# Patient Record
Sex: MALE | Race: WHITE | Employment: UNEMPLOYED | URBAN - NONMETROPOLITAN AREA
[De-identification: names, ages, dates, MRNs, and addresses within clinical notes are randomized per-mention and may not be internally consistent; named-entity substitution may affect disease eponyms.]

---

## 2019-02-07 ENCOUNTER — HOSPITAL ENCOUNTER (EMERGENCY)
Age: 34
Discharge: HOME | End: 2019-02-08
Payer: MEDICARE

## 2019-02-07 VITALS
TEMPERATURE: 98 F | WEIGHT: 185 LBS | DIASTOLIC BLOOD PRESSURE: 71 MMHG | OXYGEN SATURATION: 96 % | HEIGHT: 70 IN | RESPIRATION RATE: 20 BRPM | SYSTOLIC BLOOD PRESSURE: 108 MMHG | BODY MASS INDEX: 26.48 KG/M2 | HEART RATE: 126 BPM

## 2019-02-07 DIAGNOSIS — F41.1 ANXIETY STATE: ICD-10-CM

## 2019-02-07 DIAGNOSIS — R33.9 URINARY RETENTION: Primary | ICD-10-CM

## 2019-02-07 LAB
ALBUMIN SERPL-MCNC: 4.6 G/DL (ref 3.5–5.1)
ALP BLD-CCNC: 74 U/L (ref 38–126)
ALT SERPL-CCNC: 72 U/L (ref 11–66)
AMPHETAMINE+METHAMPHETAMINE URINE SCREEN: NEGATIVE
ANION GAP SERPL CALCULATED.3IONS-SCNC: 17 MEQ/L (ref 8–16)
AST SERPL-CCNC: 68 U/L (ref 5–40)
BACTERIA: NORMAL
BARBITURATE QUANTITATIVE URINE: NEGATIVE
BASOPHILS # BLD: 0.4 %
BASOPHILS ABSOLUTE: 0 THOU/MM3 (ref 0–0.1)
BENZODIAZEPINE QUANTITATIVE URINE: NEGATIVE
BILIRUB SERPL-MCNC: 0.5 MG/DL (ref 0.3–1.2)
BILIRUBIN URINE: NEGATIVE
BLOOD, URINE: NEGATIVE
BUN BLDV-MCNC: 12 MG/DL (ref 7–22)
CALCIUM SERPL-MCNC: 9.8 MG/DL (ref 8.5–10.5)
CANNABINOID QUANTITATIVE URINE: NEGATIVE
CASTS: NORMAL /LPF
CASTS: NORMAL /LPF
CHARACTER, URINE: CLEAR
CHLORIDE BLD-SCNC: 98 MEQ/L (ref 98–111)
CO2: 23 MEQ/L (ref 23–33)
COCAINE METABOLITE QUANTITATIVE URINE: NEGATIVE
COLOR: YELLOW
CREAT SERPL-MCNC: 1.1 MG/DL (ref 0.4–1.2)
CRYSTALS: NORMAL
EKG ATRIAL RATE: 108 BPM
EKG P AXIS: 49 DEGREES
EKG P-R INTERVAL: 160 MS
EKG Q-T INTERVAL: 334 MS
EKG QRS DURATION: 88 MS
EKG QTC CALCULATION (BAZETT): 447 MS
EKG R AXIS: 35 DEGREES
EKG T AXIS: 47 DEGREES
EKG VENTRICULAR RATE: 108 BPM
EOSINOPHIL # BLD: 1.5 %
EOSINOPHILS ABSOLUTE: 0.2 THOU/MM3 (ref 0–0.4)
EPITHELIAL CELLS, UA: NORMAL /HPF
ERYTHROCYTE [DISTWIDTH] IN BLOOD BY AUTOMATED COUNT: 12.7 % (ref 11.5–14.5)
ERYTHROCYTE [DISTWIDTH] IN BLOOD BY AUTOMATED COUNT: 39.4 FL (ref 35–45)
GFR SERPL CREATININE-BSD FRML MDRD: 76 ML/MIN/1.73M2
GLUCOSE BLD-MCNC: 112 MG/DL (ref 70–108)
GLUCOSE, URINE: NEGATIVE MG/DL
HCT VFR BLD CALC: 42.3 % (ref 42–52)
HEMOGLOBIN: 14.2 GM/DL (ref 14–18)
IMMATURE GRANS (ABS): 0.03 THOU/MM3 (ref 0–0.07)
IMMATURE GRANULOCYTES: 0.3 %
KETONES, URINE: NEGATIVE
LEUKOCYTE ESTERASE, URINE: NEGATIVE
LYMPHOCYTES # BLD: 26.8 %
LYMPHOCYTES ABSOLUTE: 2.9 THOU/MM3 (ref 1–4.8)
MCH RBC QN AUTO: 28.9 PG (ref 26–33)
MCHC RBC AUTO-ENTMCNC: 33.6 GM/DL (ref 32.2–35.5)
MCV RBC AUTO: 86 FL (ref 80–94)
MISCELLANEOUS LAB TEST RESULT: NORMAL
MONOCYTES # BLD: 13.2 %
MONOCYTES ABSOLUTE: 1.4 THOU/MM3 (ref 0.4–1.3)
NITRITE, URINE: NEGATIVE
NUCLEATED RED BLOOD CELLS: 0 /100 WBC
OPIATES, URINE: NEGATIVE
OSMOLALITY CALCULATION: 276.2 MOSMOL/KG (ref 275–300)
OXYCODONE: NEGATIVE
PH UA: 5
PHENCYCLIDINE QUANTITATIVE URINE: NEGATIVE
PLATELET # BLD: 306 THOU/MM3 (ref 130–400)
PMV BLD AUTO: 9.7 FL (ref 9.4–12.4)
POTASSIUM SERPL-SCNC: 4.2 MEQ/L (ref 3.5–5.2)
PROTEIN UA: NEGATIVE MG/DL
RBC # BLD: 4.92 MILL/MM3 (ref 4.7–6.1)
RBC URINE: NORMAL /HPF
RENAL EPITHELIAL, UA: NORMAL
SEG NEUTROPHILS: 57.8 %
SEGMENTED NEUTROPHILS ABSOLUTE COUNT: 6.2 THOU/MM3 (ref 1.8–7.7)
SODIUM BLD-SCNC: 138 MEQ/L (ref 135–145)
SPECIFIC GRAVITY UA: 1.02 (ref 1–1.03)
TOTAL PROTEIN: 7.4 G/DL (ref 6.1–8)
TRICHOMONAS, URINE, MALE: NORMAL
UROBILINOGEN, URINE: 0.2 EU/DL
WBC # BLD: 10.8 THOU/MM3 (ref 4.8–10.8)
WBC UA: NORMAL /HPF
YEAST: NORMAL

## 2019-02-07 PROCEDURE — 87086 URINE CULTURE/COLONY COUNT: CPT

## 2019-02-07 PROCEDURE — 80307 DRUG TEST PRSMV CHEM ANLYZR: CPT

## 2019-02-07 PROCEDURE — 99283 EMERGENCY DEPT VISIT LOW MDM: CPT

## 2019-02-07 PROCEDURE — 2709999900 HC NON-CHARGEABLE SUPPLY

## 2019-02-07 PROCEDURE — 85025 COMPLETE CBC W/AUTO DIFF WBC: CPT

## 2019-02-07 PROCEDURE — 6370000000 HC RX 637 (ALT 250 FOR IP): Performed by: PHYSICIAN ASSISTANT

## 2019-02-07 PROCEDURE — 80053 COMPREHEN METABOLIC PANEL: CPT

## 2019-02-07 PROCEDURE — 81001 URINALYSIS AUTO W/SCOPE: CPT

## 2019-02-07 PROCEDURE — 36415 COLL VENOUS BLD VENIPUNCTURE: CPT

## 2019-02-07 PROCEDURE — 87591 N.GONORRHOEAE DNA AMP PROB: CPT

## 2019-02-07 PROCEDURE — 87491 CHLMYD TRACH DNA AMP PROBE: CPT

## 2019-02-07 PROCEDURE — 6360000002 HC RX W HCPCS: Performed by: PHYSICIAN ASSISTANT

## 2019-02-07 PROCEDURE — 93005 ELECTROCARDIOGRAM TRACING: CPT | Performed by: PHYSICIAN ASSISTANT

## 2019-02-07 PROCEDURE — 2580000003 HC RX 258: Performed by: PHYSICIAN ASSISTANT

## 2019-02-07 PROCEDURE — 87210 SMEAR WET MOUNT SALINE/INK: CPT

## 2019-02-07 PROCEDURE — 96374 THER/PROPH/DIAG INJ IV PUSH: CPT

## 2019-02-07 RX ORDER — LORAZEPAM 2 MG/ML
1 INJECTION INTRAMUSCULAR ONCE
Status: COMPLETED | OUTPATIENT
Start: 2019-02-07 | End: 2019-02-07

## 2019-02-07 RX ORDER — DIPHENHYDRAMINE HCL 25 MG
25 TABLET ORAL ONCE
Status: COMPLETED | OUTPATIENT
Start: 2019-02-07 | End: 2019-02-07

## 2019-02-07 RX ORDER — 0.9 % SODIUM CHLORIDE 0.9 %
1000 INTRAVENOUS SOLUTION INTRAVENOUS ONCE
Status: COMPLETED | OUTPATIENT
Start: 2019-02-07 | End: 2019-02-07

## 2019-02-07 RX ADMIN — LORAZEPAM 1 MG: 2 INJECTION INTRAMUSCULAR; INTRAVENOUS at 20:26

## 2019-02-07 RX ADMIN — DIPHENHYDRAMINE HCL 25 MG: 25 TABLET ORAL at 23:55

## 2019-02-07 RX ADMIN — SODIUM CHLORIDE 1000 ML: 9 INJECTION, SOLUTION INTRAVENOUS at 20:17

## 2019-02-07 ASSESSMENT — ENCOUNTER SYMPTOMS
VOMITING: 0
DIARRHEA: 0
COUGH: 0
RHINORRHEA: 0
BACK PAIN: 1
EYE REDNESS: 0
WHEEZING: 0
EYE DISCHARGE: 0
SORE THROAT: 0
SHORTNESS OF BREATH: 0
NAUSEA: 1
ABDOMINAL PAIN: 0

## 2019-02-08 LAB
CHLAMYDIA TRACHOMATIS BY RT-PCR: NOT DETECTED
CT/NG SOURCE: NORMAL
NEISSERIA GONORRHOEAE BY RT-PCR: NOT DETECTED

## 2019-02-08 PROCEDURE — 93010 ELECTROCARDIOGRAM REPORT: CPT | Performed by: INTERNAL MEDICINE

## 2019-02-09 LAB — URINE CULTURE, ROUTINE: NORMAL

## 2019-02-13 ENCOUNTER — HOSPITAL ENCOUNTER (EMERGENCY)
Age: 34
Discharge: HOME | End: 2019-02-13
Attending: EMERGENCY MEDICINE
Payer: MEDICARE

## 2019-02-13 VITALS
DIASTOLIC BLOOD PRESSURE: 88 MMHG | RESPIRATION RATE: 20 BRPM | OXYGEN SATURATION: 98 % | SYSTOLIC BLOOD PRESSURE: 120 MMHG | TEMPERATURE: 100.3 F | HEIGHT: 70 IN | HEART RATE: 126 BPM | BODY MASS INDEX: 26.48 KG/M2 | WEIGHT: 185 LBS

## 2019-02-13 DIAGNOSIS — F11.20 HEROIN ADDICTION (HCC): ICD-10-CM

## 2019-02-13 DIAGNOSIS — L08.9 SKIN PUSTULE: ICD-10-CM

## 2019-02-13 DIAGNOSIS — F11.93 OPIOID WITHDRAWAL (HCC): Primary | ICD-10-CM

## 2019-02-13 LAB
AMPHETAMINE+METHAMPHETAMINE URINE SCREEN: POSITIVE
ANION GAP SERPL CALCULATED.3IONS-SCNC: 18 MEQ/L (ref 8–16)
BACTERIA: ABNORMAL /HPF
BARBITURATE QUANTITATIVE URINE: NEGATIVE
BASOPHILS # BLD: 0.2 %
BASOPHILS ABSOLUTE: 0 THOU/MM3 (ref 0–0.1)
BENZODIAZEPINE QUANTITATIVE URINE: POSITIVE
BILIRUBIN URINE: ABNORMAL
BLOOD, URINE: ABNORMAL
BUN BLDV-MCNC: 21 MG/DL (ref 7–22)
CALCIUM SERPL-MCNC: 9.1 MG/DL (ref 8.5–10.5)
CANNABINOID QUANTITATIVE URINE: NEGATIVE
CASTS 2: ABNORMAL /LPF
CASTS UA: ABNORMAL /LPF
CHARACTER, URINE: CLEAR
CHLORIDE BLD-SCNC: 98 MEQ/L (ref 98–111)
CO2: 21 MEQ/L (ref 23–33)
COCAINE METABOLITE QUANTITATIVE URINE: NEGATIVE
COLOR: YELLOW
CREAT SERPL-MCNC: 0.9 MG/DL (ref 0.4–1.2)
CRYSTALS, UA: ABNORMAL
EOSINOPHIL # BLD: 0 %
EOSINOPHILS ABSOLUTE: 0 THOU/MM3 (ref 0–0.4)
EPITHELIAL CELLS, UA: ABNORMAL /HPF
ERYTHROCYTE [DISTWIDTH] IN BLOOD BY AUTOMATED COUNT: 12.6 % (ref 11.5–14.5)
ERYTHROCYTE [DISTWIDTH] IN BLOOD BY AUTOMATED COUNT: 40.2 FL (ref 35–45)
GFR SERPL CREATININE-BSD FRML MDRD: > 90 ML/MIN/1.73M2
GLUCOSE BLD-MCNC: 86 MG/DL (ref 70–108)
GLUCOSE URINE: NEGATIVE MG/DL
HCT VFR BLD CALC: 37.2 % (ref 42–52)
HEMOGLOBIN: 12.5 GM/DL (ref 14–18)
ICTOTEST: NEGATIVE
IMMATURE GRANS (ABS): 0.24 THOU/MM3 (ref 0–0.07)
IMMATURE GRANULOCYTES: 1.2 %
KETONES, URINE: 80
LEUKOCYTE ESTERASE, URINE: NEGATIVE
LYMPHOCYTES # BLD: 5.4 %
LYMPHOCYTES ABSOLUTE: 1 THOU/MM3 (ref 1–4.8)
MCH RBC QN AUTO: 29.2 PG (ref 26–33)
MCHC RBC AUTO-ENTMCNC: 33.6 GM/DL (ref 32.2–35.5)
MCV RBC AUTO: 86.9 FL (ref 80–94)
MISCELLANEOUS 2: ABNORMAL
MONOCYTES # BLD: 7 %
MONOCYTES ABSOLUTE: 1.4 THOU/MM3 (ref 0.4–1.3)
NITRITE, URINE: NEGATIVE
NUCLEATED RED BLOOD CELLS: 0 /100 WBC
OPIATES, URINE: NEGATIVE
OSMOLALITY CALCULATION: 276.1 MOSMOL/KG (ref 275–300)
OXYCODONE: NEGATIVE
PH UA: 5.5
PHENCYCLIDINE QUANTITATIVE URINE: NEGATIVE
PLATELET # BLD: 273 THOU/MM3 (ref 130–400)
PMV BLD AUTO: 9.9 FL (ref 9.4–12.4)
POTASSIUM SERPL-SCNC: 4.4 MEQ/L (ref 3.5–5.2)
PROTEIN UA: ABNORMAL
RBC # BLD: 4.28 MILL/MM3 (ref 4.7–6.1)
RBC URINE: ABNORMAL /HPF
RENAL EPITHELIAL, UA: ABNORMAL
SEG NEUTROPHILS: 86.2 %
SEGMENTED NEUTROPHILS ABSOLUTE COUNT: 16.7 THOU/MM3 (ref 1.8–7.7)
SODIUM BLD-SCNC: 137 MEQ/L (ref 135–145)
SPECIFIC GRAVITY, URINE: 1.02 (ref 1–1.03)
UROBILINOGEN, URINE: 1 EU/DL
WBC # BLD: 19.4 THOU/MM3 (ref 4.8–10.8)
WBC UA: ABNORMAL /HPF
YEAST: ABNORMAL

## 2019-02-13 PROCEDURE — 81001 URINALYSIS AUTO W/SCOPE: CPT

## 2019-02-13 PROCEDURE — 6360000002 HC RX W HCPCS: Performed by: EMERGENCY MEDICINE

## 2019-02-13 PROCEDURE — 85025 COMPLETE CBC W/AUTO DIFF WBC: CPT

## 2019-02-13 PROCEDURE — 99283 EMERGENCY DEPT VISIT LOW MDM: CPT

## 2019-02-13 PROCEDURE — 80048 BASIC METABOLIC PNL TOTAL CA: CPT

## 2019-02-13 PROCEDURE — 80307 DRUG TEST PRSMV CHEM ANLYZR: CPT

## 2019-02-13 PROCEDURE — 6370000000 HC RX 637 (ALT 250 FOR IP)

## 2019-02-13 PROCEDURE — 2580000003 HC RX 258: Performed by: EMERGENCY MEDICINE

## 2019-02-13 PROCEDURE — 6370000000 HC RX 637 (ALT 250 FOR IP): Performed by: EMERGENCY MEDICINE

## 2019-02-13 PROCEDURE — 36415 COLL VENOUS BLD VENIPUNCTURE: CPT

## 2019-02-13 PROCEDURE — 96374 THER/PROPH/DIAG INJ IV PUSH: CPT

## 2019-02-13 RX ORDER — SULFAMETHOXAZOLE AND TRIMETHOPRIM 800; 160 MG/1; MG/1
1 TABLET ORAL ONCE
Status: COMPLETED | OUTPATIENT
Start: 2019-02-13 | End: 2019-02-13

## 2019-02-13 RX ORDER — IBUPROFEN 800 MG/1
TABLET ORAL
Status: COMPLETED
Start: 2019-02-13 | End: 2019-02-13

## 2019-02-13 RX ORDER — HYDROXYZINE PAMOATE 50 MG/1
50 CAPSULE ORAL NIGHTLY
COMMUNITY
End: 2019-09-18

## 2019-02-13 RX ORDER — SODIUM CHLORIDE 9 MG/ML
INJECTION, SOLUTION INTRAVENOUS CONTINUOUS
Status: DISCONTINUED | OUTPATIENT
Start: 2019-02-13 | End: 2019-02-13 | Stop reason: HOSPADM

## 2019-02-13 RX ORDER — BUPRENORPHINE AND NALOXONE 8; 2 MG/1; MG/1
1 FILM, SOLUBLE BUCCAL; SUBLINGUAL 2 TIMES DAILY
COMMUNITY
End: 2019-08-30 | Stop reason: SDUPTHER

## 2019-02-13 RX ORDER — MAGNESIUM HYDROXIDE/ALUMINUM HYDROXICE/SIMETHICONE 120; 1200; 1200 MG/30ML; MG/30ML; MG/30ML
30 SUSPENSION ORAL ONCE
Status: COMPLETED | OUTPATIENT
Start: 2019-02-13 | End: 2019-02-13

## 2019-02-13 RX ORDER — VENLAFAXINE HYDROCHLORIDE 150 MG/1
150 CAPSULE, EXTENDED RELEASE ORAL DAILY
COMMUNITY
End: 2019-09-25

## 2019-02-13 RX ORDER — ONDANSETRON 4 MG/1
TABLET, ORALLY DISINTEGRATING ORAL
Status: COMPLETED
Start: 2019-02-13 | End: 2019-02-13

## 2019-02-13 RX ORDER — HYDROXYZINE PAMOATE 25 MG/1
50 CAPSULE ORAL ONCE
Status: COMPLETED | OUTPATIENT
Start: 2019-02-13 | End: 2019-02-13

## 2019-02-13 RX ORDER — CEPHALEXIN 500 MG/1
500 CAPSULE ORAL 3 TIMES DAILY
Qty: 30 CAPSULE | Refills: 0 | Status: SHIPPED | OUTPATIENT
Start: 2019-02-13 | End: 2019-09-03

## 2019-02-13 RX ORDER — MIRTAZAPINE 45 MG/1
45 TABLET, FILM COATED ORAL NIGHTLY
COMMUNITY
End: 2019-11-20 | Stop reason: SDUPTHER

## 2019-02-13 RX ORDER — ONDANSETRON 4 MG/1
4 TABLET, ORALLY DISINTEGRATING ORAL ONCE
Status: COMPLETED | OUTPATIENT
Start: 2019-02-13 | End: 2019-02-13

## 2019-02-13 RX ORDER — SULFAMETHOXAZOLE AND TRIMETHOPRIM 800; 160 MG/1; MG/1
1 TABLET ORAL 2 TIMES DAILY
Qty: 20 TABLET | Refills: 0 | Status: SHIPPED | OUTPATIENT
Start: 2019-02-13 | End: 2019-02-23

## 2019-02-13 RX ORDER — BUSPIRONE HYDROCHLORIDE 30 MG/1
30 TABLET ORAL DAILY
COMMUNITY
End: 2019-11-20 | Stop reason: SDUPTHER

## 2019-02-13 RX ORDER — IBUPROFEN 800 MG/1
800 TABLET ORAL ONCE
Status: COMPLETED | OUTPATIENT
Start: 2019-02-13 | End: 2019-02-13

## 2019-02-13 RX ORDER — 0.9 % SODIUM CHLORIDE 0.9 %
500 INTRAVENOUS SOLUTION INTRAVENOUS ONCE
Status: COMPLETED | OUTPATIENT
Start: 2019-02-13 | End: 2019-02-13

## 2019-02-13 RX ORDER — LORAZEPAM 2 MG/ML
1 INJECTION INTRAMUSCULAR ONCE
Status: COMPLETED | OUTPATIENT
Start: 2019-02-13 | End: 2019-02-13

## 2019-02-13 RX ADMIN — SULFAMETHOXAZOLE AND TRIMETHOPRIM 1 TABLET: 800; 160 TABLET ORAL at 13:47

## 2019-02-13 RX ADMIN — HYDROXYZINE PAMOATE 50 MG: 25 CAPSULE ORAL at 13:47

## 2019-02-13 RX ADMIN — LORAZEPAM 1 MG: 2 INJECTION INTRAMUSCULAR; INTRAVENOUS at 15:45

## 2019-02-13 RX ADMIN — IBUPROFEN 800 MG: 800 TABLET ORAL at 13:52

## 2019-02-13 RX ADMIN — ALUMINUM HYDROXIDE, MAGNESIUM HYDROXIDE, AND SIMETHICONE 30 ML: 200; 200; 20 SUSPENSION ORAL at 16:03

## 2019-02-13 RX ADMIN — ONDANSETRON 4 MG: 4 TABLET, ORALLY DISINTEGRATING ORAL at 13:52

## 2019-02-13 RX ADMIN — SODIUM CHLORIDE 500 ML: 9 INJECTION, SOLUTION INTRAVENOUS at 15:05

## 2019-02-13 ASSESSMENT — PAIN SCALES - GENERAL
PAINLEVEL_OUTOF10: 7
PAINLEVEL_OUTOF10: 7
PAINLEVEL_OUTOF10: 5

## 2019-02-13 ASSESSMENT — ENCOUNTER SYMPTOMS
CHEST TIGHTNESS: 0
SHORTNESS OF BREATH: 1
COUGH: 1
PHOTOPHOBIA: 0
VOMITING: 0
BLOOD IN STOOL: 0
RHINORRHEA: 0
EYE PAIN: 0
ABDOMINAL PAIN: 0
NAUSEA: 1
WHEEZING: 0
SORE THROAT: 0
BACK PAIN: 1
CONSTIPATION: 0
DIARRHEA: 0

## 2019-02-13 ASSESSMENT — PAIN DESCRIPTION - LOCATION
LOCATION: BACK
LOCATION: BACK

## 2019-02-13 ASSESSMENT — PAIN DESCRIPTION - DIRECTION: RADIATING_TOWARDS: FLANKS

## 2019-02-13 ASSESSMENT — PAIN DESCRIPTION - ORIENTATION
ORIENTATION: LOWER
ORIENTATION: LOWER

## 2019-02-13 ASSESSMENT — PAIN DESCRIPTION - ONSET
ONSET: ON-GOING
ONSET: ON-GOING

## 2019-02-13 ASSESSMENT — PAIN DESCRIPTION - PAIN TYPE
TYPE: CHRONIC PAIN
TYPE: CHRONIC PAIN

## 2019-02-13 ASSESSMENT — PAIN DESCRIPTION - DESCRIPTORS
DESCRIPTORS: ACHING;DULL;STABBING
DESCRIPTORS: ACHING;SHARP;STABBING

## 2019-02-13 ASSESSMENT — PAIN DESCRIPTION - PROGRESSION: CLINICAL_PROGRESSION: GRADUALLY IMPROVING

## 2019-04-25 ENCOUNTER — HOSPITAL ENCOUNTER (OUTPATIENT)
Age: 34
Discharge: HOME | End: 2019-04-25
Payer: MEDICARE

## 2019-04-25 LAB
ALBUMIN SERPL-MCNC: 4 G/DL (ref 3.5–5.1)
ALP BLD-CCNC: 82 U/L (ref 38–126)
ALT SERPL-CCNC: 58 U/L (ref 11–66)
ANION GAP SERPL CALCULATED.3IONS-SCNC: 14 MEQ/L (ref 8–16)
AST SERPL-CCNC: 45 U/L (ref 5–40)
BASOPHILS # BLD: 0.4 %
BASOPHILS ABSOLUTE: 0 THOU/MM3 (ref 0–0.1)
BILIRUB SERPL-MCNC: < 0.2 MG/DL (ref 0.3–1.2)
BUN BLDV-MCNC: 13 MG/DL (ref 7–22)
CALCIUM SERPL-MCNC: 9.5 MG/DL (ref 8.5–10.5)
CHLORIDE BLD-SCNC: 102 MEQ/L (ref 98–111)
CO2: 25 MEQ/L (ref 23–33)
CREAT SERPL-MCNC: 1 MG/DL (ref 0.4–1.2)
EOSINOPHIL # BLD: 3 %
EOSINOPHILS ABSOLUTE: 0.2 THOU/MM3 (ref 0–0.4)
ERYTHROCYTE [DISTWIDTH] IN BLOOD BY AUTOMATED COUNT: 14.2 % (ref 11.5–14.5)
ERYTHROCYTE [DISTWIDTH] IN BLOOD BY AUTOMATED COUNT: 45.1 FL (ref 35–45)
GFR SERPL CREATININE-BSD FRML MDRD: 85 ML/MIN/1.73M2
GLUCOSE BLD-MCNC: 109 MG/DL (ref 70–108)
HCT VFR BLD CALC: 41.2 % (ref 42–52)
HEMOGLOBIN: 13.5 GM/DL (ref 14–18)
HEPATITIS C ANTIBODY: ABNORMAL
IMMATURE GRANS (ABS): 0.03 THOU/MM3 (ref 0–0.07)
IMMATURE GRANULOCYTES: 0.4 %
LYMPHOCYTES # BLD: 33 %
LYMPHOCYTES ABSOLUTE: 2.2 THOU/MM3 (ref 1–4.8)
MCH RBC QN AUTO: 28.8 PG (ref 26–33)
MCHC RBC AUTO-ENTMCNC: 32.8 GM/DL (ref 32.2–35.5)
MCV RBC AUTO: 87.8 FL (ref 80–94)
MONOCYTES # BLD: 8.2 %
MONOCYTES ABSOLUTE: 0.5 THOU/MM3 (ref 0.4–1.3)
NUCLEATED RED BLOOD CELLS: 0 /100 WBC
PLATELET # BLD: 316 THOU/MM3 (ref 130–400)
PMV BLD AUTO: 10 FL (ref 9.4–12.4)
POTASSIUM SERPL-SCNC: 4.3 MEQ/L (ref 3.5–5.2)
PROSTATE SPECIFIC ANTIGEN: 0.98 NG/ML (ref 0–1)
RBC # BLD: 4.69 MILL/MM3 (ref 4.7–6.1)
SEG NEUTROPHILS: 55 %
SEGMENTED NEUTROPHILS ABSOLUTE COUNT: 3.7 THOU/MM3 (ref 1.8–7.7)
SODIUM BLD-SCNC: 141 MEQ/L (ref 135–145)
TOTAL PROTEIN: 7.5 G/DL (ref 6.1–8)
TSH SERPL DL<=0.05 MIU/L-ACNC: 0.66 UIU/ML (ref 0.4–4.2)
WBC # BLD: 6.7 THOU/MM3 (ref 4.8–10.8)

## 2019-04-25 PROCEDURE — 84443 ASSAY THYROID STIM HORMONE: CPT

## 2019-04-25 PROCEDURE — 84460 ALANINE AMINO (ALT) (SGPT): CPT

## 2019-04-25 PROCEDURE — 80053 COMPREHEN METABOLIC PANEL: CPT

## 2019-04-25 PROCEDURE — 85025 COMPLETE CBC W/AUTO DIFF WBC: CPT

## 2019-04-25 PROCEDURE — 83883 ASSAY NEPHELOMETRY NOT SPEC: CPT

## 2019-04-25 PROCEDURE — 84450 TRANSFERASE (AST) (SGOT): CPT

## 2019-04-25 PROCEDURE — 84520 ASSAY OF UREA NITROGEN: CPT

## 2019-04-25 PROCEDURE — 87522 HEPATITIS C REVRS TRNSCRPJ: CPT

## 2019-04-25 PROCEDURE — 86803 HEPATITIS C AB TEST: CPT

## 2019-04-25 PROCEDURE — 87902 NFCT AGT GNTYP ALYS HEP C: CPT

## 2019-04-25 PROCEDURE — 82977 ASSAY OF GGT: CPT

## 2019-04-25 PROCEDURE — 87389 HIV-1 AG W/HIV-1&-2 AB AG IA: CPT

## 2019-04-25 PROCEDURE — 36415 COLL VENOUS BLD VENIPUNCTURE: CPT

## 2019-04-25 PROCEDURE — G0103 PSA SCREENING: HCPCS

## 2019-04-27 LAB
HCV QNT BY NAAT INTERPRETATION: DETECTED
HCV QNT BY NAAT IU/ML: ABNORMAL IU/ML
HCV QNT BY NAAT LOG IU/ML: 5.88 LOG IU/ML

## 2019-04-29 LAB
ALANINE AMINOTRANSFERASE, FIBROMETER: 73 U/L (ref 5–50)
ALPHA-2-MACROGLOBULIN, FIBROMETER: 246 MG/DL (ref 131–293)
ASPARTATE AMINOTRANSFERASE, FIBROMETER: 51 U/L (ref 9–50)
CIRRHOMETER PATIENT SCORE: 0
EER FIBROMETER REPORT: ABNORMAL
FIBROMETER INTERPRETATION: ABNORMAL
FIBROMETER PATIENT SCORE: 0.23
FIBROMETER PLATELET COUNT: 316 K/UL
FIBROMETER PROTHROMBIN INDEX: 102 % (ref 90–120)
FIBROSIS METAVIR CLASSIFICATION: ABNORMAL
GAMMA GLUTAMYL TRANSFERASE, FIBROMETER: 59 U/L (ref 7–51)
INFLAMETER METAVIR CLASSIFICATION: ABNORMAL
INFLAMETER PATIENT SCORE: 0.3
UREA NITROGEN, FIBROMETER: 14 MG/DL (ref 7–20)

## 2019-05-02 LAB
HCV GENOTYPE: NORMAL
HIV-2 AB: NEGATIVE

## 2019-07-22 ENCOUNTER — HOSPITAL ENCOUNTER (EMERGENCY)
Age: 34
Discharge: HOME | End: 2019-07-22
Payer: MEDICARE

## 2019-07-22 VITALS
SYSTOLIC BLOOD PRESSURE: 135 MMHG | HEART RATE: 98 BPM | RESPIRATION RATE: 18 BRPM | OXYGEN SATURATION: 100 % | DIASTOLIC BLOOD PRESSURE: 93 MMHG | TEMPERATURE: 98.2 F

## 2019-07-22 DIAGNOSIS — L03.90 CELLULITIS, UNSPECIFIED CELLULITIS SITE: Primary | ICD-10-CM

## 2019-07-22 PROCEDURE — 99282 EMERGENCY DEPT VISIT SF MDM: CPT

## 2019-07-22 PROCEDURE — 2709999900 HC NON-CHARGEABLE SUPPLY

## 2019-07-22 PROCEDURE — 6370000000 HC RX 637 (ALT 250 FOR IP): Performed by: NURSE PRACTITIONER

## 2019-07-22 RX ORDER — NAPROXEN 500 MG/1
500 TABLET ORAL 2 TIMES DAILY
Qty: 60 TABLET | Refills: 0 | Status: ON HOLD | OUTPATIENT
Start: 2019-07-22 | End: 2020-05-21 | Stop reason: HOSPADM

## 2019-07-22 RX ORDER — DOXYCYCLINE 100 MG/1
100 CAPSULE ORAL 2 TIMES DAILY
Qty: 20 CAPSULE | Refills: 0 | Status: SHIPPED | OUTPATIENT
Start: 2019-07-22 | End: 2019-08-01

## 2019-07-22 RX ORDER — NAPROXEN 250 MG/1
250 TABLET ORAL ONCE
Status: COMPLETED | OUTPATIENT
Start: 2019-07-22 | End: 2019-07-22

## 2019-07-22 RX ORDER — DOXYCYCLINE HYCLATE 100 MG
100 TABLET ORAL ONCE
Status: COMPLETED | OUTPATIENT
Start: 2019-07-22 | End: 2019-07-22

## 2019-07-22 RX ADMIN — NAPROXEN 250 MG: 250 TABLET ORAL at 23:39

## 2019-07-22 RX ADMIN — DOXYCYCLINE HYCLATE 100 MG: 100 TABLET, COATED ORAL at 23:13

## 2019-07-22 ASSESSMENT — PAIN SCALES - GENERAL
PAINLEVEL_OUTOF10: 7
PAINLEVEL_OUTOF10: 7

## 2019-07-22 ASSESSMENT — ENCOUNTER SYMPTOMS
DIARRHEA: 0
ABDOMINAL PAIN: 0
NAUSEA: 0
BACK PAIN: 0
VOMITING: 0

## 2019-07-22 ASSESSMENT — PAIN DESCRIPTION - PAIN TYPE: TYPE: ACUTE PAIN

## 2019-07-23 NOTE — ED PROVIDER NOTES
(MONODOX) 100 MG capsule Take 1 capsule by mouth 2 times daily for 10 days, Disp-20 capsule, R-0Print      naproxen (NAPROSYN) 500 MG tablet Take 1 tablet by mouth 2 times daily, Disp-60 tablet, R-0Print             (Please note that portions of this note were completed with a voice recognition program.Efforts were made to edit the dictations but occasionally words are mis-transcribed.)    The patient was given an opportunity to see the EmergencyAttending. The patient voiced understanding that I was a Mid-Level Provider and was in agreement with being seenindependently by myself. Scribe: Angelo Navarroer 7/22/19 10:43 PM Scribing for and in the [address] [place] [address] NAI [place]. .    Signed by:Casandra Ibanez, 07/23/19 6:58 PM    Provider:  I personally performed the services described in the documentation, reviewed and edited thedocumentation which was dictated to the scribe in my presence, and it accurately records my words andactions.     Ge Alonzo CNP. 7/22/19 6:58 PM        PAPA Carroll - NAI  07/23/19 [id number]

## 2019-08-24 ENCOUNTER — HOSPITAL ENCOUNTER (EMERGENCY)
Age: 34
Discharge: HOME | DRG: 812 | End: 2019-08-24
Payer: MEDICARE

## 2019-08-24 VITALS
TEMPERATURE: 97.6 F | OXYGEN SATURATION: 98 % | RESPIRATION RATE: 26 BRPM | DIASTOLIC BLOOD PRESSURE: 80 MMHG | SYSTOLIC BLOOD PRESSURE: 126 MMHG | BODY MASS INDEX: 28.7 KG/M2 | HEART RATE: 108 BPM | WEIGHT: 200 LBS

## 2019-08-24 DIAGNOSIS — F19.20 POLYSUBSTANCE (INCLUDING OPIOIDS) DEPENDENCE, DAILY USE (HCC): Primary | ICD-10-CM

## 2019-08-24 DIAGNOSIS — F11.20 POLYSUBSTANCE (INCLUDING OPIOIDS) DEPENDENCE, DAILY USE (HCC): Primary | ICD-10-CM

## 2019-08-24 LAB
ACETAMINOPHEN LEVEL: < 5 UG/ML (ref 0–20)
ALBUMIN SERPL-MCNC: 4.8 G/DL (ref 3.5–5.1)
ALP BLD-CCNC: 66 U/L (ref 38–126)
ALT SERPL-CCNC: 37 U/L (ref 11–66)
ANION GAP SERPL CALCULATED.3IONS-SCNC: 16 MEQ/L (ref 8–16)
AST SERPL-CCNC: 35 U/L (ref 5–40)
BASOPHILS # BLD: 0.4 %
BASOPHILS ABSOLUTE: 0 THOU/MM3 (ref 0–0.1)
BILIRUB SERPL-MCNC: 0.3 MG/DL (ref 0.3–1.2)
BILIRUBIN DIRECT: < 0.2 MG/DL (ref 0–0.3)
BUN BLDV-MCNC: 18 MG/DL (ref 7–22)
CALCIUM SERPL-MCNC: 10.3 MG/DL (ref 8.5–10.5)
CHLORIDE BLD-SCNC: 106 MEQ/L (ref 98–111)
CO2: 22 MEQ/L (ref 23–33)
CREAT SERPL-MCNC: 2.4 MG/DL (ref 0.4–1.2)
EOSINOPHIL # BLD: 0.9 %
EOSINOPHILS ABSOLUTE: 0.1 THOU/MM3 (ref 0–0.4)
ERYTHROCYTE [DISTWIDTH] IN BLOOD BY AUTOMATED COUNT: 13.7 % (ref 11.5–14.5)
ERYTHROCYTE [DISTWIDTH] IN BLOOD BY AUTOMATED COUNT: 44.5 FL (ref 35–45)
ETHYL ALCOHOL, SERUM: < 0.01 %
GFR SERPL CREATININE-BSD FRML MDRD: 31 ML/MIN/1.73M2
GLUCOSE BLD-MCNC: 106 MG/DL (ref 70–108)
HCT VFR BLD CALC: 43.1 % (ref 42–52)
HEMOGLOBIN: 14.1 GM/DL (ref 14–18)
IMMATURE GRANS (ABS): 0.02 THOU/MM3 (ref 0–0.07)
IMMATURE GRANULOCYTES: 0 %
LYMPHOCYTES # BLD: 24.1 %
LYMPHOCYTES ABSOLUTE: 2.4 THOU/MM3 (ref 1–4.8)
MCH RBC QN AUTO: 29.2 PG (ref 26–33)
MCHC RBC AUTO-ENTMCNC: 32.7 GM/DL (ref 32.2–35.5)
MCV RBC AUTO: 89.2 FL (ref 80–94)
MONOCYTES # BLD: 12.1 %
MONOCYTES ABSOLUTE: 1.2 THOU/MM3 (ref 0.4–1.3)
NUCLEATED RED BLOOD CELLS: 0 /100 WBC
OSMOLALITY CALCULATION: 289.2 MOSMOL/KG (ref 275–300)
PLATELET # BLD: 302 THOU/MM3 (ref 130–400)
PMV BLD AUTO: 9.6 FL (ref 9.4–12.4)
POTASSIUM SERPL-SCNC: 4 MEQ/L (ref 3.5–5.2)
RBC # BLD: 4.83 MILL/MM3 (ref 4.7–6.1)
SALICYLATE, SERUM: 3.3 MG/DL (ref 2–10)
SEG NEUTROPHILS: 62.3 %
SEGMENTED NEUTROPHILS ABSOLUTE COUNT: 6.3 THOU/MM3 (ref 1.8–7.7)
SODIUM BLD-SCNC: 144 MEQ/L (ref 135–145)
TOTAL PROTEIN: 8.3 G/DL (ref 6.1–8)
WBC # BLD: 10.1 THOU/MM3 (ref 4.8–10.8)

## 2019-08-24 PROCEDURE — 82248 BILIRUBIN DIRECT: CPT

## 2019-08-24 PROCEDURE — G0480 DRUG TEST DEF 1-7 CLASSES: HCPCS

## 2019-08-24 PROCEDURE — 85025 COMPLETE CBC W/AUTO DIFF WBC: CPT

## 2019-08-24 PROCEDURE — 80053 COMPREHEN METABOLIC PANEL: CPT

## 2019-08-24 PROCEDURE — 99284 EMERGENCY DEPT VISIT MOD MDM: CPT

## 2019-08-24 PROCEDURE — 36415 COLL VENOUS BLD VENIPUNCTURE: CPT

## 2019-08-24 RX ORDER — HYDROXYZINE HYDROCHLORIDE 50 MG/ML
50 INJECTION, SOLUTION INTRAMUSCULAR ONCE
Status: DISCONTINUED | OUTPATIENT
Start: 2019-08-24 | End: 2019-08-24 | Stop reason: HOSPADM

## 2019-08-24 ASSESSMENT — ENCOUNTER SYMPTOMS
ABDOMINAL PAIN: 0
EYE REDNESS: 0
BLOOD IN STOOL: 0
VOICE CHANGE: 0
CONSTIPATION: 0
WHEEZING: 0
SINUS PRESSURE: 0
DIARRHEA: 0
SORE THROAT: 0
SHORTNESS OF BREATH: 0
BACK PAIN: 0
ABDOMINAL DISTENTION: 0
COUGH: 0
PHOTOPHOBIA: 0
NAUSEA: 1
CHEST TIGHTNESS: 0
COLOR CHANGE: 0
RHINORRHEA: 0
VOMITING: 0

## 2019-08-24 NOTE — ED PROVIDER NOTES
List as of 8/24/2019  5:29 PM          (Please note that portions of this note were completed with a voice recognition program.  Efforts were made to edit the dictations but occasionally words are mis-transcribed.)    Scribe:  Samir Sanches 8/24/19 4:34 PM Scribing for and in the presence of Jeremiah Perez CNP. Signed by: Bassem Silva, 08/24/19 6:11 PM    Provider:  I personally performed the services described in the documentation,reviewed and edited the documentation which was dictated to the scribe in my presence, and it accurately records my words and actions.     Jeremiah Perez CNP 08/24/19 6:11 PM    Reji Perez, APRN - NAI         [employer], PAPA - CNP  08/24/19 1281

## 2019-08-24 NOTE — ED TRIAGE NOTES
Patient presents to the ED via [employer] with concerns of smoking joanne laced with meth. Patient denies any pain. Patient is fidgety and can not sit still. He states he knew it was laced and smoked it anyways. Patient states he was clean for about 6 months. He would like to more information about the detox program. VSS.

## 2019-08-25 ENCOUNTER — HOSPITAL ENCOUNTER (INPATIENT)
Age: 34
LOS: 2 days | Discharge: LEFT BEFORE DISCHARGE | DRG: 812 | End: 2019-08-27
Attending: FAMILY MEDICINE | Admitting: HOSPITALIST
Payer: MEDICARE

## 2019-08-25 ENCOUNTER — HOSPITAL ENCOUNTER (EMERGENCY)
Age: 34
Discharge: HOME | DRG: 812 | End: 2019-08-25
Payer: MEDICARE

## 2019-08-25 ENCOUNTER — APPOINTMENT (OUTPATIENT)
Dept: GENERAL RADIOLOGY | Age: 34
DRG: 812 | End: 2019-08-25
Payer: MEDICARE

## 2019-08-25 VITALS
SYSTOLIC BLOOD PRESSURE: 119 MMHG | TEMPERATURE: 98.6 F | HEART RATE: 144 BPM | DIASTOLIC BLOOD PRESSURE: 79 MMHG | RESPIRATION RATE: 16 BRPM

## 2019-08-25 DIAGNOSIS — F15.10 METHAMPHETAMINE ABUSE (HCC): ICD-10-CM

## 2019-08-25 DIAGNOSIS — N17.9 AKI (ACUTE KIDNEY INJURY) (HCC): ICD-10-CM

## 2019-08-25 DIAGNOSIS — M62.82 NON-TRAUMATIC RHABDOMYOLYSIS: ICD-10-CM

## 2019-08-25 DIAGNOSIS — F41.1 ANXIETY STATE: ICD-10-CM

## 2019-08-25 DIAGNOSIS — T50.901A ACCIDENTAL DRUG OVERDOSE, INITIAL ENCOUNTER: Primary | ICD-10-CM

## 2019-08-25 DIAGNOSIS — F19.10 POLYSUBSTANCE ABUSE (HCC): Primary | ICD-10-CM

## 2019-08-25 DIAGNOSIS — N17.9 ACUTE KIDNEY INJURY (HCC): ICD-10-CM

## 2019-08-25 PROBLEM — T50.902A DRUG OVERDOSE, INTENTIONAL (HCC): Status: ACTIVE | Noted: 2019-08-25

## 2019-08-25 PROBLEM — T50.902A DRUG OVERDOSE, INTENTIONAL, INITIAL ENCOUNTER (HCC): Status: ACTIVE | Noted: 2019-08-25

## 2019-08-25 PROBLEM — E87.20 METABOLIC ACIDOSIS: Status: ACTIVE | Noted: 2019-08-25

## 2019-08-25 LAB
ACETAMINOPHEN LEVEL: < 5 UG/ML (ref 0–20)
ACETAMINOPHEN LEVEL: < 5 UG/ML (ref 0–20)
ALBUMIN SERPL-MCNC: 5.2 G/DL (ref 3.5–5.1)
ALLEN TEST: POSITIVE
ALP BLD-CCNC: 74 U/L (ref 38–126)
ALT SERPL-CCNC: 35 U/L (ref 11–66)
AMPHETAMINE+METHAMPHETAMINE URINE SCREEN: POSITIVE
ANION GAP SERPL CALCULATED.3IONS-SCNC: 23 MEQ/L (ref 8–16)
ANION GAP SERPL CALCULATED.3IONS-SCNC: 24 MEQ/L (ref 8–16)
AST SERPL-CCNC: 40 U/L (ref 5–40)
BACTERIA: ABNORMAL /HPF
BARBITURATE QUANTITATIVE URINE: NEGATIVE
BASE EXCESS (CALCULATED): -6.5 MMOL/L (ref -2.5–2.5)
BASOPHILS # BLD: 0.3 %
BASOPHILS ABSOLUTE: 0.1 THOU/MM3 (ref 0–0.1)
BENZODIAZEPINE QUANTITATIVE URINE: NEGATIVE
BILIRUB SERPL-MCNC: 0.4 MG/DL (ref 0.3–1.2)
BILIRUBIN DIRECT: < 0.2 MG/DL (ref 0–0.3)
BILIRUBIN URINE: ABNORMAL
BLOOD, URINE: ABNORMAL
BUN BLDV-MCNC: 30 MG/DL (ref 7–22)
BUN BLDV-MCNC: 37 MG/DL (ref 7–22)
CALCIUM SERPL-MCNC: 10 MG/DL (ref 8.5–10.5)
CALCIUM SERPL-MCNC: 10.6 MG/DL (ref 8.5–10.5)
CANNABINOID QUANTITATIVE URINE: NEGATIVE
CASTS UA: ABNORMAL /LPF
CHARACTER, URINE: ABNORMAL
CHLORIDE BLD-SCNC: 96 MEQ/L (ref 98–111)
CHLORIDE BLD-SCNC: 99 MEQ/L (ref 98–111)
CO2: 18 MEQ/L (ref 23–33)
CO2: 23 MEQ/L (ref 23–33)
COCAINE METABOLITE QUANTITATIVE URINE: POSITIVE
COLLECTED BY:: ABNORMAL
COLOR: ABNORMAL
CREAT SERPL-MCNC: 6.2 MG/DL (ref 0.4–1.2)
CREAT SERPL-MCNC: 6.3 MG/DL (ref 0.4–1.2)
CRYSTALS, UA: ABNORMAL
DEVICE: ABNORMAL
EKG ATRIAL RATE: 103 BPM
EKG P AXIS: 56 DEGREES
EKG P-R INTERVAL: 156 MS
EKG Q-T INTERVAL: 340 MS
EKG QRS DURATION: 88 MS
EKG QTC CALCULATION (BAZETT): 445 MS
EKG R AXIS: 43 DEGREES
EKG T AXIS: 47 DEGREES
EKG VENTRICULAR RATE: 103 BPM
EOSINOPHIL # BLD: 0.1 %
EOSINOPHILS ABSOLUTE: 0 THOU/MM3 (ref 0–0.4)
EPITHELIAL CELLS, UA: ABNORMAL /HPF
ERYTHROCYTE [DISTWIDTH] IN BLOOD BY AUTOMATED COUNT: 13.6 % (ref 11.5–14.5)
ERYTHROCYTE [DISTWIDTH] IN BLOOD BY AUTOMATED COUNT: 44.3 FL (ref 35–45)
ETHYL ALCOHOL, SERUM: < 0.01 %
ETHYL ALCOHOL, SERUM: < 0.01 %
GFR SERPL CREATININE-BSD FRML MDRD: 10 ML/MIN/1.73M2
GFR SERPL CREATININE-BSD FRML MDRD: 10 ML/MIN/1.73M2
GLUCOSE BLD-MCNC: 113 MG/DL (ref 70–108)
GLUCOSE BLD-MCNC: 125 MG/DL (ref 70–108)
GLUCOSE URINE: NEGATIVE MG/DL
HCO3: 20 MMOL/L (ref 23–28)
HCT VFR BLD CALC: 42.5 % (ref 42–52)
HEMOGLOBIN: 13.8 GM/DL (ref 14–18)
ICTOTEST: NEGATIVE
IMMATURE GRANS (ABS): 0.12 THOU/MM3 (ref 0–0.07)
IMMATURE GRANULOCYTES: 1 %
KETONES, URINE: ABNORMAL
LACTIC ACID: 1.5 MMOL/L (ref 0.5–2.2)
LEUKOCYTE ESTERASE, URINE: ABNORMAL
LIPASE: 8.4 U/L (ref 5.6–51.3)
LYMPHOCYTES # BLD: 12.9 %
LYMPHOCYTES ABSOLUTE: 2.7 THOU/MM3 (ref 1–4.8)
MAGNESIUM: 2.2 MG/DL (ref 1.6–2.4)
MCH RBC QN AUTO: 29.1 PG (ref 26–33)
MCHC RBC AUTO-ENTMCNC: 32.5 GM/DL (ref 32.2–35.5)
MCV RBC AUTO: 89.5 FL (ref 80–94)
MONOCYTES # BLD: 9.7 %
MONOCYTES ABSOLUTE: 2.1 THOU/MM3 (ref 0.4–1.3)
MUCUS: ABNORMAL
NITRITE, URINE: NEGATIVE
NUCLEATED RED BLOOD CELLS: 0 /100 WBC
O2 SATURATION: 87 %
OPIATES, URINE: NEGATIVE
OSMOLALITY CALCULATION: 285.2 MOSMOL/KG (ref 275–300)
OSMOLALITY CALCULATION: 296.4 MOSMOL/KG (ref 275–300)
OXYCODONE: NEGATIVE
PCO2: 40 MMHG (ref 35–45)
PH BLOOD GAS: 7.3 (ref 7.35–7.45)
PH UA: 5 (ref 5–9)
PHENCYCLIDINE QUANTITATIVE URINE: NEGATIVE
PLATELET # BLD: 323 THOU/MM3 (ref 130–400)
PMV BLD AUTO: 9.7 FL (ref 9.4–12.4)
PO2: 58 MMHG (ref 71–104)
POTASSIUM SERPL-SCNC: 4.4 MEQ/L (ref 3.5–5.2)
POTASSIUM SERPL-SCNC: 4.8 MEQ/L (ref 3.5–5.2)
PROCALCITONIN: 1.67 NG/ML (ref 0.01–0.09)
PROTEIN UA: 100
RBC # BLD: 4.75 MILL/MM3 (ref 4.7–6.1)
RBC URINE: ABNORMAL /HPF
SALICYLATE, SERUM: 1 MG/DL (ref 2–10)
SALICYLATE, SERUM: < 0.3 MG/DL (ref 2–10)
SCAN OF BLOOD SMEAR: NORMAL
SCAN OF BLOOD SMEAR: NORMAL
SEG NEUTROPHILS: 76.4 %
SEGMENTED NEUTROPHILS ABSOLUTE COUNT: 16.3 THOU/MM3 (ref 1.8–7.7)
SODIUM BLD-SCNC: 138 MEQ/L (ref 135–145)
SODIUM BLD-SCNC: 145 MEQ/L (ref 135–145)
SOURCE, BLOOD GAS: ABNORMAL
SPECIFIC GRAVITY, URINE: 1.02 (ref 1–1.03)
TOTAL CK: 1022 U/L (ref 55–170)
TOTAL PROTEIN: 8.9 G/DL (ref 6.1–8)
TROPONIN T: < 0.01 NG/ML
TSH SERPL DL<=0.05 MIU/L-ACNC: 0.92 UIU/ML (ref 0.4–4.2)
UROBILINOGEN, URINE: 0.2 EU/DL (ref 0–1)
WBC # BLD: 21.3 THOU/MM3 (ref 4.8–10.8)
WBC UA: ABNORMAL /HPF

## 2019-08-25 PROCEDURE — 2140000000 HC CCU INTERMEDIATE R&B

## 2019-08-25 PROCEDURE — 99223 1ST HOSP IP/OBS HIGH 75: CPT | Performed by: HOSPITALIST

## 2019-08-25 PROCEDURE — 36600 WITHDRAWAL OF ARTERIAL BLOOD: CPT

## 2019-08-25 PROCEDURE — 6360000002 HC RX W HCPCS: Performed by: FAMILY MEDICINE

## 2019-08-25 PROCEDURE — 80048 BASIC METABOLIC PNL TOTAL CA: CPT

## 2019-08-25 PROCEDURE — 81001 URINALYSIS AUTO W/SCOPE: CPT

## 2019-08-25 PROCEDURE — 2580000003 HC RX 258: Performed by: HOSPITALIST

## 2019-08-25 PROCEDURE — 82248 BILIRUBIN DIRECT: CPT

## 2019-08-25 PROCEDURE — 85025 COMPLETE CBC W/AUTO DIFF WBC: CPT

## 2019-08-25 PROCEDURE — 2709999900 HC NON-CHARGEABLE SUPPLY

## 2019-08-25 PROCEDURE — 96365 THER/PROPH/DIAG IV INF INIT: CPT

## 2019-08-25 PROCEDURE — 83605 ASSAY OF LACTIC ACID: CPT

## 2019-08-25 PROCEDURE — G0480 DRUG TEST DEF 1-7 CLASSES: HCPCS

## 2019-08-25 PROCEDURE — 36415 COLL VENOUS BLD VENIPUNCTURE: CPT

## 2019-08-25 PROCEDURE — 96376 TX/PRO/DX INJ SAME DRUG ADON: CPT

## 2019-08-25 PROCEDURE — 93005 ELECTROCARDIOGRAM TRACING: CPT | Performed by: FAMILY MEDICINE

## 2019-08-25 PROCEDURE — 71045 X-RAY EXAM CHEST 1 VIEW: CPT

## 2019-08-25 PROCEDURE — 2580000003 HC RX 258: Performed by: FAMILY MEDICINE

## 2019-08-25 PROCEDURE — 96368 THER/DIAG CONCURRENT INF: CPT

## 2019-08-25 PROCEDURE — 83735 ASSAY OF MAGNESIUM: CPT

## 2019-08-25 PROCEDURE — 6360000002 HC RX W HCPCS

## 2019-08-25 PROCEDURE — 80307 DRUG TEST PRSMV CHEM ANLYZR: CPT

## 2019-08-25 PROCEDURE — 82803 BLOOD GASES ANY COMBINATION: CPT

## 2019-08-25 PROCEDURE — 2500000003 HC RX 250 WO HCPCS: Performed by: FAMILY MEDICINE

## 2019-08-25 PROCEDURE — 84145 PROCALCITONIN (PCT): CPT

## 2019-08-25 PROCEDURE — 96375 TX/PRO/DX INJ NEW DRUG ADDON: CPT

## 2019-08-25 PROCEDURE — 84484 ASSAY OF TROPONIN QUANT: CPT

## 2019-08-25 PROCEDURE — 87040 BLOOD CULTURE FOR BACTERIA: CPT

## 2019-08-25 PROCEDURE — 84443 ASSAY THYROID STIM HORMONE: CPT

## 2019-08-25 PROCEDURE — 99285 EMERGENCY DEPT VISIT HI MDM: CPT

## 2019-08-25 PROCEDURE — 80053 COMPREHEN METABOLIC PANEL: CPT

## 2019-08-25 PROCEDURE — 83690 ASSAY OF LIPASE: CPT

## 2019-08-25 PROCEDURE — 99284 EMERGENCY DEPT VISIT MOD MDM: CPT

## 2019-08-25 PROCEDURE — 93010 ELECTROCARDIOGRAM REPORT: CPT | Performed by: INTERNAL MEDICINE

## 2019-08-25 PROCEDURE — 82550 ASSAY OF CK (CPK): CPT

## 2019-08-25 RX ORDER — HYDROXYZINE HYDROCHLORIDE 50 MG/ML
50 INJECTION, SOLUTION INTRAMUSCULAR EVERY 6 HOURS PRN
Status: DISCONTINUED | OUTPATIENT
Start: 2019-08-25 | End: 2019-08-25 | Stop reason: HOSPADM

## 2019-08-25 RX ORDER — LORAZEPAM 2 MG/ML
1 INJECTION INTRAMUSCULAR ONCE
Status: COMPLETED | OUTPATIENT
Start: 2019-08-25 | End: 2019-08-25

## 2019-08-25 RX ORDER — NALOXONE HYDROCHLORIDE 0.4 MG/ML
INJECTION, SOLUTION INTRAMUSCULAR; INTRAVENOUS; SUBCUTANEOUS
Status: COMPLETED
Start: 2019-08-25 | End: 2019-08-25

## 2019-08-25 RX ORDER — 0.9 % SODIUM CHLORIDE 0.9 %
1000 INTRAVENOUS SOLUTION INTRAVENOUS ONCE
Status: COMPLETED | OUTPATIENT
Start: 2019-08-25 | End: 2019-08-25

## 2019-08-25 RX ORDER — SODIUM CHLORIDE 9 MG/ML
INJECTION, SOLUTION INTRAVENOUS CONTINUOUS
Status: DISCONTINUED | OUTPATIENT
Start: 2019-08-25 | End: 2019-08-27 | Stop reason: HOSPADM

## 2019-08-25 RX ORDER — LORAZEPAM 2 MG/ML
1 INJECTION INTRAMUSCULAR ONCE
Status: DISCONTINUED | OUTPATIENT
Start: 2019-08-25 | End: 2019-08-25

## 2019-08-25 RX ORDER — SODIUM CHLORIDE 9 MG/ML
INJECTION, SOLUTION INTRAVENOUS ONCE
Status: COMPLETED | OUTPATIENT
Start: 2019-08-25 | End: 2019-08-25

## 2019-08-25 RX ORDER — DIPHENHYDRAMINE HYDROCHLORIDE 50 MG/ML
25 INJECTION INTRAMUSCULAR; INTRAVENOUS ONCE
Status: DISCONTINUED | OUTPATIENT
Start: 2019-08-25 | End: 2019-08-25

## 2019-08-25 RX ORDER — NALOXONE HYDROCHLORIDE 0.4 MG/ML
0.4 INJECTION, SOLUTION INTRAMUSCULAR; INTRAVENOUS; SUBCUTANEOUS ONCE
Status: COMPLETED | OUTPATIENT
Start: 2019-08-25 | End: 2019-08-25

## 2019-08-25 RX ORDER — LINEZOLID 2 MG/ML
600 INJECTION, SOLUTION INTRAVENOUS EVERY 12 HOURS
Status: DISCONTINUED | OUTPATIENT
Start: 2019-08-25 | End: 2019-08-27

## 2019-08-25 RX ORDER — DIPHENHYDRAMINE HYDROCHLORIDE 50 MG/ML
50 INJECTION INTRAMUSCULAR; INTRAVENOUS ONCE
Status: COMPLETED | OUTPATIENT
Start: 2019-08-25 | End: 2019-08-25

## 2019-08-25 RX ADMIN — LORAZEPAM 1 MG: 2 INJECTION INTRAMUSCULAR; INTRAVENOUS at 17:54

## 2019-08-25 RX ADMIN — SODIUM CHLORIDE 1000 ML: 9 INJECTION, SOLUTION INTRAVENOUS at 20:40

## 2019-08-25 RX ADMIN — DIPHENHYDRAMINE HYDROCHLORIDE 50 MG: 50 INJECTION, SOLUTION INTRAMUSCULAR; INTRAVENOUS at 18:52

## 2019-08-25 RX ADMIN — SODIUM CHLORIDE 1000 ML: 9 INJECTION, SOLUTION INTRAVENOUS at 18:55

## 2019-08-25 RX ADMIN — SODIUM CHLORIDE: 9 INJECTION, SOLUTION INTRAVENOUS at 22:35

## 2019-08-25 RX ADMIN — LINEZOLID 600 MG: 600 INJECTION, SOLUTION INTRAVENOUS at 21:28

## 2019-08-25 RX ADMIN — CEFEPIME HYDROCHLORIDE 2 G: 2 INJECTION, POWDER, FOR SOLUTION INTRAVENOUS at 21:28

## 2019-08-25 RX ADMIN — SODIUM CHLORIDE 1000 ML: 9 INJECTION, SOLUTION INTRAVENOUS at 21:12

## 2019-08-25 RX ADMIN — LORAZEPAM 1 MG: 2 INJECTION INTRAMUSCULAR; INTRAVENOUS at 18:50

## 2019-08-25 RX ADMIN — Medication 50 MEQ: at 20:36

## 2019-08-25 RX ADMIN — SODIUM CHLORIDE 1000 ML/HR: 9 INJECTION, SOLUTION INTRAVENOUS at 19:57

## 2019-08-25 RX ADMIN — NALOXONE HYDROCHLORIDE 0.4 MG: 0.4 INJECTION, SOLUTION INTRAMUSCULAR; INTRAVENOUS; SUBCUTANEOUS at 20:17

## 2019-08-25 ASSESSMENT — ENCOUNTER SYMPTOMS
BACK PAIN: 0
SHORTNESS OF BREATH: 0
DIARRHEA: 0
CONSTIPATION: 0
ABDOMINAL PAIN: 1
NAUSEA: 1
VOMITING: 1

## 2019-08-25 NOTE — ED NOTES
Pt resting with eyes closes. Breathing easy and unlabored. EKG complete. Bed alarm on for safety.       Emre Griffith, DEIRDREN  50/94/61 [id number]

## 2019-08-25 NOTE — ED PROVIDER NOTES
signs or Co-signs this chart in the absence of a cardiologist.    None    RADIOLOGY: non-plainfilm images(s) such as CT, Ultrasound and MRI are read by the radiologist.    No orders to display       LABS:     Labs Reviewed   CBC WITH AUTO DIFFERENTIAL - Abnormal; Notable for the following components:       Result Value    WBC 20.3 (*)     All other components within normal limits   BASIC METABOLIC PANEL - Abnormal; Notable for the following components:    Glucose 125 (*)     BUN 30 (*)     CREATININE 6.2 (*)     Calcium 10.6 (*)     All other components within normal limits   HEPATIC FUNCTION PANEL - Abnormal; Notable for the following components: Alb 5.2 (*)     Total Protein 8.9 (*)     All other components within normal limits   SALICYLATE LEVEL - Abnormal; Notable for the following components:    Salicylate, Serum 1.0 (*)     All other components within normal limits   ANION GAP - Abnormal; Notable for the following components:    Anion Gap 23.0 (*)     All other components within normal limits   GLOMERULAR FILTRATION RATE, ESTIMATED - Abnormal; Notable for the following components:    Est, Glom Filt Rate 10 (*)     All other components within normal limits   LIPASE   TROPONIN   MAGNESIUM   TSH WITHOUT REFLEX   ACETAMINOPHEN LEVEL   ETHANOL   OSMOLALITY   URINE DRUG SCREEN   URINE RT REFLEX TO CULTURE       EMERGENCY DEPARTMENT COURSE:   Vitals:    Vitals:    08/25/19 1324   BP: 119/79   Pulse: 144   Resp: 16   Temp: 98.6 °F (37 °C)   TempSrc: Oral       1:29 PM: The patient was seen and evaluated. MDM:  The patient was seen and evaluated within the ED today with anxiety and depression with a history of drug use. Patient denies suicidal and homicidal ideation. Within the department, I observed the patient's HR to be 144 bpm on arrival to this department. On exam, the patient is very anxious. He is fidgeting in the bed and intermittently becomes tearful.   There are several lesions on his face, abdomen, and arms that appear to be scabd he has picked open. No chest wall or abdominal tenderness. Lung sounds are clear. The patient has cool and clammy skin and is mildly diaphoretic. Laboratory work was ordered, but the patient eloped before labs resulted. Within the department, the patient was treated with Vistaril. I observed the patient's condition to remain stable during the duration of the stay. Shortly after, the patient was unable to be found in the room. Multiple attempts were made to locate the patient. The patient eloped without completion of my evaluation and workup. Therefore, the patient eloped before I could obtain appropriate lab work and imaging, make any necessary consults, admit or discharge, or initiate appropriate treatment. After the patient eloped, he was found to have a Cr reading of 6.2 which is increased from 2.4 yesterday. BUN 30. WBC count noted to be 20.3. I did call both of the patient's listed phone numbers so that I could advise him to return to this department for management of his SATYA with concern for rhabdo, but neither of those phone numbers work. I have been unable to contact him regarding his lab results. CRITICAL CARE:   None    CONSULTS:  [facility] - provided outpatient follow up resources    PROCEDURES:  None    FINAL IMPRESSION      1. Polysubstance abuse ([address]    2. Anxiety state    3. SATYA (acute kidney injury) ([facility] [address]          DISPOSITION/PLAN   The patient was unable to be found in the room. Multiple attempts were made to locate the patient. The patient eloped without completion of my evaluation and workup. Therefore, the patient eloped before I could obtain appropriate lab work and imaging, make any necessary consults, admit or discharge, or initiate appropriate treatment. PATIENT REFERRED TO:  No follow-up provider specified.     DISCHARGE MEDICATIONS:  Discharge Medication List as of 8/25/2019  2:46 PM          (Please note that portions of this note were completed with a voice recognition program.  Efforts were made to edit the dictations but occasionally words are mis-transcribed.)    The patient was given an opportunity to see the Emergency Attending. The patient voiced understanding that I was a Mid-LevelProvider and was in agreement with being seen independently by myself. Scribe:  Marlin Natarajan 8/25/19 1:29 PM Scribing for and in the presence of Yanick Michelle. Signed by: Bassem Boogie, 08/25/19 3:04 PM    Provider:  I personally performed the services described in the documentation, reviewed and edited the documentation which was dictated to the scribe in my presence, and it accurately records my words and actions.     Yanick Michelle 8/25/19 3:04 PM      Yanick Michelle PA-C  08/25/19 1501

## 2019-08-25 NOTE — ED TRIAGE NOTES
PT presents to the ED via EMS for seizures and drug addiction. Pt was at the retirement prior. Pt was seen in the ED earlier today but eloped due to wanting a cigarette. Pt states he last snorted meth at 0430 today. Pt states he used fentanyl IV last week. Pt denies alcohol. Pt states he had 3 seizures today. Pt states he was eating and started to convulse and his sister stopped him from hitting his head on the ground. Pt states \"I seen it through my own eyes\" referring to what happened when he was having a seizure.

## 2019-08-26 ENCOUNTER — APPOINTMENT (OUTPATIENT)
Dept: ULTRASOUND IMAGING | Age: 34
DRG: 812 | End: 2019-08-26
Payer: MEDICARE

## 2019-08-26 ENCOUNTER — APPOINTMENT (OUTPATIENT)
Dept: GENERAL RADIOLOGY | Age: 34
DRG: 812 | End: 2019-08-26
Payer: MEDICARE

## 2019-08-26 PROBLEM — Z87.442 HISTORY OF KIDNEY STONES: Status: ACTIVE | Noted: 2019-08-26

## 2019-08-26 PROBLEM — J96.21 ACUTE AND CHRONIC RESPIRATORY FAILURE WITH HYPOXIA (HCC): Status: ACTIVE | Noted: 2019-08-26

## 2019-08-26 PROBLEM — D72.829 LEUKOCYTOSIS: Status: ACTIVE | Noted: 2019-08-26

## 2019-08-26 PROBLEM — D64.9 NORMOCYTIC ANEMIA: Status: ACTIVE | Noted: 2019-08-26

## 2019-08-26 PROBLEM — F41.9 ANXIETY: Status: ACTIVE | Noted: 2019-08-26

## 2019-08-26 LAB
ALBUMIN SERPL-MCNC: 3.7 G/DL (ref 3.5–5.1)
ALP BLD-CCNC: 50 U/L (ref 38–126)
ALT SERPL-CCNC: 23 U/L (ref 11–66)
ANION GAP SERPL CALCULATED.3IONS-SCNC: 16 MEQ/L (ref 8–16)
AST SERPL-CCNC: 31 U/L (ref 5–40)
BASOPHILS # BLD: 0.2 %
BASOPHILS ABSOLUTE: 0 THOU/MM3 (ref 0–0.1)
BILIRUB SERPL-MCNC: 0.6 MG/DL (ref 0.3–1.2)
BUN BLDV-MCNC: 35 MG/DL (ref 7–22)
CALCIUM SERPL-MCNC: 8.6 MG/DL (ref 8.5–10.5)
CHLORIDE BLD-SCNC: 106 MEQ/L (ref 98–111)
CO2: 17 MEQ/L (ref 23–33)
CREAT SERPL-MCNC: 3.5 MG/DL (ref 0.4–1.2)
CREATININE URINE: 175.9 MG/DL
EOSINOPHIL # BLD: 0.7 %
EOSINOPHILS ABSOLUTE: 0.1 THOU/MM3 (ref 0–0.4)
ERYTHROCYTE [DISTWIDTH] IN BLOOD BY AUTOMATED COUNT: 13.4 % (ref 11.5–14.5)
ERYTHROCYTE [DISTWIDTH] IN BLOOD BY AUTOMATED COUNT: 13.9 % (ref 11.5–14.5)
ERYTHROCYTE [DISTWIDTH] IN BLOOD BY AUTOMATED COUNT: 44.2 FL (ref 35–45)
ERYTHROCYTE [DISTWIDTH] IN BLOOD BY AUTOMATED COUNT: 45.1 FL (ref 35–45)
GFR SERPL CREATININE-BSD FRML MDRD: 20 ML/MIN/1.73M2
GLUCOSE BLD-MCNC: 96 MG/DL (ref 70–108)
HCT VFR BLD CALC: 34 % (ref 42–52)
HCT VFR BLD CALC: 43.9 % (ref 42–52)
HEMOGLOBIN: 11.2 GM/DL (ref 14–18)
HEMOGLOBIN: 14.2 GM/DL (ref 14–18)
IMMATURE GRANS (ABS): 0.09 THOU/MM3 (ref 0–0.07)
IMMATURE GRANULOCYTES: 0 %
LACTIC ACID: 0.7 MMOL/L (ref 0.5–2.2)
LYMPHOCYTES # BLD: 19.3 %
LYMPHOCYTES ABSOLUTE: 3.9 THOU/MM3 (ref 1–4.8)
MCH RBC QN AUTO: 29 PG (ref 26–33)
MCH RBC QN AUTO: 29 PG (ref 26–33)
MCHC RBC AUTO-ENTMCNC: 32.3 GM/DL (ref 32.2–35.5)
MCHC RBC AUTO-ENTMCNC: 32.9 GM/DL (ref 32.2–35.5)
MCV RBC AUTO: 88.1 FL (ref 80–94)
MCV RBC AUTO: 89.8 FL (ref 80–94)
MONOCYTES # BLD: 10.8 %
MONOCYTES ABSOLUTE: 2.2 THOU/MM3 (ref 0.4–1.3)
MRSA SCREEN RT-PCR: NEGATIVE
NUCLEATED RED BLOOD CELLS: 0 /100 WBC
PATHOLOGIST REVIEW: ABNORMAL
PLATELET # BLD: 231 THOU/MM3 (ref 130–400)
PLATELET # BLD: 368 THOU/MM3 (ref 130–400)
PLATELET ESTIMATE: ADEQUATE
PMV BLD AUTO: 9.8 FL (ref 9.4–12.4)
PMV BLD AUTO: 9.8 FL (ref 9.4–12.4)
POTASSIUM REFLEX MAGNESIUM: 3.9 MEQ/L (ref 3.5–5.2)
RBC # BLD: 3.86 MILL/MM3 (ref 4.7–6.1)
RBC # BLD: 4.89 MILL/MM3 (ref 4.7–6.1)
SEG NEUTROPHILS: 68.6 %
SEGMENTED NEUTROPHILS ABSOLUTE COUNT: 13.9 THOU/MM3 (ref 1.8–7.7)
SODIUM BLD-SCNC: 139 MEQ/L (ref 135–145)
SODIUM URINE: 69 MEQ/L
TOTAL PROTEIN: 6.4 G/DL (ref 6.1–8)
VANCOMYCIN RESISTANT ENTEROCOCCUS: NEGATIVE
WBC # BLD: 14.3 THOU/MM3 (ref 4.8–10.8)
WBC # BLD: 20.3 THOU/MM3 (ref 4.8–10.8)

## 2019-08-26 PROCEDURE — 83605 ASSAY OF LACTIC ACID: CPT

## 2019-08-26 PROCEDURE — 76770 US EXAM ABDO BACK WALL COMP: CPT

## 2019-08-26 PROCEDURE — 2709999900 HC NON-CHARGEABLE SUPPLY

## 2019-08-26 PROCEDURE — 6360000002 HC RX W HCPCS: Performed by: HOSPITALIST

## 2019-08-26 PROCEDURE — 90792 PSYCH DIAG EVAL W/MED SRVCS: CPT | Performed by: PSYCHIATRY & NEUROLOGY

## 2019-08-26 PROCEDURE — APPSS45 APP SPLIT SHARED TIME 31-45 MINUTES: Performed by: NURSE PRACTITIONER

## 2019-08-26 PROCEDURE — 2580000003 HC RX 258: Performed by: HOSPITALIST

## 2019-08-26 PROCEDURE — 6370000000 HC RX 637 (ALT 250 FOR IP): Performed by: NURSE PRACTITIONER

## 2019-08-26 PROCEDURE — 87081 CULTURE SCREEN ONLY: CPT

## 2019-08-26 PROCEDURE — 71045 X-RAY EXAM CHEST 1 VIEW: CPT

## 2019-08-26 PROCEDURE — 85027 COMPLETE CBC AUTOMATED: CPT

## 2019-08-26 PROCEDURE — 87500 VANOMYCIN DNA AMP PROBE: CPT

## 2019-08-26 PROCEDURE — 0BH17EZ INSERTION OF ENDOTRACHEAL AIRWAY INTO TRACHEA, VIA NATURAL OR ARTIFICIAL OPENING: ICD-10-PCS | Performed by: HOSPITALIST

## 2019-08-26 PROCEDURE — 99233 SBSQ HOSP IP/OBS HIGH 50: CPT | Performed by: INTERNAL MEDICINE

## 2019-08-26 PROCEDURE — 80053 COMPREHEN METABOLIC PANEL: CPT

## 2019-08-26 PROCEDURE — 2500000003 HC RX 250 WO HCPCS: Performed by: HOSPITALIST

## 2019-08-26 PROCEDURE — 6360000002 HC RX W HCPCS

## 2019-08-26 PROCEDURE — 6370000000 HC RX 637 (ALT 250 FOR IP): Performed by: HOSPITALIST

## 2019-08-26 PROCEDURE — 94002 VENT MGMT INPAT INIT DAY: CPT

## 2019-08-26 PROCEDURE — 84300 ASSAY OF URINE SODIUM: CPT

## 2019-08-26 PROCEDURE — 36415 COLL VENOUS BLD VENIPUNCTURE: CPT

## 2019-08-26 PROCEDURE — 82570 ASSAY OF URINE CREATININE: CPT

## 2019-08-26 PROCEDURE — 5A1935Z RESPIRATORY VENTILATION, LESS THAN 24 CONSECUTIVE HOURS: ICD-10-PCS | Performed by: PHYSICIAN ASSISTANT

## 2019-08-26 PROCEDURE — 1200000003 HC TELEMETRY R&B

## 2019-08-26 PROCEDURE — 87641 MR-STAPH DNA AMP PROBE: CPT

## 2019-08-26 PROCEDURE — APPNB45 APP NON BILLABLE 31-45 MINUTES: Performed by: PHYSICIAN ASSISTANT

## 2019-08-26 PROCEDURE — 87086 URINE CULTURE/COLONY COUNT: CPT

## 2019-08-26 RX ORDER — PROPOFOL 10 MG/ML
INJECTION, EMULSION INTRAVENOUS
Status: COMPLETED
Start: 2019-08-26 | End: 2019-08-26

## 2019-08-26 RX ORDER — MIDAZOLAM HYDROCHLORIDE 1 MG/ML
INJECTION INTRAMUSCULAR; INTRAVENOUS
Status: COMPLETED | OUTPATIENT
Start: 2019-08-26 | End: 2019-08-26

## 2019-08-26 RX ORDER — BUSPIRONE HYDROCHLORIDE 10 MG/1
30 TABLET ORAL DAILY
Status: DISCONTINUED | OUTPATIENT
Start: 2019-08-26 | End: 2019-08-27 | Stop reason: HOSPADM

## 2019-08-26 RX ORDER — ROCURONIUM BROMIDE 10 MG/ML
INJECTION, SOLUTION INTRAVENOUS
Status: COMPLETED | OUTPATIENT
Start: 2019-08-26 | End: 2019-08-26

## 2019-08-26 RX ORDER — LORAZEPAM 2 MG/ML
INJECTION INTRAMUSCULAR
Status: COMPLETED
Start: 2019-08-26 | End: 2019-08-26

## 2019-08-26 RX ORDER — QUETIAPINE FUMARATE 25 MG/1
50 TABLET, FILM COATED ORAL 2 TIMES DAILY PRN
Status: DISCONTINUED | OUTPATIENT
Start: 2019-08-26 | End: 2019-08-27 | Stop reason: HOSPADM

## 2019-08-26 RX ORDER — VENLAFAXINE HYDROCHLORIDE 150 MG/1
150 CAPSULE, EXTENDED RELEASE ORAL DAILY
Status: DISCONTINUED | OUTPATIENT
Start: 2019-08-26 | End: 2019-08-26

## 2019-08-26 RX ORDER — ACETAMINOPHEN 325 MG/1
650 TABLET ORAL EVERY 6 HOURS PRN
Status: DISCONTINUED | OUTPATIENT
Start: 2019-08-26 | End: 2019-08-27 | Stop reason: HOSPADM

## 2019-08-26 RX ORDER — HALOPERIDOL 5 MG/ML
2 INJECTION INTRAMUSCULAR 2 TIMES DAILY PRN
Status: DISCONTINUED | OUTPATIENT
Start: 2019-08-26 | End: 2019-08-27 | Stop reason: HOSPADM

## 2019-08-26 RX ORDER — HYDROXYZINE PAMOATE 50 MG/1
50 CAPSULE ORAL NIGHTLY
Status: DISCONTINUED | OUTPATIENT
Start: 2019-08-26 | End: 2019-08-27 | Stop reason: HOSPADM

## 2019-08-26 RX ORDER — SODIUM CHLORIDE 0.9 % (FLUSH) 0.9 %
10 SYRINGE (ML) INJECTION PRN
Status: DISCONTINUED | OUTPATIENT
Start: 2019-08-26 | End: 2019-08-27 | Stop reason: HOSPADM

## 2019-08-26 RX ORDER — ONDANSETRON 2 MG/ML
4 INJECTION INTRAMUSCULAR; INTRAVENOUS EVERY 6 HOURS PRN
Status: DISCONTINUED | OUTPATIENT
Start: 2019-08-26 | End: 2019-08-27 | Stop reason: HOSPADM

## 2019-08-26 RX ORDER — PROPOFOL 10 MG/ML
30 INJECTION, EMULSION INTRAVENOUS
Status: DISCONTINUED | OUTPATIENT
Start: 2019-08-26 | End: 2019-08-27

## 2019-08-26 RX ORDER — LORAZEPAM 2 MG/ML
2 INJECTION INTRAMUSCULAR EVERY 6 HOURS PRN
Status: DISCONTINUED | OUTPATIENT
Start: 2019-08-26 | End: 2019-08-27 | Stop reason: HOSPADM

## 2019-08-26 RX ORDER — MIRTAZAPINE 45 MG/1
45 TABLET, FILM COATED ORAL NIGHTLY
Status: DISCONTINUED | OUTPATIENT
Start: 2019-08-26 | End: 2019-08-27 | Stop reason: HOSPADM

## 2019-08-26 RX ORDER — BUPRENORPHINE AND NALOXONE 8; 2 MG/1; MG/1
1 FILM, SOLUBLE BUCCAL; SUBLINGUAL 2 TIMES DAILY
Status: DISCONTINUED | OUTPATIENT
Start: 2019-08-26 | End: 2019-08-26

## 2019-08-26 RX ORDER — NICOTINE 21 MG/24HR
1 PATCH, TRANSDERMAL 24 HOURS TRANSDERMAL NIGHTLY
Status: DISCONTINUED | OUTPATIENT
Start: 2019-08-26 | End: 2019-08-27 | Stop reason: HOSPADM

## 2019-08-26 RX ORDER — PROPOFOL 10 MG/ML
INJECTION, EMULSION INTRAVENOUS CONTINUOUS PRN
Status: COMPLETED | OUTPATIENT
Start: 2019-08-26 | End: 2019-08-26

## 2019-08-26 RX ORDER — SODIUM CHLORIDE 0.9 % (FLUSH) 0.9 %
10 SYRINGE (ML) INJECTION EVERY 12 HOURS SCHEDULED
Status: DISCONTINUED | OUTPATIENT
Start: 2019-08-26 | End: 2019-08-27 | Stop reason: HOSPADM

## 2019-08-26 RX ADMIN — DEXMEDETOMIDINE 0.5 MCG/KG/HR: 100 INJECTION, SOLUTION, CONCENTRATE INTRAVENOUS at 01:03

## 2019-08-26 RX ADMIN — PROPOFOL 1000 MG: 10 INJECTION, EMULSION INTRAVENOUS at 01:21

## 2019-08-26 RX ADMIN — BUSPIRONE HYDROCHLORIDE 30 MG: 10 TABLET ORAL at 08:09

## 2019-08-26 RX ADMIN — ENOXAPARIN SODIUM 30 MG: 30 INJECTION SUBCUTANEOUS at 08:08

## 2019-08-26 RX ADMIN — MIDAZOLAM HYDROCHLORIDE 4 MG: 1 INJECTION, SOLUTION INTRAMUSCULAR; INTRAVENOUS at 01:10

## 2019-08-26 RX ADMIN — MIRTAZAPINE 45 MG: 45 TABLET, FILM COATED ORAL at 20:56

## 2019-08-26 RX ADMIN — LORAZEPAM 2 MG: 2 INJECTION INTRAMUSCULAR; INTRAVENOUS at 19:59

## 2019-08-26 RX ADMIN — BUPRENORPHINE HYDROCHLORIDE, NALOXONE HYDROCHLORIDE 1 FILM: 8; 2 FILM, SOLUBLE BUCCAL; SUBLINGUAL at 08:08

## 2019-08-26 RX ADMIN — DEXMEDETOMIDINE 0.9 MCG/KG/HR: 100 INJECTION, SOLUTION, CONCENTRATE INTRAVENOUS at 15:21

## 2019-08-26 RX ADMIN — LINEZOLID 600 MG: 600 INJECTION, SOLUTION INTRAVENOUS at 08:08

## 2019-08-26 RX ADMIN — ROCURONIUM BROMIDE 50 MG: 10 INJECTION, SOLUTION INTRAVENOUS at 01:12

## 2019-08-26 RX ADMIN — DEXMEDETOMIDINE 0.5 MCG/KG/HR: 100 INJECTION, SOLUTION, CONCENTRATE INTRAVENOUS at 09:46

## 2019-08-26 RX ADMIN — PROPOFOL 50 MG: 10 INJECTION, EMULSION INTRAVENOUS at 01:14

## 2019-08-26 RX ADMIN — Medication 2 MCG/MIN: at 02:42

## 2019-08-26 RX ADMIN — SODIUM CHLORIDE: 9 INJECTION, SOLUTION INTRAVENOUS at 07:22

## 2019-08-26 RX ADMIN — FAMOTIDINE 20 MG: 10 INJECTION INTRAVENOUS at 08:08

## 2019-08-26 RX ADMIN — SODIUM CHLORIDE: 9 INJECTION, SOLUTION INTRAVENOUS at 05:25

## 2019-08-26 RX ADMIN — PROPOFOL 30 MCG/KG/MIN: 10 INJECTION, EMULSION INTRAVENOUS at 01:22

## 2019-08-26 RX ADMIN — PROPOFOL 30 MCG/KG/MIN: 10 INJECTION, EMULSION INTRAVENOUS at 05:46

## 2019-08-26 RX ADMIN — SODIUM CHLORIDE: 9 INJECTION, SOLUTION INTRAVENOUS at 16:41

## 2019-08-26 RX ADMIN — LORAZEPAM 2 MG: 2 INJECTION INTRAMUSCULAR; INTRAVENOUS at 00:52

## 2019-08-26 RX ADMIN — LINEZOLID 600 MG: 600 INJECTION, SOLUTION INTRAVENOUS at 20:55

## 2019-08-26 ASSESSMENT — PULMONARY FUNCTION TESTS
PIF_VALUE: 16
PIF_VALUE: 21
PIF_VALUE: 21

## 2019-08-26 ASSESSMENT — PAIN DESCRIPTION - ORIENTATION
ORIENTATION: RIGHT
ORIENTATION: RIGHT

## 2019-08-26 ASSESSMENT — PAIN DESCRIPTION - LOCATION
LOCATION: ANKLE
LOCATION: FOOT

## 2019-08-26 ASSESSMENT — PAIN DESCRIPTION - PAIN TYPE: TYPE: ACUTE PAIN

## 2019-08-26 ASSESSMENT — PAIN SCALES - GENERAL: PAINLEVEL_OUTOF10: 10

## 2019-08-26 ASSESSMENT — PAIN SCALES - WONG BAKER: WONGBAKER_NUMERICALRESPONSE: 6

## 2019-08-26 NOTE — H&P
History & Physical        Patient:  Benita Knutson  YOB: 1985    MRN: [id number]     Acct: [de-identified]    PCP: PAPA Moncada CNP    Date of Admission: 8/25/2019    Date of Service: Pt seen/examined on 08/25/19  and Admitted to Inpatient with expected LOS greater than two midnights due to medical therapy. Chief Complaint:  Methamphetamine overdose, seizure? History Of Present Illness:      27 y.o. male was brought to [facility] after having a seizure. Apparently patient was getting agitated in ER and patient had to be sedated. History was collected from ER medical staff and medical records. Report states that patient stated that he had 3 seizures today and he saw himself banging his head on floors during convulsion. Patient was coming in and out of the hospital 3 times since yesterday due to drug overdose. Patient apparently used fentanyl yesterday and today he was using methamphetamine. ER tried to admit him this morning but patient signed out AMA and came back again in the evening. Patient was agitated, hypotensive and had severe acute renal failure. Patient was started on fluid bolus and sedation. Patient's BP improved with fluid resuscitation. Past Medical History:          Diagnosis Date    Anxiety     Depression     Kidney stone     Opiate abuse, continuous ([address]        Past Surgical History:      No past surgical history on file. Medications Prior to Admission:      Prior to Admission medications    Medication Sig Start Date End Date Taking?  Authorizing Provider   naproxen (NAPROSYN) 500 MG tablet Take 1 tablet by mouth 2 times daily 7/22/19   PPAA Hennessy CNP   venlafaxine (EFFEXOR XR) 150 MG extended release capsule Take 150 mg by mouth daily    Historical Provider, MD   mirtazapine (REMERON) 45 MG tablet Take 45 mg by mouth nightly    Historical Provider, MD   hydrOXYzine (VISTARIL) 50 MG capsule Take 50 mg by dose of bicarb in ER. Follow labs. Thank you PAPA Fischer CNP for the opportunity to be involved in this patient's care.     Electronically signed by Bette Carter DO on 8/25/2019 at 11:12 PM

## 2019-08-26 NOTE — CARE COORDINATION
8/26/19, 9:09 AM      Dominik Apodaca       Admitted from: ED [facility]25/2019[facility]   Location: [address] [place] [address] Reason for admit: Drug overdose, intentional, initial encounter ([facility] [address] Heather Gardner Status: IP  Admit order signed?: yes  PMH:  has a past medical history of Anxiety, Depression, Kidney stone, and Opiate abuse, continuous ([facility] [address] Procedure:   8/25 CXR: No acute process  8/26 Intubated - 8/26 Extubated  8/26 CXR: No acute process  Medications:  Scheduled Meds:   buprenorphine-naloxone  1 Film Sublingual BID    busPIRone  30 mg Oral Daily    hydrOXYzine  50 mg Oral Nightly    mirtazapine  45 mg Oral Nightly    sodium chloride flush  10 mL Intravenous 2 times per day    famotidine (PEPCID) injection  20 mg Intravenous Daily    enoxaparin  30 mg Subcutaneous Daily    HYDROmorphone  1 mg Intravenous Once    linezolid  600 mg Intravenous Q12H     Continuous Infusions:   dexmedetomidine 0.5 mcg/kg/hr (08/26/19 0545)    propofol 30 mcg/kg/min (08/26/19 0546)    norepinephrine 1 mcg/min (08/26/19 0250)    sodium chloride 125 mL/hr at 08/26/19 0872      Pertinent Info/Orders/Treatment Plan: Presented following 3 seizures and \"saw himself banging his head on floors during convulsion\". Had be to ED 3 times since 8/24 d/t drug overdose. Was using fentanyl on 8/24 and meth on 8/25. On 8/25 in am ED tried to admit, but patient signed out AMA. When presented in evening on 8/25 he was agitated, hypotensive, and had severe acute renal failure. Given Fluid bolus and BP improved. Admitted to 3B. Transferred to ICU early this am d/t agitation, restlessness, and aggressive behavior on 3B. Intubated. Extubated later this am, but remains on precedex drip. Renal US ordered. Addiction Services consulted. Afebrile. On room air. Telemetry, I&O, levin care, up with assist. IVF, precedex @ 1.2 mcg/kg/hr, levo @ 1 mcg/min, buspar, pepcid, vistaril, IV zyvox. Received 4L in fld bolus, IV cefepime x1. Narcan given.  CO2 22

## 2019-08-26 NOTE — PLAN OF CARE
Problem: Falls - Risk of:  Goal: Will remain free from falls  Description  Will remain free from falls  Outcome: Ongoing  Note:   Rounded on every hour, fall risk sign post, fall arm band on, all four bedside rails up, patient has a levin in place and all items in reach  Goal: Absence of physical injury  Description  Absence of physical injury  Outcome: Ongoing  Note:   Patient has remained absent of physical injury     Problem: Pain:  Goal: Pain level will decrease  Description  Pain level will decrease  Outcome: Ongoing  Note:   Patient is on a CPOT scale. Scaled at this time a zero  Goal: Control of acute pain  Description  Control of acute pain  Outcome: Ongoing  Note:   Patient has complained of foot pain in his right foot. No pain medication given at this time  Goal: Control of chronic pain  Description  Control of chronic pain  Outcome: Ongoing     Problem: Restraint Use - Nonviolent/Non-Self-Destructive Behavior:  Goal: Absence of restraint indications  Description  Absence of restraint indications  Outcome: Ongoing  Note:   Patient continues to want to putt at lines and tubes  Goal: Absence of restraint-related injury  Description  Absence of restraint-related injury  Outcome: Ongoing  Note:   Patient remained absent of restraint injury     Problem: Health Maintenance - Impaired:  Goal: Ability to manage health-related needs will improve  Description  Ability to manage health-related needs will improve  Outcome: Ongoing  Note:   Patient came in for detox, he is currently intubated & sedated. Will continue to assess once extubation     Problem: Mood - Altered:  Goal: Mood stable  Description  Mood stable  Outcome: Ongoing  Note:   Patient has been aggressive showing physical and verbal aggression     Problem: Violence - Risk of, Self/Other-Directed:  Goal: Knowledge of developmental care interventions  Description  Absence of violence  Outcome: Ongoing  Note:   Plan is to wean patient appropriately.  He was aggressive before intubation continue. Problem: Discharge Planning:  Goal: Participates in care planning  Description  Participates in care planning  Outcome: Ongoing  Note:   Patient is currently intubated and sedated. Goal: Discharged to appropriate level of care  Description  Discharged to appropriate level of care  Outcome: Ongoing     Problem: Airway Clearance - Ineffective:  Goal: Ability to maintain a clear airway will improve  Description  Ability to maintain a clear airway will improve  Outcome: Ongoing  Note:   Patient is breathing on a mechanical vent   Care plan reviewed with patient. Patient verbalize understanding of the plan of care and contribute to goal setting.    Family not at bedside at this time

## 2019-08-26 NOTE — PROGRESS NOTES
Assessment and Plan:          1. Acute hypoxic respiratory failure: intubated and sedated, extubated 8/26, wean Precedex off.   2. Polysubstance abuse overdose: + amphetamines and cocaine, consult addiction services and psych on extubated. Stopped Suboxone  3. Acute kidney injury: renal US ordered, pt does have history of kidney stone. Urine creatinine and sodium pending. Trending down, 3.5 today, closely monitor I/O, keep levin in place   4. Non anion gap metabolic acidosis:  Z85 17, continue IV hydration   5. Leukocytosis: blood cultures prelim negative, urine negative, chest negative, multiple skin lesions, Linezolid. 6. Normocytic anemia: H/H 11.2, 34.0, monitor   7. Hepatitis C:  Hx, hepatitis panel pending, liver enzymes normal   8. Hx kidney stone: renal US pending   9. Anxiety: psych to see, awaiting medication recommendations     Dispo: transfer to medical floor when Precedex is off     CC:  Acute respiratory failure   HPI: Nikole Lobo is a 27year old white male who presents [facility] via transportation with D. He has a past medical history of opiate abuse, kidney stone depression and anxity Per report patient was acting out in public, he was taken to the ER multiple times and was leaving before treatment. Patient was noted to be banging his head on the floor. He was admitted to , when he arrived he noted to he was thrashing and being abusive, he was taken to the ICU for intubation. 8/26 on exam he was extubated, addiction services and psych were consulted.      ROS: intubated and sedated   PMH:  Per HPI  SHX:  Current everyday smoker, occasional ETOH use, substance abuse   FHX: No known family history   Allergies: NKDA   Medications:     dexmedetomidine 1.2 mcg/kg/hr (08/26/19 1142)    propofol Stopped (08/26/19 1016)    norepinephrine 1 mcg/min (08/26/19 0250)    sodium chloride 125 mL/hr at 08/26/19 0722      busPIRone  30 mg Oral Daily    hydrOXYzine  50 mg Oral Nightly    mirtazapine  45 mg

## 2019-08-26 NOTE — PROGRESS NOTES
Attempted to complete AOD Consult. Patient fell asleep multiple times while speaking with patient. SHOBHA will re-attempt when patient alert.

## 2019-08-26 NOTE — PROGRESS NOTES
Patient has been successfully weaned from Mechanical Ventilation. EtCO2 of 34 and SpO2 of 100% on 30% FiO2. Patient extubated and placed on room air. Post extubation SpO2 is 96% with HR  76 bpm and RR 16 breaths/min. Patient had strong cough that was non-productive. Extubation Well tolerated by patient. Mike Simms

## 2019-08-26 NOTE — ED NOTES
Pt moved to  2 for closer monitoring and possible intubation. Pt currently maintaining his airway, resp easy and non labored- sats 96-98% RA. IV fluids infusing, pt positioned trendelenburg for BP support as well. Dr BERNSTEIN at bedside- respiratory called.       Jorge Ruth RN  08/25/19 4605

## 2019-08-26 NOTE — CONSULTS
Department of Psychiatry  Consult Service  Psychiatric Assessment     Reason for Consult:  Polysubstance abuse    HISTORY OF PRESENT ILLNESS:      Oralia Trevizo is a 27 y.o. male with a history of opioid and amphetamine abuse/dependence who is admitted to the ICU with an acute kidney injury, metabolic acidosis, after repeated presentations to the ED for substance intoxication. He was extremely agitated and combative in the ED requiring intubation and ICU admission. He was just extubated today, however remains sedated on Precedex. He has many psych meds on his home list including Suboxone. Psychiatry consulted to manage these. PSYCHIATRIC HISTORY:      Currently following with Dr. Lorene Krishnan at [facility] professional services. Per addictions consultant, he is planning on transferring to Dr. Randi Orlando and has an appointment with him tomorrow. Past psychiatric medications includes:     Effexor, BuSpar, Suboxone  Adverse reactions from psychotropic medications:    None known      [facility] Psychiatric Review of Systems  -unable to assess, patient sedated         Past Medical History:        Diagnosis Date    Anxiety     Depression     Kidney stone     Opiate abuse, continuous ([facility] [address]        Past Surgical History:    No past surgical history on file. Medications Prior to Admission:   Medications Prior to Admission: naproxen (NAPROSYN) 500 MG tablet, Take 1 tablet by mouth 2 times daily  venlafaxine (EFFEXOR XR) 150 MG extended release capsule, Take 150 mg by mouth daily  mirtazapine (REMERON) 45 MG tablet, Take 45 mg by mouth nightly  hydrOXYzine (VISTARIL) 50 MG capsule, Take 50 mg by mouth nightly  busPIRone (BUSPAR) 30 MG tablet, Take 30 mg by mouth daily  buprenorphine-naloxone (SUBOXONE) 8-2 MG FILM SL film, Place 1 Film under the tongue 2 times daily. .  ChlorproMAZINE HCl (THORAZINE PO), Take 50 mg by mouth nightly  cephALEXin (KEFLEX) 500 MG capsule, Take 1 capsule by mouth 3 times daily    Allergies:

## 2019-08-26 NOTE — ED NOTES
Nasal trumpet removed per Dr Naheed Scott. Pt continues to maintain his airway w/o difficulty, VSS, sats 100 on 2LNC.  Will monitor     Savage Bond RN  08/25/19 2023

## 2019-08-27 VITALS
DIASTOLIC BLOOD PRESSURE: 53 MMHG | BODY MASS INDEX: 26.52 KG/M2 | HEART RATE: 71 BPM | TEMPERATURE: 98.5 F | HEIGHT: 72 IN | OXYGEN SATURATION: 95 % | WEIGHT: 195.77 LBS | RESPIRATION RATE: 16 BRPM | SYSTOLIC BLOOD PRESSURE: 106 MMHG

## 2019-08-27 LAB
ANION GAP SERPL CALCULATED.3IONS-SCNC: 9 MEQ/L (ref 8–16)
BUN BLDV-MCNC: 16 MG/DL (ref 7–22)
CALCIUM SERPL-MCNC: 8.8 MG/DL (ref 8.5–10.5)
CHLORIDE BLD-SCNC: 104 MEQ/L (ref 98–111)
CO2: 23 MEQ/L (ref 23–33)
CREAT SERPL-MCNC: 0.8 MG/DL (ref 0.4–1.2)
GFR SERPL CREATININE-BSD FRML MDRD: > 90 ML/MIN/1.73M2
GLUCOSE BLD-MCNC: 111 MG/DL (ref 70–108)
HAV IGM SER IA-ACNC: NEGATIVE
HEPATITIS B CORE IGM ANTIBODY: NEGATIVE
HEPATITIS B SURFACE ANTIGEN: NEGATIVE
HEPATITIS C ANTIBODY: POSITIVE
POTASSIUM SERPL-SCNC: 3.5 MEQ/L (ref 3.5–5.2)
SODIUM BLD-SCNC: 136 MEQ/L (ref 135–145)

## 2019-08-27 PROCEDURE — 6370000000 HC RX 637 (ALT 250 FOR IP): Performed by: HOSPITALIST

## 2019-08-27 PROCEDURE — 6360000002 HC RX W HCPCS: Performed by: HOSPITALIST

## 2019-08-27 PROCEDURE — APPSS45 APP SPLIT SHARED TIME 31-45 MINUTES: Performed by: NURSE PRACTITIONER

## 2019-08-27 PROCEDURE — APPSS30 APP SPLIT SHARED TIME 16-30 MINUTES: Performed by: PHYSICIAN ASSISTANT

## 2019-08-27 PROCEDURE — 94761 N-INVAS EAR/PLS OXIMETRY MLT: CPT

## 2019-08-27 PROCEDURE — 36415 COLL VENOUS BLD VENIPUNCTURE: CPT

## 2019-08-27 PROCEDURE — 2709999900 HC NON-CHARGEABLE SUPPLY

## 2019-08-27 PROCEDURE — 80074 ACUTE HEPATITIS PANEL: CPT

## 2019-08-27 PROCEDURE — 6370000000 HC RX 637 (ALT 250 FOR IP): Performed by: PHYSICIAN ASSISTANT

## 2019-08-27 PROCEDURE — 99232 SBSQ HOSP IP/OBS MODERATE 35: CPT | Performed by: INTERNAL MEDICINE

## 2019-08-27 PROCEDURE — 80048 BASIC METABOLIC PNL TOTAL CA: CPT

## 2019-08-27 PROCEDURE — 2500000003 HC RX 250 WO HCPCS: Performed by: HOSPITALIST

## 2019-08-27 PROCEDURE — 2580000003 HC RX 258: Performed by: HOSPITALIST

## 2019-08-27 RX ORDER — PROMETHAZINE HYDROCHLORIDE 25 MG/1
25 TABLET ORAL EVERY 6 HOURS PRN
Status: DISCONTINUED | OUTPATIENT
Start: 2019-08-27 | End: 2019-08-27 | Stop reason: HOSPADM

## 2019-08-27 RX ORDER — HYDROXYZINE PAMOATE 50 MG/1
50 CAPSULE ORAL EVERY 8 HOURS PRN
Status: DISCONTINUED | OUTPATIENT
Start: 2019-08-27 | End: 2019-08-27 | Stop reason: HOSPADM

## 2019-08-27 RX ORDER — GABAPENTIN 300 MG/1
300 CAPSULE ORAL EVERY 8 HOURS PRN
Status: DISCONTINUED | OUTPATIENT
Start: 2019-08-27 | End: 2019-08-27 | Stop reason: HOSPADM

## 2019-08-27 RX ORDER — LINEZOLID 600 MG/1
600 TABLET, FILM COATED ORAL EVERY 12 HOURS SCHEDULED
Status: DISCONTINUED | OUTPATIENT
Start: 2019-08-27 | End: 2019-08-27 | Stop reason: HOSPADM

## 2019-08-27 RX ORDER — IBUPROFEN 800 MG/1
800 TABLET ORAL EVERY 8 HOURS PRN
Status: DISCONTINUED | OUTPATIENT
Start: 2019-08-27 | End: 2019-08-27 | Stop reason: HOSPADM

## 2019-08-27 RX ORDER — CLONIDINE HYDROCHLORIDE 0.1 MG/1
0.1 TABLET ORAL PRN
Status: DISCONTINUED | OUTPATIENT
Start: 2019-08-27 | End: 2019-08-27 | Stop reason: HOSPADM

## 2019-08-27 RX ORDER — TRAZODONE HYDROCHLORIDE 50 MG/1
50 TABLET ORAL NIGHTLY PRN
Status: DISCONTINUED | OUTPATIENT
Start: 2019-08-27 | End: 2019-08-27 | Stop reason: HOSPADM

## 2019-08-27 RX ORDER — DICYCLOMINE HCL 20 MG
20 TABLET ORAL EVERY 6 HOURS PRN
Status: DISCONTINUED | OUTPATIENT
Start: 2019-08-27 | End: 2019-08-27 | Stop reason: HOSPADM

## 2019-08-27 RX ADMIN — LINEZOLID 600 MG: 600 INJECTION, SOLUTION INTRAVENOUS at 08:55

## 2019-08-27 RX ADMIN — FAMOTIDINE 20 MG: 10 INJECTION INTRAVENOUS at 08:12

## 2019-08-27 RX ADMIN — SODIUM CHLORIDE: 9 INJECTION, SOLUTION INTRAVENOUS at 08:13

## 2019-08-27 RX ADMIN — QUETIAPINE FUMARATE 50 MG: 25 TABLET ORAL at 08:16

## 2019-08-27 RX ADMIN — BUSPIRONE HYDROCHLORIDE 30 MG: 10 TABLET ORAL at 08:12

## 2019-08-27 RX ADMIN — SODIUM CHLORIDE: 9 INJECTION, SOLUTION INTRAVENOUS at 01:43

## 2019-08-27 RX ADMIN — Medication 10 ML: at 08:13

## 2019-08-27 RX ADMIN — ENOXAPARIN SODIUM 30 MG: 30 INJECTION SUBCUTANEOUS at 08:12

## 2019-08-27 RX ADMIN — LORAZEPAM 2 MG: 2 INJECTION INTRAMUSCULAR; INTRAVENOUS at 02:04

## 2019-08-27 RX ADMIN — LORAZEPAM 2 MG: 2 INJECTION INTRAMUSCULAR; INTRAVENOUS at 08:15

## 2019-08-27 RX ADMIN — DEXMEDETOMIDINE 0.8 MCG/KG/HR: 100 INJECTION, SOLUTION, CONCENTRATE INTRAVENOUS at 01:40

## 2019-08-27 ASSESSMENT — ENCOUNTER SYMPTOMS
PHOTOPHOBIA: 0
EYE DISCHARGE: 0
DIARRHEA: 0
BLOOD IN STOOL: 0
CHEST TIGHTNESS: 0
FACIAL SWELLING: 0
TROUBLE SWALLOWING: 0
WHEEZING: 0
ANAL BLEEDING: 0
BACK PAIN: 0
NAUSEA: 0
SHORTNESS OF BREATH: 0
VOMITING: 0
COLOR CHANGE: 0
ABDOMINAL DISTENTION: 0

## 2019-08-27 ASSESSMENT — PAIN SCALES - WONG BAKER: WONGBAKER_NUMERICALRESPONSE: 0

## 2019-08-27 ASSESSMENT — PAIN SCALES - GENERAL
PAINLEVEL_OUTOF10: 0
PAINLEVEL_OUTOF10: 0

## 2019-08-27 NOTE — CARE COORDINATION
8/27/19, 1:38 PM    Discharge plan discussed by  and . Discharge plan reviewed with patient/ family. Patient/ family verbalize understanding of discharge plan and are in agreement with plan. Understanding was demonstrated using the teach back method.                Patient left AMA

## 2019-08-27 NOTE — PROGRESS NOTES
Arrived to complete addictions consult. Patient slow to speak and very lethargic. Advised that SHOBHA would return this afternoon to re-attempt consult.

## 2019-08-27 NOTE — PROGRESS NOTES
Department of Psychiatry  Consult Service  Progress Note     Reason for Consult: substance abuse, manage psych meds     SUBJECTIVE:      Jeet Prasad has been transferred to stepdown today from ICU. He is groggy yet, but able to talk. He states he has been taking suboxone 12mg/d from Dr. Merle Rand compliantly, disagrees with the [facility] report of the last fill date being 7/31/19. He c/o detox sx of head ache and restlessness, wants back on suboxone and plans to transfer care to Dr. Yudelka Blakely. Denies SI/HI/AVH.     OBJECTIVE      Medications  Current Facility-Administered Medications: famotidine (PEPCID) injection 20 mg, 20 mg, Intravenous, BID  [START ON 8/28/2019] enoxaparin (LOVENOX) injection 40 mg, 40 mg, Subcutaneous, Daily  busPIRone (BUSPAR) tablet 30 mg, 30 mg, Oral, Daily  hydrOXYzine (VISTARIL) capsule 50 mg, 50 mg, Oral, Nightly  mirtazapine (REMERON) tablet 45 mg, 45 mg, Oral, Nightly  sodium chloride flush 0.9 % injection 10 mL, 10 mL, Intravenous, 2 times per day  sodium chloride flush 0.9 % injection 10 mL, 10 mL, Intravenous, PRN  magnesium hydroxide (MILK OF MAGNESIA) 400 MG/5ML suspension 30 mL, 30 mL, Oral, Daily PRN  ondansetron (ZOFRAN) injection 4 mg, 4 mg, Intravenous, Q6H PRN  acetaminophen (TYLENOL) tablet 650 mg, 650 mg, Oral, Q6H PRN  LORazepam (ATIVAN) injection 2 mg, 2 mg, Intravenous, Q6H PRN  HYDROmorphone (DILAUDID) injection 1 mg, 1 mg, Intravenous, Once  QUEtiapine (SEROQUEL) tablet 50 mg, 50 mg, Oral, BID PRN  haloperidol lactate (HALDOL) injection 2 mg, 2 mg, Intramuscular, BID PRN  nicotine (NICODERM CQ) 21 MG/24HR 1 patch, 1 patch, Transdermal, Nightly  dexmedetomidine (PRECEDEX) 45 mcg in sodium chloride 0.9 % 50 mL IV bolus, 0.5 mcg/kg, Intravenous, Once  linezolid (ZYVOX) IVPB 600 mg, 600 mg, Intravenous, Q12H  0.9 % sodium chloride infusion, , Intravenous, Continuous     Physical  /65   Pulse 73   Temp 98.5 °F (36.9 °C) (Oral)   Resp 18   Ht 6' (1.829 m)   Wt 195 lb 12.3 oz

## 2019-08-27 NOTE — PROGRESS NOTES
Assessment and Plan:          1. Polysubstance abuse overdose: + amphetamines and cocaine, consult addiction services and psych on extubated. Stopped Suboxone per Dr. Luna Cazares  2. Acute kidney injury: resolving; renal US ordered, no obstruction or hydronephrosis, possible right renal artery stenosis, arterial dopplers ordered. 0.8 today, closely monitor I/O  3. Possible right renal artery stenions: recommendation for artrial dopplers, ordered    4. Leukocytosis: blood cultures prelim negative, urine negative, chest negative, multiple skin lesions, Linezolid. 5. Normocytic anemia: H/H 11.2, 34.0, monitor   6. Hepatitis C:  positive    7. Hx kidney stone: negative for obstruction   8. Anxiety: psych added medication recommendations   9. Acute hypoxic respiratory failure: resolved; extubated 8/26 on room air, wean Precedex off. 10. Non anion gap metabolic acidosis:  resolved; C02 23, continue IV hydration         [facility] bed[facility]     CC:  Acute respiratory failure   HPI:  Mati Hayes is a 27year old white male who presents [facility] via transportation with D. He has a past medical history of opiate abuse, kidney stone depression and anxity Per report patient was acting out in public, he was taken to the ER multiple times and was leaving before treatment. Patient was noted to be banging his head on the floor. He was admitted to , when he arrived he noted to he was thrashing and being abusive, he was taken to the ICU for intubation. 8/26 on exam he was extubated, addiction services and psych were consulted. 8/27 patient was noted to have increased agitation overnight, Precedex was started. Will stop today and continue for plans to transfer to 8A/B     Review of Systems   Constitutional: Negative for fatigue and fever. HENT: Negative for trouble swallowing. Eyes: Negative for photophobia and visual disturbance.    Respiratory: Negative for chest tightness, shortness of

## 2019-08-27 NOTE — ED PROVIDER NOTES
Absolute 16.3 (*)     Monocytes Absolute 2.1 (*)     Immature Grans (Abs) 0.12 (*)     All other components within normal limits   BASIC METABOLIC PANEL - Abnormal; Notable for the following components:    Chloride 96 (*)     CO2 18 (*)     Glucose 113 (*)     BUN 37 (*)     CREATININE 6.3 (*)     All other components within normal limits   PROCALCITONIN - Abnormal; Notable for the following components:    Procalcitonin 1.67 (*)     All other components within normal limits   SALICYLATE LEVEL - Abnormal; Notable for the following components:    Salicylate, Serum < 0.3 (*)     All other components within normal limits   CK - Abnormal; Notable for the following components:     Total CK 1,022 (*)     All other components within normal limits   ANION GAP - Abnormal; Notable for the following components:    Anion Gap 24.0 (*)     All other components within normal limits   GLOMERULAR FILTRATION RATE, ESTIMATED - Abnormal; Notable for the following components:    Est, Glom Filt Rate 10 (*)     All other components within normal limits   BLOOD GAS, ARTERIAL - Abnormal; Notable for the following components:    pH, Blood Gas 7.30 (*)     PO2 58 (*)     HCO3 20 (*)     Base Excess (Calculated) -6.5 (*)     All other components within normal limits   URINE WITH REFLEXED MICRO - Abnormal; Notable for the following components:    Bilirubin Urine SMALL (*)     Ketones, Urine TRACE (*)     Blood, Urine LARGE (*)     Protein,  (*)     Leukocyte Esterase, Urine TRACE (*)     Color, UA DK YELLOW (*)     Character, Urine CLOUDY (*)     All other components within normal limits   CBC - Abnormal; Notable for the following components:    WBC 14.3 (*)     RBC 3.86 (*)     Hemoglobin 11.2 (*)     Hematocrit 34.0 (*)     RDW-SD 45.1 (*)     All other components within normal limits   COMPREHENSIVE METABOLIC PANEL W/ REFLEX TO MG FOR LOW K - Abnormal; Notable for the following components:    CREATININE 3.5 (*)     BUN 35 (*)     CO2 17 (*)     All other components within normal limits   GLOMERULAR FILTRATION RATE, ESTIMATED - Abnormal; Notable for the following components:    Est, Glom Filt Rate 20 (*)     All other components within normal limits   CULTURE BLOOD #1    Narrative:     Source: blood-Adult-suboptimal <5.5oz./set volume       Site: Peripheral Vein            Current Antibiotics: not stated   CULTURE BLOOD #2    Narrative:     Source: blood-Adult-suboptimal <5.5oz./set volume       Site: Peripheral Vein            Current Antibiotics: not stated   VRE SCREEN BY PCR   MRSA SCREENING CULTURE ONLY   URINE CULTURE   URINE DRUG SCREEN   ETHANOL   ACETAMINOPHEN LEVEL   SCAN OF BLOOD SMEAR   OSMOLALITY   LACTIC ACID, PLASMA   BILE ACIDS, TOTAL   LACTIC ACID, PLASMA   MRSA BY PCR   ANION GAP   CREATININE, RANDOM URINE   SODIUM, URINE, RANDOM   HEPATITIS PANEL, ACUTE       All other labs were within normal range or not returned as of this dictation. EMERGENCY DEPARTMENT COURSE and Medical Decision Making:     MDM/   Patient seen and evaluated emergency for possible meth overdose. Agitated on presentation. Alert and oriented. Answers questions appropriately. Lab work is initiated. Patient has elevated creatinine known from previous visit where he left AMA. Elevated CPK. Patient started on fluids. Patient was given Ativan and Benadryl. He was sleeping in the emergency. Arousable to painful stimuli. Patient pulse ox 100%. All other lab work as mentioned above. I called discussed case with hospitalist on-call Dr. vargas accepted patient for admission. CONSULTS: (None if blank)  IP CONSULT TO ADDICTION SERVICES  IP CONSULT TO PSYCHIATRY    Procedures: (None if blank)       CLINICAL IMPRESSION      1. Accidental drug overdose, initial encounter    2. Methamphetamine abuse ([facility] [address]    3. Acute kidney injury ([facility] [address]    4.  Non-traumatic rhabdomyolysis          DISPOSITION/PLAN    DISPOSITION Admitted 08/25/2019 10:24:36 PM      PATIENT REFERRED TO:  Bibi Richard, APRN - CNP  [address] [facility]  [phone]            DISCHARGE MEDICATIONS:  Current Discharge Medication List                 (Please note that portions of this note were completed with a voicerecognition program.  Efforts were made to edit the dictations but occasionally words are mis-transcribed.)      Jane Cabrera MD (electronically signed)  Attending Emergency Department Provider         Jane Cabrera MD  08/27/19 [id number]

## 2019-08-28 ENCOUNTER — OFFICE VISIT (OUTPATIENT)
Dept: INTERNAL MEDICINE CLINIC | Age: 34
End: 2019-08-28
Payer: MEDICARE

## 2019-08-28 VITALS
WEIGHT: 190.5 LBS | DIASTOLIC BLOOD PRESSURE: 89 MMHG | BODY MASS INDEX: 25.84 KG/M2 | HEART RATE: 116 BPM | SYSTOLIC BLOOD PRESSURE: 154 MMHG

## 2019-08-28 DIAGNOSIS — F11.20 SEVERE OPIOID USE DISORDER (HCC): Primary | ICD-10-CM

## 2019-08-28 DIAGNOSIS — F19.10 POLYSUBSTANCE ABUSE (HCC): ICD-10-CM

## 2019-08-28 LAB
AMPHETAMINE SCREEN, URINE: ABNORMAL
BARBITURATE SCREEN, URINE: ABNORMAL
BENZODIAZEPINE SCREEN, URINE: ABNORMAL
BUPRENORPHINE URINE: ABNORMAL
COCAINE METABOLITE SCREEN URINE: ABNORMAL
GABAPENTIN SCREEN, URINE: ABNORMAL
MDMA URINE: ABNORMAL
METHADONE SCREEN, URINE: ABNORMAL
METHAMPHETAMINE, URINE: ABNORMAL
MRSA SCREEN: NORMAL
OPIATE SCREEN URINE: ABNORMAL
OXYCODONE SCREEN URINE: ABNORMAL
PHENCYCLIDINE SCREEN URINE: ABNORMAL
PROPOXYPHENE SCREEN, URINE: ABNORMAL
THC SCREEN, URINE: ABNORMAL
TRICYCLIC ANTIDEPRESSANTS, UR: ABNORMAL
URINE CULTURE, ROUTINE: NORMAL

## 2019-08-28 PROCEDURE — 99214 OFFICE O/P EST MOD 30 MIN: CPT | Performed by: INTERNAL MEDICINE

## 2019-08-28 PROCEDURE — 4004F PT TOBACCO SCREEN RCVD TLK: CPT | Performed by: INTERNAL MEDICINE

## 2019-08-28 PROCEDURE — G8428 CUR MEDS NOT DOCUMENT: HCPCS | Performed by: INTERNAL MEDICINE

## 2019-08-28 PROCEDURE — 1111F DSCHRG MED/CURRENT MED MERGE: CPT | Performed by: INTERNAL MEDICINE

## 2019-08-28 PROCEDURE — 80305 DRUG TEST PRSMV DIR OPT OBS: CPT | Performed by: INTERNAL MEDICINE

## 2019-08-28 PROCEDURE — G8419 CALC BMI OUT NRM PARAM NOF/U: HCPCS | Performed by: INTERNAL MEDICINE

## 2019-08-28 RX ORDER — BUPRENORPHINE AND NALOXONE 8; 2 MG/1; MG/1
1 FILM, SOLUBLE BUCCAL; SUBLINGUAL DAILY
Status: DISCONTINUED | OUTPATIENT
Start: 2019-08-28 | End: 2019-09-12

## 2019-08-28 RX ADMIN — BUPRENORPHINE AND NALOXONE 1 FILM: 8; 2 FILM, SOLUBLE BUCCAL; SUBLINGUAL at 14:20

## 2019-08-28 NOTE — PROGRESS NOTES
MEDICATION ASSISTED TREATMENT ENCOUNTER    HISTORY OF PRESENT ILLNESS  Patient presents for evaluation of opioid use and would like to be placed on medication assisted treatment  Patient was recently admitted to the hospital  Apparently he was found on the street acting strangely  The [place] PD brought him to the ER where he was found to have renal failure  He was admitted to the medical service  Dr. iKm Noe from psychiatry saw him  Patient states that Dr. Dulce Cheney told him not to give him any more Suboxone he could have the Vivitrol at all they would offer  Patient on hearing is signed out [place] he said is not taking Benadryl  The second day of admission his creatinine was up to 6.5 yesterday the day of discharge it was back down to 0.08  Patient has had problems using heroin  Patient started using heroin 4 years ago  Prior to that his pain meds he started those at age 14  Patient uses it IV  Other drugs used: Cocaine, meth, crack  Suboxone programs in the past Maribeth Ramirez, prior to that he went to the [facility] clinic in [place] in the past no  Last use of heroin a week ago  Patient would typically use 1-2 gms daily  Ever used Suboxone off the street yes, a half this am    Past Medical History:   Diagnosis Date    Anxiety     Depression     Kidney stone     Opiate abuse, continuous ([facility] [address]        No past surgical history on file.     PAST PSYCHIATRIC HISTORY: PTSD, anxiety, depression    No Known Allergies    Current Outpatient Medications   Medication Sig Dispense Refill    naproxen (NAPROSYN) 500 MG tablet Take 1 tablet by mouth 2 times daily 60 tablet 0    venlafaxine (EFFEXOR XR) 150 MG extended release capsule Take 150 mg by mouth daily      mirtazapine (REMERON) 45 MG tablet Take 45 mg by mouth nightly      busPIRone (BUSPAR) 30 MG tablet Take 30 mg by mouth daily      cephALEXin (KEFLEX) 500 MG capsule Take 1 capsule by mouth 3 times daily 30 Extremities: No cyanosis clubbing or edema     Neurologic: No focal neurologic deficit detected, no tremors     Musculoskeletal: No abnormal joint findings or muscle findings     Skin: No rashes, lesions or abnormalities noted        URINE DRUG SCREEN TODAY:  Amphetamine Screen, Urine 08/28/2019  1:01 PM Unknown   neg    Barbiturate Screen, Urine 08/28/2019  1:01 PM Unknown   neg    Benzodiazepine Screen, Urine 08/28/2019  1:01 PM Unknown   neg    Buprenorphine Urine 08/28/2019  1:01 PM Unknown   pos    Cocaine Metabolite Screen, Urine 08/28/2019  1:01 PM Unknown   neg    Gabapentin Screen, Urine 08/28/2019  1:01 PM Unknown   n/a    MDMA, Urine 08/28/2019  1:01 PM Unknown   neg    Methamphetamine, Urine 08/28/2019  1:01 PM Unknown   pos    Methadone Screen, Urine 08/28/2019  1:01 PM Unknown   neg    Opiate Scrn, Ur 08/28/2019  1:01 PM Unknown   neg    Oxycodone Screen, Ur 08/28/2019  1:01 PM Unknown   neg    PCP Screen, Urine 08/28/2019  1:01 PM Unknown   neg    Propoxyphene Screen, Urine 08/28/2019  1:01 PM Unknown   n/a    THC Screen, Urine 08/28/2019  1:01 PM Unknown   pos    Tricyclic Antidepressants, Urine 08/28/2019  1:01 PM Unknown   n/a    Narrative     Positive Fentanyl. Diagnosis Orders   1. Severe opioid use disorder (HCC)  POCT Rapid Drug Screen   2. Polysubstance abuse [facility])           PLAN:  Provider provided education on Medication Assisted Treatment options, including: Suboxone, Sublocade, Methadone, and Naltrexone/Vivitrol  Allowed opportunity to respond to questions regarding the treatment options. Patient voices that their treatment preference is suboxone. I feel that this patient is an appropriate candidate for this treatment option. Education given on the importance of combining Medication Assisted Treatment with comprehensive treatment, including: individual counseling, treatment groups, community support groups, and psychiatry as applicable.  Patient will meet with social

## 2019-08-29 ENCOUNTER — NURSE ONLY (OUTPATIENT)
Dept: LAB | Age: 34
End: 2019-08-29

## 2019-08-29 ENCOUNTER — OFFICE VISIT (OUTPATIENT)
Dept: INTERNAL MEDICINE CLINIC | Age: 34
End: 2019-08-29
Payer: MEDICARE

## 2019-08-29 VITALS
WEIGHT: 189.5 LBS | SYSTOLIC BLOOD PRESSURE: 169 MMHG | BODY MASS INDEX: 25.7 KG/M2 | DIASTOLIC BLOOD PRESSURE: 107 MMHG | HEART RATE: 149 BPM

## 2019-08-29 DIAGNOSIS — Z51.81 ENCOUNTER FOR MONITORING SUBOXONE MAINTENANCE THERAPY: ICD-10-CM

## 2019-08-29 DIAGNOSIS — Z79.899 ENCOUNTER FOR MONITORING SUBOXONE MAINTENANCE THERAPY: ICD-10-CM

## 2019-08-29 DIAGNOSIS — F11.20 SEVERE OPIOID USE DISORDER (HCC): Primary | ICD-10-CM

## 2019-08-29 LAB
AMPHETAMINE SCREEN, URINE: ABNORMAL
ANION GAP SERPL CALCULATED.3IONS-SCNC: 11 MEQ/L (ref 8–16)
BARBITURATE SCREEN, URINE: ABNORMAL
BENZODIAZEPINE SCREEN, URINE: ABNORMAL
BUN BLDV-MCNC: 10 MG/DL (ref 7–22)
BUPRENORPHINE URINE: ABNORMAL
CALCIUM SERPL-MCNC: 9.5 MG/DL (ref 8.5–10.5)
CHLORIDE BLD-SCNC: 104 MEQ/L (ref 98–111)
CO2: 26 MEQ/L (ref 23–33)
COCAINE METABOLITE SCREEN URINE: ABNORMAL
CREAT SERPL-MCNC: 0.7 MG/DL (ref 0.4–1.2)
GABAPENTIN SCREEN, URINE: ABNORMAL
GFR SERPL CREATININE-BSD FRML MDRD: > 90 ML/MIN/1.73M2
GLUCOSE BLD-MCNC: 99 MG/DL (ref 70–108)
MDMA URINE: ABNORMAL
METHADONE SCREEN, URINE: ABNORMAL
METHAMPHETAMINE, URINE: ABNORMAL
OPIATE SCREEN URINE: ABNORMAL
OXYCODONE SCREEN URINE: ABNORMAL
PHENCYCLIDINE SCREEN URINE: ABNORMAL
POTASSIUM SERPL-SCNC: 3.4 MEQ/L (ref 3.5–5.2)
PROPOXYPHENE SCREEN, URINE: ABNORMAL
SODIUM BLD-SCNC: 141 MEQ/L (ref 135–145)
THC SCREEN, URINE: ABNORMAL
TRICYCLIC ANTIDEPRESSANTS, UR: ABNORMAL

## 2019-08-29 PROCEDURE — 1111F DSCHRG MED/CURRENT MED MERGE: CPT | Performed by: INTERNAL MEDICINE

## 2019-08-29 PROCEDURE — 4004F PT TOBACCO SCREEN RCVD TLK: CPT | Performed by: INTERNAL MEDICINE

## 2019-08-29 PROCEDURE — 99213 OFFICE O/P EST LOW 20 MIN: CPT | Performed by: INTERNAL MEDICINE

## 2019-08-29 PROCEDURE — G8419 CALC BMI OUT NRM PARAM NOF/U: HCPCS | Performed by: INTERNAL MEDICINE

## 2019-08-29 PROCEDURE — 80305 DRUG TEST PRSMV DIR OPT OBS: CPT | Performed by: INTERNAL MEDICINE

## 2019-08-29 PROCEDURE — G8427 DOCREV CUR MEDS BY ELIG CLIN: HCPCS | Performed by: INTERNAL MEDICINE

## 2019-08-29 NOTE — PROGRESS NOTES
MEDICATION ASSISTED TREATMENT ENCOUNTER    HISTORY OF PRESENT ILLNESS  Patient presents for evaluation of opioid use and would like to be placed on medication assisted treatment  I saw him here 8/28  Patient was recently admitted to the hospital  Apparently he was found on the street acting strangely  The [place] PD brought him to the ER where he was found to have renal failure  He was admitted to the medical service  Dr. Pepe Bajwa from psychiatry saw him  Patient states that Dr. Tae Cronin told him not to give him any more Suboxone he could have the Vivitrol at all they would offer  Patient on hearing is signed out [place] he said is not taking Benadryl  The second day of admission his creatinine was up to 6.5 yesterday the day of discharge it was back down to 0.08  Patient has had problems using heroin  Patient started using heroin 4 years ago  Prior to that his pain meds he started those at age 14  Patient uses it IV  Other drugs used: Cocaine, meth, crack  Suboxone programs in the past Rory Hdez, prior to that he went to the [facility] clinic in [place] in the past no  Last use of heroin a week ago  Patient would typically use 1-2 gms daily  Ever used Suboxone off the street yes, a half this am    Past Medical History:   Diagnosis Date    Anxiety     Depression     Kidney stone     Opiate abuse, continuous ([facility] [address]      ROS     General: Patient is feeling well  Patient is not experiencing  withdrawal symptoms ,no urges or cravings  Patient is not having any side effects from the buprenorphine    PHYSICAL EXAM  Blood pressure (!) 169/107, pulse 149, weight 189 lb 8 oz (86 kg).          General: Patient resting comfortably in no acute distress     Mental status: Alert and oriented to person place and time, no hallucinations or delusions     Eyes: Pupils are normal            URINE DRUG SCREEN TODAY:  Amphetamine Screen, Urine 08/28/2019  1:01 PM Unknown   neg Barbiturate Screen, Urine 08/28/2019  1:01 PM Unknown   neg    Benzodiazepine Screen, Urine 08/28/2019  1:01 PM Unknown   neg    Buprenorphine Urine 08/28/2019  1:01 PM Unknown   pos    Cocaine Metabolite Screen, Urine 08/28/2019  1:01 PM Unknown   neg    Gabapentin Screen, Urine 08/28/2019  1:01 PM Unknown   n/a    MDMA, Urine 08/28/2019  1:01 PM Unknown   neg    Methamphetamine, Urine 08/28/2019  1:01 PM Unknown   pos    Methadone Screen, Urine 08/28/2019  1:01 PM Unknown   neg    Opiate Scrn, Ur 08/28/2019  1:01 PM Unknown   neg    Oxycodone Screen, Ur 08/28/2019  1:01 PM Unknown   neg    PCP Screen, Urine 08/28/2019  1:01 PM Unknown   neg    Propoxyphene Screen, Urine 08/28/2019  1:01 PM Unknown   n/a    THC Screen, Urine 08/28/2019  1:01 PM Unknown   pos    Tricyclic Antidepressants, Urine 08/28/2019  1:01 PM Unknown   n/a    Narrative     Positive Fentanyl. Diagnosis Orders   1. Severe opioid use disorder (HCC)  POCT Rapid Drug Screen   2.  Encounter for monitoring Suboxone maintenance therapy           PLAN:  Plan we will give him 8 mg films twice daily  I will have him follow-up this Friday   I will be out Jenni Romano  will see him  Pineda Weinberg to help set up meetings and counseling

## 2019-08-30 ENCOUNTER — OFFICE VISIT (OUTPATIENT)
Dept: INTERNAL MEDICINE CLINIC | Age: 34
End: 2019-08-30
Payer: MEDICARE

## 2019-08-30 VITALS
RESPIRATION RATE: 12 BRPM | DIASTOLIC BLOOD PRESSURE: 86 MMHG | HEIGHT: 72 IN | SYSTOLIC BLOOD PRESSURE: 146 MMHG | HEART RATE: 75 BPM | BODY MASS INDEX: 25.68 KG/M2 | WEIGHT: 189.6 LBS

## 2019-08-30 DIAGNOSIS — F11.20 SEVERE OPIOID USE DISORDER (HCC): Primary | ICD-10-CM

## 2019-08-30 LAB
AMPHETAMINE SCREEN, URINE: NORMAL
BARBITURATE SCREEN, URINE: NORMAL
BENZODIAZEPINE SCREEN, URINE: NORMAL
BUPRENORPHINE URINE: NORMAL
COCAINE METABOLITE SCREEN URINE: NORMAL
GABAPENTIN SCREEN, URINE: NORMAL
MDMA URINE: NORMAL
METHADONE SCREEN, URINE: NORMAL
METHAMPHETAMINE, URINE: NORMAL
OPIATE SCREEN URINE: NORMAL
OXYCODONE SCREEN URINE: NORMAL
PHENCYCLIDINE SCREEN URINE: NORMAL
PROPOXYPHENE SCREEN, URINE: NORMAL
THC SCREEN, URINE: NORMAL
TRICYCLIC ANTIDEPRESSANTS, UR: NORMAL

## 2019-08-30 PROCEDURE — 80305 DRUG TEST PRSMV DIR OPT OBS: CPT | Performed by: NURSE PRACTITIONER

## 2019-08-30 PROCEDURE — G8427 DOCREV CUR MEDS BY ELIG CLIN: HCPCS | Performed by: NURSE PRACTITIONER

## 2019-08-30 PROCEDURE — 4004F PT TOBACCO SCREEN RCVD TLK: CPT | Performed by: NURSE PRACTITIONER

## 2019-08-30 PROCEDURE — G8419 CALC BMI OUT NRM PARAM NOF/U: HCPCS | Performed by: NURSE PRACTITIONER

## 2019-08-30 PROCEDURE — 1111F DSCHRG MED/CURRENT MED MERGE: CPT | Performed by: NURSE PRACTITIONER

## 2019-08-30 PROCEDURE — 99214 OFFICE O/P EST MOD 30 MIN: CPT | Performed by: NURSE PRACTITIONER

## 2019-08-30 RX ORDER — BUPRENORPHINE AND NALOXONE 8; 2 MG/1; MG/1
1 FILM, SOLUBLE BUCCAL; SUBLINGUAL 2 TIMES DAILY
Qty: 8 FILM | Refills: 0 | Status: SHIPPED | OUTPATIENT
Start: 2019-08-30 | End: 2019-09-03 | Stop reason: SDUPTHER

## 2019-08-30 ASSESSMENT — PATIENT HEALTH QUESTIONNAIRE - PHQ9
1. LITTLE INTEREST OR PLEASURE IN DOING THINGS: 0
SUM OF ALL RESPONSES TO PHQ9 QUESTIONS 1 & 2: 0
2. FEELING DOWN, DEPRESSED OR HOPELESS: 0
SUM OF ALL RESPONSES TO PHQ QUESTIONS 1-9: 0
SUM OF ALL RESPONSES TO PHQ QUESTIONS 1-9: 0

## 2019-08-30 NOTE — PROGRESS NOTES
from the following:    Height as of this encounter: 6' 0.01\" (1.829 m). Weight as of this encounter: 189 lb 9.5 oz (86 kg). Physical Exam   Constitutional: He is oriented to person, place, and time. He appears well-developed and well-nourished. No distress. HENT:   Head: Normocephalic and atraumatic. Right Ear: External ear normal.   Left Ear: External ear normal.   Nose: Nose normal.   Mouth/Throat: Oropharynx is clear and moist.   Eyes: Pupils are equal, round, and reactive to light. Conjunctivae and EOM are normal.   Neck: Normal range of motion. Neck supple. No JVD present. Cardiovascular: Normal rate, regular rhythm and normal heart sounds. Exam reveals no gallop and no friction rub. No murmur heard. Pulmonary/Chest: Effort normal and breath sounds normal. No respiratory distress. He has no wheezes. He has no rales. Musculoskeletal: Normal range of motion. He exhibits no edema. Neurological: He is alert and oriented to person, place, and time. Skin: Skin is warm and dry. Psychiatric: He has a normal mood and affect. His behavior is normal. Judgment and thought content normal.       ASSESSMENT/PLAN:    1. Severe opioid use disorder (HCC)    - POCT Rapid Drug Screen  - OARRS reviewed  - Suboxone 8 mg BID  - Attend NA/AA meetings and arrange for individualized counseling  - Follow up with Dr. Aleshia Baldwin in 4 days    An electronic signature was used to authenticate this note.     --PAPA Fontanez - CNP on 8/30/2019 at 11:21 AM

## 2019-08-31 LAB
BLOOD CULTURE, ROUTINE: NORMAL
BLOOD CULTURE, ROUTINE: NORMAL

## 2019-09-03 ENCOUNTER — OFFICE VISIT (OUTPATIENT)
Dept: INTERNAL MEDICINE CLINIC | Age: 34
End: 2019-09-03
Payer: MEDICARE

## 2019-09-03 VITALS
HEIGHT: 71 IN | SYSTOLIC BLOOD PRESSURE: 135 MMHG | HEART RATE: 103 BPM | DIASTOLIC BLOOD PRESSURE: 85 MMHG | BODY MASS INDEX: 26.32 KG/M2 | WEIGHT: 188 LBS

## 2019-09-03 DIAGNOSIS — Z51.81 ENCOUNTER FOR MONITORING SUBOXONE MAINTENANCE THERAPY: ICD-10-CM

## 2019-09-03 DIAGNOSIS — Z79.899 ENCOUNTER FOR MONITORING SUBOXONE MAINTENANCE THERAPY: ICD-10-CM

## 2019-09-03 DIAGNOSIS — F11.20 SEVERE OPIOID USE DISORDER (HCC): Primary | ICD-10-CM

## 2019-09-03 DIAGNOSIS — F19.10 POLYSUBSTANCE ABUSE (HCC): ICD-10-CM

## 2019-09-03 LAB
AMPHETAMINE SCREEN, URINE: ABNORMAL
BARBITURATE SCREEN, URINE: ABNORMAL
BENZODIAZEPINE SCREEN, URINE: ABNORMAL
BUPRENORPHINE URINE: ABNORMAL
COCAINE METABOLITE SCREEN URINE: ABNORMAL
GABAPENTIN SCREEN, URINE: ABNORMAL
MDMA URINE: ABNORMAL
METHADONE SCREEN, URINE: ABNORMAL
METHAMPHETAMINE, URINE: ABNORMAL
OPIATE SCREEN URINE: ABNORMAL
OXYCODONE SCREEN URINE: ABNORMAL
PHENCYCLIDINE SCREEN URINE: ABNORMAL
PROPOXYPHENE SCREEN, URINE: ABNORMAL
THC SCREEN, URINE: ABNORMAL
TRICYCLIC ANTIDEPRESSANTS, UR: ABNORMAL

## 2019-09-03 PROCEDURE — G8419 CALC BMI OUT NRM PARAM NOF/U: HCPCS | Performed by: INTERNAL MEDICINE

## 2019-09-03 PROCEDURE — 1111F DSCHRG MED/CURRENT MED MERGE: CPT | Performed by: INTERNAL MEDICINE

## 2019-09-03 PROCEDURE — 99213 OFFICE O/P EST LOW 20 MIN: CPT | Performed by: INTERNAL MEDICINE

## 2019-09-03 PROCEDURE — 4004F PT TOBACCO SCREEN RCVD TLK: CPT | Performed by: INTERNAL MEDICINE

## 2019-09-03 PROCEDURE — 80305 DRUG TEST PRSMV DIR OPT OBS: CPT | Performed by: INTERNAL MEDICINE

## 2019-09-03 PROCEDURE — G8427 DOCREV CUR MEDS BY ELIG CLIN: HCPCS | Performed by: INTERNAL MEDICINE

## 2019-09-03 RX ORDER — BUPRENORPHINE AND NALOXONE 8; 2 MG/1; MG/1
1 FILM, SOLUBLE BUCCAL; SUBLINGUAL 2 TIMES DAILY
Qty: 14 FILM | Refills: 0 | Status: SHIPPED | OUTPATIENT
Start: 2019-09-03 | End: 2019-09-10

## 2019-09-12 ENCOUNTER — OFFICE VISIT (OUTPATIENT)
Dept: INTERNAL MEDICINE CLINIC | Age: 34
End: 2019-09-12
Payer: MEDICARE

## 2019-09-12 VITALS
WEIGHT: 176 LBS | HEIGHT: 71 IN | SYSTOLIC BLOOD PRESSURE: 129 MMHG | BODY MASS INDEX: 24.64 KG/M2 | HEART RATE: 115 BPM | DIASTOLIC BLOOD PRESSURE: 69 MMHG

## 2019-09-12 DIAGNOSIS — Z51.81 ENCOUNTER FOR MONITORING SUBOXONE MAINTENANCE THERAPY: ICD-10-CM

## 2019-09-12 DIAGNOSIS — Z79.899 ENCOUNTER FOR MONITORING SUBOXONE MAINTENANCE THERAPY: ICD-10-CM

## 2019-09-12 DIAGNOSIS — F11.20 SEVERE OPIOID USE DISORDER (HCC): Primary | ICD-10-CM

## 2019-09-12 DIAGNOSIS — F19.10 POLYSUBSTANCE ABUSE (HCC): ICD-10-CM

## 2019-09-12 PROCEDURE — G8427 DOCREV CUR MEDS BY ELIG CLIN: HCPCS | Performed by: INTERNAL MEDICINE

## 2019-09-12 PROCEDURE — 80305 DRUG TEST PRSMV DIR OPT OBS: CPT | Performed by: INTERNAL MEDICINE

## 2019-09-12 PROCEDURE — G8420 CALC BMI NORM PARAMETERS: HCPCS | Performed by: INTERNAL MEDICINE

## 2019-09-12 PROCEDURE — 99213 OFFICE O/P EST LOW 20 MIN: CPT | Performed by: INTERNAL MEDICINE

## 2019-09-12 PROCEDURE — 1111F DSCHRG MED/CURRENT MED MERGE: CPT | Performed by: INTERNAL MEDICINE

## 2019-09-12 PROCEDURE — 4004F PT TOBACCO SCREEN RCVD TLK: CPT | Performed by: INTERNAL MEDICINE

## 2019-09-12 RX ORDER — BUPRENORPHINE AND NALOXONE 8; 2 MG/1; MG/1
1 FILM, SOLUBLE BUCCAL; SUBLINGUAL 2 TIMES DAILY
Qty: 12 FILM | Refills: 0 | Status: SHIPPED | OUTPATIENT
Start: 2019-09-12 | End: 2019-09-18 | Stop reason: SDUPTHER

## 2019-09-12 RX ORDER — BUPRENORPHINE AND NALOXONE 8; 2 MG/1; MG/1
1 FILM, SOLUBLE BUCCAL; SUBLINGUAL 2 TIMES DAILY
COMMUNITY
End: 2019-09-12 | Stop reason: SDUPTHER

## 2019-09-13 NOTE — PROGRESS NOTES
Verbal order per Dr. Beto Centeno for urine drug screen. Positive for BUP. Verified results with Rakesh Zhou. Dr. Beto Centeno ordered Suboxone 8 mg film BID for patient. Verified dose with patient. Patient was sent home with 6 day script for Suboxone 8 mg film BID and will be seen back in the office on 9/18/2019.     Labwork to be done: CMP, CBC, TSH.

## 2019-09-18 ENCOUNTER — OFFICE VISIT (OUTPATIENT)
Dept: INTERNAL MEDICINE CLINIC | Age: 34
End: 2019-09-18
Payer: MEDICARE

## 2019-09-18 VITALS
HEIGHT: 71 IN | DIASTOLIC BLOOD PRESSURE: 98 MMHG | HEART RATE: 106 BPM | WEIGHT: 194.31 LBS | BODY MASS INDEX: 27.2 KG/M2 | SYSTOLIC BLOOD PRESSURE: 145 MMHG

## 2019-09-18 DIAGNOSIS — F11.20 SEVERE OPIOID USE DISORDER (HCC): Primary | ICD-10-CM

## 2019-09-18 DIAGNOSIS — F19.10 POLYSUBSTANCE ABUSE (HCC): ICD-10-CM

## 2019-09-18 DIAGNOSIS — Z51.81 ENCOUNTER FOR MONITORING SUBOXONE MAINTENANCE THERAPY: ICD-10-CM

## 2019-09-18 DIAGNOSIS — Z79.899 ENCOUNTER FOR MONITORING SUBOXONE MAINTENANCE THERAPY: ICD-10-CM

## 2019-09-18 PROCEDURE — G8427 DOCREV CUR MEDS BY ELIG CLIN: HCPCS | Performed by: INTERNAL MEDICINE

## 2019-09-18 PROCEDURE — 1111F DSCHRG MED/CURRENT MED MERGE: CPT | Performed by: INTERNAL MEDICINE

## 2019-09-18 PROCEDURE — G8419 CALC BMI OUT NRM PARAM NOF/U: HCPCS | Performed by: INTERNAL MEDICINE

## 2019-09-18 PROCEDURE — 99213 OFFICE O/P EST LOW 20 MIN: CPT | Performed by: INTERNAL MEDICINE

## 2019-09-18 PROCEDURE — 4004F PT TOBACCO SCREEN RCVD TLK: CPT | Performed by: INTERNAL MEDICINE

## 2019-09-18 PROCEDURE — 80305 DRUG TEST PRSMV DIR OPT OBS: CPT | Performed by: INTERNAL MEDICINE

## 2019-09-18 RX ORDER — BUPRENORPHINE AND NALOXONE 8; 2 MG/1; MG/1
1 FILM, SOLUBLE BUCCAL; SUBLINGUAL 2 TIMES DAILY
Qty: 14 FILM | Refills: 0 | Status: SHIPPED | OUTPATIENT
Start: 2019-09-18 | End: 2019-09-25 | Stop reason: SDUPTHER

## 2019-09-22 LAB
BUPRENORPHINE GLUCURONIDE URINE: 48 NG/ML
BUPRENORPHINE URINE: < 2 NG/ML
NALOXONE URINE: < 100 NG/ML
NORBUPRENORPHINE GLUCURONIDE URINE: 129 NG/ML
NORBUPRENORPHINE, URINE: 41 NG/ML

## 2019-09-25 ENCOUNTER — OFFICE VISIT (OUTPATIENT)
Dept: INTERNAL MEDICINE CLINIC | Age: 34
End: 2019-09-25
Payer: MEDICARE

## 2019-09-25 VITALS
HEIGHT: 71 IN | DIASTOLIC BLOOD PRESSURE: 92 MMHG | WEIGHT: 188 LBS | SYSTOLIC BLOOD PRESSURE: 131 MMHG | BODY MASS INDEX: 26.32 KG/M2 | HEART RATE: 72 BPM

## 2019-09-25 DIAGNOSIS — F19.10 POLYSUBSTANCE ABUSE (HCC): ICD-10-CM

## 2019-09-25 DIAGNOSIS — Z79.899 ENCOUNTER FOR MONITORING SUBOXONE MAINTENANCE THERAPY: ICD-10-CM

## 2019-09-25 DIAGNOSIS — F11.20 SEVERE OPIOID USE DISORDER (HCC): Primary | ICD-10-CM

## 2019-09-25 DIAGNOSIS — Z51.81 ENCOUNTER FOR MONITORING SUBOXONE MAINTENANCE THERAPY: ICD-10-CM

## 2019-09-25 PROCEDURE — G8419 CALC BMI OUT NRM PARAM NOF/U: HCPCS | Performed by: INTERNAL MEDICINE

## 2019-09-25 PROCEDURE — 80305 DRUG TEST PRSMV DIR OPT OBS: CPT | Performed by: INTERNAL MEDICINE

## 2019-09-25 PROCEDURE — 4004F PT TOBACCO SCREEN RCVD TLK: CPT | Performed by: INTERNAL MEDICINE

## 2019-09-25 PROCEDURE — G8427 DOCREV CUR MEDS BY ELIG CLIN: HCPCS | Performed by: INTERNAL MEDICINE

## 2019-09-25 PROCEDURE — 1111F DSCHRG MED/CURRENT MED MERGE: CPT | Performed by: INTERNAL MEDICINE

## 2019-09-25 PROCEDURE — 99213 OFFICE O/P EST LOW 20 MIN: CPT | Performed by: INTERNAL MEDICINE

## 2019-09-25 RX ORDER — MIRTAZAPINE 15 MG/1
TABLET, FILM COATED ORAL
COMMUNITY
Start: 2019-09-20 | End: 2019-09-25

## 2019-09-25 RX ORDER — VENLAFAXINE 75 MG/1
150 TABLET ORAL 2 TIMES DAILY
COMMUNITY
Start: 2019-09-20 | End: 2019-11-20 | Stop reason: SDUPTHER

## 2019-09-25 RX ORDER — BUPRENORPHINE AND NALOXONE 8; 2 MG/1; MG/1
1 FILM, SOLUBLE BUCCAL; SUBLINGUAL 2 TIMES DAILY
Qty: 14 FILM | Refills: 0 | Status: SHIPPED | OUTPATIENT
Start: 2019-09-25 | End: 2019-09-30 | Stop reason: SDUPTHER

## 2019-09-25 NOTE — PROGRESS NOTES
10:55 AM Unknown   POS    Cocaine Metabolite Screen, Urine 09/25/2019 10:55 AM Unknown   NEG    Gabapentin Screen, Urine 09/25/2019 10:55 AM Unknown   N/A    MDMA, Urine 09/25/2019 10:55 AM Unknown   NEG    Methamphetamine, Urine 09/25/2019 10:55 AM Unknown   NEG    Methadone Screen, Urine 09/25/2019 10:55 AM Unknown   NEG    Opiate Scrn, Ur 09/25/2019 10:55 AM Unknown   NEG    Oxycodone Screen, Ur 09/25/2019 10:55 AM Unknown   NEG    PCP Screen, Urine 09/25/2019 10:55 AM Unknown   NEG    Propoxyphene Screen, Urine 09/25/2019 10:55 AM Unknown   N/A    THC Screen, Urine 09/25/2019 10:55 AM Unknown   NEG    Tricyclic Antidepressants, Urine 09/25/2019 10:55 AM Unknown   N/A         Diagnosis Orders   1. Severe opioid use disorder (HCC)  POCT Rapid Drug Screen    buprenorphine-naloxone (SUBOXONE) 8-2 MG FILM SL film   2. Encounter for monitoring Suboxone maintenance therapy     3.  Polysubstance abuse ([facility] [address]           PLAN:  Plan we will give him 8 mg films twice daily  [facility]  He says he will consider subcutaneous buprenorphine but he does not want it presently as he is doing so well  With his girlfriend who apparently I see and she relapsed last week  I told him generally he cannot be around patients that use  Follow-up 1 week

## 2019-09-30 ENCOUNTER — OFFICE VISIT (OUTPATIENT)
Dept: INTERNAL MEDICINE CLINIC | Age: 34
End: 2019-09-30
Payer: MEDICARE

## 2019-09-30 VITALS
DIASTOLIC BLOOD PRESSURE: 79 MMHG | SYSTOLIC BLOOD PRESSURE: 139 MMHG | HEART RATE: 102 BPM | HEIGHT: 71 IN | WEIGHT: 184.31 LBS | BODY MASS INDEX: 25.8 KG/M2

## 2019-09-30 DIAGNOSIS — F19.10 POLYSUBSTANCE ABUSE (HCC): ICD-10-CM

## 2019-09-30 DIAGNOSIS — F11.20 SEVERE OPIOID USE DISORDER (HCC): Primary | ICD-10-CM

## 2019-09-30 DIAGNOSIS — Z51.81 ENCOUNTER FOR MONITORING SUBOXONE MAINTENANCE THERAPY: ICD-10-CM

## 2019-09-30 DIAGNOSIS — Z79.899 ENCOUNTER FOR MONITORING SUBOXONE MAINTENANCE THERAPY: ICD-10-CM

## 2019-09-30 PROCEDURE — G8427 DOCREV CUR MEDS BY ELIG CLIN: HCPCS | Performed by: INTERNAL MEDICINE

## 2019-09-30 PROCEDURE — 80305 DRUG TEST PRSMV DIR OPT OBS: CPT | Performed by: INTERNAL MEDICINE

## 2019-09-30 PROCEDURE — G8419 CALC BMI OUT NRM PARAM NOF/U: HCPCS | Performed by: INTERNAL MEDICINE

## 2019-09-30 PROCEDURE — 99213 OFFICE O/P EST LOW 20 MIN: CPT | Performed by: INTERNAL MEDICINE

## 2019-09-30 PROCEDURE — 4004F PT TOBACCO SCREEN RCVD TLK: CPT | Performed by: INTERNAL MEDICINE

## 2019-09-30 RX ORDER — BUPRENORPHINE AND NALOXONE 8; 2 MG/1; MG/1
1 FILM, SOLUBLE BUCCAL; SUBLINGUAL 2 TIMES DAILY
Qty: 14 FILM | Refills: 0 | Status: SHIPPED | OUTPATIENT
Start: 2019-09-30 | End: 2019-10-07

## 2019-11-14 ENCOUNTER — OFFICE VISIT (OUTPATIENT)
Dept: INTERNAL MEDICINE CLINIC | Age: 34
End: 2019-11-14
Payer: MEDICARE

## 2019-11-14 VITALS
BODY MASS INDEX: 26.74 KG/M2 | WEIGHT: 191 LBS | HEIGHT: 71 IN | SYSTOLIC BLOOD PRESSURE: 145 MMHG | DIASTOLIC BLOOD PRESSURE: 95 MMHG | HEART RATE: 148 BPM

## 2019-11-14 DIAGNOSIS — F19.10 POLYSUBSTANCE ABUSE (HCC): ICD-10-CM

## 2019-11-14 DIAGNOSIS — F11.20 SEVERE OPIOID USE DISORDER (HCC): Primary | ICD-10-CM

## 2019-11-14 DIAGNOSIS — Z51.81 ENCOUNTER FOR MONITORING SUBOXONE MAINTENANCE THERAPY: ICD-10-CM

## 2019-11-14 DIAGNOSIS — F15.10 METHAMPHETAMINE ABUSE (HCC): ICD-10-CM

## 2019-11-14 DIAGNOSIS — Z79.899 ENCOUNTER FOR MONITORING SUBOXONE MAINTENANCE THERAPY: ICD-10-CM

## 2019-11-14 PROCEDURE — 80305 DRUG TEST PRSMV DIR OPT OBS: CPT | Performed by: INTERNAL MEDICINE

## 2019-11-14 PROCEDURE — G8419 CALC BMI OUT NRM PARAM NOF/U: HCPCS | Performed by: INTERNAL MEDICINE

## 2019-11-14 PROCEDURE — 4004F PT TOBACCO SCREEN RCVD TLK: CPT | Performed by: INTERNAL MEDICINE

## 2019-11-14 PROCEDURE — G8427 DOCREV CUR MEDS BY ELIG CLIN: HCPCS | Performed by: INTERNAL MEDICINE

## 2019-11-14 PROCEDURE — G8484 FLU IMMUNIZE NO ADMIN: HCPCS | Performed by: INTERNAL MEDICINE

## 2019-11-14 PROCEDURE — 99213 OFFICE O/P EST LOW 20 MIN: CPT | Performed by: INTERNAL MEDICINE

## 2019-11-14 RX ORDER — BUPRENORPHINE AND NALOXONE 8; 2 MG/1; MG/1
1 FILM, SOLUBLE BUCCAL; SUBLINGUAL 2 TIMES DAILY
Qty: 8 FILM | Refills: 0 | Status: SHIPPED | OUTPATIENT
Start: 2019-11-14 | End: 2019-11-18

## 2019-11-20 ENCOUNTER — OFFICE VISIT (OUTPATIENT)
Dept: INTERNAL MEDICINE CLINIC | Age: 34
End: 2019-11-20
Payer: MEDICARE

## 2019-11-20 VITALS
DIASTOLIC BLOOD PRESSURE: 81 MMHG | HEIGHT: 71 IN | BODY MASS INDEX: 27.02 KG/M2 | WEIGHT: 193 LBS | HEART RATE: 85 BPM | SYSTOLIC BLOOD PRESSURE: 142 MMHG | RESPIRATION RATE: 12 BRPM

## 2019-11-20 VITALS
HEIGHT: 71 IN | WEIGHT: 192.9 LBS | SYSTOLIC BLOOD PRESSURE: 138 MMHG | BODY MASS INDEX: 27.01 KG/M2 | HEART RATE: 85 BPM | DIASTOLIC BLOOD PRESSURE: 81 MMHG

## 2019-11-20 DIAGNOSIS — I10 ESSENTIAL HYPERTENSION: ICD-10-CM

## 2019-11-20 DIAGNOSIS — F11.20 SEVERE OPIOID USE DISORDER (HCC): Primary | ICD-10-CM

## 2019-11-20 DIAGNOSIS — M79.7 FIBROMYALGIA: ICD-10-CM

## 2019-11-20 DIAGNOSIS — Z76.89 ENCOUNTER TO ESTABLISH CARE: ICD-10-CM

## 2019-11-20 DIAGNOSIS — F19.10 POLYSUBSTANCE ABUSE (HCC): ICD-10-CM

## 2019-11-20 DIAGNOSIS — B00.1 RECURRENT COLD SORES: ICD-10-CM

## 2019-11-20 DIAGNOSIS — Z51.81 ENCOUNTER FOR MONITORING SUBOXONE MAINTENANCE THERAPY: ICD-10-CM

## 2019-11-20 DIAGNOSIS — F41.9 ANXIETY AND DEPRESSION: ICD-10-CM

## 2019-11-20 DIAGNOSIS — S31.829A WOUND OF LEFT BUTTOCK, INITIAL ENCOUNTER: ICD-10-CM

## 2019-11-20 DIAGNOSIS — I10 ESSENTIAL HYPERTENSION: Primary | ICD-10-CM

## 2019-11-20 DIAGNOSIS — F32.A ANXIETY AND DEPRESSION: ICD-10-CM

## 2019-11-20 DIAGNOSIS — Z79.899 ENCOUNTER FOR MONITORING SUBOXONE MAINTENANCE THERAPY: ICD-10-CM

## 2019-11-20 DIAGNOSIS — F15.10 METHAMPHETAMINE ABUSE (HCC): ICD-10-CM

## 2019-11-20 DIAGNOSIS — S31.819A WOUND OF RIGHT BUTTOCK, INITIAL ENCOUNTER: ICD-10-CM

## 2019-11-20 LAB
AMPHETAMINE SCREEN, URINE: ABNORMAL
AMPHETAMINE SCREEN, URINE: ABNORMAL
BARBITURATE SCREEN, URINE: ABNORMAL
BARBITURATE SCREEN, URINE: ABNORMAL
BENZODIAZEPINE SCREEN, URINE: ABNORMAL
BENZODIAZEPINE SCREEN, URINE: ABNORMAL
BUPRENORPHINE URINE: ABNORMAL
BUPRENORPHINE URINE: ABNORMAL
COCAINE METABOLITE SCREEN URINE: ABNORMAL
COCAINE METABOLITE SCREEN URINE: ABNORMAL
GABAPENTIN SCREEN, URINE: ABNORMAL
GABAPENTIN SCREEN, URINE: ABNORMAL
MDMA URINE: ABNORMAL
MDMA URINE: ABNORMAL
METHADONE SCREEN, URINE: ABNORMAL
METHADONE SCREEN, URINE: ABNORMAL
METHAMPHETAMINE, URINE: ABNORMAL
METHAMPHETAMINE, URINE: ABNORMAL
OPIATE SCREEN URINE: ABNORMAL
OPIATE SCREEN URINE: ABNORMAL
OXYCODONE SCREEN URINE: ABNORMAL
OXYCODONE SCREEN URINE: ABNORMAL
PHENCYCLIDINE SCREEN URINE: ABNORMAL
PHENCYCLIDINE SCREEN URINE: ABNORMAL
PROPOXYPHENE SCREEN, URINE: ABNORMAL
PROPOXYPHENE SCREEN, URINE: ABNORMAL
THC SCREEN, URINE: ABNORMAL
THC SCREEN, URINE: ABNORMAL
TRICYCLIC ANTIDEPRESSANTS, UR: ABNORMAL
TRICYCLIC ANTIDEPRESSANTS, UR: ABNORMAL

## 2019-11-20 PROCEDURE — G8484 FLU IMMUNIZE NO ADMIN: HCPCS | Performed by: INTERNAL MEDICINE

## 2019-11-20 PROCEDURE — 99214 OFFICE O/P EST MOD 30 MIN: CPT | Performed by: NURSE PRACTITIONER

## 2019-11-20 PROCEDURE — 99213 OFFICE O/P EST LOW 20 MIN: CPT | Performed by: INTERNAL MEDICINE

## 2019-11-20 PROCEDURE — 80305 DRUG TEST PRSMV DIR OPT OBS: CPT | Performed by: INTERNAL MEDICINE

## 2019-11-20 PROCEDURE — G8427 DOCREV CUR MEDS BY ELIG CLIN: HCPCS | Performed by: NURSE PRACTITIONER

## 2019-11-20 PROCEDURE — G8428 CUR MEDS NOT DOCUMENT: HCPCS | Performed by: INTERNAL MEDICINE

## 2019-11-20 PROCEDURE — G8419 CALC BMI OUT NRM PARAM NOF/U: HCPCS | Performed by: INTERNAL MEDICINE

## 2019-11-20 PROCEDURE — 4004F PT TOBACCO SCREEN RCVD TLK: CPT | Performed by: NURSE PRACTITIONER

## 2019-11-20 PROCEDURE — 4004F PT TOBACCO SCREEN RCVD TLK: CPT | Performed by: INTERNAL MEDICINE

## 2019-11-20 PROCEDURE — G8484 FLU IMMUNIZE NO ADMIN: HCPCS | Performed by: NURSE PRACTITIONER

## 2019-11-20 PROCEDURE — G8419 CALC BMI OUT NRM PARAM NOF/U: HCPCS | Performed by: NURSE PRACTITIONER

## 2019-11-20 RX ORDER — LISINOPRIL 20 MG/1
20 TABLET ORAL DAILY
COMMUNITY
End: 2020-01-09 | Stop reason: SDUPTHER

## 2019-11-20 RX ORDER — BUSPIRONE HYDROCHLORIDE 30 MG/1
30 TABLET ORAL DAILY
Qty: 30 TABLET | Refills: 1 | Status: SHIPPED | OUTPATIENT
Start: 2019-11-20 | End: 2019-12-30 | Stop reason: ALTCHOICE

## 2019-11-20 RX ORDER — BUPRENORPHINE AND NALOXONE 8; 2 MG/1; MG/1
1 FILM, SOLUBLE BUCCAL; SUBLINGUAL 2 TIMES DAILY
COMMUNITY
End: 2019-11-21 | Stop reason: SDUPTHER

## 2019-11-20 RX ORDER — VENLAFAXINE 75 MG/1
150 TABLET ORAL 2 TIMES DAILY
Qty: 120 TABLET | Refills: 1 | Status: SHIPPED | OUTPATIENT
Start: 2019-11-20 | End: 2019-12-30

## 2019-11-20 RX ORDER — BUPRENORPHINE AND NALOXONE 8; 2 MG/1; MG/1
1 FILM, SOLUBLE BUCCAL; SUBLINGUAL 2 TIMES DAILY
Qty: 14 FILM | Refills: 0 | Status: CANCELLED | OUTPATIENT
Start: 2019-11-20 | End: 2019-11-27

## 2019-11-20 RX ORDER — LISINOPRIL 20 MG/1
20 TABLET ORAL 2 TIMES DAILY
Qty: 30 TABLET | Refills: 3 | Status: SHIPPED | OUTPATIENT
Start: 2019-11-20 | End: 2019-12-23 | Stop reason: SDUPTHER

## 2019-11-20 RX ORDER — MIRTAZAPINE 45 MG/1
45 TABLET, FILM COATED ORAL NIGHTLY
Qty: 30 TABLET | Refills: 1 | Status: SHIPPED | OUTPATIENT
Start: 2019-11-20 | End: 2020-01-09 | Stop reason: SDUPTHER

## 2019-11-20 RX ORDER — DOXYCYCLINE HYCLATE 100 MG
100 TABLET ORAL 2 TIMES DAILY
Qty: 14 TABLET | Refills: 0 | Status: SHIPPED | OUTPATIENT
Start: 2019-11-20 | End: 2019-11-27

## 2019-11-20 RX ORDER — VALACYCLOVIR HYDROCHLORIDE 500 MG/1
1000 TABLET, FILM COATED ORAL 2 TIMES DAILY
Qty: 20 TABLET | Refills: 0 | Status: SHIPPED | OUTPATIENT
Start: 2019-11-20 | End: 2019-11-25

## 2019-11-20 RX ORDER — PROMETHAZINE HYDROCHLORIDE 25 MG/1
25 TABLET ORAL 3 TIMES DAILY PRN
Qty: 20 TABLET | Refills: 0 | Status: SHIPPED | OUTPATIENT
Start: 2019-11-20 | End: 2019-11-27

## 2019-11-21 ENCOUNTER — OFFICE VISIT (OUTPATIENT)
Dept: INTERNAL MEDICINE CLINIC | Age: 34
End: 2019-11-21
Payer: MEDICARE

## 2019-11-21 DIAGNOSIS — R76.8 HEPATITIS C ANTIBODY POSITIVE IN BLOOD: ICD-10-CM

## 2019-11-21 DIAGNOSIS — F11.20 SEVERE OPIOID USE DISORDER (HCC): Primary | ICD-10-CM

## 2019-11-21 DIAGNOSIS — M79.7 FIBROMYALGIA: ICD-10-CM

## 2019-11-21 DIAGNOSIS — Z79.899 ENCOUNTER FOR MONITORING SUBOXONE MAINTENANCE THERAPY: ICD-10-CM

## 2019-11-21 DIAGNOSIS — I10 ESSENTIAL HYPERTENSION: ICD-10-CM

## 2019-11-21 DIAGNOSIS — F19.10 POLYSUBSTANCE ABUSE (HCC): ICD-10-CM

## 2019-11-21 DIAGNOSIS — F15.10 METHAMPHETAMINE ABUSE (HCC): ICD-10-CM

## 2019-11-21 DIAGNOSIS — Z51.81 ENCOUNTER FOR MONITORING SUBOXONE MAINTENANCE THERAPY: ICD-10-CM

## 2019-11-21 PROCEDURE — G8427 DOCREV CUR MEDS BY ELIG CLIN: HCPCS | Performed by: INTERNAL MEDICINE

## 2019-11-21 PROCEDURE — 4004F PT TOBACCO SCREEN RCVD TLK: CPT | Performed by: INTERNAL MEDICINE

## 2019-11-21 PROCEDURE — G8419 CALC BMI OUT NRM PARAM NOF/U: HCPCS | Performed by: INTERNAL MEDICINE

## 2019-11-21 PROCEDURE — 99213 OFFICE O/P EST LOW 20 MIN: CPT | Performed by: INTERNAL MEDICINE

## 2019-11-21 PROCEDURE — G8484 FLU IMMUNIZE NO ADMIN: HCPCS | Performed by: INTERNAL MEDICINE

## 2019-11-21 PROCEDURE — 80305 DRUG TEST PRSMV DIR OPT OBS: CPT | Performed by: INTERNAL MEDICINE

## 2019-11-21 RX ORDER — BUPRENORPHINE AND NALOXONE 8; 2 MG/1; MG/1
1 FILM, SOLUBLE BUCCAL; SUBLINGUAL 2 TIMES DAILY
Qty: 8 FILM | Refills: 0 | Status: SHIPPED | OUTPATIENT
Start: 2019-11-21 | End: 2019-11-25 | Stop reason: SDUPTHER

## 2019-11-21 ASSESSMENT — ENCOUNTER SYMPTOMS
TROUBLE SWALLOWING: 0
ABDOMINAL PAIN: 0
COUGH: 0
DIARRHEA: 0
CONSTIPATION: 0
RHINORRHEA: 0
WHEEZING: 0
SHORTNESS OF BREATH: 0
ABDOMINAL DISTENTION: 0
SORE THROAT: 0
SINUS PAIN: 0
BLOOD IN STOOL: 0
PHOTOPHOBIA: 0
SINUS PRESSURE: 0
VOMITING: 0
NAUSEA: 0

## 2019-11-25 ENCOUNTER — OFFICE VISIT (OUTPATIENT)
Dept: INTERNAL MEDICINE CLINIC | Age: 34
End: 2019-11-25
Payer: MEDICARE

## 2019-11-25 DIAGNOSIS — Z79.899 ENCOUNTER FOR MONITORING SUBOXONE MAINTENANCE THERAPY: ICD-10-CM

## 2019-11-25 DIAGNOSIS — F41.9 ANXIETY AND DEPRESSION: ICD-10-CM

## 2019-11-25 DIAGNOSIS — I10 ESSENTIAL HYPERTENSION: ICD-10-CM

## 2019-11-25 DIAGNOSIS — F11.20 SEVERE OPIOID USE DISORDER (HCC): Primary | ICD-10-CM

## 2019-11-25 DIAGNOSIS — Z51.81 ENCOUNTER FOR MONITORING SUBOXONE MAINTENANCE THERAPY: ICD-10-CM

## 2019-11-25 DIAGNOSIS — M79.7 FIBROMYALGIA: ICD-10-CM

## 2019-11-25 DIAGNOSIS — F32.A ANXIETY AND DEPRESSION: ICD-10-CM

## 2019-11-25 DIAGNOSIS — F19.10 POLYSUBSTANCE ABUSE (HCC): ICD-10-CM

## 2019-11-25 PROCEDURE — G8427 DOCREV CUR MEDS BY ELIG CLIN: HCPCS | Performed by: INTERNAL MEDICINE

## 2019-11-25 PROCEDURE — 80305 DRUG TEST PRSMV DIR OPT OBS: CPT | Performed by: INTERNAL MEDICINE

## 2019-11-25 PROCEDURE — G8419 CALC BMI OUT NRM PARAM NOF/U: HCPCS | Performed by: INTERNAL MEDICINE

## 2019-11-25 PROCEDURE — G8484 FLU IMMUNIZE NO ADMIN: HCPCS | Performed by: INTERNAL MEDICINE

## 2019-11-25 PROCEDURE — 99213 OFFICE O/P EST LOW 20 MIN: CPT | Performed by: INTERNAL MEDICINE

## 2019-11-25 PROCEDURE — 4004F PT TOBACCO SCREEN RCVD TLK: CPT | Performed by: INTERNAL MEDICINE

## 2019-11-25 RX ORDER — BUPRENORPHINE AND NALOXONE 8; 2 MG/1; MG/1
1 FILM, SOLUBLE BUCCAL; SUBLINGUAL 2 TIMES DAILY
Qty: 14 FILM | Refills: 0 | Status: SHIPPED | OUTPATIENT
Start: 2019-11-25 | End: 2019-12-02

## 2019-12-04 ENCOUNTER — OFFICE VISIT (OUTPATIENT)
Dept: INTERNAL MEDICINE CLINIC | Age: 34
End: 2019-12-04
Payer: MEDICARE

## 2019-12-04 VITALS
HEART RATE: 104 BPM | BODY MASS INDEX: 27.44 KG/M2 | HEIGHT: 71 IN | WEIGHT: 196 LBS | DIASTOLIC BLOOD PRESSURE: 82 MMHG | SYSTOLIC BLOOD PRESSURE: 139 MMHG

## 2019-12-04 VITALS
HEIGHT: 71 IN | HEART RATE: 104 BPM | WEIGHT: 196 LBS | BODY MASS INDEX: 27.44 KG/M2 | SYSTOLIC BLOOD PRESSURE: 139 MMHG | DIASTOLIC BLOOD PRESSURE: 82 MMHG

## 2019-12-04 DIAGNOSIS — F11.20 SEVERE OPIOID USE DISORDER (HCC): Primary | ICD-10-CM

## 2019-12-04 DIAGNOSIS — R76.8 HEPATITIS C ANTIBODY POSITIVE IN BLOOD: ICD-10-CM

## 2019-12-04 DIAGNOSIS — F19.10 POLYSUBSTANCE ABUSE (HCC): ICD-10-CM

## 2019-12-04 DIAGNOSIS — F15.10 METHAMPHETAMINE ABUSE (HCC): ICD-10-CM

## 2019-12-04 DIAGNOSIS — I10 ESSENTIAL HYPERTENSION: ICD-10-CM

## 2019-12-04 DIAGNOSIS — M79.7 FIBROMYALGIA: ICD-10-CM

## 2019-12-04 DIAGNOSIS — F41.9 ANXIETY AND DEPRESSION: ICD-10-CM

## 2019-12-04 DIAGNOSIS — Z79.899 ENCOUNTER FOR MONITORING SUBOXONE MAINTENANCE THERAPY: ICD-10-CM

## 2019-12-04 DIAGNOSIS — F32.A ANXIETY AND DEPRESSION: ICD-10-CM

## 2019-12-04 DIAGNOSIS — Z51.81 ENCOUNTER FOR MONITORING SUBOXONE MAINTENANCE THERAPY: ICD-10-CM

## 2019-12-04 DIAGNOSIS — R21 RASH: Primary | ICD-10-CM

## 2019-12-04 PROCEDURE — 99214 OFFICE O/P EST MOD 30 MIN: CPT | Performed by: NURSE PRACTITIONER

## 2019-12-04 PROCEDURE — G8427 DOCREV CUR MEDS BY ELIG CLIN: HCPCS | Performed by: NURSE PRACTITIONER

## 2019-12-04 PROCEDURE — G8419 CALC BMI OUT NRM PARAM NOF/U: HCPCS | Performed by: NURSE PRACTITIONER

## 2019-12-04 PROCEDURE — G8427 DOCREV CUR MEDS BY ELIG CLIN: HCPCS | Performed by: INTERNAL MEDICINE

## 2019-12-04 PROCEDURE — G8484 FLU IMMUNIZE NO ADMIN: HCPCS | Performed by: INTERNAL MEDICINE

## 2019-12-04 PROCEDURE — 99213 OFFICE O/P EST LOW 20 MIN: CPT | Performed by: INTERNAL MEDICINE

## 2019-12-04 PROCEDURE — G8484 FLU IMMUNIZE NO ADMIN: HCPCS | Performed by: NURSE PRACTITIONER

## 2019-12-04 PROCEDURE — 80305 DRUG TEST PRSMV DIR OPT OBS: CPT | Performed by: INTERNAL MEDICINE

## 2019-12-04 PROCEDURE — 4004F PT TOBACCO SCREEN RCVD TLK: CPT | Performed by: NURSE PRACTITIONER

## 2019-12-04 PROCEDURE — G8419 CALC BMI OUT NRM PARAM NOF/U: HCPCS | Performed by: INTERNAL MEDICINE

## 2019-12-04 PROCEDURE — 4004F PT TOBACCO SCREEN RCVD TLK: CPT | Performed by: INTERNAL MEDICINE

## 2019-12-04 RX ORDER — BUPRENORPHINE AND NALOXONE 8; 2 MG/1; MG/1
1 FILM, SOLUBLE BUCCAL; SUBLINGUAL 2 TIMES DAILY
COMMUNITY
End: 2019-12-04 | Stop reason: SDUPTHER

## 2019-12-04 RX ORDER — CEPHALEXIN 500 MG/1
500 CAPSULE ORAL 3 TIMES DAILY
Qty: 21 CAPSULE | Refills: 0 | Status: SHIPPED | OUTPATIENT
Start: 2019-12-04 | End: 2019-12-11

## 2019-12-04 RX ORDER — METHYLPREDNISOLONE 4 MG/1
TABLET ORAL
Qty: 1 KIT | Refills: 0 | Status: SHIPPED | OUTPATIENT
Start: 2019-12-04 | End: 2019-12-10

## 2019-12-04 RX ORDER — IBUPROFEN 800 MG/1
800 TABLET ORAL 2 TIMES DAILY PRN
Qty: 30 TABLET | Refills: 0 | Status: SHIPPED | OUTPATIENT
Start: 2019-12-04 | End: 2019-12-23 | Stop reason: SDUPTHER

## 2019-12-04 RX ORDER — BUPRENORPHINE AND NALOXONE 8; 2 MG/1; MG/1
1 FILM, SOLUBLE BUCCAL; SUBLINGUAL 2 TIMES DAILY
Qty: 10 FILM | Refills: 0 | Status: SHIPPED | OUTPATIENT
Start: 2019-12-04 | End: 2019-12-09 | Stop reason: SDUPTHER

## 2019-12-05 ASSESSMENT — ENCOUNTER SYMPTOMS
VOMITING: 0
ABDOMINAL DISTENTION: 0
TROUBLE SWALLOWING: 0
CONSTIPATION: 0
SINUS PAIN: 0
RHINORRHEA: 0
PHOTOPHOBIA: 0
SHORTNESS OF BREATH: 0
BLOOD IN STOOL: 0
SORE THROAT: 0
SINUS PRESSURE: 0
COUGH: 0
NAUSEA: 0
DIARRHEA: 0
WHEEZING: 0
ABDOMINAL PAIN: 0

## 2019-12-09 ENCOUNTER — OFFICE VISIT (OUTPATIENT)
Dept: INTERNAL MEDICINE CLINIC | Age: 34
End: 2019-12-09
Payer: MEDICARE

## 2019-12-09 VITALS
HEIGHT: 68 IN | WEIGHT: 196 LBS | DIASTOLIC BLOOD PRESSURE: 89 MMHG | BODY MASS INDEX: 29.7 KG/M2 | RESPIRATION RATE: 12 BRPM | SYSTOLIC BLOOD PRESSURE: 130 MMHG | HEART RATE: 92 BPM

## 2019-12-09 VITALS
SYSTOLIC BLOOD PRESSURE: 130 MMHG | HEART RATE: 92 BPM | WEIGHT: 196 LBS | HEIGHT: 68 IN | BODY MASS INDEX: 29.7 KG/M2 | DIASTOLIC BLOOD PRESSURE: 89 MMHG

## 2019-12-09 DIAGNOSIS — F15.10 METHAMPHETAMINE ABUSE (HCC): ICD-10-CM

## 2019-12-09 DIAGNOSIS — Z79.899 ENCOUNTER FOR MONITORING SUBOXONE MAINTENANCE THERAPY: ICD-10-CM

## 2019-12-09 DIAGNOSIS — F32.A ANXIETY AND DEPRESSION: ICD-10-CM

## 2019-12-09 DIAGNOSIS — F41.9 ANXIETY AND DEPRESSION: ICD-10-CM

## 2019-12-09 DIAGNOSIS — R21 RASH: Primary | ICD-10-CM

## 2019-12-09 DIAGNOSIS — R76.8 HEPATITIS C ANTIBODY POSITIVE IN BLOOD: ICD-10-CM

## 2019-12-09 DIAGNOSIS — F19.10 POLYSUBSTANCE ABUSE (HCC): ICD-10-CM

## 2019-12-09 DIAGNOSIS — Z51.81 ENCOUNTER FOR MONITORING SUBOXONE MAINTENANCE THERAPY: ICD-10-CM

## 2019-12-09 DIAGNOSIS — M79.7 FIBROMYALGIA: ICD-10-CM

## 2019-12-09 DIAGNOSIS — F11.20 SEVERE OPIOID USE DISORDER (HCC): Primary | ICD-10-CM

## 2019-12-09 PROCEDURE — G8484 FLU IMMUNIZE NO ADMIN: HCPCS | Performed by: INTERNAL MEDICINE

## 2019-12-09 PROCEDURE — G8427 DOCREV CUR MEDS BY ELIG CLIN: HCPCS | Performed by: INTERNAL MEDICINE

## 2019-12-09 PROCEDURE — G8427 DOCREV CUR MEDS BY ELIG CLIN: HCPCS | Performed by: NURSE PRACTITIONER

## 2019-12-09 PROCEDURE — G8419 CALC BMI OUT NRM PARAM NOF/U: HCPCS | Performed by: INTERNAL MEDICINE

## 2019-12-09 PROCEDURE — 80305 DRUG TEST PRSMV DIR OPT OBS: CPT | Performed by: INTERNAL MEDICINE

## 2019-12-09 PROCEDURE — 4004F PT TOBACCO SCREEN RCVD TLK: CPT | Performed by: NURSE PRACTITIONER

## 2019-12-09 PROCEDURE — 4004F PT TOBACCO SCREEN RCVD TLK: CPT | Performed by: INTERNAL MEDICINE

## 2019-12-09 PROCEDURE — 99213 OFFICE O/P EST LOW 20 MIN: CPT | Performed by: NURSE PRACTITIONER

## 2019-12-09 PROCEDURE — 99213 OFFICE O/P EST LOW 20 MIN: CPT | Performed by: INTERNAL MEDICINE

## 2019-12-09 PROCEDURE — G8419 CALC BMI OUT NRM PARAM NOF/U: HCPCS | Performed by: NURSE PRACTITIONER

## 2019-12-09 PROCEDURE — G8484 FLU IMMUNIZE NO ADMIN: HCPCS | Performed by: NURSE PRACTITIONER

## 2019-12-09 RX ORDER — BUPRENORPHINE AND NALOXONE 8; 2 MG/1; MG/1
1 FILM, SOLUBLE BUCCAL; SUBLINGUAL 2 TIMES DAILY
Qty: 6 FILM | Refills: 0 | Status: SHIPPED | OUTPATIENT
Start: 2019-12-09 | End: 2019-12-12 | Stop reason: SDUPTHER

## 2019-12-09 ASSESSMENT — ENCOUNTER SYMPTOMS: ROS SKIN COMMENTS: IMPROVING

## 2019-12-12 ENCOUNTER — OFFICE VISIT (OUTPATIENT)
Dept: INTERNAL MEDICINE CLINIC | Age: 34
End: 2019-12-12
Payer: MEDICARE

## 2019-12-12 VITALS
DIASTOLIC BLOOD PRESSURE: 79 MMHG | BODY MASS INDEX: 28.14 KG/M2 | WEIGHT: 201 LBS | HEIGHT: 71 IN | HEART RATE: 84 BPM | SYSTOLIC BLOOD PRESSURE: 137 MMHG

## 2019-12-12 DIAGNOSIS — F19.10 POLYSUBSTANCE ABUSE (HCC): ICD-10-CM

## 2019-12-12 DIAGNOSIS — F41.9 ANXIETY AND DEPRESSION: ICD-10-CM

## 2019-12-12 DIAGNOSIS — Z51.81 ENCOUNTER FOR MONITORING SUBOXONE MAINTENANCE THERAPY: ICD-10-CM

## 2019-12-12 DIAGNOSIS — R76.8 HEPATITIS C ANTIBODY POSITIVE IN BLOOD: ICD-10-CM

## 2019-12-12 DIAGNOSIS — M79.7 FIBROMYALGIA: ICD-10-CM

## 2019-12-12 DIAGNOSIS — F15.10 METHAMPHETAMINE ABUSE (HCC): ICD-10-CM

## 2019-12-12 DIAGNOSIS — Z79.899 ENCOUNTER FOR MONITORING SUBOXONE MAINTENANCE THERAPY: ICD-10-CM

## 2019-12-12 DIAGNOSIS — F11.20 SEVERE OPIOID USE DISORDER (HCC): Primary | ICD-10-CM

## 2019-12-12 DIAGNOSIS — F32.A ANXIETY AND DEPRESSION: ICD-10-CM

## 2019-12-12 PROCEDURE — 99213 OFFICE O/P EST LOW 20 MIN: CPT | Performed by: INTERNAL MEDICINE

## 2019-12-12 PROCEDURE — 4004F PT TOBACCO SCREEN RCVD TLK: CPT | Performed by: INTERNAL MEDICINE

## 2019-12-12 PROCEDURE — 80305 DRUG TEST PRSMV DIR OPT OBS: CPT | Performed by: INTERNAL MEDICINE

## 2019-12-12 PROCEDURE — G8484 FLU IMMUNIZE NO ADMIN: HCPCS | Performed by: INTERNAL MEDICINE

## 2019-12-12 PROCEDURE — G8427 DOCREV CUR MEDS BY ELIG CLIN: HCPCS | Performed by: INTERNAL MEDICINE

## 2019-12-12 PROCEDURE — G8419 CALC BMI OUT NRM PARAM NOF/U: HCPCS | Performed by: INTERNAL MEDICINE

## 2019-12-12 RX ORDER — BUPRENORPHINE AND NALOXONE 8; 2 MG/1; MG/1
1 FILM, SOLUBLE BUCCAL; SUBLINGUAL 2 TIMES DAILY
Qty: 12 FILM | Refills: 0 | Status: SHIPPED | OUTPATIENT
Start: 2019-12-12 | End: 2019-12-18

## 2019-12-23 ENCOUNTER — OFFICE VISIT (OUTPATIENT)
Dept: INTERNAL MEDICINE CLINIC | Age: 34
End: 2019-12-23
Payer: MEDICARE

## 2019-12-23 VITALS
DIASTOLIC BLOOD PRESSURE: 91 MMHG | WEIGHT: 197 LBS | BODY MASS INDEX: 27.58 KG/M2 | HEIGHT: 71 IN | HEART RATE: 104 BPM | SYSTOLIC BLOOD PRESSURE: 135 MMHG

## 2019-12-23 DIAGNOSIS — F19.10 POLYSUBSTANCE ABUSE (HCC): ICD-10-CM

## 2019-12-23 DIAGNOSIS — M79.7 FIBROMYALGIA: ICD-10-CM

## 2019-12-23 DIAGNOSIS — Z79.899 ENCOUNTER FOR MONITORING SUBOXONE MAINTENANCE THERAPY: ICD-10-CM

## 2019-12-23 DIAGNOSIS — L02.91 ABSCESS: ICD-10-CM

## 2019-12-23 DIAGNOSIS — I10 ESSENTIAL HYPERTENSION: ICD-10-CM

## 2019-12-23 DIAGNOSIS — F15.10 METHAMPHETAMINE ABUSE (HCC): ICD-10-CM

## 2019-12-23 DIAGNOSIS — F11.20 SEVERE OPIOID USE DISORDER (HCC): Primary | ICD-10-CM

## 2019-12-23 DIAGNOSIS — Z51.81 ENCOUNTER FOR MONITORING SUBOXONE MAINTENANCE THERAPY: ICD-10-CM

## 2019-12-23 PROCEDURE — G8427 DOCREV CUR MEDS BY ELIG CLIN: HCPCS | Performed by: INTERNAL MEDICINE

## 2019-12-23 PROCEDURE — 99213 OFFICE O/P EST LOW 20 MIN: CPT | Performed by: INTERNAL MEDICINE

## 2019-12-23 PROCEDURE — G8484 FLU IMMUNIZE NO ADMIN: HCPCS | Performed by: INTERNAL MEDICINE

## 2019-12-23 PROCEDURE — 4004F PT TOBACCO SCREEN RCVD TLK: CPT | Performed by: INTERNAL MEDICINE

## 2019-12-23 PROCEDURE — 80305 DRUG TEST PRSMV DIR OPT OBS: CPT | Performed by: INTERNAL MEDICINE

## 2019-12-23 PROCEDURE — G8419 CALC BMI OUT NRM PARAM NOF/U: HCPCS | Performed by: INTERNAL MEDICINE

## 2019-12-23 RX ORDER — BUPRENORPHINE AND NALOXONE 8; 2 MG/1; MG/1
1 FILM, SOLUBLE BUCCAL; SUBLINGUAL 2 TIMES DAILY
Qty: 14 FILM | Refills: 0 | Status: CANCELLED | OUTPATIENT
Start: 2019-12-23 | End: 2019-12-30

## 2019-12-23 RX ORDER — BUPRENORPHINE AND NALOXONE 12; 3 MG/1; MG/1
1 FILM, SOLUBLE BUCCAL; SUBLINGUAL DAILY
COMMUNITY
End: 2019-12-23 | Stop reason: SDUPTHER

## 2019-12-23 RX ORDER — LISINOPRIL 20 MG/1
20 TABLET ORAL 2 TIMES DAILY
Qty: 28 TABLET | Refills: 0 | Status: SHIPPED | OUTPATIENT
Start: 2019-12-23 | End: 2020-05-18 | Stop reason: SDUPTHER

## 2019-12-23 RX ORDER — IBUPROFEN 800 MG/1
800 TABLET ORAL 2 TIMES DAILY PRN
Qty: 28 TABLET | Refills: 0 | Status: SHIPPED | OUTPATIENT
Start: 2019-12-23 | End: 2019-12-24

## 2019-12-23 RX ORDER — BUPRENORPHINE AND NALOXONE 8; 2 MG/1; MG/1
1 FILM, SOLUBLE BUCCAL; SUBLINGUAL 2 TIMES DAILY
COMMUNITY
End: 2020-01-02

## 2019-12-23 RX ORDER — BUPRENORPHINE AND NALOXONE 12; 3 MG/1; MG/1
1 FILM, SOLUBLE BUCCAL; SUBLINGUAL DAILY
Qty: 7 FILM | Refills: 0 | Status: SHIPPED | OUTPATIENT
Start: 2019-12-23 | End: 2019-12-23 | Stop reason: SDUPTHER

## 2019-12-23 RX ORDER — CLINDAMYCIN HYDROCHLORIDE 300 MG/1
600 CAPSULE ORAL 3 TIMES DAILY
Qty: 60 CAPSULE | Refills: 0 | Status: SHIPPED | OUTPATIENT
Start: 2019-12-23 | End: 2020-01-02

## 2019-12-24 ENCOUNTER — HOSPITAL ENCOUNTER (EMERGENCY)
Age: 34
Discharge: HOME | End: 2019-12-24
Attending: FAMILY MEDICINE
Payer: MEDICARE

## 2019-12-24 VITALS
SYSTOLIC BLOOD PRESSURE: 129 MMHG | HEIGHT: 71 IN | RESPIRATION RATE: 16 BRPM | HEART RATE: 99 BPM | OXYGEN SATURATION: 98 % | BODY MASS INDEX: 27.58 KG/M2 | DIASTOLIC BLOOD PRESSURE: 69 MMHG | TEMPERATURE: 97.8 F | WEIGHT: 197 LBS

## 2019-12-24 DIAGNOSIS — L02.01 FACIAL ABSCESS: Primary | ICD-10-CM

## 2019-12-24 PROCEDURE — 99282 EMERGENCY DEPT VISIT SF MDM: CPT

## 2019-12-24 RX ORDER — IBUPROFEN 800 MG/1
800 TABLET ORAL 3 TIMES DAILY PRN
Qty: 30 TABLET | Refills: 0 | Status: SHIPPED | OUTPATIENT
Start: 2019-12-24 | End: 2020-01-09 | Stop reason: SDUPTHER

## 2019-12-24 RX ORDER — SULFAMETHOXAZOLE AND TRIMETHOPRIM 800; 160 MG/1; MG/1
1 TABLET ORAL 2 TIMES DAILY
Qty: 20 TABLET | Refills: 0 | Status: SHIPPED | OUTPATIENT
Start: 2019-12-24 | End: 2020-01-03

## 2019-12-24 RX ORDER — CEPHALEXIN 500 MG/1
500 CAPSULE ORAL 2 TIMES DAILY
Qty: 20 CAPSULE | Refills: 0 | Status: SHIPPED | OUTPATIENT
Start: 2019-12-24 | End: 2020-01-03

## 2019-12-24 ASSESSMENT — ENCOUNTER SYMPTOMS
NAUSEA: 0
ABDOMINAL PAIN: 0
VOMITING: 0
SHORTNESS OF BREATH: 0

## 2019-12-24 ASSESSMENT — PAIN DESCRIPTION - PAIN TYPE: TYPE: ACUTE PAIN

## 2019-12-24 ASSESSMENT — PAIN SCALES - GENERAL: PAINLEVEL_OUTOF10: 8

## 2019-12-25 RX ORDER — BUPRENORPHINE AND NALOXONE 12; 3 MG/1; MG/1
1 FILM, SOLUBLE BUCCAL; SUBLINGUAL DAILY
Qty: 3 FILM | Refills: 0 | Status: SHIPPED | OUTPATIENT
Start: 2019-12-25 | End: 2019-12-28

## 2019-12-29 ASSESSMENT — ENCOUNTER SYMPTOMS
SHORTNESS OF BREATH: 0
CHEST TIGHTNESS: 0

## 2019-12-30 ENCOUNTER — OFFICE VISIT (OUTPATIENT)
Dept: INTERNAL MEDICINE CLINIC | Age: 34
End: 2019-12-30
Payer: MEDICARE

## 2019-12-30 VITALS
RESPIRATION RATE: 12 BRPM | HEART RATE: 99 BPM | SYSTOLIC BLOOD PRESSURE: 132 MMHG | DIASTOLIC BLOOD PRESSURE: 75 MMHG | WEIGHT: 196.8 LBS | BODY MASS INDEX: 27.55 KG/M2 | HEIGHT: 71 IN

## 2019-12-30 DIAGNOSIS — F11.20 SEVERE OPIOID USE DISORDER (HCC): Primary | ICD-10-CM

## 2019-12-30 DIAGNOSIS — Z09 HOSPITAL DISCHARGE FOLLOW-UP: ICD-10-CM

## 2019-12-30 DIAGNOSIS — M79.7 FIBROMYALGIA: ICD-10-CM

## 2019-12-30 PROCEDURE — G8427 DOCREV CUR MEDS BY ELIG CLIN: HCPCS | Performed by: NURSE PRACTITIONER

## 2019-12-30 PROCEDURE — 80305 DRUG TEST PRSMV DIR OPT OBS: CPT | Performed by: NURSE PRACTITIONER

## 2019-12-30 PROCEDURE — 4004F PT TOBACCO SCREEN RCVD TLK: CPT | Performed by: NURSE PRACTITIONER

## 2019-12-30 PROCEDURE — G8484 FLU IMMUNIZE NO ADMIN: HCPCS | Performed by: NURSE PRACTITIONER

## 2019-12-30 PROCEDURE — G8419 CALC BMI OUT NRM PARAM NOF/U: HCPCS | Performed by: NURSE PRACTITIONER

## 2019-12-30 PROCEDURE — 99214 OFFICE O/P EST MOD 30 MIN: CPT | Performed by: NURSE PRACTITIONER

## 2019-12-30 RX ORDER — VENLAFAXINE 100 MG/1
100 TABLET ORAL 3 TIMES DAILY
Qty: 90 TABLET | Refills: 0 | Status: SHIPPED | OUTPATIENT
Start: 2019-12-30 | End: 2020-05-26 | Stop reason: SDUPTHER

## 2019-12-30 ASSESSMENT — ENCOUNTER SYMPTOMS
VOMITING: 0
DIARRHEA: 0
ABDOMINAL DISTENTION: 0
ABDOMINAL PAIN: 0
SINUS PAIN: 0
NAUSEA: 0
CONSTIPATION: 0
BLOOD IN STOOL: 0
SINUS PRESSURE: 0
PHOTOPHOBIA: 0
TROUBLE SWALLOWING: 0
SORE THROAT: 0
WHEEZING: 0
SHORTNESS OF BREATH: 0
RHINORRHEA: 0
COUGH: 0

## 2020-01-02 ENCOUNTER — OFFICE VISIT (OUTPATIENT)
Dept: INTERNAL MEDICINE CLINIC | Age: 35
End: 2020-01-02
Payer: MEDICARE

## 2020-01-02 VITALS
HEIGHT: 71 IN | BODY MASS INDEX: 27.44 KG/M2 | WEIGHT: 196 LBS | DIASTOLIC BLOOD PRESSURE: 83 MMHG | SYSTOLIC BLOOD PRESSURE: 139 MMHG | HEART RATE: 94 BPM

## 2020-01-02 PROCEDURE — G8484 FLU IMMUNIZE NO ADMIN: HCPCS | Performed by: INTERNAL MEDICINE

## 2020-01-02 PROCEDURE — G8427 DOCREV CUR MEDS BY ELIG CLIN: HCPCS | Performed by: INTERNAL MEDICINE

## 2020-01-02 PROCEDURE — G8419 CALC BMI OUT NRM PARAM NOF/U: HCPCS | Performed by: INTERNAL MEDICINE

## 2020-01-02 PROCEDURE — 80305 DRUG TEST PRSMV DIR OPT OBS: CPT | Performed by: INTERNAL MEDICINE

## 2020-01-02 PROCEDURE — 99213 OFFICE O/P EST LOW 20 MIN: CPT | Performed by: INTERNAL MEDICINE

## 2020-01-02 PROCEDURE — 4004F PT TOBACCO SCREEN RCVD TLK: CPT | Performed by: INTERNAL MEDICINE

## 2020-01-02 RX ORDER — BUPRENORPHINE AND NALOXONE 12; 3 MG/1; MG/1
1 FILM, SOLUBLE BUCCAL; SUBLINGUAL DAILY
Qty: 5 FILM | Refills: 0 | Status: SHIPPED | OUTPATIENT
Start: 2020-01-02 | End: 2020-01-07

## 2020-01-02 RX ORDER — BUPRENORPHINE AND NALOXONE 12; 3 MG/1; MG/1
FILM, SOLUBLE BUCCAL; SUBLINGUAL
COMMUNITY
Start: 2019-12-30 | End: 2020-01-02 | Stop reason: SDUPTHER

## 2020-01-02 RX ORDER — BUPRENORPHINE AND NALOXONE 8; 2 MG/1; MG/1
1 FILM, SOLUBLE BUCCAL; SUBLINGUAL 2 TIMES DAILY
Qty: 12 FILM | Refills: 0 | Status: CANCELLED | OUTPATIENT
Start: 2020-01-02 | End: 2020-01-08

## 2020-01-02 NOTE — PROGRESS NOTES
Verbal order per Dr. Chio Pires for urine drug screen. Positive for BUP. Verified results with Ira Connell RN. Dr. Chio Pires ordered Suboxone 12mg film daily   for patient. Verified dose with patient. Patient was sent home with 5 day script of Suboxone 12mg film daily and will be seen back in the office 1/7/2020.

## 2020-01-06 NOTE — TELEPHONE ENCOUNTER
Verbal order per Dr. Nuzhat Parish for Suboxone 12 mg film daily qty 1.    RN called script for Suboxone 12 mg film daily qty 1 to [facility]. Pt is to be seen back 1/8/2020.

## 2020-01-08 ENCOUNTER — OFFICE VISIT (OUTPATIENT)
Dept: INTERNAL MEDICINE CLINIC | Age: 35
End: 2020-01-08
Payer: MEDICARE

## 2020-01-08 VITALS
HEART RATE: 107 BPM | HEIGHT: 71 IN | WEIGHT: 196 LBS | BODY MASS INDEX: 27.44 KG/M2 | SYSTOLIC BLOOD PRESSURE: 137 MMHG | DIASTOLIC BLOOD PRESSURE: 86 MMHG

## 2020-01-08 PROCEDURE — 99213 OFFICE O/P EST LOW 20 MIN: CPT | Performed by: INTERNAL MEDICINE

## 2020-01-08 PROCEDURE — G8484 FLU IMMUNIZE NO ADMIN: HCPCS | Performed by: INTERNAL MEDICINE

## 2020-01-08 PROCEDURE — 4004F PT TOBACCO SCREEN RCVD TLK: CPT | Performed by: INTERNAL MEDICINE

## 2020-01-08 PROCEDURE — G8419 CALC BMI OUT NRM PARAM NOF/U: HCPCS | Performed by: INTERNAL MEDICINE

## 2020-01-08 PROCEDURE — G8427 DOCREV CUR MEDS BY ELIG CLIN: HCPCS | Performed by: INTERNAL MEDICINE

## 2020-01-08 PROCEDURE — 80305 DRUG TEST PRSMV DIR OPT OBS: CPT | Performed by: INTERNAL MEDICINE

## 2020-01-08 RX ORDER — BUPRENORPHINE AND NALOXONE 12; 3 MG/1; MG/1
1 FILM, SOLUBLE BUCCAL; SUBLINGUAL DAILY
COMMUNITY
End: 2020-01-08 | Stop reason: SDUPTHER

## 2020-01-08 RX ORDER — BUPRENORPHINE AND NALOXONE 12; 3 MG/1; MG/1
1 FILM, SOLUBLE BUCCAL; SUBLINGUAL DAILY
Qty: 5 FILM | Refills: 0 | Status: SHIPPED | OUTPATIENT
Start: 2020-01-08 | End: 2020-01-13 | Stop reason: SDUPTHER

## 2020-01-09 ENCOUNTER — OFFICE VISIT (OUTPATIENT)
Dept: INTERNAL MEDICINE CLINIC | Age: 35
End: 2020-01-09
Payer: MEDICARE

## 2020-01-09 VITALS
BODY MASS INDEX: 27.86 KG/M2 | RESPIRATION RATE: 12 BRPM | HEIGHT: 71 IN | WEIGHT: 199 LBS | DIASTOLIC BLOOD PRESSURE: 85 MMHG | SYSTOLIC BLOOD PRESSURE: 141 MMHG | HEART RATE: 84 BPM

## 2020-01-09 PROCEDURE — G8427 DOCREV CUR MEDS BY ELIG CLIN: HCPCS | Performed by: NURSE PRACTITIONER

## 2020-01-09 PROCEDURE — 4004F PT TOBACCO SCREEN RCVD TLK: CPT | Performed by: NURSE PRACTITIONER

## 2020-01-09 PROCEDURE — G8484 FLU IMMUNIZE NO ADMIN: HCPCS | Performed by: NURSE PRACTITIONER

## 2020-01-09 PROCEDURE — 99213 OFFICE O/P EST LOW 20 MIN: CPT | Performed by: NURSE PRACTITIONER

## 2020-01-09 PROCEDURE — G8419 CALC BMI OUT NRM PARAM NOF/U: HCPCS | Performed by: NURSE PRACTITIONER

## 2020-01-09 RX ORDER — IBUPROFEN 800 MG/1
800 TABLET ORAL 2 TIMES DAILY PRN
Qty: 30 TABLET | Refills: 3 | Status: SHIPPED | OUTPATIENT
Start: 2020-01-09 | End: 2020-07-24

## 2020-01-09 RX ORDER — PREGABALIN 300 MG/1
300 CAPSULE ORAL 2 TIMES DAILY
Qty: 60 CAPSULE | Refills: 3 | Status: SHIPPED | OUTPATIENT
Start: 2020-01-09 | End: 2020-05-18 | Stop reason: DRUGHIGH

## 2020-01-09 RX ORDER — LISINOPRIL 20 MG/1
20 TABLET ORAL DAILY
Qty: 30 TABLET | Refills: 3 | Status: SHIPPED | OUTPATIENT
Start: 2020-01-09 | End: 2020-08-20 | Stop reason: SDUPTHER

## 2020-01-09 RX ORDER — VENLAFAXINE HYDROCHLORIDE 150 MG/1
150 CAPSULE, EXTENDED RELEASE ORAL 2 TIMES DAILY
Qty: 60 CAPSULE | Refills: 3 | Status: SHIPPED | OUTPATIENT
Start: 2020-01-09 | End: 2020-05-18 | Stop reason: DRUGHIGH

## 2020-01-09 RX ORDER — MIRTAZAPINE 45 MG/1
45 TABLET, FILM COATED ORAL NIGHTLY
Qty: 30 TABLET | Refills: 3 | Status: SHIPPED | OUTPATIENT
Start: 2020-01-09 | End: 2020-08-20 | Stop reason: SDUPTHER

## 2020-01-09 RX ORDER — VENLAFAXINE 100 MG/1
100 TABLET ORAL 3 TIMES DAILY
Qty: 90 TABLET | Refills: 1 | Status: CANCELLED | OUTPATIENT
Start: 2020-01-09

## 2020-01-09 ASSESSMENT — PATIENT HEALTH QUESTIONNAIRE - PHQ9
SUM OF ALL RESPONSES TO PHQ QUESTIONS 1-9: 0
2. FEELING DOWN, DEPRESSED OR HOPELESS: 0
SUM OF ALL RESPONSES TO PHQ9 QUESTIONS 1 & 2: 0
1. LITTLE INTEREST OR PLEASURE IN DOING THINGS: 0
SUM OF ALL RESPONSES TO PHQ QUESTIONS 1-9: 0

## 2020-01-09 NOTE — PROGRESS NOTES
palpitations and leg swelling. Gastrointestinal: Negative for abdominal distention, abdominal pain, blood in stool, constipation, diarrhea, nausea and vomiting. Endocrine: Negative for polydipsia, polyphagia and polyuria. Genitourinary: Negative for difficulty urinating, dysuria, frequency, hematuria and urgency. Musculoskeletal: Positive for myalgias (generalized- fibromyalgia). Negative for arthralgias. Skin: Negative for rash and wound. Neurological: Negative for dizziness, light-headedness and headaches. Psychiatric/Behavioral: Negative for dysphoric mood and sleep disturbance. The patient is not nervous/anxious. Prior to Visit Medications    Medication Sig Taking? Authorizing Provider   pregabalin (LYRICA) 300 MG capsule Take 1 capsule by mouth 2 times daily for 120 days. Yes PAPA Lo CNP   lisinopril (PRINIVIL;ZESTRIL) 20 MG tablet Take 1 tablet by mouth daily Yes PAPA Lo CNP   mirtazapine (REMERON) 45 MG tablet Take 1 tablet by mouth nightly Yes PAPA Lo CNP   venlafaxine (EFFEXOR XR) 150 MG extended release capsule Take 1 capsule by mouth 2 times daily Yes PAPA Lo CNP   ibuprofen (ADVIL;MOTRIN) 800 MG tablet Take 1 tablet by mouth 2 times daily as needed for Pain Yes PAPA Lo CNP   LYRICA 200 MG capsule Take one capsule 3 times daily Yes PAPA Lo CNP   venlafaxine (EFFEXOR) 100 MG tablet Take 1 tablet by mouth 3 times daily Yes PAPA Lo CNP   naproxen (NAPROSYN) 500 MG tablet Take 1 tablet by mouth 2 times daily Yes Revonddeepak Grills, APRN - CNP   buprenorphine-naloxone (SUBOXONE) 12-3 MG sublingual film Place 1 Film under the tongue daily for 2 days.   Claire Heath MD   lisinopril (PRINIVIL;ZESTRIL) 20 MG tablet Take 1 tablet by mouth 2 times daily for 14 days  Claire Heath MD        Social History     Tobacco Use    Smoking status: Current Every Day tenderness. Lymphadenopathy:      Cervical: No cervical adenopathy. Skin:     General: Skin is warm and dry. Capillary Refill: Capillary refill takes less than 2 seconds. Findings: No erythema, lesion or rash. Comments:      Neurological:      General: No focal deficit present. Mental Status: He is alert and oriented to person, place, and time. Motor: No weakness. Coordination: Coordination normal.      Deep Tendon Reflexes: Reflexes normal.   Psychiatric:         Mood and Affect: Mood normal.         Behavior: Behavior normal.         Thought Content: Thought content normal.         Judgment: Judgment normal.       ASSESSMENT/PLAN:    1. Fibromyalgia    - pregabalin (LYRICA) 300 MG capsule; Take 1 capsule by mouth 2 times daily for 120 days. Dispense: 60 capsule; Refill: 3  - ibuprofen (ADVIL;MOTRIN) 800 MG tablet; Take 1 tablet by mouth 2 times daily as needed for Pain  Dispense: 30 tablet; Refill: 3    2. Anxiety and depression    - mirtazapine (REMERON) 45 MG tablet; Take 1 tablet by mouth nightly  Dispense: 30 tablet; Refill: 3  - venlafaxine (EFFEXOR XR) 150 MG extended release capsule; Take 1 capsule by mouth 2 times daily  Dispense: 60 capsule; Refill: 3    3. Essential hypertension    - lisinopril (PRINIVIL;ZESTRIL) 20 MG tablet; Take 1 tablet by mouth daily  Dispense: 30 tablet; Refill: 3    4. Encounter for medication review    - Medications reviewed and doses adjusted to BID  - Continue current regimen while in treatment  - Obtain labs       Return in about 4 months (around 5/9/2020). An electronic signature was used to authenticate this note.     --PAPA Fowler - CNP on 1/14/2020 at 8:19 AM

## 2020-01-13 ENCOUNTER — OFFICE VISIT (OUTPATIENT)
Dept: INTERNAL MEDICINE CLINIC | Age: 35
End: 2020-01-13
Payer: MEDICARE

## 2020-01-13 VITALS
HEIGHT: 71 IN | BODY MASS INDEX: 28.42 KG/M2 | WEIGHT: 203 LBS | HEART RATE: 102 BPM | DIASTOLIC BLOOD PRESSURE: 72 MMHG | SYSTOLIC BLOOD PRESSURE: 128 MMHG

## 2020-01-13 PROCEDURE — G8419 CALC BMI OUT NRM PARAM NOF/U: HCPCS | Performed by: INTERNAL MEDICINE

## 2020-01-13 PROCEDURE — 80305 DRUG TEST PRSMV DIR OPT OBS: CPT | Performed by: INTERNAL MEDICINE

## 2020-01-13 PROCEDURE — 4004F PT TOBACCO SCREEN RCVD TLK: CPT | Performed by: INTERNAL MEDICINE

## 2020-01-13 PROCEDURE — G8484 FLU IMMUNIZE NO ADMIN: HCPCS | Performed by: INTERNAL MEDICINE

## 2020-01-13 PROCEDURE — 99213 OFFICE O/P EST LOW 20 MIN: CPT | Performed by: INTERNAL MEDICINE

## 2020-01-13 PROCEDURE — G8427 DOCREV CUR MEDS BY ELIG CLIN: HCPCS | Performed by: INTERNAL MEDICINE

## 2020-01-13 RX ORDER — BUPRENORPHINE AND NALOXONE 12; 3 MG/1; MG/1
1 FILM, SOLUBLE BUCCAL; SUBLINGUAL DAILY
Qty: 2 FILM | Refills: 0 | Status: SHIPPED | OUTPATIENT
Start: 2020-01-13 | End: 2020-01-15

## 2020-01-14 ASSESSMENT — ENCOUNTER SYMPTOMS
PHOTOPHOBIA: 0
COUGH: 0
SINUS PAIN: 0
VOMITING: 0
TROUBLE SWALLOWING: 0
ABDOMINAL PAIN: 0
SHORTNESS OF BREATH: 0
DIARRHEA: 0
WHEEZING: 0
CONSTIPATION: 0
SORE THROAT: 0
SINUS PRESSURE: 0
ABDOMINAL DISTENTION: 0
NAUSEA: 0
RHINORRHEA: 0
BLOOD IN STOOL: 0

## 2020-02-02 NOTE — PROGRESS NOTES
Barbiturate Screen, Urine 01/08/2020  1:45 PM Unknown   NEG    Benzodiazepine Screen, Urine 01/08/2020  1:45 PM Unknown   NEG    Buprenorphine Urine 01/08/2020  1:45 PM Unknown   POS    Cocaine Metabolite Screen, Urine 01/08/2020  1:45 PM Unknown   NEG    Gabapentin Screen, Urine 01/08/2020  1:45 PM Unknown   N/A    MDMA, Urine 01/08/2020  1:45 PM Unknown   NEG    Methamphetamine, Urine 01/08/2020  1:45 PM Unknown   POS    Methadone Screen, Urine 01/08/2020  1:45 PM Unknown   NEG    Opiate Scrn, Ur 01/08/2020  1:45 PM Unknown   NEG    Oxycodone Screen, Ur 01/08/2020  1:45 PM Unknown   NEG    PCP Screen, Urine 01/08/2020  1:45 PM Unknown   NEG    Propoxyphene Screen, Urine 01/08/2020  1:45 PM Unknown   N/A    THC Screen, Urine 01/08/2020  1:45 PM Unknown   POS    Tricyclic Antidepressants, Urine 01/08/2020  1:45 PM Unknown   N/A    Narrative     POSITIVE FOR FENTANYL. NEGATIVE FOR FENTANYL. Diagnosis Orders   1. Severe opioid use disorder (HCC)  POCT Rapid Drug Screen    DISCONTINUED: buprenorphine-naloxone (SUBOXONE) 12-3 MG sublingual film   2. Fibromyalgia     3. Essential hypertension     4. Encounter for monitoring Suboxone maintenance therapy     5. Methamphetamine abuse ([facility] [address]     6.  Polysubstance abuse ([facility] [address]           PLAN:   Urine has fentanyl and methamphetamines  He just cannot stay clean  We will continue his Suboxone  I think without it he is at a high risk for fatal overdose  Follow-up 1/13

## 2020-05-14 ENCOUNTER — OFFICE VISIT (OUTPATIENT)
Dept: INTERNAL MEDICINE CLINIC | Age: 35
End: 2020-05-14
Payer: MEDICARE

## 2020-05-14 VITALS
HEIGHT: 71 IN | DIASTOLIC BLOOD PRESSURE: 90 MMHG | BODY MASS INDEX: 28.56 KG/M2 | SYSTOLIC BLOOD PRESSURE: 138 MMHG | WEIGHT: 204 LBS | TEMPERATURE: 98.8 F | HEART RATE: 118 BPM

## 2020-05-14 PROCEDURE — 99213 OFFICE O/P EST LOW 20 MIN: CPT | Performed by: INTERNAL MEDICINE

## 2020-05-14 PROCEDURE — G8428 CUR MEDS NOT DOCUMENT: HCPCS | Performed by: INTERNAL MEDICINE

## 2020-05-14 PROCEDURE — 80305 DRUG TEST PRSMV DIR OPT OBS: CPT | Performed by: INTERNAL MEDICINE

## 2020-05-14 PROCEDURE — 1036F TOBACCO NON-USER: CPT | Performed by: INTERNAL MEDICINE

## 2020-05-14 PROCEDURE — G8419 CALC BMI OUT NRM PARAM NOF/U: HCPCS | Performed by: INTERNAL MEDICINE

## 2020-05-14 RX ORDER — BUPRENORPHINE AND NALOXONE 8; 2 MG/1; MG/1
1 FILM, SOLUBLE BUCCAL; SUBLINGUAL DAILY
Status: DISCONTINUED | OUTPATIENT
Start: 2020-05-14 | End: 2020-05-21 | Stop reason: HOSPADM

## 2020-05-14 RX ORDER — BUPRENORPHINE AND NALOXONE 12; 3 MG/1; MG/1
1 FILM, SOLUBLE BUCCAL; SUBLINGUAL DAILY
Qty: 2 FILM | Refills: 0 | Status: CANCELLED | OUTPATIENT
Start: 2020-05-14 | End: 2020-05-16

## 2020-05-14 RX ADMIN — BUPRENORPHINE AND NALOXONE 1 FILM: 8; 2 FILM, SOLUBLE BUCCAL; SUBLINGUAL at 09:29
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
## 2020-05-14 NOTE — PROGRESS NOTES
Verbal order per Dr. Pepe Collins for urine drug screen. Positive for Fentanyl. Verified results with Harley Huitron RN. Dr. Pepe Collins ordered Suboxone 8mg film qty 1 out of stock for induction purpose for patient. Verified dose with patient. Patient was sent home with Suboxone 8mg film qty 2 out of stock  and will be seen back in the office 5/15/20.

## 2020-05-15 ENCOUNTER — TELEPHONE (OUTPATIENT)
Dept: INTERNAL MEDICINE CLINIC | Age: 35
End: 2020-05-15

## 2020-05-15 ENCOUNTER — APPOINTMENT (OUTPATIENT)
Dept: GENERAL RADIOLOGY | Age: 35
DRG: 137 | End: 2020-05-15
Payer: MEDICARE

## 2020-05-15 ENCOUNTER — HOSPITAL ENCOUNTER (EMERGENCY)
Age: 35
Discharge: HOME | DRG: 137 | End: 2020-05-15
Payer: MEDICARE

## 2020-05-15 VITALS
SYSTOLIC BLOOD PRESSURE: 118 MMHG | WEIGHT: 204 LBS | BODY MASS INDEX: 28.56 KG/M2 | HEART RATE: 84 BPM | OXYGEN SATURATION: 99 % | HEIGHT: 71 IN | DIASTOLIC BLOOD PRESSURE: 86 MMHG | TEMPERATURE: 98.5 F | RESPIRATION RATE: 12 BRPM

## 2020-05-15 LAB
ALBUMIN SERPL-MCNC: 4.8 G/DL (ref 3.5–5.1)
ALP BLD-CCNC: 83 U/L (ref 38–126)
ALT SERPL-CCNC: 46 U/L (ref 11–66)
ANION GAP SERPL CALCULATED.3IONS-SCNC: 13 MEQ/L (ref 8–16)
AST SERPL-CCNC: 75 U/L (ref 5–40)
BASOPHILS # BLD: 0.2 %
BASOPHILS ABSOLUTE: 0 THOU/MM3 (ref 0–0.1)
BILIRUB SERPL-MCNC: 0.3 MG/DL (ref 0.3–1.2)
BILIRUBIN DIRECT: < 0.2 MG/DL (ref 0–0.3)
BUN BLDV-MCNC: 23 MG/DL (ref 7–22)
C-REACTIVE PROTEIN: 1.6 MG/DL (ref 0–1)
CALCIUM SERPL-MCNC: 9.9 MG/DL (ref 8.5–10.5)
CHLORIDE BLD-SCNC: 100 MEQ/L (ref 98–111)
CO2: 22 MEQ/L (ref 23–33)
CREAT SERPL-MCNC: 1.4 MG/DL (ref 0.4–1.2)
EKG ATRIAL RATE: 111 BPM
EKG P AXIS: 58 DEGREES
EKG P-R INTERVAL: 156 MS
EKG Q-T INTERVAL: 322 MS
EKG QRS DURATION: 90 MS
EKG QTC CALCULATION (BAZETT): 437 MS
EKG R AXIS: 58 DEGREES
EKG T AXIS: 57 DEGREES
EKG VENTRICULAR RATE: 111 BPM
EOSINOPHIL # BLD: 0.2 %
EOSINOPHILS ABSOLUTE: 0 THOU/MM3 (ref 0–0.4)
ERYTHROCYTE [DISTWIDTH] IN BLOOD BY AUTOMATED COUNT: 13.5 % (ref 11.5–14.5)
ERYTHROCYTE [DISTWIDTH] IN BLOOD BY AUTOMATED COUNT: 44 FL (ref 35–45)
ETHYL ALCOHOL, SERUM: < 0.01 %
FERRITIN: 110 NG/ML (ref 22–322)
GFR SERPL CREATININE-BSD FRML MDRD: 57 ML/MIN/1.73M2
GLUCOSE BLD-MCNC: 157 MG/DL (ref 70–108)
HCT VFR BLD CALC: 44.6 % (ref 42–52)
HEMOGLOBIN: 14.4 GM/DL (ref 14–18)
IMMATURE GRANS (ABS): 0.03 THOU/MM3 (ref 0–0.07)
IMMATURE GRANULOCYTES: 0.3 %
LD: 369 U/L (ref 100–190)
LIPASE: 13.3 U/L (ref 5.6–51.3)
LYMPHOCYTES # BLD: 10.1 %
LYMPHOCYTES ABSOLUTE: 1 THOU/MM3 (ref 1–4.8)
MCH RBC QN AUTO: 28.7 PG (ref 26–33)
MCHC RBC AUTO-ENTMCNC: 32.3 GM/DL (ref 32.2–35.5)
MCV RBC AUTO: 88.8 FL (ref 80–94)
MONOCYTES # BLD: 9.4 %
MONOCYTES ABSOLUTE: 0.9 THOU/MM3 (ref 0.4–1.3)
NUCLEATED RED BLOOD CELLS: 0 /100 WBC
OSMOLALITY CALCULATION: 277 MOSMOL/KG (ref 275–300)
PLATELET # BLD: 278 THOU/MM3 (ref 130–400)
PMV BLD AUTO: 9.3 FL (ref 9.4–12.4)
POTASSIUM REFLEX MAGNESIUM: 4.7 MEQ/L (ref 3.5–5.2)
PRO-BNP: 68.5 PG/ML (ref 0–450)
RBC # BLD: 5.02 MILL/MM3 (ref 4.7–6.1)
SARS-COV-2, NAAT: NOT DETECTED
SEG NEUTROPHILS: 79.8 %
SEGMENTED NEUTROPHILS ABSOLUTE COUNT: 7.6 THOU/MM3 (ref 1.8–7.7)
SODIUM BLD-SCNC: 135 MEQ/L (ref 135–145)
TOTAL PROTEIN: 8.2 G/DL (ref 6.1–8)
TROPONIN T: < 0.01 NG/ML
WBC # BLD: 9.5 THOU/MM3 (ref 4.8–10.8)

## 2020-05-15 PROCEDURE — 83615 LACTATE (LD) (LDH) ENZYME: CPT

## 2020-05-15 PROCEDURE — 80048 BASIC METABOLIC PNL TOTAL CA: CPT

## 2020-05-15 PROCEDURE — 93005 ELECTROCARDIOGRAM TRACING: CPT | Performed by: NURSE PRACTITIONER

## 2020-05-15 PROCEDURE — 36415 COLL VENOUS BLD VENIPUNCTURE: CPT

## 2020-05-15 PROCEDURE — 2709999900 HC NON-CHARGEABLE SUPPLY

## 2020-05-15 PROCEDURE — 82728 ASSAY OF FERRITIN: CPT

## 2020-05-15 PROCEDURE — 93010 ELECTROCARDIOGRAM REPORT: CPT | Performed by: INTERNAL MEDICINE

## 2020-05-15 PROCEDURE — 6360000002 HC RX W HCPCS: Performed by: NURSE PRACTITIONER

## 2020-05-15 PROCEDURE — 84484 ASSAY OF TROPONIN QUANT: CPT

## 2020-05-15 PROCEDURE — 86140 C-REACTIVE PROTEIN: CPT

## 2020-05-15 PROCEDURE — 96374 THER/PROPH/DIAG INJ IV PUSH: CPT

## 2020-05-15 PROCEDURE — 99283 EMERGENCY DEPT VISIT LOW MDM: CPT

## 2020-05-15 PROCEDURE — 83880 ASSAY OF NATRIURETIC PEPTIDE: CPT

## 2020-05-15 PROCEDURE — 83690 ASSAY OF LIPASE: CPT

## 2020-05-15 PROCEDURE — 80076 HEPATIC FUNCTION PANEL: CPT

## 2020-05-15 PROCEDURE — 85025 COMPLETE CBC W/AUTO DIFF WBC: CPT

## 2020-05-15 PROCEDURE — 2580000003 HC RX 258: Performed by: NURSE PRACTITIONER

## 2020-05-15 PROCEDURE — 71045 X-RAY EXAM CHEST 1 VIEW: CPT

## 2020-05-15 PROCEDURE — U0002 COVID-19 LAB TEST NON-CDC: HCPCS

## 2020-05-15 PROCEDURE — 96361 HYDRATE IV INFUSION ADD-ON: CPT

## 2020-05-15 PROCEDURE — G0480 DRUG TEST DEF 1-7 CLASSES: HCPCS

## 2020-05-15 RX ORDER — ONDANSETRON 2 MG/ML
4 INJECTION INTRAMUSCULAR; INTRAVENOUS ONCE
Status: COMPLETED | OUTPATIENT
Start: 2020-05-15 | End: 2020-05-15

## 2020-05-15 RX ORDER — 0.9 % SODIUM CHLORIDE 0.9 %
1000 INTRAVENOUS SOLUTION INTRAVENOUS ONCE
Status: COMPLETED | OUTPATIENT
Start: 2020-05-15 | End: 2020-05-15

## 2020-05-15 RX ADMIN — ONDANSETRON HYDROCHLORIDE 4 MG: 2 SOLUTION INTRAMUSCULAR; INTRAVENOUS at 02:26

## 2020-05-15 RX ADMIN — SODIUM CHLORIDE 1000 ML: 9 INJECTION, SOLUTION INTRAVENOUS at 04:09

## 2020-05-15 ASSESSMENT — PAIN SCALES - GENERAL: PAINLEVEL_OUTOF10: 6

## 2020-05-15 ASSESSMENT — ENCOUNTER SYMPTOMS
SINUS PRESSURE: 0
APNEA: 0
TROUBLE SWALLOWING: 0
WHEEZING: 0
COLOR CHANGE: 0
RHINORRHEA: 0
NAUSEA: 1
CHEST TIGHTNESS: 0
CONSTIPATION: 0
FACIAL SWELLING: 0
SINUS PAIN: 0
DIARRHEA: 0
BACK PAIN: 0
COUGH: 0
SORE THROAT: 0
ABDOMINAL PAIN: 0
SHORTNESS OF BREATH: 1
VOMITING: 1
PHOTOPHOBIA: 0
ABDOMINAL DISTENTION: 0

## 2020-05-15 ASSESSMENT — PAIN DESCRIPTION - PAIN TYPE: TYPE: ACUTE PAIN

## 2020-05-15 ASSESSMENT — PAIN DESCRIPTION - DESCRIPTORS: DESCRIPTORS: SPASM

## 2020-05-15 ASSESSMENT — PAIN DESCRIPTION - LOCATION: LOCATION: LEG

## 2020-05-15 NOTE — ED NOTES
Pt ambulated in [place] at this time. Pulse ox remains above 90% with ambulation. Provider notified.      Katya Carvalho RN  05/15/20 8518

## 2020-05-18 ENCOUNTER — OFFICE VISIT (OUTPATIENT)
Dept: INTERNAL MEDICINE CLINIC | Age: 35
End: 2020-05-18
Payer: MEDICARE

## 2020-05-18 ENCOUNTER — HOSPITAL ENCOUNTER (INPATIENT)
Age: 35
LOS: 3 days | Discharge: HOME | DRG: 137 | End: 2020-05-21
Attending: INTERNAL MEDICINE | Admitting: INTERNAL MEDICINE
Payer: MEDICARE

## 2020-05-18 ENCOUNTER — APPOINTMENT (OUTPATIENT)
Dept: GENERAL RADIOLOGY | Age: 35
DRG: 137 | End: 2020-05-18
Payer: MEDICARE

## 2020-05-18 ENCOUNTER — TELEPHONE (OUTPATIENT)
Dept: INTERNAL MEDICINE CLINIC | Age: 35
End: 2020-05-18

## 2020-05-18 VITALS
HEART RATE: 113 BPM | BODY MASS INDEX: 30.14 KG/M2 | TEMPERATURE: 97.5 F | SYSTOLIC BLOOD PRESSURE: 107 MMHG | HEIGHT: 70 IN | WEIGHT: 210.54 LBS | DIASTOLIC BLOOD PRESSURE: 55 MMHG

## 2020-05-18 PROBLEM — U07.1 COVID-19: Status: ACTIVE | Noted: 2020-05-18

## 2020-05-18 LAB
ALBUMIN SERPL-MCNC: 3.7 G/DL (ref 3.5–5.1)
ALBUMIN SERPL-MCNC: 3.7 G/DL (ref 3.5–5.1)
ALP BLD-CCNC: 63 U/L (ref 38–126)
ALP BLD-CCNC: 65 U/L (ref 38–126)
ALT SERPL-CCNC: 87 U/L (ref 11–66)
ALT SERPL-CCNC: 89 U/L (ref 11–66)
AMORPHOUS: ABNORMAL
AMPHETAMINE+METHAMPHETAMINE URINE SCREEN: NEGATIVE
ANION GAP SERPL CALCULATED.3IONS-SCNC: 13 MEQ/L (ref 8–16)
ANION GAP SERPL CALCULATED.3IONS-SCNC: 21 MEQ/L (ref 8–16)
APTT: 25 SECONDS (ref 22–38)
AST SERPL-CCNC: 119 U/L (ref 5–40)
AST SERPL-CCNC: 132 U/L (ref 5–40)
BACTERIA: ABNORMAL
BARBITURATE QUANTITATIVE URINE: NEGATIVE
BASE EXCESS MIXED: -6.5 MMOL/L (ref -2–3)
BASOPHILS # BLD: 0.3 %
BASOPHILS # BLD: 0.3 %
BASOPHILS ABSOLUTE: 0 THOU/MM3 (ref 0–0.1)
BASOPHILS ABSOLUTE: 0 THOU/MM3 (ref 0–0.1)
BENZODIAZEPINE QUANTITATIVE URINE: NEGATIVE
BILIRUB SERPL-MCNC: 0.2 MG/DL (ref 0.3–1.2)
BILIRUB SERPL-MCNC: 0.3 MG/DL (ref 0.3–1.2)
BILIRUBIN DIRECT: < 0.2 MG/DL (ref 0–0.3)
BILIRUBIN URINE: NEGATIVE
BLOOD, URINE: ABNORMAL
BUN BLDV-MCNC: 74 MG/DL (ref 7–22)
BUN BLDV-MCNC: 85 MG/DL (ref 7–22)
CALCIUM SERPL-MCNC: 8.7 MG/DL (ref 8.5–10.5)
CALCIUM SERPL-MCNC: 8.9 MG/DL (ref 8.5–10.5)
CANNABINOID QUANTITATIVE URINE: NEGATIVE
CASTS: ABNORMAL /LPF
CASTS: ABNORMAL /LPF
CHARACTER, URINE: ABNORMAL
CHLORIDE BLD-SCNC: 102 MEQ/L (ref 98–111)
CHLORIDE BLD-SCNC: 96 MEQ/L (ref 98–111)
CHLORIDE, URINE: < 20 MEQ/L
CO2: 16 MEQ/L (ref 23–33)
CO2: 21 MEQ/L (ref 23–33)
COCAINE METABOLITE QUANTITATIVE URINE: NEGATIVE
COLLECTED BY:: ABNORMAL
COLOR: ABNORMAL
CREAT SERPL-MCNC: 5.2 MG/DL (ref 0.4–1.2)
CREAT SERPL-MCNC: 8.1 MG/DL (ref 0.4–1.2)
CREATININE URINE: 57.8 MG/DL
CRYSTALS: ABNORMAL
EKG ATRIAL RATE: 104 BPM
EKG P AXIS: 62 DEGREES
EKG P-R INTERVAL: 156 MS
EKG Q-T INTERVAL: 332 MS
EKG QRS DURATION: 92 MS
EKG QTC CALCULATION (BAZETT): 436 MS
EKG R AXIS: 48 DEGREES
EKG T AXIS: 44 DEGREES
EKG VENTRICULAR RATE: 104 BPM
EOSINOPHIL # BLD: 1.5 %
EOSINOPHIL # BLD: 1.6 %
EOSINOPHILS ABSOLUTE: 0.2 THOU/MM3 (ref 0–0.4)
EOSINOPHILS ABSOLUTE: 0.2 THOU/MM3 (ref 0–0.4)
EPITHELIAL CELLS, UA: ABNORMAL /HPF
ERYTHROCYTE [DISTWIDTH] IN BLOOD BY AUTOMATED COUNT: 13.8 % (ref 11.5–14.5)
ERYTHROCYTE [DISTWIDTH] IN BLOOD BY AUTOMATED COUNT: 13.9 % (ref 11.5–14.5)
ERYTHROCYTE [DISTWIDTH] IN BLOOD BY AUTOMATED COUNT: 44.7 FL (ref 35–45)
ERYTHROCYTE [DISTWIDTH] IN BLOOD BY AUTOMATED COUNT: 45.2 FL (ref 35–45)
ETHYL ALCOHOL, SERUM: < 0.01 %
FIBRINOGEN: 528 MG/100ML (ref 155–475)
FLU A ANTIGEN: NEGATIVE
FLU B ANTIGEN: NEGATIVE
GFR SERPL CREATININE-BSD FRML MDRD: 13 ML/MIN/1.73M2
GFR SERPL CREATININE-BSD FRML MDRD: 8 ML/MIN/1.73M2
GLUCOSE BLD-MCNC: 100 MG/DL (ref 70–108)
GLUCOSE BLD-MCNC: 102 MG/DL (ref 70–108)
GLUCOSE, URINE: NEGATIVE MG/DL
HCO3, MIXED: 20 MMOL/L (ref 23–28)
HCT VFR BLD CALC: 39.3 % (ref 42–52)
HCT VFR BLD CALC: 40.6 % (ref 42–52)
HEMOCCULT STL QL: NEGATIVE
HEMOGLOBIN: 12.8 GM/DL (ref 14–18)
HEMOGLOBIN: 13.1 GM/DL (ref 14–18)
IMMATURE GRANS (ABS): 0.04 THOU/MM3 (ref 0–0.07)
IMMATURE GRANS (ABS): 0.05 THOU/MM3 (ref 0–0.07)
IMMATURE GRANULOCYTES: 0.4 %
IMMATURE GRANULOCYTES: 0.4 %
INR BLD: 1.02 (ref 0.85–1.13)
KETONES, URINE: ABNORMAL
LACTIC ACID: 0.8 MMOL/L (ref 0.5–2.2)
LEUKOCYTE ESTERASE, URINE: NEGATIVE
LYMPHOCYTES # BLD: 13.3 %
LYMPHOCYTES # BLD: 14.5 %
LYMPHOCYTES ABSOLUTE: 1.5 THOU/MM3 (ref 1–4.8)
LYMPHOCYTES ABSOLUTE: 1.6 THOU/MM3 (ref 1–4.8)
MAGNESIUM: 2.7 MG/DL (ref 1.6–2.4)
MCH RBC QN AUTO: 28.9 PG (ref 26–33)
MCH RBC QN AUTO: 29.2 PG (ref 26–33)
MCHC RBC AUTO-ENTMCNC: 32.3 GM/DL (ref 32.2–35.5)
MCHC RBC AUTO-ENTMCNC: 32.6 GM/DL (ref 32.2–35.5)
MCV RBC AUTO: 89.6 FL (ref 80–94)
MCV RBC AUTO: 89.7 FL (ref 80–94)
MONOCYTES # BLD: 10.6 %
MONOCYTES # BLD: 11.8 %
MONOCYTES ABSOLUTE: 1.2 THOU/MM3 (ref 0.4–1.3)
MONOCYTES ABSOLUTE: 1.3 THOU/MM3 (ref 0.4–1.3)
MRSA NASAL SCREEN RT-PCR: POSITIVE
MUCUS: ABNORMAL
NITRITE, URINE: NEGATIVE
NUCLEATED RED BLOOD CELLS: 0 /100 WBC
NUCLEATED RED BLOOD CELLS: 0 /100 WBC
O2 SAT, MIXED: 64 %
OPIATES, URINE: NEGATIVE
OSMOLALITY CALCULATION: 292.4 MOSMOL/KG (ref 275–300)
OXYCODONE: NEGATIVE
PCO2, MIXED VENOUS: 43 MMHG (ref 41–51)
PH UA: 5 (ref 5–9)
PH, MIXED: 7.28 (ref 7.31–7.41)
PHENCYCLIDINE QUANTITATIVE URINE: POSITIVE
PHOSPHORUS: 3.2 MG/DL (ref 2.4–4.7)
PLATELET # BLD: 240 THOU/MM3 (ref 130–400)
PLATELET # BLD: 251 THOU/MM3 (ref 130–400)
PMV BLD AUTO: 9.8 FL (ref 9.4–12.4)
PMV BLD AUTO: 9.8 FL (ref 9.4–12.4)
PO2 MIXED: 37 MMHG (ref 25–40)
POTASSIUM REFLEX MAGNESIUM: 3.9 MEQ/L (ref 3.5–5.2)
POTASSIUM SERPL-SCNC: 3.8 MEQ/L (ref 3.5–5.2)
POTASSIUM, URINE: 4.2 MEQ/L
PROTEIN UA: 100 MG/DL
RBC # BLD: 4.38 MILL/MM3 (ref 4.7–6.1)
RBC # BLD: 4.53 MILL/MM3 (ref 4.7–6.1)
RBC URINE: ABNORMAL /HPF
SARS-COV-2, NAAT: DETECTED
SEG NEUTROPHILS: 72.6 %
SEG NEUTROPHILS: 72.7 %
SEGMENTED NEUTROPHILS ABSOLUTE COUNT: 8.1 THOU/MM3 (ref 1.8–7.7)
SEGMENTED NEUTROPHILS ABSOLUTE COUNT: 8.1 THOU/MM3 (ref 1.8–7.7)
SODIUM BLD-SCNC: 133 MEQ/L (ref 135–145)
SODIUM BLD-SCNC: 136 MEQ/L (ref 135–145)
SODIUM URINE: 35 MEQ/L
SPECIFIC GRAVITY UA: 1.02 (ref 1–1.03)
STAPH AUREUS SCREEN RT-PCR: POSITIVE
TOTAL CK: 6769 U/L (ref 55–170)
TOTAL PROTEIN: 6.9 G/DL (ref 6.1–8)
TOTAL PROTEIN: 7.1 G/DL (ref 6.1–8)
TROPONIN T: 0.02 NG/ML
TROPONIN T: < 0.01 NG/ML
TROPONIN T: < 0.01 NG/ML
URIC ACID: 9.1 MG/DL (ref 3.7–7)
UROBILINOGEN, URINE: 1 EU/DL (ref 0–1)
WBC # BLD: 11.1 THOU/MM3 (ref 4.8–10.8)
WBC # BLD: 11.2 THOU/MM3 (ref 4.8–10.8)
WBC UA: ABNORMAL /HPF

## 2020-05-18 PROCEDURE — 84300 ASSAY OF URINE SODIUM: CPT

## 2020-05-18 PROCEDURE — 81001 URINALYSIS AUTO W/SCOPE: CPT

## 2020-05-18 PROCEDURE — 80053 COMPREHEN METABOLIC PANEL: CPT

## 2020-05-18 PROCEDURE — 2500000003 HC RX 250 WO HCPCS: Performed by: PHYSICIAN ASSISTANT

## 2020-05-18 PROCEDURE — 71045 X-RAY EXAM CHEST 1 VIEW: CPT

## 2020-05-18 PROCEDURE — 6370000000 HC RX 637 (ALT 250 FOR IP): Performed by: NURSE PRACTITIONER

## 2020-05-18 PROCEDURE — 84133 ASSAY OF URINE POTASSIUM: CPT

## 2020-05-18 PROCEDURE — 99214 OFFICE O/P EST MOD 30 MIN: CPT | Performed by: NURSE PRACTITIONER

## 2020-05-18 PROCEDURE — 93010 ELECTROCARDIOGRAM REPORT: CPT | Performed by: INTERNAL MEDICINE

## 2020-05-18 PROCEDURE — 99223 1ST HOSP IP/OBS HIGH 75: CPT | Performed by: INTERNAL MEDICINE

## 2020-05-18 PROCEDURE — 85610 PROTHROMBIN TIME: CPT

## 2020-05-18 PROCEDURE — 93005 ELECTROCARDIOGRAM TRACING: CPT | Performed by: NURSE PRACTITIONER

## 2020-05-18 PROCEDURE — 84550 ASSAY OF BLOOD/URIC ACID: CPT

## 2020-05-18 PROCEDURE — 2580000003 HC RX 258: Performed by: PHYSICIAN ASSISTANT

## 2020-05-18 PROCEDURE — 2709999900 HC NON-CHARGEABLE SUPPLY

## 2020-05-18 PROCEDURE — 87147 CULTURE TYPE IMMUNOLOGIC: CPT

## 2020-05-18 PROCEDURE — 2580000003 HC RX 258: Performed by: NURSE PRACTITIONER

## 2020-05-18 PROCEDURE — 2100000000 HC CCU R&B

## 2020-05-18 PROCEDURE — 87040 BLOOD CULTURE FOR BACTERIA: CPT

## 2020-05-18 PROCEDURE — 83605 ASSAY OF LACTIC ACID: CPT

## 2020-05-18 PROCEDURE — 6360000002 HC RX W HCPCS: Performed by: PHYSICIAN ASSISTANT

## 2020-05-18 PROCEDURE — 85385 FIBRINOGEN ANTIGEN: CPT

## 2020-05-18 PROCEDURE — 6360000002 HC RX W HCPCS: Performed by: NURSE PRACTITIONER

## 2020-05-18 PROCEDURE — 96366 THER/PROPH/DIAG IV INF ADDON: CPT

## 2020-05-18 PROCEDURE — 6370000000 HC RX 637 (ALT 250 FOR IP): Performed by: PHYSICIAN ASSISTANT

## 2020-05-18 PROCEDURE — 85730 THROMBOPLASTIN TIME PARTIAL: CPT

## 2020-05-18 PROCEDURE — 99285 EMERGENCY DEPT VISIT HI MDM: CPT

## 2020-05-18 PROCEDURE — 82550 ASSAY OF CK (CPK): CPT

## 2020-05-18 PROCEDURE — 82570 ASSAY OF URINE CREATININE: CPT

## 2020-05-18 PROCEDURE — 87081 CULTURE SCREEN ONLY: CPT

## 2020-05-18 PROCEDURE — 82436 ASSAY OF URINE CHLORIDE: CPT

## 2020-05-18 PROCEDURE — 82272 OCCULT BLD FECES 1-3 TESTS: CPT

## 2020-05-18 PROCEDURE — 84484 ASSAY OF TROPONIN QUANT: CPT

## 2020-05-18 PROCEDURE — 36415 COLL VENOUS BLD VENIPUNCTURE: CPT

## 2020-05-18 PROCEDURE — 87804 INFLUENZA ASSAY W/OPTIC: CPT

## 2020-05-18 PROCEDURE — 2500000003 HC RX 250 WO HCPCS: Performed by: NURSE PRACTITIONER

## 2020-05-18 PROCEDURE — 96361 HYDRATE IV INFUSION ADD-ON: CPT

## 2020-05-18 PROCEDURE — 80307 DRUG TEST PRSMV CHEM ANLYZR: CPT

## 2020-05-18 PROCEDURE — 96367 TX/PROPH/DG ADDL SEQ IV INF: CPT

## 2020-05-18 PROCEDURE — U0002 COVID-19 LAB TEST NON-CDC: HCPCS

## 2020-05-18 PROCEDURE — 84100 ASSAY OF PHOSPHORUS: CPT

## 2020-05-18 PROCEDURE — 85025 COMPLETE CBC W/AUTO DIFF WBC: CPT

## 2020-05-18 PROCEDURE — 96365 THER/PROPH/DIAG IV INF INIT: CPT

## 2020-05-18 PROCEDURE — 83735 ASSAY OF MAGNESIUM: CPT

## 2020-05-18 PROCEDURE — G0480 DRUG TEST DEF 1-7 CLASSES: HCPCS

## 2020-05-18 PROCEDURE — 82248 BILIRUBIN DIRECT: CPT

## 2020-05-18 PROCEDURE — 99253 IP/OBS CNSLTJ NEW/EST LOW 45: CPT | Performed by: INTERNAL MEDICINE

## 2020-05-18 RX ORDER — ACETAMINOPHEN 325 MG/1
650 TABLET ORAL EVERY 6 HOURS PRN
Status: DISCONTINUED | OUTPATIENT
Start: 2020-05-18 | End: 2020-05-21

## 2020-05-18 RX ORDER — HYDROXYCHLOROQUINE SULFATE 200 MG/1
200 TABLET, FILM COATED ORAL EVERY 12 HOURS
Status: DISCONTINUED | OUTPATIENT
Start: 2020-05-19 | End: 2020-05-21 | Stop reason: HOSPADM

## 2020-05-18 RX ORDER — DEXAMETHASONE SODIUM PHOSPHATE 4 MG/ML
4 INJECTION, SOLUTION INTRA-ARTICULAR; INTRALESIONAL; INTRAMUSCULAR; INTRAVENOUS; SOFT TISSUE DAILY
Status: DISCONTINUED | OUTPATIENT
Start: 2020-05-18 | End: 2020-05-21

## 2020-05-18 RX ORDER — POLYETHYLENE GLYCOL 3350 17 G/17G
17 POWDER, FOR SOLUTION ORAL DAILY PRN
Status: DISCONTINUED | OUTPATIENT
Start: 2020-05-18 | End: 2020-05-21

## 2020-05-18 RX ORDER — AZITHROMYCIN 250 MG/1
500 TABLET, FILM COATED ORAL ONCE
Status: COMPLETED | OUTPATIENT
Start: 2020-05-18 | End: 2020-05-18

## 2020-05-18 RX ORDER — MIRTAZAPINE 45 MG/1
45 TABLET, FILM COATED ORAL NIGHTLY
Status: DISCONTINUED | OUTPATIENT
Start: 2020-05-18 | End: 2020-05-21 | Stop reason: HOSPADM

## 2020-05-18 RX ORDER — HEPARIN SODIUM 5000 [USP'U]/ML
5000 INJECTION, SOLUTION INTRAVENOUS; SUBCUTANEOUS EVERY 8 HOURS SCHEDULED
Status: DISCONTINUED | OUTPATIENT
Start: 2020-05-18 | End: 2020-05-21

## 2020-05-18 RX ORDER — HYDROXYCHLOROQUINE SULFATE 200 MG/1
400 TABLET, FILM COATED ORAL EVERY 12 HOURS
Status: COMPLETED | OUTPATIENT
Start: 2020-05-18 | End: 2020-05-19

## 2020-05-18 RX ORDER — ONDANSETRON 2 MG/ML
4 INJECTION INTRAMUSCULAR; INTRAVENOUS EVERY 6 HOURS PRN
Status: DISCONTINUED | OUTPATIENT
Start: 2020-05-18 | End: 2020-05-21

## 2020-05-18 RX ORDER — PROMETHAZINE HYDROCHLORIDE 25 MG/1
12.5 TABLET ORAL EVERY 6 HOURS PRN
Status: DISCONTINUED | OUTPATIENT
Start: 2020-05-18 | End: 2020-05-21

## 2020-05-18 RX ORDER — SODIUM CHLORIDE 9 MG/ML
1000 INJECTION, SOLUTION INTRAVENOUS CONTINUOUS
Status: DISCONTINUED | OUTPATIENT
Start: 2020-05-18 | End: 2020-05-18

## 2020-05-18 RX ORDER — 0.9 % SODIUM CHLORIDE 0.9 %
1000 INTRAVENOUS SOLUTION INTRAVENOUS ONCE
Status: COMPLETED | OUTPATIENT
Start: 2020-05-18 | End: 2020-05-18

## 2020-05-18 RX ORDER — VENLAFAXINE 50 MG/1
100 TABLET ORAL 3 TIMES DAILY
Status: DISCONTINUED | OUTPATIENT
Start: 2020-05-18 | End: 2020-05-21 | Stop reason: HOSPADM

## 2020-05-18 RX ORDER — SODIUM CHLORIDE 0.9 % (FLUSH) 0.9 %
10 SYRINGE (ML) INJECTION PRN
Status: DISCONTINUED | OUTPATIENT
Start: 2020-05-18 | End: 2020-05-21

## 2020-05-18 RX ORDER — BUPRENORPHINE AND NALOXONE 8; 2 MG/1; MG/1
1 FILM, SOLUBLE BUCCAL; SUBLINGUAL 2 TIMES DAILY
COMMUNITY
End: 2020-05-21 | Stop reason: SDUPTHER

## 2020-05-18 RX ORDER — ACETAMINOPHEN 650 MG/1
650 SUPPOSITORY RECTAL EVERY 6 HOURS PRN
Status: DISCONTINUED | OUTPATIENT
Start: 2020-05-18 | End: 2020-05-21

## 2020-05-18 RX ORDER — SODIUM CHLORIDE 0.9 % (FLUSH) 0.9 %
10 SYRINGE (ML) INJECTION EVERY 12 HOURS SCHEDULED
Status: DISCONTINUED | OUTPATIENT
Start: 2020-05-18 | End: 2020-05-21

## 2020-05-18 RX ORDER — AZITHROMYCIN 250 MG/1
250 TABLET, FILM COATED ORAL DAILY
Status: DISCONTINUED | OUTPATIENT
Start: 2020-05-19 | End: 2020-05-21

## 2020-05-18 RX ADMIN — DEXAMETHASONE SODIUM PHOSPHATE 4 MG: 4 INJECTION, SOLUTION INTRA-ARTICULAR; INTRALESIONAL; INTRAMUSCULAR; INTRAVENOUS; SOFT TISSUE at 18:46

## 2020-05-18 RX ADMIN — PREGABALIN 200 MG: 75 CAPSULE ORAL at 22:14

## 2020-05-18 RX ADMIN — ASCORBIC ACID 1500 MG: 500 INJECTION INTRAVENOUS at 23:32

## 2020-05-18 RX ADMIN — VANCOMYCIN HYDROCHLORIDE 1500 MG: 500 INJECTION, POWDER, LYOPHILIZED, FOR SOLUTION INTRAVENOUS at 14:24

## 2020-05-18 RX ADMIN — SODIUM BICARBONATE: 84 INJECTION, SOLUTION INTRAVENOUS at 18:52

## 2020-05-18 RX ADMIN — SODIUM CHLORIDE 1000 ML: 9 INJECTION, SOLUTION INTRAVENOUS at 12:45

## 2020-05-18 RX ADMIN — ASCORBIC ACID 1500 MG: 500 INJECTION INTRAVENOUS at 18:58

## 2020-05-18 RX ADMIN — VENLAFAXINE 100 MG: 50 TABLET ORAL at 21:28

## 2020-05-18 RX ADMIN — HYDROXYCHLOROQUINE SULFATE 400 MG: 200 TABLET ORAL at 18:38

## 2020-05-18 RX ADMIN — Medication 2 MCG/MIN: at 14:27

## 2020-05-18 RX ADMIN — MIRTAZAPINE 45 MG: 45 TABLET, FILM COATED ORAL at 21:29

## 2020-05-18 RX ADMIN — ASCORBIC ACID 1500 MG: 500 INJECTION INTRAVENOUS at 21:00

## 2020-05-18 RX ADMIN — HEPARIN SODIUM 5000 UNITS: 5000 INJECTION INTRAVENOUS; SUBCUTANEOUS at 21:46

## 2020-05-18 RX ADMIN — SODIUM CHLORIDE 1000 ML: 9 INJECTION, SOLUTION INTRAVENOUS at 11:43

## 2020-05-18 RX ADMIN — AZITHROMYCIN DIHYDRATE 500 MG: 250 TABLET, FILM COATED ORAL at 18:44

## 2020-05-18 RX ADMIN — THIAMINE HYDROCHLORIDE 200 MG: 100 INJECTION, SOLUTION INTRAMUSCULAR; INTRAVENOUS at 21:31

## 2020-05-18 RX ADMIN — PIPERACILLIN AND TAZOBACTAM 3.38 G: 3; .375 INJECTION, POWDER, FOR SOLUTION INTRAVENOUS at 13:13

## 2020-05-18 ASSESSMENT — PAIN SCALES - GENERAL
PAINLEVEL_OUTOF10: 7
PAINLEVEL_OUTOF10: 7
PAINLEVEL_OUTOF10: 8
PAINLEVEL_OUTOF10: 7
PAINLEVEL_OUTOF10: 8

## 2020-05-18 ASSESSMENT — PAIN DESCRIPTION - LOCATION
LOCATION: CHEST;LEG
LOCATION: GENERALIZED

## 2020-05-18 ASSESSMENT — ENCOUNTER SYMPTOMS
SHORTNESS OF BREATH: 1
COUGH: 0
CONSTIPATION: 0
NAUSEA: 1
SINUS PAIN: 0
BACK PAIN: 0
CHEST TIGHTNESS: 0
EYE ITCHING: 0
SHORTNESS OF BREATH: 0
SINUS PRESSURE: 0
ANAL BLEEDING: 1
ABDOMINAL PAIN: 1
EYE PAIN: 0
SORE THROAT: 1
RHINORRHEA: 0
BLOOD IN STOOL: 0
VOMITING: 0
NAUSEA: 0
EYE REDNESS: 0
RECTAL PAIN: 1
ABDOMINAL PAIN: 0
DIARRHEA: 0

## 2020-05-18 ASSESSMENT — PAIN DESCRIPTION - DESCRIPTORS
DESCRIPTORS: SORE

## 2020-05-18 ASSESSMENT — PAIN DESCRIPTION - PROGRESSION
CLINICAL_PROGRESSION: NOT CHANGED

## 2020-05-18 ASSESSMENT — PAIN DESCRIPTION - PAIN TYPE
TYPE: CHRONIC PAIN
TYPE: ACUTE PAIN
TYPE: ACUTE PAIN
TYPE: CHRONIC PAIN
TYPE: ACUTE PAIN

## 2020-05-18 ASSESSMENT — PAIN DESCRIPTION - FREQUENCY
FREQUENCY: CONTINUOUS

## 2020-05-18 ASSESSMENT — PAIN - FUNCTIONAL ASSESSMENT
PAIN_FUNCTIONAL_ASSESSMENT: PREVENTS OR INTERFERES SOME ACTIVE ACTIVITIES AND ADLS

## 2020-05-18 ASSESSMENT — PAIN DESCRIPTION - ONSET
ONSET: ON-GOING

## 2020-05-18 ASSESSMENT — PAIN DESCRIPTION - ORIENTATION
ORIENTATION: RIGHT;LEFT

## 2020-05-18 NOTE — TELEPHONE ENCOUNTER
Patient's girlfriend Shi called  Pre service inquirying on who and where patient was. I called her back at [phone] and left message that patient was taken to there ER room.

## 2020-05-18 NOTE — H&P
the needles were clean. Patient states that he slept for almost 2 days after the accidental overdose. The patient woke up this a.m. and had generalized body aches, SOB, decreased urination output. The patient came to the emergency department, it was found the patient had acute renal failure and rhabdomyolysis with elevated CK. Patient was also found to be COVID positive. Pt had low blood pressures in the ED and was started on Levophed. Pt was admitted by the ICU team. For further details, please review the assessment and plan. Past Medical History:  Opiate Abuse, Hep C, Depression, Anxiety. Family History:  No known FH. Social History: Former smoker and occasional alcohol use. ROS   Review of Systems   Constitutional: Positive for activity change, chills, diaphoresis and fatigue. HENT: Negative for sinus pressure, sinus pain and sneezing. Eyes: Negative for pain, redness and itching. Respiratory: Negative for cough, chest tightness and shortness of breath. Cardiovascular: Positive for chest pain. Negative for palpitations and leg swelling. Gastrointestinal: Positive for abdominal pain and nausea. Negative for constipation and diarrhea. Genitourinary: Positive for decreased urine volume, difficulty urinating and hematuria. Negative for flank pain and frequency. Musculoskeletal: Positive for arthralgias and myalgias. Negative for back pain. Skin: Positive for pallor, rash and wound. Neurological: Positive for weakness. Negative for seizures, light-headedness and headaches. Psychiatric/Behavioral: Positive for confusion. Negative for agitation, behavioral problems and self-injury. Scheduled Meds:   buprenorphine-naloxone  1 Film Sublingual Daily     Continuous Infusions:   norepinephrine 6 mcg/min (05/18/20 1523)    sodium chloride         PHYSICAL EXAMINATION:  T:  98.1. P:  73. RR:  18. B/P:  102/58. FiO2:  RA. O2 Sat:  98%. Body mass index is 28.7 kg/m².    General: Acutely ill appearing, pale white male   HEENT:  normocephalic and atraumatic. No scleral icterus. PERR  Neck: supple. No Thyromegaly. Lungs: clear to auscultation. No retractions  Cardiac: RRR. No JVD. Abdomen: soft. Nontender. Extremities:  No clubbing, cyanosis, or edema x 4. Vasculature: capillary refill < 3 seconds. Palpable dorsalis pedis pulses. Skin:  warm and dry. Multiple small wounds. Psych:  Alert and oriented x3. Lymph:  No supraclavicular adenopathy. Neurologic:  No focal deficit. No seizures. Data: (All radiographs, tracings, PFTs, and imaging are personally viewed and interpreted unless otherwise noted).  Sodium 133. Potassium 3.8. Chloride 96. CO2 16 BUN 85. Creatinine 8.1. Magnesium 2.7. CK 6769. Troponin 0 0.017. Albumin 3.7. Alk Phos 65. ALT 89. . Bilirubin 0.2.    WBC 11.2. Hgb 12.8. Hct 39.3. Plts 240.  Telemetry shows NSR.    UDS: Positive for PCP.  CXR (5/18): No acute findings. Seen with multidisciplinary ICU team .  Meets Continued ICU Level Care Criteria:    [x] Yes   [] No - Transfer Planned to listed location:  [] HOSPITALIST CONTACTED-      Case and plan discussed with Dr. Collette Peace. Electronically signed by EDIS Jackson  CRITICAL CARE SPECIALIST  Patient seen by me. Case discussed with physician assistant. Case discussed with emergency room physician. Patient COVID positive with evidence of rhabdomyolysis and acute renal failure. Undergoing volume resuscitation with bicarbonate drip. Electronically signed by Morene Arthur Collette Peace MD.

## 2020-05-18 NOTE — ACP (ADVANCE CARE PLANNING)
have serious health conditions or who are very sick. \"    \"In the event your heart stopped as a result of an underlying serious health condition, would you want attempts to be made to restart your heart (answer \"yes\" for attempt to resuscitate) or would you prefer a natural death (answer \"no\" for do not attempt to resuscitate)? \" yes       [x] Yes   [] No   Educated Patient / Lauren Shepherd regarding differences between Advance Directives and portable DNR orders. Length of ACP Conversation in minutes:      Conversation Outcomes:  [x] ACP discussion completed  [] Existing advance directive reviewed with patient; no changes to patient's previously recorded wishes  [] New Advance Directive completed  [] Portable Do Not Rescitate prepared for Provider review and signature  [] POLST/POST/MOLST/MOST prepared for Provider review and signature      Follow-up plan:    [] Schedule follow-up conversation to continue planning  [] Referred individual to Provider for additional questions/concerns   [] Advised patient/agent/surrogate to review completed ACP document and update if needed with changes in condition, patient preferences or care setting    [x] This note routed to one or more involved healthcare providers     * patient is registered as a Jehovah Witness. He did not confirm or deny the use of blood products. He did not have a copy of his AD with [employer] [place] on hand with us to record with Medical Records. This will need reviewed if the patient is admitted. Referral will be made with Spiritual Care at that time. - Patient was lying in bed with blankets pulled over his head. This staff turned down the lights for him so we could discuss his care. - Patient pointed out he was in a lot of pain and want shaking constantly as we talked. When asked about his choices of ventilation and resuscitation he agreed to both. Stated even if things got worse, he would still make those choices.    - Patient stated his emergency contacts

## 2020-05-18 NOTE — ED NOTES
Patient resting quietly in cot showing no signs of distress at this time. Patient states, \"My pain is so bad. I need my medications. \"  Patient notified by this RN that pain medication could make him more hypotensive so we cannot administer any at this time. Patient states, \"I'll sign out AMA. I will get up and fucking leave if I can't have my meds. \"  Margarita Shields, [facility] notified.        Ronna Shaver, CHRIS  05/18/20 5265

## 2020-05-18 NOTE — ED PROVIDER NOTES
times daily  Qty: 90 tablet, Refills: 0      naproxen (NAPROSYN) 500 MG tablet Take 1 tablet by mouth 2 times daily  Qty: 60 tablet, Refills: 0             ALLERGIES     has No Known Allergies. FAMILY HISTORY     has no family status information on file. family history is not on file.     SOCIAL HISTORY       Social History     Socioeconomic History    Marital status: Single     Spouse name: Not on file    Number of children: Not on file    Years of education: Not on file    Highest education level: Not on file   Occupational History    Not on file   Social Needs    Financial resource strain: Not on file    Food insecurity     Worry: Not on file     Inability: Not on file    Transportation needs     Medical: Not on file     Non-medical: Not on file   Tobacco Use    Smoking status: Former Smoker     Packs/day: 0.50     Types: Cigarettes    Smokeless tobacco: Never Used   Substance and Sexual Activity    Alcohol use: Yes     Comment: occasional    Drug use: Not Currently     Types: Opiates , IV, Cocaine, Marijuana, Methamphetamines     Comment: IV Fentanyl 5/15/20 OD, hx meth, heroine, pills    Sexual activity: Not Currently   Lifestyle    Physical activity     Days per week: Not on file     Minutes per session: Not on file    Stress: Not on file   Relationships    Social connections     Talks on phone: Not on file     Gets together: Not on file     Attends Mosque service: Not on file     Active member of club or organization: Not on file     Attends meetings of clubs or organizations: Not on file     Relationship status: Not on file    Intimate partner violence     Fear of current or ex partner: Not on file     Emotionally abused: Not on file     Physically abused: Not on file     Forced sexual activity: Not on file   Other Topics Concern    Not on file   Social History Narrative    Not on file       PHYSICAL EXAM     INITIAL VITALS:  height is 5' 10\" (1.778 m) and weight is 208 lb 5.4 oz (94.5 kg). His oral temperature is 98.3 °F (36.8 °C). His blood pressure is 113/74 and his pulse is 87. His respiration is 22 and oxygen saturation is 97%. Physical Exam  Vitals signs and nursing note reviewed. Exam conducted with a chaperone present. Constitutional:       Appearance: He is well-developed. He is not toxic-appearing or diaphoretic. HENT:      Head: Normocephalic and atraumatic. Right Ear: Tympanic membrane and external ear normal.      Left Ear: Tympanic membrane and external ear normal.      Nose: Nose normal.      Mouth/Throat:      Dentition: Has dentures. Pharynx: No oropharyngeal exudate or posterior oropharyngeal erythema. Eyes:      Conjunctiva/sclera: Conjunctivae normal.   Neck:      Musculoskeletal: Normal range of motion and neck supple. Vascular: No JVD. Cardiovascular:      Rate and Rhythm: Regular rhythm. Tachycardia present. Pulses: Normal pulses. Heart sounds: Normal heart sounds. No murmur. No friction rub. No gallop. Pulmonary:      Effort: Pulmonary effort is normal. No respiratory distress. Breath sounds: Normal breath sounds. No decreased breath sounds, wheezing, rhonchi or rales. Abdominal:      General: Bowel sounds are normal. There is no distension. Palpations: Abdomen is soft. Tenderness: There is no abdominal tenderness. There is no guarding or rebound. Genitourinary:     Comments: No external hemorrhoids noted   Musculoskeletal: Normal range of motion. Skin:     General: Skin is warm and dry. Findings: Rash present. Comments: Folliculitis to buttock, hips, and legs   Cellulitis to left arm with no fluctuance    Neurological:      Mental Status: He is alert and oriented to person, place, and time. Motor: No abnormal muscle tone.       Coordination: Coordination normal.         DIFFERENTIAL DIAGNOSIS:   Including but not limited to endocarditis, sepsis, acute blood loss, anemia, shock     DIAGNOSTIC RESULTS     EKG: All EKG's are interpreted by the Emergency Department Physician who eithersigns or Co-signs this chart in the absence of a cardiologist.    EKG read and interpreted by myself with comparison to 15-May-2020 gives impression of normal sinus tachycardia with heart rate of 104; interval 165; QRS 92;QTc 436; axis 62, 48, 44. Otherwise normal ECG  No STEMI      RADIOLOGY: non-plainfilm images(s) such as CT, Ultrasound and MRI are read by the radiologist.  Plain radiographic images are visualized and preliminarily interpreted by the emergency physician unless otherwise stated below. XR CHEST PORTABLE   Final Result   No acute findings               **This report has been created using voice recognition software. It may contain minor errors which are inherent in voice recognition technology. **      Final report electronically signed by Dr. Abdirashid Wilkerson on 5/18/2020 12:09 PM            LABS:   Labs Reviewed   CBC WITH AUTO DIFFERENTIAL - Abnormal; Notable for the following components:       Result Value    WBC 11.2 (*)     RBC 4.38 (*)     Hemoglobin 12.8 (*)     Hematocrit 39.3 (*)     RDW-SD 45.2 (*)     Segs Absolute 8.1 (*)     All other components within normal limits   BASIC METABOLIC PANEL - Abnormal; Notable for the following components:    Sodium 133 (*)     Chloride 96 (*)     CO2 16 (*)     BUN 85 (*)     CREATININE 8.1 (*)     All other components within normal limits   HEPATIC FUNCTION PANEL - Abnormal; Notable for the following components: Total Bilirubin 0.2 (*)      (*)     ALT 89 (*)     All other components within normal limits   TROPONIN - Abnormal; Notable for the following components:    Troponin T 0.017 (*)     All other components within normal limits   MAGNESIUM - Abnormal; Notable for the following components:    Magnesium 2.7 (*)     All other components within normal limits   CK - Abnormal; Notable for the following components:     Total CK 6,769 (*)     All other range)     Or   acetaminophen (TYLENOL) suppository 650 mg (has no administration in time range)   polyethylene glycol (GLYCOLAX) packet 17 g (has no administration in time range)   promethazine (PHENERGAN) tablet 12.5 mg (has no administration in time range)     Or   ondansetron (ZOFRAN) injection 4 mg (has no administration in time range)   heparin (porcine) injection 5,000 Units (has no administration in time range)   sodium bicarbonate 150 mEq in dextrose 5 % 1,000 mL infusion (has no administration in time range)   hydroxychloroquine (PLAQUENIL) tablet 400 mg (has no administration in time range)     Followed by   hydroxychloroquine (PLAQUENIL) tablet 200 mg (has no administration in time range)   azithromycin (ZITHROMAX) tablet 500 mg (has no administration in time range)     Followed by   azithromycin (ZITHROMAX) tablet 250 mg (has no administration in time range)   ascorbic acid 1,500 mg in sodium chloride 0.9 % 100 mL IVPB (has no administration in time range)   thiamine (B-1) 200 mg in sodium chloride 0.9 % 100 mL IVPB (has no administration in time range)   Dexamethasone Sodium Phosphate injection 4 mg (has no administration in time range)   0.9 % sodium chloride bolus (0 mLs Intravenous Stopped 5/18/20 1348)   piperacillin-tazobactam (ZOSYN) 3.375 g in dextrose 5 % 50 mL IVPB (mini-bag) (0 g Intravenous Stopped 5/18/20 1343)   vancomycin (VANCOCIN) 1,500 mg in dextrose 5 % 500 mL IVPB (0 mg Intravenous Stopped 5/18/20 1624)   0.9 % sodium chloride bolus (0 mLs Intravenous Stopped 5/18/20 1345)   Case was discussed with my attending Dr. Dioni Hyatt, who agrees with this plan of care. Patient was seenindependently by myself. The patient's final impression and disposition and plan was determined by myself. CRITICAL CARE:   There was a high probability of clinically significant/life threatening deterioration in this patient's condition which required my urgent intervention.   Total critical care time was 100

## 2020-05-18 NOTE — CONSULTS
[facility]    [address], [address]  [facility]  5/18/2020 7:48 PM    Pt Name:    Cristi Enrique  MRN:     [id number]   [id number]  YOB: 1985  Admit Date:    5/18/2020 11:27 AM  Primary Care Physician:  PAPA Cabezas CNP    [facility] Number:   [id number]    Reason for Consult:  SATYA and rhabdomyolysis  Requesting provider:  EDIS Montez    History:   The patient is a 38 y.o.  white male with history of hepatitis C, substance use who was evaluated in the emergency room for chest pain which apparently had been present for last several weeks but has progressed in severity. Patient reports chest pain of 6 out of 10 on pain scale. He has a history of substance use and was recently released from a treatment facility. Patient apparently used some IV fentanyl recently. Over last several days he has had generalized weakness, body aches, decreased urine output as well as associated shortness of breath. He was evaluated in the emergency room and was noted to have acute renal failure with elevated CK levels. Patient was also noted to be hypotensive in the emergency room and was started on IV Levophed as well as IV fluids. Patient was agitated in the emergency room. He was given IV fluids of 2 L of normal saline with minimal improvement. Apparently patient threatened to sign AMA while he was in the emergency room. Subsequently his COVID 19 test came back positive. He was subsequently placed in [facility] ICU. Nephrology has been asked to see him in consultation for management of acute kidney injury. Patient was seen and examined by me in [facility] [place] [facility]\"" unit. I spoke with the patient but patient was somewhat somnolent and not very cooperative. Discussed with ICU nurse at bedside. Patient is currently nonoliguric and has excellent urine output. He is currently receiving IV fluids. He is going to be started on IV bicarbonate drip.   Per medication list patient apparently has history of NSAID use. Last use uncertain    Past Medical History:  Past Medical History:   Diagnosis Date    Anxiety     Depression     Kidney stone     Liver disease     Hep C    Opiate abuse, continuous ([facility] [address]        Past Surgical History:  History reviewed. No pertinent surgical history. Family History:  History reviewed. No pertinent family history.     Social History:  Social History     Socioeconomic History    Marital status: Single     Spouse name: Not on file    Number of children: Not on file    Years of education: Not on file    Highest education level: Not on file   Occupational History    Not on file   Social Needs    Financial resource strain: Not on file    Food insecurity     Worry: Not on file     Inability: Not on file    Transportation needs     Medical: Not on file     Non-medical: Not on file   Tobacco Use    Smoking status: Former Smoker     Packs/day: 0.50     Types: Cigarettes    Smokeless tobacco: Never Used   Substance and Sexual Activity    Alcohol use: Yes     Comment: occasional    Drug use: Not Currently     Types: Opiates , IV, Cocaine, Marijuana, Methamphetamines     Comment: IV Fentanyl 5/15/20 OD, hx meth, heroine, pills    Sexual activity: Not Currently   Lifestyle    Physical activity     Days per week: Not on file     Minutes per session: Not on file    Stress: Not on file   Relationships    Social connections     Talks on phone: Not on file     Gets together: Not on file     Attends Yarsani service: Not on file     Active member of club or organization: Not on file     Attends meetings of clubs or organizations: Not on file     Relationship status: Not on file    Intimate partner violence     Fear of current or ex partner: Not on file     Emotionally abused: Not on file     Physically abused: Not on file     Forced sexual activity: Not on file   Other Topics Concern    Not on file   Social History Narrative    Not on file       Home Meds:  Prior to Admission medications    Medication Sig Start Date End Date Taking? Authorizing Provider   buprenorphine-naloxone (SUBOXONE) 8-2 MG FILM SL film Place 1 Film under the tongue 2 times daily.    Yes Historical Provider, MD   lisinopril (PRINIVIL;ZESTRIL) 20 MG tablet Take 1 tablet by mouth daily 1/9/20  Yes PAPA Javier CNP   mirtazapine (REMERON) 45 MG tablet Take 1 tablet by mouth nightly 1/9/20  Yes PAPA Javier CNP   ibuprofen (ADVIL;MOTRIN) 800 MG tablet Take 1 tablet by mouth 2 times daily as needed for Pain 1/9/20 6/18/20 Yes PAPA Javier CNP   LYRICA 200 MG capsule Take one capsule 3 times daily 12/30/19 6/18/20 Yes PAPA Javier CNP   venlafaxine [facility]) 100 MG tablet Take 1 tablet by mouth 3 times daily 12/30/19  Yes PAPA Javier CNP   naproxen (NAPROSYN) 500 MG tablet Take 1 tablet by mouth 2 times daily 7/22/19   PAPA Cisse CNP       Review of Systems:  Constitutional: Positive for generalized weakness associated with decreased urine output, fatigue, chills and diaphoresis  Head: Negative for headaches  Eyes: Negative for blurry vision or discharge  Ears: Negative for ear pain or hearing changes  Nose: Negative for runny nose or epistaxis  Respiratory:  Negative for hemoptysis  Cardiovascular: Positive for chest pain  GI: Positive for nausea and abdominal pain  : Negative for dysuria, positive for decreased urine output  Skin: Positive for rash  Neuro: Positive for weakness   Psychiatric: Agitated, somnolent    All other review of systems were reviewed and negative    Current Meds:  Infusion:    norepinephrine 8 mcg/min (05/18/20 1854)    sodium bicarbonate infusion 150 mL/hr at 05/18/20 1852     Meds:    mirtazapine  45 mg Oral Nightly    venlafaxine  100 mg Oral TID    sodium chloride flush  10 mL Intravenous 2 times per day    heparin (porcine)  5,000 Units Subcutaneous 3 times

## 2020-05-18 NOTE — ED NOTES
Report called to 4B and given to Dunnsandrine Panchal [facility]. Patient transported to the floor in guarded condition.      Domenica Anderson RN  05/18/20 8353

## 2020-05-18 NOTE — PROGRESS NOTES
2020     Zack He (:  1985) is a 38 y.o. male, here for evaluation of the following medical concerns:  Oliguria, weakness, altered mental status    I last seen Elly Dennis 4 months ago. At that time he was heading to an inpatient rehab. He was released from rehab last Wed. Follows with Dr. Mariah Saenz for MAT, seen him Thurs. Missed appointment Friday, relapsed, and overdosed. He was seen in the ER on Friday and released. States that he went home and has been sleeping since then. Is unsure of when he urinated last, possibly Friday reportedly. His color is poor, gray. Difficult to maintain conversation as he is falling asleep, twitching. In a wheelchair because his gait was so unsteady the nurse was concerned. He also reports that he has noticed blood coming from his rectum. BP is low, pulse oximetry only 90%. He does report intermittent chest pain and shortness of breath. No fever noted. Review of Systems   Respiratory: Positive for shortness of breath. Negative for cough. Cardiovascular: Positive for chest pain. Gastrointestinal: Positive for anal bleeding. Genitourinary: Positive for difficulty urinating. Neurological: Positive for weakness. Prior to Visit Medications    Medication Sig Taking? Authorizing Provider   buprenorphine-naloxone (SUBOXONE) 8-2 MG FILM SL film Place 1 Film under the tongue 2 times daily.  Yes Historical Provider, MD   lisinopril (PRINIVIL;ZESTRIL) 20 MG tablet Take 1 tablet by mouth daily Yes PAPA Small CNP   mirtazapine (REMERON) 45 MG tablet Take 1 tablet by mouth nightly Yes PAPA Small CNP   ibuprofen (ADVIL;MOTRIN) 800 MG tablet Take 1 tablet by mouth 2 times daily as needed for Pain Yes PAPA Small CNP   LYRICA 200 MG capsule Take one capsule 3 times daily Yes PAPA Small CNP   venlafaxine (EFFEXOR) 100 MG tablet Take 1 tablet by mouth 3 times daily Yes PAPA Small

## 2020-05-19 LAB
ALBUMIN SERPL-MCNC: 3.7 G/DL (ref 3.5–5.1)
ALP BLD-CCNC: 62 U/L (ref 38–126)
ALT SERPL-CCNC: 81 U/L (ref 11–66)
ANION GAP SERPL CALCULATED.3IONS-SCNC: 11 MEQ/L (ref 8–16)
APTT: 24.1 SECONDS (ref 22–38)
AST SERPL-CCNC: 87 U/L (ref 5–40)
BASOPHILS # BLD: 0.1 %
BASOPHILS ABSOLUTE: 0 THOU/MM3 (ref 0–0.1)
BILIRUB SERPL-MCNC: 0.2 MG/DL (ref 0.3–1.2)
BUN BLDV-MCNC: 47 MG/DL (ref 7–22)
CALCIUM SERPL-MCNC: 9.3 MG/DL (ref 8.5–10.5)
CHLORIDE BLD-SCNC: 105 MEQ/L (ref 98–111)
CO2: 24 MEQ/L (ref 23–33)
CREAT SERPL-MCNC: 1.9 MG/DL (ref 0.4–1.2)
EOSINOPHIL # BLD: 0.1 %
EOSINOPHILS ABSOLUTE: 0 THOU/MM3 (ref 0–0.4)
ERYTHROCYTE [DISTWIDTH] IN BLOOD BY AUTOMATED COUNT: 13.6 % (ref 11.5–14.5)
ERYTHROCYTE [DISTWIDTH] IN BLOOD BY AUTOMATED COUNT: 43.3 FL (ref 35–45)
FIBRINOGEN: 571 MG/100ML (ref 155–475)
GFR SERPL CREATININE-BSD FRML MDRD: 40 ML/MIN/1.73M2
GLUCOSE BLD-MCNC: 151 MG/DL (ref 70–108)
HCT VFR BLD CALC: 39.5 % (ref 42–52)
HEMOGLOBIN: 13.2 GM/DL (ref 14–18)
IMMATURE GRANS (ABS): 0.05 THOU/MM3 (ref 0–0.07)
IMMATURE GRANULOCYTES: 0.4 %
INR BLD: 1.07 (ref 0.85–1.13)
LYMPHOCYTES # BLD: 5.7 %
LYMPHOCYTES ABSOLUTE: 0.6 THOU/MM3 (ref 1–4.8)
MCH RBC QN AUTO: 29.3 PG (ref 26–33)
MCHC RBC AUTO-ENTMCNC: 33.4 GM/DL (ref 32.2–35.5)
MCV RBC AUTO: 87.8 FL (ref 80–94)
MONOCYTES # BLD: 5.7 %
MONOCYTES ABSOLUTE: 0.6 THOU/MM3 (ref 0.4–1.3)
NUCLEATED RED BLOOD CELLS: 0 /100 WBC
PHOSPHORUS: 2.6 MG/DL (ref 2.4–4.7)
PLATELET # BLD: 271 THOU/MM3 (ref 130–400)
PMV BLD AUTO: 9.9 FL (ref 9.4–12.4)
POTASSIUM REFLEX MAGNESIUM: 4.7 MEQ/L (ref 3.5–5.2)
RBC # BLD: 4.5 MILL/MM3 (ref 4.7–6.1)
SEG NEUTROPHILS: 88 %
SEGMENTED NEUTROPHILS ABSOLUTE COUNT: 9.8 THOU/MM3 (ref 1.8–7.7)
SODIUM BLD-SCNC: 140 MEQ/L (ref 135–145)
TOTAL CK: 3618 U/L (ref 55–170)
TOTAL CK: 4568 U/L (ref 55–170)
TOTAL PROTEIN: 6.8 G/DL (ref 6.1–8)
WBC # BLD: 11.1 THOU/MM3 (ref 4.8–10.8)

## 2020-05-19 PROCEDURE — 6370000000 HC RX 637 (ALT 250 FOR IP): Performed by: INTERNAL MEDICINE

## 2020-05-19 PROCEDURE — 6370000000 HC RX 637 (ALT 250 FOR IP): Performed by: PHYSICIAN ASSISTANT

## 2020-05-19 PROCEDURE — 85730 THROMBOPLASTIN TIME PARTIAL: CPT

## 2020-05-19 PROCEDURE — 2580000003 HC RX 258: Performed by: PHYSICIAN ASSISTANT

## 2020-05-19 PROCEDURE — 94760 N-INVAS EAR/PLS OXIMETRY 1: CPT

## 2020-05-19 PROCEDURE — 2100000000 HC CCU R&B

## 2020-05-19 PROCEDURE — 6360000002 HC RX W HCPCS: Performed by: PHYSICIAN ASSISTANT

## 2020-05-19 PROCEDURE — 36415 COLL VENOUS BLD VENIPUNCTURE: CPT

## 2020-05-19 PROCEDURE — 84100 ASSAY OF PHOSPHORUS: CPT

## 2020-05-19 PROCEDURE — 85610 PROTHROMBIN TIME: CPT

## 2020-05-19 PROCEDURE — 2500000003 HC RX 250 WO HCPCS: Performed by: PHYSICIAN ASSISTANT

## 2020-05-19 PROCEDURE — 2580000003 HC RX 258: Performed by: INTERNAL MEDICINE

## 2020-05-19 PROCEDURE — 85025 COMPLETE CBC W/AUTO DIFF WBC: CPT

## 2020-05-19 PROCEDURE — 99232 SBSQ HOSP IP/OBS MODERATE 35: CPT | Performed by: INTERNAL MEDICINE

## 2020-05-19 PROCEDURE — 85385 FIBRINOGEN ANTIGEN: CPT

## 2020-05-19 PROCEDURE — 82550 ASSAY OF CK (CPK): CPT

## 2020-05-19 PROCEDURE — 6370000000 HC RX 637 (ALT 250 FOR IP): Performed by: NURSE PRACTITIONER

## 2020-05-19 PROCEDURE — 80053 COMPREHEN METABOLIC PANEL: CPT

## 2020-05-19 RX ORDER — SULFAMETHOXAZOLE AND TRIMETHOPRIM 800; 160 MG/1; MG/1
1 TABLET ORAL EVERY 12 HOURS SCHEDULED
Status: DISCONTINUED | OUTPATIENT
Start: 2020-05-19 | End: 2020-05-20

## 2020-05-19 RX ORDER — BUPRENORPHINE AND NALOXONE 8; 2 MG/1; MG/1
1 FILM, SOLUBLE BUCCAL; SUBLINGUAL 2 TIMES DAILY
Status: DISCONTINUED | OUTPATIENT
Start: 2020-05-19 | End: 2020-05-21

## 2020-05-19 RX ORDER — SODIUM CHLORIDE 9 MG/ML
INJECTION, SOLUTION INTRAVENOUS CONTINUOUS
Status: DISCONTINUED | OUTPATIENT
Start: 2020-05-19 | End: 2020-05-21

## 2020-05-19 RX ORDER — BUPRENORPHINE AND NALOXONE 8; 2 MG/1; MG/1
1 FILM, SOLUBLE BUCCAL; SUBLINGUAL DAILY
Status: DISCONTINUED | OUTPATIENT
Start: 2020-05-19 | End: 2020-05-19

## 2020-05-19 RX ORDER — DIPHENHYDRAMINE HCL 25 MG
50 TABLET ORAL EVERY 6 HOURS PRN
Status: DISCONTINUED | OUTPATIENT
Start: 2020-05-19 | End: 2020-05-21

## 2020-05-19 RX ADMIN — MIRTAZAPINE 45 MG: 45 TABLET, FILM COATED ORAL at 21:18

## 2020-05-19 RX ADMIN — ONDANSETRON HYDROCHLORIDE 4 MG: 2 SOLUTION INTRAMUSCULAR; INTRAVENOUS at 15:49

## 2020-05-19 RX ADMIN — SULFAMETHOXAZOLE AND TRIMETHOPRIM 1 TABLET: 800; 160 TABLET ORAL at 21:17

## 2020-05-19 RX ADMIN — PREGABALIN 200 MG: 75 CAPSULE ORAL at 21:18

## 2020-05-19 RX ADMIN — ASCORBIC ACID 1500 MG: 500 INJECTION INTRAVENOUS at 06:04

## 2020-05-19 RX ADMIN — HEPARIN SODIUM 5000 UNITS: 5000 INJECTION INTRAVENOUS; SUBCUTANEOUS at 05:45

## 2020-05-19 RX ADMIN — ACETAMINOPHEN 650 MG: 325 TABLET ORAL at 21:18

## 2020-05-19 RX ADMIN — ASCORBIC ACID 1500 MG: 500 INJECTION INTRAVENOUS at 11:43

## 2020-05-19 RX ADMIN — ASCORBIC ACID 1500 MG: 500 INJECTION INTRAVENOUS at 18:34

## 2020-05-19 RX ADMIN — PREGABALIN 200 MG: 75 CAPSULE ORAL at 09:30

## 2020-05-19 RX ADMIN — HYDROXYCHLOROQUINE SULFATE 200 MG: 200 TABLET ORAL at 18:34

## 2020-05-19 RX ADMIN — VENLAFAXINE 100 MG: 50 TABLET ORAL at 13:58

## 2020-05-19 RX ADMIN — VENLAFAXINE 100 MG: 50 TABLET ORAL at 09:31

## 2020-05-19 RX ADMIN — THIAMINE HYDROCHLORIDE 200 MG: 100 INJECTION, SOLUTION INTRAMUSCULAR; INTRAVENOUS at 15:18

## 2020-05-19 RX ADMIN — SODIUM CHLORIDE: 9 INJECTION, SOLUTION INTRAVENOUS at 18:34

## 2020-05-19 RX ADMIN — HEPARIN SODIUM 5000 UNITS: 5000 INJECTION INTRAVENOUS; SUBCUTANEOUS at 21:33

## 2020-05-19 RX ADMIN — BUPRENORPHINE AND NALOXONE 1 FILM: 8; 2 FILM BUCCAL; SUBLINGUAL at 21:18

## 2020-05-19 RX ADMIN — HYDROXYCHLOROQUINE SULFATE 400 MG: 200 TABLET ORAL at 05:48

## 2020-05-19 RX ADMIN — THIAMINE HYDROCHLORIDE 200 MG: 100 INJECTION, SOLUTION INTRAMUSCULAR; INTRAVENOUS at 09:31

## 2020-05-19 RX ADMIN — ACETAMINOPHEN 650 MG: 325 TABLET ORAL at 15:49

## 2020-05-19 RX ADMIN — SODIUM BICARBONATE: 84 INJECTION, SOLUTION INTRAVENOUS at 02:24

## 2020-05-19 RX ADMIN — DIPHENHYDRAMINE HCL 50 MG: 25 TABLET ORAL at 21:18

## 2020-05-19 RX ADMIN — HEPARIN SODIUM 5000 UNITS: 5000 INJECTION INTRAVENOUS; SUBCUTANEOUS at 13:58

## 2020-05-19 RX ADMIN — BUPRENORPHINE AND NALOXONE 1 FILM: 8; 2 FILM BUCCAL; SUBLINGUAL at 13:54

## 2020-05-19 RX ADMIN — AZITHROMYCIN DIHYDRATE 250 MG: 250 TABLET, FILM COATED ORAL at 09:30

## 2020-05-19 RX ADMIN — SODIUM BICARBONATE: 84 INJECTION, SOLUTION INTRAVENOUS at 09:31

## 2020-05-19 RX ADMIN — DEXAMETHASONE SODIUM PHOSPHATE 4 MG: 4 INJECTION, SOLUTION INTRA-ARTICULAR; INTRALESIONAL; INTRAMUSCULAR; INTRAVENOUS; SOFT TISSUE at 09:30

## 2020-05-19 RX ADMIN — VENLAFAXINE 100 MG: 50 TABLET ORAL at 21:17

## 2020-05-19 ASSESSMENT — PAIN DESCRIPTION - FREQUENCY
FREQUENCY: CONTINUOUS
FREQUENCY: CONTINUOUS

## 2020-05-19 ASSESSMENT — PAIN DESCRIPTION - PROGRESSION
CLINICAL_PROGRESSION: NOT CHANGED

## 2020-05-19 ASSESSMENT — PAIN DESCRIPTION - DESCRIPTORS
DESCRIPTORS: ACHING;SORE
DESCRIPTORS: ACHING;SORE

## 2020-05-19 ASSESSMENT — PAIN SCALES - GENERAL
PAINLEVEL_OUTOF10: 7
PAINLEVEL_OUTOF10: 7
PAINLEVEL_OUTOF10: 6

## 2020-05-19 ASSESSMENT — PAIN DESCRIPTION - LOCATION
LOCATION: GENERALIZED
LOCATION: FACE;NECK

## 2020-05-19 ASSESSMENT — PAIN DESCRIPTION - ORIENTATION
ORIENTATION: RIGHT;LEFT
ORIENTATION: RIGHT

## 2020-05-19 ASSESSMENT — PAIN DESCRIPTION - ONSET
ONSET: ON-GOING
ONSET: ON-GOING

## 2020-05-19 ASSESSMENT — PAIN DESCRIPTION - PAIN TYPE
TYPE: ACUTE PAIN
TYPE: CHRONIC PAIN

## 2020-05-19 ASSESSMENT — PAIN - FUNCTIONAL ASSESSMENT
PAIN_FUNCTIONAL_ASSESSMENT: PREVENTS OR INTERFERES SOME ACTIVE ACTIVITIES AND ADLS
PAIN_FUNCTIONAL_ASSESSMENT: PREVENTS OR INTERFERES SOME ACTIVE ACTIVITIES AND ADLS

## 2020-05-19 NOTE — CARE COORDINATION
5/19/20, 10:28 AM EDT  DISCHARGE PLANNING EVALUATION:    Rita Fothergill       Admitted from: ER, patient presented with chest pain, shortness of breath, sore throat, decreased urine output and generalized rash. 5/18/2020/ [address] day: 1   Location: [address] [place] [address] Reason for admit: COVID-19 [U07.1, J98.8] Status: Inpt. Admit order signed?: yes  PMH:  has a past medical history of Anxiety, Depression, Kidney stone, Liver disease, and Opiate abuse, continuous ([facility] [address] Procedure: N/A  Pertinent abnormal Imaging:Chest x-ray indicates no acute findings. Medications:  Scheduled Meds:   mirtazapine  45 mg Oral Nightly    venlafaxine  100 mg Oral TID    sodium chloride flush  10 mL Intravenous 2 times per day    heparin (porcine)  5,000 Units Subcutaneous 3 times per day    hydroxychloroquine  200 mg Oral Q12H    azithromycin  250 mg Oral Daily    ascorbic acid  1,500 mg Intravenous Q6H    thiamine (VITAMIN B1) IVPB  200 mg Intravenous BID    dexamethasone  4 mg Intravenous Daily    pregabalin  200 mg Oral BID     Continuous Infusions:   norepinephrine Stopped (05/18/20 2227)    sodium bicarbonate infusion 150 mL/hr at 05/19/20 0931      Pertinent Info/Orders/Treatment Plan:  Creatinine on admission 5.2, down to 1.9 today, CK 3618, Troponin (1) <0.010, (2) < 0.010, WBC 11.1, patient is positive for Covid and MRSA, blood cultures pending. IV Vancomycin and Zosyn x 1 dose, IV ascorbic acid q 6 hr., Dexamethasone daily, Heparin subq, oral Zithromax, IV Thiamine twice daily, Plaquenil loading dose and daily dose, Sodium bicarbonate IV fluids, prn Tylenol, Glycolax, Phenergan and Zofran, daily INR, CBC, CMP with Magnesium, and Fibrinogen, daily weights, droplet + isolation, SCD's, telemetry, up with assistance. Diet: DIET GENERAL;   Smoking status:  reports that he has quit smoking. His smoking use included cigarettes. He smoked 0.50 packs per day.  He has never used smokeless tobacco.   PCP: Jeremiah Morel

## 2020-05-20 LAB
ALBUMIN SERPL-MCNC: 3 G/DL (ref 3.5–5.1)
ALP BLD-CCNC: 48 U/L (ref 38–126)
ALT SERPL-CCNC: 54 U/L (ref 11–66)
ANION GAP SERPL CALCULATED.3IONS-SCNC: 10 MEQ/L (ref 8–16)
APTT: 24 SECONDS (ref 22–38)
AST SERPL-CCNC: 40 U/L (ref 5–40)
BASOPHILS # BLD: 0.2 %
BASOPHILS ABSOLUTE: 0 THOU/MM3 (ref 0–0.1)
BILIRUB SERPL-MCNC: < 0.2 MG/DL (ref 0.3–1.2)
BUN BLDV-MCNC: 19 MG/DL (ref 7–22)
CALCIUM SERPL-MCNC: 9.3 MG/DL (ref 8.5–10.5)
CHLORIDE BLD-SCNC: 102 MEQ/L (ref 98–111)
CO2: 27 MEQ/L (ref 23–33)
CREAT SERPL-MCNC: 0.9 MG/DL (ref 0.4–1.2)
EOSINOPHIL # BLD: 0.4 %
EOSINOPHILS ABSOLUTE: 0 THOU/MM3 (ref 0–0.4)
ERYTHROCYTE [DISTWIDTH] IN BLOOD BY AUTOMATED COUNT: 13.5 % (ref 11.5–14.5)
ERYTHROCYTE [DISTWIDTH] IN BLOOD BY AUTOMATED COUNT: 45 FL (ref 35–45)
FIBRINOGEN: 396 MG/100ML (ref 155–475)
GFR SERPL CREATININE-BSD FRML MDRD: > 90 ML/MIN/1.73M2
GLUCOSE BLD-MCNC: 123 MG/DL (ref 70–108)
HCT VFR BLD CALC: 33.5 % (ref 42–52)
HEMOGLOBIN: 10.7 GM/DL (ref 14–18)
IMMATURE GRANS (ABS): 0.06 THOU/MM3 (ref 0–0.07)
IMMATURE GRANULOCYTES: 0.5 %
INR BLD: 1.02 (ref 0.85–1.13)
LYMPHOCYTES # BLD: 14.4 %
LYMPHOCYTES ABSOLUTE: 1.6 THOU/MM3 (ref 1–4.8)
MCH RBC QN AUTO: 29.1 PG (ref 26–33)
MCHC RBC AUTO-ENTMCNC: 31.9 GM/DL (ref 32.2–35.5)
MCV RBC AUTO: 91 FL (ref 80–94)
MONOCYTES # BLD: 11.1 %
MONOCYTES ABSOLUTE: 1.2 THOU/MM3 (ref 0.4–1.3)
MRSA SCREEN: ABNORMAL
NUCLEATED RED BLOOD CELLS: 0 /100 WBC
ORGANISM: ABNORMAL
PLATELET # BLD: 220 THOU/MM3 (ref 130–400)
PMV BLD AUTO: 9.8 FL (ref 9.4–12.4)
POTASSIUM REFLEX MAGNESIUM: 4 MEQ/L (ref 3.5–5.2)
POTASSIUM SERPL-SCNC: 4 MEQ/L (ref 3.5–5.2)
RBC # BLD: 3.68 MILL/MM3 (ref 4.7–6.1)
SEG NEUTROPHILS: 73.4 %
SEGMENTED NEUTROPHILS ABSOLUTE COUNT: 8.1 THOU/MM3 (ref 1.8–7.7)
SODIUM BLD-SCNC: 139 MEQ/L (ref 135–145)
TOTAL CK: 849 U/L (ref 55–170)
TOTAL PROTEIN: 5.9 G/DL (ref 6.1–8)
VRE CULTURE: NORMAL
WBC # BLD: 11 THOU/MM3 (ref 4.8–10.8)

## 2020-05-20 PROCEDURE — 6370000000 HC RX 637 (ALT 250 FOR IP): Performed by: INTERNAL MEDICINE

## 2020-05-20 PROCEDURE — 2500000003 HC RX 250 WO HCPCS: Performed by: PHYSICIAN ASSISTANT

## 2020-05-20 PROCEDURE — 82550 ASSAY OF CK (CPK): CPT

## 2020-05-20 PROCEDURE — 85730 THROMBOPLASTIN TIME PARTIAL: CPT

## 2020-05-20 PROCEDURE — 2580000003 HC RX 258: Performed by: INTERNAL MEDICINE

## 2020-05-20 PROCEDURE — 85610 PROTHROMBIN TIME: CPT

## 2020-05-20 PROCEDURE — 99232 SBSQ HOSP IP/OBS MODERATE 35: CPT | Performed by: INTERNAL MEDICINE

## 2020-05-20 PROCEDURE — 6370000000 HC RX 637 (ALT 250 FOR IP): Performed by: NURSE PRACTITIONER

## 2020-05-20 PROCEDURE — 85385 FIBRINOGEN ANTIGEN: CPT

## 2020-05-20 PROCEDURE — 6360000002 HC RX W HCPCS: Performed by: PHYSICIAN ASSISTANT

## 2020-05-20 PROCEDURE — 6370000000 HC RX 637 (ALT 250 FOR IP): Performed by: PHYSICIAN ASSISTANT

## 2020-05-20 PROCEDURE — 36415 COLL VENOUS BLD VENIPUNCTURE: CPT

## 2020-05-20 PROCEDURE — 85025 COMPLETE CBC W/AUTO DIFF WBC: CPT

## 2020-05-20 PROCEDURE — 2100000000 HC CCU R&B

## 2020-05-20 PROCEDURE — 6360000002 HC RX W HCPCS: Performed by: INTERNAL MEDICINE

## 2020-05-20 PROCEDURE — 80053 COMPREHEN METABOLIC PANEL: CPT

## 2020-05-20 PROCEDURE — 2580000003 HC RX 258: Performed by: PHYSICIAN ASSISTANT

## 2020-05-20 RX ADMIN — SODIUM CHLORIDE, PRESERVATIVE FREE 10 ML: 5 INJECTION INTRAVENOUS at 08:16

## 2020-05-20 RX ADMIN — ACYCLOVIR SODIUM 800 MG: 50 INJECTION, SOLUTION INTRAVENOUS at 16:01

## 2020-05-20 RX ADMIN — DEXAMETHASONE SODIUM PHOSPHATE 4 MG: 4 INJECTION, SOLUTION INTRA-ARTICULAR; INTRALESIONAL; INTRAMUSCULAR; INTRAVENOUS; SOFT TISSUE at 08:10

## 2020-05-20 RX ADMIN — ASCORBIC ACID 1500 MG: 500 INJECTION INTRAVENOUS at 01:34

## 2020-05-20 RX ADMIN — THIAMINE HYDROCHLORIDE 200 MG: 100 INJECTION, SOLUTION INTRAMUSCULAR; INTRAVENOUS at 17:15

## 2020-05-20 RX ADMIN — ACETAMINOPHEN 650 MG: 325 TABLET ORAL at 21:50

## 2020-05-20 RX ADMIN — BUPRENORPHINE AND NALOXONE 1 FILM: 8; 2 FILM BUCCAL; SUBLINGUAL at 21:50

## 2020-05-20 RX ADMIN — VENLAFAXINE 100 MG: 50 TABLET ORAL at 08:09

## 2020-05-20 RX ADMIN — SODIUM CHLORIDE: 9 INJECTION, SOLUTION INTRAVENOUS at 22:34

## 2020-05-20 RX ADMIN — ASCORBIC ACID 1500 MG: 500 INJECTION INTRAVENOUS at 08:02

## 2020-05-20 RX ADMIN — DIPHENHYDRAMINE HCL 50 MG: 25 TABLET ORAL at 08:09

## 2020-05-20 RX ADMIN — PREGABALIN 200 MG: 75 CAPSULE ORAL at 08:09

## 2020-05-20 RX ADMIN — SODIUM CHLORIDE, PRESERVATIVE FREE 10 ML: 5 INJECTION INTRAVENOUS at 23:20

## 2020-05-20 RX ADMIN — HEPARIN SODIUM 5000 UNITS: 5000 INJECTION INTRAVENOUS; SUBCUTANEOUS at 23:15

## 2020-05-20 RX ADMIN — HYDROXYCHLOROQUINE SULFATE 200 MG: 200 TABLET ORAL at 18:42

## 2020-05-20 RX ADMIN — ASCORBIC ACID 1500 MG: 500 INJECTION INTRAVENOUS at 00:49

## 2020-05-20 RX ADMIN — MIRTAZAPINE 45 MG: 45 TABLET, FILM COATED ORAL at 23:15

## 2020-05-20 RX ADMIN — ACETAMINOPHEN 650 MG: 325 TABLET ORAL at 08:09

## 2020-05-20 RX ADMIN — AZITHROMYCIN DIHYDRATE 250 MG: 250 TABLET, FILM COATED ORAL at 08:10

## 2020-05-20 RX ADMIN — ASCORBIC ACID 1500 MG: 500 INJECTION INTRAVENOUS at 06:37

## 2020-05-20 RX ADMIN — BUPRENORPHINE AND NALOXONE 1 FILM: 8; 2 FILM BUCCAL; SUBLINGUAL at 08:10

## 2020-05-20 RX ADMIN — THIAMINE HYDROCHLORIDE 200 MG: 100 INJECTION, SOLUTION INTRAMUSCULAR; INTRAVENOUS at 08:16

## 2020-05-20 RX ADMIN — SODIUM CHLORIDE: 9 INJECTION, SOLUTION INTRAVENOUS at 01:06

## 2020-05-20 RX ADMIN — ASCORBIC ACID 1500 MG: 500 INJECTION INTRAVENOUS at 18:42

## 2020-05-20 RX ADMIN — HEPARIN SODIUM 5000 UNITS: 5000 INJECTION INTRAVENOUS; SUBCUTANEOUS at 06:36

## 2020-05-20 RX ADMIN — DIPHENHYDRAMINE HCL 50 MG: 25 TABLET ORAL at 14:35

## 2020-05-20 RX ADMIN — PREGABALIN 200 MG: 75 CAPSULE ORAL at 14:35

## 2020-05-20 RX ADMIN — VENLAFAXINE 100 MG: 50 TABLET ORAL at 14:34

## 2020-05-20 RX ADMIN — VENLAFAXINE 100 MG: 50 TABLET ORAL at 23:15

## 2020-05-20 RX ADMIN — HYDROXYCHLOROQUINE SULFATE 200 MG: 200 TABLET ORAL at 06:36

## 2020-05-20 RX ADMIN — SULFAMETHOXAZOLE AND TRIMETHOPRIM 1 TABLET: 800; 160 TABLET ORAL at 08:15

## 2020-05-20 RX ADMIN — ACETAMINOPHEN 650 MG: 325 TABLET ORAL at 14:38

## 2020-05-20 RX ADMIN — PREGABALIN 200 MG: 75 CAPSULE ORAL at 21:57

## 2020-05-20 RX ADMIN — DIPHENHYDRAMINE HCL 50 MG: 25 TABLET ORAL at 21:51

## 2020-05-20 RX ADMIN — HEPARIN SODIUM 5000 UNITS: 5000 INJECTION INTRAVENOUS; SUBCUTANEOUS at 14:35

## 2020-05-20 RX ADMIN — ASCORBIC ACID 1500 MG: 500 INJECTION INTRAVENOUS at 12:07

## 2020-05-20 RX ADMIN — ASCORBIC ACID 1500 MG: 500 INJECTION INTRAVENOUS at 00:04

## 2020-05-20 ASSESSMENT — PAIN DESCRIPTION - PROGRESSION

## 2020-05-20 ASSESSMENT — PAIN SCALES - GENERAL
PAINLEVEL_OUTOF10: 2
PAINLEVEL_OUTOF10: 0
PAINLEVEL_OUTOF10: 3
PAINLEVEL_OUTOF10: 8

## 2020-05-20 NOTE — PROGRESS NOTES
- Patient was lying in bed with blankets pulled over his head. This staff turned down the lights for him so we could discuss his care. - Patient pointed out he was in a lot of pain and want shaking constantly as we talked. When asked about his choices of ventilation and resuscitation he agreed to both. Stated even if things got worse, he would still make those choices. - Patient stated his emergency contacts as listed in his registration file. - Patient does not have an AD on file and stated no desire to complete at this time. This staff explained without the docs on file, his emergency contacts would have no say in his care. He stated understanding and still opted out of completing an AD.  - No further follow-ups will be made.     * See ACP Notes
Chart reviewed  Discussed with 4B RN on the phone  Labs reviewed  Creat improving nicely down to 1.9  He is non-oliguric  Bicarbonate improving  CK levels improving    1. SATYA: non-oliguric  - improving  - change IVF to isotonic saline  2. Met acidosis: resolved  3.  Elevated CK/Rhabdo: trend CK, improving    D/W 4B RN
I called [address], patient last filled Suboxone last 8/19/2019, 1 film 8-2 mg by his PCP. Unable to verify with Dr Paloma Mathis office that patient received 1 film BID. Dr Paloma Mathis notes does not say anything about giving patient samples of medication. OFERTALDIA PA updated. No new orders.
Patient remains stable with //69 while off Levophed drip. Patient transferred to [address] [place] [address] via wheelchair. Verbalized understanding of transfer.
Patient:  Starr Patton    Unit/Bed:4B-12/012-A  MRN: [id number]   PCP: PAPA Rolon CNP  Date of Admission: 5/18/2020    Assessment and Plan(All pulmonary edema, renal failure, PE, and respiratory failure diagnoses are acute in nature unless otherwise specified):        1. COVID-19: On heparin for hypercoagulable state. On hydroxychloroquine and Zithromax. On low-dose steroids, ascorbic acid, and thiamine. 2. Hypovolemic shock: Resolved with hydration. Now off pressors. 3. Acute renal failure: Negative to rhabdomyolysis. Undergoing volume resuscitation. 4. Rhabdomyolysis: Secondary to unconscious state with prolonged laying on the floor. Undergoing volume resuscitation. 5. Polysubstance abuse: On Suboxone. CC: [id number]. HPI: Patient is a 76-year-old white male with polysubstance abuse. He is currently on Suboxone for chronic opioid abuse. He has a history of hepatitis C, anxiety and depression. Patient was admitted through the emergency room on 5/18/2020. Patient relates that he had an exposure to COVID-19 while in senior living. He states that he has chest pain of 2 days duration and complains of feeling fatigued with poor appetite. Patient complained of recent IV fentanyl utilization with overdose. Patient stated that he slept for almost 2 days with accidental overdose and could not get up. In the emergency room he tested positive for COVID-19 and was found to have acute renal failure secondary to rhabdomyolysis. Patient underwent volume resuscitation with improvement in renal function. ROS: Patient complains of withdrawing from Suboxone. Requests more Suboxone. Complains of aching all over. Complains of poor appetite. PMH:  Per HPI  SHX: Polysubstance abuse including narcotics and tobacco.  FHX: No known tuberculosis exposure.   Allergies: No known drug allergies  Medications:     norepinephrine Stopped (05/18/20 9888)    sodium bicarbonate infusion 150 mL/hr at 05/19/20 5409
No known tuberculosis exposure. Allergies: No known drug allergies  Medications:     sodium chloride 150 mL/hr at 05/20/20 0106      acyclovir  800 mg Intravenous Q8H    buprenorphine-naloxone  1 Film Sublingual BID    pregabalin  200 mg Oral TID    mirtazapine  45 mg Oral Nightly    venlafaxine  100 mg Oral TID    sodium chloride flush  10 mL Intravenous 2 times per day    heparin (porcine)  5,000 Units Subcutaneous 3 times per day    hydroxychloroquine  200 mg Oral Q12H    azithromycin  250 mg Oral Daily    ascorbic acid  1,500 mg Intravenous Q6H    thiamine (VITAMIN B1) IVPB  200 mg Intravenous BID    dexamethasone  4 mg Intravenous Daily     Vital Signs:   T: 98.6: P: 95 RR: 16 B/P: 124/68: FiO2: RA: O2 Sat:96: I/O: 2282/1500 GCS: 15  Body mass index is 29.24 kg/m². Tita Prey General:   Chronically ill disheveled white male. HEENT:  normocephalic and atraumatic. No scleral icterus. PERR. Blistering rash on the right side of the face and dermatomal patterns consistent with zoster. Blisters have not ruptured. There is some swelling underneath the right eye. Neck: supple. No Thyromegaly. Lungs: clear to auscultation. No retractions  Cardiac: RRR. No JVD. Abdomen: soft. Nontender. Extremities:  No clubbing, cyanosis, or edema x 4. Vasculature: capillary refill < 3 seconds. Palpable dorsalis pedis pulses. Skin:  warm and dry. Psych:  Alert and oriented x3. Affect disgruntled. Lymph:  No supraclavicular adenopathy. Neurologic:  No focal deficit. No seizures.     Data: (All radiographs, tracings, PFTs, and imaging are personally viewed and interpreted unless otherwise noted). · Telemetry shows sinus rhythm. · Sodium 139, potassium 4, chloride 102, bicarb 27, BUN 19, creatinine 0.9, glucose 123. . White blood cell count 11, hemoglobin 10.7, platelets 392. .     Electronically signed by Leticia Ashby M.D.
integrity will stabilize  Outcome: Ongoing  Note: No new skin issues noted during this shift. Patient is able to reposition independently. Problem: Discharge Planning:  Goal: Patients continuum of care needs are met  Description: Patients continuum of care needs are met  Outcome: Ongoing  Note: Discharge planning in progress. Patient is planning to return home at discharge. Case management assisting with discharge needs.

## 2020-05-20 NOTE — PLAN OF CARE
of care needs are met  Description: Patients continuum of care needs are met  5/19/2020 2213 by Ghislaine Freedman RN  Outcome: Ongoing  5/19/2020 0916 by Mike Oliva RN  Outcome: Ongoing  Note: Discharge planning in progress. Patient is planning to return home at discharge. Case management assisting with discharge needs.

## 2020-05-21 VITALS
HEART RATE: 83 BPM | BODY MASS INDEX: 29.06 KG/M2 | RESPIRATION RATE: 18 BRPM | HEIGHT: 70 IN | WEIGHT: 203 LBS | OXYGEN SATURATION: 97 % | SYSTOLIC BLOOD PRESSURE: 136 MMHG | TEMPERATURE: 98 F | DIASTOLIC BLOOD PRESSURE: 80 MMHG

## 2020-05-21 LAB
ALBUMIN SERPL-MCNC: 2.9 G/DL (ref 3.5–5.1)
ALP BLD-CCNC: 49 U/L (ref 38–126)
ALT SERPL-CCNC: 43 U/L (ref 11–66)
ANION GAP SERPL CALCULATED.3IONS-SCNC: 9 MEQ/L (ref 8–16)
APTT: 23.9 SECONDS (ref 22–38)
AST SERPL-CCNC: 23 U/L (ref 5–40)
BASOPHILS # BLD: 0.1 %
BASOPHILS ABSOLUTE: 0 THOU/MM3 (ref 0–0.1)
BILIRUB SERPL-MCNC: < 0.2 MG/DL (ref 0.3–1.2)
BUN BLDV-MCNC: 14 MG/DL (ref 7–22)
CALCIUM SERPL-MCNC: 9.4 MG/DL (ref 8.5–10.5)
CHLORIDE BLD-SCNC: 106 MEQ/L (ref 98–111)
CO2: 25 MEQ/L (ref 23–33)
CREAT SERPL-MCNC: 0.8 MG/DL (ref 0.4–1.2)
EOSINOPHIL # BLD: 0.6 %
EOSINOPHILS ABSOLUTE: 0.1 THOU/MM3 (ref 0–0.4)
ERYTHROCYTE [DISTWIDTH] IN BLOOD BY AUTOMATED COUNT: 13.6 % (ref 11.5–14.5)
ERYTHROCYTE [DISTWIDTH] IN BLOOD BY AUTOMATED COUNT: 46.5 FL (ref 35–45)
FIBRINOGEN: 367 MG/100ML (ref 155–475)
GFR SERPL CREATININE-BSD FRML MDRD: > 90 ML/MIN/1.73M2
GLUCOSE BLD-MCNC: 120 MG/DL (ref 70–108)
HCT VFR BLD CALC: 36.1 % (ref 42–52)
HEMOGLOBIN: 11.4 GM/DL (ref 14–18)
IMMATURE GRANS (ABS): 0.11 THOU/MM3 (ref 0–0.07)
IMMATURE GRANULOCYTES: 0.9 %
INR BLD: 0.97 (ref 0.85–1.13)
LYMPHOCYTES # BLD: 19.3 %
LYMPHOCYTES ABSOLUTE: 2.3 THOU/MM3 (ref 1–4.8)
MCH RBC QN AUTO: 29.2 PG (ref 26–33)
MCHC RBC AUTO-ENTMCNC: 31.6 GM/DL (ref 32.2–35.5)
MCV RBC AUTO: 92.3 FL (ref 80–94)
MONOCYTES # BLD: 10.1 %
MONOCYTES ABSOLUTE: 1.2 THOU/MM3 (ref 0.4–1.3)
MRSA SCREEN: ABNORMAL
NUCLEATED RED BLOOD CELLS: 0 /100 WBC
ORGANISM: ABNORMAL
PLATELET # BLD: 250 THOU/MM3 (ref 130–400)
PMV BLD AUTO: 9.9 FL (ref 9.4–12.4)
POTASSIUM REFLEX MAGNESIUM: 4.1 MEQ/L (ref 3.5–5.2)
RBC # BLD: 3.91 MILL/MM3 (ref 4.7–6.1)
SEG NEUTROPHILS: 69 %
SEGMENTED NEUTROPHILS ABSOLUTE COUNT: 8.1 THOU/MM3 (ref 1.8–7.7)
SODIUM BLD-SCNC: 140 MEQ/L (ref 135–145)
TOTAL PROTEIN: 5.8 G/DL (ref 6.1–8)
WBC # BLD: 11.8 THOU/MM3 (ref 4.8–10.8)

## 2020-05-21 PROCEDURE — 6370000000 HC RX 637 (ALT 250 FOR IP): Performed by: PHYSICIAN ASSISTANT

## 2020-05-21 PROCEDURE — 85025 COMPLETE CBC W/AUTO DIFF WBC: CPT

## 2020-05-21 PROCEDURE — 2580000003 HC RX 258: Performed by: PHYSICIAN ASSISTANT

## 2020-05-21 PROCEDURE — 85730 THROMBOPLASTIN TIME PARTIAL: CPT

## 2020-05-21 PROCEDURE — 85610 PROTHROMBIN TIME: CPT

## 2020-05-21 PROCEDURE — 2500000003 HC RX 250 WO HCPCS: Performed by: PHYSICIAN ASSISTANT

## 2020-05-21 PROCEDURE — 85385 FIBRINOGEN ANTIGEN: CPT

## 2020-05-21 PROCEDURE — 2580000003 HC RX 258: Performed by: INTERNAL MEDICINE

## 2020-05-21 PROCEDURE — 80053 COMPREHEN METABOLIC PANEL: CPT

## 2020-05-21 PROCEDURE — 6370000000 HC RX 637 (ALT 250 FOR IP): Performed by: NURSE PRACTITIONER

## 2020-05-21 PROCEDURE — 6360000002 HC RX W HCPCS: Performed by: PHYSICIAN ASSISTANT

## 2020-05-21 PROCEDURE — 99233 SBSQ HOSP IP/OBS HIGH 50: CPT | Performed by: INTERNAL MEDICINE

## 2020-05-21 PROCEDURE — 6370000000 HC RX 637 (ALT 250 FOR IP): Performed by: INTERNAL MEDICINE

## 2020-05-21 PROCEDURE — 6360000002 HC RX W HCPCS: Performed by: INTERNAL MEDICINE

## 2020-05-21 PROCEDURE — 94760 N-INVAS EAR/PLS OXIMETRY 1: CPT

## 2020-05-21 PROCEDURE — 36415 COLL VENOUS BLD VENIPUNCTURE: CPT

## 2020-05-21 RX ORDER — VALACYCLOVIR HYDROCHLORIDE 1 G/1
2000 TABLET, FILM COATED ORAL 2 TIMES DAILY
Qty: 20 TABLET | Refills: 0 | Status: SHIPPED | OUTPATIENT
Start: 2020-05-21 | End: 2020-05-31

## 2020-05-21 RX ORDER — BUPRENORPHINE AND NALOXONE 8; 2 MG/1; MG/1
1 FILM, SOLUBLE BUCCAL; SUBLINGUAL 2 TIMES DAILY
Qty: 12 FILM | Refills: 0 | OUTPATIENT
Start: 2020-05-21 | End: 2020-05-27

## 2020-05-21 RX ORDER — HYDROXYCHLOROQUINE SULFATE 200 MG/1
200 TABLET, FILM COATED ORAL EVERY 12 HOURS
Qty: 10 TABLET | Refills: 0 | Status: SHIPPED | OUTPATIENT
Start: 2020-05-21 | End: 2020-05-26

## 2020-05-21 RX ADMIN — SODIUM CHLORIDE, PRESERVATIVE FREE 10 ML: 5 INJECTION INTRAVENOUS at 09:29

## 2020-05-21 RX ADMIN — ACYCLOVIR SODIUM 800 MG: 50 INJECTION, SOLUTION INTRAVENOUS at 01:41

## 2020-05-21 RX ADMIN — PREGABALIN 200 MG: 75 CAPSULE ORAL at 09:28

## 2020-05-21 RX ADMIN — BUPRENORPHINE AND NALOXONE 1 FILM: 8; 2 FILM BUCCAL; SUBLINGUAL at 09:28

## 2020-05-21 RX ADMIN — DEXAMETHASONE SODIUM PHOSPHATE 4 MG: 4 INJECTION, SOLUTION INTRA-ARTICULAR; INTRALESIONAL; INTRAMUSCULAR; INTRAVENOUS; SOFT TISSUE at 09:29

## 2020-05-21 RX ADMIN — HEPARIN SODIUM 5000 UNITS: 5000 INJECTION INTRAVENOUS; SUBCUTANEOUS at 05:57

## 2020-05-21 RX ADMIN — VENLAFAXINE 100 MG: 50 TABLET ORAL at 14:54

## 2020-05-21 RX ADMIN — ACYCLOVIR SODIUM 800 MG: 50 INJECTION, SOLUTION INTRAVENOUS at 07:21

## 2020-05-21 RX ADMIN — THIAMINE HYDROCHLORIDE 200 MG: 100 INJECTION, SOLUTION INTRAMUSCULAR; INTRAVENOUS at 09:29

## 2020-05-21 RX ADMIN — VENLAFAXINE 100 MG: 50 TABLET ORAL at 09:28

## 2020-05-21 RX ADMIN — AZITHROMYCIN DIHYDRATE 250 MG: 250 TABLET, FILM COATED ORAL at 09:28

## 2020-05-21 RX ADMIN — ASCORBIC ACID 1500 MG: 500 INJECTION INTRAVENOUS at 00:36

## 2020-05-21 RX ADMIN — SODIUM CHLORIDE: 9 INJECTION, SOLUTION INTRAVENOUS at 06:03

## 2020-05-21 RX ADMIN — HYDROXYCHLOROQUINE SULFATE 200 MG: 200 TABLET ORAL at 05:57

## 2020-05-21 RX ADMIN — PREGABALIN 200 MG: 75 CAPSULE ORAL at 14:59

## 2020-05-21 RX ADMIN — ASCORBIC ACID 1500 MG: 500 INJECTION INTRAVENOUS at 06:03

## 2020-05-21 ASSESSMENT — ENCOUNTER SYMPTOMS
COUGH: 0
ANAL BLEEDING: 1
SHORTNESS OF BREATH: 1

## 2020-05-21 ASSESSMENT — PAIN SCALES - GENERAL
PAINLEVEL_OUTOF10: 0

## 2020-05-21 NOTE — DISCHARGE SUMMARY
patchy infiltrates. .  Patient discharged in stable and improved condition. Electronically signed by Jailene Foy M.D.

## 2020-05-22 ENCOUNTER — CARE COORDINATION (OUTPATIENT)
Dept: CASE MANAGEMENT | Age: 35
End: 2020-05-22

## 2020-05-22 NOTE — CARE COORDINATION
([phone])\",\"[facility]  ([phone])\",\"[facility]: ([phone])\",\"[facility]  ([phone]\",\"OOQSUYD [facility] [phone]\",\"[facility] [phone]\"} and PCP office for questions related to their healthcare. CTN/ACM provided contact information for future needs. Reviewed and educated {Blank Single Select Template:54578::\"patient\",\"family\",\"caregiver\",\"parent\"} on any new and changed medications related to discharge diagnosis     Patient/family/caregiver given information for [employer] and agrees to enroll {Blank Single Select Template:34131::\"yes\",\"no\"}  Patient's preferred e-mail: ***   Patient's preferred phone number: ***  Based on Loop alert triggers, patient will be contacted by nurse care manager for worsening symptoms. {Blank Single Select Template:10378::\"Plan for follow-up call in 1-2 days\",\"Plan for follow-up call in 3-5 days\",\"Plan for follow-up call in 5-7 days\",\"Pt will be further monitored by COVID Loop Team\"} based on severity of symptoms and risk factors. Advance Care Planning  People with COVID-19 may have no symptoms, mild symptoms, such as fever, cough, and shortness of breath or they may have more severe illness, developing severe and fatal pneumonia. As a result, Advance Care Planning with attention to naming a health care decision maker (someone you trust to make healthcare decisions for you if you could not speak for yourself) and sharing other health care preferences is important BEFORE a possible health crisis. Please contact your Primary Care Provider to discuss Advance Care Planning.     Preventing the Spread of Coronavirus Disease 2019 in Homes and Residential Communities  For the most recent information go to madvertiseCleaners.fi    Prevention steps for People with confirmed or suspected COVID-19 (including persons under healthcare provider and tell them that you have or may have COVID-19. This will help the healthcare providers office take steps to keep other people from getting infected or exposed. Wear a facemask  You should wear a facemask when you are around other people (e.g., sharing a room or vehicle) or pets and before you enter a healthcare providers office. If you are not able to wear a facemask (for example, because it causes trouble breathing), then people who live with you should not stay in the same room with you, or they should wear a facemask if they enter your room. Cover your coughs and sneezes  Cover your mouth and nose with a tissue when you cough or sneeze. Throw used tissues in a lined trash can. Immediately wash your hands with soap and water for at least 20 seconds or, if soap and water are not available, clean your hands with an alcohol-based hand  that contains at least 60% alcohol. Clean your hands often  Wash your hands often with soap and water for at least 20 seconds, especially after blowing your nose, coughing, or sneezing; going to the bathroom; and before eating or preparing food. If soap and water are not readily available, use an alcohol-based hand  with at least 60% alcohol, covering all surfaces of your hands and rubbing them together until they feel dry. Soap and water are the best option if hands are visibly dirty. Avoid touching your eyes, nose, and mouth with unwashed hands. Avoid sharing personal household items  You should not share dishes, drinking glasses, cups, eating utensils, towels, or bedding with other people or pets in your home. After using these items, they should be washed thoroughly with soap and water. Clean all high-touch surfaces everyday  High touch surfaces include counters, tabletops, doorknobs, bathroom fixtures, toilets, phones, keyboards, tablets, and bedside tables.  Also, clean any surfaces that may have blood, stool, or body fluids on them. Use a household cleaning spray or wipe, according to the label instructions. Labels contain instructions for safe and effective use of the cleaning product including precautions you should take when applying the product, such as wearing gloves and making sure you have good ventilation during use of the product. Monitor your symptoms  Seek prompt medical attention if your illness is worsening (e.g., difficulty breathing). Before seeking care, call your healthcare provider and tell them that you have, or are being evaluated for, COVID-19. Put on a facemask before you enter the facility. These steps will help the healthcare providers office to keep other people in the office or waiting room from getting infected or exposed. Ask your healthcare provider to call the local or state health department. Persons who are placed under active monitoring or facilitated self-monitoring should follow instructions provided by their local health department or occupational health professionals, as appropriate. When working with your local health department check their available hours. If you have a medical emergency and need to call 911, notify the dispatch personnel that you have, or are being evaluated for COVID-19. If possible, put on a facemask before emergency medical services arrive. Discontinuing home isolation  Patients with confirmed COVID-19 should remain under home isolation precautions until the risk of secondary transmission to others is thought to be low. The decision to discontinue home isolation precautions should be made on a case-by-case basis, in consultation with healthcare providers and state and local health departments.

## 2020-05-22 NOTE — CARE COORDINATION
5/22/20, 7:26 AM EDT    Patient goals/plan/ treatment preferences discussed by  and . Patient goals/plan/ treatment preferences reviewed with patient/ family. Patient/ family verbalize understanding of discharge plan and are in agreement with goal/plan/treatment preferences. Understanding was demonstrated using the teach back method. AVS provided by RN at time of discharge, which includes all necessary medical information pertaining to the patients current course of illness, treatment, post-discharge goals of care, and treatment preferences. Pt discharged to home. Denies needs or services.

## 2020-05-22 NOTE — CARE COORDINATION
enroll yes  Patient's preferred e-mail: [email or web address]  Patient's preferred phone number:[phone]   Based on Loop alert triggers, patient will be contacted by nurse care manager for worsening symptoms. Pt will be further monitored by COVID Loop Team based on severity of symptoms and risk factors.     [address] [place] [address] Transition Nurse  [phone]

## 2020-05-24 LAB
BLOOD CULTURE, ROUTINE: NORMAL
BLOOD CULTURE, ROUTINE: NORMAL

## 2020-05-26 ENCOUNTER — VIRTUAL VISIT (OUTPATIENT)
Dept: INTERNAL MEDICINE CLINIC | Age: 35
End: 2020-05-26
Payer: MEDICARE

## 2020-05-26 PROCEDURE — G8428 CUR MEDS NOT DOCUMENT: HCPCS | Performed by: NURSE PRACTITIONER

## 2020-05-26 PROCEDURE — 99214 OFFICE O/P EST MOD 30 MIN: CPT | Performed by: NURSE PRACTITIONER

## 2020-05-26 PROCEDURE — 1111F DSCHRG MED/CURRENT MED MERGE: CPT | Performed by: NURSE PRACTITIONER

## 2020-05-26 RX ORDER — VENLAFAXINE 100 MG/1
100 TABLET ORAL 3 TIMES DAILY
Qty: 90 TABLET | Refills: 0 | Status: SHIPPED | OUTPATIENT
Start: 2020-05-26 | End: 2020-07-24 | Stop reason: SDUPTHER

## 2020-05-27 ENCOUNTER — VIRTUAL VISIT (OUTPATIENT)
Dept: INTERNAL MEDICINE CLINIC | Age: 35
End: 2020-05-27
Payer: MEDICARE

## 2020-05-27 PROCEDURE — 99213 OFFICE O/P EST LOW 20 MIN: CPT | Performed by: INTERNAL MEDICINE

## 2020-05-27 RX ORDER — BUPRENORPHINE AND NALOXONE 8; 2 MG/1; MG/1
1 FILM, SOLUBLE BUCCAL; SUBLINGUAL 2 TIMES DAILY
Qty: 12 FILM | Refills: 0 | Status: CANCELLED | OUTPATIENT
Start: 2020-05-27 | End: 2020-06-02

## 2020-05-27 RX ORDER — BUPRENORPHINE HYDROCHLORIDE AND NALOXONE HYDROCHLORIDE DIHYDRATE 8; 2 MG/1; MG/1
1 TABLET SUBLINGUAL 2 TIMES DAILY
COMMUNITY
End: 2020-05-27 | Stop reason: SDUPTHER

## 2020-05-27 RX ORDER — BUPRENORPHINE HYDROCHLORIDE AND NALOXONE HYDROCHLORIDE DIHYDRATE 8; 2 MG/1; MG/1
1 TABLET SUBLINGUAL 2 TIMES DAILY
Qty: 12 TABLET | Refills: 0 | Status: SHIPPED | OUTPATIENT
Start: 2020-05-27 | End: 2020-06-01 | Stop reason: SDUPTHER

## 2020-05-30 NOTE — PROGRESS NOTES
2020    TELEHEALTH EVALUATION -- Audio/Visual (During [facility] public health emergency)    HPI:  A video conference was done with the patient  Patient was at home, I was in the office  There was no  one else  present during the conference    Elizabeth Martinez (:  1985) has requested an audio/video evaluation for the following concern(s):    I last saw him here   He saw Endy Chaney  via a video visit  He was recently admitted with acute kidney injury, rhabdomyolysis, dehydration  He was found to be COVID positive  We did not want him coming into the office today  He says he is  serious about staying clean    Prior to Visit Medications    Medication Sig Taking? Authorizing Provider   buprenorphine-naloxone (SUBOXONE) 8-2 MG SUBL SL tablet Place 1 tablet under the tongue 2 times daily for 6 days.  Yes Deny Bal MD   venlafaxine (EFFEXOR) 100 MG tablet Take 1 tablet by mouth 3 times daily  PAPA Galvan CNP   LYRICA 200 MG capsule Take one capsule 3 times daily  PAPA Galvan CNP   valACYclovir (VALTREX) 1 g tablet Take 2 tablets by mouth 2 times daily for 10 days  Lasha El MD   lisinopril (PRINIVIL;ZESTRIL) 20 MG tablet Take 1 tablet by mouth daily  PAPA Galvan CNP   mirtazapine (REMERON) 45 MG tablet Take 1 tablet by mouth nightly  PAPA Galvan CNP   ibuprofen (ADVIL;MOTRIN) 800 MG tablet Take 1 tablet by mouth 2 times daily as needed for Pain  PAPA Galvan CNP       Social History     Tobacco Use    Smoking status: Former Smoker     Packs/day: 0.50     Types: Cigarettes    Smokeless tobacco: Never Used   Substance Use Topics    Alcohol use: Yes     Comment: occasional    Drug use: Not Currently     Types: Opiates , IV, Cocaine, Marijuana, Methamphetamines     Comment: IV Fentanyl 5/15/20 OD, hx meth, heroine, pills          ROS  Patient is feeling well  Patient is not experiencing  withdrawal symptoms ,no urges advised to contact this office for worsening conditions or problems, and seek emergency medical treatment and/or call 911 if deemed necessary. Services were provided through a video synchronous discussion virtually to substitute for in-person clinic visit. I reviewed the [place] Automated Rx Reporting System report     There does not appear to be any discrepancies or overprescribing of controlled substances  Repeat COVID testing  Follow up 6 days with a video visit  --Nicholas Obregon MD on 5/30/2020 at 1:30 PM    An electronic signature was used to authenticate this note.

## 2020-06-01 ENCOUNTER — TELEPHONE (OUTPATIENT)
Dept: INTERNAL MEDICINE CLINIC | Age: 35
End: 2020-06-01

## 2020-06-01 ENCOUNTER — HOSPITAL ENCOUNTER (OUTPATIENT)
Age: 35
Discharge: HOME | End: 2020-06-01
Payer: MEDICARE

## 2020-06-01 ENCOUNTER — VIRTUAL VISIT (OUTPATIENT)
Dept: INTERNAL MEDICINE CLINIC | Age: 35
End: 2020-06-01
Payer: MEDICARE

## 2020-06-01 PROCEDURE — U0002 COVID-19 LAB TEST NON-CDC: HCPCS

## 2020-06-01 PROCEDURE — 1111F DSCHRG MED/CURRENT MED MERGE: CPT | Performed by: INTERNAL MEDICINE

## 2020-06-01 PROCEDURE — 1036F TOBACCO NON-USER: CPT | Performed by: INTERNAL MEDICINE

## 2020-06-01 PROCEDURE — 99213 OFFICE O/P EST LOW 20 MIN: CPT | Performed by: INTERNAL MEDICINE

## 2020-06-01 PROCEDURE — G8419 CALC BMI OUT NRM PARAM NOF/U: HCPCS | Performed by: INTERNAL MEDICINE

## 2020-06-01 PROCEDURE — G8428 CUR MEDS NOT DOCUMENT: HCPCS | Performed by: INTERNAL MEDICINE

## 2020-06-01 RX ORDER — BUPRENORPHINE HYDROCHLORIDE AND NALOXONE HYDROCHLORIDE DIHYDRATE 8; 2 MG/1; MG/1
1 TABLET SUBLINGUAL 2 TIMES DAILY
Qty: 8 TABLET | Refills: 0 | Status: SHIPPED | OUTPATIENT
Start: 2020-06-01 | End: 2020-06-05

## 2020-06-01 NOTE — TELEPHONE ENCOUNTER
Per Dr. Zac Figueroa, RN called pt and left a message to let him know that he is not going to do a visit with him and will not give medications until he and Shi get their covid-19 tests completed.

## 2020-06-02 LAB
PERFORMING LAB: NORMAL
REPORT: NORMAL
SARS-COV-2: NOT DETECTED

## 2020-06-06 NOTE — PROGRESS NOTES
Types: Opiates , IV, Cocaine, Marijuana, Methamphetamines     Comment: IV Fentanyl 5/15/20 OD, hx meth, heroine, pills          ROS  Patient is feeling well  Patient is not experiencing  withdrawal symptoms ,no urges or cravings  Patient is not having any side effects from the buprenorphine    PHYSICAL EXAMINATION:  [ INSTRUCTIONS:  \"[x]\" Indicates a positive item  \"[]\" Indicates a negative item  -- DELETE ALL ITEMS NOT EXAMINED]  No vitals done    Constitutional: [x] Appears well-developed and well-nourished [] No apparent distress      [] Abnormal-   Mental status  [x] Alert and awake  [] Oriented to person/place/time []Able to follow commands      Eyes:  EOM    [x]  Normal  [] Abnormal-  Sclera  []  Normal  [] Abnormal -         Discharge []  None visible  [] Abnormal -  Pupils normal  HENT:   [x] Normocephalic, atraumatic. [] Abnormal   [] Mouth/Throat: Mucous membranes are moist.     Psychiatric:       [x] Normal Affect [] No Hallucinations        [] Abnormal-     -      Diagnosis Orders   1. Severe opioid use disorder (HCC)  buprenorphine-naloxone (SUBOXONE) 8-2 MG SUBL SL tablet   2. Fibromyalgia     3. COVID-19     4. Encounter for monitoring Suboxone maintenance therapy     5. Polysubstance abuse ([facility] [address]     6. Hepatitis C antibody positive in blood     7. [facility]         Diane Peoples is a 38 y.o. male being evaluated by a Virtual Visit (video visit) encounter to address concerns as mentioned above. A caregiver was present when appropriate. Due to this being a TeleHealth encounter (During [place] [address] public health emergency), evaluation of the following organ systems was limited: Vitals/Constitutional/EENT/Resp/CV/GI//MS/Neuro/Skin/Heme-Lymph-Imm.   Pursuant to the emergency declaration under the [facility] waiver authority and the [employer] Act, this Virtual Visit was conducted with patient's

## 2020-06-11 ASSESSMENT — ENCOUNTER SYMPTOMS
ABDOMINAL PAIN: 0
CONSTIPATION: 0
DIARRHEA: 0
SINUS PAIN: 0
PHOTOPHOBIA: 0
COUGH: 0
RHINORRHEA: 0
SINUS PRESSURE: 0
VOMITING: 0
SORE THROAT: 0
WHEEZING: 0
ABDOMINAL DISTENTION: 0
BLOOD IN STOOL: 0
TROUBLE SWALLOWING: 0
SHORTNESS OF BREATH: 0
NAUSEA: 0

## 2020-06-23 ENCOUNTER — OFFICE VISIT (OUTPATIENT)
Dept: INTERNAL MEDICINE CLINIC | Age: 35
End: 2020-06-23
Payer: MEDICARE

## 2020-06-23 VITALS
TEMPERATURE: 97.3 F | WEIGHT: 186 LBS | HEART RATE: 88 BPM | HEIGHT: 70 IN | BODY MASS INDEX: 26.63 KG/M2 | DIASTOLIC BLOOD PRESSURE: 88 MMHG | SYSTOLIC BLOOD PRESSURE: 131 MMHG

## 2020-06-23 PROCEDURE — G8427 DOCREV CUR MEDS BY ELIG CLIN: HCPCS | Performed by: NURSE PRACTITIONER

## 2020-06-23 PROCEDURE — 1036F TOBACCO NON-USER: CPT | Performed by: NURSE PRACTITIONER

## 2020-06-23 PROCEDURE — 99213 OFFICE O/P EST LOW 20 MIN: CPT | Performed by: NURSE PRACTITIONER

## 2020-06-23 PROCEDURE — 80305 DRUG TEST PRSMV DIR OPT OBS: CPT | Performed by: NURSE PRACTITIONER

## 2020-06-23 PROCEDURE — G8419 CALC BMI OUT NRM PARAM NOF/U: HCPCS | Performed by: NURSE PRACTITIONER

## 2020-06-23 RX ORDER — BUPRENORPHINE AND NALOXONE 8; 2 MG/1; MG/1
1 FILM, SOLUBLE BUCCAL; SUBLINGUAL ONCE
Status: COMPLETED | OUTPATIENT
Start: 2020-06-23 | End: 2020-06-23

## 2020-06-23 RX ORDER — BUPRENORPHINE AND NALOXONE 8; 2 MG/1; MG/1
1 FILM, SOLUBLE BUCCAL; SUBLINGUAL 2 TIMES DAILY
Qty: 6 FILM | Refills: 0 | Status: SHIPPED | OUTPATIENT
Start: 2020-06-26 | End: 2020-06-26 | Stop reason: SDUPTHER

## 2020-06-23 RX ORDER — BUPRENORPHINE HYDROCHLORIDE AND NALOXONE HYDROCHLORIDE DIHYDRATE 8; 2 MG/1; MG/1
1 TABLET SUBLINGUAL 2 TIMES DAILY
COMMUNITY
End: 2020-06-23

## 2020-06-23 RX ORDER — BUPRENORPHINE HYDROCHLORIDE AND NALOXONE HYDROCHLORIDE DIHYDRATE 8; 2 MG/1; MG/1
1 TABLET SUBLINGUAL 2 TIMES DAILY
Qty: 2 TABLET | Refills: 0 | Status: CANCELLED | OUTPATIENT
Start: 2020-06-23 | End: 2020-06-24

## 2020-06-23 RX ADMIN — BUPRENORPHINE AND NALOXONE 1 FILM: 8; 2 FILM, SOLUBLE BUCCAL; SUBLINGUAL at 11:43

## 2020-06-23 NOTE — PROGRESS NOTES
2020     Rivas Stockton (:  1985) is a 38 y.o. male, here for evaluation of the following medical concerns: Severe Opioid Use Disorder    MAT patient of Dr. Clifton Harper. Last seen him 22 days ago via video visit. Missed last appointment. States that he has been using since that time, however has not used in 2 days because he has no money. He is in active withdrawal. Complains of a runny nose, diarrhea, and nausea/vomiting     Urine positive for opiates, THC, and fentanyl     Urges and cravings previously controlled with Suboxone 8 mg BID    Review of Systems   Constitutional: Negative for chills, fatigue and fever. HENT: Positive for rhinorrhea. Negative for congestion, sinus pressure, sinus pain, sore throat, tinnitus and trouble swallowing. Eyes: Negative for photophobia and visual disturbance. Respiratory: Negative for cough, shortness of breath and wheezing. Cardiovascular: Negative for chest pain, palpitations and leg swelling. Gastrointestinal: Positive for diarrhea, nausea and vomiting. Negative for abdominal distention, abdominal pain, blood in stool and constipation. Endocrine: Negative for polydipsia, polyphagia and polyuria. Genitourinary: Negative for difficulty urinating, dysuria, frequency, hematuria and urgency. Musculoskeletal: Positive for myalgias (generalized- fibromyalgia). Negative for arthralgias. Skin: Negative for rash and wound. Neurological: Negative for dizziness, light-headedness and headaches. Psychiatric/Behavioral: Negative for dysphoric mood and sleep disturbance. The patient is not nervous/anxious. Prior to Visit Medications    Medication Sig Taking?  Authorizing Provider   venlafaxine (EFFEXOR) 100 MG tablet Take 1 tablet by mouth 3 times daily Yes Pamula Ashleyher, APRN - CNP   lisinopril (PRINIVIL;ZESTRIL) 20 MG tablet Take 1 tablet by mouth daily Yes Pamula Cypher, APRN - CNP   mirtazapine (REMERON) 45 MG tablet Take 1 tablet by mouth nightly Yes Carolann Landau, APRN - CNP   buprenorphine-naloxone (SUBOXONE) 8-2 MG FILM SL film Place 1 Film under the tongue 2 times daily for 5 days. Carolann Landau, APRN - CNP   pregabalin (LYRICA) 300 MG capsule Take 1 capsule by mouth 2 times daily for 30 days. Carolann Landau, APRN - CNP   venlafaxine (EFFEXOR XR) 150 MG extended release capsule Take 1 capsule by mouth 2 times daily  Carolann Landau, APRN - CNP   ibuprofen (ADVIL;MOTRIN) 800 MG tablet Take 1 tablet by mouth 2 times daily as needed for Pain  Carolann Landau, APRN - CNP        Social History     Tobacco Use    Smoking status: Former Smoker     Packs/day: 0.50     Types: Cigarettes    Smokeless tobacco: Never Used   Substance Use Topics    Alcohol use: Yes     Comment: occasional        Vitals:    06/23/20 1021 06/23/20 1022   BP: (!) 127/92 131/88   Site: Left Wrist Left Wrist   Position: Sitting Sitting   Cuff Size: Medium Adult Medium Adult   Pulse: 98 88   Temp: 97.3 °F (36.3 °C)    Weight: 186 lb (84.4 kg)    Height: 5' 10\" (1.778 m)      Estimated body mass index is 26.69 kg/m² as calculated from the following:    Height as of this encounter: 5' 10\" (1.778 m). Weight as of this encounter: 186 lb (84.4 kg). Physical Exam  Constitutional:       Appearance: He is ill-appearing. Eyes:      Extraocular Movements: Extraocular movements intact. Conjunctiva/sclera: Conjunctivae normal.      Pupils: Pupils are equal, round, and reactive to light. Neck:      Musculoskeletal: Normal range of motion and neck supple. Cardiovascular:      Rate and Rhythm: Normal rate and regular rhythm. Pulses: Normal pulses. Heart sounds: Normal heart sounds. No murmur. No friction rub. No gallop. Pulmonary:      Effort: Pulmonary effort is normal. No respiratory distress. Breath sounds: Normal breath sounds. No wheezing, rhonchi or rales.    Abdominal:      General: Bowel sounds are normal. Tenderness: There is no abdominal tenderness. Musculoskeletal:      Right lower leg: No edema. Left lower leg: No edema. Skin:     General: Skin is warm and dry. Capillary Refill: Capillary refill takes less than 2 seconds. Coloration: Skin is pale. Neurological:      Mental Status: He is alert and oriented to person, place, and time. Motor: No weakness. Coordination: Coordination normal.      Gait: Gait normal.   Psychiatric:         Mood and Affect: Mood is anxious. Affect is tearful. ASSESSMENT/PLAN:    1. Severe opioid use disorder (HCC)    - POCT Rapid Drug Screen  - buprenorphine-naloxone (SUBOXONE) 8-2 MG SUBL SL tablet; Place 1 tablet under the tongue 2 times daily for 1 day. Dispense: 2 tablet; Refill: 0  - 8 mg Suboxone given in office, tolerated well  - Narcan at home  - Discussed potential risks associated with suboxone and illicit drug use including precipitated withdrawal and/or overdose. Understanding voiced,  - OARRS reviewed- no discrepancies  - Attend NA/AA meetings and/or arrange for individualized counseling      Return in about 3 days (around 6/26/2020). An electronic signature was used to authenticate this note.     --Lalo Morales, PAPA - CNP on 7/6/2020 at 9:14 PM

## 2020-06-23 NOTE — PROGRESS NOTES
Verbal order per Emmie Crowley CNP for urine drug screen. Positive for MOP THC Fentanyl. Verified results with Lianna PINEDA. CNP ordered Suboxone 8mg film qty 1 out of stock. Patient sent home with 3 day script of Suboxone 8mg film BID. Patient back in office 6/26/20.

## 2020-06-26 ENCOUNTER — OFFICE VISIT (OUTPATIENT)
Dept: INTERNAL MEDICINE CLINIC | Age: 35
End: 2020-06-26
Payer: MEDICARE

## 2020-06-26 VITALS
BODY MASS INDEX: 27.49 KG/M2 | HEART RATE: 106 BPM | DIASTOLIC BLOOD PRESSURE: 84 MMHG | HEIGHT: 70 IN | SYSTOLIC BLOOD PRESSURE: 135 MMHG | WEIGHT: 192 LBS | TEMPERATURE: 97.2 F

## 2020-06-26 PROCEDURE — G8419 CALC BMI OUT NRM PARAM NOF/U: HCPCS | Performed by: NURSE PRACTITIONER

## 2020-06-26 PROCEDURE — G8427 DOCREV CUR MEDS BY ELIG CLIN: HCPCS | Performed by: NURSE PRACTITIONER

## 2020-06-26 PROCEDURE — 1036F TOBACCO NON-USER: CPT | Performed by: NURSE PRACTITIONER

## 2020-06-26 PROCEDURE — 99214 OFFICE O/P EST MOD 30 MIN: CPT | Performed by: NURSE PRACTITIONER

## 2020-06-26 PROCEDURE — 80305 DRUG TEST PRSMV DIR OPT OBS: CPT | Performed by: NURSE PRACTITIONER

## 2020-06-26 RX ORDER — VENLAFAXINE HYDROCHLORIDE 150 MG/1
150 CAPSULE, EXTENDED RELEASE ORAL 2 TIMES DAILY
Qty: 60 CAPSULE | Refills: 0 | Status: SHIPPED | OUTPATIENT
Start: 2020-06-26 | End: 2020-07-24

## 2020-06-26 RX ORDER — PREGABALIN 300 MG/1
300 CAPSULE ORAL 2 TIMES DAILY
Qty: 60 CAPSULE | Refills: 0 | Status: SHIPPED | OUTPATIENT
Start: 2020-06-26 | End: 2020-07-27 | Stop reason: SDUPTHER

## 2020-06-26 RX ORDER — BUPRENORPHINE AND NALOXONE 8; 2 MG/1; MG/1
1 FILM, SOLUBLE BUCCAL; SUBLINGUAL 2 TIMES DAILY
Qty: 6 FILM | Refills: 0 | Status: SHIPPED | OUTPATIENT
Start: 2020-06-26 | End: 2020-06-29

## 2020-06-26 NOTE — PROGRESS NOTES
2020     Chi Padilla (:  1985) is a 38 y.o. male, here for evaluation of the following medical concerns: Severe Opioid Use Disorder    I last seen Gladis Miranda 3 days ago. He is a routine MAT patient of Dr. Myra Arteaga. I follow him routinely for primary care. Got a job at [employer]- states that he is a going to be working 5 days a week, from 7a-4p starting Monday. Urine positive for buprenorphine, opiates, and fentanyl    Last use Monday night reportedly. Review of Systems   Constitutional: Negative for chills, fatigue and fever. HENT: Negative for congestion, rhinorrhea, sinus pressure, sinus pain, sore throat, tinnitus and trouble swallowing. Eyes: Negative for photophobia and visual disturbance. Respiratory: Negative for cough, shortness of breath and wheezing. Cardiovascular: Negative for chest pain, palpitations and leg swelling. Gastrointestinal: Negative for abdominal distention, abdominal pain, blood in stool, constipation, diarrhea, nausea and vomiting. Endocrine: Negative for polydipsia, polyphagia and polyuria. Genitourinary: Negative for difficulty urinating, dysuria, frequency, hematuria and urgency. Musculoskeletal: Positive for myalgias (generalized- fibromyalgia). Negative for arthralgias. Skin: Negative for rash and wound. Neurological: Negative for dizziness, light-headedness and headaches. Psychiatric/Behavioral: Negative for dysphoric mood and sleep disturbance. The patient is not nervous/anxious. Prior to Visit Medications    Medication Sig Taking? Authorizing Provider   pregabalin (LYRICA) 300 MG capsule Take 1 capsule by mouth 2 times daily for 30 days.  Yes Igor Brandt, APRN - CNP   venlafaxine (EFFEXOR XR) 150 MG extended release capsule Take 1 capsule by mouth 2 times daily Yes Igor Brandt, APRN - CNP   venlafaxine (EFFEXOR) 100 MG tablet Take 1 tablet by mouth 3 times daily Yes Igor Brandt, APRN - NAI   lisinopril (PRINIVIL;ZESTRIL) 20 MG tablet Take 1 tablet by mouth daily Yes Hien July, APRN - CNP   mirtazapine (REMERON) 45 MG tablet Take 1 tablet by mouth nightly Yes Hien July, APRN - CNP   buprenorphine-naloxone (SUBOXONE) 8-2 MG FILM SL film Place 1 Film under the tongue 2 times daily for 5 days. Sai Skinner MD   sulfamethoxazole-trimethoprim (BACTRIM DS) 800-160 MG per tablet Take 1 tablet by mouth 2 times daily for 10 days  Matthew Villasenor MD   ibuprofen (ADVIL;MOTRIN) 800 MG tablet Take 1 tablet by mouth 2 times daily as needed for Pain  Hien July, APRN - CNP        Social History     Tobacco Use    Smoking status: Former Smoker     Packs/day: 0.50     Types: Cigarettes    Smokeless tobacco: Never Used   Substance Use Topics    Alcohol use: Yes     Comment: occasional        Vitals:    06/26/20 1003   BP: 135/84   Site: Right Upper Arm   Position: Sitting   Cuff Size: Medium Adult   Pulse: 106   Temp: 97.2 °F (36.2 °C)   Weight: 192 lb (87.1 kg)   Height: 5' 10\" (1.778 m)     Estimated body mass index is 27.55 kg/m² as calculated from the following:    Height as of this encounter: 5' 10\" (1.778 m). Weight as of this encounter: 192 lb (87.1 kg). Physical Exam  Vitals signs reviewed. Constitutional:       General: He is not in acute distress. Appearance: Normal appearance. He is not ill-appearing. HENT:      Head: Normocephalic and atraumatic. Right Ear: External ear normal.      Left Ear: External ear normal.      Nose: Nose normal. No congestion or rhinorrhea. Mouth/Throat:      Mouth: Mucous membranes are moist.   Eyes:      Extraocular Movements: Extraocular movements intact. Conjunctiva/sclera: Conjunctivae normal.      Pupils: Pupils are equal, round, and reactive to light. Neck:      Musculoskeletal: Normal range of motion and neck supple. Pulmonary:      Effort: Pulmonary effort is normal. No respiratory distress.    Musculoskeletal: Normal range of motion. Right lower leg: No edema. Left lower leg: No edema. Skin:     General: Skin is warm and dry. Neurological:      General: No focal deficit present. Mental Status: He is alert and oriented to person, place, and time. Psychiatric:         Mood and Affect: Mood normal.         Behavior: Behavior normal.         Thought Content: Thought content normal.         Judgment: Judgment normal.         ASSESSMENT/PLAN:      1. Severe opioid use disorder (HCC)    - POCT Rapid Drug Screen  - buprenorphine-naloxone (SUBOXONE) 8-2 MG FILM SL film; Place 1 Film under the tongue 2 times daily for 3 days. Dispense: 6 Film; Refill: 0  - Narcan at home  - Discussed potential risks associated with suboxone and illicit drug use including precipitated withdrawal and/or overdose. Understanding voiced,  - OARRS reviewed- no discrepancies  - Attend NA/AA meetings and/or arrange for individualized counseling    2. Fibromyalgia    - pregabalin (LYRICA) 300 MG capsule; Take 1 capsule by mouth 2 times daily for 30 days. Dispense: 60 capsule; Refill: 0      Return in about 3 days (around 6/29/2020) for with Edna Wood- sarah. An electronic signature was used to authenticate this note.     --PAPA Wells - CNP on 7/11/2020 at 10:05 PM

## 2020-06-26 NOTE — PROGRESS NOTES
Verbal order per Evelia Solis CNP for urine drug screen. Positive for BUP MOP Fentanyl. Verified resutls with Deepti Rueda CNP ordered 3 day script of Suboxone 8mg film BID, Lyrica 300mg cap BID for 30 days and Effexor 150mg BID qty 60.

## 2020-06-29 ENCOUNTER — HOSPITAL ENCOUNTER (INPATIENT)
Age: 35
LOS: 1 days | Discharge: LEFT BEFORE DISCHARGE | DRG: 812 | End: 2020-06-30
Attending: FAMILY MEDICINE | Admitting: FAMILY MEDICINE
Payer: MEDICARE

## 2020-06-29 LAB
BASOPHILS # BLD: 0.3 %
BASOPHILS ABSOLUTE: 0 THOU/MM3 (ref 0–0.1)
EKG ATRIAL RATE: 144 BPM
EKG P AXIS: 73 DEGREES
EKG P-R INTERVAL: 134 MS
EKG Q-T INTERVAL: 268 MS
EKG QRS DURATION: 76 MS
EKG QTC CALCULATION (BAZETT): 414 MS
EKG R AXIS: 55 DEGREES
EKG T AXIS: 72 DEGREES
EKG VENTRICULAR RATE: 144 BPM
EOSINOPHIL # BLD: 0.5 %
EOSINOPHILS ABSOLUTE: 0.1 THOU/MM3 (ref 0–0.4)
ERYTHROCYTE [DISTWIDTH] IN BLOOD BY AUTOMATED COUNT: 13.1 % (ref 11.5–14.5)
ERYTHROCYTE [DISTWIDTH] IN BLOOD BY AUTOMATED COUNT: 41 FL (ref 35–45)
HCT VFR BLD CALC: 38.8 % (ref 42–52)
HEMOGLOBIN: 13.2 GM/DL (ref 14–18)
IMMATURE GRANS (ABS): 0.06 THOU/MM3 (ref 0–0.07)
IMMATURE GRANULOCYTES: 0.4 %
LYMPHOCYTES # BLD: 16.2 %
LYMPHOCYTES ABSOLUTE: 2.3 THOU/MM3 (ref 1–4.8)
MCH RBC QN AUTO: 29.5 PG (ref 26–33)
MCHC RBC AUTO-ENTMCNC: 34 GM/DL (ref 32.2–35.5)
MCV RBC AUTO: 86.6 FL (ref 80–94)
MONOCYTES # BLD: 9 %
MONOCYTES ABSOLUTE: 1.3 THOU/MM3 (ref 0.4–1.3)
NUCLEATED RED BLOOD CELLS: 0 /100 WBC
PLATELET # BLD: 428 THOU/MM3 (ref 130–400)
PMV BLD AUTO: 9.6 FL (ref 9.4–12.4)
RBC # BLD: 4.48 MILL/MM3 (ref 4.7–6.1)
SEG NEUTROPHILS: 73.6 %
SEGMENTED NEUTROPHILS ABSOLUTE COUNT: 10.5 THOU/MM3 (ref 1.8–7.7)
WBC # BLD: 14.2 THOU/MM3 (ref 4.8–10.8)

## 2020-06-29 PROCEDURE — 96374 THER/PROPH/DIAG INJ IV PUSH: CPT

## 2020-06-29 PROCEDURE — 82248 BILIRUBIN DIRECT: CPT

## 2020-06-29 PROCEDURE — 36415 COLL VENOUS BLD VENIPUNCTURE: CPT

## 2020-06-29 PROCEDURE — 99285 EMERGENCY DEPT VISIT HI MDM: CPT

## 2020-06-29 PROCEDURE — 85025 COMPLETE CBC W/AUTO DIFF WBC: CPT

## 2020-06-29 PROCEDURE — 96376 TX/PRO/DX INJ SAME DRUG ADON: CPT

## 2020-06-29 PROCEDURE — 84443 ASSAY THYROID STIM HORMONE: CPT

## 2020-06-29 PROCEDURE — 96361 HYDRATE IV INFUSION ADD-ON: CPT

## 2020-06-29 PROCEDURE — 82550 ASSAY OF CK (CPK): CPT

## 2020-06-29 PROCEDURE — 93005 ELECTROCARDIOGRAM TRACING: CPT | Performed by: NURSE PRACTITIONER

## 2020-06-29 PROCEDURE — 2709999900 HC NON-CHARGEABLE SUPPLY

## 2020-06-29 PROCEDURE — G0480 DRUG TEST DEF 1-7 CLASSES: HCPCS

## 2020-06-29 PROCEDURE — 6360000002 HC RX W HCPCS: Performed by: NURSE PRACTITIONER

## 2020-06-29 PROCEDURE — 80053 COMPREHEN METABOLIC PANEL: CPT

## 2020-06-29 RX ORDER — LORAZEPAM 2 MG/ML
1 INJECTION INTRAMUSCULAR ONCE
Status: COMPLETED | OUTPATIENT
Start: 2020-06-29 | End: 2020-06-29

## 2020-06-29 RX ADMIN — LORAZEPAM 1 MG: 2 INJECTION INTRAMUSCULAR; INTRAVENOUS at 23:29

## 2020-06-30 VITALS
HEART RATE: 78 BPM | SYSTOLIC BLOOD PRESSURE: 92 MMHG | HEIGHT: 70 IN | BODY MASS INDEX: 25.93 KG/M2 | RESPIRATION RATE: 14 BRPM | TEMPERATURE: 98.3 F | DIASTOLIC BLOOD PRESSURE: 62 MMHG | WEIGHT: 181.1 LBS | OXYGEN SATURATION: 92 %

## 2020-06-30 PROBLEM — T50.901A DRUG OVERDOSE, ACCIDENTAL OR UNINTENTIONAL, INITIAL ENCOUNTER: Status: ACTIVE | Noted: 2020-06-30

## 2020-06-30 LAB
ACETAMINOPHEN LEVEL: < 5 UG/ML (ref 0–20)
ALBUMIN SERPL-MCNC: 4.9 G/DL (ref 3.5–5.1)
ALP BLD-CCNC: 95 U/L (ref 38–126)
ALT SERPL-CCNC: 51 U/L (ref 11–66)
AMPHETAMINE+METHAMPHETAMINE URINE SCREEN: POSITIVE
ANION GAP SERPL CALCULATED.3IONS-SCNC: 11 MEQ/L (ref 8–16)
ANION GAP SERPL CALCULATED.3IONS-SCNC: 26 MEQ/L (ref 8–16)
AST SERPL-CCNC: 48 U/L (ref 5–40)
BACTERIA: ABNORMAL /HPF
BARBITURATE QUANTITATIVE URINE: NEGATIVE
BENZODIAZEPINE QUANTITATIVE URINE: NEGATIVE
BILIRUB SERPL-MCNC: 0.4 MG/DL (ref 0.3–1.2)
BILIRUBIN DIRECT: < 0.2 MG/DL (ref 0–0.3)
BILIRUBIN URINE: NEGATIVE
BLOOD, URINE: ABNORMAL
BUN BLDV-MCNC: 14 MG/DL (ref 7–22)
BUN BLDV-MCNC: 18 MG/DL (ref 7–22)
CALCIUM SERPL-MCNC: 10 MG/DL (ref 8.5–10.5)
CALCIUM SERPL-MCNC: 8.9 MG/DL (ref 8.5–10.5)
CANNABINOID QUANTITATIVE URINE: NEGATIVE
CASTS 2: ABNORMAL /LPF
CASTS UA: ABNORMAL /LPF
CHARACTER, URINE: CLEAR
CHLORIDE BLD-SCNC: 100 MEQ/L (ref 98–111)
CHLORIDE BLD-SCNC: 105 MEQ/L (ref 98–111)
CO2: 15 MEQ/L (ref 23–33)
CO2: 22 MEQ/L (ref 23–33)
COCAINE METABOLITE QUANTITATIVE URINE: NEGATIVE
COLOR: YELLOW
CREAT SERPL-MCNC: 1.2 MG/DL (ref 0.4–1.2)
CREAT SERPL-MCNC: 2.4 MG/DL (ref 0.4–1.2)
CRYSTALS, UA: ABNORMAL
EPITHELIAL CELLS, UA: ABNORMAL /HPF
ETHYL ALCOHOL, SERUM: < 0.01 %
GFR SERPL CREATININE-BSD FRML MDRD: 31 ML/MIN/1.73M2
GFR SERPL CREATININE-BSD FRML MDRD: 68 ML/MIN/1.73M2
GLUCOSE BLD-MCNC: 135 MG/DL (ref 70–108)
GLUCOSE BLD-MCNC: 89 MG/DL (ref 70–108)
GLUCOSE URINE: NEGATIVE MG/DL
KETONES, URINE: 15
LACTIC ACID: 0.8 MMOL/L (ref 0.5–2.2)
LEUKOCYTE ESTERASE, URINE: NEGATIVE
MAGNESIUM: 2.2 MG/DL (ref 1.6–2.4)
MISCELLANEOUS 2: ABNORMAL
NITRITE, URINE: NEGATIVE
OPIATES, URINE: NEGATIVE
OSMOLALITY CALCULATION: 285.2 MOSMOL/KG (ref 275–300)
OXYCODONE: NEGATIVE
PH UA: 5 (ref 5–9)
PHENCYCLIDINE QUANTITATIVE URINE: NEGATIVE
POTASSIUM SERPL-SCNC: 3.9 MEQ/L (ref 3.5–5.2)
POTASSIUM SERPL-SCNC: 4.2 MEQ/L (ref 3.5–5.2)
PROTEIN UA: ABNORMAL
RBC URINE: ABNORMAL /HPF
RENAL EPITHELIAL, UA: ABNORMAL
SALICYLATE, SERUM: 1.4 MG/DL (ref 2–10)
SARS-COV-2, NAAT: NORMAL
SARS-COV-2, PCR: NOT DETECTED
SODIUM BLD-SCNC: 138 MEQ/L (ref 135–145)
SODIUM BLD-SCNC: 141 MEQ/L (ref 135–145)
SPECIFIC GRAVITY, URINE: 1.02 (ref 1–1.03)
TOTAL CK: 1742 U/L (ref 55–170)
TOTAL CK: 831 U/L (ref 55–170)
TOTAL PROTEIN: 8.7 G/DL (ref 6.1–8)
TSH SERPL DL<=0.05 MIU/L-ACNC: 2.43 UIU/ML (ref 0.4–4.2)
UROBILINOGEN, URINE: 0.2 EU/DL (ref 0–1)
WBC UA: ABNORMAL /HPF
YEAST: ABNORMAL

## 2020-06-30 PROCEDURE — 6370000000 HC RX 637 (ALT 250 FOR IP): Performed by: NURSE PRACTITIONER

## 2020-06-30 PROCEDURE — 83735 ASSAY OF MAGNESIUM: CPT

## 2020-06-30 PROCEDURE — 1200000003 HC TELEMETRY R&B

## 2020-06-30 PROCEDURE — 2580000003 HC RX 258: Performed by: NURSE PRACTITIONER

## 2020-06-30 PROCEDURE — 81001 URINALYSIS AUTO W/SCOPE: CPT

## 2020-06-30 PROCEDURE — U0002 COVID-19 LAB TEST NON-CDC: HCPCS

## 2020-06-30 PROCEDURE — 2709999900 HC NON-CHARGEABLE SUPPLY

## 2020-06-30 PROCEDURE — 80048 BASIC METABOLIC PNL TOTAL CA: CPT

## 2020-06-30 PROCEDURE — 2500000003 HC RX 250 WO HCPCS: Performed by: FAMILY MEDICINE

## 2020-06-30 PROCEDURE — 99234 HOSP IP/OBS SM DT SF/LOW 45: CPT | Performed by: INTERNAL MEDICINE

## 2020-06-30 PROCEDURE — 82550 ASSAY OF CK (CPK): CPT

## 2020-06-30 PROCEDURE — 99223 1ST HOSP IP/OBS HIGH 75: CPT | Performed by: FAMILY MEDICINE

## 2020-06-30 PROCEDURE — 83605 ASSAY OF LACTIC ACID: CPT

## 2020-06-30 PROCEDURE — 93010 ELECTROCARDIOGRAM REPORT: CPT | Performed by: NUCLEAR MEDICINE

## 2020-06-30 PROCEDURE — 6370000000 HC RX 637 (ALT 250 FOR IP): Performed by: FAMILY MEDICINE

## 2020-06-30 PROCEDURE — 6360000002 HC RX W HCPCS

## 2020-06-30 PROCEDURE — 2580000003 HC RX 258: Performed by: FAMILY MEDICINE

## 2020-06-30 PROCEDURE — 80307 DRUG TEST PRSMV CHEM ANLYZR: CPT

## 2020-06-30 PROCEDURE — 36415 COLL VENOUS BLD VENIPUNCTURE: CPT

## 2020-06-30 RX ORDER — LORAZEPAM 2 MG/ML
1 INJECTION INTRAMUSCULAR EVERY 6 HOURS PRN
Status: DISCONTINUED | OUTPATIENT
Start: 2020-06-30 | End: 2020-06-30 | Stop reason: HOSPADM

## 2020-06-30 RX ORDER — 0.9 % SODIUM CHLORIDE 0.9 %
1000 INTRAVENOUS SOLUTION INTRAVENOUS ONCE
Status: COMPLETED | OUTPATIENT
Start: 2020-06-30 | End: 2020-06-30

## 2020-06-30 RX ORDER — PREGABALIN 75 MG/1
150 CAPSULE ORAL 2 TIMES DAILY
Status: DISCONTINUED | OUTPATIENT
Start: 2020-06-30 | End: 2020-06-30 | Stop reason: HOSPADM

## 2020-06-30 RX ORDER — BUPRENORPHINE AND NALOXONE 8; 2 MG/1; MG/1
1 FILM, SOLUBLE BUCCAL; SUBLINGUAL 2 TIMES DAILY
Status: DISCONTINUED | OUTPATIENT
Start: 2020-06-30 | End: 2020-06-30 | Stop reason: HOSPADM

## 2020-06-30 RX ORDER — SODIUM CHLORIDE 0.9 % (FLUSH) 0.9 %
10 SYRINGE (ML) INJECTION EVERY 12 HOURS SCHEDULED
Status: DISCONTINUED | OUTPATIENT
Start: 2020-06-30 | End: 2020-06-30 | Stop reason: HOSPADM

## 2020-06-30 RX ORDER — ACETAMINOPHEN 325 MG/1
650 TABLET ORAL EVERY 6 HOURS PRN
Status: DISCONTINUED | OUTPATIENT
Start: 2020-06-30 | End: 2020-06-30 | Stop reason: HOSPADM

## 2020-06-30 RX ORDER — POTASSIUM CHLORIDE 7.45 MG/ML
10 INJECTION INTRAVENOUS PRN
Status: DISCONTINUED | OUTPATIENT
Start: 2020-06-30 | End: 2020-06-30 | Stop reason: HOSPADM

## 2020-06-30 RX ORDER — SODIUM CHLORIDE 0.9 % (FLUSH) 0.9 %
10 SYRINGE (ML) INJECTION PRN
Status: DISCONTINUED | OUTPATIENT
Start: 2020-06-30 | End: 2020-06-30 | Stop reason: HOSPADM

## 2020-06-30 RX ORDER — MIRTAZAPINE 45 MG/1
45 TABLET, FILM COATED ORAL NIGHTLY
Status: DISCONTINUED | OUTPATIENT
Start: 2020-06-30 | End: 2020-06-30 | Stop reason: HOSPADM

## 2020-06-30 RX ORDER — LORAZEPAM 1 MG/1
1 TABLET ORAL ONCE
Status: COMPLETED | OUTPATIENT
Start: 2020-06-30 | End: 2020-06-30

## 2020-06-30 RX ORDER — ACETAMINOPHEN 650 MG/1
650 SUPPOSITORY RECTAL EVERY 6 HOURS PRN
Status: DISCONTINUED | OUTPATIENT
Start: 2020-06-30 | End: 2020-06-30 | Stop reason: HOSPADM

## 2020-06-30 RX ORDER — SODIUM CHLORIDE 9 MG/ML
INJECTION, SOLUTION INTRAVENOUS CONTINUOUS
Status: DISCONTINUED | OUTPATIENT
Start: 2020-06-30 | End: 2020-06-30 | Stop reason: HOSPADM

## 2020-06-30 RX ORDER — LORAZEPAM 2 MG/ML
2 INJECTION INTRAMUSCULAR ONCE
Status: COMPLETED | OUTPATIENT
Start: 2020-06-30 | End: 2020-06-30

## 2020-06-30 RX ORDER — VENLAFAXINE 50 MG/1
100 TABLET ORAL 3 TIMES DAILY
Status: DISCONTINUED | OUTPATIENT
Start: 2020-06-30 | End: 2020-06-30 | Stop reason: HOSPADM

## 2020-06-30 RX ORDER — MAGNESIUM SULFATE IN WATER 40 MG/ML
2 INJECTION, SOLUTION INTRAVENOUS PRN
Status: DISCONTINUED | OUTPATIENT
Start: 2020-06-30 | End: 2020-06-30 | Stop reason: HOSPADM

## 2020-06-30 RX ORDER — LORAZEPAM 2 MG/ML
INJECTION INTRAMUSCULAR
Status: COMPLETED
Start: 2020-06-30 | End: 2020-06-30

## 2020-06-30 RX ORDER — ONDANSETRON 2 MG/ML
4 INJECTION INTRAMUSCULAR; INTRAVENOUS EVERY 6 HOURS PRN
Status: DISCONTINUED | OUTPATIENT
Start: 2020-06-30 | End: 2020-06-30 | Stop reason: HOSPADM

## 2020-06-30 RX ORDER — VENLAFAXINE HYDROCHLORIDE 150 MG/1
150 CAPSULE, EXTENDED RELEASE ORAL 2 TIMES DAILY
Status: DISCONTINUED | OUTPATIENT
Start: 2020-06-30 | End: 2020-06-30 | Stop reason: SDUPTHER

## 2020-06-30 RX ORDER — PROMETHAZINE HYDROCHLORIDE 25 MG/1
12.5 TABLET ORAL EVERY 6 HOURS PRN
Status: DISCONTINUED | OUTPATIENT
Start: 2020-06-30 | End: 2020-06-30 | Stop reason: HOSPADM

## 2020-06-30 RX ORDER — POLYETHYLENE GLYCOL 3350 17 G/17G
17 POWDER, FOR SOLUTION ORAL DAILY PRN
Status: DISCONTINUED | OUTPATIENT
Start: 2020-06-30 | End: 2020-06-30 | Stop reason: HOSPADM

## 2020-06-30 RX ADMIN — LORAZEPAM 1 MG: 1 TABLET ORAL at 06:05

## 2020-06-30 RX ADMIN — FAMOTIDINE 20 MG: 10 INJECTION, SOLUTION INTRAVENOUS at 11:18

## 2020-06-30 RX ADMIN — SODIUM CHLORIDE 1000 ML: 9 INJECTION, SOLUTION INTRAVENOUS at 00:35

## 2020-06-30 RX ADMIN — LORAZEPAM 1 MG: 2 INJECTION INTRAMUSCULAR; INTRAVENOUS at 03:49

## 2020-06-30 RX ADMIN — SODIUM CHLORIDE: 9 INJECTION, SOLUTION INTRAVENOUS at 06:05

## 2020-06-30 RX ADMIN — SODIUM CHLORIDE 1000 ML: 9 INJECTION, SOLUTION INTRAVENOUS at 02:08

## 2020-06-30 RX ADMIN — LORAZEPAM 2 MG: 2 INJECTION INTRAMUSCULAR at 01:05

## 2020-06-30 RX ADMIN — VENLAFAXINE 100 MG: 50 TABLET ORAL at 11:18

## 2020-06-30 RX ADMIN — LORAZEPAM 2 MG: 2 INJECTION INTRAMUSCULAR; INTRAVENOUS at 01:05

## 2020-06-30 RX ADMIN — LORAZEPAM 1 MG: 2 INJECTION INTRAMUSCULAR at 03:49

## 2020-06-30 ASSESSMENT — PAIN - FUNCTIONAL ASSESSMENT: PAIN_FUNCTIONAL_ASSESSMENT: ACTIVITIES ARE NOT PREVENTED

## 2020-06-30 ASSESSMENT — PAIN SCALES - GENERAL: PAINLEVEL_OUTOF10: 5

## 2020-06-30 ASSESSMENT — PAIN DESCRIPTION - DESCRIPTORS: DESCRIPTORS: ACHING

## 2020-06-30 ASSESSMENT — PAIN DESCRIPTION - PAIN TYPE: TYPE: CHRONIC PAIN

## 2020-06-30 ASSESSMENT — PAIN DESCRIPTION - ORIENTATION: ORIENTATION: LOWER

## 2020-06-30 ASSESSMENT — PAIN DESCRIPTION - FREQUENCY: FREQUENCY: CONTINUOUS

## 2020-06-30 ASSESSMENT — PAIN DESCRIPTION - LOCATION: LOCATION: BACK

## 2020-06-30 ASSESSMENT — PAIN DESCRIPTION - ONSET: ONSET: ON-GOING

## 2020-06-30 NOTE — ED TRIAGE NOTES
Pt presents to the ED from home via [facility] for drug overdose. Pt states he took meth, heroine and crack that he knows of. Pt told this RN that uses intravenously. Pt was given 4mg of narcan by EMS. Pt was responsive at scene, but was telling EMS that he could not breathe. Pt has a substance abuse history. Upon arrival to ED patient appears to be diaphoretic. EKG complete. Georgia Rosales NP at bedside. Pt is tachycardic on monitor. IV access established and maintained. Pt is alert and oriented x4.

## 2020-06-30 NOTE — CARE COORDINATION
Admitting diagnosis: Drug Overdose  Currently in treatment/where: Dr. Miya Morgan office- follows with Zimmer Setting, APRN-CNP  Currently taking subutex/soboxone/vivitrol:  Suboxone  If yes, who prescribes: [address] [place] [address]  at discharge: home with [address] to treatment, if yes where: NA  Insurance: Payor: [employer] / Plan: Daryl Seller / Product Type: *No Product type* /    Medication Assistance: no has [place] [employer] Medicaid  Transportation Assistance: no concerns     JENNYFER spoke with [facility]([phone]) with Dr. Miya Morgan office. Pt was supposed to call office this morning for follow up appointment. SW scheduled appointment July 2 @ 10 am.  This is placed in pts follow ups at discharge. CHRIS Hood updated. 7/1/20, 7:01 AM EDT    Patient goals/plan/ treatment preferences discussed by  and . Patient goals/plan/ treatment preferences reviewed with patient/ family. Patient/ family verbalize understanding of discharge plan and are in agreement with goal/plan/treatment preferences. Understanding was demonstrated using the teach back method. AVS provided by RN at time of discharge, which includes all necessary medical information pertaining to the patients current course of illness, treatment, post-discharge goals of care, and treatment preferences.

## 2020-06-30 NOTE — ED NOTES
Pt appears to be sleeping at this time. Lights dimmed in room for comfort. Pt given ice water per pt request. Will monitor. PT respirations easy and unlabored.       Michael Perez RN  06/29/20 1122

## 2020-06-30 NOTE — PROGRESS NOTES
Pt admitted to  [facility] room 07  Transported by RN  Complaints at arrival to room: wants to leave MD KLAUDIA paged and came to see patient.     IV site free of s/s of infection or infiltration  Vital signs obtained  Assessment and data collection initiated   Oriented to room  Policies and procedures for [facility] explained   All questions answered with no further questions at this time  Fall prevention and safety brochure given and discussed with patient  Skin assessment completed- no pressure injuries

## 2020-06-30 NOTE — ED NOTES
Pt medicated per order. Girlfriend at bedside. Pt appears to be more comfortable at this time.       Peg Hdz RN  06/30/20 0111

## 2020-06-30 NOTE — PLAN OF CARE
Problem: Pain:  Goal: Pain level will decrease  Description: Pain level will decrease  Outcome: Ongoing  Note: Pt reports chronic lower back pain rated a 5. Tolerable at this time. Problem: Falls - Risk of:  Goal: Will remain free from falls  Description: Will remain free from falls  Outcome: Ongoing  Note: Patient remains free from falls this shift. Fall precautions in place with bed/chair exit alarmed. Fall sign posted and fall armband in place. Nonskid footwear used with transferring. Educated patient to use call light when in need of staff assistance with transferring, ambulating, and other activities of daily living. Patient appropriately uses call light this shift. Goal: Absence of physical injury  Description: Absence of physical injury  Outcome: Ongoing  Note: Call light in reach, bed lowest position, wheels locked, and correct ID band in place. Environment remains clear of fall hazards. Reviewed safety measures with patient/family. Problem: Skin Integrity:  Goal: Will show no infection signs and symptoms  Description: Will show no infection signs and symptoms  Outcome: Ongoing  Note: No signs or symptoms of infection. Goal: Absence of new skin breakdown  Description: Absence of new skin breakdown  Outcome: Ongoing  Note: No signs of new skin breakdown with each assessment. Skin remains warm, dry, intact. Mucous membranes pink & moist. Patient is able to turn self. Problem: Discharge Planning:  Goal: Discharged to appropriate level of care  Description: Discharged to appropriate level of care  Outcome: Ongoing  Note: Patients continuum of care needs met this shift. Patient to be discharged to home. Assessed barriers to discharge.

## 2020-06-30 NOTE — ED NOTES
Girlfriend at bedside. Pt appears to be sleeping. Pt appears to be comfortably. Pt respirations easy and unlabored.       Hiwot Jama RN  06/30/20 7839

## 2020-06-30 NOTE — ED NOTES
Pt laying on cot and is becoming agitated. Joss Olivares NP at bedside. Pt respirations easy and unlabored.       Vivi Phillips RN  06/30/20 8301

## 2020-06-30 NOTE — ED NOTES
Lab drawn sent. Pt respirations easy and unlabored. Pt denies any further needs at this time.       Jasper Lloyd RN  06/30/20 4476

## 2020-06-30 NOTE — H&P
History & Physical        Patient:  Honey Winston  YOB: 1985    MRN: [id number]     Acct: [de-identified]    PCP: PAPA Zhao CNP    Date of Admission: 6/29/2020    Date of Service: Pt seen/examined on 06/30/20  and Admitted to Observation with expected LOS less than two midnights due to medical therapy. Chief Complaint:  overdose    Update:  5 AM  Patient is on the floor, awake alert oriented x3, agitated. Patient remembers going to a party and doing different kinds of drugs. Patient states that he is back to his baseline. Getting agitated easily and then calms down easily. Not following fall precautions. I did witness the patient walk in the room without difficulty. Nursing staff is concerned that the patient may leave [address] Patient is alert awake and oriented x3. He has capacity and insight. I notified the patient that if he leaves, his kidney injury can worsen, his rhabdomyolysis can worsen and this could lead to worsening of his illness as well as death. Patient understands and is willing to stay at this time. Drug overdose  -Accidental   -Narcan given by EMS, patient became responsive  -Sees outpatient providers  -Continue to monitor on telemetry    Agitation/irritation  -Patient was given Ativan in the ER for agitation  -PRN ordered  -Likely secondary to overdose versus withdrawal    Polysubstance abuse  -I have not continued his Suboxone at this time given his presentation and how somnolent he is  -Medications need to be verified and resumed in the morning    Depression  -Continue Effexor    Rhabdomyolysis  -IV fluids  -Recheck CK    Anion gap acidosis  -Lactic acid ordered  -Continue IV fluids    Leukocytosis  Likely reactive-  -recheck in a.m. Recent hospitalization for COVID-19 infection  -Positive on 5/15/2020  -Thereafter he has had a negative test  -Currently with no symptoms suggestive of COVID-19.   On his initial presentation in Suboxone for 3 days 8 mg twice daily with plans to go back to the office on the 26th. He was evaluated on the 26th and given 3-day prescription of Suboxone, Lyrica for 30 days and Effexor for 30 days. His last dose of the Suboxone would have been yesterday. Patient will be admitted to the hospital under observation status for IV fluids, improvement in mental status. Past Medical History:          Diagnosis Date    Anxiety     Depression     Kidney stone     Liver disease     Hep C    Opiate abuse, continuous ([facility] [address]        Past Surgical History:      History reviewed. No pertinent surgical history. Medications Prior to Admission:      Prior to Admission medications    Medication Sig Start Date End Date Taking? Authorizing Provider   pregabalin (LYRICA) 300 MG capsule Take 1 capsule by mouth 2 times daily for 30 days. 6/26/20 7/26/20  Excell Simmering, APRN - CNP   venlafaxine (EFFEXOR XR) 150 MG extended release capsule Take 1 capsule by mouth 2 times daily 6/26/20 7/26/20  Excell Simmering, APRN - CNP   venlafaxine [facility]) 100 MG tablet Take 1 tablet by mouth 3 times daily 5/26/20   Excell Simmering, APRN - CNP   lisinopril (PRINIVIL;ZESTRIL) 20 MG tablet Take 1 tablet by mouth daily 1/9/20   Excell Simmering, APRN - CNP   mirtazapine (REMERON) 45 MG tablet Take 1 tablet by mouth nightly 1/9/20   Excell Simmering, APRN - CNP   ibuprofen (ADVIL;MOTRIN) 800 MG tablet Take 1 tablet by mouth 2 times daily as needed for Pain 1/9/20 6/18/20  Excell Simmering, APRN - CNP       Allergies:  Patient has no known allergies. Social History:      The patient currently lives home    TOBACCO:   reports that he has quit smoking. His smoking use included cigarettes. He smoked 0.50 packs per day. He has never used smokeless tobacco.  ETOH:   reports current alcohol use. Family History:       Reviewed in detail and negative for DM, CAD, Cancer, CVA. Positive as follows:    History reviewed.  No pertinent family history. Diet:  No diet orders on file    REVIEW OF SYSTEMS:   Pertinent positives as noted in the HPI. All other systems reviewed and negative. PHYSICAL EXAM:  Limited as patient was asleep after multiple doses of ativan in ER. /76   Pulse 98   Temp 98.3 °F (36.8 °C) (Oral)   Resp 20   Ht 5' 10\" (1.778 m)   Wt 175 lb (79.4 kg)   SpO2 98%   BMI 25.11 kg/m²     General appearance:  +ve distress, appears stated age and cooperative. Sound asleep. Wakes somewhat to name calling but per nursing staff, tries to punch or push back  HEENT:  Normal cephalic, atraumatic without obvious deformity. Pupils equal, round, and reactive to light. Extra ocular muscles intact. Conjunctivae/corneas clear. Neck: Supple, with full range of motion. No jugular venous distention. Trachea midline. Respiratory:  Normal respiratory effort. Cardiovascular:  Tachy  Abdomen: Soft, non-tender, non-distended  Musculoskeletal:  No gross abnml  Skin: Skin color, texture, turgor normal.  No rashes or lesions. Neurologic:  Unable to assess, pt asleep and not cooperative with exam  Psychiatric:  somnolent      Labs:     Recent Labs     06/29/20  2330   WBC 14.2*   HGB 13.2*   HCT 38.8*   *     Recent Labs     06/29/20  2330      K 4.2      CO2 15*   BUN 18   CREATININE 2.4*   CALCIUM 10.0     Recent Labs     06/29/20  2330   AST 48*   ALT 51   BILIDIR <0.2   BILITOT 0.4   ALKPHOS 95     No results for input(s): INR in the last 72 hours. Recent Labs     06/29/20  2330   CKTOTAL 831*       Urinalysis:      Lab Results   Component Value Date    NITRU NEGATIVE 05/18/2020    WBCUA 5-9 05/18/2020    BACTERIA FEW 05/18/2020    RBCUA 5-10 05/18/2020    BLOODU LARGE 05/18/2020    SPECGRAV 1.022 05/18/2020    GLUCOSEU NEGATIVE 08/25/2019       Intake & Output:  No intake/output data recorded. No intake/output data recorded.           Thank you PAPA Hill - NAI for the opportunity to

## 2020-06-30 NOTE — ED NOTES
ED to inpatient nurses report    Chief Complaint   Patient presents with    Drug Overdose      Present to ED from home  LOC: alert and orientated to name, place, date  Vital signs   Vitals:    06/30/20 0035 06/30/20 0109 06/30/20 0151 06/30/20 0257   BP: 121/81 123/82 117/87 113/76   Pulse: 102 106 92 98   Resp: 20 20 20 20   Temp:       TempSrc:       SpO2: 96% 93% 97% 98%   Weight:       Height:          Oxygen Baseline 98% on room air    Current needs required urine sample  LDAs:   Peripheral IV 06/29/20 Right Antecubital (Active)   Site Assessment Clean;Dry; Intact 6/30/2020  2:58 AM   Line Status Infusing 6/30/2020  2:58 AM   Dressing Status Clean;Dry; Intact 6/30/2020  2:58 AM     Mobility: Requires assistance * 2  Pending ED orders: N/A  Present condition: vital signs are stable. Pt appears to be sleeping at this time.      Electronically signed by Peg Hdz RN on 6/30/2020 at 3:07 AM     Peg Hdz RN  06/30/20 5241

## 2020-06-30 NOTE — PROGRESS NOTES
Patient reported to ZAFAR Baez RN that he was leaving. Explained to patient that he would be leaving against medical advice. Notified Dr. Edgar Olsen. AMA form signed per patient. IV removed. Heart monitor removed. Left ambulatory.

## 2020-06-30 NOTE — ED NOTES
Pt updated on POC. Pt respirations easy and unlabored. Pt appears to be sleeping. Will monitor.       Vivi Phillips RN  06/30/20 2124

## 2020-06-30 NOTE — CARE COORDINATION
6/30/20, 9:49 AM EDT  DISCHARGE PLANNING EVALUATION:    Jennifer Romero       Admitted from: ER 6/29/2020/ [address] [facility] day: 0   Location: [facility]07/007 Reason for admit: Drug overdose, accidental or unintentional, initial encounter [T50.900N] Status: IP   Admit order signed?: yes  PMH:  has a past medical history of Anxiety, Depression, Kidney stone, Liver disease, and Opiate abuse, continuous ([facility] [address] Medications:  Scheduled Meds:   venlafaxine  150 mg Oral BID    venlafaxine  100 mg Oral TID    sodium chloride flush  10 mL Intravenous 2 times per day    enoxaparin  40 mg Subcutaneous Daily    famotidine (PEPCID) injection  20 mg Intravenous BID    buprenorphine-naloxone  1 Film Sublingual BID    mirtazapine  45 mg Oral Nightly    pregabalin  150 mg Oral BID     Continuous Infusions:   sodium chloride 75 mL/hr at 06/30/20 0605      Pertinent Info/Orders/Treatment Plan:  CO2 15, creat 2.4, CK 1742. IVF rate increased, suboxone ordered. Diet: DIET GENERAL;   Smoking status:  reports that he has quit smoking. His smoking use included cigarettes. He smoked 0.50 packs per day. He has never used smokeless tobacco.   PCP: PAPA Hill CNP  Readmission 30 days or less:   Readmission Risk Score: 23%    Discharge Planning Evaluation  Current Residence:     Living Arrangements:      Support Systems:     Current Services PTA:     Potential Assistance Needed:     Potential Assistance Purchasing Medications:     Does patient want to participate in local refill/ meds to beds program?     Type of Home Care Services:     Patient expects to be discharged to:     Expected Discharge date: Follow Up Appointment: Best Day/ Time:      Patient Goals/Plan/Treatment Preferences: Home with girlfriend. Transportation/Food Security/Housekeeping Addressed:  No issues identified.     Evaluation: yes

## 2020-06-30 NOTE — ED NOTES
Pt given ice water with verbal order from Jamir Chan NP. Pt medicated per order. Pt respirations easy and unlabored.       Jevon Potts RN  06/29/20 1225

## 2020-06-30 NOTE — ED PROVIDER NOTES
[facility] [address] [facility]  Pt Name: Clemencia Powell  MRN: [id number]  [place] 1985  Date of evaluation: 6/29/2020  Provider: PAPA Gutiérrez CNP    CHIEF COMPLAINT       Chief Complaint   Patient presents with    Drug Overdose       Nurses Notes reviewed and I agree except as noted in the HPI. HISTORY OF PRESENT ILLNESS    Clemencia Powell is a 38 y.o. male who presents to the emergency department from home for a suspected drug overdose. The patient was found unconscious after the ingestion of a random assortment of pills. The patient reports that he took 4 unknown pills and used heroin. Dispatch was called in order to transport the patient here to the department for further evaluation. He is awake un arrival to the department. He is agitated and uncooperative with the department staff. The patient has been seen here in the department before for drug overdose. He has a history of IV drug use, opiate, cocaine, methamphetamine and marijuana use. No further information is obtainable at this time. Experienced previously: yes      HPI was provided by the patient. Triage notes and Nursing notes were reviewed by myself. Any discrepancies are addressed above. REVIEW OF SYSTEMS     Review of Systems   Unable to perform ROS: Other (patient uncooperative)          All pertinent systems were reviewed and are negative unless indicated in the HPI. PAST MEDICAL HISTORY    has a past medical history of Anxiety, Depression, Kidney stone, Liver disease, and Opiate abuse, continuous ([facility] [address] SURGICAL HISTORY      has no past surgical history on file. CURRENT MEDICATIONS       Discharge Medication List as of 6/30/2020  6:09 PM      CONTINUE these medications which have NOT CHANGED    Details   pregabalin (LYRICA) 300 MG capsule Take 1 capsule by mouth 2 times daily for 30 days. , Disp-60 capsule, R-0Normal      venlafaxine (EFFEXOR XR) 150 MG extended release capsule Take 1 capsule by mouth 2 times daily, Disp-60 capsule, R-0Normal      venlafaxine (EFFEXOR) 100 MG tablet Take 1 tablet by mouth 3 times daily, Disp-90 tablet, R-0Normal      lisinopril (PRINIVIL;ZESTRIL) 20 MG tablet Take 1 tablet by mouth daily, Disp-30 tablet, R-3Print      mirtazapine (REMERON) 45 MG tablet Take 1 tablet by mouth nightly, Disp-30 tablet, R-3Print      ibuprofen (ADVIL;MOTRIN) 800 MG tablet Take 1 tablet by mouth 2 times daily as needed for Pain, Disp-30 tablet, R-3Print             ALLERGIES     has No Known Allergies. FAMILY HISTORY     has no family status information on file. family history is not on file. SOCIAL HISTORY      reports that he has quit smoking. His smoking use included cigarettes. He smoked 0.50 packs per day. He has never used smokeless tobacco. He reports current alcohol use. He reports previous drug use. Drugs: Opiates , IV, Cocaine, Marijuana, and Methamphetamines. PHYSICAL EXAM     INITIAL VITALS:  height is 5' 10\" (1.778 m) and weight is 181 lb 1.6 oz (82.1 kg). His oral temperature is 98.3 °F (36.8 °C). His blood pressure is 92/62 and his pulse is 78. His respiration is 14 and oxygen saturation is 92%. Physical Exam  Vitals signs and nursing note reviewed. Constitutional:       Appearance: He is well-developed. He is diaphoretic. He is not toxic-appearing. HENT:      Head: Normocephalic and atraumatic. Right Ear: Tympanic membrane and external ear normal.      Left Ear: Tympanic membrane and external ear normal.      Nose: Nose normal.      Mouth/Throat:      Pharynx: No oropharyngeal exudate or posterior oropharyngeal erythema. Eyes:      Conjunctiva/sclera: Conjunctivae normal.   Neck:      Musculoskeletal: Normal range of motion and neck supple. Vascular: No JVD. Cardiovascular:      Rate and Rhythm: Normal rate and regular rhythm. Pulses: Normal pulses. Heart sounds: Normal heart sounds. No murmur. No friction rub. No gallop.     Pulmonary: Effort: Pulmonary effort is normal. No respiratory distress. Breath sounds: Normal breath sounds. No decreased breath sounds, wheezing, rhonchi or rales. Abdominal:      General: Bowel sounds are normal. There is no distension. Palpations: Abdomen is soft. Tenderness: There is no abdominal tenderness. There is no guarding or rebound. Musculoskeletal: Normal range of motion. Skin:     General: Skin is warm. Findings: Wound present. No signs of injury or rash. Comments: Multiple sores noted over the entire body   Neurological:      Mental Status: He is alert and oriented to person, place, and time. Motor: No abnormal muscle tone. Coordination: Coordination normal.   Psychiatric:         Behavior: Behavior is agitated. DIFFERENTIAL DIAGNOSIS:   Including but not limited to drug overdose, acute intoxication, substance abuse. DIAGNOSTIC RESULTS     EKG: AllEKG's are interpreted by the Emergency Department Physician who either signs or Co-signs this chart in the absence of a cardiologist.  none    RADIOLOGY: non-plain film images(s) such as CT, Ultrasound and MRI are read by the radiologist.  Plain radiographic images are visualized and preliminarily interpreted by the emergencyphysician unless otherwise stated below.   No orders to display         LABS:   Labs Reviewed   BASIC METABOLIC PANEL - Abnormal; Notable for the following components:       Result Value    CO2 15 (*)     Glucose 135 (*)     CREATININE 2.4 (*)     All other components within normal limits   CBC WITH AUTO DIFFERENTIAL - Abnormal; Notable for the following components:    WBC 14.2 (*)     RBC 4.48 (*)     Hemoglobin 13.2 (*)     Hematocrit 38.8 (*)     Platelets 336 (*)     Segs Absolute 10.5 (*)     All other components within normal limits   HEPATIC FUNCTION PANEL - Abnormal; Notable for the following components:    AST 48 (*)     Total Protein 8.7 (*)     All other components within normal overdose. Appropriate labs and imaging are ordered and reviewed. An attempt to medically clear the patient, patient was found to have a significant kidney injury. This is not his first time in acute renal failure. Last time he was much worse. Patient is hydrated appropriately and then is admitted to the hospitalist service with Dr. Debora Townsend. Patient is agreeable. Strict return precautions and follow up instructions were discussed with the patient with which the patient agrees     Physical assessment findings, diagnostic testing(s) if applicable, and vital signs reviewed with patient/patient representative. Questions answered. Medications asdirected, including OTC medications for supportive care. Education provided on medications. Differential diagnosis(s) discussed with patient/patient representative. Home care/self care instructions reviewed withpatient/patient representative. Patient is to follow-up with family care provider in 2-3 days if no improvement. Patient is to go to the emergency department if symptoms worsen. Patient/patient representative isaware of care plan, questions answered, verbalizes understanding and is in agreement. ED Medications administered this visit:   Medications   LORazepam (ATIVAN) injection 1 mg (1 mg Intravenous Given 6/29/20 2329)   0.9 % sodium chloride bolus (0 mLs Intravenous Stopped 6/30/20 0208)   LORazepam (ATIVAN) injection 2 mg (2 mg Intravenous Given 6/30/20 0105)   0.9 % sodium chloride bolus (0 mLs Intravenous Stopped 6/30/20 0308)   LORazepam (ATIVAN) tablet 1 mg (1 mg Oral Given 6/30/20 0605)           I have given the patient strict written and verbal instructions about care at home,follow-up, and signs and symptoms of worsening of condition and they did verbalize understanding. Patient was seen independently by myself. The Patient's final impression and disposition and plan was determined by myself.        CRITICAL CARE:

## 2020-07-01 ENCOUNTER — OFFICE VISIT (OUTPATIENT)
Dept: INTERNAL MEDICINE CLINIC | Age: 35
End: 2020-07-01
Payer: MEDICARE

## 2020-07-01 VITALS
HEIGHT: 70 IN | BODY MASS INDEX: 27.2 KG/M2 | HEART RATE: 111 BPM | TEMPERATURE: 98.1 F | DIASTOLIC BLOOD PRESSURE: 83 MMHG | WEIGHT: 190 LBS | SYSTOLIC BLOOD PRESSURE: 139 MMHG

## 2020-07-01 PROCEDURE — G8427 DOCREV CUR MEDS BY ELIG CLIN: HCPCS | Performed by: NURSE PRACTITIONER

## 2020-07-01 PROCEDURE — 1111F DSCHRG MED/CURRENT MED MERGE: CPT | Performed by: NURSE PRACTITIONER

## 2020-07-01 PROCEDURE — 99214 OFFICE O/P EST MOD 30 MIN: CPT | Performed by: NURSE PRACTITIONER

## 2020-07-01 PROCEDURE — 1036F TOBACCO NON-USER: CPT | Performed by: NURSE PRACTITIONER

## 2020-07-01 PROCEDURE — G8419 CALC BMI OUT NRM PARAM NOF/U: HCPCS | Performed by: NURSE PRACTITIONER

## 2020-07-01 PROCEDURE — 80305 DRUG TEST PRSMV DIR OPT OBS: CPT | Performed by: NURSE PRACTITIONER

## 2020-07-01 RX ORDER — BUPRENORPHINE AND NALOXONE 8; 2 MG/1; MG/1
1 FILM, SOLUBLE BUCCAL; SUBLINGUAL ONCE
Status: COMPLETED | OUTPATIENT
Start: 2020-07-01 | End: 2020-07-01

## 2020-07-01 RX ORDER — BUPRENORPHINE AND NALOXONE 8; 2 MG/1; MG/1
1 FILM, SOLUBLE BUCCAL; SUBLINGUAL 2 TIMES DAILY
Qty: 10 FILM | Refills: 0 | Status: SHIPPED | OUTPATIENT
Start: 2020-07-01 | End: 2020-07-09 | Stop reason: SDUPTHER

## 2020-07-01 RX ADMIN — BUPRENORPHINE AND NALOXONE 1 FILM: 8; 2 FILM, SOLUBLE BUCCAL; SUBLINGUAL at 10:26

## 2020-07-01 NOTE — PROGRESS NOTES
2020     Landry Hunter (:  1985) is a 38 y.o. male, here for evaluation of the following medical concerns: Severe Opioid Use Disorder    I last seen Erin Bonner 4 days ago. I follow him for PCP and Dr. Odilia Grace follows for MAT; however I am seeing for MAT currently as Dr. Odilia Grace is out. He missed his follow up appointment, and relapsed reportedly. Was seen in the ER for an unintentional heroin overdose on , but signed out against medical advise. He is tearful, restless, and complains of severe leg cramping. Denies any use since leaving the hospital.    Urine positive for buprenorphine, opiates, and fentanyl. Urges and cravings controlled with Suboxone 8 mg BID    He needs to attend NA/AA meetings and/or arrange for counseling. Understanding voiced. Review of Systems   Constitutional: Negative for chills, fatigue and fever. HENT: Negative for congestion, rhinorrhea, sinus pressure, sinus pain, sore throat, tinnitus and trouble swallowing. Eyes: Negative for photophobia and visual disturbance. Respiratory: Negative for cough, shortness of breath and wheezing. Cardiovascular: Negative for chest pain, palpitations and leg swelling. Gastrointestinal: Negative for abdominal distention, abdominal pain, blood in stool, constipation, diarrhea, nausea and vomiting. Endocrine: Negative for polydipsia, polyphagia and polyuria. Genitourinary: Negative for difficulty urinating, dysuria, frequency, hematuria and urgency. Musculoskeletal: Positive for myalgias (generalized- fibromyalgia). Negative for arthralgias. Skin: Negative for rash and wound. Neurological: Negative for dizziness, light-headedness and headaches. Psychiatric/Behavioral: Positive for dysphoric mood. Negative for sleep disturbance and suicidal ideas. The patient is nervous/anxious. Prior to Visit Medications    Medication Sig Taking?  Authorizing Provider   pregabalin (LYRICA) 300 MG capsule Take 1 capsule by mouth 2 times daily for 30 days. Yes PAPA Crocker CNP   venlafaxine (EFFEXOR XR) 150 MG extended release capsule Take 1 capsule by mouth 2 times daily Yes PAPA Crocker CNP   venlafaxine (EFFEXOR) 100 MG tablet Take 1 tablet by mouth 3 times daily Yes PAPA Crocker CNP   lisinopril (PRINIVIL;ZESTRIL) 20 MG tablet Take 1 tablet by mouth daily Yes PAPA Crocker CNP   mirtazapine (REMERON) 45 MG tablet Take 1 tablet by mouth nightly Yes PAPA Crocker CNP   gabapentin (NEURONTIN) 800 MG tablet Take 1 tablet by mouth 3 times daily for 7 days. Christine Go MD   buprenorphine-naloxone (SUBOXONE) 8-2 MG FILM SL film Place 1 Film under the tongue 2 times daily for 3 days. Christine Go MD   buprenorphine-naloxone (SUBOXONE) 8-2 MG FILM SL film Place 1 Film under the tongue 2 times daily for 4 days. Christine Go MD   ibuprofen (ADVIL;MOTRIN) 800 MG tablet Take 1 tablet by mouth 2 times daily as needed for Pain  PAPA Crocker CNP        Social History     Tobacco Use    Smoking status: Former Smoker     Packs/day: 0.50     Types: Cigarettes    Smokeless tobacco: Never Used   Substance Use Topics    Alcohol use: Yes     Comment: occasional        Vitals:    07/01/20 0941   BP: 139/83   Site: Right Upper Arm   Position: Sitting   Cuff Size: Medium Adult   Pulse: 111   Temp: 98.1 °F (36.7 °C)   Weight: 190 lb (86.2 kg)   Height: 5' 10\" (1.778 m)     Estimated body mass index is 27.26 kg/m² as calculated from the following:    Height as of this encounter: 5' 10\" (1.778 m). Weight as of this encounter: 190 lb (86.2 kg). Physical Exam  Vitals signs reviewed. Constitutional:       General: He is not in acute distress. Appearance: Normal appearance. He is not ill-appearing. HENT:      Head: Normocephalic and atraumatic.       Right Ear: External ear normal.      Left Ear: External ear normal.      Nose: Nose normal. No congestion or rhinorrhea. Mouth/Throat:      Mouth: Mucous membranes are moist.   Eyes:      Extraocular Movements: Extraocular movements intact. Conjunctiva/sclera: Conjunctivae normal.      Pupils: Pupils are equal, round, and reactive to light. Neck:      Musculoskeletal: Normal range of motion and neck supple. Pulmonary:      Effort: Pulmonary effort is normal. No respiratory distress. Musculoskeletal: Normal range of motion. Right lower leg: No edema. Left lower leg: No edema. Skin:     General: Skin is warm and dry. Neurological:      General: No focal deficit present. Mental Status: He is alert and oriented to person, place, and time. Psychiatric:         Mood and Affect: Mood normal. Affect is tearful. Behavior: Behavior normal.         Thought Content: Thought content normal.         Judgment: Judgment normal.         ASSESSMENT/PLAN:    1. Severe opioid use disorder (HCC)    - POCT Rapid Drug Screen  - buprenorphine-naloxone (SUBOXONE) 8-2 MG SL film 1 Film. Given in office, tolerated well. - Narcan at home  - Discussed potential risks associated with suboxone and illicit drug use including precipitated withdrawal and/or overdose. Understanding voiced,  - OARRS reviewed- no discrepancies  - Attend NA/AA meetings and/or arrange for individualized counseling         Return in about 5 days (around 7/6/2020) for with [facility]. An electronic signature was used to authenticate this note.     --PAPA López - CNP on 7/20/2020 at 8:41 PM

## 2020-07-01 NOTE — PROGRESS NOTES
Verbal order per Gennett Cheadle CNP for urine drug screen. Positive for BUP MOP Fentanyl. Verified results with Etelvina FIORE CNP ordered Suboxone 8mg film qty 1 out of stock. Patient sent home with 5 day script of Suboxone 8mg film BID. Patient back in office 7/6/20.

## 2020-07-02 NOTE — DISCHARGE SUMMARY
Hospital Medicine Discharge Summary      Patient Identification:   Funmi Vance   : 1985  MRN: [id number]   Account: [de-identified]      Patient's PCP: PAPA Roque CNP    Admit Date: 2020     Discharge Date: 2020      Admitting Physician: Alok Almazan MD     Discharge Physician: Katlyn Boucher MD     Discharge Diagnoses: Unintentional Drug Overdose, Polysubstance Abuse, [facility], [facility]     Active Hospital Problems    Diagnosis Date Noted    Drug overdose, accidental or unintentional, initial encounter Jeromy Oroczo 2020       The patient was seen and examined on day of discharge and this discharge summary is in conjunction with any daily progress note from day of discharge. Hospital Course:     38 y.o. male with past medical history of polysubstance abuse, on Suboxone, anxiety, depression, liver disease, recent incarceration, recent admission for COVID-19 with discharge on , with a negative COVID test done . Patient has had admissions in the past for substance abuse and overdose. He follows up with psychiatry and substance abuse providers in the outpatient setting. On admission he told the ER that he took methamphetamines, heroin, crack but there could be others as well. He has done IV drug abuse in the past. When the EMS evaluated him, he was unresponsive, they gave him 4 mg of Narcan. Patient became responsive at the scene but stated that he could not breathe. He was brought into the hospital where he was found to be diaphoretic, tachycardic. His vital signs were otherwise stable. Patient became very agitated in the ER and thus received multiple doses of Ativan. Patient was also found to have a creatinine of 2.4 from a baseline of 0.8, anion gap 26, total , glucose 135, urine drug screen with buprenorphine, opiates and methamphetamines. Pt started on aggressive IV fluids. Kidney function back to baseline. Mental status at baseline. Pt left AMA.

## 2020-07-06 ENCOUNTER — OFFICE VISIT (OUTPATIENT)
Dept: INTERNAL MEDICINE CLINIC | Age: 35
End: 2020-07-06
Payer: MEDICARE

## 2020-07-06 VITALS
DIASTOLIC BLOOD PRESSURE: 86 MMHG | BODY MASS INDEX: 25.48 KG/M2 | HEIGHT: 70 IN | WEIGHT: 178 LBS | SYSTOLIC BLOOD PRESSURE: 135 MMHG | TEMPERATURE: 97.2 F | HEART RATE: 88 BPM

## 2020-07-06 PROCEDURE — 99213 OFFICE O/P EST LOW 20 MIN: CPT | Performed by: INTERNAL MEDICINE

## 2020-07-06 PROCEDURE — G8428 CUR MEDS NOT DOCUMENT: HCPCS | Performed by: INTERNAL MEDICINE

## 2020-07-06 PROCEDURE — 1111F DSCHRG MED/CURRENT MED MERGE: CPT | Performed by: INTERNAL MEDICINE

## 2020-07-06 PROCEDURE — G8419 CALC BMI OUT NRM PARAM NOF/U: HCPCS | Performed by: INTERNAL MEDICINE

## 2020-07-06 PROCEDURE — 80305 DRUG TEST PRSMV DIR OPT OBS: CPT | Performed by: INTERNAL MEDICINE

## 2020-07-06 PROCEDURE — 1036F TOBACCO NON-USER: CPT | Performed by: INTERNAL MEDICINE

## 2020-07-06 RX ORDER — BUPRENORPHINE AND NALOXONE 8; 2 MG/1; MG/1
1 FILM, SOLUBLE BUCCAL; SUBLINGUAL ONCE
Status: COMPLETED | OUTPATIENT
Start: 2020-07-06 | End: 2020-07-06

## 2020-07-06 RX ORDER — BUPRENORPHINE AND NALOXONE 8; 2 MG/1; MG/1
1 FILM, SOLUBLE BUCCAL; SUBLINGUAL 2 TIMES DAILY
Qty: 2 FILM | Refills: 0 | Status: CANCELLED | OUTPATIENT
Start: 2020-07-06 | End: 2020-07-07

## 2020-07-06 RX ADMIN — BUPRENORPHINE AND NALOXONE 1 FILM: 8; 2 FILM, SOLUBLE BUCCAL; SUBLINGUAL at 16:18

## 2020-07-06 ASSESSMENT — ENCOUNTER SYMPTOMS
ABDOMINAL DISTENTION: 0
PHOTOPHOBIA: 0
SINUS PAIN: 0
WHEEZING: 0
CONSTIPATION: 0
COUGH: 0
DIARRHEA: 1
SHORTNESS OF BREATH: 0
VOMITING: 1
NAUSEA: 1
TROUBLE SWALLOWING: 0
RHINORRHEA: 1
ABDOMINAL PAIN: 0
SORE THROAT: 0
BLOOD IN STOOL: 0
SINUS PRESSURE: 0
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
## 2020-07-06 NOTE — PROGRESS NOTES
Verbal order per Dr. Belinda Pacheco for urine drug screen. Positive for BUP THC Fentanyl. Verified results with Suzanne Amaya RN. Dr. Belinda Pacheco ordered Suboxone 8mg film qty 1 for induction purpose- no adverse reactions noted for patient. Verified dose with patient. Patient was sent home with Suboxone 8mg film qty 2 out of stock and will be seen back in the office 7/7/20.

## 2020-07-07 ENCOUNTER — OFFICE VISIT (OUTPATIENT)
Dept: INTERNAL MEDICINE CLINIC | Age: 35
End: 2020-07-07
Payer: MEDICARE

## 2020-07-07 ENCOUNTER — HOSPITAL ENCOUNTER (EMERGENCY)
Age: 35
Discharge: HOME | End: 2020-07-07
Attending: EMERGENCY MEDICINE
Payer: MEDICARE

## 2020-07-07 ENCOUNTER — TELEPHONE (OUTPATIENT)
Dept: INTERNAL MEDICINE CLINIC | Age: 35
End: 2020-07-07

## 2020-07-07 VITALS
SYSTOLIC BLOOD PRESSURE: 131 MMHG | BODY MASS INDEX: 26.63 KG/M2 | TEMPERATURE: 97.3 F | WEIGHT: 186 LBS | DIASTOLIC BLOOD PRESSURE: 79 MMHG | HEIGHT: 70 IN | HEART RATE: 82 BPM

## 2020-07-07 VITALS
OXYGEN SATURATION: 96 % | HEIGHT: 71 IN | RESPIRATION RATE: 16 BRPM | DIASTOLIC BLOOD PRESSURE: 90 MMHG | SYSTOLIC BLOOD PRESSURE: 139 MMHG | BODY MASS INDEX: 24.5 KG/M2 | WEIGHT: 175 LBS | HEART RATE: 82 BPM | TEMPERATURE: 97.7 F

## 2020-07-07 PROCEDURE — 1036F TOBACCO NON-USER: CPT | Performed by: INTERNAL MEDICINE

## 2020-07-07 PROCEDURE — G8420 CALC BMI NORM PARAMETERS: HCPCS | Performed by: INTERNAL MEDICINE

## 2020-07-07 PROCEDURE — 80305 DRUG TEST PRSMV DIR OPT OBS: CPT | Performed by: INTERNAL MEDICINE

## 2020-07-07 PROCEDURE — 99282 EMERGENCY DEPT VISIT SF MDM: CPT

## 2020-07-07 PROCEDURE — 1111F DSCHRG MED/CURRENT MED MERGE: CPT | Performed by: INTERNAL MEDICINE

## 2020-07-07 PROCEDURE — G8427 DOCREV CUR MEDS BY ELIG CLIN: HCPCS | Performed by: INTERNAL MEDICINE

## 2020-07-07 PROCEDURE — 99213 OFFICE O/P EST LOW 20 MIN: CPT | Performed by: INTERNAL MEDICINE

## 2020-07-07 RX ORDER — SULFAMETHOXAZOLE AND TRIMETHOPRIM 800; 160 MG/1; MG/1
1 TABLET ORAL 2 TIMES DAILY
Qty: 20 TABLET | Refills: 0 | Status: SHIPPED | OUTPATIENT
Start: 2020-07-07 | End: 2020-07-17

## 2020-07-07 RX ORDER — BUPRENORPHINE AND NALOXONE 8; 2 MG/1; MG/1
1 FILM, SOLUBLE BUCCAL; SUBLINGUAL 2 TIMES DAILY
Qty: 12 FILM | Refills: 0 | Status: CANCELLED | OUTPATIENT
Start: 2020-07-07 | End: 2020-07-13

## 2020-07-07 NOTE — ED PROVIDER NOTES
[address] [facility] [address] [address] EMERGENCY DEPT    EMERGENCY MEDICINE     Pt Name: Adam Millan  MRN: [id number]  [place] 1985  Date of evaluation: 7/7/2020  Provider: Matthew Villasenor MD, [address]       Chief Complaint   Patient presents with    Other     sores on body        HISTORY OF PRESENT ILLNESS    Adam Millan is a 38 y.o. male who presents to the emergency department from home due to sores on his body that he noticed 2 days ago. He states that they first started on his chest, then progressed to his underarms. No genital lesions. 1 sexual partner. Last time using IV heroin was July 3. Patient otherwise states that sores are similar to his previous sores that he has had from using meth. Some of the sores are pustules. Otherwise, no systemic symptoms, no fever, chills, vision changes, nausea or vomiting, chest pain or shortness of breath. Patient otherwise also has a stye on his right eye, that has been present for 3 days. Patient has no vision changes associated. Triage notes and Nursing notes were reviewed by myself. Any discrepancies are addressed above. PAST MEDICAL HISTORY     Past Medical History:   Diagnosis Date    Anxiety     Depression     Kidney stone     Liver disease     Hep C    Opiate abuse, continuous ([facility] [address]        SURGICAL HISTORY     History reviewed. No pertinent surgical history. CURRENT MEDICATIONS       Discharge Medication List as of 7/7/2020 10:33 AM      CONTINUE these medications which have NOT CHANGED    Details   pregabalin (LYRICA) 300 MG capsule Take 1 capsule by mouth 2 times daily for 30 days. , Disp-60 capsule, R-0Normal      venlafaxine (EFFEXOR XR) 150 MG extended release capsule Take 1 capsule by mouth 2 times daily, Disp-60 capsule, R-0Normal      venlafaxine (EFFEXOR) 100 MG tablet Take 1 tablet by mouth 3 times daily, Disp-90 tablet, R-0Normal      lisinopril (PRINIVIL;ZESTRIL) 20 MG tablet Take 1 tablet by mouth daily, Disp-30 tablet, R-3Print mirtazapine (REMERON) 45 MG tablet Take 1 tablet by mouth nightly, Disp-30 tablet, R-3Print      ibuprofen (ADVIL;MOTRIN) 800 MG tablet Take 1 tablet by mouth 2 times daily as needed for Pain, Disp-30 tablet, R-3Print             ALLERGIES     Patient has no known allergies. FAMILY HISTORY     History reviewed. No pertinent family history. SOCIAL HISTORY       Social History     Socioeconomic History    Marital status: Single     Spouse name: None    Number of children: None    Years of education: None    Highest education level: None   Occupational History    None   Social Needs    Financial resource strain: None    Food insecurity     Worry: None     Inability: None    Transportation needs     Medical: None     Non-medical: None   Tobacco Use    Smoking status: Former Smoker     Packs/day: 0.50     Types: Cigarettes    Smokeless tobacco: Never Used   Substance and Sexual Activity    Alcohol use: Yes     Comment: occasional    Drug use: Not Currently     Types: Opiates , IV, Cocaine, Marijuana, Methamphetamines     Comment:  last used IV Fentanyl/heroin 7/6/20 @ 0800.  last used meth 6/19/20    Sexual activity: Not Currently   Lifestyle    Physical activity     Days per week: None     Minutes per session: None    Stress: None   Relationships    Social connections     Talks on phone: None     Gets together: None     Attends Faith service: None     Active member of club or organization: None     Attends meetings of clubs or organizations: None     Relationship status: None    Intimate partner violence     Fear of current or ex partner: None     Emotionally abused: None     Physically abused: None     Forced sexual activity: None   Other Topics Concern    None   Social History Narrative    None       REVIEW OF SYSTEMS     Review of Systems  A full 10 point review of systems is otherwise negative except as mentioned in the above HPI    Except as noted above the remainder of the review Mood and Affect: Mood normal.         Behavior: Behavior normal.         DIAGNOSTIC RESULTS     EKG:(none if blank)  All E[facility]'s [facility] who either signs or Co-signs this chart in the absence of a cardiologist.        RADIOLOGY: (none if blank)   Interpretation per the Radiologistbelow, if available at the time of this note:    No orders to display       LABS:  Labs Reviewed - No data to display    All other labs were within normal range or not returned as of this dictation. Please note, any cultures that may have been sent were not resulted at the time of this patient visit. EMERGENCY DEPARTMENT COURSE andMedical Decision Making:     MDM/  ED Course as of Jul 07 1413   Tue Jul 07, 2020   1008 Patient is IV drug user. Injects. Sores on body    [AL]   1017 Patient uses meth and heroin, otherwise last time he injected was July 3. Patient states that he has been on Suboxone since then, and has not injected. 2 days ago he started to get sores on his body. He states that they are similar to the sores that he used to get when he used meth, and he has scars from when he picked at sores  I checked the sores, there are multiple on patient's abdomen, chest, and under his armpits. None of them appear to be infected. Some have pustules. There are none in the genital region. Patient otherwise has no murmur, no chest pain or shortness of breath, no fever or chills, hemodynamically stable, vitals within normal limits, I do not suspect any systemic emergency at this time. Patient otherwise states that he usually gets antibiotics for these and they go away. Patient also has a stye on his right eye, no vision changes, has been trying warm compresses, and it has been draining. Encourage warm compresses, will give Bactrim for the sores, has primary care appointment today at 3:30 PM, which he will follow-up with for reevaluation.   Patient otherwise will need to go to the

## 2020-07-07 NOTE — PROGRESS NOTES
Discussed inpatient options with pt during visit with Dr. Angie Smith. Pt reports that he is on parole and unable to leave the [facility]. Clinician inquired about pt signing a NAGA so that clinician could advocate for inpatient treatment. Pt stated this would be an option but he is reluctant to do so because he has \"flown under the radar\" because of COVID-19 and his PO is unaware of his active use and overdoses. He fears sanctions and incarcerations. Clinician encouraged consideration for inpatient treatment but also offered information regarding dual outpatient services and strongly encouraged attending treatment groups to enhance accountability and support. Recommended pt complete a walk-in DA at [facility] tomorrow morning to initiate counseling, group, and psychiatry services.

## 2020-07-07 NOTE — ED TRIAGE NOTES
Patient arrived to room 19 with c/o sores on his body. Patient stated he is drug user and injects and has sores on his body.

## 2020-07-07 NOTE — TELEPHONE ENCOUNTER
Per Penny Dimas request  I left a message for patient to call the office. He needs to come in earlier than his 3:30pm time today.

## 2020-07-07 NOTE — PROGRESS NOTES
Verbal order per Dr. [facility] for urine drug screen. Positive for BUP MOP THC Fentanyl. Verified results with Julia Marques. Dr. LAL [facility] ordered Suboxone 8mg film qty 4 out of stock  for patient. Verified dose with patient. Patient was sent home with Suboxone 8mg film qty 4 out of stock and will be seen back in the office 7/9/20. UC sent.

## 2020-07-09 ENCOUNTER — OFFICE VISIT (OUTPATIENT)
Dept: INTERNAL MEDICINE CLINIC | Age: 35
End: 2020-07-09
Payer: MEDICARE

## 2020-07-09 VITALS
HEIGHT: 70 IN | BODY MASS INDEX: 25.77 KG/M2 | HEART RATE: 79 BPM | WEIGHT: 180 LBS | SYSTOLIC BLOOD PRESSURE: 139 MMHG | TEMPERATURE: 97.2 F | DIASTOLIC BLOOD PRESSURE: 79 MMHG

## 2020-07-09 PROCEDURE — 1111F DSCHRG MED/CURRENT MED MERGE: CPT | Performed by: INTERNAL MEDICINE

## 2020-07-09 PROCEDURE — 1036F TOBACCO NON-USER: CPT | Performed by: INTERNAL MEDICINE

## 2020-07-09 PROCEDURE — 99213 OFFICE O/P EST LOW 20 MIN: CPT | Performed by: INTERNAL MEDICINE

## 2020-07-09 PROCEDURE — G8419 CALC BMI OUT NRM PARAM NOF/U: HCPCS | Performed by: INTERNAL MEDICINE

## 2020-07-09 PROCEDURE — 80305 DRUG TEST PRSMV DIR OPT OBS: CPT | Performed by: INTERNAL MEDICINE

## 2020-07-09 PROCEDURE — G8427 DOCREV CUR MEDS BY ELIG CLIN: HCPCS | Performed by: INTERNAL MEDICINE

## 2020-07-09 RX ORDER — BUPRENORPHINE AND NALOXONE 8; 2 MG/1; MG/1
1 FILM, SOLUBLE BUCCAL; SUBLINGUAL 2 TIMES DAILY
Qty: 10 FILM | Refills: 0 | Status: SHIPPED | OUTPATIENT
Start: 2020-07-09 | End: 2020-07-13 | Stop reason: SDUPTHER

## 2020-07-11 ASSESSMENT — ENCOUNTER SYMPTOMS
NAUSEA: 0
SORE THROAT: 0
SINUS PAIN: 0
COUGH: 0
SINUS PRESSURE: 0
PHOTOPHOBIA: 0
CONSTIPATION: 0
BLOOD IN STOOL: 0
ABDOMINAL PAIN: 0
ABDOMINAL DISTENTION: 0
WHEEZING: 0
SHORTNESS OF BREATH: 0
DIARRHEA: 0
RHINORRHEA: 0
TROUBLE SWALLOWING: 0
VOMITING: 0
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
## 2020-07-13 ENCOUNTER — OFFICE VISIT (OUTPATIENT)
Dept: INTERNAL MEDICINE CLINIC | Age: 35
End: 2020-07-13
Payer: MEDICARE

## 2020-07-13 VITALS
WEIGHT: 178 LBS | HEIGHT: 70 IN | BODY MASS INDEX: 25.48 KG/M2 | SYSTOLIC BLOOD PRESSURE: 135 MMHG | HEART RATE: 99 BPM | DIASTOLIC BLOOD PRESSURE: 89 MMHG | TEMPERATURE: 97.2 F

## 2020-07-13 PROCEDURE — 99213 OFFICE O/P EST LOW 20 MIN: CPT | Performed by: INTERNAL MEDICINE

## 2020-07-13 PROCEDURE — 1036F TOBACCO NON-USER: CPT | Performed by: INTERNAL MEDICINE

## 2020-07-13 PROCEDURE — G8419 CALC BMI OUT NRM PARAM NOF/U: HCPCS | Performed by: INTERNAL MEDICINE

## 2020-07-13 PROCEDURE — 1111F DSCHRG MED/CURRENT MED MERGE: CPT | Performed by: INTERNAL MEDICINE

## 2020-07-13 PROCEDURE — 80305 DRUG TEST PRSMV DIR OPT OBS: CPT | Performed by: INTERNAL MEDICINE

## 2020-07-13 PROCEDURE — G8427 DOCREV CUR MEDS BY ELIG CLIN: HCPCS | Performed by: INTERNAL MEDICINE

## 2020-07-13 RX ORDER — BUPRENORPHINE AND NALOXONE 8; 2 MG/1; MG/1
1 FILM, SOLUBLE BUCCAL; SUBLINGUAL 2 TIMES DAILY
Qty: 6 FILM | Refills: 0 | Status: SHIPPED | OUTPATIENT
Start: 2020-07-13 | End: 2020-07-16 | Stop reason: SDUPTHER
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
## 2020-07-16 ENCOUNTER — OFFICE VISIT (OUTPATIENT)
Dept: INTERNAL MEDICINE CLINIC | Age: 35
End: 2020-07-16
Payer: MEDICARE

## 2020-07-16 VITALS
SYSTOLIC BLOOD PRESSURE: 136 MMHG | BODY MASS INDEX: 26.05 KG/M2 | HEART RATE: 107 BPM | TEMPERATURE: 98.2 F | DIASTOLIC BLOOD PRESSURE: 84 MMHG | WEIGHT: 182 LBS | HEIGHT: 70 IN

## 2020-07-16 PROCEDURE — 80305 DRUG TEST PRSMV DIR OPT OBS: CPT | Performed by: INTERNAL MEDICINE

## 2020-07-16 PROCEDURE — 99213 OFFICE O/P EST LOW 20 MIN: CPT | Performed by: INTERNAL MEDICINE

## 2020-07-16 PROCEDURE — G8427 DOCREV CUR MEDS BY ELIG CLIN: HCPCS | Performed by: INTERNAL MEDICINE

## 2020-07-16 PROCEDURE — 1036F TOBACCO NON-USER: CPT | Performed by: INTERNAL MEDICINE

## 2020-07-16 PROCEDURE — 1111F DSCHRG MED/CURRENT MED MERGE: CPT | Performed by: INTERNAL MEDICINE

## 2020-07-16 PROCEDURE — G8419 CALC BMI OUT NRM PARAM NOF/U: HCPCS | Performed by: INTERNAL MEDICINE

## 2020-07-16 RX ORDER — BUPRENORPHINE AND NALOXONE 8; 2 MG/1; MG/1
1 FILM, SOLUBLE BUCCAL; SUBLINGUAL 2 TIMES DAILY
Qty: 8 FILM | Refills: 0 | Status: SHIPPED | OUTPATIENT
Start: 2020-07-16 | End: 2020-07-20
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
## 2020-07-20 ENCOUNTER — OFFICE VISIT (OUTPATIENT)
Dept: INTERNAL MEDICINE CLINIC | Age: 35
End: 2020-07-20
Payer: MEDICARE

## 2020-07-20 ENCOUNTER — HOSPITAL ENCOUNTER (EMERGENCY)
Age: 35
Discharge: LEFT BEFORE DISCHARGE | End: 2020-07-20
Attending: EMERGENCY MEDICINE
Payer: MEDICARE

## 2020-07-20 VITALS
DIASTOLIC BLOOD PRESSURE: 97 MMHG | OXYGEN SATURATION: 100 % | HEART RATE: 113 BPM | HEIGHT: 70 IN | TEMPERATURE: 98.3 F | WEIGHT: 175 LBS | RESPIRATION RATE: 19 BRPM | BODY MASS INDEX: 25.05 KG/M2 | SYSTOLIC BLOOD PRESSURE: 141 MMHG

## 2020-07-20 PROCEDURE — 99281 EMR DPT VST MAYX REQ PHY/QHP: CPT

## 2020-07-20 PROCEDURE — 1111F DSCHRG MED/CURRENT MED MERGE: CPT | Performed by: INTERNAL MEDICINE

## 2020-07-20 PROCEDURE — G8428 CUR MEDS NOT DOCUMENT: HCPCS | Performed by: INTERNAL MEDICINE

## 2020-07-20 PROCEDURE — 1036F TOBACCO NON-USER: CPT | Performed by: INTERNAL MEDICINE

## 2020-07-20 PROCEDURE — G8419 CALC BMI OUT NRM PARAM NOF/U: HCPCS | Performed by: INTERNAL MEDICINE

## 2020-07-20 PROCEDURE — 80305 DRUG TEST PRSMV DIR OPT OBS: CPT | Performed by: INTERNAL MEDICINE

## 2020-07-20 PROCEDURE — 99214 OFFICE O/P EST MOD 30 MIN: CPT | Performed by: INTERNAL MEDICINE

## 2020-07-20 RX ORDER — BUPRENORPHINE AND NALOXONE 8; 2 MG/1; MG/1
1 FILM, SOLUBLE BUCCAL; SUBLINGUAL 2 TIMES DAILY
Qty: 6 FILM | Refills: 0 | Status: SHIPPED | OUTPATIENT
Start: 2020-07-20 | End: 2020-07-23

## 2020-07-20 RX ORDER — BUPRENORPHINE AND NALOXONE 8; 2 MG/1; MG/1
1 FILM, SOLUBLE BUCCAL; SUBLINGUAL ONCE
Status: COMPLETED | OUTPATIENT
Start: 2020-07-20 | End: 2020-07-20

## 2020-07-20 RX ORDER — GABAPENTIN 800 MG/1
800 TABLET ORAL 3 TIMES DAILY
Qty: 21 TABLET | Refills: 0 | Status: SHIPPED | OUTPATIENT
Start: 2020-07-20 | End: 2020-07-24

## 2020-07-20 RX ADMIN — BUPRENORPHINE AND NALOXONE 1 FILM: 8; 2 FILM, SOLUBLE BUCCAL; SUBLINGUAL at 12:35

## 2020-07-20 ASSESSMENT — ENCOUNTER SYMPTOMS
SHORTNESS OF BREATH: 0
RHINORRHEA: 0
VOMITING: 0
NAUSEA: 0
ABDOMINAL DISTENTION: 0
SINUS PRESSURE: 0
DIARRHEA: 0
CONSTIPATION: 0
COUGH: 0
PHOTOPHOBIA: 0
ABDOMINAL PAIN: 0
BLOOD IN STOOL: 0
TROUBLE SWALLOWING: 0
WHEEZING: 0
SORE THROAT: 0
SINUS PAIN: 0

## 2020-07-20 NOTE — ED NOTES
Dr. Tracey Alpaugh at bedside explaining to patient that he cannot call nursing staff and tell them to give him Suboxone. Pt. Became very furious with staff and stormed out of room.       Janessa Park RN  07/20/20 1002
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
## 2020-07-23 ENCOUNTER — NURSE ONLY (OUTPATIENT)
Dept: INTERNAL MEDICINE CLINIC | Age: 35
End: 2020-07-23
Payer: MEDICARE

## 2020-07-23 PROCEDURE — 80305 DRUG TEST PRSMV DIR OPT OBS: CPT | Performed by: NURSE PRACTITIONER

## 2020-07-23 NOTE — PROGRESS NOTES
Per Verbal order of NAI Garcia  for urine drug screen. Patient is positive for MICHI,FEN,THC,MOP. Verified with results with Raquel Mckeon LPN.

## 2020-07-24 ENCOUNTER — OFFICE VISIT (OUTPATIENT)
Dept: INTERNAL MEDICINE CLINIC | Age: 35
End: 2020-07-24
Payer: MEDICARE

## 2020-07-24 VITALS
SYSTOLIC BLOOD PRESSURE: 131 MMHG | TEMPERATURE: 97.7 F | HEIGHT: 70 IN | DIASTOLIC BLOOD PRESSURE: 88 MMHG | HEART RATE: 89 BPM | WEIGHT: 181 LBS | BODY MASS INDEX: 25.91 KG/M2

## 2020-07-24 PROCEDURE — 99214 OFFICE O/P EST MOD 30 MIN: CPT | Performed by: NURSE PRACTITIONER

## 2020-07-24 PROCEDURE — 1111F DSCHRG MED/CURRENT MED MERGE: CPT | Performed by: NURSE PRACTITIONER

## 2020-07-24 PROCEDURE — G8419 CALC BMI OUT NRM PARAM NOF/U: HCPCS | Performed by: NURSE PRACTITIONER

## 2020-07-24 PROCEDURE — 1036F TOBACCO NON-USER: CPT | Performed by: NURSE PRACTITIONER

## 2020-07-24 PROCEDURE — 80305 DRUG TEST PRSMV DIR OPT OBS: CPT | Performed by: NURSE PRACTITIONER

## 2020-07-24 PROCEDURE — G8427 DOCREV CUR MEDS BY ELIG CLIN: HCPCS | Performed by: NURSE PRACTITIONER

## 2020-07-24 RX ORDER — VENLAFAXINE 100 MG/1
100 TABLET ORAL 3 TIMES DAILY
Qty: 90 TABLET | Refills: 0 | Status: SHIPPED | OUTPATIENT
Start: 2020-07-24 | End: 2020-08-20 | Stop reason: SDUPTHER

## 2020-07-24 RX ORDER — NALOXONE HYDROCHLORIDE 4 MG/.1ML
1 SPRAY NASAL PRN
Qty: 1 EACH | Refills: 0 | Status: ON HOLD | OUTPATIENT
Start: 2020-07-24 | End: 2020-08-27

## 2020-07-24 RX ORDER — BUPRENORPHINE AND NALOXONE 8; 2 MG/1; MG/1
1 FILM, SOLUBLE BUCCAL; SUBLINGUAL 2 TIMES DAILY
Qty: 7 FILM | Refills: 0 | Status: SHIPPED | OUTPATIENT
Start: 2020-07-24 | End: 2020-07-27 | Stop reason: SDUPTHER

## 2020-07-24 RX ORDER — METHYLPREDNISOLONE 4 MG/1
TABLET ORAL
Qty: 1 KIT | Refills: 0 | Status: SHIPPED | OUTPATIENT
Start: 2020-07-24 | End: 2020-07-30

## 2020-07-24 RX ORDER — BUPRENORPHINE AND NALOXONE 8; 2 MG/1; MG/1
1 FILM, SOLUBLE BUCCAL; SUBLINGUAL 2 TIMES DAILY
COMMUNITY
End: 2020-07-27 | Stop reason: SDUPTHER

## 2020-07-24 ASSESSMENT — ENCOUNTER SYMPTOMS
COUGH: 0
NAUSEA: 0
VOMITING: 0
SHORTNESS OF BREATH: 0
DIARRHEA: 0
ABDOMINAL DISTENTION: 0
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
## 2020-07-24 NOTE — PROGRESS NOTES
Urine drug screen was ordered per yue morales CNP and was positive for BUP, MICHI, THC and MOP. Was also dipped for Fentanyl and was positive. Angelo Joseadria was notified.

## 2020-07-27 ENCOUNTER — OFFICE VISIT (OUTPATIENT)
Dept: INTERNAL MEDICINE CLINIC | Age: 35
End: 2020-07-27
Payer: MEDICARE

## 2020-07-27 VITALS
WEIGHT: 181.6 LBS | HEART RATE: 113 BPM | TEMPERATURE: 97.3 F | SYSTOLIC BLOOD PRESSURE: 145 MMHG | DIASTOLIC BLOOD PRESSURE: 91 MMHG | HEIGHT: 70 IN | BODY MASS INDEX: 26 KG/M2

## 2020-07-27 PROCEDURE — G8427 DOCREV CUR MEDS BY ELIG CLIN: HCPCS | Performed by: INTERNAL MEDICINE

## 2020-07-27 PROCEDURE — G8419 CALC BMI OUT NRM PARAM NOF/U: HCPCS | Performed by: INTERNAL MEDICINE

## 2020-07-27 PROCEDURE — 99213 OFFICE O/P EST LOW 20 MIN: CPT | Performed by: INTERNAL MEDICINE

## 2020-07-27 PROCEDURE — 1036F TOBACCO NON-USER: CPT | Performed by: INTERNAL MEDICINE

## 2020-07-27 PROCEDURE — 80305 DRUG TEST PRSMV DIR OPT OBS: CPT | Performed by: INTERNAL MEDICINE

## 2020-07-27 PROCEDURE — 1111F DSCHRG MED/CURRENT MED MERGE: CPT | Performed by: INTERNAL MEDICINE

## 2020-07-27 RX ORDER — PREGABALIN 300 MG/1
300 CAPSULE ORAL 2 TIMES DAILY
Qty: 60 CAPSULE | Refills: 0 | Status: SHIPPED | OUTPATIENT
Start: 2020-07-27 | End: 2020-08-20 | Stop reason: SDUPTHER

## 2020-07-27 RX ORDER — BUPRENORPHINE AND NALOXONE 8; 2 MG/1; MG/1
1 FILM, SOLUBLE BUCCAL; SUBLINGUAL 2 TIMES DAILY
Qty: 7 FILM | Refills: 0 | Status: SHIPPED | OUTPATIENT
Start: 2020-07-27 | End: 2020-07-30

## 2020-07-27 NOTE — PROGRESS NOTES
Verbal order per Dr. Ethelle Kayser for urine drug screen. Positive for BUP. Verified results with Ashely Palacios MA. Dr. Ethelle Kayser ordered Suboxone 8 mg film BID for patient. Verified dose with patient. Patient was sent home with 3 day script for Suboxone 8 mg film BID and will be seen back in the office on 7/29/20.
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
## 2020-07-30 ENCOUNTER — APPOINTMENT (OUTPATIENT)
Dept: CT IMAGING | Age: 35
End: 2020-07-30
Payer: MEDICARE

## 2020-07-30 ENCOUNTER — HOSPITAL ENCOUNTER (EMERGENCY)
Age: 35
Discharge: LEFT BEFORE DISCHARGE | End: 2020-07-30
Attending: EMERGENCY MEDICINE
Payer: MEDICARE

## 2020-07-30 VITALS
TEMPERATURE: 100.6 F | HEART RATE: 90 BPM | WEIGHT: 181 LBS | DIASTOLIC BLOOD PRESSURE: 65 MMHG | OXYGEN SATURATION: 97 % | BODY MASS INDEX: 25.91 KG/M2 | SYSTOLIC BLOOD PRESSURE: 103 MMHG | RESPIRATION RATE: 11 BRPM | HEIGHT: 70 IN

## 2020-07-30 LAB
ACETAMINOPHEN LEVEL: < 5 UG/ML (ref 0–20)
ALBUMIN SERPL-MCNC: 4.5 G/DL (ref 3.5–5.1)
ALP BLD-CCNC: 92 U/L (ref 38–126)
ALT SERPL-CCNC: 50 U/L (ref 11–66)
AMPHETAMINE+METHAMPHETAMINE URINE SCREEN: POSITIVE
ANION GAP SERPL CALCULATED.3IONS-SCNC: 15 MEQ/L (ref 8–16)
AST SERPL-CCNC: 46 U/L (ref 5–40)
BACTERIA: ABNORMAL
BARBITURATE QUANTITATIVE URINE: NEGATIVE
BASOPHILS # BLD: 0.4 %
BASOPHILS ABSOLUTE: 0.1 THOU/MM3 (ref 0–0.1)
BENZODIAZEPINE QUANTITATIVE URINE: NEGATIVE
BILIRUB SERPL-MCNC: 0.4 MG/DL (ref 0.3–1.2)
BILIRUBIN DIRECT: < 0.2 MG/DL (ref 0–0.3)
BILIRUBIN URINE: NEGATIVE
BLOOD, URINE: ABNORMAL
BUN BLDV-MCNC: 16 MG/DL (ref 7–22)
CALCIUM SERPL-MCNC: 9.6 MG/DL (ref 8.5–10.5)
CANNABINOID QUANTITATIVE URINE: POSITIVE
CASTS: ABNORMAL /LPF
CASTS: ABNORMAL /LPF
CHARACTER, URINE: ABNORMAL
CHLORIDE BLD-SCNC: 99 MEQ/L (ref 98–111)
CO2: 27 MEQ/L (ref 23–33)
COCAINE METABOLITE QUANTITATIVE URINE: NEGATIVE
COLOR: ABNORMAL
CREAT SERPL-MCNC: 1.6 MG/DL (ref 0.4–1.2)
CRYSTALS: ABNORMAL
EKG ATRIAL RATE: 113 BPM
EKG P AXIS: 58 DEGREES
EKG P-R INTERVAL: 166 MS
EKG Q-T INTERVAL: 326 MS
EKG QRS DURATION: 86 MS
EKG QTC CALCULATION (BAZETT): 447 MS
EKG R AXIS: 46 DEGREES
EKG T AXIS: 56 DEGREES
EKG VENTRICULAR RATE: 113 BPM
EOSINOPHIL # BLD: 1.6 %
EOSINOPHILS ABSOLUTE: 0.3 THOU/MM3 (ref 0–0.4)
EPITHELIAL CELLS, UA: ABNORMAL /HPF
ERYTHROCYTE [DISTWIDTH] IN BLOOD BY AUTOMATED COUNT: 13.3 % (ref 11.5–14.5)
ERYTHROCYTE [DISTWIDTH] IN BLOOD BY AUTOMATED COUNT: 44.1 FL (ref 35–45)
ETHYL ALCOHOL, SERUM: < 0.01 %
GFR SERPL CREATININE-BSD FRML MDRD: 49 ML/MIN/1.73M2
GLUCOSE BLD-MCNC: 111 MG/DL (ref 70–108)
GLUCOSE, URINE: NEGATIVE MG/DL
HCT VFR BLD CALC: 40.2 % (ref 42–52)
HEMOGLOBIN: 12.9 GM/DL (ref 14–18)
IMMATURE GRANS (ABS): 0.08 THOU/MM3 (ref 0–0.07)
IMMATURE GRANULOCYTES: 0.5 %
KETONES, URINE: NEGATIVE
LEUKOCYTE EST, POC: NEGATIVE
LIPASE: 7.6 U/L (ref 5.6–51.3)
LYMPHOCYTES # BLD: 29.2 %
LYMPHOCYTES ABSOLUTE: 4.7 THOU/MM3 (ref 1–4.8)
MAGNESIUM: 2.5 MG/DL (ref 1.6–2.4)
MCH RBC QN AUTO: 29.1 PG (ref 26–33)
MCHC RBC AUTO-ENTMCNC: 32.1 GM/DL (ref 32.2–35.5)
MCV RBC AUTO: 90.7 FL (ref 80–94)
MISCELLANEOUS LAB TEST RESULT: ABNORMAL
MONOCYTES # BLD: 12.9 %
MONOCYTES ABSOLUTE: 2.1 THOU/MM3 (ref 0.4–1.3)
MUCUS: ABNORMAL
NITRITE, URINE: NEGATIVE
NUCLEATED RED BLOOD CELLS: 0 /100 WBC
OPIATES, URINE: POSITIVE
OSMOLALITY CALCULATION: 283.1 MOSMOL/KG (ref 275–300)
OXYCODONE: NEGATIVE
PH UA: 5 (ref 5–9)
PHENCYCLIDINE QUANTITATIVE URINE: POSITIVE
PLATELET # BLD: 446 THOU/MM3 (ref 130–400)
PLATELET ESTIMATE: ADEQUATE
PMV BLD AUTO: 9.3 FL (ref 9.4–12.4)
POTASSIUM SERPL-SCNC: 4.5 MEQ/L (ref 3.5–5.2)
PRO-BNP: 148.5 PG/ML (ref 0–450)
PROTEIN UA: 30 MG/DL
RBC # BLD: 4.43 MILL/MM3 (ref 4.7–6.1)
RBC URINE: ABNORMAL /HPF
RENAL EPITHELIAL, UA: ABNORMAL
SALICYLATE, SERUM: 0.5 MG/DL (ref 2–10)
SCAN OF BLOOD SMEAR: NORMAL
SEG NEUTROPHILS: 55.4 %
SEGMENTED NEUTROPHILS ABSOLUTE COUNT: 8.9 THOU/MM3 (ref 1.8–7.7)
SODIUM BLD-SCNC: 141 MEQ/L (ref 135–145)
SPECIFIC GRAVITY UA: 1.02 (ref 1–1.03)
TOTAL PROTEIN: 8.1 G/DL (ref 6.1–8)
TROPONIN T: 0.01 NG/ML
TSH SERPL DL<=0.05 MIU/L-ACNC: 2.1 UIU/ML (ref 0.4–4.2)
UROBILINOGEN, URINE: 1 EU/DL (ref 0–1)
WBC # BLD: 16.1 THOU/MM3 (ref 4.8–10.8)
WBC UA: ABNORMAL /HPF
YEAST: ABNORMAL

## 2020-07-30 PROCEDURE — 2709999900 HC NON-CHARGEABLE SUPPLY

## 2020-07-30 PROCEDURE — 82248 BILIRUBIN DIRECT: CPT

## 2020-07-30 PROCEDURE — G0480 DRUG TEST DEF 1-7 CLASSES: HCPCS

## 2020-07-30 PROCEDURE — 36415 COLL VENOUS BLD VENIPUNCTURE: CPT

## 2020-07-30 PROCEDURE — 6360000002 HC RX W HCPCS: Performed by: EMERGENCY MEDICINE

## 2020-07-30 PROCEDURE — 81001 URINALYSIS AUTO W/SCOPE: CPT

## 2020-07-30 PROCEDURE — 84484 ASSAY OF TROPONIN QUANT: CPT

## 2020-07-30 PROCEDURE — 80307 DRUG TEST PRSMV CHEM ANLYZR: CPT

## 2020-07-30 PROCEDURE — 93005 ELECTROCARDIOGRAM TRACING: CPT | Performed by: EMERGENCY MEDICINE

## 2020-07-30 PROCEDURE — 85025 COMPLETE CBC W/AUTO DIFF WBC: CPT

## 2020-07-30 PROCEDURE — 83735 ASSAY OF MAGNESIUM: CPT

## 2020-07-30 PROCEDURE — 99285 EMERGENCY DEPT VISIT HI MDM: CPT

## 2020-07-30 PROCEDURE — 80053 COMPREHEN METABOLIC PANEL: CPT

## 2020-07-30 PROCEDURE — 83690 ASSAY OF LIPASE: CPT

## 2020-07-30 PROCEDURE — 96374 THER/PROPH/DIAG INJ IV PUSH: CPT

## 2020-07-30 PROCEDURE — 83880 ASSAY OF NATRIURETIC PEPTIDE: CPT

## 2020-07-30 PROCEDURE — 84443 ASSAY THYROID STIM HORMONE: CPT

## 2020-07-30 PROCEDURE — 70450 CT HEAD/BRAIN W/O DYE: CPT

## 2020-07-30 RX ORDER — LORAZEPAM 2 MG/ML
INJECTION INTRAMUSCULAR
Status: DISCONTINUED
Start: 2020-07-30 | End: 2020-07-30 | Stop reason: HOSPADM

## 2020-07-30 RX ORDER — LORAZEPAM 2 MG/ML
2 INJECTION INTRAMUSCULAR ONCE
Status: COMPLETED | OUTPATIENT
Start: 2020-07-30 | End: 2020-07-30

## 2020-07-30 RX ADMIN — LORAZEPAM 2 MG: 2 INJECTION INTRAMUSCULAR; INTRAVENOUS at 00:43

## 2020-07-30 ASSESSMENT — ENCOUNTER SYMPTOMS
VOICE CHANGE: 0
VOMITING: 0
TROUBLE SWALLOWING: 0
WHEEZING: 0
EYE ITCHING: 0
SORE THROAT: 0
EYE DISCHARGE: 0
RHINORRHEA: 0
NAUSEA: 0
EYE REDNESS: 0
SHORTNESS OF BREATH: 0
CHOKING: 0
CONSTIPATION: 0
ABDOMINAL PAIN: 0
BACK PAIN: 0
COUGH: 0
ABDOMINAL DISTENTION: 0
PHOTOPHOBIA: 0
CHEST TIGHTNESS: 0
BLOOD IN STOOL: 0
DIARRHEA: 0
EYE PAIN: 0
SINUS PRESSURE: 0

## 2020-07-30 NOTE — ED NOTES
Pt beginning to calm down after being medicated. Pt VSS. Pt respiration easy and unlabored at this time.      Kristy Clifford RN  07/30/20 0927

## 2020-07-30 NOTE — ED PROVIDER NOTES
[facility]  eMERGENCY dEPARTMENT eNCOUnter          CHIEF COMPLAINT       Chief Complaint   Patient presents with    Drug Overdose       Nurses Notes reviewed and I agree except as noted in the HPI. HISTORY OF PRESENT ILLNESS    Kaycee Stevens is a 38 y.o. male who presents who was brought in by [facility] fire. Apparently he has been doing meth or crack all day. They were called out to bring him in this morning and they could not catch him because he ran away. Currently the patient is on the cot. He appears delusional.  He cannot stop moving. He is able to stop periodically and talk to me. He does admit to methamphetamine abuse. Patient is in mild to moderate distress most likely brought on by drug overdose. Otherwise awake and alert and able to answer questions. Not complaining of any physical pain at this time. REVIEW OF SYSTEMS     Review of Systems   Constitutional: Negative for activity change, appetite change, diaphoresis, fatigue and unexpected weight change. HENT: Negative for congestion, ear discharge, ear pain, hearing loss, rhinorrhea, sinus pressure, sore throat, trouble swallowing and voice change. Eyes: Negative for photophobia, pain, discharge, redness and itching. Respiratory: Negative for cough, choking, chest tightness, shortness of breath and wheezing. Cardiovascular: Negative for chest pain, palpitations and leg swelling. Gastrointestinal: Negative for abdominal distention, abdominal pain, blood in stool, constipation, diarrhea, nausea and vomiting. Endocrine: Negative for polydipsia, polyphagia and polyuria. Genitourinary: Negative for decreased urine volume, difficulty urinating, dysuria, enuresis, frequency, hematuria and urgency. Musculoskeletal: Negative for arthralgias, back pain, gait problem, myalgias, neck pain and neck stiffness. Skin: Negative for pallor and rash. Allergic/Immunologic: Negative for immunocompromised state. other components within normal limits   HEPATIC FUNCTION PANEL - Abnormal; Notable for the following components:    AST 46 (*)     Total Protein 8.1 (*)     All other components within normal limits   TROPONIN - Abnormal; Notable for the following components:    Troponin T 0.010 (*)     All other components within normal limits   MAGNESIUM - Abnormal; Notable for the following components:    Magnesium 2.5 (*)     All other components within normal limits   SALICYLATE LEVEL - Abnormal; Notable for the following components:    Salicylate, Serum 0.5 (*)     All other components within normal limits   GLOMERULAR FILTRATION RATE, ESTIMATED - Abnormal; Notable for the following components:    Est, Glom Filt Rate 49 (*)     All other components within normal limits   MICROSCOPIC URINALYSIS - Abnormal; Notable for the following components:    Blood, Urine MODERATE (*)     Protein, UA 30 (*)     Color, UA DK YELLOW (*)     Character, Urine CLOUDY (*)     All other components within normal limits   BRAIN NATRIURETIC PEPTIDE   LIPASE   TSH WITHOUT REFLEX   ETHANOL   ACETAMINOPHEN LEVEL   URINE DRUG SCREEN   ANION GAP   OSMOLALITY   SCAN OF BLOOD SMEAR       EMERGENCY DEPARTMENT COURSE:   Vitals:    Vitals:    07/30/20 0046 07/30/20 0116 07/30/20 0156 07/30/20 0243   BP: 113/74 (!) 96/56 107/60 103/65   Pulse: 112 101 93 90   Resp: 19 15 16 11   Temp:       TempSrc:       SpO2: 92% 92% 95% 97%   Weight:       Height:         Patient was assessed at bedside appropriate labs and imaging were ordered. Patient does have problems right now with being able to keep still. He is a danger to himself. He is placed in four-point restraints. Neurologically though he appears to be able to move all his extremities, they are intact. He is able to focus on me when I speak to him in a loud voice. Patient required Ativan to calm him down. Reviewed labs and imaging. It appears he is positive for methamphetamines opiates and cannabis. Patient was watched for approximately 4 hours. He had return to mentation. At this point he stated he wanted to be signed out. He states he wants no more further treatment. He is awake alert oriented neurologically intact. We did try to convince him to stay however he refused. At this point patient signed out [address] He was advised to return for treatment should he worsen in any way. The patient has decided to leave against medical advice, because he feels better and no longer wants any more treatment. He has normal mental status and adequate capacity to make medical decisions. The patient refuses hospital admission and wants to be discharged. The risks have been explained to the patient, including worsening illness, chronic pain, permanent disability, and death. The benefits of admission have also been explained, including the availability and proximity of nurses, physicians, monitoring, diagnostic testing, and treatment. The patient was able to understand and state the risks and benefits of hospital admission. This was witnessed by nurse. He had the opportunity to ask questions about his medical condition. The patient was treated to the extent that he would allow, and knows that he may return for care at any time. CRITICAL CARE:   None    CONSULTS:  None    PROCEDURES:  None    FINAL IMPRESSION      1. Left against medical advice    2. Methamphetamine abuse ([facility] [address]    3. Opioid abuse ([facility] [address]    4. Marijuana abuse          DISPOSITION/PLAN   Left AGAINST MEDICAL ADVICE    PATIENT REFERRED TO:  No follow-up provider specified.     DISCHARGE MEDICATIONS:  New Prescriptions    No medications on file       (Please note that portions of this note were completed with a voice recognition program.  Efforts were made to edit the dictations but occasionally words are mis-transcribed.)    Bev Spicer, [address] [place] [address], DO  07/30/20 0424

## 2020-07-30 NOTE — ED NOTES
Pt straight cathed for urine sample.  Pt urine collected, labeled and sent to lab      Ayala Jay RN  07/30/20 6459

## 2020-07-30 NOTE — ED NOTES
At this time pt is in four point restraints. Pt appears to be having involuntary movements. Pt has incomprehensible speech at this time. Pt very restless on cot.      Colie Baumgarten, RN  07/30/20 7837

## 2020-07-30 NOTE — ED NOTES
Verbal order obtained from Dr Renetta Jackson for 2mg Ativan.       Clovis Hedrick RN  07/30/20 5114

## 2020-07-30 NOTE — ED NOTES
Pt continues to rest on cot with eyes closed. Pt respirations easy and unlabored. Call light in reach. Will continue to monitor.      Mana Raymond RN  07/30/20 0303

## 2020-07-30 NOTE — ED TRIAGE NOTES
Pt to ED via EMS with c/o drug overdose. Pt arrives with [employer] at bedside. EMS reports pt reports taking meth and heroin today. Upon arrival to ED pt appears to be having involuntary movements. Pt is very restless on cot. Pt is A&O to person and year. Pt placed in four point restraints for pt safety per verbal order from Dr Teresa Rebolledo. Pt is diaphoretic at this time. Will continue to monitor.

## 2020-07-30 NOTE — ED NOTES
[place] [employer] at bedside to remove restraints. Pt resting calmly at this time. Pt VSS.  Will continue to monitor     Mildred Cosme RN  07/30/20 0406

## 2020-07-30 NOTE — ED NOTES
Upon entering pt room, pt awake and wishing to leave AMA. Dr Suero Sanjay informed. Dr Suero Sanjay into room to assess pt. AMA paper signed at this time.      Clovis Hedrick RN  07/30/20 3615

## 2020-07-30 NOTE — ED NOTES
Pt returned to room from CT. Pt resting on cot. VSS. Will continue to monitor.       Jeffrey Rosenbaum RN  07/30/20 0157

## 2020-08-20 RX ORDER — MIRTAZAPINE 45 MG/1
45 TABLET, FILM COATED ORAL NIGHTLY
Qty: 30 TABLET | Refills: 3 | Status: SHIPPED | OUTPATIENT
Start: 2020-08-20 | End: 2020-09-21 | Stop reason: SDUPTHER

## 2020-08-20 RX ORDER — LISINOPRIL 20 MG/1
20 TABLET ORAL DAILY
Qty: 30 TABLET | Refills: 3 | Status: SHIPPED | OUTPATIENT
Start: 2020-08-20 | End: 2020-09-21 | Stop reason: SDUPTHER

## 2020-08-20 RX ORDER — PREGABALIN 300 MG/1
300 CAPSULE ORAL 2 TIMES DAILY
Qty: 60 CAPSULE | Refills: 0 | Status: ON HOLD
Start: 2020-08-20 | End: 2020-08-27 | Stop reason: HOSPADM

## 2020-08-20 RX ORDER — VENLAFAXINE 100 MG/1
100 TABLET ORAL 3 TIMES DAILY
Qty: 90 TABLET | Refills: 0 | Status: ON HOLD
Start: 2020-08-20 | End: 2020-08-25 | Stop reason: DRUGHIGH

## 2020-08-24 ENCOUNTER — HOSPITAL ENCOUNTER (INPATIENT)
Age: 35
LOS: 2 days | Discharge: HOME | DRG: 812 | End: 2020-08-27
Attending: INTERNAL MEDICINE | Admitting: INTERNAL MEDICINE
Payer: MEDICARE

## 2020-08-24 LAB
ACETAMINOPHEN LEVEL: < 5 UG/ML (ref 0–20)
ALBUMIN SERPL-MCNC: 4.9 G/DL (ref 3.5–5.1)
ALP BLD-CCNC: 99 U/L (ref 38–126)
ALT SERPL-CCNC: 54 U/L (ref 11–66)
ANION GAP SERPL CALCULATED.3IONS-SCNC: 25 MEQ/L (ref 8–16)
AST SERPL-CCNC: 90 U/L (ref 5–40)
BASOPHILS # BLD: 0.4 %
BASOPHILS ABSOLUTE: 0.1 THOU/MM3 (ref 0–0.1)
BILIRUB SERPL-MCNC: 0.5 MG/DL (ref 0.3–1.2)
BILIRUBIN DIRECT: < 0.2 MG/DL (ref 0–0.3)
BUN BLDV-MCNC: 32 MG/DL (ref 7–22)
CALCIUM SERPL-MCNC: 11 MG/DL (ref 8.5–10.5)
CHLORIDE BLD-SCNC: 98 MEQ/L (ref 98–111)
CO2: 16 MEQ/L (ref 23–33)
CREAT SERPL-MCNC: 3.1 MG/DL (ref 0.4–1.2)
EOSINOPHIL # BLD: 0.2 %
EOSINOPHILS ABSOLUTE: 0 THOU/MM3 (ref 0–0.4)
ERYTHROCYTE [DISTWIDTH] IN BLOOD BY AUTOMATED COUNT: 13.3 % (ref 11.5–14.5)
ERYTHROCYTE [DISTWIDTH] IN BLOOD BY AUTOMATED COUNT: 41.7 FL (ref 35–45)
ETHYL ALCOHOL, SERUM: < 0.01 %
GFR SERPL CREATININE-BSD FRML MDRD: 23 ML/MIN/1.73M2
GLUCOSE BLD-MCNC: 76 MG/DL (ref 70–108)
GLUCOSE BLD-MCNC: 91 MG/DL (ref 70–108)
HCT VFR BLD CALC: 44.2 % (ref 42–52)
HEMOGLOBIN: 14.8 GM/DL (ref 14–18)
IMMATURE GRANS (ABS): 0.07 THOU/MM3 (ref 0–0.07)
IMMATURE GRANULOCYTES: 0.4 %
LYMPHOCYTES # BLD: 28.7 %
LYMPHOCYTES ABSOLUTE: 4.5 THOU/MM3 (ref 1–4.8)
MCH RBC QN AUTO: 28.8 PG (ref 26–33)
MCHC RBC AUTO-ENTMCNC: 33.5 GM/DL (ref 32.2–35.5)
MCV RBC AUTO: 86.2 FL (ref 80–94)
MONOCYTES # BLD: 11 %
MONOCYTES ABSOLUTE: 1.7 THOU/MM3 (ref 0.4–1.3)
NUCLEATED RED BLOOD CELLS: 0 /100 WBC
OSMOLALITY CALCULATION: 283.2 MOSMOL/KG (ref 275–300)
PLATELET # BLD: 517 THOU/MM3 (ref 130–400)
PMV BLD AUTO: 9.6 FL (ref 9.4–12.4)
POTASSIUM SERPL-SCNC: 5.1 MEQ/L (ref 3.5–5.2)
RBC # BLD: 5.13 MILL/MM3 (ref 4.7–6.1)
SALICYLATE, SERUM: < 0.3 MG/DL (ref 2–10)
SEG NEUTROPHILS: 59.3 %
SEGMENTED NEUTROPHILS ABSOLUTE COUNT: 9.4 THOU/MM3 (ref 1.8–7.7)
SODIUM BLD-SCNC: 139 MEQ/L (ref 135–145)
TOTAL CK: 3186 U/L (ref 55–170)
TOTAL PROTEIN: 9.2 G/DL (ref 6.1–8)
TSH SERPL DL<=0.05 MIU/L-ACNC: 1.01 UIU/ML (ref 0.4–4.2)
WBC # BLD: 15.8 THOU/MM3 (ref 4.8–10.8)

## 2020-08-24 PROCEDURE — 2580000003 HC RX 258: Performed by: NURSE PRACTITIONER

## 2020-08-24 PROCEDURE — 80053 COMPREHEN METABOLIC PANEL: CPT

## 2020-08-24 PROCEDURE — 36415 COLL VENOUS BLD VENIPUNCTURE: CPT

## 2020-08-24 PROCEDURE — 2500000003 HC RX 250 WO HCPCS: Performed by: NURSE PRACTITIONER

## 2020-08-24 PROCEDURE — 96376 TX/PRO/DX INJ SAME DRUG ADON: CPT

## 2020-08-24 PROCEDURE — 81001 URINALYSIS AUTO W/SCOPE: CPT

## 2020-08-24 PROCEDURE — 6360000002 HC RX W HCPCS

## 2020-08-24 PROCEDURE — 96365 THER/PROPH/DIAG IV INF INIT: CPT

## 2020-08-24 PROCEDURE — 82248 BILIRUBIN DIRECT: CPT

## 2020-08-24 PROCEDURE — 96361 HYDRATE IV INFUSION ADD-ON: CPT

## 2020-08-24 PROCEDURE — G0480 DRUG TEST DEF 1-7 CLASSES: HCPCS

## 2020-08-24 PROCEDURE — 84443 ASSAY THYROID STIM HORMONE: CPT

## 2020-08-24 PROCEDURE — 87086 URINE CULTURE/COLONY COUNT: CPT

## 2020-08-24 PROCEDURE — 80307 DRUG TEST PRSMV CHEM ANLYZR: CPT

## 2020-08-24 PROCEDURE — 83874 ASSAY OF MYOGLOBIN: CPT

## 2020-08-24 PROCEDURE — 85025 COMPLETE CBC W/AUTO DIFF WBC: CPT

## 2020-08-24 PROCEDURE — 96375 TX/PRO/DX INJ NEW DRUG ADDON: CPT

## 2020-08-24 PROCEDURE — 2500000003 HC RX 250 WO HCPCS: Performed by: EMERGENCY MEDICINE

## 2020-08-24 PROCEDURE — 82948 REAGENT STRIP/BLOOD GLUCOSE: CPT

## 2020-08-24 PROCEDURE — 82550 ASSAY OF CK (CPK): CPT

## 2020-08-24 PROCEDURE — 99285 EMERGENCY DEPT VISIT HI MDM: CPT

## 2020-08-24 RX ORDER — LORAZEPAM 2 MG/ML
INJECTION INTRAMUSCULAR
Status: COMPLETED
Start: 2020-08-24 | End: 2020-08-24

## 2020-08-24 RX ORDER — KETAMINE HCL IN NACL, ISO-OSM 100MG/10ML
1 SYRINGE (ML) INJECTION ONCE
Status: COMPLETED | OUTPATIENT
Start: 2020-08-24 | End: 2020-08-24

## 2020-08-24 RX ORDER — 0.9 % SODIUM CHLORIDE 0.9 %
2000 INTRAVENOUS SOLUTION INTRAVENOUS ONCE
Status: COMPLETED | OUTPATIENT
Start: 2020-08-24 | End: 2020-08-25

## 2020-08-24 RX ADMIN — LORAZEPAM 2 MG: 2 INJECTION INTRAMUSCULAR; INTRAVENOUS at 21:30

## 2020-08-24 RX ADMIN — LORAZEPAM 2 MG: 2 INJECTION INTRAMUSCULAR; INTRAVENOUS at 21:35

## 2020-08-24 RX ADMIN — Medication 24.6 MG: at 22:03

## 2020-08-24 RX ADMIN — SODIUM CHLORIDE 2000 ML: 9 INJECTION, SOLUTION INTRAVENOUS at 22:45

## 2020-08-24 RX ADMIN — Medication 50 MG: at 23:51

## 2020-08-24 ASSESSMENT — PAIN DESCRIPTION - PAIN TYPE: TYPE: ACUTE PAIN

## 2020-08-24 ASSESSMENT — PAIN DESCRIPTION - DESCRIPTORS: DESCRIPTORS: ACHING

## 2020-08-25 PROBLEM — T50.911A DRUG OVERDOSE, MULTIPLE DRUGS, ACCIDENTAL OR UNINTENTIONAL, INITIAL ENCOUNTER: Status: ACTIVE | Noted: 2020-08-25

## 2020-08-25 LAB
AMPHETAMINE+METHAMPHETAMINE URINE SCREEN: POSITIVE
ANION GAP SERPL CALCULATED.3IONS-SCNC: 11 MEQ/L (ref 8–16)
BACTERIA: ABNORMAL /HPF
BARBITURATE QUANTITATIVE URINE: NEGATIVE
BASOPHILS # BLD: 0.4 %
BASOPHILS ABSOLUTE: 0 THOU/MM3 (ref 0–0.1)
BENZODIAZEPINE QUANTITATIVE URINE: NEGATIVE
BILIRUBIN URINE: NEGATIVE
BLOOD, URINE: ABNORMAL
BUN BLDV-MCNC: 34 MG/DL (ref 7–22)
CALCIUM SERPL-MCNC: 8.9 MG/DL (ref 8.5–10.5)
CANNABINOID QUANTITATIVE URINE: POSITIVE
CASTS 2: ABNORMAL /LPF
CASTS UA: ABNORMAL /LPF
CHARACTER, URINE: ABNORMAL
CHLORIDE BLD-SCNC: 110 MEQ/L (ref 98–111)
CO2: 20 MEQ/L (ref 23–33)
COCAINE METABOLITE QUANTITATIVE URINE: NEGATIVE
COLOR: ABNORMAL
CREAT SERPL-MCNC: 2.1 MG/DL (ref 0.4–1.2)
CRYSTALS, UA: ABNORMAL
EOSINOPHIL # BLD: 0.1 %
EOSINOPHILS ABSOLUTE: 0 THOU/MM3 (ref 0–0.4)
EPITHELIAL CELLS, UA: ABNORMAL /HPF
ERYTHROCYTE [DISTWIDTH] IN BLOOD BY AUTOMATED COUNT: 13.5 % (ref 11.5–14.5)
ERYTHROCYTE [DISTWIDTH] IN BLOOD BY AUTOMATED COUNT: 43.8 FL (ref 35–45)
GFR SERPL CREATININE-BSD FRML MDRD: 36 ML/MIN/1.73M2
GLUCOSE BLD-MCNC: 97 MG/DL (ref 70–108)
GLUCOSE URINE: NEGATIVE MG/DL
HCT VFR BLD CALC: 37.5 % (ref 42–52)
HEMOGLOBIN: 11.9 GM/DL (ref 14–18)
IMMATURE GRANS (ABS): 0.03 THOU/MM3 (ref 0–0.07)
IMMATURE GRANULOCYTES: 0.3 %
KETONES, URINE: ABNORMAL
LEUKOCYTE ESTERASE, URINE: ABNORMAL
LYMPHOCYTES # BLD: 19.7 %
LYMPHOCYTES ABSOLUTE: 2.2 THOU/MM3 (ref 1–4.8)
MCH RBC QN AUTO: 28.4 PG (ref 26–33)
MCHC RBC AUTO-ENTMCNC: 31.7 GM/DL (ref 32.2–35.5)
MCV RBC AUTO: 89.5 FL (ref 80–94)
MISCELLANEOUS 2: ABNORMAL
MONOCYTES # BLD: 9.4 %
MONOCYTES ABSOLUTE: 1 THOU/MM3 (ref 0.4–1.3)
MRSA SCREEN RT-PCR: POSITIVE
MYOGLOBIN URINE: POSITIVE
NITRITE, URINE: NEGATIVE
NUCLEATED RED BLOOD CELLS: 0 /100 WBC
OPIATES, URINE: POSITIVE
OXYCODONE: NEGATIVE
PH UA: 5.5 (ref 5–9)
PHENCYCLIDINE QUANTITATIVE URINE: POSITIVE
PLATELET # BLD: 362 THOU/MM3 (ref 130–400)
PMV BLD AUTO: 9.5 FL (ref 9.4–12.4)
POTASSIUM REFLEX MAGNESIUM: 4.4 MEQ/L (ref 3.5–5.2)
PROTEIN UA: 100
RBC # BLD: 4.19 MILL/MM3 (ref 4.7–6.1)
RBC URINE: ABNORMAL /HPF
RENAL EPITHELIAL, UA: ABNORMAL
SEG NEUTROPHILS: 70.1 %
SEGMENTED NEUTROPHILS ABSOLUTE COUNT: 7.8 THOU/MM3 (ref 1.8–7.7)
SODIUM BLD-SCNC: 141 MEQ/L (ref 135–145)
SPECIFIC GRAVITY, URINE: 1.02 (ref 1–1.03)
TOTAL CK: 4466 U/L (ref 55–170)
UROBILINOGEN, URINE: 1 EU/DL (ref 0–1)
VANCOMYCIN RESISTANT ENTEROCOCCUS: NEGATIVE
WBC # BLD: 11.1 THOU/MM3 (ref 4.8–10.8)
WBC UA: ABNORMAL /HPF
YEAST: ABNORMAL

## 2020-08-25 PROCEDURE — 87641 MR-STAPH DNA AMP PROBE: CPT

## 2020-08-25 PROCEDURE — 2060000000 HC ICU INTERMEDIATE R&B

## 2020-08-25 PROCEDURE — 94760 N-INVAS EAR/PLS OXIMETRY 1: CPT

## 2020-08-25 PROCEDURE — 99999 PR OFFICE/OUTPT VISIT,PROCEDURE ONLY: CPT | Performed by: NURSE PRACTITIONER

## 2020-08-25 PROCEDURE — 99222 1ST HOSP IP/OBS MODERATE 55: CPT | Performed by: INTERNAL MEDICINE

## 2020-08-25 PROCEDURE — 6370000000 HC RX 637 (ALT 250 FOR IP): Performed by: STUDENT IN AN ORGANIZED HEALTH CARE EDUCATION/TRAINING PROGRAM

## 2020-08-25 PROCEDURE — 85025 COMPLETE CBC W/AUTO DIFF WBC: CPT

## 2020-08-25 PROCEDURE — 2580000003 HC RX 258: Performed by: NURSE PRACTITIONER

## 2020-08-25 PROCEDURE — 2580000003 HC RX 258: Performed by: INTERNAL MEDICINE

## 2020-08-25 PROCEDURE — 2580000003 HC RX 258: Performed by: STUDENT IN AN ORGANIZED HEALTH CARE EDUCATION/TRAINING PROGRAM

## 2020-08-25 PROCEDURE — 82550 ASSAY OF CK (CPK): CPT

## 2020-08-25 PROCEDURE — 36415 COLL VENOUS BLD VENIPUNCTURE: CPT

## 2020-08-25 PROCEDURE — 80048 BASIC METABOLIC PNL TOTAL CA: CPT

## 2020-08-25 PROCEDURE — 90792 PSYCH DIAG EVAL W/MED SRVCS: CPT | Performed by: PSYCHIATRY & NEUROLOGY

## 2020-08-25 PROCEDURE — 87500 VANOMYCIN DNA AMP PROBE: CPT

## 2020-08-25 PROCEDURE — 87081 CULTURE SCREEN ONLY: CPT

## 2020-08-25 RX ORDER — POTASSIUM CHLORIDE 20 MEQ/1
40 TABLET, EXTENDED RELEASE ORAL PRN
Status: DISCONTINUED | OUTPATIENT
Start: 2020-08-25 | End: 2020-08-27 | Stop reason: HOSPADM

## 2020-08-25 RX ORDER — SODIUM CHLORIDE, SODIUM LACTATE, POTASSIUM CHLORIDE, CALCIUM CHLORIDE 600; 310; 30; 20 MG/100ML; MG/100ML; MG/100ML; MG/100ML
INJECTION, SOLUTION INTRAVENOUS CONTINUOUS
Status: DISCONTINUED | OUTPATIENT
Start: 2020-08-25 | End: 2020-08-27 | Stop reason: HOSPADM

## 2020-08-25 RX ORDER — METHADONE HYDROCHLORIDE 10 MG/5ML
70 SOLUTION ORAL DAILY
Status: ON HOLD | COMMUNITY
End: 2020-08-25

## 2020-08-25 RX ORDER — SODIUM CHLORIDE 0.9 % (FLUSH) 0.9 %
10 SYRINGE (ML) INJECTION PRN
Status: DISCONTINUED | OUTPATIENT
Start: 2020-08-25 | End: 2020-08-27 | Stop reason: HOSPADM

## 2020-08-25 RX ORDER — PROMETHAZINE HYDROCHLORIDE 25 MG/1
12.5 TABLET ORAL EVERY 6 HOURS PRN
Status: DISCONTINUED | OUTPATIENT
Start: 2020-08-25 | End: 2020-08-27 | Stop reason: HOSPADM

## 2020-08-25 RX ORDER — ACETAMINOPHEN 650 MG/1
650 SUPPOSITORY RECTAL EVERY 6 HOURS PRN
Status: DISCONTINUED | OUTPATIENT
Start: 2020-08-25 | End: 2020-08-27 | Stop reason: HOSPADM

## 2020-08-25 RX ORDER — SODIUM CHLORIDE 9 MG/ML
INJECTION, SOLUTION INTRAVENOUS CONTINUOUS
Status: DISCONTINUED | OUTPATIENT
Start: 2020-08-25 | End: 2020-08-25

## 2020-08-25 RX ORDER — SODIUM CHLORIDE 0.9 % (FLUSH) 0.9 %
10 SYRINGE (ML) INJECTION EVERY 12 HOURS SCHEDULED
Status: DISCONTINUED | OUTPATIENT
Start: 2020-08-25 | End: 2020-08-27 | Stop reason: HOSPADM

## 2020-08-25 RX ORDER — METHADONE HYDROCHLORIDE 10 MG/1
70 TABLET ORAL DAILY
Status: DISCONTINUED | OUTPATIENT
Start: 2020-08-25 | End: 2020-08-26

## 2020-08-25 RX ORDER — VENLAFAXINE 50 MG/1
50 TABLET ORAL 3 TIMES DAILY
Status: ON HOLD | COMMUNITY
End: 2020-08-27 | Stop reason: HOSPADM

## 2020-08-25 RX ORDER — ONDANSETRON 2 MG/ML
4 INJECTION INTRAMUSCULAR; INTRAVENOUS EVERY 6 HOURS PRN
Status: DISCONTINUED | OUTPATIENT
Start: 2020-08-25 | End: 2020-08-27 | Stop reason: HOSPADM

## 2020-08-25 RX ORDER — ACETAMINOPHEN 325 MG/1
650 TABLET ORAL EVERY 6 HOURS PRN
Status: DISCONTINUED | OUTPATIENT
Start: 2020-08-25 | End: 2020-08-27 | Stop reason: HOSPADM

## 2020-08-25 RX ORDER — METHADONE HYDROCHLORIDE 10 MG/ML
70 CONCENTRATE ORAL DAILY
COMMUNITY
End: 2020-12-17

## 2020-08-25 RX ORDER — 0.9 % SODIUM CHLORIDE 0.9 %
1000 INTRAVENOUS SOLUTION INTRAVENOUS ONCE
Status: COMPLETED | OUTPATIENT
Start: 2020-08-25 | End: 2020-08-25

## 2020-08-25 RX ORDER — MAGNESIUM SULFATE IN WATER 40 MG/ML
2 INJECTION, SOLUTION INTRAVENOUS PRN
Status: DISCONTINUED | OUTPATIENT
Start: 2020-08-25 | End: 2020-08-27 | Stop reason: HOSPADM

## 2020-08-25 RX ORDER — 0.9 % SODIUM CHLORIDE 0.9 %
2000 INTRAVENOUS SOLUTION INTRAVENOUS ONCE
Status: COMPLETED | OUTPATIENT
Start: 2020-08-25 | End: 2020-08-25

## 2020-08-25 RX ORDER — POTASSIUM CHLORIDE 7.45 MG/ML
10 INJECTION INTRAVENOUS PRN
Status: DISCONTINUED | OUTPATIENT
Start: 2020-08-25 | End: 2020-08-27 | Stop reason: HOSPADM

## 2020-08-25 RX ADMIN — SODIUM CHLORIDE, POTASSIUM CHLORIDE, SODIUM LACTATE AND CALCIUM CHLORIDE: 600; 310; 30; 20 INJECTION, SOLUTION INTRAVENOUS at 23:01

## 2020-08-25 RX ADMIN — SODIUM CHLORIDE 2000 ML: 9 INJECTION, SOLUTION INTRAVENOUS at 08:58

## 2020-08-25 RX ADMIN — SODIUM CHLORIDE, POTASSIUM CHLORIDE, SODIUM LACTATE AND CALCIUM CHLORIDE: 600; 310; 30; 20 INJECTION, SOLUTION INTRAVENOUS at 16:35

## 2020-08-25 RX ADMIN — Medication 10 ML: at 08:59

## 2020-08-25 RX ADMIN — SODIUM CHLORIDE: 9 INJECTION, SOLUTION INTRAVENOUS at 01:50

## 2020-08-25 RX ADMIN — METHADONE HYDROCHLORIDE 70 MG: 10 TABLET ORAL at 15:20

## 2020-08-25 RX ADMIN — SODIUM CHLORIDE 1000 ML: 9 INJECTION, SOLUTION INTRAVENOUS at 04:32

## 2020-08-25 RX ADMIN — SODIUM CHLORIDE, POTASSIUM CHLORIDE, SODIUM LACTATE AND CALCIUM CHLORIDE: 600; 310; 30; 20 INJECTION, SOLUTION INTRAVENOUS at 12:00

## 2020-08-25 ASSESSMENT — PAIN SCALES - GENERAL: PAINLEVEL_OUTOF10: 9

## 2020-08-25 NOTE — ED NOTES
Pt resting on cot in semi-fowlers position. Pt continues on oxygen. Pt titrated to 2L via nc, maintaining 100%. VSS.      Isabella Johnson RN  08/25/20 0002

## 2020-08-25 NOTE — PROGRESS NOTES
Pharmacy Medication History Note      List of current medications patient is taking is complete. Source of information: patient, surescripts    Changes made to medication list:  Medications added/doses adjusted:  Adjusted venlafaxine to 50 mg TID    Other notes (ex. Recent course of antibiotics, Coumadin dosing):  Denies use of other OTC or herbal medications.       Allergies reviewed      Electronically signed by Vero Rivera on 8/25/2020 at 1:41 PM

## 2020-08-25 NOTE — ED NOTES
Dr Radha Velasco at bedside, administered 50mg Ketamine IV. Pt oxygen sats dropped to 75%. Pt placed on 15L NRB, O2 sats up to 100%.      Caron Jauregui RN  08/25/20 0001

## 2020-08-25 NOTE — ED NOTES
Pt continues to thrash about in bed. Pt continues to mumble, ramble with incomprehensible words. IV continues to infuse showing no s/s of infection or infiltration.      Phyllis Hdz RN  08/25/20 0003

## 2020-08-25 NOTE — H&P
Hospitalist Progress Note      Patient:  Jonelle Mireles    Unit/Bed:4K-15/015-A  YOB: 1985  MRN: [id number]   Acct: [de-identified]   PCP: PAPA Melchor CNP  Date of Admission: 8/24/2020    Assessment/Plan:    1. Poisoning by unspecified drugs, medicaments and biological substances, accidental- resolving. This can be seen based on the patient's clinical presentation of incoherence and urine drug screen that was positive for multiple substances including amphetamines, cannabinoids, PCP, and opioids. This can also be seen based on a similar presentation on June 30, 2020. The patient's last vitals were stable with a temperature of 97.6, respiratory rate of 16, pulse of 87, blood pressure 99/62, and 99% pulse ox on room air. The patient is more alert and is requesting food and water. 8/25/20: We will monitor the patient's vitals per unit protocol. The patient will be maintained on suicide precautions. We will have a sitter by the patient's bedside. We will fluid resuscitate with 200 cc/h of lactated Ringer's. Psychiatry, addiction services, and social work were all consulted. Will monitor patient and reevaluate. The patient was placed on a general diet. 2. Acute respiratory failure with hypoxia-resolved. This was likely secondary to ketamine use as the patient desaturated after being given ketamine, per Dr. Rupert Martinez report. The patient is currently satting 94% on room air.  8/25/20: We will monitor patient's vitals per unit protocol, encourage deep breathing and coughing every 2 hours while awake, and by keeping oxygen therapy protocol on standby. 3. Rhabdomyolysis- worsening. This is apparent based on the patient's CK level 3,186 on the night of 8/24/2020 and on the higher CK level 4,466 on the morning of 8/25/2020.  This is also based on the urinalysis finding large blood in the urine and urine chemistry positive for myoglobin. 8/25/20: We provide aggressive fluid resuscitation at a rate of 200 cc/h as described above. Will monitor with daily total CK levels. We will monitor electrolytes with a daily BMP level. If abnormalities in potassium levels we will monitor potassium more frequently depending on the level of elevation. If necessary we will give more IV lactated Ringer's and consult Nephrology. 4. Toxic encephalopathy-resolving. As described above. 8/25/20: As described above. 5. Anion gap metabolic acidosis-resolving. This anion gap metabolic acidosis is likely secondary to rhabdomyolysis. This can be seen based on the patient's known diagnosis of rhabdomyolysis for reasons described above and based on anion gap metabolic acidosis being a known complication of rhabdomyolysis. This can also be seen based on the lack of other obvious causes. This can be seen based on the patient's elevated anion gap of 25.0 on presentation on 8/24/2020. However, anion gap has since improved to 11.0 with fluid resuscitation of 2 L normal saline bolus and 125 cc/h fluid resuscitation with normal saline. 8/25/20: We will replace the normal saline at 125 cc/h with lactated Ringer's at 200 cc/h. Otherwise treat as described above for rhabdomyolysis. 6. Stage III SATYA by AKIN classification. This renal failure is likely secondary to rhabdomyolysis versus volume depletion. This is because the patient has a confirmed diagnosis of rhabdomyolysis and SATYA is a known complication of rhabdomyolysis. This can also be seen based on the patient's substantial elevation in his creatinine from baseline. This can also be seen in the substantial drop of his eGFR from baseline. Both of these drops are described below. 8/25/20: We will replace normal saline resuscitation 125 cc/h with lactated Ringer's at 200 cc/h. Otherwise treat as described above for rhabdomyolysis. 7. Depression, unspecified-stable.   8/25/20: Patient will be maintained on suicide precautions. Psychiatry, addiction services, and social work are all consulted. The patient will follow up with PCP and psychiatry as an outpatient. 8. Anxiety, unspecified-stable. 8/25/20: Psychiatry, addiction services, and social work are all consulted. The patient will follow-up with PCP and psychiatry as an outpatient. 9. Opioid dependence and withdrawal- opioid dependence stable, withdrawal new onset. 8/25/20: Pharmacy will restart his home Suboxone. If this cannot be done, will consider low dose morphine or non-opioid treatment for withdrawal syndrome. In particular, will consider clonidine, benzos, and supportive care. Expected discharge date: 8/28/2020    Disposition:    [x] Home       [] TCU       [] Rehab       [] Psych       [] SNF       [] [place]       [] Other-    Chief Complaint: Drug overdose    Hospital Treatment Course:   8/24/2028/25/2020: The patient is a 58-year-old male with a past medical history of previous hospitalization for drug overdose on June 30, 2020 during which he signed out [address], unspecified anxiety, unspecified depression, unspecified viral hepatitis C without hepatic coma, and unspecified opioid use disorder who presents with a chief complaint of drug overdose. According to the ED provider, the patient was clean from opioids for 2 weeks and then relapsed on the evening of 8/23/2020. The patient was reported as rambling nonsensical words and he was given 2 L of normal saline, Ativan 2 mg x 2 doses, and ketamine 82.1 mg and then 24.6 mg. The patient's O2 saturation then dropped from 95% to 75%. The patient was placed on nonrebreather mask at 15 L/min and the saturation came back up to 100%. The patient's vital signs stabilized he was admitted to the hospitalist service.     A CMP was unremarkable except for CO2 of 16, BUN of 32, a creatinine of 3.1, and anion gap of 25, a glomerular filtration rate of 23%, a calcium of 11.0, and elevated total protein of 9.2, and a slightly high AST of 90. The patient's baseline creatinine appears to be about 1 and the baseline GFR appears to be greater than 90 though the patient is had frequent episodes of reduced GFR and increased creatinine. A BMP on the morning of 8/25/2020 and a CO2 of 20, BUN of 34, creatinine of 2.1, and an estimated glomerular filtration rate of 36. The total CK was 93,000 186 and then 4466. Glucose was 97. The urine drug screen was positive for amphetamines, cannabinoids PCP, and opioids. Acetaminophen and salicylate levels were in an acceptable range. The patient was negative for oxycodone. CBC on 8/24/2020 was notable for an elevated white blood cell count of 15.8 and a normal hemoglobin. CBC in the morning of 8/24/2020 was notable for an elevated but lower white blood cell count of 11.1 and a slight anemia with a hemoglobin of 11.9 which is likely dilutional.    Urine cultures are pending. MRSA screen was positive. Urinalysis was notable for trace ketones, large amounts of blood, large amounts of urobilinogen, and trace leukocyte esterase. The urine chemistry was positive for myoglobin. The patient was treated with a normal saline bolus of 2000 cc along with a continuous normal saline infusion of 150 cc/h. Consults were made to addiction services, psychiatry, and social work. Patient was placed on contact isolation, daily weights, I's and O's were monitored, and intermittent pneumatic compression device was placed, a sitter was placed at the bedside, patient was placed on telemetry monitoring, and suicide precautions were put in place. Subjective (past 24 hours): On presentation, the patient was extremely sleepy and difficult to arouse. However, once awake he was coherent and capable of answering questions and following commands.   The patient requested food, water, and treatment for opioid withdrawal.  The patient had no other non-distended with normal bowel sounds. Musculoskeletal: passive and active ROM x 4 extremities. Capillary Refill: Brisk,< 3 seconds   Peripheral Pulses: +2 palpable, equal bilaterally     Labs:   Recent Labs     08/24/20 2210 08/25/20  0550   WBC 15.8* 11.1*   HGB 14.8 11.9*   HCT 44.2 37.5*   * 362     Recent Labs     08/24/20 2210 08/25/20  0550    141   K 5.1 4.4   CL 98 110   CO2 16* 20*   BUN 32* 34*   CREATININE 3.1* 2.1*   CALCIUM 11.0* 8.9     Recent Labs     08/24/20 2210   AST 90*   ALT 54   BILIDIR <0.2   BILITOT 0.5   ALKPHOS 99     No results for input(s): INR in the last 72 hours. Recent Labs     08/24/20 2210 08/25/20  0550   CKTOTAL 3,186* 4,466*       Microbiology:    Blood culture #1:   Lab Results   Component Value Date    BC No growth-preliminary No growth  05/18/2020       Blood culture #2:No results found for: Mamta Nany    Organism:  Lab Results   Component Value Date    ORG Culture screen is positive for MRSA colonization 05/18/2020    ORG Culture screen is positive for MRSA colonization 05/18/2020       No results found for: LABGRAM    MRSA culture only:No results found for: [facility]    Urine culture:   Lab Results   Component Value Date    LABURIN No growth-preliminaryNo growth 08/26/2019       Respiratory culture: No results found for: CULTRESP    Aerobic and Anaerobic :  No results found for: LABAERO  No results found for: LABANAE    Urinalysis:      Lab Results   Component Value Date    NITRU NEGATIVE 08/24/2020    WBCUA 10-15 08/24/2020    BACTERIA NONE SEEN 08/24/2020    RBCUA 50-75 08/24/2020    BLOODU LARGE 08/24/2020    SPECGRAV 1.024 07/30/2020    GLUCOSEU NEGATIVE 08/24/2020       Radiology:  No orders to display     No results found.     Support Devices (date placed):  [] ETT []Oral / [] Nasal  [] Gastric Tube [] OG / [] NG    [] Central Venous Line (Specify Site):  [] Urinary Catheter  [] Arterial Line (Specify Site)  [x] Peripheral IV access  [] Other:    DVT prophylaxis: [] Lovenox                                 [x] SCDs                                 [] SQ Heparin                                 [] Encourage ambulation           [] Already on Anticoagulation     Code Status: Full Code    Tele:   [x] yes             [] no    Electronically signed by Madhu Ashley MD, MPH, [facility] on 8/25/2020 at 11:52 AM   Supervised by Dr. Josef Robert

## 2020-08-25 NOTE — ED NOTES
ED to inpatient nurses report    Chief Complaint   Patient presents with    Drug Overdose      Present to ED from home  LOC: Pt unable to articulate comprehensible words  Vital signs   Vitals:    08/24/20 2358 08/25/20 0013 08/25/20 0034 08/25/20 0050   BP: 112/74 109/68 95/62 (!) 91/54   Pulse: 105 101 97 94   Resp: 22 18 18 18   Temp:       TempSrc:       SpO2: 100% 100% 97% 98%   Weight:          Oxygen Baseline Room Air    Current needs required Room Air Bipap/Cpap No  LDAs:   Peripheral IV 08/24/20 Right Hand (Active)   Site Assessment Clean;Dry; Intact 08/25/20 0050   Line Status Infusing 08/25/20 0050   Dressing Status Clean;Dry; Intact 08/25/20 0050   Dressing Intervention New 08/25/20 0050     Mobility: Unable to assess  Pending ED orders: NA  Present condition: Pt resting on cot in semi fowlers position. Pt continues to respond to painful stimuli. IV continues to infuse showing no s/s of infection or infiltration.     Electronically signed by Vanna Mike RN on 8/25/2020 at 1:02 AM       Vanna Mike RN  08/25/20 0726

## 2020-08-25 NOTE — ED NOTES
Pt continues to thrash about in bed. Attempted to redirect pt unsuccessful multiple times. Pt continues with incomprehensible words with periods of profanity. Monitor remains in place. Will continue to monitor.      Ji Ibarra RN  08/25/20 9317

## 2020-08-25 NOTE — CARE COORDINATION
8/25/20, 2:48 PM EDT  DISCHARGE PLANNING EVALUATION:    Milad Garrido       Admitted from: ED 8/24/2020/ 2117 Hospital day: 0   [facility] Reason for admit: Drug overdose, multiple drugs, accidental or unintentional, initial encounter [T50.911A]  Drug overdose, multiple drugs, accidental or unintentional, initial encounter [T50.911A] Status: IP  Admit order signed?: yes  PMH:  has a past medical history of Anxiety, Depression, Kidney stone, Liver disease, and Opiate abuse, continuous ([facility] [address] Procedure:   Medications:  Scheduled Meds:   sodium chloride flush  10 mL Intravenous 2 times per day    methadone  70 mg Oral Daily     Continuous Infusions:   lactated ringers        Pertinent Info/Orders/Treatment Plan:  Client admitted with OD treated with IVF; Psychiatry following. Creatinine 2.1, WBC 11.1, (+) UD: Cannabis, PCP Quant; monitor  Diet: DIET GENERAL; Safety Tray; Safety Tray (Disposables)   Smoking status:  reports that he has quit smoking. His smoking use included cigarettes. He smoked 0.50 packs per day. He has never used smokeless tobacco.   PCP: PAPA Damico CNP  Readmission 30 days or less: none  Readmission Risk Score: 35%    Patient Goals/Plan/Treatment Preferences: plans home with Rowena Mckay, Suboxone patient of Dr. Eris Granda, await Psychiatry input, Addiction Services has provided information to patient; AMA in past  Transportation/Food Security/Housekeeping Addressed:  No issues identified.     Evaluation: yes, prior

## 2020-08-25 NOTE — ED NOTES
Pt continues to thrash about in bed. Pt remains in 4 point hard restraints for pt safety. Pt is a danger to himself d/t the trashing and being unable to stay still. Seizure pads placed on bed rails for pt safety as well. Pt would thrash in bed and run head into bed rails. Pt unable to articulate comprehensible words at this time and continues to ramble with occasional outbursts and using profanity. Monitor remains in place and VS as noted. IV infusing fluid bolus at this time with no s/s of infection or infiltration. Spoke with Abdias Bey CNP and Dr Sherly Greene regarding pt's status. No new orders at this time.      Jeanette Menjivar RN  08/24/20 6243

## 2020-08-25 NOTE — H&P
approximately 16 times a minute with easy unlabored respirations; vital signs are stable at this time; he is being admitted to the hospital service for further care and evaluation. Past Medical History:          Diagnosis Date    Anxiety     Depression     Kidney stone     Liver disease     Hep C    Opiate abuse, continuous ([facility] [address]        Past Surgical History:      No past surgical history on file. Medications Prior to Admission:      Prior to Admission medications    Medication Sig Start Date End Date Taking? Authorizing Provider   pregabalin (LYRICA) 300 MG capsule Take 1 capsule by mouth 2 times daily for 30 days. 8/20/20 9/19/20  PAPA Lock CNP   venlafaxine [facility]) 100 MG tablet Take 1 tablet by mouth 3 times daily 8/20/20   PAPA Lock CNP   lisinopril (PRINIVIL;ZESTRIL) 20 MG tablet Take 1 tablet by mouth daily 8/20/20   PAPA Lock CNP   mirtazapine (REMERON) 45 MG tablet Take 1 tablet by mouth nightly 8/20/20   PAPA Lock CNP   naloxone 4 MG/0.1ML LIQD nasal spray 1 spray by Nasal route as needed for Opioid Reversal 7/24/20   PAPA Lance CNP       Allergies:  Patient has no known allergies. Social History:   reports that he has quit smoking. His smoking use included cigarettes. He smoked 0.50 packs per day. He has never used smokeless tobacco. He reports current alcohol use. He reports previous drug use. Drugs: Opiates , IV, Cocaine, Marijuana, and Methamphetamines. Family History:      Positive as follows:    No family history on file. REVIEW OF SYSTEMS:   Unable to obtain secondary to altered mental status    PHYSICAL EXAM:    BP (!) 84/54   Pulse 93   Temp 98 °F (36.7 °C) (Axillary)   Resp 18   Wt 181 lb (82.1 kg)   SpO2 97%   BMI 25.97 kg/m²     General appearance:  No apparent distress, appears stated age   HEENT:  Normal cephalic, atraumatic without obvious deformity.  Pupils equal, round, and reactive to light. Conjunctivae/corneas clear. Neck: Supple, with full range of motion. No jugular venous distention. Trachea midline. Respiratory:  Normal respiratory effort. Clear to auscultation from the anterior aspect, bilaterally without Rales/Wheezes/Rhonchi. Cardiovascular:  Regular rate and rhythm with normal S1/S2 without murmurs, rubs or gallops. Abdomen: Soft, non-tender, non-distended with normal bowel sounds. Musculoskeletal:  No clubbing, cyanosis or edema bilaterally. Full range of motion without deformity. Skin: Skin color, texture, turgor normal.    Neurologic: Asleep, no verbal response at this time  Psychiatric: Asleep  Capillary Refill: Brisk,< 3 seconds   Peripheral Pulses: +2 palpable, equal bilaterally       Labs:     Recent Labs     08/24/20 2210   WBC 15.8*   HGB 14.8   HCT 44.2   *     Recent Labs     08/24/20 2210      K 5.1   CL 98   CO2 16*   BUN 32*   CREATININE 3.1*   CALCIUM 11.0*     Recent Labs     08/24/20 2210   AST 90*   ALT 54   BILIDIR <0.2   BILITOT 0.5   ALKPHOS 99     No results for input(s): INR in the last 72 hours. Recent Labs     08/24/20  2210   CKTOTAL 3,186*        Lab Results   Component Value Date    NITRU NEGATIVE 08/24/2020    WBCUA 10-15 08/24/2020    BACTERIA NONE SEEN 08/24/2020    RBCUA 50-75 08/24/2020    BLOODU LARGE 08/24/2020    SPECGRAV 1.024 07/30/2020    GLUCOSEU NEGATIVE 08/24/2020       Thank you PAPA Cohen CNP for the opportunity to be involved in this patient's care. Electronically signed by PAPA Simms CNP on 8/25/2020 at 1:20 AM   Electronically signed by [facility].  Corrinne Angry MD.

## 2020-08-25 NOTE — PLAN OF CARE
Problem: Pain:  Goal: Pain level will decrease  Description: Pain level will decrease  Outcome: Ongoing  Note:            Is pain goal met at this time?   Yes          Goal: Control of acute pain  Description: Control of acute pain  Outcome: Ongoing  Goal: Control of chronic pain  Description: Control of chronic pain  Outcome: Ongoing     Problem: Falls - Risk of:  Goal: Will remain free from falls  Description: Will remain free from falls  Outcome: Ongoing  Note: Patient in bed, call light and items in reach, bed alarm on  Goal: Absence of physical injury  Description: Absence of physical injury  Outcome: Ongoing  Note: No falls or injury this shift     Problem: Cardiovascular  Goal: Hemodynamic stability  Outcome: Ongoing  Note:   Vitals:    08/25/20 0430   BP: 99/62   Pulse: 87   Resp: 16   Temp: 97.6 °F (36.4 °C)   SpO2: 99%     BP been running low this shift, fluid bolus being given     Problem: Respiratory  Goal: No pulmonary complications  Outcome: Ongoing  Note: Lungs clear, O2 >90     Problem: GI  Goal: No bowel complications  Outcome: Ongoing

## 2020-08-25 NOTE — PROGRESS NOTES
Pt admitted to  [phone] from ED and via cart/stretcher. Complaints: None. IV normal saline infusing into the hand right, condition patent and no redness at a rate of 125 mls/ hour with about 600 mls in the bag still. IV site free of s/s of infection or infiltration. Vital signs obtained. Assessment and data collection initiated. Two nurse skin assessment performed by CARISSA RN and SAMANTHA RN. Oriented to room. Policies and procedures for 4K explained. All questions answered with no further questions at this time. Fall prevention and safety brochure discussed with patient. Bed alarm on. Call light in reach. Would you like your Primary Care Physician notified?  no   The best day to schedule a follow up Dr appointment is:  Tuesday p.m.

## 2020-08-25 NOTE — ED NOTES
Bed: 006A  Expected date:   Expected time:   Means of arrival:   Comments:  [employer], RN  08/24/20 0303

## 2020-08-25 NOTE — ED NOTES
Pt continues to rest on cot in semi-fowlers position. Pt responds to painful stimuli. Oxygen continues to 2L via nc. VS as noted. IV continues to infuse with no s/s of infection or infiltration. Monitor remains in place. Will continue to monitor.      Le Scott RN  08/25/20 6059

## 2020-08-25 NOTE — ED NOTES
Pt resting on cot in semi-fowlers position. Pt continues to respond to pain. IV infusing with no s/s of infection or infiltration. Awaiting transport to unit.      J Luis Villafuerte RN  08/25/20 0946

## 2020-08-25 NOTE — ED NOTES
Pt presents to ED via [facility] for c/o drug overdose. It is reported that pt has been clean for 2 weeks then relapsed tonight. EMS reports a bystander gave pt 8mg Narcan prior to their arrival. Pt arrives to ED, unable to sit still, rambling nonsensical words. Pt unable to articulate comprehensible words. Soft restraints placed on pt at this time. Verbal order from Gilmar Judd CNP to place leather restraints on pt dt patient being a threat to himself. Pt has multiple areas all over body with sores. IV placed and verbal order from Gilmar Judd CNP for 2mg Ativan IVP. Pt still unable to hold still. Pt is trashing about on the bed, not making sense of his words. Monitor applied and attempted to VS.     Gilmar Judd CNP gives verbal order for another 2mg Ativan IVP. Pt medicated and IV flushed.       Phyllis Hdz RN  08/25/20 0111

## 2020-08-25 NOTE — CONSULTS
Department of Psychiatry  Consult Service   Psychiatric Assessment      Thank you very much for allowing us to participate in the care of this patient. Reason for Consult:  Drug overdose    HISTORY OF PRESENT ILLNESS:          The patient is a 38 y.o. male with significant history of anxiety, depression, polysubstance abuse, overdoses who is admitted medically drug overdose. Patient presented to [address] [place] [address] with a drug overdose; upon review of old records he had a very similar presentation in June 30 of 2020 and he signed out against medical advice, which he has done on several occasions. From the emergency department note, the patient was clean for 2 weeks and then relapsed last evening; it was reported by EMS to the ED staff that a bystander gave him Narcan 8 mg; upon arrival to the emergency department patient was unable to sit still rambling nonsensical words; he was given Ativan 2 mg x 2 doses; ketamine 82.1 mg then 24.6 mg; he was also given 2 L of 0.9 normal saline; after the ketamine was given the patient's O2 sat did drop to 75% and he was placed on a nonrebreather. This morning he is was sleeping when I woke him up for interview. He said he didn't want to talk to me, just wanted to eat and go get his methadone. Wants to leave. Denies SI, HI, delusions or hallucinations of any kind. Per Dr. Lazara Alegria interview, the patient did not intentionally overdose and is not suicidal at this time.      PSYCHIATRIC HISTORY:      · Outpatient psychiatric provider:  Denies but has been in [facility] before  · Suicide attempts: denies  · Inpatient psychiatric admissions: denies    Past psychiatric medications includes:     Effexor 100 mg 3x daily, states he has been on others he just doesn't know    Adverse reactions from psychotropic medications:    Declined to answer      Lifetime Psychiatric Review of Systems     ·    Obsessions and Compulsions: Declined to answer  ·    Stacy or Hypomania: Declined to answer  ·    Hallucinations: Declined to answer  ·    Panic Attacks:  Declined to answer  ·    Delusions:  Declined to answer  ·    Phobias:  Declined to answer  ·    Trauma: Declined to answer    Prior to Admission medications    Medication Sig Start Date End Date Taking? Authorizing Provider   pregabalin (LYRICA) 300 MG capsule Take 1 capsule by mouth 2 times daily for 30 days.  8/20/20 9/19/20  Jessenia Levi APRNAT - CNP   venlafaxine [facility]) 100 MG tablet Take 1 tablet by mouth 3 times daily 8/20/20   Jessenia PAPA Sims - CNP   lisinopril (PRINIVIL;ZESTRIL) 20 MG tablet Take 1 tablet by mouth daily 8/20/20   Jessenia PAPA Sims - CNP   mirtazapine (REMERON) 45 MG tablet Take 1 tablet by mouth nightly 8/20/20   Jessenia PAPA Sims CNP   naloxone 4 MG/0.1ML LIQD nasal spray 1 spray by Nasal route as needed for Opioid Reversal 7/24/20   PAPA Cleary CNP        Medications:    Current Facility-Administered Medications: sodium chloride flush 0.9 % injection 10 mL, 10 mL, Intravenous, 2 times per day  sodium chloride flush 0.9 % injection 10 mL, 10 mL, Intravenous, PRN  acetaminophen (TYLENOL) tablet 650 mg, 650 mg, Oral, Q6H PRN **OR** acetaminophen (TYLENOL) suppository 650 mg, 650 mg, Rectal, Q6H PRN  promethazine (PHENERGAN) tablet 12.5 mg, 12.5 mg, Oral, Q6H PRN **OR** ondansetron (ZOFRAN) injection 4 mg, 4 mg, Intravenous, Q6H PRN  0.9 % sodium chloride infusion, , Intravenous, Continuous  potassium chloride (KLOR-CON M) extended release tablet 40 mEq, 40 mEq, Oral, PRN **OR** potassium bicarb-citric acid (EFFER-K) effervescent tablet 40 mEq, 40 mEq, Oral, PRN **OR** potassium chloride 10 mEq/100 mL IVPB (Peripheral Line), 10 mEq, Intravenous, PRN  magnesium sulfate 2 g in 50 mL IVPB premix, 2 g, Intravenous, PRN  bisacodyl (DULCOLAX) EC tablet 5 mg, 5 mg, Oral, Daily PRN  0.9 % sodium chloride bolus, 2,000 mL, Intravenous, Once     Past Medical History: Diagnosis Date    Anxiety     Depression     Kidney stone     Liver disease     Hep C    Opiate abuse, continuous ([address]        Past Surgical History:    History reviewed. No pertinent surgical history. Allergies: Patient has no known allergies. Social History:      RESIDENCE: lives with girlfriend in her house  : not     CHILDREN: no children  OCCUPATION: not working  EDUCATION: associates in accounting    SUBSTANCE USE HISTORY: declined to answer, however extensive drug use history per electronic medical record review. UDS positive for Methamphetamines, PCP, opiates, marijuana during this admission. Family Medical and Psychiatric History:     Declined to answer    History reviewed. No pertinent family history. Physical  BP 99/62   Pulse 87   Temp 97.6 °F (36.4 °C) (Oral)   Resp 16   Wt 164 lb (74.4 kg)   SpO2 94% Comment: evaluated o2, no need for o2 at this time  BMI 23.53 kg/m²       Mental Status Examination:  Level of consciousness:  Within normal limits  Appearance: hospital attire, lying in bed, fair grooming  Behavior/Motor:  Agitated, constantly moving  Attitude toward examiner:  Uncooperative but good eye contact  Speech:  Spontaneous, normal rate and volume  Mood:  agitated  Affect: mood congruent  Thought processes:  Linear, goal directed, coherent  Thought content: denies suicidal ideations   denies homicidal ideations    denies hallucinations   denies delusions  Cognition:  Oriented to self, situation, location, date  Concentration clinically adequate  Memory age appropriate  Insight & Judgment:  fair    DSM-5 DIAGNOSIS:      Substance Use disorder    Stressors     Severity of stressors is unable to determine  Source of stressors include: Other psychosocial and environmental stressors    PLAN:      No further recommendations   Psychiatry will sign off  Call with questions  Additional recommendations will follow the clinical course.      Thank you very much for allowing us to participate in the care of this patient. Time spent 25 min.       Electronically signed by Sonu Smith MD on 8/25/20 at 1:49 PM EDT

## 2020-08-26 LAB
ANION GAP SERPL CALCULATED.3IONS-SCNC: 6 MEQ/L (ref 8–16)
BUN BLDV-MCNC: 24 MG/DL (ref 7–22)
CALCIUM SERPL-MCNC: 8.4 MG/DL (ref 8.5–10.5)
CHLORIDE BLD-SCNC: 112 MEQ/L (ref 98–111)
CO2: 24 MEQ/L (ref 23–33)
CREAT SERPL-MCNC: 0.8 MG/DL (ref 0.4–1.2)
GFR SERPL CREATININE-BSD FRML MDRD: > 90 ML/MIN/1.73M2
GLUCOSE BLD-MCNC: 108 MG/DL (ref 70–108)
MYOGLOBIN URINE: 19 MG/L (ref 0–1)
POTASSIUM SERPL-SCNC: 3.7 MEQ/L (ref 3.5–5.2)
SODIUM BLD-SCNC: 142 MEQ/L (ref 135–145)
TOTAL CK: 2480 U/L (ref 55–170)

## 2020-08-26 PROCEDURE — 5A1935Z RESPIRATORY VENTILATION, LESS THAN 24 CONSECUTIVE HOURS: ICD-10-PCS | Performed by: NURSE PRACTITIONER

## 2020-08-26 PROCEDURE — 0BH17EZ INSERTION OF ENDOTRACHEAL AIRWAY INTO TRACHEA, VIA NATURAL OR ARTIFICIAL OPENING: ICD-10-PCS | Performed by: NURSE PRACTITIONER

## 2020-08-26 PROCEDURE — 82550 ASSAY OF CK (CPK): CPT

## 2020-08-26 PROCEDURE — 36415 COLL VENOUS BLD VENIPUNCTURE: CPT

## 2020-08-26 PROCEDURE — 6370000000 HC RX 637 (ALT 250 FOR IP): Performed by: STUDENT IN AN ORGANIZED HEALTH CARE EDUCATION/TRAINING PROGRAM

## 2020-08-26 PROCEDURE — 94760 N-INVAS EAR/PLS OXIMETRY 1: CPT

## 2020-08-26 PROCEDURE — 6370000000 HC RX 637 (ALT 250 FOR IP): Performed by: NURSE PRACTITIONER

## 2020-08-26 PROCEDURE — 6370000000 HC RX 637 (ALT 250 FOR IP)

## 2020-08-26 PROCEDURE — 2060000000 HC ICU INTERMEDIATE R&B

## 2020-08-26 PROCEDURE — 6370000000 HC RX 637 (ALT 250 FOR IP): Performed by: INTERNAL MEDICINE

## 2020-08-26 PROCEDURE — 82552 ASSAY OF CPK IN BLOOD: CPT

## 2020-08-26 PROCEDURE — 2580000003 HC RX 258: Performed by: STUDENT IN AN ORGANIZED HEALTH CARE EDUCATION/TRAINING PROGRAM

## 2020-08-26 PROCEDURE — 80048 BASIC METABOLIC PNL TOTAL CA: CPT

## 2020-08-26 PROCEDURE — 99232 SBSQ HOSP IP/OBS MODERATE 35: CPT | Performed by: INTERNAL MEDICINE

## 2020-08-26 RX ORDER — VENLAFAXINE 50 MG/1
100 TABLET ORAL 3 TIMES DAILY
Status: DISCONTINUED | OUTPATIENT
Start: 2020-08-26 | End: 2020-08-27 | Stop reason: HOSPADM

## 2020-08-26 RX ORDER — PREGABALIN 75 MG/1
CAPSULE ORAL
Status: COMPLETED
Start: 2020-08-26 | End: 2020-08-26

## 2020-08-26 RX ORDER — MIRTAZAPINE 45 MG/1
45 TABLET, FILM COATED ORAL NIGHTLY
Status: DISCONTINUED | OUTPATIENT
Start: 2020-08-26 | End: 2020-08-27 | Stop reason: HOSPADM

## 2020-08-26 RX ORDER — METHADONE HYDROCHLORIDE 10 MG/1
10 TABLET ORAL ONCE
Status: COMPLETED | OUTPATIENT
Start: 2020-08-26 | End: 2020-08-26

## 2020-08-26 RX ORDER — METHADONE HYDROCHLORIDE 10 MG/1
80 TABLET ORAL DAILY
Status: DISCONTINUED | OUTPATIENT
Start: 2020-08-27 | End: 2020-08-27 | Stop reason: HOSPADM

## 2020-08-26 RX ORDER — PREGABALIN 75 MG/1
300 CAPSULE ORAL 2 TIMES DAILY
Status: DISCONTINUED | OUTPATIENT
Start: 2020-08-26 | End: 2020-08-27 | Stop reason: HOSPADM

## 2020-08-26 RX ORDER — NALOXONE HYDROCHLORIDE 4 MG/.1ML
1 SPRAY NASAL PRN
Status: DISCONTINUED | OUTPATIENT
Start: 2020-08-26 | End: 2020-08-26 | Stop reason: RX

## 2020-08-26 RX ORDER — VENLAFAXINE 50 MG/1
50 TABLET ORAL 3 TIMES DAILY
Status: DISCONTINUED | OUTPATIENT
Start: 2020-08-26 | End: 2020-08-26

## 2020-08-26 RX ORDER — BACITRACIN ZINC AND POLYMYXIN B SULFATE 500; 1000 [USP'U]/G; [USP'U]/G
OINTMENT TOPICAL 2 TIMES DAILY
Status: DISCONTINUED | OUTPATIENT
Start: 2020-08-26 | End: 2020-08-26 | Stop reason: RX

## 2020-08-26 RX ORDER — IBUPROFEN 200 MG
CAPSULE ORAL 2 TIMES DAILY
Status: DISCONTINUED | OUTPATIENT
Start: 2020-08-26 | End: 2020-08-27 | Stop reason: HOSPADM

## 2020-08-26 RX ORDER — METHADONE HYDROCHLORIDE 10 MG/1
80 TABLET ORAL DAILY
Qty: 56 TABLET | Refills: 0 | Status: CANCELLED | OUTPATIENT
Start: 2020-08-27 | End: 2020-09-03

## 2020-08-26 RX ADMIN — VENLAFAXINE 100 MG: 50 TABLET ORAL at 19:54

## 2020-08-26 RX ADMIN — PREGABALIN 300 MG: 75 CAPSULE ORAL at 16:00

## 2020-08-26 RX ADMIN — BACITRACIN ZINC NEOMYCIN SULFATE POLYMYXIN B SULFATE: 400; 3.5; 5 OINTMENT TOPICAL at 23:00

## 2020-08-26 RX ADMIN — SODIUM CHLORIDE, POTASSIUM CHLORIDE, SODIUM LACTATE AND CALCIUM CHLORIDE: 600; 310; 30; 20 INJECTION, SOLUTION INTRAVENOUS at 08:09

## 2020-08-26 RX ADMIN — SODIUM CHLORIDE, POTASSIUM CHLORIDE, SODIUM LACTATE AND CALCIUM CHLORIDE: 600; 310; 30; 20 INJECTION, SOLUTION INTRAVENOUS at 14:45

## 2020-08-26 RX ADMIN — METHADONE HYDROCHLORIDE 70 MG: 10 TABLET ORAL at 08:09

## 2020-08-26 RX ADMIN — SODIUM CHLORIDE, POTASSIUM CHLORIDE, SODIUM LACTATE AND CALCIUM CHLORIDE: 600; 310; 30; 20 INJECTION, SOLUTION INTRAVENOUS at 19:45

## 2020-08-26 RX ADMIN — MIRTAZAPINE 45 MG: 45 TABLET, FILM COATED ORAL at 19:54

## 2020-08-26 RX ADMIN — ACETAMINOPHEN 650 MG: 325 TABLET ORAL at 19:53

## 2020-08-26 RX ADMIN — VENLAFAXINE 100 MG: 50 TABLET ORAL at 15:59

## 2020-08-26 RX ADMIN — METHADONE HYDROCHLORIDE 10 MG: 10 TABLET ORAL at 11:25

## 2020-08-26 ASSESSMENT — PAIN DESCRIPTION - ONSET
ONSET: ON-GOING
ONSET: ON-GOING

## 2020-08-26 ASSESSMENT — PAIN DESCRIPTION - LOCATION
LOCATION: HEAD
LOCATION: HEAD

## 2020-08-26 ASSESSMENT — PAIN DESCRIPTION - DESCRIPTORS
DESCRIPTORS: HEADACHE
DESCRIPTORS: HEADACHE

## 2020-08-26 ASSESSMENT — PAIN DESCRIPTION - PROGRESSION
CLINICAL_PROGRESSION: NOT CHANGED
CLINICAL_PROGRESSION: NOT CHANGED

## 2020-08-26 ASSESSMENT — PAIN SCALES - GENERAL
PAINLEVEL_OUTOF10: 8
PAINLEVEL_OUTOF10: 6
PAINLEVEL_OUTOF10: 7
PAINLEVEL_OUTOF10: 6

## 2020-08-26 ASSESSMENT — PAIN DESCRIPTION - PAIN TYPE
TYPE: ACUTE PAIN
TYPE: ACUTE PAIN

## 2020-08-26 ASSESSMENT — PAIN - FUNCTIONAL ASSESSMENT
PAIN_FUNCTIONAL_ASSESSMENT: PREVENTS OR INTERFERES SOME ACTIVE ACTIVITIES AND ADLS
PAIN_FUNCTIONAL_ASSESSMENT: ACTIVITIES ARE NOT PREVENTED

## 2020-08-26 ASSESSMENT — PAIN DESCRIPTION - FREQUENCY
FREQUENCY: INTERMITTENT
FREQUENCY: INTERMITTENT

## 2020-08-26 NOTE — PROGRESS NOTES
Hospitalist Progress Note      Patient:  Dread Pastrana    Unit/Bed:4K-15/015-A  YOB: 1985  MRN: [id number]   Acct: [de-identified]   PCP: PAPA Andrade CNP  Date of Admission: 8/24/2020    Assessment/Plan:     1. Poisoning by unspecified drugs, medicaments and biological substances, accidental-  resolved. This can be seen based on the patient's clinical presentation of incoherence and urine drug screen that was positive for multiple substances including amphetamines, cannabinoids, PCP, and opioids. This can also be seen based on a similar presentation on June 30, 2020. The patient is currently eating normally. However, the patient had an episode of bradycardia with a pulse of 46.  8/25/20: We will monitor the patient's vitals per unit protocol. The patient will be maintained on suicide precautions. We will have a sitter by the patient's bedside. We will fluid resuscitate with 200 cc/h of lactated Ringer's. Psychiatry, addiction services, and social work were all consulted. Will monitor patient and reevaluate. The patient was placed on a general diet. 8/26/2020: Psychiatry stated the patient is acutely stable and signed off. Otherwise, treat as above. Will follow up with psychiatry and PCP as an outpatient. Will monitor the patient for 1 more day to track bradycardia. 2. Acute respiratory failure with hypoxia-resolved. This was likely secondary to ketamine use as the patient desaturated after being given ketamine, per Dr. Raimundo Hyatt report. The patient is currently satting 94% on room air.  8/25/20: We will monitor patient's vitals per unit protocol, encourage deep breathing and coughing every 2 hours while awake, and by keeping oxygen therapy protocol on standby. 8/26/2020: We will treat as described above. 3. Rhabdomyolysis-improving. From 8/24/2028/26/2020 the patient's total CK went from 3,186 to 4,466 to 2,480. lactated Ringer's at 200 cc/h. Otherwise treat as described above for rhabdomyolysis. 8/26/2020: We will treat as described above. 7. Depression, unspecified-stable. 8/25/20: Patient will be maintained on suicide precautions. Psychiatry, addiction services, and social work are all consulted. The patient will follow up with PCP and psychiatry as an outpatient. 8/26/2020: We will treat as described above. 8. Anxiety, unspecified-stable. 8/25/20: Psychiatry, addiction services, and social work are all consulted. The patient will follow-up with PCP and psychiatry as an outpatient. 8/26/20: The patient's home psych meds were restarted. Otherwise treat as described above. 9. Opioid dependence and withdrawal- opioid dependence stable, withdrawal new onset. 8/25/20: Pharmacy will restart his home Suboxone. If this cannot be done, will consider low dose morphine or non-opioid treatment for withdrawal syndrome. In particular, will consider clonidine, benzos, and supportive care. 8/26/2020: The patient was on methadone dosed via clinic. He was not on Suboxone. Therefore, the patient was restarted on his outpatient dose of methadone. He will follow-up with the methadone clinic as an outpatient. Expected discharge date: 8/28/2020    Disposition:    [x] Home       [] TCU       [] Rehab       [] Psych       [] SNF       [] [place]       [] Other-    Chief Complaint: Drug overdose    Hospital Treatment Course:     8/24/2020- 8/25/2020:     The patient is a 69-year-old male with a past medical history of previous hospitalization for drug overdose on June 30, 2020 during which he signed out [address], unspecified anxiety, unspecified depression, unspecified viral hepatitis C without hepatic coma, and unspecified opioid use disorder who presents with a chief complaint of drug overdose.     According to the ED provider, the patient was clean from opioids for 2 weeks and then relapsed on the evening of 8/23/2020. The patient was reported as rambling nonsensical words and he was given 2 L of normal saline, Ativan 2 mg x 2 doses, and ketamine 82.1 mg and then 24.6 mg. The patient's O2 saturation then dropped from 95% to 75%. The patient was placed on nonrebreather mask at 15 L/min and the saturation came back up to 100%. The patient's vital signs stabilized he was admitted to the hospitalist service.     A CMP was unremarkable except for CO2 of 16, BUN of 32, a creatinine of 3.1, and anion gap of 25, a glomerular filtration rate of 23%, a calcium of 11.0, and elevated total protein of 9.2, and a slightly high AST of 90. The patient's baseline creatinine appears to be about 1 and the baseline GFR appears to be greater than 90 though the patient is had frequent episodes of reduced GFR and increased creatinine.     A BMP on the morning of 8/25/2020 and a CO2 of 20, BUN of 34, creatinine of 2.1, and an estimated glomerular filtration rate of 36.     The total CK was 93,000 186 and then 4466. Glucose was 97. The urine drug screen was positive for amphetamines, cannabinoids PCP, and opioids. Acetaminophen and salicylate levels were in an acceptable range. The patient was negative for oxycodone.     CBC on 8/24/2020 was notable for an elevated white blood cell count of 15.8 and a normal hemoglobin. CBC in the morning of 8/24/2020 was notable for an elevated but lower white blood cell count of 11.1 and a slight anemia with a hemoglobin of 11.9 which is likely dilutional.     Urine cultures are pending. MRSA screen was positive. Urinalysis was notable for trace ketones, large amounts of blood, large amounts of urobilinogen, and trace leukocyte esterase. The urine chemistry was positive for myoglobin.  The patient was treated with a normal saline bolus of 2000 cc along with a continuous normal saline infusion of 150 cc/h.     Consults were made to addiction services, psychiatry, and social work.     Patient was placed on contact isolation, daily weights, I's and O's were monitored, and intermittent pneumatic compression device was placed, a sitter was placed at the bedside, patient was placed on telemetry monitoring, and suicide precautions were put in place. 8/25/2020- 8/26/2020: On presentation, the patient was extremely sleepy and difficult to arouse. However, once awake he was coherent and capable of answering questions and following commands. The patient requested food, water, and treatment for opioid withdrawal.  The patient had no other specific complaints or concerns and just wanted to rest.  Therefore, the history is kept to a minimum. The patient denies suicidal thoughts and ideations. The patient denies any recent thoughts of wanting to hurt himself. However, the patient did state he had thoughts of self-harm when he was younger. Subjective (past 24 hours): The patient states that he is feeling much better as compared to when he was admitted. However, the patient states that he is feeling \"sick to his stomach\"because he does not have his home Effexor, Remeron, and Lyrica. Otherwise, the patient has no new complaints or concerns. Constitutional: The patient denies fevers and myalgias. Gastrointestinal: The patient denies abdominal pain and heartburn. Cardiovascular: Denies chest pain and peripheral edema. Respiratory: Denies SOB, pleurisy, and cough. Past medical history, family history, social history and allergies reviewed again and is unchanged since admission. ROS (12 point review of systems completed. Pertinent positives noted.  Otherwise ROS is negative)     Medications:  Reviewed    Infusion Medications    lactated ringers 200 mL/hr at 08/26/20 1445     Scheduled Medications    [START ON 8/27/2020] methadone  80 mg Oral Daily    pregabalin  300 mg Oral BID    mirtazapine  45 mg Oral Nightly    venlafaxine  100 mg Oral TID    sodium chloride flush  10 mL Intravenous 2 times per day     PRN Meds: sodium chloride flush, acetaminophen **OR** acetaminophen, promethazine **OR** ondansetron, potassium chloride **OR** potassium alternative oral replacement **OR** potassium chloride, magnesium sulfate, bisacodyl      Intake/Output Summary (Last 24 hours) at 8/26/2020 1932  Last data filed at 8/26/2020 1444  Gross per 24 hour   Intake 4053.21 ml   Output --   Net 4053.21 ml       Diet:  DIET GENERAL; Safety Tray; Safety Tray (Disposables)    Exam:  /82   Pulse (!) 46   Temp 98 °F (36.7 °C) (Oral)   Resp 12   Wt 180 lb 5 oz (81.8 kg)   SpO2 96%   BMI 25.87 kg/m²   General appearance:  Awake alert and in no acute distress. Capable of answering questions and following commands. HEENT: No lymphadenopathy or thyromegaly. Respiratory:  Normal respiratory effort. Clear to auscultation, bilaterally without Rales/Wheezes/Rhonchi. Cardiovascular: Regular rate and rhythm with normal S1/S2 without murmurs, rubs or gallops. Abdomen: Soft, non-tender, non-distended with normal bowel sounds. Musculoskeletal: passive and active ROM x 4 extremities. Capillary Refill: Brisk,< 3 seconds   Peripheral Pulses: +2 palpable, equal bilaterally     Labs:   Recent Labs     08/24/20 2210 08/25/20  0550   WBC 15.8* 11.1*   HGB 14.8 11.9*   HCT 44.2 37.5*   * 362     Recent Labs     08/24/20 2210 08/25/20  0550 08/26/20  0545    141 142   K 5.1 4.4 3.7   CL 98 110 112*   CO2 16* 20* 24   BUN 32* 34* 24*   CREATININE 3.1* 2.1* 0.8   CALCIUM 11.0* 8.9 8.4*     Recent Labs     08/24/20 2210   AST 90*   ALT 54   BILIDIR <0.2   BILITOT 0.5   ALKPHOS 99     No results for input(s): INR in the last 72 hours.   Recent Labs     08/24/20 2210 08/25/20  0550 08/26/20  0545   CKTOTAL 3,186* 4,466* 2,480*       Microbiology:    Blood culture #1:   Lab Results   Component Value Date    BC No growth-preliminary No growth  05/18/2020       Blood culture #2:No results found for: BLOODCULT2    Organism:  Lab Results   Component Value Date    ORG Culture screen is positive for MRSA colonization 05/18/2020    [facility] Culture screen is positive for MRSA colonization 05/18/2020       No results found for: LABGRAM    MRSA culture only:No results found for: [facility]    Urine culture:   Lab Results   Component Value Date    LABURIN No growth-preliminaryNo growth 08/26/2019       Respiratory culture: No results found for: CULTRESP    Aerobic and Anaerobic :  No results found for: LABAERO  No results found for: LABANAE    Urinalysis:      Lab Results   Component Value Date    NITRU NEGATIVE 08/24/2020    WBCUA 10-15 08/24/2020    BACTERIA NONE SEEN 08/24/2020    RBCUA 50-75 08/24/2020    BLOODU LARGE 08/24/2020    SPECGRAV 1.024 07/30/2020    GLUCOSEU NEGATIVE 08/24/2020       Radiology:  No orders to display     No results found.     Support Devices (date placed):  [] ETT []Oral / [] Nasal  [] Gastric Tube [] OG / [] NG    [] Central Venous Line (Specify Site):  [] Urinary Catheter  [] Arterial Line (Specify Site)  [x] Peripheral IV access  [] Other:    DVT prophylaxis: [] Lovenox                                 [x] SCDs                                 [] SQ Heparin                                 [] Encourage ambulation           [] Already on Anticoagulation     Code Status: Full Code    Tele:   [x] yes             [] no    Electronically signed by Kim Quiñones MD, MPH, [facility] on 8/26/2020 at 7:32 PM   Supervised by Dr. Kael Enriquez

## 2020-08-26 NOTE — CARE COORDINATION
Discharge Plan; plans home with Caridad Perez, Suboxone patient of Dr. Tammie Mccullough, Psychiatry signed off; okayed patient for home, Addiction Services has provided information to patient; [facility] in past, Sylvia Hermosillo will see client today    8/26/20, 10:34 AM EDT    Patient goals/plan/ treatment preferences discussed by  and . Patient goals/plan/ treatment preferences reviewed with patient/ family. Patient/ family verbalize understanding of discharge plan and are in agreement with goal/plan/treatment preferences. Understanding was demonstrated using the teach back method. AVS provided by RN at time of discharge, which includes all necessary medical information pertaining to the patients current course of illness, treatment, post-discharge goals of care, and treatment preferences.

## 2020-08-26 NOTE — CARE COORDINATION
8/26/20, 4:13 PM EDT    DISCHARGE PLANNING EVALUATION      Admitting diagnosis: Drug overdose  Currently in treatment/where: no  Currently taking subutex/soboxone/vivitrol: Methadone  If yes, who prescribes: Methadone clinic, [address] [place] [address] at discharge: yes  Given Housing List: [address] [place] [address] Road List: declined  Agreeable to treatment, if yes where: no  Insurance: Payor: [employer] / Plan: [employer] / Product Type: *No Product type* /    Medication Assistance: no  Transportation Assistance:  May need [employer]

## 2020-08-26 NOTE — PLAN OF CARE
Problem: Pain:  Goal: Pain level will decrease  Description: Pain level will decrease  Outcome: Ongoing  Note:    Pain Level: 9     Pain goal: 0   Is pain goal met at this time? Yes             Problem: Falls - Risk of:  Goal: Will remain free from falls  Description: Will remain free from falls  Outcome: Ongoing  Note: Pt remained free of falls this shift . Fall precautions in place: bed alarm on, bed wheels locked, bed in lowest position. Call light within reach, pt demonstrates proper use of call light. Problem: Cardiovascular  Goal: Hemodynamic stability  Outcome: Ongoing  Note:   Vitals:    08/25/20 2259   BP: 115/76   Pulse: 61   Resp: 17   Temp: 98.1 °F (36.7 °C)   SpO2: 97%          Problem: Respiratory  Goal: No pulmonary complications  Outcome: Ongoing  Note: Pt is sating above 90% while being on RA with no complications     Problem: GI  Goal: No bowel complications  Outcome: Ongoing  Note: Pt denies any abd pain or tenderness at this time. Pt's bowel sounds are active X4     Problem: Discharge Planning:  Goal: Discharged to appropriate level of care  Description: Discharged to appropriate level of care  Outcome: Ongoing  Note: Pt is from home with girlfriend and plans to be discharged home when medically stable. Care plan reviewed with patient. Patient verbalize understanding of the plan of care and contribute to goal setting.

## 2020-08-27 VITALS
WEIGHT: 179.19 LBS | HEART RATE: 72 BPM | DIASTOLIC BLOOD PRESSURE: 90 MMHG | BODY MASS INDEX: 25.71 KG/M2 | SYSTOLIC BLOOD PRESSURE: 136 MMHG | RESPIRATION RATE: 18 BRPM | TEMPERATURE: 98.5 F | OXYGEN SATURATION: 95 %

## 2020-08-27 LAB
TOTAL CK: 1507 U/L (ref 55–170)
URINE CULTURE REFLEX: NORMAL

## 2020-08-27 PROCEDURE — 82550 ASSAY OF CK (CPK): CPT

## 2020-08-27 PROCEDURE — 2580000003 HC RX 258: Performed by: STUDENT IN AN ORGANIZED HEALTH CARE EDUCATION/TRAINING PROGRAM

## 2020-08-27 PROCEDURE — 6370000000 HC RX 637 (ALT 250 FOR IP): Performed by: STUDENT IN AN ORGANIZED HEALTH CARE EDUCATION/TRAINING PROGRAM

## 2020-08-27 PROCEDURE — 36415 COLL VENOUS BLD VENIPUNCTURE: CPT

## 2020-08-27 PROCEDURE — 99239 HOSP IP/OBS DSCHRG MGMT >30: CPT | Performed by: INTERNAL MEDICINE

## 2020-08-27 PROCEDURE — 82552 ASSAY OF CPK IN BLOOD: CPT

## 2020-08-27 PROCEDURE — 6370000000 HC RX 637 (ALT 250 FOR IP): Performed by: INTERNAL MEDICINE

## 2020-08-27 RX ORDER — SULFAMETHOXAZOLE AND TRIMETHOPRIM 800; 160 MG/1; MG/1
1 TABLET ORAL 2 TIMES DAILY
Qty: 14 TABLET | Refills: 0 | Status: SHIPPED | OUTPATIENT
Start: 2020-08-27 | End: 2020-09-03

## 2020-08-27 RX ORDER — NALOXONE HYDROCHLORIDE 4 MG/.1ML
1 SPRAY NASAL PRN
Qty: 1 EACH | Refills: 5 | Status: SHIPPED | OUTPATIENT
Start: 2020-08-27 | End: 2020-09-21 | Stop reason: SDUPTHER

## 2020-08-27 RX ORDER — NALOXONE HYDROCHLORIDE 4 MG/.1ML
1 SPRAY NASAL PRN
Qty: 1 EACH | Refills: 5 | Status: SHIPPED | OUTPATIENT
Start: 2020-08-27 | End: 2021-08-29 | Stop reason: ALTCHOICE

## 2020-08-27 RX ORDER — IBUPROFEN 200 MG
CAPSULE ORAL
Qty: 1 TUBE | Refills: 1 | Status: SHIPPED | OUTPATIENT
Start: 2020-08-27 | End: 2021-03-31 | Stop reason: ALTCHOICE

## 2020-08-27 RX ORDER — VENLAFAXINE 100 MG/1
100 TABLET ORAL 3 TIMES DAILY
Qty: 90 TABLET | Refills: 3 | Status: SHIPPED | OUTPATIENT
Start: 2020-08-27 | End: 2020-09-21 | Stop reason: SDUPTHER

## 2020-08-27 RX ORDER — ACYCLOVIR 200 MG/1
400 CAPSULE ORAL 3 TIMES DAILY
Qty: 60 CAPSULE | Refills: 0 | Status: SHIPPED | OUTPATIENT
Start: 2020-08-27 | End: 2020-09-06

## 2020-08-27 RX ADMIN — SODIUM CHLORIDE, POTASSIUM CHLORIDE, SODIUM LACTATE AND CALCIUM CHLORIDE: 600; 310; 30; 20 INJECTION, SOLUTION INTRAVENOUS at 02:00

## 2020-08-27 RX ADMIN — SODIUM CHLORIDE, POTASSIUM CHLORIDE, SODIUM LACTATE AND CALCIUM CHLORIDE: 600; 310; 30; 20 INJECTION, SOLUTION INTRAVENOUS at 06:12

## 2020-08-27 RX ADMIN — PREGABALIN 300 MG: 75 CAPSULE ORAL at 08:23

## 2020-08-27 RX ADMIN — BACITRACIN ZINC NEOMYCIN SULFATE POLYMYXIN B SULFATE: 400; 3.5; 5 OINTMENT TOPICAL at 08:28

## 2020-08-27 RX ADMIN — VENLAFAXINE 100 MG: 50 TABLET ORAL at 13:43

## 2020-08-27 RX ADMIN — METHADONE HYDROCHLORIDE 80 MG: 10 TABLET ORAL at 08:22

## 2020-08-27 RX ADMIN — VENLAFAXINE 100 MG: 50 TABLET ORAL at 08:23

## 2020-08-27 ASSESSMENT — PAIN DESCRIPTION - DESCRIPTORS: DESCRIPTORS: THROBBING

## 2020-08-27 ASSESSMENT — PAIN DESCRIPTION - LOCATION: LOCATION: EAR;NECK;SHOULDER

## 2020-08-27 ASSESSMENT — PAIN SCALES - GENERAL
PAINLEVEL_OUTOF10: 3
PAINLEVEL_OUTOF10: 0

## 2020-08-27 ASSESSMENT — PAIN DESCRIPTION - ORIENTATION: ORIENTATION: RIGHT

## 2020-08-27 NOTE — DISCHARGE SUMMARY
Discharge Summary     Patient Identification:  Milad Garrido  : 1985  MRN: [id number]   Account: [de-identified]     Admit date: 2020  Discharge date: 2020  Attending provider: Dr. Edwardo Amaral     Primary care provider: PAPA Damico CNP     Discharge Diagnoses: Active Problems:    Drug overdose, multiple drugs, accidental or unintentional, initial encounter    Polysubstance abuse ([facility] [address]  Resolved Problems:    * No resolved hospital problems. *    Assessment/Plan:     1. Poisoning by unspecified drugs, medicaments and biological substances, accidental-  resolved.  This can be seen based on the patient's clinical presentation of incoherence and urine drug screen that was positive for multiple substances including amphetamines, cannabinoids, PCP, and opioids.  This can also be seen based on a similar presentation on 2020.    The patient is currently eating normally. However, the patient had an episode of bradycardia with a pulse of 46. The patient's vitals are currently stable with a temperature of 98.1, a respiratory rate of 18, pulse of 76, blood pressure 149/95, and a pulse ox of 99% on room air.  20: We will monitor the patient's vitals per unit protocol.  The patient will be maintained on suicide precautions.  We will have a sitter by the patient's bedside.  We will fluid resuscitate with 200 cc/h of lactated Ringer's.  Psychiatry, addiction services, and social work were all consulted. Mary Tiwari monitor patient and reevaluate.  The patient was placed on a general diet. 2020: Psychiatry stated the patient is acutely stable and signed off. Otherwise, treat as above. Will follow up with psychiatry and PCP as an outpatient. Will monitor the patient for 1 more day to track bradycardia. 2020: We will restart the patient on lisinopril. Otherwise, the patient should be safe for discharge.   2. Acute respiratory failure with hypoxia-resolved.  This was likely secondary to ketamine use as the patient desaturated after being given ketamine, per Dr. Ezequiel Ribera report. Jackson Bazan patient is currently satting 94% on room air.  8/25/20: We will monitor patient's vitals per unit protocol, encourage deep breathing and coughing every 2 hours while awake, and by keeping oxygen therapy protocol on standby. 8/26/2020: We will treat as described above. 8/26/2020: See above.     3. Rhabdomyolysis-improving. From 8/24/2020 to 8/26/2020 the patient's total CK went from 3,186 to 4,466 to 2,480. As of 8/27/2020 the total CK is 1507.  8/25/20: We provide aggressive fluid resuscitation at a rate of 200 cc/h as described above.  Will monitor with daily total CK levels.  We will monitor electrolytes with a daily BMP level.  If abnormalities in potassium levels we will monitor potassium more frequently depending on the level of elevation.  If necessary we will give more IV lactated Ringer's and consult Nephrology. 8/26/2020: We will maintain treatment as described above. We will check the patient for 1 more day to confirm resolution of rhabdomyolysis. 8/27/2020: The patient is safe for discharge from this standpoint. 4. Toxic encephalopathy-resolving.  As described above. 8/25/20: As described above. 8/26/20: As described above. 8/27/2020: See above. 5. Anion gap metabolic acidosis- resolved.  This anion gap metabolic acidosis is likely secondary to rhabdomyolysis.  This can be seen based on the patient's known diagnosis of rhabdomyolysis for reasons described above and based on anion gap metabolic acidosis being a known complication of rhabdomyolysis.  This can also be seen based on the lack of other obvious causes.    From 8/24/2020 to 8/26/2020 the patient's anion gap went from 25.0-11 0.0-6.0.  8/25/20: We will replace the normal saline at 125 cc/h with lactated Ringer's at 200 cc/h.  Otherwise treat as described above for rhabdomyolysis. 8/26/2020:  We will treat as described PCP, and opioids.  Acetaminophen and salicylate levels were in an acceptable range.  The patient was negative for oxycodone.     CBC on 8/24/2020 was notable for an elevated white blood cell count of 15.8 and a normal hemoglobin.  CBC in the morning of 8/24/2020 was notable for an elevated but lower white blood cell count of 11.1 and a slight anemia with a hemoglobin of 11.9 which is likely dilutional.     Urine cultures are pending.  MRSA screen was positive.  Urinalysis was notable for trace ketones, large amounts of blood, large amounts of urobilinogen, and trace leukocyte esterase.  The urine chemistry was positive for myoglobin. The patient was treated with a normal saline bolus of 2000 cc along with a continuous normal saline infusion of 150 cc/h.     Consults were made to addiction services, psychiatry, and social work.     Patient was placed on contact isolation, daily weights, I's and O's were monitored, and intermittent pneumatic compression device was placed, a sitter was placed at the bedside, patient was placed on telemetry monitoring, and suicide precautions were put in place.     8/25/2020- 8/26/2020:     On presentation, the patient was extremely sleepy and difficult to arouse.  However, once awake he was coherent and capable of answering questions and following commands.  The patient requested food, water, and treatment for opioid withdrawal.  The patient had no other specific complaints or concerns and just wanted to rest.  Therefore, the history is kept to a minimum.  The patient denies suicidal thoughts and ideations.  The patient denies any recent thoughts of wanting to hurt himself. [facility], the patient did state he had thoughts of self-harm when he was younger.     8/26/2020-8/27/2020:     The patient states that he is feeling much better as compared to when he was admitted.  However, the patient states that he is feeling \"sick to his stomach\"because he does not have his home Effexor, Remeron, and Lyrica. Otherwise, the patient has no new complaints or concerns. The patient states that he is feeling well and states that he feels that he can go home. The patient's bradycardia resolved and his last pulse was 72. The patient was determined to be stable and allowed to go home. Discharge Medications:   Teo Jameson   [place] Medication Instructions TGF:[id number]    Printed on:08/27/20 3302   Medication Information                      acyclovir (ZOVIRAX) 200 MG capsule  Take 2 capsules by mouth 3 times daily for 10 days             lisinopril (PRINIVIL;ZESTRIL) 20 MG tablet  Take 1 tablet by mouth daily             methadone (DOLOPHINE) 10 MG/ML solution  Take 70 mg by mouth daily. Dispensed at [facility]             mirtazapine (REMERON) 45 MG tablet  Take 1 tablet by mouth nightly             naloxone 4 MG/0.1ML LIQD nasal spray  1 spray by Nasal route as needed for Opioid Reversal             naloxone 4 MG/0.1ML LIQD nasal spray  1 spray by Nasal route as needed for Opioid Reversal             neomycin-bacitracin-polymyxin (NEOSPORIN) 3.5-400-5000 OINT ointment  Apply topically 4 times daily. sulfamethoxazole-trimethoprim (BACTRIM DS;SEPTRA DS) 800-160 MG per tablet  Take 1 tablet by mouth 2 times daily for 7 days             venlafaxine (EFFEXOR) 100 MG tablet  Take 1 tablet by mouth 3 times daily                 Patient Instructions:    Discharge lab work: None. Activity: activity as tolerated  Diet: DIET GENERAL; Safety Tray; Safety Tray (Disposables)    Code Status: Full Code    Follow-up visits:   Meryl Messina, APRN - CNP  [address]  [address]  [phone]    On 9/3/2020  at 3:55 PM    [facility]  [address] [facility] [address]  [phone]      As needed       Procedures:     8/26/2020:  Intubation and Mechanical Ventilation    8/26/2020: Extubation    Consults:   IP CONSULT TO SOCIAL WORK  IP CONSULT TO ADDICTION SERVICES  IP CONSULT TO PSYCHIATRY    Examination:  Vitals:  Vitals:    08/26/20 2315 08/27/20 0342 08/27/20 0745 08/27/20 1200   BP: (!) 132/90 (!) 126/94 (!) 149/95 (!) 136/90   Pulse: 75 73 76 72   Resp: 17 17 18 18   Temp: 97.6 °F (36.4 °C) 98.1 °F (36.7 °C) 98.1 °F (36.7 °C) 98.5 °F (36.9 °C)   TempSrc: Oral Oral Axillary Oral   SpO2: 98% 93% 99% 95%   Weight:  179 lb 3 oz (81.3 kg)       Weight: Weight: 179 lb 3 oz (81.3 kg)     24 hour intake/output:    Intake/Output Summary (Last 24 hours) at 8/27/2020 1656  Last data filed at 8/27/2020 1321  Gross per 24 hour   Intake 4676.79 ml   Output --   Net 4676.79 ml       General appearance:  Awake, alert, and in no acute distress. Capable of answering questions and following commands. HEENT: No lymphadenopathy or thyromegaly. Respiratory:  Normal respiratory effort. Clear to auscultation, bilaterally without Rales/Wheezes/Rhonchi. Cardiovascular: Regular rate and rhythm with normal S1/S2 without murmurs, rubs or gallops. Abdomen: Soft, non-tender, non-distended with normal bowel sounds. Musculoskeletal: passive and active ROM x 4 extremities. Capillary Refill: Brisk,< 3 seconds   Peripheral Pulses: +2 palpable, equal bilaterally     Significant Diagnostics:   Radiology: No results found.     Labs:   Recent Results (from the past 72 hour(s))   POCT Glucose    Collection Time: 08/24/20  9:42 PM   Result Value Ref Range    POC Glucose 91 70 - 108 mg/dl   Acetaminophen Level    Collection Time: 08/24/20 10:10 PM   Result Value Ref Range    Acetaminophen Level < 5.0 0.0 - 20.0 ug/mL   Basic Metabolic Panel    Collection Time: 08/24/20 10:10 PM   Result Value Ref Range    Sodium 139 135 - 145 meq/L    Potassium 5.1 3.5 - 5.2 meq/L    Chloride 98 98 - 111 meq/L    CO2 16 (L) 23 - 33 meq/L    Glucose 76 70 - 108 mg/dL    BUN 32 (H) 7 - 22 mg/dL    CREATININE 3.1 (HH) 0.4 - 1.2 mg/dL    Calcium 11.0 (H) 8.5 - 10.5 mg/dL   CBC Auto Differential    Collection Time: 08/24/20 10:10 PM Result Value Ref Range    WBC 15.8 (H) 4.8 - 10.8 thou/mm3    RBC 5.13 4.70 - 6.10 mill/mm3    Hemoglobin 14.8 14.0 - 18.0 gm/dl    Hematocrit 44.2 42.0 - 52.0 %    MCV 86.2 80.0 - 94.0 fL    MCH 28.8 26.0 - 33.0 pg    MCHC 33.5 32.2 - 35.5 gm/dl    RDW-CV 13.3 11.5 - 14.5 %    RDW-SD 41.7 35.0 - 45.0 fL    Platelets 974 (H) 230 - 400 thou/mm3    MPV 9.6 9.4 - 12.4 fL    Seg Neutrophils 59.3 %    Lymphocytes 28.7 %    Monocytes 11.0 %    Eosinophils 0.2 %    Basophils 0.4 %    Immature Granulocytes 0.4 %    Segs Absolute 9.4 (H) 1.8 - 7.7 thou/mm3    Lymphocytes Absolute 4.5 1.0 - 4.8 thou/mm3    Monocytes Absolute 1.7 (H) 0.4 - 1.3 thou/mm3    Eosinophils Absolute 0.0 0.0 - 0.4 thou/mm3    Basophils Absolute 0.1 0.0 - 0.1 thou/mm3    Immature Grans (Abs) 0.07 0.00 - 0.07 thou/mm3    nRBC 0 /100 wbc   Ethanol    Collection Time: 08/24/20 10:10 PM   Result Value Ref Range    ETHYL ALCOHOL, SERUM < 0.01 0.00 %   Hepatic Function Panel    Collection Time: 08/24/20 10:10 PM   Result Value Ref Range    Alb 4.9 3.5 - 5.1 g/dL    Total Bilirubin 0.5 0.3 - 1.2 mg/dL    Bilirubin, Direct <0.2 0.0 - 0.3 mg/dL    Alkaline Phosphatase 99 38 - 126 U/L    AST 90 (H) 5 - 40 U/L    ALT 54 11 - 66 U/L    Total Protein 9.2 (H) 6.1 - 8.0 g/dL   Salicylate    Collection Time: 08/24/20 10:10 PM   Result Value Ref Range    Salicylate, Serum < 0.3 (L) 2.0 - 10.0 mg/dL   TSH without Reflex    Collection Time: 08/24/20 10:10 PM   Result Value Ref Range    TSH 1.010 0.400 - 4.20 uIU/mL   Anion Gap    Collection Time: 08/24/20 10:10 PM   Result Value Ref Range    Anion Gap 25.0 (H) 8.0 - 16.0 meq/L   Glomerular Filtration Rate, Estimated    Collection Time: 08/24/20 10:10 PM   Result Value Ref Range    Est, Glom Filt Rate 23 (A) ml/min/1.73m2   Osmolality    Collection Time: 08/24/20 10:10 PM   Result Value Ref Range    Osmolality Calc 283.2 275.0 - 300 mOsmol/kg   CK    Collection Time: 08/24/20 10:10 PM   Result Value Ref Range    Total CK 3,186 (H) 55 - 170 U/L   Urine Drug Screen    Collection Time: 08/24/20 11:55 PM   Result Value Ref Range    AMPHETAMINE+METHAMPHETAMINE URINE SCREEN POSITIVE NEGATIVE    Barbiturate Quant, Ur Negative NEGATIVE    Benzodiazepine Quant, Ur Negative NEGATIVE    Cannabinoid Quant, Ur POSITIVE NEGATIVE    Cocaine Metab Quant, Ur Negative NEGATIVE    Opiates, Urine POSITIVE NEGATIVE    Oxycodone Negative NEGATIVE    PCP Quant, Ur POSITIVE NEGATIVE   Urine with Reflexed Micro    Collection Time: 08/24/20 11:55 PM   Result Value Ref Range    Glucose, Ur NEGATIVE NEGATIVE mg/dl    Bilirubin Urine NEGATIVE NEGATIVE    Ketones, Urine TRACE (A) NEGATIVE    Specific Gravity, Urine 1.022 1.002 - 1.03    Blood, Urine LARGE (A) NEGATIVE    pH, UA 5.5 5.0 - 9.0    Protein,  (A) NEGATIVE    Urobilinogen, Urine 1.0 0.0 - 1.0 eu/dl    Nitrite, Urine NEGATIVE NEGATIVE    Leukocyte Esterase, Urine TRACE (A) NEGATIVE    Color, UA DK YELLOW (A) STRAW-YELL    Character, Urine SL CLOUDY CLEAR-SL C    RBC, UA 50-75 0-2/hpf /hpf    WBC, UA 10-15 0-4/hpf /hpf    Epithelial Cells, UA 3-5 3-5/hpf /hpf    Bacteria, UA NONE SEEN FEW/NONE S /hpf    Casts UA >15 HYALINE NONE SEEN /lpf    Crystals, UA OXALATE NONE SEEN    Renal Epithelial, UA NONE SEEN NONE SEEN    Yeast, UA NONE SEEN NONE SEEN    CASTS 2 NONE SEEN NONE SEEN /lpf    MISCELLANEOUS 2 NONE SEEN    Culture, Reflexed, Urine    Collection Time: 08/24/20 11:55 PM    Specimen: Urine, clean catch   Result Value Ref Range    Urine Culture Reflex No growth-preliminary No growth     Myoglobin, urine    Collection Time: 08/24/20 11:55 PM   Result Value Ref Range    Myoglobin, Ur POSITIVE (A) NEGATIVE   Myoglobin, Urine    Collection Time: 08/24/20 11:55 PM   Result Value Ref Range    Myoglobin, Ur 19 (H) 0 - 1 mg/L   MRSA by PCR    Collection Time: 08/25/20  3:00 AM   Result Value Ref Range    MRSA SCREEN RT-PCR POSITIVE (A)    VRE Screen by PCR    Collection Time: 08/25/20  3:00 AM Specimen: Rectal Swab   Result Value Ref Range    Vancomycin Resistant Enterococcus NEGATIVE    CBC auto differential    Collection Time: 08/25/20  5:50 AM   Result Value Ref Range    WBC 11.1 (H) 4.8 - 10.8 thou/mm3    RBC 4.19 (L) 4.70 - 6.10 mill/mm3    Hemoglobin 11.9 (L) 14.0 - 18.0 gm/dl    Hematocrit 37.5 (L) 42.0 - 52.0 %    MCV 89.5 80.0 - 94.0 fL    MCH 28.4 26.0 - 33.0 pg    MCHC 31.7 (L) 32.2 - 35.5 gm/dl    RDW-CV 13.5 11.5 - 14.5 %    RDW-SD 43.8 35.0 - 45.0 fL    Platelets 730 359 - 687 thou/mm3    MPV 9.5 9.4 - 12.4 fL    Seg Neutrophils 70.1 %    Lymphocytes 19.7 %    Monocytes 9.4 %    Eosinophils 0.1 %    Basophils 0.4 %    Immature Granulocytes 0.3 %    Segs Absolute 7.8 (H) 1.8 - 7.7 thou/mm3    Lymphocytes Absolute 2.2 1.0 - 4.8 thou/mm3    Monocytes Absolute 1.0 0.4 - 1.3 thou/mm3    Eosinophils Absolute 0.0 0.0 - 0.4 thou/mm3    Basophils Absolute 0.0 0.0 - 0.1 thou/mm3    Immature Grans (Abs) 0.03 0.00 - 0.07 thou/mm3    nRBC 0 /100 wbc   Basic Metabolic Panel w/ Reflex to MG    Collection Time: 08/25/20  5:50 AM   Result Value Ref Range    Sodium 141 135 - 145 meq/L    Potassium reflex Magnesium 4.4 3.5 - 5.2 meq/L    Chloride 110 98 - 111 meq/L    CO2 20 (L) 23 - 33 meq/L    Glucose 97 70 - 108 mg/dL    BUN 34 (H) 7 - 22 mg/dL    CREATININE 2.1 (H) 0.4 - 1.2 mg/dL    Calcium 8.9 8.5 - 10.5 mg/dL   CK    Collection Time: 08/25/20  5:50 AM   Result Value Ref Range    Total CK 4,466 (H) 55 - 170 U/L   Anion Gap    Collection Time: 08/25/20  5:50 AM   Result Value Ref Range    Anion Gap 11.0 8.0 - 16.0 meq/L   Glomerular Filtration Rate, Estimated    Collection Time: 08/25/20  5:50 AM   Result Value Ref Range    Est, Glom Filt Rate 36 (A) RI/DJX/2.28I8   Basic Metabolic Panel    Collection Time: 08/26/20  5:45 AM   Result Value Ref Range    Sodium 142 135 - 145 meq/L    Potassium 3.7 3.5 - 5.2 meq/L    Chloride 112 (H) 98 - 111 meq/L    CO2 24 23 - 33 meq/L    Glucose 108 70 - 108 mg/dL    BUN

## 2020-08-27 NOTE — ADT AUTH CERT
Patient Demographics     Name  Patient ID  SSN  Gender Identity  Birth Date    Radha Mcgregor  [id number]    Male  [birth year] (35 yrs)    Address  Phone  Email  Employer     [address]  [facility] [id number] (A) [phone] Charisse Fields)  --  [employer]   [address] [address] [place] [address]  Occupation  Emp Status     Neymar Altamirano  --  Part Time     Reg Status  PCP  Date Last Verified  Next Review Date     Verified  Omid ReyesPAPA - CNP  [phone]  20     Admission Date  Discharge Date  Admitting Provider      20  --  Joe Flores MD      Marital Status  Mosque       Single  [facility] Witness       Emergency Contact 1  Emergency Contact 2    Shi Palacios (P)   [address]  [place] [address] [facility]  Dusty Reynolds 6   [id number] (J)   [phone] Charisse Fields)    Penny Parker  [de-identified]   [address] [employer] Name/Sex/Relation  Subscriber   Subscriber Address/Phone  Subscriber Emp/Emp Phone    1. [employer]   [id number]  Heather Rowell Male   (Self)  1985  [address] [place] [address]   [phone](Y)  OTHER    Utilization Reviews         LOC:Acute Adult-General Medical (2020) by Michelle Childs RN         Review Status  Review Entered    In Primary  2020 09:16        Criteria Review    REVIEW SUMMARY     Patient: Jade Velasquez  Review Number: [id number]  Review Status:  In Primary     Condition Specific: Yes        OUTCOMES  Outcome Type: Primary           REVIEW DETAILS     Service Date: 2020  Product: [employer] Adult  Subset: General Medical      (Symptom or finding within 24h)         (Excludes PO medications unless noted)          Select Day, One:              [ ] Episode Day 2,  One:                  [ ] INTERMEDIATE, >= One:                      [ ] Toxic exposure or ingestion and, All:                          [X] Continuous cardiac monitoring (excludes Holter) ~--Admin, IQ Admin Admin on 08- 09:16 AM--~                          Telemetry monitoring - 24 hour duration                                                                                   Version: InterQual® 2019.1  Viraj Chang  © 2019 [employer] and/or one of its subsidiaries. All Rights Reserved. CPT only © 2018 American Medical Association. All Rights Reserved. Additional Notes    Internal Med       Assessment/Plan:         1. Poisoning by unspecified drugs, medicaments and biological substances, accidental-  resolved.  This can be seen based on the patient's clinical presentation of incoherence and urine drug screen that was positive for multiple substances including amphetamines, cannabinoids, PCP, and opioids.  This can also be seen based on a similar presentation on June 30, 2020.    The patient is currently eating normally. [facility], the patient had an episode of bradycardia with a pulse of 46.    8/25/20: We will monitor the patient's vitals per unit protocol.  The patient will be maintained on suicide precautions.  We will have a sitter by the patient's bedside.  We will fluid resuscitate with 200 cc/h of lactated Ringer's.  Psychiatry, addiction services, and social work were all consulted. Erum Mcgovern monitor patient and reevaluate.  The patient was placed on a general diet. 8/26/2020: Psychiatry stated the patient is acutely stable and signed off. aldo Marshall as above.  Will follow up with psychiatry and PCP as an outpatient. Erum Mcgovern monitor the patient for 1 more day to track bradycardia.     2. Acute respiratory failure with hypoxia-resolved.  This was likely secondary to ketamine use as the patient desaturated after being given ketamine, per Dr. Talia Mendez report. Brenna Haynes patient is currently satting 94% on room air.    8/25/20: We will monitor patient's vitals per unit protocol, encourage deep breathing and coughing every 2 hours while awake, and by keeping oxygen therapy protocol on standby. 8/26/2020: We will treat as described above. 3. Rhabdomyolysis-improving.  From 8/24/2028/26/2020 the patient's total CK went from 3,186 to 4,466 to 2,480.    8/25/20: We provide aggressive fluid resuscitation at a rate of 200 cc/h as described above.  Will monitor with daily total CK levels.  We will monitor electrolytes with a daily BMP level.  If abnormalities in potassium levels we will monitor potassium more frequently depending on the level of elevation.  If necessary we will give more IV lactated Ringer's and consult Nephrology. 8/26/2020: We will maintain treatment as described above.  We will check the patient for 1 more day to confirm resolution of rhabdomyolysis. 4. Toxic encephalopathy-resolving.  As described above. 8/25/20: As described above. 8/26/20: As described above. 5. Anion gap metabolic acidosis- resolved.  This anion gap metabolic acidosis is likely secondary to rhabdomyolysis.  This can be seen based on the patient's known diagnosis of rhabdomyolysis for reasons described above and based on anion gap metabolic acidosis being a known complication of rhabdomyolysis.  This can also be seen based on the lack of other obvious causes.    From 8/24/2020 to 8/26/2020 the patient's anion gap went from 25.0-11 0.0-6.0.    8/25/20: We will replace the normal saline at 125 cc/h with lactated Ringer's at 200 cc/h.  Otherwise treat as described above for rhabdomyolysis. 8/26/2020: We will treat as described above. 6. Stage III SATYA by AKIN classification-resolved.  This renal failure is likely secondary to rhabdomyolysis versus volume depletion.  This is because the patient has a confirmed diagnosis of rhabdomyolysis and SATYA is a known complication of rhabdomyolysis.  This can also be seen based on the patient's substantial elevation in his creatinine from baseline.  This can also be seen in the substantial drop of his eGFR from baseline.  From 8/24/2020 to 8/26/2020 the patient's creatinine peaked at 3.1 then went to 2.1 and is now 0.8.  The glomerular filtration rate is now above 90.    8/25/20: We will replace normal saline resuscitation 125 cc/h with lactated Ringer's at 200 cc/h.  Otherwise treat as described above for rhabdomyolysis. 8/26/2020: We will treat as described above. 7. Depression, unspecified-stable. 8/25/20: Patient will be maintained on suicide precautions.  Psychiatry, addiction services, and social work are all consulted.  The patient will follow up with PCP and psychiatry as an outpatient. 8/26/2020: We will treat as described above. 8. Anxiety, unspecified-stable. 8/25/20: Psychiatry, addiction services, and social work are all consulted.  The patient will follow-up with PCP and psychiatry as an outpatient. 8/26/20: The patient's home psych meds were restarted. Rock Butler treat as described above. 9. Opioid dependence and withdrawal- opioid dependence stable, withdrawal new onset. 8/25/20: Pharmacy will restart his home Suboxone.  If this cannot be done, will consider low dose morphine or non-opioid treatment for withdrawal syndrome.  In particular, will consider clonidine, benzos, and supportive care. 8/26/2020:  The patient was on methadone dosed via clinic. [facility] was not on Suboxone.  Therefore, the patient was restarted on his outpatient dose of methadone. [facility] will follow-up with the methadone clinic as an outpatient.         Expected discharge date: 8/28/2020       =    Subjective (past 24 hours):         The patient states that he is feeling much better as compared to when he was admitted. [facility], the patient states that he is feeling \"sick to his stomach\"because he does not have his home Effexor, Remeron, and Lyrica.  Otherwise, the patient has no new complaints or concerns.         Constitutional: The patient denies fevers and myalgias.         Gastrointestinal: The patient denies abdominal pain and heartburn.         Cardiovascular: Denies chest pain and peripheral edema.         Respiratory: Denies SOB, pleurisy, and cough.              Past medical history, family history, social history and allergies reviewed again and is unchanged since admission       Recent Labs      200 20    0550 20    0545     141 142    K 5.1 4.4 3.7    CL 98 110 112*    CO2 16* 20* 24    BUN 32* 34* 24*    CREATININE 3.1* 2.1* 0.8    CALCIUM 11.0* 8.9 8.4*       Recent Labs      20    AST 90*    ALT 54    BILIDIR <0.2    BILITOT 0.5    ALKPHOS 99       No results for input(s): INR in the last 72 hours. Recent Labs      20    0550 20    0545    CKTOTAL 3,186* 4,466* 2,480*            ================Meds    20 0900    methadone (DOLOPHINE) tablet 80 mg  80 mg, Oral, DAILY       lactated ringers infusion   Route: Intravenous Frequency: CONTINUOUS @ 200 mL/hr                 PA Review by Corky Escobedo RN         Review Status  Review Entered    In Primary  2020 09:12        Criteria Review             Summary: We recommend that the following pt's current hospitalization under INPATIENT. [facility]         Note type: Physician Advisor Note level: Hospital Account       Note text: We recommend that the following pt's current hospitalization under INPATIENT        status is APPROPRIATE .                        Name: Althea Banda        : 1985        CSN: [id number]        INSURANCE: [employer]                               Clinical summary 79-year-old male with a past medical history of previous       hospitalization for drug overdose on 2020 during which he signed out       AGAINST MEDICAL ADVICE, unspecified anxiety, unspecified depression, unspecified       viral hepatitis C without hepatic coma, and unspecified opioid use disorder who       presented with drug overdose.  Pt admitted with the clinical assessments of:       -Poisoning by unspecified drugs, medicaments and biological substances,       accidental- resolving. This can be seen based on the patient's clinical       presentation of incoherence and urine drug screen that was positive for multiple       substances including amphetamines, cannabinoids, PCP, and opioids.                -Acute respiratory failure with hypoxia-resolved. This was likely secondary to       ketamine use as the patient desaturated after being given ketamine.                -Rhabdomyolysis- worsening. This is apparent based on the patient's CK level       3,186 on the night of 8/24/2020 and on the higher CK level 4,466 on the morning       of 8/25/2020. This is also based on the urinalysis finding large blood in the       urine and urine chemistry positive for myoglobin.       8/25/20:                -Toxic encephalopathy-resolving.                -Anion gap metabolic acidosis-resolving. This anion gap metabolic acidosis is       likely secondary to rhabdomyolysis. This can be seen based on the patient's       known diagnosis of rhabdomyolysis for reasons described above and based on anion       gap metabolic acidosis being a known complication of rhabdomyolysis. This can       also be seen based on the lack of other obvious causes. This can be seen based       on the patient's elevated anion gap of 25.0 on presentation on 8/24/2020.        However, anion gap has since improved to 11.0 with fluid resuscitation of 2 L       normal saline bolus and 125 cc/h fluid resuscitation with normal saline.               -Stage III SATYA by AKIN classification.  This renal failure is likely secondary       to rhabdomyolysis versus volume depletion.               Vitals improved       Labs and Imaging See above       MCG criteria applies yes       Comments                        This chart was reviewed at 4:15 PM 8/26/2020               Kim Barajas MD        Physician Advisor        [facility]        CELL : [phone]

## 2020-08-27 NOTE — CARE COORDINATION
Discharge Plan; plans home with Frances Colunga, Suboxone patient of Dr. Karlee Olvera, Psychiatry signed off; okayed patient for home, Addiction Services has provided information to patient; AMA in past, [facility] following          Patient goals/plan/ treatment preferences discussed by  and . Patient goals/plan/ treatment preferences reviewed with patient/ family. Patient/ family verbalize understanding of discharge plan and are in agreement with goal/plan/treatment preferences. Understanding was demonstrated using the teach back method.   AVS provided by RN at time of discharge, which includes all necessary medical information pertaining to the patients current course of illness, treatment, post-discharge goals of care, and treatment preferences.

## 2020-08-28 ENCOUNTER — APPOINTMENT (OUTPATIENT)
Dept: CT IMAGING | Age: 35
DRG: 812 | End: 2020-08-28
Payer: MEDICARE

## 2020-08-28 ENCOUNTER — APPOINTMENT (OUTPATIENT)
Dept: GENERAL RADIOLOGY | Age: 35
DRG: 812 | End: 2020-08-28
Payer: MEDICARE

## 2020-08-28 ENCOUNTER — HOSPITAL ENCOUNTER (INPATIENT)
Age: 35
LOS: 1 days | Discharge: HOME | DRG: 812 | End: 2020-08-28
Attending: EMERGENCY MEDICINE | Admitting: INTERNAL MEDICINE
Payer: MEDICARE

## 2020-08-28 VITALS
OXYGEN SATURATION: 95 % | BODY MASS INDEX: 25.2 KG/M2 | RESPIRATION RATE: 16 BRPM | TEMPERATURE: 98 F | DIASTOLIC BLOOD PRESSURE: 92 MMHG | HEIGHT: 71 IN | HEART RATE: 73 BPM | WEIGHT: 180 LBS | SYSTOLIC BLOOD PRESSURE: 136 MMHG

## 2020-08-28 PROBLEM — R60.0 BILATERAL LEG EDEMA: Status: ACTIVE | Noted: 2020-08-28

## 2020-08-28 PROBLEM — G93.40 ACUTE ENCEPHALOPATHY: Status: ACTIVE | Noted: 2020-08-28

## 2020-08-28 PROBLEM — R74.8 ELEVATED LIVER ENZYMES: Status: ACTIVE | Noted: 2020-08-28

## 2020-08-28 PROBLEM — R50.9 FEVER: Status: ACTIVE | Noted: 2020-08-28

## 2020-08-28 PROBLEM — F11.20 SEVERE FENTANYL USE DISORDER (HCC): Status: ACTIVE | Noted: 2020-08-28

## 2020-08-28 LAB
ACETAMINOPHEN LEVEL: < 5 UG/ML (ref 0–20)
ALBUMIN SERPL-MCNC: 3.8 G/DL (ref 3.5–5.1)
ALP BLD-CCNC: 74 U/L (ref 38–126)
ALT SERPL-CCNC: 70 U/L (ref 11–66)
AMMONIA: 29 UMOL/L (ref 11–60)
AMPHETAMINE+METHAMPHETAMINE URINE SCREEN: NEGATIVE
ANION GAP SERPL CALCULATED.3IONS-SCNC: 10 MEQ/L (ref 8–16)
APTT: 29.3 SECONDS (ref 22–38)
AST SERPL-CCNC: 71 U/L (ref 5–40)
BARBITURATE QUANTITATIVE URINE: NEGATIVE
BASOPHILS # BLD: 0.3 %
BASOPHILS ABSOLUTE: 0 THOU/MM3 (ref 0–0.1)
BENZODIAZEPINE QUANTITATIVE URINE: NEGATIVE
BILIRUB SERPL-MCNC: < 0.2 MG/DL (ref 0.3–1.2)
BILIRUBIN URINE: NEGATIVE
BLOOD, URINE: NEGATIVE
BUN BLDV-MCNC: 7 MG/DL (ref 7–22)
C-REACTIVE PROTEIN: 1.31 MG/DL (ref 0–1)
CALCIUM SERPL-MCNC: 9.4 MG/DL (ref 8.5–10.5)
CANNABINOID QUANTITATIVE URINE: NEGATIVE
CHARACTER, URINE: CLEAR
CHLORIDE BLD-SCNC: 100 MEQ/L (ref 98–111)
CO2: 31 MEQ/L (ref 23–33)
COCAINE METABOLITE QUANTITATIVE URINE: NEGATIVE
COLOR: YELLOW
CREAT SERPL-MCNC: 0.9 MG/DL (ref 0.4–1.2)
EKG ATRIAL RATE: 97 BPM
EKG P AXIS: 58 DEGREES
EKG P-R INTERVAL: 184 MS
EKG Q-T INTERVAL: 342 MS
EKG QRS DURATION: 92 MS
EKG QTC CALCULATION (BAZETT): 434 MS
EKG R AXIS: 38 DEGREES
EKG T AXIS: 54 DEGREES
EKG VENTRICULAR RATE: 97 BPM
EOSINOPHIL # BLD: 0.9 %
EOSINOPHILS ABSOLUTE: 0.1 THOU/MM3 (ref 0–0.4)
ERYTHROCYTE [DISTWIDTH] IN BLOOD BY AUTOMATED COUNT: 12.8 % (ref 11.5–14.5)
ERYTHROCYTE [DISTWIDTH] IN BLOOD BY AUTOMATED COUNT: 41.2 FL (ref 35–45)
ETHYL ALCOHOL, SERUM: < 0.01 %
GFR SERPL CREATININE-BSD FRML MDRD: > 90 ML/MIN/1.73M2
GLUCOSE BLD-MCNC: 101 MG/DL (ref 70–108)
GLUCOSE URINE: NEGATIVE MG/DL
HCT VFR BLD CALC: 32 % (ref 42–52)
HEMOGLOBIN: 10.4 GM/DL (ref 14–18)
IMMATURE GRANS (ABS): 0.03 THOU/MM3 (ref 0–0.07)
IMMATURE GRANULOCYTES: 0.3 %
INR BLD: 1.2 (ref 0.85–1.13)
KETONES, URINE: NEGATIVE
LACTIC ACID: 1.5 MMOL/L (ref 0.5–2.2)
LD: 345 U/L (ref 100–190)
LEUKOCYTE ESTERASE, URINE: NEGATIVE
LIPASE: 14.6 U/L (ref 5.6–51.3)
LYMPHOCYTES # BLD: 24.9 %
LYMPHOCYTES ABSOLUTE: 2.7 THOU/MM3 (ref 1–4.8)
MCH RBC QN AUTO: 28.6 PG (ref 26–33)
MCHC RBC AUTO-ENTMCNC: 32.5 GM/DL (ref 32.2–35.5)
MCV RBC AUTO: 87.9 FL (ref 80–94)
MONOCYTES # BLD: 10.3 %
MONOCYTES ABSOLUTE: 1.1 THOU/MM3 (ref 0.4–1.3)
NITRITE, URINE: NEGATIVE
NUCLEATED RED BLOOD CELLS: 0 /100 WBC
OPIATES, URINE: POSITIVE
OSMOLALITY CALCULATION: 279.4 MOSMOL/KG (ref 275–300)
OXYCODONE: NEGATIVE
PH UA: 8 (ref 5–9)
PHENCYCLIDINE QUANTITATIVE URINE: NEGATIVE
PLATELET # BLD: 264 THOU/MM3 (ref 130–400)
PMV BLD AUTO: 9.8 FL (ref 9.4–12.4)
POTASSIUM REFLEX MAGNESIUM: 3.6 MEQ/L (ref 3.5–5.2)
PRO-BNP: 1659 PG/ML (ref 0–450)
PROCALCITONIN: 0.27 NG/ML (ref 0.01–0.09)
PROTEIN UA: NEGATIVE
RBC # BLD: 3.64 MILL/MM3 (ref 4.7–6.1)
REASON FOR REJECTION: NORMAL
REJECTED TEST: NORMAL
SALICYLATE, SERUM: < 0.3 MG/DL (ref 2–10)
SARS-COV-2, NAAT: NOT DETECTED
SEDIMENTATION RATE, ERYTHROCYTE: 40 MM/HR (ref 0–10)
SEG NEUTROPHILS: 63.3 %
SEGMENTED NEUTROPHILS ABSOLUTE COUNT: 6.8 THOU/MM3 (ref 1.8–7.7)
SODIUM BLD-SCNC: 141 MEQ/L (ref 135–145)
SPECIFIC GRAVITY, URINE: 1.01 (ref 1–1.03)
TOTAL CK: 1109 U/L (ref 55–170)
TOTAL CK: 1461 U/L (ref 55–170)
TOTAL CK: 894 U/L (ref 55–170)
TOTAL PROTEIN: 7.1 G/DL (ref 6.1–8)
TROPONIN T: < 0.01 NG/ML
UROBILINOGEN, URINE: 0.2 EU/DL (ref 0–1)
WBC # BLD: 10.8 THOU/MM3 (ref 4.8–10.8)

## 2020-08-28 PROCEDURE — 99223 1ST HOSP IP/OBS HIGH 75: CPT | Performed by: INTERNAL MEDICINE

## 2020-08-28 PROCEDURE — 87040 BLOOD CULTURE FOR BACTERIA: CPT

## 2020-08-28 PROCEDURE — 87522 HEPATITIS C REVRS TRNSCRPJ: CPT

## 2020-08-28 PROCEDURE — 83880 ASSAY OF NATRIURETIC PEPTIDE: CPT

## 2020-08-28 PROCEDURE — 87389 HIV-1 AG W/HIV-1&-2 AB AG IA: CPT

## 2020-08-28 PROCEDURE — 36415 COLL VENOUS BLD VENIPUNCTURE: CPT

## 2020-08-28 PROCEDURE — 83605 ASSAY OF LACTIC ACID: CPT

## 2020-08-28 PROCEDURE — 82140 ASSAY OF AMMONIA: CPT

## 2020-08-28 PROCEDURE — 6360000004 HC RX CONTRAST MEDICATION: Performed by: EMERGENCY MEDICINE

## 2020-08-28 PROCEDURE — U0002 COVID-19 LAB TEST NON-CDC: HCPCS

## 2020-08-28 PROCEDURE — 80053 COMPREHEN METABOLIC PANEL: CPT

## 2020-08-28 PROCEDURE — G0480 DRUG TEST DEF 1-7 CLASSES: HCPCS

## 2020-08-28 PROCEDURE — 93005 ELECTROCARDIOGRAM TRACING: CPT | Performed by: EMERGENCY MEDICINE

## 2020-08-28 PROCEDURE — 71275 CT ANGIOGRAPHY CHEST: CPT

## 2020-08-28 PROCEDURE — 71045 X-RAY EXAM CHEST 1 VIEW: CPT

## 2020-08-28 PROCEDURE — 2060000000 HC ICU INTERMEDIATE R&B

## 2020-08-28 PROCEDURE — 6370000000 HC RX 637 (ALT 250 FOR IP): Performed by: INTERNAL MEDICINE

## 2020-08-28 PROCEDURE — 70450 CT HEAD/BRAIN W/O DYE: CPT

## 2020-08-28 PROCEDURE — 6360000002 HC RX W HCPCS: Performed by: STUDENT IN AN ORGANIZED HEALTH CARE EDUCATION/TRAINING PROGRAM

## 2020-08-28 PROCEDURE — 6360000002 HC RX W HCPCS: Performed by: INTERNAL MEDICINE

## 2020-08-28 PROCEDURE — 99285 EMERGENCY DEPT VISIT HI MDM: CPT

## 2020-08-28 PROCEDURE — 85651 RBC SED RATE NONAUTOMATED: CPT

## 2020-08-28 PROCEDURE — 83615 LACTATE (LD) (LDH) ENZYME: CPT

## 2020-08-28 PROCEDURE — 82550 ASSAY OF CK (CPK): CPT

## 2020-08-28 PROCEDURE — 86038 ANTINUCLEAR ANTIBODIES: CPT

## 2020-08-28 PROCEDURE — 84145 PROCALCITONIN (PCT): CPT

## 2020-08-28 PROCEDURE — 86140 C-REACTIVE PROTEIN: CPT

## 2020-08-28 PROCEDURE — 85025 COMPLETE CBC W/AUTO DIFF WBC: CPT

## 2020-08-28 PROCEDURE — 93010 ELECTROCARDIOGRAM REPORT: CPT | Performed by: NUCLEAR MEDICINE

## 2020-08-28 PROCEDURE — 96361 HYDRATE IV INFUSION ADD-ON: CPT

## 2020-08-28 PROCEDURE — 96374 THER/PROPH/DIAG INJ IV PUSH: CPT

## 2020-08-28 PROCEDURE — 80307 DRUG TEST PRSMV CHEM ANLYZR: CPT

## 2020-08-28 PROCEDURE — 6360000002 HC RX W HCPCS: Performed by: EMERGENCY MEDICINE

## 2020-08-28 PROCEDURE — 85730 THROMBOPLASTIN TIME PARTIAL: CPT

## 2020-08-28 PROCEDURE — 2580000003 HC RX 258: Performed by: INTERNAL MEDICINE

## 2020-08-28 PROCEDURE — 87086 URINE CULTURE/COLONY COUNT: CPT

## 2020-08-28 PROCEDURE — 2580000003 HC RX 258: Performed by: STUDENT IN AN ORGANIZED HEALTH CARE EDUCATION/TRAINING PROGRAM

## 2020-08-28 PROCEDURE — 81003 URINALYSIS AUTO W/O SCOPE: CPT

## 2020-08-28 PROCEDURE — 6370000000 HC RX 637 (ALT 250 FOR IP): Performed by: EMERGENCY MEDICINE

## 2020-08-28 PROCEDURE — 83690 ASSAY OF LIPASE: CPT

## 2020-08-28 PROCEDURE — 85610 PROTHROMBIN TIME: CPT

## 2020-08-28 PROCEDURE — 84484 ASSAY OF TROPONIN QUANT: CPT

## 2020-08-28 PROCEDURE — 2580000003 HC RX 258: Performed by: EMERGENCY MEDICINE

## 2020-08-28 RX ORDER — MAGNESIUM SULFATE IN WATER 40 MG/ML
2 INJECTION, SOLUTION INTRAVENOUS PRN
Status: DISCONTINUED | OUTPATIENT
Start: 2020-08-28 | End: 2020-08-28 | Stop reason: HOSPADM

## 2020-08-28 RX ORDER — METHADONE HYDROCHLORIDE 10 MG/ML
70 CONCENTRATE ORAL DAILY
Status: DISCONTINUED | OUTPATIENT
Start: 2020-08-28 | End: 2020-08-28

## 2020-08-28 RX ORDER — ACYCLOVIR 200 MG/1
400 CAPSULE ORAL 3 TIMES DAILY
Status: DISCONTINUED | OUTPATIENT
Start: 2020-08-28 | End: 2020-08-28 | Stop reason: HOSPADM

## 2020-08-28 RX ORDER — SODIUM CHLORIDE 9 MG/ML
INJECTION, SOLUTION INTRAVENOUS CONTINUOUS
Status: DISCONTINUED | OUTPATIENT
Start: 2020-08-28 | End: 2020-08-28 | Stop reason: HOSPADM

## 2020-08-28 RX ORDER — SODIUM CHLORIDE 0.9 % (FLUSH) 0.9 %
10 SYRINGE (ML) INJECTION EVERY 12 HOURS SCHEDULED
Status: DISCONTINUED | OUTPATIENT
Start: 2020-08-28 | End: 2020-08-28 | Stop reason: HOSPADM

## 2020-08-28 RX ORDER — PREGABALIN 75 MG/1
300 CAPSULE ORAL 2 TIMES DAILY
Status: DISCONTINUED | OUTPATIENT
Start: 2020-08-28 | End: 2020-08-28 | Stop reason: HOSPADM

## 2020-08-28 RX ORDER — 0.9 % SODIUM CHLORIDE 0.9 %
30 INTRAVENOUS SOLUTION INTRAVENOUS ONCE
Status: COMPLETED | OUTPATIENT
Start: 2020-08-28 | End: 2020-08-28

## 2020-08-28 RX ORDER — POTASSIUM CHLORIDE 20 MEQ/1
40 TABLET, EXTENDED RELEASE ORAL PRN
Status: DISCONTINUED | OUTPATIENT
Start: 2020-08-28 | End: 2020-08-28 | Stop reason: HOSPADM

## 2020-08-28 RX ORDER — FAMOTIDINE 20 MG/1
20 TABLET, FILM COATED ORAL 2 TIMES DAILY
Status: DISCONTINUED | OUTPATIENT
Start: 2020-08-28 | End: 2020-08-28 | Stop reason: RX

## 2020-08-28 RX ORDER — ACETAMINOPHEN 650 MG/1
650 SUPPOSITORY RECTAL EVERY 6 HOURS PRN
Status: DISCONTINUED | OUTPATIENT
Start: 2020-08-28 | End: 2020-08-28 | Stop reason: HOSPADM

## 2020-08-28 RX ORDER — ACETAMINOPHEN 325 MG/1
650 TABLET ORAL ONCE
Status: COMPLETED | OUTPATIENT
Start: 2020-08-28 | End: 2020-08-28

## 2020-08-28 RX ORDER — PROMETHAZINE HYDROCHLORIDE 25 MG/1
12.5 TABLET ORAL EVERY 6 HOURS PRN
Status: DISCONTINUED | OUTPATIENT
Start: 2020-08-28 | End: 2020-08-28 | Stop reason: HOSPADM

## 2020-08-28 RX ORDER — POTASSIUM CHLORIDE 7.45 MG/ML
10 INJECTION INTRAVENOUS PRN
Status: DISCONTINUED | OUTPATIENT
Start: 2020-08-28 | End: 2020-08-28 | Stop reason: HOSPADM

## 2020-08-28 RX ORDER — ONDANSETRON 2 MG/ML
4 INJECTION INTRAMUSCULAR; INTRAVENOUS EVERY 6 HOURS PRN
Status: DISCONTINUED | OUTPATIENT
Start: 2020-08-28 | End: 2020-08-28 | Stop reason: HOSPADM

## 2020-08-28 RX ORDER — SODIUM CHLORIDE 0.9 % (FLUSH) 0.9 %
10 SYRINGE (ML) INJECTION PRN
Status: DISCONTINUED | OUTPATIENT
Start: 2020-08-28 | End: 2020-08-28 | Stop reason: HOSPADM

## 2020-08-28 RX ORDER — LORAZEPAM 2 MG/ML
1 INJECTION INTRAMUSCULAR ONCE
Status: COMPLETED | OUTPATIENT
Start: 2020-08-28 | End: 2020-08-28

## 2020-08-28 RX ORDER — PANTOPRAZOLE SODIUM 40 MG/1
40 TABLET, DELAYED RELEASE ORAL
Status: DISCONTINUED | OUTPATIENT
Start: 2020-08-28 | End: 2020-08-28 | Stop reason: HOSPADM

## 2020-08-28 RX ORDER — POLYETHYLENE GLYCOL 3350 17 G/17G
17 POWDER, FOR SOLUTION ORAL DAILY
Status: DISCONTINUED | OUTPATIENT
Start: 2020-08-28 | End: 2020-08-28 | Stop reason: HOSPADM

## 2020-08-28 RX ORDER — METHADONE HYDROCHLORIDE 10 MG/1
80 TABLET ORAL
Status: DISCONTINUED | OUTPATIENT
Start: 2020-08-28 | End: 2020-08-28 | Stop reason: HOSPADM

## 2020-08-28 RX ORDER — 0.9 % SODIUM CHLORIDE 0.9 %
500 INTRAVENOUS SOLUTION INTRAVENOUS ONCE
Status: COMPLETED | OUTPATIENT
Start: 2020-08-28 | End: 2020-08-28

## 2020-08-28 RX ORDER — MIRTAZAPINE 45 MG/1
45 TABLET, FILM COATED ORAL NIGHTLY
Status: DISCONTINUED | OUTPATIENT
Start: 2020-08-28 | End: 2020-08-28 | Stop reason: HOSPADM

## 2020-08-28 RX ORDER — LISINOPRIL 20 MG/1
20 TABLET ORAL DAILY
Status: DISCONTINUED | OUTPATIENT
Start: 2020-08-28 | End: 2020-08-28 | Stop reason: HOSPADM

## 2020-08-28 RX ORDER — VENLAFAXINE 50 MG/1
100 TABLET ORAL 3 TIMES DAILY
Status: DISCONTINUED | OUTPATIENT
Start: 2020-08-28 | End: 2020-08-28 | Stop reason: HOSPADM

## 2020-08-28 RX ORDER — ACETAMINOPHEN 325 MG/1
650 TABLET ORAL EVERY 6 HOURS PRN
Status: DISCONTINUED | OUTPATIENT
Start: 2020-08-28 | End: 2020-08-28 | Stop reason: HOSPADM

## 2020-08-28 RX ADMIN — Medication 10 ML: at 11:00

## 2020-08-28 RX ADMIN — VENLAFAXINE 100 MG: 50 TABLET ORAL at 13:29

## 2020-08-28 RX ADMIN — ACYCLOVIR 400 MG: 200 CAPSULE ORAL at 13:29

## 2020-08-28 RX ADMIN — SODIUM CHLORIDE 2448 ML: 9 INJECTION, SOLUTION INTRAVENOUS at 02:40

## 2020-08-28 RX ADMIN — PIPERACILLIN AND TAZOBACTAM 3.38 G: 3; .375 INJECTION, POWDER, LYOPHILIZED, FOR SOLUTION INTRAVENOUS at 07:02

## 2020-08-28 RX ADMIN — SODIUM CHLORIDE: 9 INJECTION, SOLUTION INTRAVENOUS at 16:38

## 2020-08-28 RX ADMIN — IOPAMIDOL 80 ML: 755 INJECTION, SOLUTION INTRAVENOUS at 05:18

## 2020-08-28 RX ADMIN — METHADONE HYDROCHLORIDE 80 MG: 10 TABLET ORAL at 17:19

## 2020-08-28 RX ADMIN — SODIUM CHLORIDE 500 ML: 9 INJECTION, SOLUTION INTRAVENOUS at 10:59

## 2020-08-28 RX ADMIN — PANTOPRAZOLE SODIUM 40 MG: 40 TABLET, DELAYED RELEASE ORAL at 10:59

## 2020-08-28 RX ADMIN — PREGABALIN 300 MG: 75 CAPSULE ORAL at 13:29

## 2020-08-28 RX ADMIN — ACETAMINOPHEN 650 MG: 325 TABLET ORAL at 03:03

## 2020-08-28 RX ADMIN — VANCOMYCIN HYDROCHLORIDE 1250 MG: 5 INJECTION, POWDER, LYOPHILIZED, FOR SOLUTION INTRAVENOUS at 17:21

## 2020-08-28 RX ADMIN — CEFEPIME HYDROCHLORIDE 2 G: 2 INJECTION, POWDER, FOR SOLUTION INTRAVENOUS at 12:44

## 2020-08-28 RX ADMIN — POLYETHYLENE GLYCOL 3350 17 G: 17 POWDER, FOR SOLUTION ORAL at 10:59

## 2020-08-28 RX ADMIN — LORAZEPAM 1 MG: 2 INJECTION INTRAMUSCULAR; INTRAVENOUS at 03:44

## 2020-08-28 RX ADMIN — ACETAMINOPHEN 650 MG: 325 TABLET ORAL at 11:35

## 2020-08-28 RX ADMIN — VANCOMYCIN HYDROCHLORIDE 1000 MG: 1 INJECTION, POWDER, LYOPHILIZED, FOR SOLUTION INTRAVENOUS at 07:35

## 2020-08-28 RX ADMIN — SODIUM CHLORIDE: 9 INJECTION, SOLUTION INTRAVENOUS at 08:50

## 2020-08-28 ASSESSMENT — PAIN SCALES - GENERAL
PAINLEVEL_OUTOF10: 5
PAINLEVEL_OUTOF10: 3
PAINLEVEL_OUTOF10: 0
PAINLEVEL_OUTOF10: 8
PAINLEVEL_OUTOF10: 9
PAINLEVEL_OUTOF10: 8

## 2020-08-28 ASSESSMENT — ENCOUNTER SYMPTOMS
SORE THROAT: 0
CONSTIPATION: 0
EYE DISCHARGE: 0
ABDOMINAL PAIN: 0
COUGH: 0
STRIDOR: 0
PHOTOPHOBIA: 0
RHINORRHEA: 0
NAUSEA: 0
EYE ITCHING: 0
BACK PAIN: 0
EYE REDNESS: 0
CHEST TIGHTNESS: 0
ABDOMINAL DISTENTION: 0
EYE PAIN: 0
WHEEZING: 0
DIARRHEA: 0
VOMITING: 0
SHORTNESS OF BREATH: 0

## 2020-08-28 NOTE — ED TRIAGE NOTES
Pt to ED via intake with c/o of left leg swelling and leg pain. Pt leslee reports after pt was D/C from hospital today they noticed the abscess on their legs. Pt appears to have several abscesses on bilateral legs. Pt is febrile at this time. Pt HR is tachycardic. Pt respirations easy and unlabored. Pt is A&Ox4. Pt is restless on cot. IV inserted. Call light in reach. Will continue to monitor.

## 2020-08-28 NOTE — H&P
Hospitalist H+P      Patient:  Jonelle Mireles    Unit/Bed:01/001A  YOB: 1985  MRN: [id number]   Acct: [de-identified]     PCP: PAPA Melchor CNP  Date of Admission: 8/28/2020        Assessment and Plan:        1. Fever: We will panculture, order venous Doppler of lower extremities to rule out DVT, GENOVEVA to rule out endocarditis, urine culture, will also obtain TATYANA to evaluate for autoimmune causes of fever, will start on IV fluids, IV antibiotics vancomycin and cefepime empirically. 2. Lower leg edema left greater than right: We will obtain venous Doppler of lower extremities rule out DVT. 3. Elevated hepatic enzymes: We will repeat in a.m. obtain hep C quantitative. 4. Fentanyl overdose x2: We will consult social service and may need to get psychiatry involved for inpatient drug rehab and counseling  5. Encephalopathy: We will monitor, possibly secondary to his multiple overdoses today. CC: Bilateral leg swelling and skin abscesses    HPI: 29-year-old disheveled  male presents with leg swelling and leg pain. States it is actually in both legs. Patient was discharged from hospital yesterday. Patient has been complaining of fever and fast heart rate. Patient is extremely lethargic and a poor historian but he does eventually wake up and states that he used fentanyl twice after being discharged from the hospital and overdosed and his girlfriend gave him Narcan twice. No other history is obtainable from the patient. Electronic medical records were reviewed and care everywhere was reviewed. His past medical history includes depression, acute kidney injury, multiple drug overdoses, anxiety, opioid dependency on methadone. ROS (14 point review of systems completed. Pertinent positives noted. Otherwise ROS is negative) :  Unable to obtain due to to the patient's mental status    PMH:  Per HPI and       Diagnosis Date    Anxiety     Depression     Kidney stone     Liver disease     Hep C    Opiate abuse, continuous ([facility] [address]      SHX:  History reviewed. No pertinent surgical history. FHX: History reviewed. No pertinent family history. SOCHX:   Social History     Socioeconomic History    Marital status: Single     Spouse name: None    Number of children: None    Years of education: None    Highest education level: None   Occupational History    None   Social Needs    Financial resource strain: None    Food insecurity     Worry: None     Inability: None    Transportation needs     Medical: None     Non-medical: None   Tobacco Use    Smoking status: Former Smoker     Packs/day: 0.50     Types: Cigarettes    Smokeless tobacco: Never Used   Substance and Sexual Activity    Alcohol use: Yes     Comment: occasional    Drug use: Not Currently     Types: Opiates , IV, Cocaine, Marijuana, Methamphetamines     Comment:  last used IV Fentanyl/heroin 7/6/20 @ 0800. last used meth 6/19/20    Sexual activity: Not Currently   Lifestyle    Physical activity     Days per week: None     Minutes per session: None    Stress: None   Relationships    Social connections     Talks on phone: None     Gets together: None     Attends Oriental orthodox service: None     Active member of club or organization: None     Attends meetings of clubs or organizations: None     Relationship status: None    Intimate partner violence     Fear of current or ex partner: None     Emotionally abused: None     Physically abused: None     Forced sexual activity: None   Other Topics Concern    None   Social History Narrative    None      Allergies: Patient has no known allergies.   Medications:       piperacillin-tazobactam  3.375 g Intravenous Once    vancomycin  1,000 mg Intravenous Once       PHYSICAL EXAM:    BP (!) 134/92   Pulse 81   Temp 99.3 °F (37.4 °C) (Oral)   Resp 20   Ht 5' 11\" (1.803 m)   Wt 180 lb (81.6 kg)   SpO2 94%   BMI 25.10 kg/m²     General appearance:  No apparent distress, appears stated age and cooperative. HEENT:  Normal cephalic, atraumatic without obvious deformity. Pupils equal, round, and reactive to light. Extra ocular muscles intact. Conjunctivae/corneas clear. Neck: Supple, with full range of motion. no jugular venous distention. Trachea midline. no carotid bruits  Respiratory:  Normal respiratory effort. Clear to auscultation, bilaterally without Rales/Wheezes/Rhonchi. Breath sounds equal bilaterally  Cardiovascular:  Regular rate and rhythm with normal S1/S2 without murmurs, rubs or gallops. PMI non displaced  Abdomen: Soft, non-tender, non-distended with normal bowel sounds. No guarding, rebound. Musculoskeletal:  No clubbing, cyanosis or edema bilaterally in lower extremities left leg slightly bigger than the right. Full range of motion without deformity. Skin: Skin color, texture, turgor normal.  No rashes or lesions, or suspicious lesions. No splinter hemorrhages in the nailbeds, multiple skin lesions in various stages of healing  Neurologic:  Neurovascularly intact without any focal sensory/motor deficits. Cranial nerves: II-XII intact, grossly non-focal.  Psychiatric:  Alert and oriented, thought content appropriate, normal insight  Capillary Refill: Brisk,< 2 seconds   Peripheral Pulses: +2 palpable, equal bilaterally upper and lower extremities  Lymphatics: no lymphadenopathy    Data: (All radiographs, tracings, PFTs, and imaging are personally viewed and interpreted unless otherwise noted).     Fever 102.1   WBC 10.8   Platelets 427   Hemoglobin 10.4   Creatinine 0.9   AST 71 ALT T 70   INR 1.2   CK total 1461   CRP 1.31   Ammonia 29   Urine drug screen positive for opiates  Recent Labs     08/28/20  0249   WBC 10.8   HGB 10.4*   HCT 32.0*         Recent Labs     08/26/20  0545 08/28/20  0249    141   K 3.7 3.6   * 100   CO2 24 31   BUN 24* 7   CREATININE 0.8 0.9   CALCIUM 8.4* 9.4      Recent Labs     08/28/20  0249   AST 71*   ALT 70* upright view of the chest. FINDINGS: Heart size is accentuated by technique. No definite infiltrate. No definite infiltrate. **This report has been created using voice recognition software. It may contain minor errors which are inherent in voice recognition technology. ** Final report electronically signed by Dr. Jenn Rodriguez on 8/28/2020 3:27 AM      Electronically signed by Etienne Stokes DO on 8/28/2020 at 7:31 AM

## 2020-08-28 NOTE — ED NOTES
Pt medicated per [facility] for fever. Pt updated on POC, Call light in reach, will continue to monitor.       Harsh Gibson RN  08/28/20 [id number]

## 2020-08-28 NOTE — ED NOTES
Pt transported to [facility] on cart in stable condition. Floor contacted before transport. Spoke with Banner Edgar.      Genoveva Morris  08/28/20 [id number]

## 2020-08-28 NOTE — ED PROVIDER NOTES
[address] ENCOUNTER      PATIENT NAME: Kimberly Becker  MRN: [id number]  : 1985  WEIR: 2020  PROVIDER: Lupe King MD      CHIEF COMPLAINT       Chief Complaint   Patient presents with    Leg Swelling    Abscess       Nurses Notes reviewed and I agreeexcept as noted in the HPI. HISTORY OF PRESENT ILLNESS    Kimberly Becker is a 38 y.o. male who presents to Emergency Department with Leg Swelling and Abscess      Onset: Gradual  Location: at home  Severity: severe  Timing: Last three hours  Modifying factors: None  Quality: Confusion and left leg pain and swelling, headache and fever  Radiation: None  Duration: Persistent  Context: Past medical history of polysubstance abuse, discharged from [facility] after admission for polysubstance abuse, acute respiratory failure with hypoxia, rhabdomyolysis, metabolic acidosis, SATYA, and toxic encephalopathy  Prior episodes: Frequent  Therapy today: None  Associated Symptoms: None  Additional history: He overdosed twice with fentanyl which was reverted with Narcan by his girlfriend since yesterday discharge. This HPI was provided by the patient. REVIEW OF SYSTEMS     Review of Systems   Constitutional: Positive for activity change and fever. Negative for appetite change, chills, fatigue and unexpected weight change. HENT: Negative for congestion, ear discharge, ear pain, hearing loss, nosebleeds, rhinorrhea and sore throat. Eyes: Negative for photophobia, pain, discharge, redness and itching. Respiratory: Negative for cough, chest tightness, shortness of breath, wheezing and stridor. Cardiovascular: Negative for chest pain, palpitations and leg swelling. Gastrointestinal: Negative for abdominal distention, abdominal pain, constipation, diarrhea, nausea and vomiting. Endocrine: Negative for cold intolerance, heat intolerance, polydipsia and polyphagia.    Genitourinary: Negative for dysuria, flank pain, frequency and hematuria. Musculoskeletal: Positive for myalgias. Negative for arthralgias, back pain, gait problem, neck pain and neck stiffness. Skin: Positive for wound. Negative for pallor and rash. Allergic/Immunologic: Negative for environmental allergies and food allergies. Neurological: Positive for weakness and headaches. Negative for dizziness, tremors and syncope. Psychiatric/Behavioral: Positive for agitation, behavioral problems, confusion, dysphoric mood and hallucinations. Negative for self-injury, sleep disturbance and suicidal ideas. PAST MEDICAL HISTORY    has a past medical history of Anxiety, Depression, Kidney stone, Liver disease, and Opiate abuse, continuous ([facility] [address] SURGICAL HISTORY      has no past surgical history on file. CURRENT MEDICATIONS       Previous Medications    ACYCLOVIR (ZOVIRAX) 200 MG CAPSULE    Take 2 capsules by mouth 3 times daily for 10 days    LISINOPRIL (PRINIVIL;ZESTRIL) 20 MG TABLET    Take 1 tablet by mouth daily    METHADONE (DOLOPHINE) 10 MG/ML SOLUTION    Take 70 mg by mouth daily. Dispensed at [facility]    MIRTAZAPINE (REMERON) 45 MG TABLET    Take 1 tablet by mouth nightly    NALOXONE 4 MG/0.1ML LIQD NASAL SPRAY    1 spray by Nasal route as needed for Opioid Reversal    NALOXONE 4 MG/0.1ML LIQD NASAL SPRAY    1 spray by Nasal route as needed for Opioid Reversal    NEOMYCIN-BACITRACIN-POLYMYXIN (NEOSPORIN) 3.5-400-5000 OINT OINTMENT    Apply topically 4 times daily. SULFAMETHOXAZOLE-TRIMETHOPRIM (BACTRIM DS;SEPTRA DS) 800-160 MG PER TABLET    Take 1 tablet by mouth 2 times daily for 7 days    VENLAFAXINE (EFFEXOR) 100 MG TABLET    Take 1 tablet by mouth 3 times daily       ALLERGIES     has No Known Allergies. FAMILY HISTORY     has no family status information on file. family history is not on file. SOCIAL HISTORY      reports that he has quit smoking. His smoking use included cigarettes.  He smoked 0.50 packs per day. He has never used smokeless tobacco. He reports current alcohol use. He reports previous drug use. Drugs: Opiates , IV, Cocaine, Marijuana, and Methamphetamines. PHYSICAL EXAM     INITIAL VITALS:  height is 5' 11\" (1.803 m) and weight is 180 lb (81.6 kg). His oral temperature is 99.3 °F (37.4 °C). His blood pressure is 134/92 (abnormal) and his pulse is 81. His respiration is 20 and oxygen saturation is 94%. Physical Exam  Vitals signs and nursing note reviewed. Constitutional:       Appearance: He is well-developed. He is not diaphoretic. HENT:      Head: Normocephalic and atraumatic. Nose: Nose normal.   Eyes:      General: No scleral icterus. Right eye: No discharge. Left eye: No discharge. Conjunctiva/sclera: Conjunctivae normal.      Pupils: Pupils are equal, round, and reactive to light. Neck:      Musculoskeletal: Normal range of motion and neck supple. Vascular: No JVD. Trachea: No tracheal deviation. Cardiovascular:      Rate and Rhythm: Regular rhythm. Tachycardia present. Heart sounds: Normal heart sounds. No murmur. No friction rub. No gallop. Pulmonary:      Effort: Pulmonary effort is normal. No respiratory distress. Breath sounds: Normal breath sounds. No stridor. No wheezing or rales. Chest:      Chest wall: No tenderness. Abdominal:      General: Bowel sounds are normal. There is no distension. Palpations: Abdomen is soft. There is no mass. Tenderness: There is no abdominal tenderness. There is no guarding or rebound. Hernia: No hernia is present. Musculoskeletal:         General: Tenderness present. No deformity. Left lower leg: Edema present. Comments: Left calf swelling and pain   Lymphadenopathy:      Cervical: No cervical adenopathy. Skin:     General: Skin is warm and dry. Capillary Refill: Capillary refill takes less than 2 seconds. Coloration: Skin is not pale.       Findings: No recognition software. It may contain minor errors which are inherent in voice recognition technology. **      Final report electronically signed by Dr. Yovanny Martínez on 8/28/2020 3:27 AM      VL DUP LOWER EXTREMITY VENOUS BILATERAL    (Results Pending)       []Visualized and interpreted by me   [] Radiologist's Wet Read Report Reviewed   [] Discussed with Radiologist.    Karlo Barajas:   Results for orders placed or performed during the hospital encounter of 08/28/20   Lactic acid, plasma   Result Value Ref Range    Lactic Acid 1.5 0.5 - 2.2 mmol/L   CBC auto differential   Result Value Ref Range    WBC 10.8 4.8 - 10.8 thou/mm3    RBC 3.64 (L) 4.70 - 6.10 mill/mm3    Hemoglobin 10.4 (L) 14.0 - 18.0 gm/dl    Hematocrit 32.0 (L) 42.0 - 52.0 %    MCV 87.9 80.0 - 94.0 fL    MCH 28.6 26.0 - 33.0 pg    MCHC 32.5 32.2 - 35.5 gm/dl    RDW-CV 12.8 11.5 - 14.5 %    RDW-SD 41.2 35.0 - 45.0 fL    Platelets 368 329 - 698 thou/mm3    MPV 9.8 9.4 - 12.4 fL    Seg Neutrophils 63.3 %    Lymphocytes 24.9 %    Monocytes 10.3 %    Eosinophils 0.9 %    Basophils 0.3 %    Immature Granulocytes 0.3 %    Segs Absolute 6.8 1.8 - 7.7 thou/mm3    Lymphocytes Absolute 2.7 1.0 - 4.8 thou/mm3    Monocytes Absolute 1.1 0.4 - 1.3 thou/mm3    Eosinophils Absolute 0.1 0.0 - 0.4 thou/mm3    Basophils Absolute 0.0 0.0 - 0.1 thou/mm3    Immature Grans (Abs) 0.03 0.00 - 0.07 thou/mm3    nRBC 0 /100 wbc   Comprehensive Metabolic Panel w/ Reflex to MG   Result Value Ref Range    Glucose 101 70 - 108 mg/dL    CREATININE 0.9 0.4 - 1.2 mg/dL    BUN 7 7 - 22 mg/dL    Sodium 141 135 - 145 meq/L    Potassium reflex Magnesium 3.6 3.5 - 5.2 meq/L    Chloride 100 98 - 111 meq/L    CO2 31 23 - 33 meq/L    Calcium 9.4 8.5 - 10.5 mg/dL    AST 71 (H) 5 - 40 U/L    Alkaline Phosphatase 74 38 - 126 U/L    Total Protein 7.1 6.1 - 8.0 g/dL    Alb 3.8 3.5 - 5.1 g/dL    Total Bilirubin <0.2 (L) 0.3 - 1.2 mg/dL    ALT 70 (H) 11 - 66 U/L   Lipase   Result Value Ref Range    Lipase 14.6 5.6 - 51.3 U/L   Brain Natriuretic Peptide   Result Value Ref Range    Pro-BNP 1659.0 (H) 0.0 - 450.0 pg/mL   Troponin   Result Value Ref Range    Troponin T < 0.010 ng/ml   Procalcitonin   Result Value Ref Range    Procalcitonin 0.27 (H) 0.01 - 0.09 ng/mL   Acetaminophen level   Result Value Ref Range    Acetaminophen Level < 5.0 0.0 - 80.5 ug/mL   Salicylate Level   Result Value Ref Range    Salicylate, Serum < 0.3 (L) 2.0 - 10.0 mg/dL   Ethanol   Result Value Ref Range    ETHYL ALCOHOL, SERUM < 0.01 0.00 %   Urine Drug Screen   Result Value Ref Range    AMPHETAMINE+METHAMPHETAMINE URINE SCREEN Negative NEGATIVE    Barbiturate Quant, Ur Negative NEGATIVE    Benzodiazepine Quant, Ur Negative NEGATIVE    Cannabinoid Quant, Ur Negative NEGATIVE    Cocaine Metab Quant, Ur Negative NEGATIVE    Opiates, Urine POSITIVE NEGATIVE    Oxycodone Negative NEGATIVE    PCP Quant, Ur Negative NEGATIVE   Urine reflex to culture    Specimen: Urine, clean catch   Result Value Ref Range    Glucose, Ur NEGATIVE NEGATIVE mg/dl    Bilirubin Urine NEGATIVE NEGATIVE    Ketones, Urine NEGATIVE NEGATIVE    Specific Gravity, Urine 1.007 1.002 - 1.030    Blood, Urine NEGATIVE NEGATIVE    pH, UA 8.0 5.0 - 9.0    Protein, UA NEGATIVE NEGATIVE    Urobilinogen, Urine 0.2 0.0 - 1.0 eu/dl    Nitrite, Urine NEGATIVE NEGATIVE    Leukocyte Esterase, Urine NEGATIVE NEGATIVE    Color, UA YELLOW STRAW-YELLOW    Character, Urine CLEAR CLEAR-SL CLOUD   Ammonia   Result Value Ref Range    Ammonia 29 11 - 60 umol/L   APTT   Result Value Ref Range    aPTT 29.3 22.0 - 38.0 seconds   Protime-INR   Result Value Ref Range    INR 1.20 (H) 0.85 - 1.13   Anion Gap   Result Value Ref Range    Anion Gap 10.0 8.0 - 16.0 meq/L   Glomerular Filtration Rate, Estimated   Result Value Ref Range    Est, Glom Filt Rate >90 ml/min/1.73m2   Osmolality   Result Value Ref Range    Osmolality Calc 279.4 275.0 - 300.0 mOsmol/kg   Sedimentation Rate   Result Value Ref Range Sed Rate 40 (H) 0 - 10 mm/hr   C-reactive protein   Result Value Ref Range    CRP 1.31 (H) 0.00 - 1.00 mg/dl   COVID-19   Result Value Ref Range    SARS-CoV-2, NAAT NOT DETECTED NOT DETECTED   CK   Result Value Ref Range    Total CK 1,461 (H) 55 - 170 U/L   EKG 12 Lead   Result Value Ref Range    Ventricular Rate 97 BPM    Atrial Rate 97 BPM    P-R Interval 184 ms    QRS Duration 92 ms    Q-T Interval 342 ms    QTc Calculation (Bazett) 434 ms    P Axis 58 degrees    R Axis 38 degrees    T Axis 54 degrees       EMERGENCY DEPARTMENT COURSE:   Vitals:    Vitals:    08/28/20 0342 08/28/20 0433 08/28/20 0540 08/28/20 0702   BP: (!) 143/100 124/88 136/88 (!) 134/92   Pulse: 99 91 76 81   Resp: 15 17 17 20   Temp: 99.3 °F (37.4 °C)      TempSrc: Oral      SpO2: 96% 99% 94% 94%   Weight:       Height:           3:56 AM: Patient is seen and evaluated in a timely fashion. ACTIONS:    Large bore IV  Tele monitor  EKG  CXR  CT head  CT PE study  Labs  Medications:   Medications   0.9 % sodium chloride bolus (2,448 mLs Intravenous New Bag 8/28/20 0240)   acetaminophen (TYLENOL) tablet 650 mg (650 mg Oral Given 8/28/20 0303)   LORazepam (ATIVAN) injection 1 mg (1 mg Intravenous Given 8/28/20 0344)           MEDICAL DECISION MAKINGS:    Extensive workups done but no good etiology for his fever and confusion. He was agitated which made LP impossible unless he was deeply sedated or intubated. CT head neg for acute changed. CTA chest neg for PE or acute changes. He felt better on re-assessment but still sleepy. Admission is warranted given his history of IV drug use. Discussed and admitted by Hospitalist service. CRITICAL CARE:   None    CONSULTS:  Hospitalist    PROCEDURES:  None    FINAL IMPRESSION      1. Altered mental status, unspecified altered mental status type    2. Fever, unspecified fever cause    3.  Non-traumatic rhabdomyolysis          DISPOSITION/PLAN   Admit    PATIENT REFERRED

## 2020-08-28 NOTE — PROGRESS NOTES
Hospitalist Progress Note      Patient:  Yaritza Endo    Unit/Bed:4K-23/023-A  YOB: 1985  MRN: [id number]   Acct: [de-identified]   PCP: PAPA Watt CNP  Date of Admission: 8/28/2020    Assessment/Plan:  Sepsis (Temp 102.1 with hypertension and tachycardia)  Lower extremitiy swelling    Likely 2/2 possible non-purulent cellulitis in the setting of substance use disorder and LLE edema. Patient has multiple lesions suggestive of drug use. No obvious IV marks noted. CXR negative for acute process. BCx and UCx pending.   -Given 500 cc bolus this AM. On IV NS at 100 cc/hr maintenance   -Cefepime and Vancomycin. Follow up on cultures and sensitivities to de-escalate Abx accordingly  -monitor vitals  -follow up on BLE Doppler US to assess for DVT    Altered Mental Status  Likely toxic encephalopathy in the setting of polysubstance use disorder vs withdrawal given hx of opioid dependence; pt is on Methadone. Patient is drowsy on evaluation, falling asleep while talking. No electrolytes imbalances. Oriented to self and place.   -will contact psych to determine the correct Methadone dose patient is supposed to be taking  -will monitor for worsening and withdrawal symptoms    Rhabdomyolysis  Likely related to immobility in the setting of recent drug overdose. Downtrending, ~1400s from yesterday 1500  -IVF  -trend CK levels q8H    Polysubstance use disorder/Opioid dependence on Methadone  Recent hospitalization for polysubstance overdose with Methamphetamine, Marijuana, PCP, opiates. Patient reportedly admitted to use after discharge yesterday, per chart review, used Fentanyl twice and received Narcan x2 from his girlfriend  -On Methadone  -SW consulted. Patient refusing inpatient detoxification services  -will consider Psych consult    Transaminitis  Likely reactive in the setting substance use disorder and rhabdomyolysis   -will monitor.  Will check Lia Riley on 8/28/2020 4:28 AM      XR CHEST PORTABLE   Final Result   No definite infiltrate. **This report has been created using voice recognition software. It may contain minor errors which are inherent in voice recognition technology. **      Final report electronically signed by Dr. Demetrio Perez on 8/28/2020 3:27 AM      VL DUP LOWER EXTREMITY VENOUS BILATERAL    (Results Pending)     Ct Head Wo Contrast    Result Date: 8/28/2020  PROCEDURE: CT HEAD WO CONTRAST CLINICAL INFORMATION: altered mental status. COMPARISON: CT 7/30/2020 TECHNIQUE: Noncontrast 5 mm axial images were obtained through the brain. All CT scans at this facility use dose modulation, iterative reconstruction, and/or weight-based dosing when appropriate to reduce radiation dose to as low as reasonably achievable. FINDINGS: Parenchyma: There is no edema or mass effect. Ventricles and sulci: Normal size for age. Other: There is no acute intracranial hemorrhage. No calvarial fracture. There are no air fluid levels. Small retention cysts within the sphenoid sinus. Mild ethmoid mucosal thickening. There is no acute edema or hemorrhage. **This report has been created using voice recognition software. It may contain minor errors which are inherent in voice recognition technology. ** Final report electronically signed by Dr. Demetrio Perez on 8/28/2020 4:28 AM    Cta Chest W Wo Contrast    Result Date: 8/28/2020  PROCEDURE: CTA CHEST W WO CONTRAST CLINICAL INFORMATION: confusion, rule out PE. COMPARISON: Chest x-ray 8/28/2020 TECHNIQUE: 3 mm axial images were obtained through the chest after the administration of IV contrast.  A non-contrast localizer was obtained. 3D reconstructions were performed on the scanner to include MIP images through the right and left pulmonary arteries and sagittal images through the chest. Isovue was the intravenous contrast utilized.  All CT scans at this facility use dose modulation, iterative reconstruction, and/or weight-based dosing when appropriate to reduce radiation dose to as low as reasonably achievable. FINDINGS: There is adequate opacification of the pulmonary arterial system. No pulmonary emboli are present. The aorta is within acceptable limits. The heart size is normal. There is no significant coronary calcification. No pericardial effusion. Small right pleural effusion. Tiny left pleural effusion. There is no mediastinal, axillary or hilar adenopathy. The lungs are clear. There are no infiltrates. No suspicious osseous lesions are present. Mild thickening of the distal esophagus. Stomach is mildly thickened as well though this can be artifactual.     No pulmonary emboli or pulmonary infiltrates. Possible esophagitis. **This report has been created using voice recognition software. It may contain minor errors which are inherent in voice recognition technology. ** Final report electronically signed by Dr. Padmini Robert on 8/28/2020 5:32 AM    Xr Chest Portable    Result Date: 8/28/2020  PROCEDURE: XR CHEST PORTABLE CLINICAL INFORMATION: confusion, fever. COMPARISON: Multiple previous most recent 5/18/2020 TECHNIQUE: AP upright view of the chest. FINDINGS: Heart size is accentuated by technique. No definite infiltrate. No definite infiltrate. **This report has been created using voice recognition software. It may contain minor errors which are inherent in voice recognition technology. ** Final report electronically signed by Dr. Padmini Robert on 8/28/2020 3:27 Yennifer Mazariegos MD   PGY1, Internal Medicine   8/28/2020 at 9:42 AM

## 2020-08-28 NOTE — PROGRESS NOTES
Pharmacy Note  Vancomycin Consult    Dread Pastrana is a 38 y.o. male started on Vancomycin for possible bacteremia or skin infection secondary to IV drug abuse; consult received from Dr. Andrez Mcdaniel to manage therapy. Also receiving the following antibiotics: cefepime. Patient Active Problem List   Diagnosis    Drug overdose, [facility] ([facility]    Methamphetamine abuse ([facility]    Acute kidney injury ([facility]    Metabolic acidosis, normal anion gap (NAG)    Drug overdose, intentional, initial encounter ([facility]    Leukocytosis    Normocytic anemia    Anxiety    History of kidney stones    Acute and chronic respiratory failure with hypoxia ([facility]    COVID-19    Drug overdose, accidental or unintentional, initial encounter    Drug overdose, multiple drugs, accidental or unintentional, initial encounter    Polysubstance abuse ([facility]    Acute encephalopathy    Fever    Bilateral leg edema    Severe fentanyl use disorder (HCC)    Elevated liver enzymes    Altered mental status       Allergies:  Patient has no known allergies. Temp max: 102.1    Recent Labs     08/26/20  0545 08/28/20  0249   BUN 24* 7       Recent Labs     08/26/20  0545 08/28/20  0249   CREATININE 0.8 0.9       Recent Labs     08/28/20  0249   WBC 10.8         Intake/Output Summary (Last 24 hours) at 8/28/2020 1319  Last data filed at 8/28/2020 1248  Gross per 24 hour   Intake 2448 ml   Output 1100 ml   Net 1348 ml       Culture Date Source Results   8/28/20 Blood 1 ngtd   8/28/20 Blood 2 ngtd       Ht Readings from Last 1 Encounters:   08/28/20 5' 11\" (1.803 m)        Wt Readings from Last 1 Encounters:   08/28/20 180 lb (81.6 kg)         Body mass index is 25.1 kg/m². CrCl: 121 mL/min      Assessment/Plan:  Patient received 1000 mg IV @ [id number]. Will initiate vancomycin 1250 mg IV every 12 hours. Timing of trough level will be determined based on culture results, renal function, and clinical response. Thank you for the consult.   Will continue to follow.

## 2020-08-29 LAB
CK ISOENZYMES: NORMAL
CK ISOENZYMES: NORMAL
MRSA SCREEN: ABNORMAL
ORGANISM: ABNORMAL

## 2020-08-30 LAB
ANA SCREEN: NORMAL
HIV-2 AB: NEGATIVE
URINE CULTURE, ROUTINE: NORMAL

## 2020-08-30 NOTE — ED PROVIDER NOTES
[facility]          Pt Name: Yajaira Melchor  MRN: [id number]  [place] 1985  Date of evaluation: 8/24/2020  Emergency Physician: PAPA Berman CNP    CHIEF COMPLAINT       Chief Complaint   Patient presents with    Drug Overdose     History obtained from unobtainable from patient due to mental status and EMS/Police. HISTORY OF PRESENT ILLNESS    The history is provided by the EMS personnel and the police. The history is limited by the condition of the patient. Yajaira Melchor is a 38 y.o. male who presents to the emergency department for evaluation of a drug overdose. He was brought in by police and EMS, agitated and thrashing around. He required four point restraints. He was unable to answer any questions. VS were stable but tachycardic. The patient has no other acute complaints at this time. REVIEW OF SYSTEMS   Review of Systems   Unable to perform ROS: Acuity of condition         PAST MEDICAL AND SURGICAL HISTORY     Past Medical History:   Diagnosis Date    Anxiety     Depression     Kidney stone     Liver disease     Hep C    Opiate abuse, continuous ([facility] [address]      History reviewed. No pertinent surgical history. MEDICATIONS   No current facility-administered medications for this encounter. Current Outpatient Medications:     naloxone 4 MG/0.1ML LIQD nasal spray, 1 spray by Nasal route as needed for Opioid Reversal, Disp: 1 each, Rfl: 5    naloxone 4 MG/0.1ML LIQD nasal spray, 1 spray by Nasal route as needed for Opioid Reversal, Disp: 1 each, Rfl: 5    venlafaxine (EFFEXOR) 100 MG tablet, Take 1 tablet by mouth 3 times daily, Disp: 90 tablet, Rfl: 3    neomycin-bacitracin-polymyxin (NEOSPORIN) 3.5-400-5000 OINT ointment, Apply topically 4 times daily. , Disp: 1 Tube, Rfl: 1    sulfamethoxazole-trimethoprim (BACTRIM DS;SEPTRA DS) 800-160 MG per tablet, Take 1 tablet by mouth 2 times daily for 7 days, Disp: 14 tablet, Rfl: 0    acyclovir (ZOVIRAX) 200 MG capsule, Take 2 capsules by mouth 3 times daily for 10 days, Disp: 60 capsule, Rfl: 0    methadone (DOLOPHINE) 10 MG/ML solution, Take 70 mg by mouth daily. Dispensed at [facility], Disp: , Rfl:     lisinopril (PRINIVIL;ZESTRIL) 20 MG tablet, Take 1 tablet by mouth daily, Disp: 30 tablet, Rfl: 3    mirtazapine (REMERON) 45 MG tablet, Take 1 tablet by mouth nightly, Disp: 30 tablet, Rfl: 3      SOCIAL HISTORY     Social History     Social History Narrative    Not on file     Social History     Tobacco Use    Smoking status: Former Smoker     Packs/day: 0.50     Types: Cigarettes    Smokeless tobacco: Never Used   Substance Use Topics    Alcohol use: Yes     Comment: occasional    Drug use: Not Currently     Types: Opiates , IV, Cocaine, Marijuana, Methamphetamines     Comment:  last used IV Fentanyl/heroin 7/6/20 @ 0800. last used meth 6/19/20         ALLERGIES   No Known Allergies      FAMILY HISTORY   History reviewed. No pertinent family history. PHYSICAL EXAM     ED Triage Vitals [08/24/20 2119]   BP Temp Temp Source Pulse Resp SpO2 Height Weight   86/62 98 °F (36.7 °C) Axillary 123 28 99 % -- 181 lb (82.1 kg)     Initial vital signs and nursing assessment reviewed and abnormal from Heartrate from OD. Pulsoximetry is normal per my interpretation. Additional Vital Signs:  Vitals:    08/27/20 1200   BP: (!) 136/90   Pulse: 72   Resp: 18   Temp: 98.5 °F (36.9 °C)   SpO2: 95%       Physical Exam  Vitals signs and nursing note reviewed. Constitutional:       Appearance: He is underweight. He is toxic-appearing and diaphoretic. Comments: Agitated and in four point restraints. HENT:      Head: Normocephalic. Eyes:      Comments: Pupils are large   Neck:      Musculoskeletal: Normal range of motion. Cardiovascular:      Rate and Rhythm: Tachycardia present. Pulses: Normal pulses.       Heart sounds: Normal heart sounds. Pulmonary:      Effort: Pulmonary effort is normal.      Breath sounds: Normal breath sounds. Abdominal:      General: Abdomen is flat. Musculoskeletal: Normal range of motion. Skin:     General: Skin is warm. Capillary Refill: Capillary refill takes less than 2 seconds. Neurological:      Mental Status: He is disoriented. Psychiatric:         Mood and Affect: Affect is flat. Speech: Speech is slurred. Behavior: Behavior is agitated and hyperactive. MEDICAL DECISION MAKING   Differential Diagnoses: Overdose     Initial Assessment:  The patient was seen and evaluated in the ER for a drug overdose. The patient was treated with ativan without much improvement in symptoms. He was then given IVPB ketamine without reduction in agitation. Dr. Serge Chaney then gave IVP ketamine. He finally calmed and was less restless. I reviewed lab work and urine. He is polysubstance positive. CK is elevated. Patient will be treated with IVF. I consulted with DR. Melendez, hospitalist and she agreed to admit for further evaluation. Plan: To further define disposition and risk stratification will obtain labs, urine, UDS. I reviewed patient care with DR. Altamirano and he also evaluated the patient.      ED RESULTS   Laboratory results:  Labs Reviewed   CULTURE, MRSA, SCREENING - Abnormal; Notable for the following components:       Result Value    Organism Culture screen is positive for MRSA colonization (*)     All other components within normal limits    Narrative:     Source: rectal       Site:           Current Antibiotics: none   BASIC METABOLIC PANEL - Abnormal; Notable for the following components:    CO2 16 (*)     BUN 32 (*)     CREATININE 3.1 (*)     Calcium 11.0 (*)     All other components within normal limits   CBC WITH AUTO DIFFERENTIAL - Abnormal; Notable for the following components:    WBC 15.8 (*)     Platelets 927 (*)     Segs Absolute 9.4 (*) Monocytes Absolute 1.7 (*)     All other components within normal limits   HEPATIC FUNCTION PANEL - Abnormal; Notable for the following components:    AST 90 (*)     Total Protein 9.2 (*)     All other components within normal limits   SALICYLATE LEVEL - Abnormal; Notable for the following components:    Salicylate, Serum < 0.3 (*)     All other components within normal limits   ANION GAP - Abnormal; Notable for the following components:    Anion Gap 25.0 (*)     All other components within normal limits   GLOMERULAR FILTRATION RATE, ESTIMATED - Abnormal; Notable for the following components:    Est, Glom Filt Rate 23 (*)     All other components within normal limits   CK - Abnormal; Notable for the following components: Total CK 3,186 (*)     All other components within normal limits   URINE WITH REFLEXED MICRO - Abnormal; Notable for the following components:    Ketones, Urine TRACE (*)     Blood, Urine LARGE (*)     Protein,  (*)     Leukocyte Esterase, Urine TRACE (*)     Color, UA DK YELLOW (*)     All other components within normal limits   CBC WITH AUTO DIFFERENTIAL - Abnormal; Notable for the following components:    WBC 11.1 (*)     RBC 4.19 (*)     Hemoglobin 11.9 (*)     Hematocrit 37.5 (*)     MCHC 31.7 (*)     Segs Absolute 7.8 (*)     All other components within normal limits   BASIC METABOLIC PANEL W/ REFLEX TO MG FOR LOW K - Abnormal; Notable for the following components:    CO2 20 (*)     BUN 34 (*)     CREATININE 2.1 (*)     All other components within normal limits   CK - Abnormal; Notable for the following components:     Total CK 4,466 (*)     All other components within normal limits   MYOGLOBIN, URINE - Abnormal; Notable for the following components:    Myoglobin, Ur POSITIVE (*)     All other components within normal limits   MRSA BY PCR - Abnormal; Notable for the following components:    MRSA SCREEN RT-PCR POSITIVE (*)     All other components within normal limits   MYOGLOBIN, URINE precautions and follow up instructions were discussed with the patient prior to discharge, with which the patient agrees. Physical assessment findings, diagnostic testing(s) if applicable, and vital signs reviewed with patient/patient representative. Questions answered. Medications asdirected, including OTC medications for supportive care. Education provided on medications. Differential diagnosis(s) discussed with patient/patient representative. Home care/self care instructions reviewed withpatient/patient representative. Patient is to follow-up with family care provider in 2-3 days if no improvement. Patient is to go to the emergency department if symptoms worsen. Patient/patient representative isaware of care plan, questions answered, verbalizes understanding and is in agreement. MEDICATION CHANGES     Discharge Medication List as of 8/27/2020  3:34 PM      START taking these medications    Details   neomycin-bacitracin-polymyxin (NEOSPORIN) 3.5-400-5000 OINT ointment Apply topically 4 times daily. , Disp-1 Tube,R-1, Normal      sulfamethoxazole-trimethoprim (BACTRIM DS;SEPTRA DS) 800-160 MG per tablet Take 1 tablet by mouth 2 times daily for 7 days, Disp-14 tablet,R-0Normal      acyclovir (ZOVIRAX) 200 MG capsule Take 2 capsules by mouth 3 times daily for 10 days, Disp-60 capsule,R-0Normal               FINAL DISPOSITION     Final diagnoses:   Polysubstance abuse ([facility] [address]   Altered mental status, unspecified altered mental status type   [facility]dom[facility]   Acute kidney injury ([facility]     DISPOSITION Admitted 08/25/2020 12:56:35 AM      PAPA Anna - CNP  [address]  [address]  [address] [facility]  [phone]    On 9/3/2020  at 3:55 PM    [facility]  [address]  [address] [facility] [id number] [phone]      As needed    DISCHARGE MEDICATIONS:  Discharge Medication List as of 8/27/2020  3:34 PM      START taking these medications    Details

## 2020-09-01 LAB
HCV QNT BY NAAT INTERPRETATION: DETECTED
HCV QNT BY NAAT IU/ML: ABNORMAL IU/ML
HCV QNT BY NAAT LOG IU/ML: 4.54 LOG IU/ML

## 2020-09-02 LAB
BLOOD CULTURE, ROUTINE: NORMAL
BLOOD CULTURE, ROUTINE: NORMAL

## 2020-09-21 ENCOUNTER — HOSPITAL ENCOUNTER (EMERGENCY)
Age: 35
Discharge: HOME | End: 2020-09-22
Payer: MEDICARE

## 2020-09-21 ENCOUNTER — OFFICE VISIT (OUTPATIENT)
Dept: INTERNAL MEDICINE CLINIC | Age: 35
End: 2020-09-21
Payer: MEDICARE

## 2020-09-21 VITALS
HEIGHT: 71 IN | TEMPERATURE: 97.3 F | WEIGHT: 165.8 LBS | DIASTOLIC BLOOD PRESSURE: 76 MMHG | BODY MASS INDEX: 23.21 KG/M2 | SYSTOLIC BLOOD PRESSURE: 140 MMHG | HEART RATE: 101 BPM

## 2020-09-21 LAB
ACETAMINOPHEN LEVEL: < 5 UG/ML (ref 0–20)
ALBUMIN SERPL-MCNC: 4.6 G/DL (ref 3.5–5.1)
ALP BLD-CCNC: 78 U/L (ref 38–126)
ALT SERPL-CCNC: 46 U/L (ref 11–66)
AMPHETAMINE+METHAMPHETAMINE URINE SCREEN: POSITIVE
ANION GAP SERPL CALCULATED.3IONS-SCNC: 16 MEQ/L (ref 8–16)
AST SERPL-CCNC: 41 U/L (ref 5–40)
BARBITURATE QUANTITATIVE URINE: NEGATIVE
BASOPHILS # BLD: 0.3 %
BASOPHILS ABSOLUTE: 0 THOU/MM3 (ref 0–0.1)
BENZODIAZEPINE QUANTITATIVE URINE: NEGATIVE
BILIRUB SERPL-MCNC: 0.3 MG/DL (ref 0.3–1.2)
BILIRUBIN DIRECT: < 0.2 MG/DL (ref 0–0.3)
BUN BLDV-MCNC: 23 MG/DL (ref 7–22)
CALCIUM SERPL-MCNC: 10.2 MG/DL (ref 8.5–10.5)
CANNABINOID QUANTITATIVE URINE: NEGATIVE
CHLORIDE BLD-SCNC: 100 MEQ/L (ref 98–111)
CO2: 18 MEQ/L (ref 23–33)
COCAINE METABOLITE QUANTITATIVE URINE: NEGATIVE
CREAT SERPL-MCNC: 1.2 MG/DL (ref 0.4–1.2)
EKG ATRIAL RATE: 129 BPM
EKG P AXIS: 78 DEGREES
EKG P-R INTERVAL: 156 MS
EKG Q-T INTERVAL: 296 MS
EKG QRS DURATION: 86 MS
EKG QTC CALCULATION (BAZETT): 433 MS
EKG R AXIS: 50 DEGREES
EKG T AXIS: 79 DEGREES
EKG VENTRICULAR RATE: 129 BPM
EOSINOPHIL # BLD: 0.2 %
EOSINOPHILS ABSOLUTE: 0 THOU/MM3 (ref 0–0.4)
ERYTHROCYTE [DISTWIDTH] IN BLOOD BY AUTOMATED COUNT: 14 % (ref 11.5–14.5)
ERYTHROCYTE [DISTWIDTH] IN BLOOD BY AUTOMATED COUNT: 43.6 FL (ref 35–45)
ETHYL ALCOHOL, SERUM: < 0.01 %
GFR SERPL CREATININE-BSD FRML MDRD: 68 ML/MIN/1.73M2
GLUCOSE BLD-MCNC: 79 MG/DL (ref 70–108)
HCT VFR BLD CALC: 38.6 % (ref 42–52)
HEMOGLOBIN: 12.4 GM/DL (ref 14–18)
IMMATURE GRANS (ABS): 0.06 THOU/MM3 (ref 0–0.07)
IMMATURE GRANULOCYTES: 0.4 %
LYMPHOCYTES # BLD: 11.1 %
LYMPHOCYTES ABSOLUTE: 1.6 THOU/MM3 (ref 1–4.8)
MCH RBC QN AUTO: 27.6 PG (ref 26–33)
MCHC RBC AUTO-ENTMCNC: 32.1 GM/DL (ref 32.2–35.5)
MCV RBC AUTO: 85.8 FL (ref 80–94)
MONOCYTES # BLD: 7.3 %
MONOCYTES ABSOLUTE: 1.1 THOU/MM3 (ref 0.4–1.3)
NUCLEATED RED BLOOD CELLS: 0 /100 WBC
OPIATES, URINE: POSITIVE
OSMOLALITY CALCULATION: 270.8 MOSMOL/KG (ref 275–300)
OXYCODONE: NEGATIVE
PHENCYCLIDINE QUANTITATIVE URINE: NEGATIVE
PLATELET # BLD: 387 THOU/MM3 (ref 130–400)
PMV BLD AUTO: 9.5 FL (ref 9.4–12.4)
POTASSIUM SERPL-SCNC: 4.7 MEQ/L (ref 3.5–5.2)
RBC # BLD: 4.5 MILL/MM3 (ref 4.7–6.1)
SALICYLATE, SERUM: 3.9 MG/DL (ref 2–10)
SEG NEUTROPHILS: 80.7 %
SEGMENTED NEUTROPHILS ABSOLUTE COUNT: 11.8 THOU/MM3 (ref 1.8–7.7)
SODIUM BLD-SCNC: 134 MEQ/L (ref 135–145)
TOTAL PROTEIN: 8.3 G/DL (ref 6.1–8)
TSH SERPL DL<=0.05 MIU/L-ACNC: 0.87 UIU/ML (ref 0.4–4.2)
WBC # BLD: 14.6 THOU/MM3 (ref 4.8–10.8)

## 2020-09-21 PROCEDURE — 96372 THER/PROPH/DIAG INJ SC/IM: CPT

## 2020-09-21 PROCEDURE — G0480 DRUG TEST DEF 1-7 CLASSES: HCPCS

## 2020-09-21 PROCEDURE — 93010 ELECTROCARDIOGRAM REPORT: CPT | Performed by: INTERNAL MEDICINE

## 2020-09-21 PROCEDURE — 99285 EMERGENCY DEPT VISIT HI MDM: CPT

## 2020-09-21 PROCEDURE — 80307 DRUG TEST PRSMV CHEM ANLYZR: CPT

## 2020-09-21 PROCEDURE — 1036F TOBACCO NON-USER: CPT | Performed by: NURSE PRACTITIONER

## 2020-09-21 PROCEDURE — 82550 ASSAY OF CK (CPK): CPT

## 2020-09-21 PROCEDURE — 96374 THER/PROPH/DIAG INJ IV PUSH: CPT

## 2020-09-21 PROCEDURE — 99214 OFFICE O/P EST MOD 30 MIN: CPT | Performed by: NURSE PRACTITIONER

## 2020-09-21 PROCEDURE — 1111F DSCHRG MED/CURRENT MED MERGE: CPT | Performed by: NURSE PRACTITIONER

## 2020-09-21 PROCEDURE — 82248 BILIRUBIN DIRECT: CPT

## 2020-09-21 PROCEDURE — G8427 DOCREV CUR MEDS BY ELIG CLIN: HCPCS | Performed by: NURSE PRACTITIONER

## 2020-09-21 PROCEDURE — 36415 COLL VENOUS BLD VENIPUNCTURE: CPT

## 2020-09-21 PROCEDURE — 85025 COMPLETE CBC W/AUTO DIFF WBC: CPT

## 2020-09-21 PROCEDURE — G8420 CALC BMI NORM PARAMETERS: HCPCS | Performed by: NURSE PRACTITIONER

## 2020-09-21 PROCEDURE — 93005 ELECTROCARDIOGRAM TRACING: CPT | Performed by: EMERGENCY MEDICINE

## 2020-09-21 PROCEDURE — 96376 TX/PRO/DX INJ SAME DRUG ADON: CPT

## 2020-09-21 PROCEDURE — 6360000002 HC RX W HCPCS: Performed by: NURSE PRACTITIONER

## 2020-09-21 PROCEDURE — 80053 COMPREHEN METABOLIC PANEL: CPT

## 2020-09-21 PROCEDURE — 84443 ASSAY THYROID STIM HORMONE: CPT

## 2020-09-21 RX ORDER — HALOPERIDOL 5 MG/ML
5 INJECTION INTRAMUSCULAR ONCE
Status: COMPLETED | OUTPATIENT
Start: 2020-09-21 | End: 2020-09-21

## 2020-09-21 RX ORDER — PREGABALIN 200 MG/1
200 CAPSULE ORAL 3 TIMES DAILY
Qty: 90 CAPSULE | Refills: 1 | Status: SHIPPED | OUTPATIENT
Start: 2020-09-21 | End: 2020-11-02 | Stop reason: SDUPTHER

## 2020-09-21 RX ORDER — VENLAFAXINE 100 MG/1
100 TABLET ORAL 3 TIMES DAILY
Qty: 90 TABLET | Refills: 1 | Status: SHIPPED | OUTPATIENT
Start: 2020-09-21 | End: 2020-11-02 | Stop reason: SDUPTHER

## 2020-09-21 RX ORDER — PREGABALIN 300 MG/1
300 CAPSULE ORAL 2 TIMES DAILY
COMMUNITY
End: 2020-09-21 | Stop reason: SDUPTHER

## 2020-09-21 RX ORDER — LORAZEPAM 2 MG/ML
1 INJECTION INTRAMUSCULAR ONCE
Status: COMPLETED | OUTPATIENT
Start: 2020-09-21 | End: 2020-09-21

## 2020-09-21 RX ORDER — MIRTAZAPINE 45 MG/1
45 TABLET, FILM COATED ORAL NIGHTLY
Qty: 30 TABLET | Refills: 1 | Status: SHIPPED | OUTPATIENT
Start: 2020-09-21 | End: 2020-11-02 | Stop reason: SDUPTHER

## 2020-09-21 RX ORDER — HALOPERIDOL 5 MG/ML
INJECTION INTRAMUSCULAR
Status: DISCONTINUED
Start: 2020-09-21 | End: 2020-09-22 | Stop reason: HOSPADM

## 2020-09-21 RX ORDER — LISINOPRIL 20 MG/1
20 TABLET ORAL DAILY
Qty: 30 TABLET | Refills: 1 | Status: SHIPPED | OUTPATIENT
Start: 2020-09-21 | End: 2020-11-02 | Stop reason: SDUPTHER

## 2020-09-21 RX ADMIN — LORAZEPAM 1 MG: 2 INJECTION INTRAMUSCULAR; INTRAVENOUS at 22:24

## 2020-09-21 RX ADMIN — HALOPERIDOL LACTATE 5 MG: 5 INJECTION, SOLUTION INTRAMUSCULAR at 23:17

## 2020-09-21 RX ADMIN — LORAZEPAM 1 MG: 2 INJECTION, SOLUTION INTRAMUSCULAR; INTRAVENOUS at 22:56

## 2020-09-21 NOTE — PROGRESS NOTES
photophobia and visual disturbance. Respiratory: Positive for shortness of breath (on exertion). Negative for cough and wheezing. Cardiovascular: Negative for chest pain, palpitations and leg swelling. Gastrointestinal: Negative for abdominal distention, abdominal pain, blood in stool, constipation, diarrhea, nausea and vomiting. Endocrine: Negative for polydipsia, polyphagia and polyuria. Genitourinary: Negative for difficulty urinating, dysuria, frequency, hematuria and urgency. Musculoskeletal: Positive for myalgias (generalized- fibromyalgia). Negative for arthralgias. Skin: Positive for wound (face). Negative for rash. Neurological: Negative for dizziness, light-headedness and headaches. Psychiatric/Behavioral: Positive for dysphoric mood. Negative for sleep disturbance and suicidal ideas. The patient is nervous/anxious. Prior to Visit Medications    Medication Sig Taking? Authorizing Provider   venlafaxine (EFFEXOR) 100 MG tablet Take 1 tablet by mouth 3 times daily Yes PAPA Cast CNP   pregabalin (LYRICA) 200 MG capsule Take 1 capsule by mouth 3 times daily for 60 days. Yes PAPA Cast CNP   lisinopril (PRINIVIL;ZESTRIL) 20 MG tablet Take 1 tablet by mouth daily Yes PAPA Cast CNP   mirtazapine (REMERON) 45 MG tablet Take 1 tablet by mouth nightly Yes PAPA Cast CNP   naloxone 4 MG/0.1ML LIQD nasal spray 1 spray by Nasal route as needed for Opioid Reversal Yes Nae Ibarra MD   methadone (DOLOPHINE) 10 MG/ML solution Take 70 mg by mouth daily. Dispensed at [facility] Yes Jenniffer Jauregui MD   pregabalin (LYRICA) 150 MG capsule Take 1 capsule by mouth 4 times daily for 30 days.   PAPA Cast CNP   venlafaxine (EFFEXOR XR) 150 MG extended release capsule Take 1 capsule by mouth 2 times daily  PAPA Cast CNP   neomycin-bacitracin-polymyxin (NEOSPORIN) 3.5-400-5000 OINT oriented to person, place, and time. Motor: Weakness present. Gait: Gait abnormal.   Psychiatric:         Mood and Affect: Mood normal. Affect is tearful. Behavior: Behavior normal.         Thought Content: Thought content normal.         Judgment: Judgment normal.         ASSESSMENT/PLAN:    1. Essential hypertension    - lisinopril (PRINIVIL;ZESTRIL) 20 MG tablet; Take 1 tablet by mouth daily  Dispense: 30 tablet; Refill: 1  - CBC; Future  - Comprehensive Metabolic Panel; Future  - CK; Future  - Brain Natriuretic Peptide; Future  - Repeat labs, encouraged ER visit; patient declines    2. Anxiety and depression    - mirtazapine (REMERON) 45 MG tablet; Take 1 tablet by mouth nightly  Dispense: 30 tablet; Refill: 1    3. Fibromyalgia    - pregabalin (LYRICA) 200 MG capsule; Take 1 capsule by mouth 3 times daily for 60 days. Dispense: 90 capsule; Refill: 1  - OARRS reviewed- no discrepancies    Return in about 6 weeks (around 11/2/2020). An electronic signature was used to authenticate this note.     --PAPA Chavis - CNP on 9/29/2020 at 1:20 PM

## 2020-09-22 VITALS
RESPIRATION RATE: 13 BRPM | DIASTOLIC BLOOD PRESSURE: 84 MMHG | BODY MASS INDEX: 21.47 KG/M2 | SYSTOLIC BLOOD PRESSURE: 108 MMHG | HEART RATE: 55 BPM | OXYGEN SATURATION: 100 % | HEIGHT: 70 IN | WEIGHT: 150 LBS | TEMPERATURE: 98.1 F

## 2020-09-22 LAB — TOTAL CK: 112 U/L (ref 55–170)

## 2020-09-22 NOTE — ED NOTES
Tigist in place, telesitter at bedside     Gricel Harper, [address] [facility]  09/21/20 4021

## 2020-09-22 NOTE — ED NOTES
Pt is trashing in bed and unable to sit still. Medicated per orders.      Anson Martell RN  09/21/20 1146

## 2020-09-22 NOTE — ED NOTES
Patient states he has been having muscle spasms since earlier this morning     Meli Veloz RN  09/21/20 5853

## 2020-09-22 NOTE — ED NOTES
Pt lethargic at this time. Pt arouses to sternal rub. Maria Eugenia Blancas NP notified at this time. No further orders. Will continue to monitor.       Poppy ALBRIGHT RN  09/21/20 3556

## 2020-09-22 NOTE — ED NOTES
Patient to ER due to being told his labs were off by his doctor. Patient states he did meth a couple days ago and fentanyl at 1400. Patient rolling around in bed with face mask over eyes unable to sit still.       [address] Elliot Hung RN  09/21/20 1053

## 2020-09-22 NOTE — ED NOTES
Patient rolling all over bed, kicking, yelling profanity at staff.      Etta Jacinto RN  09/21/20 5017

## 2020-09-22 NOTE — ED NOTES
Pt is thrashing in bed while attempting to get out of bed. Pt is kicking legs and not listening to staff at this time.      Nicholas Tinsley RN  09/21/20 9722

## 2020-09-22 NOTE — ED NOTES
Pt resting quietly in room with eyes closed no needs expressed. VSS, respirations even and unlabored. Side rails up x2 with call light in reach. Will continue to monitor. Telesitter at bedside.         Myesha Carbajal RN  09/22/20 1947

## 2020-09-22 NOTE — ED NOTES
ED nurse-to-nurse bedside report    Chief Complaint   Patient presents with    Other     did meth yesterday, told to come in by Doc      LOC: alert and orientated to name, place, date  Vital signs   Vitals:    09/21/20 2118 09/21/20 2146 09/21/20 2227 09/21/20 2256   BP: 138/83 (!) 141/129     Pulse: 136 130 137 131   Resp: 25 22 22    Temp: 98.1 °F (36.7 °C)      TempSrc: Oral      SpO2: 100% 100% 98%    Weight: 150 lb (68 kg)      Height: 5' 10\" (1.778 m)         Pain:    Pain Interventions: pt is not experiencing pain  Pain Goal: 0  Oxygen: No    Current needs required 0L   Telemetry: Yes  LDAs:    Continuous Infusions:   Mobility: Independent  Rich Fall Risk Score: No flowsheet data found.   Fall Interventions: call light in reach and blanca at bedside  Report given to: Jose Raul Bajwa, [address] [place] [address] [place], RN  09/21/20 2430

## 2020-09-22 NOTE — ED NOTES
Pt is thrashing in bed, kicking legs over head. Attempted to redirect patient however unsuccessful. Jag Zach remains at bedside.      Dalila Tran RN  09/21/20 8464

## 2020-09-22 NOTE — ED NOTES
Pt ambulated to restroom at this time and ambulated back to room. Pt alert and oriented.       Cassidy Alfaro RN  09/22/20 3138

## 2020-09-22 NOTE — ED NOTES
Pt resting on cot with eyes closed and unlabored respirations.       Snehal CAZARES RN  09/22/20 7940

## 2020-09-22 NOTE — ED NOTES
Pt appears to be sleeping comfortably at this time. Pt denies needs. Vital signs stable.       Charanjit Denny RN  09/22/20 8517

## 2020-09-22 NOTE — ED NOTES
This RN in to assess pt. Pt respirations unlabored. Joss Olivares NP to bedside to assess pt due to low NIBP's. Fluids started per verbal order from Joss Olivares NP.       Lorie SCOTT RN  09/22/20 7805

## 2020-09-24 NOTE — ED PROVIDER NOTES
[facility] Emergency Department    CHIEF COMPLAINT       Chief Complaint   Patient presents with    Other     did meth yesterday, told to come in by Doc       Nurses Notes reviewed and I agree except as noted in the HPI. HISTORY OF PRESENT ILLNESS    José Antonio Fischer jessica 38 y.o. male who presents to the ED for evaluation of meth use. The patient is hyperactive and is agitated. He is moving all over the bed. He said Jenny Garcia CNP sent him over because his labs were abnormal.  Patient has been seen her numerous times over the last few weeks for meth related visits. The patient states that he takes methadone daily as well        HPI was provided by the patient. REVIEW OF SYSTEMS     Review of Systems   Unable to perform ROS: Psychiatric disorder        PAST MEDICAL HISTORY     Past Medical History:   Diagnosis Date    Anxiety     Depression     Kidney stone     Liver disease     Hep C    Opiate abuse, continuous ([facility])        SURGICALHISTORY      has no past surgical history on file. CURRENT MEDICATIONS       Discharge Medication List as of 9/22/2020  5:36 AM      CONTINUE these medications which have NOT CHANGED    Details   venlafaxine (EFFEXOR) 100 MG tablet Take 1 tablet by mouth 3 times daily, Disp-90 tablet,R-1Normal      pregabalin (LYRICA) 200 MG capsule Take 1 capsule by mouth 3 times daily for 60 days. , Disp-90 capsule,R-1Normal      lisinopril (PRINIVIL;ZESTRIL) 20 MG tablet Take 1 tablet by mouth daily, Disp-30 tablet,R-1Normal      mirtazapine (REMERON) 45 MG tablet Take 1 tablet by mouth nightly, Disp-30 tablet,R-1Normal      naloxone 4 MG/0.1ML LIQD nasal spray 1 spray by Nasal route as needed for Opioid Reversal, Disp-1 each,R-5Normal      neomycin-bacitracin-polymyxin (NEOSPORIN) 3.5-400-5000 OINT ointment Apply topically 4 times daily. , Disp-1 Tube,R-1, Normal      methadone (DOLOPHINE) 10 MG/ML solution Take 70 mg by mouth daily.  Dispensed at [facility] saturation is 100%. Physical Exam  Vitals signs and nursing note reviewed. Constitutional:       Appearance: He is diaphoretic. HENT:      Head: Normocephalic and atraumatic. Cardiovascular:      Rate and Rhythm: Regular rhythm. Tachycardia present. Pulses: Normal pulses. Heart sounds: Normal heart sounds. Pulmonary:      Effort: Pulmonary effort is normal.      Breath sounds: Normal breath sounds. Musculoskeletal: Normal range of motion. Skin:     General: Skin is warm. Capillary Refill: Capillary refill takes less than 2 seconds. Neurological:      General: No focal deficit present. Mental Status: He is alert. Psychiatric:         Attention and Perception: He is inattentive. Mood and Affect: Affect is labile. Behavior: Behavior is hyperactive. Judgment: Judgment is impulsive and inappropriate. DIFFERENTIAL DIAGNOSIS:   Meth abuse    DIAGNOSTIC RESULTS     EKG: All EKG's are interpreted by the Emergency Department Physician who eithersigns or Co-signs this chart in the absence of a cardiologist.        RADIOLOGY: non-plainfilm images(s) such as CT, Ultrasound and MRI are read by the radiologist.  Plain radiographic images are visualized and preliminarily interpreted by the emergency physician unless otherwise stated below.   No orders to display         LABS:   Labs Reviewed   BASIC METABOLIC PANEL - Abnormal; Notable for the following components:       Result Value    Sodium 134 (*)     CO2 18 (*)     BUN 23 (*)     All other components within normal limits   CBC WITH AUTO DIFFERENTIAL - Abnormal; Notable for the following components:    WBC 14.6 (*)     RBC 4.50 (*)     Hemoglobin 12.4 (*)     Hematocrit 38.6 (*)     MCHC 32.1 (*)     Segs Absolute 11.8 (*)     All other components within normal limits   HEPATIC FUNCTION PANEL - Abnormal; Notable for the following components:    AST 41 (*)     Total Protein 8.3 (*)     All other components within normal limits   GLOMERULAR FILTRATION RATE, ESTIMATED - Abnormal; Notable for the following components:    Est, Glom Filt Rate 68 (*)     All other components within normal limits   OSMOLALITY - Abnormal; Notable for the following components:    Osmolality Calc 270.8 (*)     All other components within normal limits   URINE DRUG SCREEN   ACETAMINOPHEN LEVEL   ETHANOL   SALICYLATE LEVEL   TSH WITHOUT REFLEX   CK   ANION GAP   URINE DRUG SCREEN   URINE RT REFLEX TO CULTURE       EMERGENCY DEPARTMENT COURSE:   Vitals:    Vitals:    09/22/20 0123 09/22/20 0212 09/22/20 0303 09/22/20 0457   BP: 108/86 101/72 102/76 108/84   Pulse: 87 64 60 55   Resp: 14 17 14 13   Temp:       TempSrc:       SpO2: 99% 100%  100%   Weight:       Height:             MDM                     Patient was seen and evaluated in the ER for Meth abuse. The patient is very agitated and hyperactive. He is moving constantly and very irritable. The patient is also taking his methadone. He was medicated per the [facility] and slept quite well for several hours. Once he was able to arouse and ambulate in the [place] he was discharged home. Medications   LORazepam (ATIVAN) injection 1 mg (1 mg Intravenous Given 9/21/20 2224)   LORazepam (ATIVAN) injection 1 mg (1 mg Intravenous Given 9/21/20 2256)   haloperidol lactate (HALDOL) injection 5 mg (5 mg Intramuscular Given 9/21/20 2317)         Patient was seenindependently by myself. The patient's final impression and disposition and plan was determined by myself. CRITICAL CARE:   None    CONSULTS:  None    PROCEDURES:  None    FINAL IMPRESSION     1. Polysubstance abuse ([facility] [address]    2. Methamphetamine abuse [facility])          DISPOSITION/PLAN   DISPOSITION Decision To Discharge 09/22/2020 07:06:51 AM      PATIENT REFERREDTO:  PAPA Zhao - CNP  [address]  [address] [facility].  [address]  [address] [place] [address]  [phone]    Schedule an appointment as soon as possible for a visit in 2 days  For follow up      DISCHARGE MEDICATIONS:  Discharge Medication List as of 9/22/2020  5:36 AM          (Please note that portions of this note were completed with a voice recognition program.  Efforts were made to edit the dictations but occasionally words are mis-transcribed.)         PAPA Gonzalez CNP, APRN - CNP  09/24/20 5361

## 2020-09-29 ENCOUNTER — TELEPHONE (OUTPATIENT)
Dept: INTERNAL MEDICINE CLINIC | Age: 35
End: 2020-09-29

## 2020-09-29 RX ORDER — PREGABALIN 150 MG/1
150 CAPSULE ORAL 4 TIMES DAILY
Qty: 60 CAPSULE | Refills: 0 | Status: SHIPPED | OUTPATIENT
Start: 2020-09-29 | End: 2020-12-29 | Stop reason: DRUGHIGH

## 2020-09-29 RX ORDER — VENLAFAXINE HYDROCHLORIDE 150 MG/1
150 CAPSULE, EXTENDED RELEASE ORAL 2 TIMES DAILY
Qty: 60 CAPSULE | Refills: 0 | Status: SHIPPED | OUTPATIENT
Start: 2020-09-29 | End: 2020-12-29 | Stop reason: DRUGHIGH

## 2020-09-29 ASSESSMENT — ENCOUNTER SYMPTOMS
CONSTIPATION: 0
VOMITING: 0
DIARRHEA: 0
BLOOD IN STOOL: 0
RHINORRHEA: 0
PHOTOPHOBIA: 0
NAUSEA: 0
SHORTNESS OF BREATH: 1
TROUBLE SWALLOWING: 0
SORE THROAT: 0
ABDOMINAL DISTENTION: 0
SINUS PAIN: 0
WHEEZING: 0
COUGH: 0
ABDOMINAL PAIN: 0
SINUS PRESSURE: 0

## 2020-10-12 ENCOUNTER — TELEPHONE (OUTPATIENT)
Dept: INTERNAL MEDICINE CLINIC | Age: 35
End: 2020-10-12

## 2020-10-12 ENCOUNTER — HOSPITAL ENCOUNTER (EMERGENCY)
Age: 35
Discharge: HOME | End: 2020-10-12
Attending: EMERGENCY MEDICINE
Payer: MEDICARE

## 2020-10-12 ENCOUNTER — APPOINTMENT (OUTPATIENT)
Dept: CT IMAGING | Age: 35
End: 2020-10-12
Payer: MEDICARE

## 2020-10-12 ENCOUNTER — APPOINTMENT (OUTPATIENT)
Dept: GENERAL RADIOLOGY | Age: 35
End: 2020-10-12
Payer: MEDICARE

## 2020-10-12 VITALS
RESPIRATION RATE: 18 BRPM | SYSTOLIC BLOOD PRESSURE: 102 MMHG | BODY MASS INDEX: 21.47 KG/M2 | OXYGEN SATURATION: 96 % | DIASTOLIC BLOOD PRESSURE: 66 MMHG | HEIGHT: 70 IN | HEART RATE: 85 BPM | TEMPERATURE: 98.8 F | WEIGHT: 150 LBS

## 2020-10-12 LAB
ACETAMINOPHEN LEVEL: < 5 UG/ML (ref 0–20)
ALBUMIN SERPL-MCNC: 3.8 G/DL (ref 3.5–5.1)
ALP BLD-CCNC: 86 U/L (ref 38–126)
ALT SERPL-CCNC: 35 U/L (ref 11–66)
AMPHETAMINE+METHAMPHETAMINE URINE SCREEN: POSITIVE
ANION GAP SERPL CALCULATED.3IONS-SCNC: 13 MEQ/L (ref 8–16)
AST SERPL-CCNC: 25 U/L (ref 5–40)
BACTERIA: ABNORMAL /HPF
BARBITURATE QUANTITATIVE URINE: NEGATIVE
BASE EXCESS MIXED: -0.2 MMOL/L (ref -2–3)
BASOPHILS # BLD: 0.5 %
BASOPHILS ABSOLUTE: 0.1 THOU/MM3 (ref 0–0.1)
BENZODIAZEPINE QUANTITATIVE URINE: NEGATIVE
BILIRUB SERPL-MCNC: < 0.2 MG/DL (ref 0.3–1.2)
BILIRUBIN DIRECT: < 0.2 MG/DL (ref 0–0.3)
BILIRUBIN URINE: NEGATIVE
BLOOD, URINE: ABNORMAL
BUN BLDV-MCNC: 20 MG/DL (ref 7–22)
CALCIUM SERPL-MCNC: 9.5 MG/DL (ref 8.5–10.5)
CANNABINOID QUANTITATIVE URINE: NEGATIVE
CASTS 2: ABNORMAL /LPF
CASTS UA: ABNORMAL /LPF
CHARACTER, URINE: CLEAR
CHLORIDE BLD-SCNC: 103 MEQ/L (ref 98–111)
CO2: 24 MEQ/L (ref 23–33)
COCAINE METABOLITE QUANTITATIVE URINE: NEGATIVE
COLLECTED BY:: ABNORMAL
COLOR: YELLOW
CREAT SERPL-MCNC: 0.9 MG/DL (ref 0.4–1.2)
CRYSTALS, UA: ABNORMAL
DEVICE: ABNORMAL
EKG ATRIAL RATE: 122 BPM
EKG P AXIS: 76 DEGREES
EKG P-R INTERVAL: 166 MS
EKG Q-T INTERVAL: 304 MS
EKG QRS DURATION: 88 MS
EKG QTC CALCULATION (BAZETT): 433 MS
EKG R AXIS: 24 DEGREES
EKG T AXIS: 70 DEGREES
EKG VENTRICULAR RATE: 122 BPM
EOSINOPHIL # BLD: 2.1 %
EOSINOPHILS ABSOLUTE: 0.2 THOU/MM3 (ref 0–0.4)
EPITHELIAL CELLS, UA: ABNORMAL /HPF
ERYTHROCYTE [DISTWIDTH] IN BLOOD BY AUTOMATED COUNT: 15 % (ref 11.5–14.5)
ERYTHROCYTE [DISTWIDTH] IN BLOOD BY AUTOMATED COUNT: 47.8 FL (ref 35–45)
GFR SERPL CREATININE-BSD FRML MDRD: > 90 ML/MIN/1.73M2
GLUCOSE BLD-MCNC: 58 MG/DL (ref 70–108)
GLUCOSE URINE: NEGATIVE MG/DL
HCO3, MIXED: 28 MMOL/L (ref 23–28)
HCT VFR BLD CALC: 35.7 % (ref 42–52)
HEMOGLOBIN: 11.5 GM/DL (ref 14–18)
IMMATURE GRANS (ABS): 0.02 THOU/MM3 (ref 0–0.07)
IMMATURE GRANULOCYTES: 0.2 %
INR BLD: 1.08 (ref 0.85–1.13)
KETONES, URINE: NEGATIVE
LEUKOCYTE ESTERASE, URINE: NEGATIVE
LYMPHOCYTES # BLD: 23.7 %
LYMPHOCYTES ABSOLUTE: 2.4 THOU/MM3 (ref 1–4.8)
MAGNESIUM: 1.7 MG/DL (ref 1.6–2.4)
MCH RBC QN AUTO: 27.9 PG (ref 26–33)
MCHC RBC AUTO-ENTMCNC: 32.2 GM/DL (ref 32.2–35.5)
MCV RBC AUTO: 86.7 FL (ref 80–94)
MONOCYTES # BLD: 8.8 %
MONOCYTES ABSOLUTE: 0.9 THOU/MM3 (ref 0.4–1.3)
MUCUS: ABNORMAL
NITRITE, URINE: NEGATIVE
NUCLEATED RED BLOOD CELLS: 0 /100 WBC
O2 SAT, MIXED: 61 %
OPIATES, URINE: POSITIVE
OSMOLALITY CALCULATION: 279.8 MOSMOL/KG (ref 275–300)
OXYCODONE: NEGATIVE
PCO2, MIXED VENOUS: 57 MMHG (ref 41–51)
PH UA: 5 (ref 5–9)
PH, MIXED: 7.3 (ref 7.31–7.41)
PHENCYCLIDINE QUANTITATIVE URINE: POSITIVE
PLATELET # BLD: 331 THOU/MM3 (ref 130–400)
PMV BLD AUTO: 9.2 FL (ref 9.4–12.4)
PO2 MIXED: 36 MMHG (ref 25–40)
POTASSIUM SERPL-SCNC: 4 MEQ/L (ref 3.5–5.2)
PROTEIN UA: 30
RBC # BLD: 4.12 MILL/MM3 (ref 4.7–6.1)
RBC URINE: ABNORMAL /HPF
SALICYLATE, SERUM: 5.3 MG/DL (ref 2–10)
SEG NEUTROPHILS: 64.7 %
SEGMENTED NEUTROPHILS ABSOLUTE COUNT: 6.6 THOU/MM3 (ref 1.8–7.7)
SODIUM BLD-SCNC: 140 MEQ/L (ref 135–145)
SPECIFIC GRAVITY, URINE: > 1.03 (ref 1–1.03)
TOTAL CK: 160 U/L (ref 55–170)
TOTAL PROTEIN: 7.1 G/DL (ref 6.1–8)
TROPONIN T: < 0.01 NG/ML
UROBILINOGEN, URINE: 1 EU/DL (ref 0–1)
WBC # BLD: 10.2 THOU/MM3 (ref 4.8–10.8)
WBC UA: ABNORMAL /HPF
YEAST: ABNORMAL

## 2020-10-12 PROCEDURE — 82248 BILIRUBIN DIRECT: CPT

## 2020-10-12 PROCEDURE — G0480 DRUG TEST DEF 1-7 CLASSES: HCPCS

## 2020-10-12 PROCEDURE — 96375 TX/PRO/DX INJ NEW DRUG ADDON: CPT

## 2020-10-12 PROCEDURE — 81001 URINALYSIS AUTO W/SCOPE: CPT

## 2020-10-12 PROCEDURE — 36415 COLL VENOUS BLD VENIPUNCTURE: CPT

## 2020-10-12 PROCEDURE — 83735 ASSAY OF MAGNESIUM: CPT

## 2020-10-12 PROCEDURE — 80307 DRUG TEST PRSMV CHEM ANLYZR: CPT

## 2020-10-12 PROCEDURE — 85610 PROTHROMBIN TIME: CPT

## 2020-10-12 PROCEDURE — 99284 EMERGENCY DEPT VISIT MOD MDM: CPT

## 2020-10-12 PROCEDURE — 82550 ASSAY OF CK (CPK): CPT

## 2020-10-12 PROCEDURE — 80053 COMPREHEN METABOLIC PANEL: CPT

## 2020-10-12 PROCEDURE — 99283 EMERGENCY DEPT VISIT LOW MDM: CPT

## 2020-10-12 PROCEDURE — 2580000003 HC RX 258: Performed by: EMERGENCY MEDICINE

## 2020-10-12 PROCEDURE — 74176 CT ABD & PELVIS W/O CONTRAST: CPT

## 2020-10-12 PROCEDURE — 93005 ELECTROCARDIOGRAM TRACING: CPT | Performed by: EMERGENCY MEDICINE

## 2020-10-12 PROCEDURE — 84484 ASSAY OF TROPONIN QUANT: CPT

## 2020-10-12 PROCEDURE — 82803 BLOOD GASES ANY COMBINATION: CPT

## 2020-10-12 PROCEDURE — 85025 COMPLETE CBC W/AUTO DIFF WBC: CPT

## 2020-10-12 PROCEDURE — 6360000002 HC RX W HCPCS: Performed by: EMERGENCY MEDICINE

## 2020-10-12 PROCEDURE — 71045 X-RAY EXAM CHEST 1 VIEW: CPT

## 2020-10-12 PROCEDURE — 6370000000 HC RX 637 (ALT 250 FOR IP)

## 2020-10-12 PROCEDURE — 96374 THER/PROPH/DIAG INJ IV PUSH: CPT

## 2020-10-12 RX ORDER — KETOROLAC TROMETHAMINE 30 MG/ML
30 INJECTION, SOLUTION INTRAMUSCULAR; INTRAVENOUS ONCE
Status: COMPLETED | OUTPATIENT
Start: 2020-10-12 | End: 2020-10-12

## 2020-10-12 RX ORDER — PREGABALIN 300 MG/1
300 CAPSULE ORAL 2 TIMES DAILY
Qty: 60 CAPSULE | Refills: 0 | OUTPATIENT
Start: 2020-10-12 | End: 2020-10-13

## 2020-10-12 RX ORDER — ONDANSETRON 4 MG/1
4 TABLET, ORALLY DISINTEGRATING ORAL EVERY 8 HOURS PRN
Qty: 15 TABLET | Refills: 0 | Status: SHIPPED | OUTPATIENT
Start: 2020-10-12 | End: 2020-10-23 | Stop reason: SDUPTHER

## 2020-10-12 RX ORDER — TAMSULOSIN HYDROCHLORIDE 0.4 MG/1
CAPSULE ORAL
Status: COMPLETED
Start: 2020-10-12 | End: 2020-10-12

## 2020-10-12 RX ORDER — TAMSULOSIN HYDROCHLORIDE 0.4 MG/1
0.4 CAPSULE ORAL DAILY
Qty: 7 CAPSULE | Refills: 0 | Status: SHIPPED | OUTPATIENT
Start: 2020-10-12 | End: 2020-12-29 | Stop reason: ALTCHOICE

## 2020-10-12 RX ORDER — TAMSULOSIN HYDROCHLORIDE 0.4 MG/1
0.4 CAPSULE ORAL DAILY
Status: DISCONTINUED | OUTPATIENT
Start: 2020-10-13 | End: 2020-10-13 | Stop reason: HOSPADM

## 2020-10-12 RX ORDER — LORAZEPAM 2 MG/ML
2 INJECTION INTRAMUSCULAR ONCE
Status: COMPLETED | OUTPATIENT
Start: 2020-10-12 | End: 2020-10-12

## 2020-10-12 RX ORDER — 0.9 % SODIUM CHLORIDE 0.9 %
30 INTRAVENOUS SOLUTION INTRAVENOUS ONCE
Status: COMPLETED | OUTPATIENT
Start: 2020-10-12 | End: 2020-10-12

## 2020-10-12 RX ORDER — KETOROLAC TROMETHAMINE 10 MG/1
10 TABLET, FILM COATED ORAL EVERY 8 HOURS PRN
Qty: 15 TABLET | Refills: 0 | Status: ON HOLD | OUTPATIENT
Start: 2020-10-12 | End: 2020-10-13

## 2020-10-12 RX ADMIN — LORAZEPAM 2 MG: 2 INJECTION INTRAMUSCULAR; INTRAVENOUS at 19:57

## 2020-10-12 RX ADMIN — SODIUM CHLORIDE 1000 ML: 9 INJECTION, SOLUTION INTRAVENOUS at 19:57

## 2020-10-12 RX ADMIN — TAMSULOSIN HYDROCHLORIDE 0.4 MG: 0.4 CAPSULE ORAL at 23:12

## 2020-10-12 RX ADMIN — KETOROLAC TROMETHAMINE 30 MG: 30 INJECTION, SOLUTION INTRAMUSCULAR; INTRAVENOUS at 23:08

## 2020-10-12 ASSESSMENT — ENCOUNTER SYMPTOMS
EYE DISCHARGE: 0
SHORTNESS OF BREATH: 0
RHINORRHEA: 0
COUGH: 0
ABDOMINAL DISTENTION: 0
CONSTIPATION: 0
EYE ITCHING: 0
SORE THROAT: 0
STRIDOR: 0
BACK PAIN: 1
VOMITING: 0
EYE REDNESS: 0
CHEST TIGHTNESS: 0
EYE PAIN: 0
WHEEZING: 0
NAUSEA: 0
DIARRHEA: 0
ABDOMINAL PAIN: 0
PHOTOPHOBIA: 0

## 2020-10-12 ASSESSMENT — PAIN DESCRIPTION - ORIENTATION: ORIENTATION: RIGHT;LEFT

## 2020-10-12 ASSESSMENT — PAIN SCALES - GENERAL: PAINLEVEL_OUTOF10: 8

## 2020-10-12 ASSESSMENT — PAIN DESCRIPTION - PAIN TYPE: TYPE: ACUTE PAIN

## 2020-10-12 ASSESSMENT — PAIN DESCRIPTION - LOCATION: LOCATION: CHEST;ABDOMEN;LEG

## 2020-10-12 NOTE — ED NOTES
Pt moved back to room 22. Pt restless. Unable to sit still. Pt reports he has restless leg syndrome. Pt denies any drug use since last use last week. Pt reports he is on Methadone. Pt complaining of back pain 9/10.  Jasson at bedside for Mario Beck RN  10/12/20 8956

## 2020-10-12 NOTE — ED NOTES
Bed: 022A  Expected date:   Expected time:   Means of arrival:   Comments:  Stephanie Chan RN  10/12/20 1911

## 2020-10-12 NOTE — ED PROVIDER NOTES
[address] ENCOUNTER      PATIENT NAME: Yaritza Tavarez  MRN: [id number]  : 1985  WEIR: 10/12/2020  PROVIDER: Brett Jara MD      CHIEF COMPLAINT       Chief Complaint   Patient presents with    Anxiety    Chest Pain       Nurses Notes reviewed and I agreeexcept as noted in the HPI. HISTORY OF PRESENT ILLNESS    Yaritza Tavarez is a 38 y.o. male who presents to Emergency Department with Anxiety and Chest Pain    76-year-old male with history of rest leg syndrome and methamphetamine abuse presented to ED complaining diffuse body aches and \"peeing blood\" in the last 2 days. Last IV methamphetamine use was 2 days ago. Patient was anxious, restless, complaining 10 of the pain all over the body. He is concerned he may have some infection. He also picking skin chronically due to anxiety with multiple small sores at various eating stage. He has no fever, no chills. He has some chest discomfort. He has mild nausea and vomiting. He has no diarrhea. He has no abdominal pain. This HPI was provided by the patient. REVIEW OF SYSTEMS     Review of Systems   Constitutional: Negative for activity change, appetite change, chills, fatigue, fever and unexpected weight change. HENT: Negative for congestion, ear discharge, ear pain, hearing loss, nosebleeds, rhinorrhea and sore throat. Eyes: Negative for photophobia, pain, discharge, redness and itching. Respiratory: Negative for cough, chest tightness, shortness of breath, wheezing and stridor. Cardiovascular: Negative for chest pain, palpitations and leg swelling. Gastrointestinal: Negative for abdominal distention, abdominal pain, constipation, diarrhea, nausea and vomiting. Endocrine: Negative for cold intolerance, heat intolerance, polydipsia and polyphagia. Genitourinary: Negative for dysuria, flank pain, frequency and hematuria.         \"Peeing blood\"   Musculoskeletal: Positive for arthralgias, back pain and myalgias. Negative for gait problem, neck pain and neck stiffness. Skin: Positive for wound. Negative for pallor and rash. Allergic/Immunologic: Negative for environmental allergies and food allergies. Neurological: Negative for dizziness, tremors, syncope, weakness and headaches. Psychiatric/Behavioral: Positive for agitation, behavioral problems, decreased concentration and dysphoric mood. Negative for confusion, self-injury, sleep disturbance and suicidal ideas. The patient is nervous/anxious and is hyperactive. PAST MEDICAL HISTORY     Past Medical History:   Diagnosis Date    Anxiety     Depression     Kidney stone     Liver disease     Hep C    Opiate abuse, continuous ([facility] [address]        SURGICAL HISTORY     No past surgical history on file. CURRENT MEDICATIONS       Discharge Medication List as of 10/12/2020 10:22 PM      CONTINUE these medications which have NOT CHANGED    Details   ! ! pregabalin (LYRICA) 300 MG capsule Take 1 capsule by mouth 2 times daily for 30 days. , Disp-60 capsule,R-0Phone In      !! pregabalin (LYRICA) 150 MG capsule Take 1 capsule by mouth 4 times daily for 30 days. , Disp-60 capsule,R-0Normal      venlafaxine (EFFEXOR XR) 150 MG extended release capsule Take 1 capsule by mouth 2 times daily, Disp-60 capsule,R-0Normal      venlafaxine (EFFEXOR) 100 MG tablet Take 1 tablet by mouth 3 times daily, Disp-90 tablet,R-1Normal      !! pregabalin (LYRICA) 200 MG capsule Take 1 capsule by mouth 3 times daily for 60 days. , Disp-90 capsule,R-1Normal      lisinopril (PRINIVIL;ZESTRIL) 20 MG tablet Take 1 tablet by mouth daily, Disp-30 tablet,R-1Normal      mirtazapine (REMERON) 45 MG tablet Take 1 tablet by mouth nightly, Disp-30 tablet,R-1Normal      naloxone 4 MG/0.1ML LIQD nasal spray 1 spray by Nasal route as needed for Opioid Reversal, Disp-1 each,R-5Normal      neomycin-bacitracin-polymyxin (NEOSPORIN) 3.5-400-5000 OINT ointment Apply topically 4 times daily. , Disp-1 Tube,R-1, Normal      methadone (DOLOPHINE) 10 MG/ML solution Take 70 mg by mouth daily. Dispensed at [facility]       !! - Potential duplicate medications found. Please discuss with provider. ALLERGIES     Patient has no known allergies. FAMILY HISTORY     has no family status information on file. family history is not on file. SOCIAL HISTORY      reports that he has quit smoking. His smoking use included cigarettes. He smoked 0.50 packs per day. He has never used smokeless tobacco. He reports current alcohol use. He reports previous drug use. Drugs: Opiates , IV, Cocaine, Marijuana, and Methamphetamines. PHYSICAL EXAM     INITIAL VITALS:  height is 5' 10\" (1.778 m) and weight is 150 lb (68 kg). His oral temperature is 98.8 °F (37.1 °C). His blood pressure is 102/66 and his pulse is 85. His respiration is 18 and oxygen saturation is 96%. Physical Exam  Vitals signs and nursing note reviewed. Constitutional:       Appearance: He is well-developed. He is not diaphoretic. HENT:      Head: Normocephalic and atraumatic. Nose: Nose normal.   Eyes:      General: No scleral icterus. Right eye: No discharge. Left eye: No discharge. Conjunctiva/sclera: Conjunctivae normal.      Pupils: Pupils are equal, round, and reactive to light. Neck:      Musculoskeletal: Normal range of motion and neck supple. Vascular: No JVD. Trachea: No tracheal deviation. Cardiovascular:      Rate and Rhythm: Normal rate and regular rhythm. Heart sounds: Normal heart sounds. No murmur. No friction rub. No gallop. Pulmonary:      Effort: Pulmonary effort is normal. No respiratory distress. Breath sounds: Normal breath sounds. No stridor. No wheezing or rales. Chest:      Chest wall: No tenderness. Abdominal:      General: Bowel sounds are normal. There is no distension. Palpations: Abdomen is soft.  There is no One [address]  [phone]  In 1 day  at 1 PM      DISCHARGE MEDICATIONS:  Discharge Medication List as of 10/12/2020 10:22 PM      START taking these medications    Details   ketorolac (TORADOL) 10 MG tablet Take 1 tablet by mouth every 8 hours as needed for Pain, Disp-15 tablet,R-0Print      tamsulosin (FLOMAX) 0.4 MG capsule Take 1 capsule by mouth daily for 7 days, Disp-7 capsule,R-0Print      ondansetron (ZOFRAN ODT) 4 MG disintegrating tablet Take 1 tablet by mouth every 8 hours as needed for Nausea or Vomiting, Disp-15 tablet,R-0Print             (Please note that portions of this note were completed with a voice recognition program.  Efforts were made to edit the dictations but occasionally words aremis-transcribed.)    Hyman Spatz, MD Hyman Spatz, MD  10/12/20 [id number]

## 2020-10-12 NOTE — TELEPHONE ENCOUNTER
Patient requesting refill on Lyrica to [facility].    Was given 15 day supply for Lyrica 150mg QID on 9/29/20    Patient states he called his insurance company and they will pay for Lyrica 300mg BID-requesting a script in that strength. Verbal order per Purnima Magallon to call in script for Lyrica 300 mg BID.      LPN called in Lyrica 300 mg BID qty 60 to [facility].

## 2020-10-13 ENCOUNTER — TELEPHONE (OUTPATIENT)
Dept: UROLOGY | Age: 35
End: 2020-10-13

## 2020-10-13 ENCOUNTER — HOSPITAL ENCOUNTER (OUTPATIENT)
Age: 35
Setting detail: OBSERVATION
Discharge: LEFT BEFORE DISCHARGE | End: 2020-10-14
Attending: INTERNAL MEDICINE | Admitting: FAMILY MEDICINE
Payer: MEDICARE

## 2020-10-13 ENCOUNTER — HOSPITAL ENCOUNTER (EMERGENCY)
Age: 35
Discharge: LEFT BEFORE DISCHARGE | End: 2020-10-13
Attending: FAMILY MEDICINE
Payer: MEDICARE

## 2020-10-13 ENCOUNTER — OFFICE VISIT (OUTPATIENT)
Dept: UROLOGY | Age: 35
End: 2020-10-13
Payer: MEDICARE

## 2020-10-13 VITALS
HEART RATE: 130 BPM | OXYGEN SATURATION: 93 % | WEIGHT: 180 LBS | TEMPERATURE: 98.6 F | DIASTOLIC BLOOD PRESSURE: 102 MMHG | SYSTOLIC BLOOD PRESSURE: 145 MMHG | RESPIRATION RATE: 18 BRPM | BODY MASS INDEX: 25.83 KG/M2

## 2020-10-13 VITALS — HEIGHT: 70 IN | WEIGHT: 180 LBS | TEMPERATURE: 96.8 F | BODY MASS INDEX: 25.77 KG/M2

## 2020-10-13 PROBLEM — N20.1 CALCULUS OF URETEROVESICAL JUNCTION (UVJ): Status: ACTIVE | Noted: 2020-10-13

## 2020-10-13 LAB
ALBUMIN SERPL-MCNC: 3.8 G/DL (ref 3.5–5.1)
ALP BLD-CCNC: 82 U/L (ref 38–126)
ALT SERPL-CCNC: 35 U/L (ref 11–66)
ANION GAP SERPL CALCULATED.3IONS-SCNC: 13 MEQ/L (ref 8–16)
AST SERPL-CCNC: 30 U/L (ref 5–40)
BASOPHILS # BLD: 0.5 %
BASOPHILS ABSOLUTE: 0 THOU/MM3 (ref 0–0.1)
BILIRUB SERPL-MCNC: < 0.2 MG/DL (ref 0.3–1.2)
BUN BLDV-MCNC: 23 MG/DL (ref 7–22)
CALCIUM SERPL-MCNC: 9.3 MG/DL (ref 8.5–10.5)
CHLORIDE BLD-SCNC: 109 MEQ/L (ref 98–111)
CO2: 23 MEQ/L (ref 23–33)
CREAT SERPL-MCNC: 0.7 MG/DL (ref 0.4–1.2)
EOSINOPHIL # BLD: 3 %
EOSINOPHILS ABSOLUTE: 0.2 THOU/MM3 (ref 0–0.4)
ERYTHROCYTE [DISTWIDTH] IN BLOOD BY AUTOMATED COUNT: 15.3 % (ref 11.5–14.5)
ERYTHROCYTE [DISTWIDTH] IN BLOOD BY AUTOMATED COUNT: 48.5 FL (ref 35–45)
GFR SERPL CREATININE-BSD FRML MDRD: > 90 ML/MIN/1.73M2
GLUCOSE BLD-MCNC: 90 MG/DL (ref 70–108)
HCT VFR BLD CALC: 33.2 % (ref 42–52)
HEMOGLOBIN: 10.6 GM/DL (ref 14–18)
IMMATURE GRANS (ABS): 0.01 THOU/MM3 (ref 0–0.07)
IMMATURE GRANULOCYTES: 0.1 %
LIPASE: 10.1 U/L (ref 5.6–51.3)
LYMPHOCYTES # BLD: 36.9 %
LYMPHOCYTES ABSOLUTE: 2.8 THOU/MM3 (ref 1–4.8)
MCH RBC QN AUTO: 27.7 PG (ref 26–33)
MCHC RBC AUTO-ENTMCNC: 31.9 GM/DL (ref 32.2–35.5)
MCV RBC AUTO: 86.9 FL (ref 80–94)
MONOCYTES # BLD: 10.7 %
MONOCYTES ABSOLUTE: 0.8 THOU/MM3 (ref 0.4–1.3)
NUCLEATED RED BLOOD CELLS: 0 /100 WBC
OSMOLALITY CALCULATION: 291.9 MOSMOL/KG (ref 275–300)
PLATELET # BLD: 294 THOU/MM3 (ref 130–400)
PMV BLD AUTO: 9.3 FL (ref 9.4–12.4)
POTASSIUM REFLEX MAGNESIUM: 4.4 MEQ/L (ref 3.5–5.2)
RBC # BLD: 3.82 MILL/MM3 (ref 4.7–6.1)
SEG NEUTROPHILS: 48.8 %
SEGMENTED NEUTROPHILS ABSOLUTE COUNT: 3.7 THOU/MM3 (ref 1.8–7.7)
SODIUM BLD-SCNC: 145 MEQ/L (ref 135–145)
TOTAL PROTEIN: 7.3 G/DL (ref 6.1–8)
WBC # BLD: 7.6 THOU/MM3 (ref 4.8–10.8)

## 2020-10-13 PROCEDURE — 83690 ASSAY OF LIPASE: CPT

## 2020-10-13 PROCEDURE — G0378 HOSPITAL OBSERVATION PER HR: HCPCS

## 2020-10-13 PROCEDURE — 51798 US URINE CAPACITY MEASURE: CPT

## 2020-10-13 PROCEDURE — 99219 PR INITIAL OBSERVATION CARE/DAY 50 MINUTES: CPT | Performed by: FAMILY MEDICINE

## 2020-10-13 PROCEDURE — G0379 DIRECT REFER HOSPITAL OBSERV: HCPCS

## 2020-10-13 PROCEDURE — 99204 OFFICE O/P NEW MOD 45 MIN: CPT | Performed by: PHYSICIAN ASSISTANT

## 2020-10-13 PROCEDURE — 85025 COMPLETE CBC W/AUTO DIFF WBC: CPT

## 2020-10-13 PROCEDURE — 6360000002 HC RX W HCPCS: Performed by: FAMILY MEDICINE

## 2020-10-13 PROCEDURE — G8484 FLU IMMUNIZE NO ADMIN: HCPCS | Performed by: PHYSICIAN ASSISTANT

## 2020-10-13 PROCEDURE — 96375 TX/PRO/DX INJ NEW DRUG ADDON: CPT

## 2020-10-13 PROCEDURE — 96374 THER/PROPH/DIAG INJ IV PUSH: CPT

## 2020-10-13 PROCEDURE — G8427 DOCREV CUR MEDS BY ELIG CLIN: HCPCS | Performed by: PHYSICIAN ASSISTANT

## 2020-10-13 PROCEDURE — 99284 EMERGENCY DEPT VISIT MOD MDM: CPT

## 2020-10-13 PROCEDURE — 1036F TOBACCO NON-USER: CPT | Performed by: PHYSICIAN ASSISTANT

## 2020-10-13 PROCEDURE — 36415 COLL VENOUS BLD VENIPUNCTURE: CPT

## 2020-10-13 PROCEDURE — 6370000000 HC RX 637 (ALT 250 FOR IP): Performed by: FAMILY MEDICINE

## 2020-10-13 PROCEDURE — 80053 COMPREHEN METABOLIC PANEL: CPT

## 2020-10-13 PROCEDURE — 4500000002 HC ER NO CHARGE

## 2020-10-13 PROCEDURE — 2580000003 HC RX 258: Performed by: FAMILY MEDICINE

## 2020-10-13 PROCEDURE — G8419 CALC BMI OUT NRM PARAM NOF/U: HCPCS | Performed by: PHYSICIAN ASSISTANT

## 2020-10-13 RX ORDER — VENLAFAXINE HYDROCHLORIDE 150 MG/1
150 CAPSULE, EXTENDED RELEASE ORAL 2 TIMES DAILY
Status: DISCONTINUED | OUTPATIENT
Start: 2020-10-13 | End: 2020-10-13

## 2020-10-13 RX ORDER — PROMETHAZINE HYDROCHLORIDE 25 MG/1
12.5 TABLET ORAL EVERY 6 HOURS PRN
Status: DISCONTINUED | OUTPATIENT
Start: 2020-10-13 | End: 2020-10-14 | Stop reason: HOSPADM

## 2020-10-13 RX ORDER — VENLAFAXINE 50 MG/1
100 TABLET ORAL 3 TIMES DAILY
Status: DISCONTINUED | OUTPATIENT
Start: 2020-10-13 | End: 2020-10-14 | Stop reason: HOSPADM

## 2020-10-13 RX ORDER — PREGABALIN 75 MG/1
150 CAPSULE ORAL 4 TIMES DAILY
Status: DISCONTINUED | OUTPATIENT
Start: 2020-10-13 | End: 2020-10-14 | Stop reason: HOSPADM

## 2020-10-13 RX ORDER — ONDANSETRON 2 MG/ML
4 INJECTION INTRAMUSCULAR; INTRAVENOUS EVERY 6 HOURS PRN
Status: DISCONTINUED | OUTPATIENT
Start: 2020-10-13 | End: 2020-10-14 | Stop reason: HOSPADM

## 2020-10-13 RX ORDER — LISINOPRIL 20 MG/1
20 TABLET ORAL DAILY
Status: DISCONTINUED | OUTPATIENT
Start: 2020-10-13 | End: 2020-10-14 | Stop reason: HOSPADM

## 2020-10-13 RX ORDER — LORAZEPAM 2 MG/ML
1 INJECTION INTRAMUSCULAR ONCE
Status: COMPLETED | OUTPATIENT
Start: 2020-10-13 | End: 2020-10-13

## 2020-10-13 RX ORDER — SODIUM CHLORIDE 0.9 % (FLUSH) 0.9 %
10 SYRINGE (ML) INJECTION PRN
Status: DISCONTINUED | OUTPATIENT
Start: 2020-10-13 | End: 2020-10-14 | Stop reason: HOSPADM

## 2020-10-13 RX ORDER — MORPHINE SULFATE 2 MG/ML
2 INJECTION, SOLUTION INTRAMUSCULAR; INTRAVENOUS ONCE
Status: COMPLETED | OUTPATIENT
Start: 2020-10-13 | End: 2020-10-13

## 2020-10-13 RX ORDER — IBUPROFEN 800 MG/1
800 TABLET ORAL EVERY 8 HOURS PRN
Status: DISCONTINUED | OUTPATIENT
Start: 2020-10-14 | End: 2020-10-14 | Stop reason: HOSPADM

## 2020-10-13 RX ORDER — SODIUM CHLORIDE 9 MG/ML
INJECTION, SOLUTION INTRAVENOUS CONTINUOUS
Status: DISCONTINUED | OUTPATIENT
Start: 2020-10-13 | End: 2020-10-14 | Stop reason: HOSPADM

## 2020-10-13 RX ORDER — SODIUM CHLORIDE 0.9 % (FLUSH) 0.9 %
10 SYRINGE (ML) INJECTION EVERY 12 HOURS SCHEDULED
Status: DISCONTINUED | OUTPATIENT
Start: 2020-10-13 | End: 2020-10-14 | Stop reason: HOSPADM

## 2020-10-13 RX ORDER — MIRTAZAPINE 45 MG/1
45 TABLET, FILM COATED ORAL NIGHTLY
Status: DISCONTINUED | OUTPATIENT
Start: 2020-10-13 | End: 2020-10-14 | Stop reason: HOSPADM

## 2020-10-13 RX ORDER — TAMSULOSIN HYDROCHLORIDE 0.4 MG/1
0.4 CAPSULE ORAL DAILY
Status: DISCONTINUED | OUTPATIENT
Start: 2020-10-13 | End: 2020-10-14 | Stop reason: HOSPADM

## 2020-10-13 RX ORDER — PHENAZOPYRIDINE HYDROCHLORIDE 200 MG/1
200 TABLET, FILM COATED ORAL
Status: DISCONTINUED | OUTPATIENT
Start: 2020-10-14 | End: 2020-10-14 | Stop reason: HOSPADM

## 2020-10-13 RX ORDER — IBUPROFEN 800 MG/1
800 TABLET ORAL ONCE
Status: COMPLETED | OUTPATIENT
Start: 2020-10-13 | End: 2020-10-13

## 2020-10-13 RX ORDER — METHADONE HYDROCHLORIDE 10 MG/ML
120 CONCENTRATE ORAL DAILY
Status: DISCONTINUED | OUTPATIENT
Start: 2020-10-14 | End: 2020-10-14

## 2020-10-13 RX ADMIN — SODIUM CHLORIDE: 9 INJECTION, SOLUTION INTRAVENOUS at 21:15

## 2020-10-13 RX ADMIN — PREGABALIN 150 MG: 75 CAPSULE ORAL at 22:30

## 2020-10-13 RX ADMIN — MORPHINE SULFATE 2 MG: 2 INJECTION, SOLUTION INTRAMUSCULAR; INTRAVENOUS at 21:38

## 2020-10-13 RX ADMIN — MIRTAZAPINE 45 MG: 45 TABLET, FILM COATED ORAL at 22:30

## 2020-10-13 RX ADMIN — LORAZEPAM 1 MG: 2 INJECTION INTRAMUSCULAR; INTRAVENOUS at 23:45

## 2020-10-13 RX ADMIN — VENLAFAXINE 100 MG: 50 TABLET ORAL at 22:30

## 2020-10-13 RX ADMIN — SODIUM CHLORIDE, PRESERVATIVE FREE 10 ML: 5 INJECTION INTRAVENOUS at 22:26

## 2020-10-13 RX ADMIN — TAMSULOSIN HYDROCHLORIDE 0.4 MG: 0.4 CAPSULE ORAL at 22:07

## 2020-10-13 RX ADMIN — IBUPROFEN 800 MG: 800 TABLET, FILM COATED ORAL at 21:00

## 2020-10-13 RX ADMIN — LORAZEPAM 1 MG: 2 INJECTION INTRAMUSCULAR; INTRAVENOUS at 22:22

## 2020-10-13 ASSESSMENT — PAIN DESCRIPTION - LOCATION: LOCATION: FLANK

## 2020-10-13 ASSESSMENT — PAIN SCALES - GENERAL
PAINLEVEL_OUTOF10: 10

## 2020-10-13 ASSESSMENT — PAIN DESCRIPTION - ORIENTATION: ORIENTATION: RIGHT;LEFT

## 2020-10-13 ASSESSMENT — PAIN DESCRIPTION - PAIN TYPE: TYPE: ACUTE PAIN

## 2020-10-13 NOTE — PROGRESS NOTES
Mr. William Fregoso is a 65-year-old male who presented to [facility] ED on 10/12/20 with complaints of chest pain, gross hematuria, and abdominal/pelvic pain. A CT scan of the abdomen and pelvis was performed on 10/12/20 demonstrating mild right hydroureter with a 4 mm calculus in the right UVJ. Multiple non-obstructing right renal calculi measuring up to 3 mm were noted. He was discharged to follow-up in our office this morning to discuss treatment options. The patient is extremely agitated this morning because of inadequate pain control. He is screaming, jumping, and using obscenities. He has a history of methamphetamine and opioid abuse. Reportedly receiving Methadone. He states that his pain is a 20/10, located in the right flank and radiating to the right groin. He reports gross hematuria and nausea but denies dysuria, urgency, frequency, or fever/chills. He is a poor historian so it is unknown what his aggravating/alleviating factors are, when the pain started, if he has ever had kidney stones before, and if any pain medication was successful lessening his pain. The pain stated that if cannot treat his pain, he will obtain illegal drugs to find the relieve he needs. Past Medical History:   Diagnosis Date    Anxiety     Depression     Kidney stone     Liver disease     Hep C    Opiate abuse, continuous ([address]        History reviewed. No pertinent surgical history. Current Outpatient Medications on File Prior to Visit   Medication Sig Dispense Refill    pregabalin (LYRICA) 150 MG capsule Take 1 capsule by mouth 4 times daily for 30 days. 60 capsule 0    venlafaxine (EFFEXOR XR) 150 MG extended release capsule Take 1 capsule by mouth 2 times daily 60 capsule 0    venlafaxine (EFFEXOR) 100 MG tablet Take 1 tablet by mouth 3 times daily 90 tablet 1    pregabalin (LYRICA) 200 MG capsule Take 1 capsule by mouth 3 times daily for 60 days.  90 capsule 1    lisinopril (PRINIVIL;ZESTRIL) 20 MG tablet Take 1 tablet by mouth daily 30 tablet 1    mirtazapine (REMERON) 45 MG tablet Take 1 tablet by mouth nightly 30 tablet 1    naloxone 4 MG/0.1ML LIQD nasal spray 1 spray by Nasal route as needed for Opioid Reversal 1 each 5    neomycin-bacitracin-polymyxin (NEOSPORIN) 3.5-400-5000 OINT ointment Apply topically 4 times daily. 1 Tube 1    methadone (DOLOPHINE) 10 MG/ML solution Take 70 mg by mouth daily. Dispensed at [facility]      ketorolac (TORADOL) 10 MG tablet Take 1 tablet by mouth every 8 hours as needed for Pain (Patient not taking: Reported on 10/13/2020) 15 tablet 0    tamsulosin (FLOMAX) 0.4 MG capsule Take 1 capsule by mouth daily for 7 days (Patient not taking: Reported on 10/13/2020) 7 capsule 0    ondansetron (ZOFRAN ODT) 4 MG disintegrating tablet Take 1 tablet by mouth every 8 hours as needed for Nausea or Vomiting (Patient not taking: Reported on 10/13/2020) 15 tablet 0     No current facility-administered medications on file prior to visit. No Known Allergies    No family history on file.     Social History     Socioeconomic History    Marital status: Single     Spouse name: Not on file    Number of children: Not on file    Years of education: Not on file    Highest education level: Not on file   Occupational History    Not on file   Social Needs    Financial resource strain: Not on file    Food insecurity     Worry: Not on file     Inability: Not on file    Transportation needs     Medical: Not on file     Non-medical: Not on file   Tobacco Use    Smoking status: Former Smoker     Packs/day: 0.50     Types: Cigarettes    Smokeless tobacco: Never Used   Substance and Sexual Activity    Alcohol use: Yes     Comment: occasional    Drug use: Not Currently     Types: Opiates , IV, Cocaine, Marijuana, Methamphetamines    Sexual activity: Not Currently   Lifestyle    Physical activity     Days per week: Not on file     Minutes per session: Not on file    Stress: Not on file Relationships    Social connections     Talks on phone: Not on file     Gets together: Not on file     Attends Taoism service: Not on file     Active member of club or organization: Not on file     Attends meetings of clubs or organizations: Not on file     Relationship status: Not on file    Intimate partner violence     Fear of current or ex partner: Not on file     Emotionally abused: Not on file     Physically abused: Not on file     Forced sexual activity: Not on file   Other Topics Concern    Not on file   Social History Narrative    Not on file       Review of Systems (Extremely limited due to current agitated state)  Constitutional: Negative for chills, fatigue, fever. Respiratory: Negative for cough, chest tightness, shortness of breath. Cardiovascular: Negative for chest pain, palpitations and leg swelling. Gastrointestinal: Positive for right upper and lower quadrant pain. Negative for nausea and vomiting. Genitourinary: Positive for gross hematuria and flank pain. Musculoskeletal: Positive for arthralgias, back pain and myalgias. Skin: Positive itching and wounds. Psychiatric/Behavioral: Positive for agitation, behavioral problems, decreased concentration and dysphoric mood. Exam (Extremely limited due to psychiatric state)    Temp 96.8 °F (36 °C)   Ht 5' 10\" (1.778 m)   Wt 180 lb (81.6 kg)   BMI 25.83 kg/m²     Constitutional: Oriented to person, place, and time. Appears agitated and hyperactive. He is not cooperative. HENT:    Head: Normocephalic and atraumatic.    Mouth/Throat: Poor dentition  Eyes: Right eye exhibits no discharge. Left eye exhibits no discharge. No scleral icterus. Cardiovascular: Normal rate and regular rhythm. S1 and S2 normal.  Pulmonary/Chest: Effort normal. No respiratory distress. no wheezes. Abdominal: Soft. Exhibits no distension but there is generalized tenderness. There is no rebound, rigidity, or guarding. No CVA tenderness bilaterally. Musculoskeletal: No edema or tenderness. Neurological: Alert and oriented to person, place, and time. Skin: Skin is warm and dry. Not diaphoretic. Multiple skin lesions noted from self-induced injury. Psychiatric: Patient is agitated, anxious, hyperactive, paranoid. Nursing note and vitals reviewed. Labs    No results found for this visit on 10/13/20. Lab Results   Component Value Date    CREATININE 0.7 10/13/2020    BUN 23 (H) 10/13/2020     10/13/2020    K 4.4 10/13/2020     10/13/2020    CO2 23 10/13/2020       Lab Results   Component Value Date    PSA 0.98 04/25/2019     Radiology: CT abdomen and pelvis (October 12, 2020)    FINDINGS:    Lower Chest:    Minimal atelectasis along the posterior lung bases. Right lower lobe 6 mm pulmonary nodule (series 2, image 11).      Abdomen:    Normal gallbladder.  Noncontrast appearance of the liver, spleen, pancreas    and adrenal glands are unremarkable.         Multiple nonobstructing right renal calculi measuring up to 3 mm.  No    right-sided hydronephrosis.  Mild right hydroureter.  At the right UVJ    there is a 4 mm ureteral calculus. Nonobstructing 1 mm left renal calculus.  No left-sided hydronephrosis or    hydroureter.  No left ureteral calculus.         Normal appendix.  Moderate colonic stool burden.  No bowel obstruction.      No mesenteric or retroperitoneal lymphadenopathy. Normal abdominal aorta.         Pelvis:    Normal appearance of the urinary bladder. Multiple normal-sized inguinal lymph nodes, nonspecific but likely    reactive.         Musculoskeletal:    No acute fracture or suspicious osseous abnormality.              Impression    1.  Right UVJ 4 mm calculus causes mild right hydroureter. 2.  Multiple additional nonobstructing renal calculi present bilaterally    measuring up to 3 mm in the right kidney. Plan:  1.  Mildly obstructing right distal ureteral stone- CT abdomen and pelvis (October

## 2020-10-13 NOTE — H&P
History & Physical       Patient: Elizabeth Faith  YOB: 1985    MRN: [id number]     Acct: [de-identified]    PCP: PAPA Monroe CNP    Date of Admission: 10/13/2020    Date of Service: Patient seen / examined on 10/13/20 and admitted to inpatient with expected LOS greater than two midnights due to medical therapy. ASSESSMENT / PLAN:    R flank pain secondary to 4 mm obstructing UVJ stone, with associated mild hydroureter  Known history of renal stones. UA (10/12/20) negative for infection. No associated SATYA. COVID negative. Direct admitted to medical floor. IVF. Analgesics / antiemetics prn (patient refusing toradol, tylenol but requesting ibuprofen). Flomax. Strain urine. Repeat UA pending. Urology consulted -- will keep NPO pending evaluation in the AM. *May benefit from initiation of HCTZ given history of recurrent stones and comorbid HTN. Opioid abuse (on methadone), with recent relapse  UDS 10/12/20 positive for opioids, amphetamines and PCP. Patient admits only to heroin use 2 days ago. Reports 120 mg daily dose of methadone, with last dose taken 10/13 AM (70 mg daily dosing listed in chart). Methadone dose and active Rx need verified with outpatient provider prior to administration tomorrow AM. Lyrica resumed. Monitor clinically for signs/symptoms of withdrawal. Supportive care as needed.  on risks of illicit drug use. Addiction services consulted. Hepatitis C  Historical / untreated. Will require outpatient follow up as indicated. Depression, anxiety  Effexor resumed. Chronic HTN  Well-controlled despite 10/10 flank pain. ACE-I resumed. Consider initiation of HCTZ (as above). Monitor BP. Former tobacco abuse  Noted. Encourage ongoing abstention. Multiple superficial lacerations (to face, BUE)  Patient admits to picking his skin. Limited physical exam performed, but no gross concern for bacterial superinfection.  Will continue to fevers/chills, headache, chest pain, palpitations, n/v/d or abdominal pain. States he is in too much pain to undergo a complete physical examination. Please see A&P for additional details. Past Medical History:    Diagnosis Date    Anxiety     Depression     Kidney stone     Liver disease     Hep C    Opiate abuse, continuous ([address]    Hypertension    Past Surgical History:    Reviewed / no PSHx provided. Medications Prior to Admission:   Medication Sig    tamsulosin (FLOMAX) 0.4 MG capsule Take 1 capsule by mouth daily for 7 days (Patient not taking: Reported on 10/13/2020)    ondansetron (ZOFRAN ODT) 4 MG disintegrating tablet Take 1 tablet by mouth every 8 hours as needed for Nausea or Vomiting (Patient not taking: Reported on 10/13/2020)    pregabalin (LYRICA) 150 MG capsule Take 1 capsule by mouth 4 times daily for 30 days.  venlafaxine (EFFEXOR) 100 MG tablet Take 1 tablet by mouth 3 times daily    pregabalin (LYRICA) 200 MG capsule Take 1 capsule by mouth 3 times daily for 60 days.  lisinopril (PRINIVIL;ZESTRIL) 20 MG tablet Take 1 tablet by mouth daily    mirtazapine (REMERON) 45 MG tablet Take 1 tablet by mouth nightly    naloxone 4 MG/0.1ML LIQD nasal spray 1 spray by Nasal route as needed for Opioid Reversal    neomycin-bacitracin-polymyxin (NEOSPORIN) 3.5-400-5000 OINT ointment Apply topically 4 times daily.  methadone (DOLOPHINE) 10 MG/ML solution Take 120 mg by mouth daily. Dispensed at [facility]     Allergies:   Patient has no known allergies.     Social History:   Socioeconomic History    Marital status: Single   Tobacco Use    Smoking status: Former Smoker     Packs/day: 0.50     Types: Cigarettes    Smokeless tobacco: Never Used   Substance and Sexual Activity    Alcohol use: Yes     Comment: occasional    Drug use: Not Currently     Types: Opiates, IV, Cocaine, Marijuana, Methamphetamines     Family History:  Reviewed / no significant PFHx

## 2020-10-13 NOTE — ED TRIAGE NOTES
Pt presents to the ED from urology for bilateral flank pain. Pt states he was seen in the ED last night for the same complaint and was told he had multiple kidney stones. Pt was brought to the ED from urology due to not having an inpatient bed available for the pt. She stated they may need to do surgery.

## 2020-10-13 NOTE — ED NOTES
Pt restless in bed. Medicated per MAR. Will continue to monitor.       Katlin Mijares RN  10/12/20 2001

## 2020-10-14 ENCOUNTER — TELEPHONE (OUTPATIENT)
Dept: UROLOGY | Age: 35
End: 2020-10-14

## 2020-10-14 VITALS
TEMPERATURE: 97.9 F | WEIGHT: 172.5 LBS | BODY MASS INDEX: 24.69 KG/M2 | HEIGHT: 70 IN | HEART RATE: 73 BPM | DIASTOLIC BLOOD PRESSURE: 66 MMHG | SYSTOLIC BLOOD PRESSURE: 103 MMHG | OXYGEN SATURATION: 99 % | RESPIRATION RATE: 18 BRPM

## 2020-10-14 LAB
ALBUMIN SERPL-MCNC: 3.6 G/DL (ref 3.5–5.1)
ALP BLD-CCNC: 72 U/L (ref 38–126)
ALT SERPL-CCNC: 40 U/L (ref 11–66)
ANION GAP SERPL CALCULATED.3IONS-SCNC: 8 MEQ/L (ref 8–16)
APTT: 30.8 SECONDS (ref 22–38)
AST SERPL-CCNC: 38 U/L (ref 5–40)
BASOPHILS # BLD: 0.5 %
BASOPHILS ABSOLUTE: 0 THOU/MM3 (ref 0–0.1)
BILIRUB SERPL-MCNC: 0.2 MG/DL (ref 0.3–1.2)
BUN BLDV-MCNC: 22 MG/DL (ref 7–22)
CALCIUM SERPL-MCNC: 8.8 MG/DL (ref 8.5–10.5)
CHLORIDE BLD-SCNC: 107 MEQ/L (ref 98–111)
CO2: 25 MEQ/L (ref 23–33)
CREAT SERPL-MCNC: 0.7 MG/DL (ref 0.4–1.2)
EOSINOPHIL # BLD: 4.7 %
EOSINOPHILS ABSOLUTE: 0.3 THOU/MM3 (ref 0–0.4)
ERYTHROCYTE [DISTWIDTH] IN BLOOD BY AUTOMATED COUNT: 15.5 % (ref 11.5–14.5)
ERYTHROCYTE [DISTWIDTH] IN BLOOD BY AUTOMATED COUNT: 49.9 FL (ref 35–45)
GFR SERPL CREATININE-BSD FRML MDRD: > 90 ML/MIN/1.73M2
GLUCOSE BLD-MCNC: 82 MG/DL (ref 70–108)
HCT VFR BLD CALC: 32.5 % (ref 42–52)
HEMOGLOBIN: 10.3 GM/DL (ref 14–18)
IMMATURE GRANS (ABS): 0.02 THOU/MM3 (ref 0–0.07)
IMMATURE GRANULOCYTES: 0.3 %
INR BLD: 1.05 (ref 0.85–1.13)
LYMPHOCYTES # BLD: 29.3 %
LYMPHOCYTES ABSOLUTE: 1.9 THOU/MM3 (ref 1–4.8)
MAGNESIUM: 2.1 MG/DL (ref 1.6–2.4)
MCH RBC QN AUTO: 27.9 PG (ref 26–33)
MCHC RBC AUTO-ENTMCNC: 31.7 GM/DL (ref 32.2–35.5)
MCV RBC AUTO: 88.1 FL (ref 80–94)
MONOCYTES # BLD: 10.4 %
MONOCYTES ABSOLUTE: 0.7 THOU/MM3 (ref 0.4–1.3)
NUCLEATED RED BLOOD CELLS: 0 /100 WBC
PHOSPHORUS: 3.8 MG/DL (ref 2.4–4.7)
PLATELET # BLD: 276 THOU/MM3 (ref 130–400)
PMV BLD AUTO: 9.2 FL (ref 9.4–12.4)
POTASSIUM REFLEX MAGNESIUM: 3.8 MEQ/L (ref 3.5–5.2)
RBC # BLD: 3.69 MILL/MM3 (ref 4.7–6.1)
SEG NEUTROPHILS: 54.8 %
SEGMENTED NEUTROPHILS ABSOLUTE COUNT: 3.6 THOU/MM3 (ref 1.8–7.7)
SODIUM BLD-SCNC: 140 MEQ/L (ref 135–145)
TOTAL PROTEIN: 6.4 G/DL (ref 6.1–8)
WBC # BLD: 6.6 THOU/MM3 (ref 4.8–10.8)

## 2020-10-14 PROCEDURE — 36415 COLL VENOUS BLD VENIPUNCTURE: CPT

## 2020-10-14 PROCEDURE — 99232 SBSQ HOSP IP/OBS MODERATE 35: CPT | Performed by: NURSE PRACTITIONER

## 2020-10-14 PROCEDURE — 80053 COMPREHEN METABOLIC PANEL: CPT

## 2020-10-14 PROCEDURE — G0378 HOSPITAL OBSERVATION PER HR: HCPCS

## 2020-10-14 PROCEDURE — 6370000000 HC RX 637 (ALT 250 FOR IP): Performed by: FAMILY MEDICINE

## 2020-10-14 PROCEDURE — 84100 ASSAY OF PHOSPHORUS: CPT

## 2020-10-14 PROCEDURE — 99243 OFF/OP CNSLTJ NEW/EST LOW 30: CPT | Performed by: UROLOGY

## 2020-10-14 PROCEDURE — 85730 THROMBOPLASTIN TIME PARTIAL: CPT

## 2020-10-14 PROCEDURE — 85025 COMPLETE CBC W/AUTO DIFF WBC: CPT

## 2020-10-14 PROCEDURE — 83735 ASSAY OF MAGNESIUM: CPT

## 2020-10-14 PROCEDURE — 85610 PROTHROMBIN TIME: CPT

## 2020-10-14 RX ORDER — METHADONE HYDROCHLORIDE 10 MG/ML
70 CONCENTRATE ORAL DAILY
Status: DISCONTINUED | OUTPATIENT
Start: 2020-10-14 | End: 2020-10-14

## 2020-10-14 RX ORDER — CEFAZOLIN SODIUM 1 G/50ML
1 INJECTION, SOLUTION INTRAVENOUS EVERY 8 HOURS
Status: DISCONTINUED | OUTPATIENT
Start: 2020-10-14 | End: 2020-10-14 | Stop reason: HOSPADM

## 2020-10-14 RX ORDER — METHADONE HYDROCHLORIDE 10 MG/5ML
120 SOLUTION ORAL DAILY
Status: DISCONTINUED | OUTPATIENT
Start: 2020-10-14 | End: 2020-10-14 | Stop reason: HOSPADM

## 2020-10-14 RX ADMIN — VENLAFAXINE 100 MG: 50 TABLET ORAL at 11:03

## 2020-10-14 RX ADMIN — TAMSULOSIN HYDROCHLORIDE 0.4 MG: 0.4 CAPSULE ORAL at 11:07

## 2020-10-14 RX ADMIN — METHADONE HYDROCHLORIDE 120 MG: 10 SOLUTION ORAL at 11:08

## 2020-10-14 RX ADMIN — PHENAZOPYRIDINE HYDROCHLORIDE 200 MG: 200 TABLET ORAL at 11:02

## 2020-10-14 RX ADMIN — PREGABALIN 150 MG: 75 CAPSULE ORAL at 11:07

## 2020-10-14 ASSESSMENT — PAIN SCALES - GENERAL
PAINLEVEL_OUTOF10: 0
PAINLEVEL_OUTOF10: 4
PAINLEVEL_OUTOF10: 10
PAINLEVEL_OUTOF10: 6
PAINLEVEL_OUTOF10: 0

## 2020-10-14 ASSESSMENT — PAIN DESCRIPTION - PAIN TYPE: TYPE: ACUTE PAIN

## 2020-10-14 ASSESSMENT — PAIN DESCRIPTION - ORIENTATION: ORIENTATION: RIGHT

## 2020-10-14 ASSESSMENT — PAIN DESCRIPTION - LOCATION: LOCATION: FLANK

## 2020-10-14 NOTE — PROGRESS NOTES
Patient leaving AMA. Does not want to stay here because he cannot eat. We explained to him he cannot have the surgery if he eats. Patient opened a drink and drank it and wanted to leave so he can eat. Signed AMA paperwork and he left. IV taken out and tele removed .

## 2020-10-14 NOTE — PROCEDURES
A Bladder scan was performed at 2148 . The patient's last void was at unable to void . The residual amount was measured to be 475 ML. Report of results was given to [facility] .

## 2020-10-14 NOTE — TELEPHONE ENCOUNTER
Patient came in for a stone fu on 10-13. He was very antsy when he first came in and crying saying he was in a lot of pain. After being seen was taken down to ER by wheelchair. He ended up back up here in about 15 to 30 min later saying they was being mean to him they wouldn't help him. He wanted to see who took him down.(which was Balwinder). He was very agitated while waiting to talk to us. He was crying saying he was in so much pain if we didn't do something he was going to go find his street dealer to get some pain medication. We ended up calling security and had him escorted out.

## 2020-10-14 NOTE — TELEPHONE ENCOUNTER
Called the patient late yesterday afternoon to offer scheduling him for Cystoscopy, right ureteroscopy, laser lithotripsy, basket retrieval of stone fragments, and possible right ureteral stent placement. Patient very upset stating \"nobody wanted to help him. He is in a lot of pain. He was seen in the urology office by EDIS Shin and was instructed to go to the ER (we wanted to direct admit the patient from the office but per [facility]. Solange's admitting they had no beds and recommended the patient go through the ER). He waited in the ER for an hour (per the patient) without any pain medication. He left the ER AMA and returned to the urology office wanting pain medication. \"    The patient was acting very erratic in the office and security was called to assist.  (Please see Victorino Oliver note)    Per Tigist Rosas took him downstairs to a waiting rescue squad, where he asked the Paramedic/EMT if he can get pain medication, which they were unable to give. Being upset Jorge Tipton signed off from there care and told them he was going to his dope dealer to get help for his pain. \"    The patient advised that for our office to help him get pain relief he would need to have surgical treatment for his stone. When I asked if the Toradol was helping with his pain, he laughed and said no. He was requesting to get Ultram.  Per Esteban Banks CNP we are unable to just prescribe narcotics secondary to his Methadone treatment. He told me \"that's bullshit. \"  Discussed the case with Payton Ferris CNP and Dr. Aydin Armando. Dr. Aydin Armando wanted the patient admitted to [address] [place] [address] for withdrawal and pain management. Saba Solomon contacted Dr. Milla Mckinney (hospitalist) to see if they would admit the patient to their services. Dr. Milla Mckinney agreed to accept the patient. I call the patient back to advise him of the plan.   The only thing he was worried about was whether he was going to get pain medication and

## 2020-10-14 NOTE — PROGRESS NOTES
Pt in room, pacing, refusing to sit down due to pain. He stated \"standing and moving is the only thing that helps the pain\". Patient being compliant but very belligerent, cursing and speaking very loudly and is being very inappropriate.

## 2020-10-14 NOTE — PROGRESS NOTES
Hospitalist Progress Note    Patient:  Hosea Vera      Unit/Bed:8B-32/032-A    YOB: 1985    MRN: [id number]       Acct: [de-identified]     PCP: PAPA Moore CNP    Date of Admission: 10/13/2020    Assessment/Plan:    R flank pain secondary to 4 mm obstructing UVJ stone, with associated mild hydroureter  Known history of renal stones. UA (10/12/20) negative for infection.   - Flomax. - Strain urine.   - Urology consulted plans for undergo cystoscopy, possible ureteroscopy, basket extraction of stone today.      Opioid abuse (on methadone), with recent relapse  UDS 10/12/20 positive for opioids, amphetamines and PCP. Patient admits only to heroin use 2 days ago. - continue home methadone dose as verified by nursing staff.     Hepatitis C  Historical / untreated. Will require outpatient follow up as indicated.     Depression, anxiety  Effexor resumed.     Chronic HTN  Well-controlled despite 10/10 flank pain. ACE-I resumed. Consider initiation of HCTZ (as above). Monitor BP.     Former tobacco abuse  Noted. Encourage ongoing abstention.     Multiple superficial lacerations (to face, BUE)      Chief Complaint: flank pain    Hospital Course:     39 y/o M PMHx renal stones, opioid abuse (on methadone), hepatitis C (untreated), depression, anxiety and former tobacco abuse -- presents as a direct admission to [facility] (from home) with a chief complaint of flank pain. History provided by the patient.     Patient presented to [facility] ED yesterday evening (10/12) with complaints of anxiety, chest pain, diffuse body aches and hematuria / admitted to injecting heroin 2 days prior to arrival / workup revealed R 4 mm obstructing UVJ stone with associated mild hydroureter, as well as multiple additional non-obstructing bilateral renal calculi. UA revealed hematuria but no infection. UDS was positive for amphetamines, opioids and PCP. Toradol, flomax and IVF were administered.  Pain improved upon reassessment, and the patient was discharged home with scheduled outpatient urology follow up today at .      Patient followed up with urologist today, who attempted to facilitate direct admission for pain control and stone extraction. Due to Psychiatric inpatient volume, the patient was instead sent to the ED for initiation of management. Provider also documented concern the patient seemed to be under the influence of drugs or withdrawing from drugs and might require medical/psychologic optimization prior to surgery. Patient was transported to the ED, where he was noted to be pacing in his room and the hallway, sobbing loudly and cursing regarding the need for pain medication. He subsequently left AMA.    He returned to Psychiatric this evening as a direct admission from home for the above issues. Patient continues to complain of 10/10 R lower back and flank pain, with radiation to the groin. Also reports severe dysuria and gross hematuria. Reports being prescribed 120 mg methadone daily (last dose 10/13 AM). Admits to using heroin 2 days ago (as above), but adamantly denies other illicit drug use. He also denies fevers/chills, headache, chest pain, palpitations, n/v/d or abdominal pain. States he is in too much pain to undergo a complete physical examination.     Please see A&P for additional details. Subjective (past 24 hours): Reports ongoing flank pain. Appears uncomfortable in bed. No CP/SOB/palpitations or dizziness.        Medications:  Reviewed    Infusion Medications    sodium chloride 100 mL/hr at 10/13/20 2115     Scheduled Medications    methadone  120 mg Oral Daily    ceFAZolin  1 g Intravenous Q8H    tamsulosin  0.4 mg Oral Daily    phenazopyridine  200 mg Oral TID WC    [Held by provider] lisinopril  20 mg Oral Daily    mirtazapine  45 mg Oral Nightly    pregabalin  150 mg Oral 4x Daily    venlafaxine  100 mg Oral TID    sodium chloride flush  10 mL Intravenous 2 times per day     PRN Meds: sodium chloride flush, promethazine **OR** ondansetron, ibuprofen    No intake or output data in the 24 hours ending 10/14/20 1014    Diet:  Diet NPO, After Midnight    Exam:  BP 98/72   Pulse 67   Temp 98.2 °F (36.8 °C) (Oral)   Resp 16   Ht 5' 10\" (1.778 m)   Wt 172 lb 8 oz (78.2 kg)   SpO2 97%   BMI 24.75 kg/m²     General appearance: No apparent distress, appears stated age and cooperative. HEENT: Pupils equal, round, and reactive to light. Conjunctivae/corneas clear. Neck: Supple, with full range of motion. No jugular venous distention. Trachea midline. Respiratory:  Normal respiratory effort. Clear to auscultation, bilaterally without Rales/Wheezes/Rhonchi. Cardiovascular: Regular rate and rhythm with normal S1/S2 without murmurs, rubs or gallops. Abdomen: Soft, non-tender, non-distended with normal bowel sounds. + CVA tenderness. Musculoskeletal: passive and active ROM x 4 extremities. Skin: Skin color, texture, turgor normal.   superficial scabs / scars and laceration to face and upper ext. Neurologic:  Neurovascularly intact without any focal sensory/motor deficits.  Cranial nerves: II-XII intact, grossly non-focal.  Psychiatric: Alert and oriented, thought content appropriate  Capillary Refill: Brisk,< 3 seconds   Peripheral Pulses: +2 palpable, equal bilaterally       Labs:   Recent Labs     10/12/20  1922 10/13/20  0945 10/14/20  0804   WBC 10.2 7.6 6.6   HGB 11.5* 10.6* 10.3*   HCT 35.7* 33.2* 32.5*    294 276     Recent Labs     10/12/20  1922 10/13/20  0945 10/14/20  0804    145 140   K 4.0 4.4 3.8    109 107   CO2 24 23 25   BUN 20 23* 22   CREATININE 0.9 0.7 0.7   CALCIUM 9.5 9.3 8.8   PHOS  --   --  3.8     Recent Labs     10/12/20  2012 10/13/20  0945 10/14/20  0804   AST 25 30 38   ALT 35 35 40   BILIDIR <0.2  --   --    BILITOT <0.2* <0.2* 0.2*   ALKPHOS 86 82 72     Recent Labs     10/12/20  2012 10/14/20  0804   INR 1.08 1.05     Recent Labs     10/12/20  2012 CKTOTAL 160     No results for input(s): PROCAL in the last 72 hours.     Microbiology:      Urinalysis:      Lab Results   Component Value Date    NITRU NEGATIVE 10/12/2020    WBCUA 0-2 10/12/2020    BACTERIA NONE 10/12/2020    RBCUA 50-75 10/12/2020    BLOODU LARGE 10/12/2020    SPECGRAV 1.024 07/30/2020    GLUCOSEU NEGATIVE 10/12/2020       Radiology:  No orders to display         Code Status: Full Code        Active Hospital Problems    Diagnosis Date Noted    Calculus of ureterovesical junction (UVJ) [N20.1] 10/13/2020       Electronically signed by PAPA Ivy CNP on 10/14/2020 at 10:14 AM

## 2020-10-14 NOTE — PROGRESS NOTES
Methadone clinic called. Spoke with Niharika Miranda LPN, verified methadone dose at 120 mg every morning.

## 2020-10-14 NOTE — CARE COORDINATION
DISCHARGE PLANNING EVALUATION: OP/OBSERVATION        10/14/20, 10:35 PM EDT    Hosea Vera       Admitted from: ED 10/13/2020/ 1932   Location: [address] [place] [address] Reason for admit: Calculus of ureterovesical junction (UVJ) [N20.1]   Admit order signed?: yes    Procedure: Pt NPO after MN for cystoscopy, right ureteroscopy, laser lithotripsy, basket retrieval of stone fragments, and right ureteral stent placement per Dr Esequiel Whiteside. Pertinent Info/Orders/Treatment Plan:  Pt is admitted for management of his relapse from opioid addiction as well as a 3-4 mm stone located at the right ureterovesical junction. He has a history of methamphetamine and opioid abuse. Pt belligerent, cursing and pacing in room last night; Security was called. PCP: PAPA Moore - CNP    Patient Goals/Plan/Treatment Preferences: Attempted to speak to patient this morning. He is drowsy and appears uncomfortable. He is verbally aggressive. Will attempt to speak to patient tomorrow after his procedure. Transportation/Food Security/Housekeeping Addressed:  No issues identified.

## 2020-10-14 NOTE — CONSULTS
[address] [facility] [address]B  [address] [facility] [address]  Dept: [phone]  Loc: [phone]  Visit Date: 10/13/2020    Urology Consult Note    Reason for Consult:  Obstructing 4 mm UVJ calculus with mild hydroureter   Requesting Physician:  Nora    History Obtained From:  patient    Chief Complaint: flank pain    HISTORY OF PRESENT ILLNESS:                The patient is a 38 y.o. male with significant past medical history of see below who presents with right flank pain to our office yesterday, per previous note he was very agitated because of his inadequate pain control. He has a history of methamphetamine and opioid abuse. He reported gross hematuria and nausea, but denied dysuria, urgency, frequency or fever/chills. During exam, he is difficult to understand, slurred speech and he will only open his eyes when he demands his methadone. He has been given ativan and morphine overnight, he says his pain is uncontrolled. He is currently NPO    Past Medical History:        Diagnosis Date    Anxiety     Depression     Kidney stone     Liver disease     Hep C    Opiate abuse, continuous ([facility] [address]      Past Surgical History:    No past surgical history on file. Allergies:  Patient has no known allergies.   Social History:  Social History     Socioeconomic History    Marital status: Single     Spouse name: Not on file    Number of children: Not on file    Years of education: Not on file    Highest education level: Not on file   Occupational History    Not on file   Social Needs    Financial resource strain: Not on file    Food insecurity     Worry: Not on file     Inability: Not on file    Transportation needs     Medical: Not on file     Non-medical: Not on file   Tobacco Use    Smoking status: Former Smoker     Packs/day: 0.50     Types: Cigarettes    Smokeless tobacco: Never Used   Substance and Sexual Activity    Alcohol use: Yes     Comment: occasional    Drug use: Not Currently     Types: Opiates , IV, Cocaine, Marijuana, Methamphetamines    Sexual activity: Not Currently   Lifestyle    Physical activity     Days per week: Not on file     Minutes per session: Not on file    Stress: Not on file   Relationships    Social connections     Talks on phone: Not on file     Gets together: Not on file     Attends Gnosticist service: Not on file     Active member of club or organization: Not on file     Attends meetings of clubs or organizations: Not on file     Relationship status: Not on file    Intimate partner violence     Fear of current or ex partner: Not on file     Emotionally abused: Not on file     Physically abused: Not on file     Forced sexual activity: Not on file   Other Topics Concern    Not on file   Social History Narrative    Not on file     Family History:   No family history on file. ROS:  Constitutional: Negative for chills, fatigue, fever, or weight loss. Eyes: Denies reported visual changes. ENT: Denies headache, difficulty swallowing, earache, and nosebleeds. Cardiovascular: Negative for chest pain, palpitations, tachycardia or edema. Respiratory: Denies cough or SOB. GI:The patient denies abdominal or flank pain, anorexia, nausea or vomiting. : see HPI. Musculoskeletal: Patient denies low back pain or painful or reduced range of ROM. Neurological: The patient denies any symptoms of neurological impairment or TIA. Psychiatric: Denies anxiety or depression. Skin: Denies rash or lesions. All remaining ROS negative. PHYSICAL EXAM:  VITALS:  /88   Pulse 80   Temp 97.7 °F (36.5 °C) (Oral)   Resp 16   Ht 5' 10\" (1.778 m)   Wt 172 lb 8 oz (78.2 kg)   SpO2 97%   BMI 24.75 kg/m² . Nursing note and vitals reviewed. Constitutional: Alert, eyes closed, refuses to open on exam, no acute distress, and cooperative to examination with appropriate mood and affect.    HEENT:   Head:         Normocephalic and BILIRUBINUR NEGATIVE 10/12/2020    BLOODU LARGE 10/12/2020    GLUCOSEU NEGATIVE 10/12/2020    AMORPHOUS URATES 05/18/2020       Imaging: The patient has had a CT Without  Contrast which I have independently reviewed along with its accompanying report. The study demonstrates   Narrative    CT abdomen and pelvis without contrast.         COMPARISON: Radiograph of the chest dated 10/12/2020         FINDINGS:    Lower Chest:    Minimal atelectasis along the posterior lung bases. Right lower lobe 6 mm pulmonary nodule (series 2, image 11).      Abdomen:    Normal gallbladder.  Noncontrast appearance of the liver, spleen, pancreas    and adrenal glands are unremarkable.         Multiple nonobstructing right renal calculi measuring up to 3 mm.  No    right-sided hydronephrosis.  Mild right hydroureter.  At the right UVJ    there is a 4 mm ureteral calculus. Nonobstructing 1 mm left renal calculus.  No left-sided hydronephrosis or    hydroureter.  No left ureteral calculus.         Normal appendix.  Moderate colonic stool burden.  No bowel obstruction.      No mesenteric or retroperitoneal lymphadenopathy. Normal abdominal aorta.         Pelvis:    Normal appearance of the urinary bladder. Multiple normal-sized inguinal lymph nodes, nonspecific but likely    reactive.         Musculoskeletal:    No acute fracture or suspicious osseous abnormality.              Impression    1.  Right UVJ 4 mm calculus causes mild right hydroureter. 2.  Multiple additional nonobstructing renal calculi present bilaterally    measuring up to 3 mm in the right kidney.           IMPRESSION:  4mm right UVJ calculus  Mild right hydroureter   Multiple non obstructing renal calculi bilaterally, measuring up to 3mm in the right kidney    Plan:    Covid ordered  Consent obtained  Pre surgery Ancef ordered    Cystoscopy, right ureteroscopy, laser lithotripsy, basket retrieval of stone fragments, and possible right ureteral stent placement per Dr Marielle Mcgraw. I described the procedure in detail and also described the associated risks and benefits at length. We discussed possible alternative therapies. We discussed the risks and benefits of not undergoing therapy. Patient understands these risks and benefits and desires to proceed. Post-op expectations were discussed; stent pain, urinary frequency and urgency secondary to the stent, dysuria which should improve 1-2 days after procedure, and intermittent hematuria can be expected as long as stent is in place.       Thank you for including us in the care of PAPA Cantu  10/14/20 8:03 AM  Urology

## 2020-10-14 NOTE — PROGRESS NOTES
67-year-old white male is admitted for management of his relapse from opioid addiction as well as a 3-4 mm stone located at the right ureterovesical junction. Stone protocol CT scan was obtained on 10/12/2020 which I have reviewed. He has 2 tiny nonobstructing stones in the right kidney. He has a 3-4 mm stone just proximal to the right ureteral orifice. I recommended he undergo cystoscopy, possible ureteroscopy, basket extraction of stone. If the procedures described go well then a stent will not be placed. He understands the procedure, success rate, common side effects, potential complications.

## 2020-10-23 RX ORDER — ONDANSETRON 4 MG/1
4 TABLET, ORALLY DISINTEGRATING ORAL EVERY 8 HOURS PRN
Qty: 15 TABLET | Refills: 0 | Status: SHIPPED | OUTPATIENT
Start: 2020-10-23 | End: 2021-03-21

## 2020-10-23 NOTE — TELEPHONE ENCOUNTER
Patient is complaining of nausea and vomiting.  Requesting zofran to be sent to [facility].

## 2020-10-29 ENCOUNTER — HOSPITAL ENCOUNTER (EMERGENCY)
Age: 35
Discharge: HOME | End: 2020-10-30
Payer: MEDICARE

## 2020-10-29 ENCOUNTER — APPOINTMENT (OUTPATIENT)
Dept: CT IMAGING | Age: 35
End: 2020-10-29
Payer: MEDICARE

## 2020-10-29 ENCOUNTER — TELEPHONE (OUTPATIENT)
Dept: UROLOGY | Age: 35
End: 2020-10-29

## 2020-10-29 LAB
BASOPHILS # BLD: 0.6 %
BASOPHILS ABSOLUTE: 0 THOU/MM3 (ref 0–0.1)
EOSINOPHIL # BLD: 3.8 %
EOSINOPHILS ABSOLUTE: 0.3 THOU/MM3 (ref 0–0.4)
ERYTHROCYTE [DISTWIDTH] IN BLOOD BY AUTOMATED COUNT: 15.2 % (ref 11.5–14.5)
ERYTHROCYTE [DISTWIDTH] IN BLOOD BY AUTOMATED COUNT: 49.6 FL (ref 35–45)
HCT VFR BLD CALC: 32.8 % (ref 42–52)
HEMOGLOBIN: 10.3 GM/DL (ref 14–18)
IMMATURE GRANS (ABS): 0.01 THOU/MM3 (ref 0–0.07)
IMMATURE GRANULOCYTES: 0.1 %
LYMPHOCYTES # BLD: 38.4 %
LYMPHOCYTES ABSOLUTE: 2.7 THOU/MM3 (ref 1–4.8)
MCH RBC QN AUTO: 27.9 PG (ref 26–33)
MCHC RBC AUTO-ENTMCNC: 31.4 GM/DL (ref 32.2–35.5)
MCV RBC AUTO: 88.9 FL (ref 80–94)
MONOCYTES # BLD: 11.1 %
MONOCYTES ABSOLUTE: 0.8 THOU/MM3 (ref 0.4–1.3)
NUCLEATED RED BLOOD CELLS: 0 /100 WBC
PLATELET # BLD: 246 THOU/MM3 (ref 130–400)
PMV BLD AUTO: 9.3 FL (ref 9.4–12.4)
RBC # BLD: 3.69 MILL/MM3 (ref 4.7–6.1)
SEG NEUTROPHILS: 46 %
SEGMENTED NEUTROPHILS ABSOLUTE COUNT: 3.3 THOU/MM3 (ref 1.8–7.7)
WBC # BLD: 7.1 THOU/MM3 (ref 4.8–10.8)

## 2020-10-29 PROCEDURE — 96365 THER/PROPH/DIAG IV INF INIT: CPT

## 2020-10-29 PROCEDURE — 6360000002 HC RX W HCPCS: Performed by: PHYSICIAN ASSISTANT

## 2020-10-29 PROCEDURE — 36415 COLL VENOUS BLD VENIPUNCTURE: CPT

## 2020-10-29 PROCEDURE — 74176 CT ABD & PELVIS W/O CONTRAST: CPT

## 2020-10-29 PROCEDURE — 96375 TX/PRO/DX INJ NEW DRUG ADDON: CPT

## 2020-10-29 PROCEDURE — 6370000000 HC RX 637 (ALT 250 FOR IP): Performed by: PHYSICIAN ASSISTANT

## 2020-10-29 PROCEDURE — 2580000003 HC RX 258: Performed by: PHYSICIAN ASSISTANT

## 2020-10-29 PROCEDURE — 51798 US URINE CAPACITY MEASURE: CPT

## 2020-10-29 PROCEDURE — 80053 COMPREHEN METABOLIC PANEL: CPT

## 2020-10-29 PROCEDURE — 85025 COMPLETE CBC W/AUTO DIFF WBC: CPT

## 2020-10-29 PROCEDURE — 99285 EMERGENCY DEPT VISIT HI MDM: CPT

## 2020-10-29 RX ORDER — IBUPROFEN 800 MG/1
800 TABLET ORAL ONCE
Status: COMPLETED | OUTPATIENT
Start: 2020-10-29 | End: 2020-10-29

## 2020-10-29 RX ORDER — ONDANSETRON 2 MG/ML
4 INJECTION INTRAMUSCULAR; INTRAVENOUS ONCE
Status: COMPLETED | OUTPATIENT
Start: 2020-10-29 | End: 2020-10-29

## 2020-10-29 RX ORDER — 0.9 % SODIUM CHLORIDE 0.9 %
500 INTRAVENOUS SOLUTION INTRAVENOUS ONCE
Status: COMPLETED | OUTPATIENT
Start: 2020-10-29 | End: 2020-10-30

## 2020-10-29 RX ADMIN — ONDANSETRON 4 MG: 2 INJECTION INTRAMUSCULAR; INTRAVENOUS at 23:42

## 2020-10-29 RX ADMIN — IBUPROFEN 800 MG: 800 TABLET, FILM COATED ORAL at 23:42

## 2020-10-29 RX ADMIN — SODIUM CHLORIDE 500 ML: 9 INJECTION, SOLUTION INTRAVENOUS at 23:47

## 2020-10-29 RX ADMIN — LIDOCAINE HYDROCHLORIDE 115.6 MG: 20 INJECTION INTRAVENOUS at 23:42

## 2020-10-29 ASSESSMENT — ENCOUNTER SYMPTOMS
SHORTNESS OF BREATH: 0
ABDOMINAL PAIN: 0
VOMITING: 0
COLOR CHANGE: 0
NAUSEA: 1

## 2020-10-29 ASSESSMENT — PAIN DESCRIPTION - PAIN TYPE: TYPE: ACUTE PAIN

## 2020-10-29 ASSESSMENT — PAIN SCALES - GENERAL: PAINLEVEL_OUTOF10: 10

## 2020-10-29 ASSESSMENT — PAIN DESCRIPTION - ORIENTATION: ORIENTATION: RIGHT;LEFT

## 2020-10-29 ASSESSMENT — PAIN DESCRIPTION - LOCATION: LOCATION: FLANK

## 2020-10-29 NOTE — TELEPHONE ENCOUNTER
patient called and left a message that he is in pain with a kidney stone. Attempted to call the patient and left a message to call the office.

## 2020-10-30 ENCOUNTER — PREP FOR PROCEDURE (OUTPATIENT)
Dept: UROLOGY | Age: 35
End: 2020-10-30

## 2020-10-30 ENCOUNTER — TELEPHONE (OUTPATIENT)
Dept: UROLOGY | Age: 35
End: 2020-10-30

## 2020-10-30 VITALS
OXYGEN SATURATION: 98 % | WEIGHT: 170 LBS | HEART RATE: 78 BPM | RESPIRATION RATE: 10 BRPM | TEMPERATURE: 98.3 F | SYSTOLIC BLOOD PRESSURE: 119 MMHG | HEIGHT: 70 IN | DIASTOLIC BLOOD PRESSURE: 78 MMHG | BODY MASS INDEX: 24.34 KG/M2

## 2020-10-30 LAB
ALBUMIN SERPL-MCNC: 4.1 G/DL (ref 3.5–5.1)
ALP BLD-CCNC: 78 U/L (ref 38–126)
ALT SERPL-CCNC: 73 U/L (ref 11–66)
AMPHETAMINE+METHAMPHETAMINE URINE SCREEN: NEGATIVE
ANION GAP SERPL CALCULATED.3IONS-SCNC: 10 MEQ/L (ref 8–16)
AST SERPL-CCNC: 65 U/L (ref 5–40)
BACTERIA: ABNORMAL /HPF
BARBITURATE QUANTITATIVE URINE: NEGATIVE
BENZODIAZEPINE QUANTITATIVE URINE: NEGATIVE
BILIRUB SERPL-MCNC: < 0.2 MG/DL (ref 0.3–1.2)
BILIRUBIN URINE: NEGATIVE
BLOOD, URINE: ABNORMAL
BUN BLDV-MCNC: 20 MG/DL (ref 7–22)
CALCIUM SERPL-MCNC: 9.8 MG/DL (ref 8.5–10.5)
CANNABINOID QUANTITATIVE URINE: POSITIVE
CASTS 2: ABNORMAL /LPF
CASTS UA: ABNORMAL /LPF
CHARACTER, URINE: ABNORMAL
CHLORIDE BLD-SCNC: 103 MEQ/L (ref 98–111)
CO2: 27 MEQ/L (ref 23–33)
COCAINE METABOLITE QUANTITATIVE URINE: NEGATIVE
COLOR: YELLOW
CREAT SERPL-MCNC: 0.8 MG/DL (ref 0.4–1.2)
CRYSTALS, UA: ABNORMAL
EPITHELIAL CELLS, UA: ABNORMAL /HPF
GFR SERPL CREATININE-BSD FRML MDRD: > 90 ML/MIN/1.73M2
GLUCOSE BLD-MCNC: 116 MG/DL (ref 70–108)
GLUCOSE URINE: NEGATIVE MG/DL
KETONES, URINE: NEGATIVE
LEUKOCYTE ESTERASE, URINE: NEGATIVE
MISCELLANEOUS 2: ABNORMAL
NITRITE, URINE: NEGATIVE
OPIATES, URINE: POSITIVE
OSMOLALITY CALCULATION: 283 MOSMOL/KG (ref 275–300)
OXYCODONE: NEGATIVE
PH UA: 7 (ref 5–9)
PHENCYCLIDINE QUANTITATIVE URINE: NEGATIVE
POTASSIUM REFLEX MAGNESIUM: 4.1 MEQ/L (ref 3.5–5.2)
PROTEIN UA: NEGATIVE
RBC URINE: ABNORMAL /HPF
RENAL EPITHELIAL, UA: ABNORMAL
SODIUM BLD-SCNC: 140 MEQ/L (ref 135–145)
SPECIFIC GRAVITY, URINE: 1.02 (ref 1–1.03)
TOTAL PROTEIN: 7.2 G/DL (ref 6.1–8)
UROBILINOGEN, URINE: 1 EU/DL (ref 0–1)
WBC UA: ABNORMAL /HPF
YEAST: ABNORMAL

## 2020-10-30 PROCEDURE — 6370000000 HC RX 637 (ALT 250 FOR IP): Performed by: PHYSICIAN ASSISTANT

## 2020-10-30 PROCEDURE — 80307 DRUG TEST PRSMV CHEM ANLYZR: CPT

## 2020-10-30 PROCEDURE — 81001 URINALYSIS AUTO W/SCOPE: CPT

## 2020-10-30 RX ORDER — SODIUM CHLORIDE 9 MG/ML
INJECTION, SOLUTION INTRAVENOUS CONTINUOUS
Status: CANCELLED | OUTPATIENT
Start: 2020-11-02

## 2020-10-30 RX ORDER — OXYBUTYNIN CHLORIDE 5 MG/1
5 TABLET ORAL 3 TIMES DAILY PRN
Qty: 15 TABLET | Refills: 0 | Status: SHIPPED | OUTPATIENT
Start: 2020-10-30 | End: 2020-12-16 | Stop reason: SDUPTHER

## 2020-10-30 RX ORDER — OXYBUTYNIN CHLORIDE 5 MG/1
5 TABLET ORAL ONCE
Status: COMPLETED | OUTPATIENT
Start: 2020-10-30 | End: 2020-10-30

## 2020-10-30 RX ADMIN — OXYBUTYNIN CHLORIDE 5 MG: 5 TABLET ORAL at 04:52

## 2020-10-30 NOTE — TELEPHONE ENCOUNTER
Patient scheduled for surgery with Dr Amy Saldaña on 11/2/20. Surgery consent on arrival. Surgery instructions gone over verbally. Patient has had all pre op testing done.  Will need Covid on arrival.

## 2020-10-30 NOTE — ED NOTES
Pt requesting ativan, saying it's the \"only thing that knocks him out\". Pt unable to keep his eyes open during questioning and states that he is just very tired, but admitted to using heroin, a little bit of meth, and marijuana today. Unable to collect a urine sample at this time.  Pt medicated per [facility]     Sujatha Mutter  10/29/20 6055

## 2020-10-30 NOTE — ED NOTES
Pt resting in bed, eyes closed.  Respirs easy and unlabored at this time, call light within reach     Cassidy Peaks  10/30/20 6065

## 2020-10-30 NOTE — ED NOTES
Pt continues to rest in bed with unlabored respirations. VSS, girlfriend remains at bedside.       Diaz Russ  10/30/20 0214

## 2020-10-30 NOTE — TELEPHONE ENCOUNTER
SURGERY [address]  [address] [place] [address] [address] [facility] [address] [facility], [address]      Phone *[phone] *[phone]   Surgical Scheduling Direct Line Phone *[phone] Fax *[phone]      Yaritza Endo 1985 male    18 [birth year]1 [address] [facility]  [address]   Marital Status:          Home Phone: [phone]      Cell Phone:    Telephone Information:   Mobile [phone]          Surgeon: Dr. Dennis Danielson  Surgery Date: 11/2/20   Time: 12:15 pm    Procedure: Cystoscopy, right ureteroscopy, laser lithotripsy, basket retrieval of stone fragments, and possible right ureteral stent placement    Diagnosis: Kidney Stone     Important Medical History: In Epic    Special Inst/Equip:     CPT Codes:    18328  Latex Allergy:  No     Cardiac Device:  No     Anesthesia:  Anesthesiologist (General/Spinal)          Admission Type:  Same Day                             Admit Prior to Day of Surgery: No    Case Location:  Main OR           Preadmission Testing:Phone Call              PAT Date and Time:______________________________________________________    PAT Confirmation #: ______________________________________________________    Post Op Visit: ___________________________________________________________    Need Preop Cardiac Clearance: No    Does Patient have Cardiologist/physician?      None    Surgery Confirmation #: __________________________________________________    : ________________________   Date: __________________________     [employer] Name: [place]

## 2020-10-30 NOTE — ED PROVIDER NOTES
MEDICATIONS       Previous Medications    LISINOPRIL (PRINIVIL;ZESTRIL) 20 MG TABLET    Take 1 tablet by mouth daily    METHADONE (DOLOPHINE) 10 MG/ML SOLUTION    Take 70 mg by mouth daily. Dispensed at [facility]    MIRTAZAPINE (REMERON) 45 MG TABLET    Take 1 tablet by mouth nightly    NALOXONE 4 MG/0.1ML LIQD NASAL SPRAY    1 spray by Nasal route as needed for Opioid Reversal    NEOMYCIN-BACITRACIN-POLYMYXIN (NEOSPORIN) 3.5-400-5000 OINT OINTMENT    Apply topically 4 times daily. ONDANSETRON (ZOFRAN ODT) 4 MG DISINTEGRATING TABLET    Take 1 tablet by mouth every 8 hours as needed for Nausea or Vomiting    PREGABALIN (LYRICA) 150 MG CAPSULE    Take 1 capsule by mouth 4 times daily for 30 days. PREGABALIN (LYRICA) 200 MG CAPSULE    Take 1 capsule by mouth 3 times daily for 60 days. TAMSULOSIN (FLOMAX) 0.4 MG CAPSULE    Take 1 capsule by mouth daily for 7 days    VENLAFAXINE (EFFEXOR XR) 150 MG EXTENDED RELEASE CAPSULE    Take 1 capsule by mouth 2 times daily    VENLAFAXINE (EFFEXOR) 100 MG TABLET    Take 1 tablet by mouth 3 times daily       ALLERGIES     has No Known Allergies. FAMILY HISTORY     has no family status information on file. family history is not on file. SOCIAL HISTORY      reports that he has quit smoking. His smoking use included cigarettes. He smoked 0.50 packs per day. He has never used smokeless tobacco. He reports current alcohol use. He reports previous drug use. Drugs: Opiates , IV, Cocaine, Marijuana, and Methamphetamines. PHYSICAL EXAM     INITIAL VITALS:  height is 5' 10\" (1.778 m) and weight is 170 lb (77.1 kg). His oral temperature is 98.3 °F (36.8 °C). His blood pressure is 119/78 and his pulse is 78. His respiration is 10 and oxygen saturation is 98%. Physical Exam  Vitals signs and nursing note reviewed. Constitutional:       Appearance: Normal appearance. HENT:      Head: Normocephalic and atraumatic.    Eyes:      Conjunctiva/sclera: (*)     Hematocrit 32.8 (*)     MCHC 31.4 (*)     RDW-CV 15.2 (*)     RDW-SD 49.6 (*)     MPV 9.3 (*)     All other components within normal limits   COMPREHENSIVE METABOLIC PANEL W/ REFLEX TO MG FOR LOW K - Abnormal; Notable for the following components:    Glucose 116 (*)     AST 65 (*)     Total Bilirubin <0.2 (*)     ALT 73 (*)     All other components within normal limits   URINE WITH REFLEXED MICRO - Abnormal; Notable for the following components:    Blood, Urine SMALL (*)     All other components within normal limits   URINE DRUG SCREEN   ANION GAP   OSMOLALITY   GLOMERULAR FILTRATION RATE, ESTIMATED       EMERGENCY DEPARTMENT COURSE:   Vitals:    Vitals:    10/29/20 2249 10/29/20 2258 10/30/20 0213   BP: 123/85 129/80 119/78   Pulse: 91 90 78   Resp: 18 12 10   Temp: 98.3 °F (36.8 °C) 98.3 °F (36.8 °C)    TempSrc: Oral Oral    SpO2: 95% 98% 98%   Weight:  170 lb (77.1 kg)    Height:  5' 10\" (1.778 m)       11:05 PM EDT: The patient was seen and evaluated. Patient presents for complaints of bilateral flank pain that he reports is been ongoing for the past 4 days, mild at time of onset with progressive worsening. He arrives with reassuring vital signs. He reports symptoms consistent with prior kidney stones. He has documented history of polysubstance abuse and multiple provider notes documenting threats of obtaining illegal narcotic medication on the street if his pain needs or not adequately met. He is requesting Ativan specifically upon my initial evaluation despite having some mild drowsiness and later reporting to nursing staff use of both heroin as well as methamphetamines today on top of his daily cannabis. He was treated in the department with Motrin, Zofran and IV lidocaine with some improvement in his symptoms. Bladder scan showed 200 mL of urine in the bladder. Laboratory results were reassuring.   No white blood cell count elevation and urine obtained by catheterization showed small amount

## 2020-11-02 ENCOUNTER — TELEPHONE (OUTPATIENT)
Dept: UROLOGY | Age: 35
End: 2020-11-02

## 2020-11-02 RX ORDER — LISINOPRIL 20 MG/1
20 TABLET ORAL DAILY
Qty: 30 TABLET | Refills: 1 | Status: SHIPPED | OUTPATIENT
Start: 2020-11-02 | End: 2020-12-16 | Stop reason: SDUPTHER

## 2020-11-02 RX ORDER — PREGABALIN 200 MG/1
200 CAPSULE ORAL 3 TIMES DAILY
Qty: 90 CAPSULE | Refills: 0 | Status: SHIPPED | OUTPATIENT
Start: 2020-11-02 | End: 2020-12-02 | Stop reason: SDUPTHER

## 2020-11-02 RX ORDER — VENLAFAXINE 100 MG/1
100 TABLET ORAL 3 TIMES DAILY
Qty: 90 TABLET | Refills: 1 | Status: SHIPPED | OUTPATIENT
Start: 2020-11-02 | End: 2020-12-16 | Stop reason: SDUPTHER

## 2020-11-02 RX ORDER — MIRTAZAPINE 45 MG/1
45 TABLET, FILM COATED ORAL NIGHTLY
Qty: 30 TABLET | Refills: 1 | Status: SHIPPED | OUTPATIENT
Start: 2020-11-02 | End: 2020-12-16 | Stop reason: SDUPTHER

## 2020-11-05 ENCOUNTER — TELEPHONE (OUTPATIENT)
Dept: INTERNAL MEDICINE CLINIC | Age: 35
End: 2020-11-05

## 2020-11-05 ENCOUNTER — TELEPHONE (OUTPATIENT)
Dept: UROLOGY | Age: 35
End: 2020-11-05

## 2020-11-05 ENCOUNTER — HOSPITAL ENCOUNTER (EMERGENCY)
Age: 35
Discharge: HOME | End: 2020-11-05
Attending: EMERGENCY MEDICINE
Payer: MEDICARE

## 2020-11-05 ENCOUNTER — APPOINTMENT (OUTPATIENT)
Dept: CT IMAGING | Age: 35
End: 2020-11-05
Payer: MEDICARE

## 2020-11-05 VITALS
HEART RATE: 105 BPM | OXYGEN SATURATION: 98 % | RESPIRATION RATE: 22 BRPM | TEMPERATURE: 97.5 F | DIASTOLIC BLOOD PRESSURE: 73 MMHG | BODY MASS INDEX: 24.34 KG/M2 | SYSTOLIC BLOOD PRESSURE: 114 MMHG | HEIGHT: 70 IN | WEIGHT: 170 LBS

## 2020-11-05 LAB
AMORPHOUS: ABNORMAL
ANION GAP SERPL CALCULATED.3IONS-SCNC: 14 MEQ/L (ref 8–16)
BACTERIA: ABNORMAL /HPF
BASOPHILS # BLD: 0.3 %
BASOPHILS ABSOLUTE: 0 THOU/MM3 (ref 0–0.1)
BILIRUBIN URINE: NEGATIVE
BLOOD, URINE: ABNORMAL
BUN BLDV-MCNC: 20 MG/DL (ref 7–22)
CALCIUM SERPL-MCNC: 9.8 MG/DL (ref 8.5–10.5)
CASTS 2: ABNORMAL /LPF
CASTS UA: ABNORMAL /LPF
CHARACTER, URINE: ABNORMAL
CHLORIDE BLD-SCNC: 103 MEQ/L (ref 98–111)
CO2: 23 MEQ/L (ref 23–33)
COLOR: ABNORMAL
CREAT SERPL-MCNC: 1.6 MG/DL (ref 0.4–1.2)
CRYSTALS, UA: ABNORMAL
CRYSTALS, UA: ABNORMAL
EKG ATRIAL RATE: 105 BPM
EKG P AXIS: 67 DEGREES
EKG P-R INTERVAL: 168 MS
EKG Q-T INTERVAL: 314 MS
EKG QRS DURATION: 84 MS
EKG QTC CALCULATION (BAZETT): 415 MS
EKG R AXIS: 55 DEGREES
EKG T AXIS: 67 DEGREES
EKG VENTRICULAR RATE: 105 BPM
EOSINOPHIL # BLD: 2.5 %
EOSINOPHILS ABSOLUTE: 0.2 THOU/MM3 (ref 0–0.4)
EPITHELIAL CELLS, UA: ABNORMAL /HPF
ERYTHROCYTE [DISTWIDTH] IN BLOOD BY AUTOMATED COUNT: 15.3 % (ref 11.5–14.5)
ERYTHROCYTE [DISTWIDTH] IN BLOOD BY AUTOMATED COUNT: 50.4 FL (ref 35–45)
GFR SERPL CREATININE-BSD FRML MDRD: 49 ML/MIN/1.73M2
GLUCOSE BLD-MCNC: 113 MG/DL (ref 70–108)
GLUCOSE URINE: NEGATIVE MG/DL
HCT VFR BLD CALC: 36.3 % (ref 42–52)
HEMOGLOBIN: 11.5 GM/DL (ref 14–18)
IMMATURE GRANS (ABS): 0.02 THOU/MM3 (ref 0–0.07)
IMMATURE GRANULOCYTES: 0.2 %
KETONES, URINE: ABNORMAL
LEUKOCYTE ESTERASE, URINE: ABNORMAL
LYMPHOCYTES # BLD: 29.4 %
LYMPHOCYTES ABSOLUTE: 2.6 THOU/MM3 (ref 1–4.8)
MCH RBC QN AUTO: 28 PG (ref 26–33)
MCHC RBC AUTO-ENTMCNC: 31.7 GM/DL (ref 32.2–35.5)
MCV RBC AUTO: 88.5 FL (ref 80–94)
MISCELLANEOUS 2: ABNORMAL
MONOCYTES # BLD: 8 %
MONOCYTES ABSOLUTE: 0.7 THOU/MM3 (ref 0.4–1.3)
NITRITE, URINE: NEGATIVE
NUCLEATED RED BLOOD CELLS: 0 /100 WBC
OSMOLALITY CALCULATION: 282.8 MOSMOL/KG (ref 275–300)
PH UA: 5 (ref 5–9)
PLATELET # BLD: 257 THOU/MM3 (ref 130–400)
PMV BLD AUTO: 9.3 FL (ref 9.4–12.4)
POTASSIUM REFLEX MAGNESIUM: 4 MEQ/L (ref 3.5–5.2)
PROTEIN UA: 30
RBC # BLD: 4.1 MILL/MM3 (ref 4.7–6.1)
RBC URINE: > 100 /HPF
RENAL EPITHELIAL, UA: ABNORMAL
SEG NEUTROPHILS: 59.6 %
SEGMENTED NEUTROPHILS ABSOLUTE COUNT: 5.2 THOU/MM3 (ref 1.8–7.7)
SODIUM BLD-SCNC: 140 MEQ/L (ref 135–145)
SPECIFIC GRAVITY, URINE: > 1.03 (ref 1–1.03)
UROBILINOGEN, URINE: 1 EU/DL (ref 0–1)
WBC # BLD: 8.8 THOU/MM3 (ref 4.8–10.8)
WBC UA: ABNORMAL /HPF
YEAST: ABNORMAL

## 2020-11-05 PROCEDURE — 51798 US URINE CAPACITY MEASURE: CPT

## 2020-11-05 PROCEDURE — 85025 COMPLETE CBC W/AUTO DIFF WBC: CPT

## 2020-11-05 PROCEDURE — 2580000003 HC RX 258: Performed by: EMERGENCY MEDICINE

## 2020-11-05 PROCEDURE — 6360000002 HC RX W HCPCS: Performed by: EMERGENCY MEDICINE

## 2020-11-05 PROCEDURE — 99284 EMERGENCY DEPT VISIT MOD MDM: CPT

## 2020-11-05 PROCEDURE — 6370000000 HC RX 637 (ALT 250 FOR IP): Performed by: EMERGENCY MEDICINE

## 2020-11-05 PROCEDURE — 96374 THER/PROPH/DIAG INJ IV PUSH: CPT

## 2020-11-05 PROCEDURE — 74176 CT ABD & PELVIS W/O CONTRAST: CPT

## 2020-11-05 PROCEDURE — 36415 COLL VENOUS BLD VENIPUNCTURE: CPT

## 2020-11-05 PROCEDURE — 93005 ELECTROCARDIOGRAM TRACING: CPT | Performed by: EMERGENCY MEDICINE

## 2020-11-05 PROCEDURE — 80048 BASIC METABOLIC PNL TOTAL CA: CPT

## 2020-11-05 PROCEDURE — 81001 URINALYSIS AUTO W/SCOPE: CPT

## 2020-11-05 RX ORDER — DIPHENHYDRAMINE HYDROCHLORIDE 50 MG/ML
50 INJECTION INTRAMUSCULAR; INTRAVENOUS ONCE
Status: COMPLETED | OUTPATIENT
Start: 2020-11-05 | End: 2020-11-05

## 2020-11-05 RX ORDER — 0.9 % SODIUM CHLORIDE 0.9 %
1000 INTRAVENOUS SOLUTION INTRAVENOUS ONCE
Status: COMPLETED | OUTPATIENT
Start: 2020-11-05 | End: 2020-11-05

## 2020-11-05 RX ORDER — PROMETHAZINE HYDROCHLORIDE 25 MG/ML
25 INJECTION, SOLUTION INTRAMUSCULAR; INTRAVENOUS ONCE
Status: DISCONTINUED | OUTPATIENT
Start: 2020-11-05 | End: 2020-11-06 | Stop reason: HOSPADM

## 2020-11-05 RX ORDER — IBUPROFEN 200 MG
400 TABLET ORAL ONCE
Status: COMPLETED | OUTPATIENT
Start: 2020-11-05 | End: 2020-11-05

## 2020-11-05 RX ORDER — ONDANSETRON 2 MG/ML
4 INJECTION INTRAMUSCULAR; INTRAVENOUS ONCE
Status: COMPLETED | OUTPATIENT
Start: 2020-11-05 | End: 2020-11-05

## 2020-11-05 RX ADMIN — ONDANSETRON 4 MG: 2 INJECTION INTRAMUSCULAR; INTRAVENOUS at 20:37

## 2020-11-05 RX ADMIN — LIDOCAINE HYDROCHLORIDE 115.6 MG: 20 INJECTION INTRAVENOUS at 21:07

## 2020-11-05 RX ADMIN — SODIUM CHLORIDE 1000 ML: 9 INJECTION, SOLUTION INTRAVENOUS at 20:38

## 2020-11-05 RX ADMIN — IBUPROFEN 400 MG: 200 TABLET, FILM COATED ORAL at 20:37

## 2020-11-05 RX ADMIN — DIPHENHYDRAMINE HYDROCHLORIDE 50 MG: 50 INJECTION, SOLUTION INTRAMUSCULAR; INTRAVENOUS at 20:37

## 2020-11-05 ASSESSMENT — PAIN SCALES - GENERAL
PAINLEVEL_OUTOF10: 10

## 2020-11-05 ASSESSMENT — ENCOUNTER SYMPTOMS
CONSTIPATION: 0
VOMITING: 0
ABDOMINAL PAIN: 0
DIARRHEA: 0
NAUSEA: 0
SHORTNESS OF BREATH: 0
RHINORRHEA: 0
COUGH: 0
SORE THROAT: 0

## 2020-11-05 ASSESSMENT — PAIN DESCRIPTION - ORIENTATION
ORIENTATION: RIGHT
ORIENTATION: RIGHT

## 2020-11-05 ASSESSMENT — PAIN DESCRIPTION - LOCATION
LOCATION: FLANK
LOCATION: FLANK

## 2020-11-05 ASSESSMENT — PAIN DESCRIPTION - PAIN TYPE
TYPE: ACUTE PAIN
TYPE: ACUTE PAIN

## 2020-11-05 NOTE — TELEPHONE ENCOUNTER
Patient called stating [facility] will not let him fill Lyrica until 11/12/20. Per patient he called his insurance and they will pay for early fill. Per patient he left his meds at the hospital, went back to get them and they were gone so that is why he is out. He had 60 capsules filled on 10/12/20. Shefali Cat CNP spoke with patient was informed can only get Lyrica filled every 30 days and this is the last time will do an early fill, patient voiced understanding. Per Shefali Cat call [facility] and let them know okay to fill Lyrica. Pharmacist Aron Aly informed okay to fill Lyrica.

## 2020-11-06 PROCEDURE — 93010 ELECTROCARDIOGRAM REPORT: CPT | Performed by: INTERNAL MEDICINE

## 2020-11-06 NOTE — ED NOTES
Pt transferred to room 38 in stable condition. Pt acting outrageous and cussing at staff. Pt quick to apologize to staff after acting rashfully. Vitals remain stable. Call light in reach.  Will continue to monitor      Sebastian Valle RN  11/05/20 4303

## 2020-11-06 NOTE — TELEPHONE ENCOUNTER
Patient eloped from the ER and would like you to review the ct scan results. When would you like to see him in the office? Please advise. Thank you.

## 2020-11-06 NOTE — ED NOTES
Pt cussing at staff and states that he \"feels numb all over\". This RN explained the effects of the lidocaine medication. Pt stated he wanted the medication stopped. Medication stopped prior to receiving whole dose. Vitals remain stable. Call light in reach.  Will continue to monitor     Yesika Hung RN  11/05/20 1613

## 2020-11-06 NOTE — ED NOTES
Pt still acting hostile toward staff. Pt is yelling at staff and cussing. Vitals remain stable. Call light in reach.  Will continue to monitor     Reva Nicole RN  11/05/20 3472

## 2020-11-06 NOTE — ED NOTES
Patient transported to Radiology department via [facility] in stable condition.      Amanuel Davis RN  11/05/20 4165

## 2020-11-06 NOTE — ED PROVIDER NOTES
Maryann [address] COMPLAINT       Chief Complaint   Patient presents with    Dysuria    Flank Pain       Nurses Notes reviewed and I agree except as noted in the HPI. HISTORY OF PRESENT ILLNESS    Jackson Bearden is a 38 y.o. male who presents the emergency department stating that \"I got a stone and I was supposed to have surgery on it and it dropped to my bladder and it was a big one and so now I cannot pee\". Patient reports that he urinated this morning and some blood in his urine and that he has been \"drinking a lot of stuff\". He reports that he talked to Juan Jose Hardy with urology, states that Jose Thompson called me earlier and told me to come here to the ER\". He reports nausea and that he vomited once today. No fever. He states that he was set up for surgery twice for his kidney stones. The first time he states \"I was stubborn and the nurse was being a bitch and I said I will just leave cause I thought it was okay and then I got pain again. I was supposed to get surgery and the stone was up in my kidney so I came to the ER and they said they do not know why I was in pain because the stone was in my kidney. Then I went back to see the urologist and the stone was not there anymore, it had moved down to my bladder so they canceled the second surgery\". He reports that he has been passing blood in his urine for the past 5 days. He reports he did have a catheter in his last ER visit but did not have to leave with the catheter in place. No other initial complaints or concerns. REVIEW OF SYSTEMS     Review of Systems   Constitutional: Negative for diaphoresis and fever. HENT: Negative for congestion, rhinorrhea and sore throat. Eyes: Negative for visual disturbance. Respiratory: Negative for cough and shortness of breath. Cardiovascular: Negative for chest pain, palpitations and leg swelling.    Gastrointestinal: Negative for abdominal pain, constipation, diarrhea, nausea and vomiting. Endocrine: Negative for polyuria. Genitourinary: Positive for decreased urine volume, flank pain and hematuria. Negative for dysuria. Musculoskeletal: Negative for joint swelling. Skin: Negative for rash. Neurological: Negative for dizziness, seizures, syncope, speech difficulty, weakness, numbness and headaches. Hematological: Negative for adenopathy. Psychiatric/Behavioral: Negative for confusion and self-injury. All other systems reviewed and are negative. PAST MEDICAL HISTORY    has a past medical history of Anxiety, Depression, Kidney stone, Liver disease, and Opiate abuse, continuous ([facility] [address] SURGICAL HISTORY      has no past surgical history on file. CURRENT MEDICATIONS       Discharge Medication List as of 11/5/2020  9:55 PM      CONTINUE these medications which have NOT CHANGED    Details   venlafaxine (EFFEXOR) 100 MG tablet Take 1 tablet by mouth 3 times daily, Disp-90 tablet,R-1Normal      pregabalin (LYRICA) 200 MG capsule Take 1 capsule by mouth 3 times daily for 30 days. , Disp-90 capsule,R-0Normal      lisinopril (PRINIVIL;ZESTRIL) 20 MG tablet Take 1 tablet by mouth daily, Disp-30 tablet,R-1Normal      mirtazapine (REMERON) 45 MG tablet Take 1 tablet by mouth nightly, Disp-30 tablet,R-1Normal      oxybutynin (DITROPAN) 5 MG tablet Take 1 tablet by mouth 3 times daily as needed (flank pain), Disp-15 tablet,R-0Print      ondansetron (ZOFRAN ODT) 4 MG disintegrating tablet Take 1 tablet by mouth every 8 hours as needed for Nausea or Vomiting, Disp-15 tablet,R-0Normal      tamsulosin (FLOMAX) 0.4 MG capsule Take 1 capsule by mouth daily for 7 days, Disp-7 capsule,R-0Print      venlafaxine (EFFEXOR XR) 150 MG extended release capsule Take 1 capsule by mouth 2 times daily, Disp-60 capsule,R-0Normal      naloxone 4 MG/0.1ML LIQD nasal spray 1 spray by Nasal route as needed for Opioid Reversal, Disp-1 each,R-5Normal neomycin-bacitracin-polymyxin (NEOSPORIN) 3.5-400-5000 OINT ointment Apply topically 4 times daily. , Disp-1 Tube,R-1, Normal      methadone (DOLOPHINE) 10 MG/ML solution Take 70 mg by mouth daily. Dispensed at [address]     has No Known Allergies. FAMILY HISTORY     has no family status information on file. family history is not on file. SOCIAL HISTORY      reports that he has quit smoking. His smoking use included cigarettes. He smoked 0.50 packs per day. He has never used smokeless tobacco. He reports current alcohol use. He reports current drug use. Drugs: Opiates , IV, Cocaine, Marijuana, and Methamphetamines. PHYSICAL EXAM     INITIAL VITALS:  height is 5' 10\" (1.778 m) and weight is 170 lb (77.1 kg). His oral temperature is 97.5 °F (36.4 °C). His blood pressure is 114/73 and his pulse is 105. His respiration is 22 and oxygen saturation is 98%. CONSTITUTIONAL: [Awake, alert, non toxic, well developed, well nourished, no acute distress, patient does display some psychomotor agitation while in the room, he is able to be verbally redirected.]  HEAD: [Normocephalic, atraumatic]  EYES: [Pupils equal, round & reactive to light, extraocular movements intact, no nystagmus, clear conjunctiva, non-icteric sclera]  ENT: [External ear canal clear without evidence of cerumen impaction or foreign body, TM's clear without erythema or bulging. Nares patent without drainage, septum appears midline. Moist mucus membranes, oropharynx clear without exudate, erythema, or mass. Uvula midline]  NECK: [Nontender and supple. No meningismus, no appreciated lymphadenopathy. Intact full range of motion. C-spine midline without vertebral tenderness. Trachea midline.]  CHEST: [Inspection normal, no lesions, equal rise. No crepitus or tenderness upon palpation.]  CARDIOVASCULAR: [Regular rate, rhythm, normal S1 and S2. No appreciated murmurs, rubs, or gallops.  No pulse deficits appreciated. Intact distal perfusion. JVD not appreciated.]  PULMONARY: [Respiratory distress absent. Respiratory effort normal. Breath sounds clear to auscultation without rhonchi, rales, or wheezing. No accessory muscle use. No stridor]  ABDOMEN: [Inspection normal, without surgical scars. Soft, non-tender, non-distended, with normoactive bowel sounds. No palpable masses, rebound, or guarding]  BACK: [Intact ROM. No midline vertebral tenderness, step off, or crepitus. No CVA tenderness.]  MUSCULOSKELETAL: [Extremities nontender to palpation. No gross deformity or evidence of external trauma. Intact range of motion. Sensation intact. No clubbing, cyanosis, or edema.]  SKIN: [Warm, dry. No jaundice, rash, urticaria, or petechiae. There are multiple crusted wounds over the extremities in various stages of healing. No signs of abscess or cellulitis. Patient does appear to have possible track marks in the antecubital area with no overt signs of abscess or cellulitis.]  NEUROLOGIC: [Alert and oriented x 3, GCS 15, normal mentation for age. Moves all four extremities. No gross sensory deficit. Cerebellar function grossly normal.]      DIFFERENTIAL DIAGNOSIS:   Withdrawal from substance, ?dehydration, ?urinary retention, ? UTI, and others    DIAGNOSTIC RESULTS       RADIOLOGY: non-plain film images(s) such as CT,Ultrasound and MRI are read by the radiologist.    CT ABDOMEN PELVIS WO CONTRAST Additional Contrast? None   Final Result   1. Stable nonobstructing bilateral renal stones. No ureteral calculi or    hydronephrosis. 2.  Appendix is normal.  No other acute findings. This document has been electronically signed by: Gallito Crowder MD on    11/05/2020 09:48 PM      All CT scans at this facility use dose modulation, iterative    reconstruction, and/or weight-based   dosing when appropriate to reduce radiation dose to as low as reasonably    achievable.            [] Visualized and interpreted by me [x] Radiologist's Wet Read Report Reviewed   [] Discussed withRadiologist.    LABS:   Labs Reviewed   CBC WITH AUTO DIFFERENTIAL - Abnormal; Notable for the following components:       Result Value    RBC 4.10 (*)     Hemoglobin 11.5 (*)     Hematocrit 36.3 (*)     MCHC 31.7 (*)     RDW-CV 15.3 (*)     RDW-SD 50.4 (*)     MPV 9.3 (*)     All other components within normal limits   BASIC METABOLIC PANEL W/ REFLEX TO MG FOR LOW K - Abnormal; Notable for the following components:    Glucose 113 (*)     CREATININE 1.6 (*)     All other components within normal limits   URINE WITH REFLEXED MICRO - Abnormal; Notable for the following components:    Ketones, Urine TRACE (*)     Specific Gravity, Urine > 1.030 (*)     Blood, Urine LARGE (*)     Protein, UA 30 (*)     Leukocyte Esterase, Urine SMALL (*)     Color, UA DK YELLOW (*)     Character, Urine TURBID (*)     All other components within normal limits   GLOMERULAR FILTRATION RATE, ESTIMATED - Abnormal; Notable for the following components:    Est, Glom Filt Rate 49 (*)     All other components within normal limits   ANION GAP   OSMOLALITY       EMERGENCY DEPARTMENT COURSE:   Vitals:    Vitals:    11/05/20 1928 11/05/20 1938 11/05/20 2032   BP: 112/74 117/84 114/73   Pulse: 110 105 105   Resp: 18 18 22   Temp: 97.5 °F (36.4 °C)     TempSrc: Oral     SpO2: 98% 99% 98%   Weight:  170 lb (77.1 kg)    Height:  5' 10\" (1.778 m)      Patient was reportedly cursing at staff upon his arrival in the emergency department. Old records were reviewed, patient has history of threatening staff with going out on the street to abuse illegal drugs if he does not receive the medications that he specifically requests while in the emergency department. His documented history of polysubstance abuse. Old records show a phone discussion with Leny Miller this afternoon. Patient at that time was reporting pain and rambling in his speech.   He was reporting hematuria difficulty urinating and difficulty moving his bowels. Phone conversation states that patient told urology that he was going back to the ER today because he wants his stone removed however documentation from urology states that his scheduled surgery was canceled as the patient has already passed his stone on 11/2/2020. Also recently had a phone conversation involving his Lyrica prescription as he was requesting for it to be filled early, stating that he had left his medications at the hospital and his meds were gone. This was the last time that pharmacy was going to do an early fill for him for his Lyrica. Patient continued to ask for additional pain medications. Due to past history and documentation in chart that patient has already passed his stones, non-narcotic medications were offered to patient. Bladder scan shows approximately 50cc of urine. Labs were reassuring, patient does have hematuria on UA and my initial recommendation to patient was that he will need to follow up with his urologist due to his hematuria. Patient ultimately stated that he wished to leave just before his CT reading was obtained and left before receiving his discharge paperwork with his female . CRITICAL CARE:   None    CONSULTS:  None    PROCEDURES:  None    FINAL IMPRESSION      1. Hematuria, unspecified type          DISPOSITION/PLAN   Discharge    PATIENT REFERRED TO:  Omid Reyes, APRN - CNP  [address]  [address] [facility].  [address]  [facility]  [phone]    Schedule an appointment as soon as possible for a visit       Guillaume Luis MD  [address] [address]    Schedule an appointment as soon as possible for a visit         DISCHARGE MEDICATIONS:  Discharge Medication List as of 11/5/2020  9:55 PM          (Please note that portions of this note were completed with a voice recognition program.  Efforts were made to edit the dictations but occasionally words are mis-transcribed.)    Provider:  I personally performed the services described in the documentation, reviewed and edited the documentation which was dictated, and it accurately records my words and actions.     Ronald Arias MD 11/5/20 1:57 AM                Ronald Arias MD  11/06/20 0200

## 2020-11-08 ENCOUNTER — TELEPHONE (OUTPATIENT)
Dept: UROLOGY | Age: 35
End: 2020-11-08

## 2020-11-09 NOTE — TELEPHONE ENCOUNTER
Attempted to call the patient. The home number has calling restrictions. Attempted to the mobile number. It has been changed or disconnected.

## 2020-11-25 ENCOUNTER — HOSPITAL ENCOUNTER (INPATIENT)
Age: 35
LOS: 1 days | Discharge: LEFT BEFORE DISCHARGE | DRG: 770 | End: 2020-11-26
Attending: FAMILY MEDICINE | Admitting: INTERNAL MEDICINE
Payer: MEDICARE

## 2020-11-25 LAB
ACETAMINOPHEN LEVEL: 5.5 UG/ML (ref 0–20)
ALBUMIN SERPL-MCNC: 4.6 G/DL (ref 3.5–5.1)
ALP BLD-CCNC: 99 U/L (ref 38–126)
ALT SERPL-CCNC: 48 U/L (ref 11–66)
AMPHETAMINE+METHAMPHETAMINE URINE SCREEN: POSITIVE
ANION GAP SERPL CALCULATED.3IONS-SCNC: 18 MEQ/L (ref 8–16)
AST SERPL-CCNC: 110 U/L (ref 5–40)
BACTERIA: ABNORMAL
BARBITURATE QUANTITATIVE URINE: NEGATIVE
BASOPHILS # BLD: 0.5 %
BASOPHILS ABSOLUTE: 0.1 THOU/MM3 (ref 0–0.1)
BENZODIAZEPINE QUANTITATIVE URINE: NEGATIVE
BILIRUB SERPL-MCNC: 0.3 MG/DL (ref 0.3–1.2)
BILIRUBIN URINE: NEGATIVE
BLOOD, URINE: ABNORMAL
BUN BLDV-MCNC: 25 MG/DL (ref 7–22)
CALCIUM IONIZED: 1.15 MMOL/L (ref 1.12–1.32)
CALCIUM SERPL-MCNC: 10.5 MG/DL (ref 8.5–10.5)
CANNABINOID QUANTITATIVE URINE: NEGATIVE
CASTS: ABNORMAL /LPF
CASTS: ABNORMAL /LPF
CHARACTER, URINE: CLEAR
CHLORIDE BLD-SCNC: 104 MEQ/L (ref 98–111)
CO2: 23 MEQ/L (ref 23–33)
COCAINE METABOLITE QUANTITATIVE URINE: NEGATIVE
COLOR: YELLOW
CREAT SERPL-MCNC: 1.3 MG/DL (ref 0.4–1.2)
CRYSTALS: ABNORMAL
EKG ATRIAL RATE: 131 BPM
EKG P AXIS: 82 DEGREES
EKG P-R INTERVAL: 158 MS
EKG Q-T INTERVAL: 288 MS
EKG QRS DURATION: 88 MS
EKG QTC CALCULATION (BAZETT): 425 MS
EKG R AXIS: 52 DEGREES
EKG T AXIS: 63 DEGREES
EKG VENTRICULAR RATE: 131 BPM
EOSINOPHIL # BLD: 2.2 %
EOSINOPHILS ABSOLUTE: 0.4 THOU/MM3 (ref 0–0.4)
EPITHELIAL CELLS, UA: ABNORMAL /HPF
ERYTHROCYTE [DISTWIDTH] IN BLOOD BY AUTOMATED COUNT: 14.7 % (ref 11.5–14.5)
ERYTHROCYTE [DISTWIDTH] IN BLOOD BY AUTOMATED COUNT: 45.2 FL (ref 35–45)
ETHYL ALCOHOL, SERUM: < 0.01 %
GFR SERPL CREATININE-BSD FRML MDRD: 62 ML/MIN/1.73M2
GLUCOSE BLD-MCNC: 77 MG/DL (ref 70–108)
GLUCOSE, URINE: NEGATIVE MG/DL
HCT VFR BLD CALC: 38.1 % (ref 42–52)
HEMOGLOBIN: 12.4 GM/DL (ref 14–18)
IMMATURE GRANS (ABS): 0.08 THOU/MM3 (ref 0–0.07)
IMMATURE GRANULOCYTES: 0.5 %
KETONES, URINE: ABNORMAL
LEUKOCYTE ESTERASE, URINE: NEGATIVE
LYMPHOCYTES # BLD: 17.7 %
LYMPHOCYTES ABSOLUTE: 3.1 THOU/MM3 (ref 1–4.8)
MAGNESIUM: 2.2 MG/DL (ref 1.6–2.4)
MCH RBC QN AUTO: 27.7 PG (ref 26–33)
MCHC RBC AUTO-ENTMCNC: 32.5 GM/DL (ref 32.2–35.5)
MCV RBC AUTO: 85 FL (ref 80–94)
MISCELLANEOUS LAB TEST RESULT: ABNORMAL
MONOCYTES # BLD: 8.5 %
MONOCYTES ABSOLUTE: 1.5 THOU/MM3 (ref 0.4–1.3)
MYOGLOBIN URINE: POSITIVE
NITRITE, URINE: NEGATIVE
NUCLEATED RED BLOOD CELLS: 0 /100 WBC
OPIATES, URINE: POSITIVE
OSMOLALITY CALCULATION: 291.9 MOSMOL/KG (ref 275–300)
OXYCODONE: NEGATIVE
PH UA: 6.5 (ref 5–9)
PHENCYCLIDINE QUANTITATIVE URINE: NEGATIVE
PLATELET # BLD: 403 THOU/MM3 (ref 130–400)
PMV BLD AUTO: 8.8 FL (ref 9.4–12.4)
POTASSIUM SERPL-SCNC: 4.2 MEQ/L (ref 3.5–5.2)
PROTEIN UA: NEGATIVE MG/DL
RBC # BLD: 4.48 MILL/MM3 (ref 4.7–6.1)
RBC URINE: ABNORMAL /HPF
RENAL EPITHELIAL, UA: ABNORMAL
SALICYLATE, SERUM: 5.4 MG/DL (ref 2–10)
SEG NEUTROPHILS: 70.6 %
SEGMENTED NEUTROPHILS ABSOLUTE COUNT: 12.2 THOU/MM3 (ref 1.8–7.7)
SODIUM BLD-SCNC: 145 MEQ/L (ref 135–145)
SPECIFIC GRAVITY UA: 1.02 (ref 1–1.03)
TOTAL CK: 5691 U/L (ref 55–170)
TOTAL PROTEIN: 8.3 G/DL (ref 6.1–8)
TSH SERPL DL<=0.05 MIU/L-ACNC: 3.58 UIU/ML (ref 0.4–4.2)
UROBILINOGEN, URINE: 0.2 EU/DL (ref 0–1)
WBC # BLD: 17.3 THOU/MM3 (ref 4.8–10.8)
WBC UA: ABNORMAL /HPF
YEAST: ABNORMAL

## 2020-11-25 PROCEDURE — 93005 ELECTROCARDIOGRAM TRACING: CPT | Performed by: NURSE PRACTITIONER

## 2020-11-25 PROCEDURE — 96376 TX/PRO/DX INJ SAME DRUG ADON: CPT

## 2020-11-25 PROCEDURE — 83874 ASSAY OF MYOGLOBIN: CPT

## 2020-11-25 PROCEDURE — 85025 COMPLETE CBC W/AUTO DIFF WBC: CPT

## 2020-11-25 PROCEDURE — 2580000003 HC RX 258: Performed by: STUDENT IN AN ORGANIZED HEALTH CARE EDUCATION/TRAINING PROGRAM

## 2020-11-25 PROCEDURE — 87040 BLOOD CULTURE FOR BACTERIA: CPT

## 2020-11-25 PROCEDURE — 87147 CULTURE TYPE IMMUNOLOGIC: CPT

## 2020-11-25 PROCEDURE — 2060000000 HC ICU INTERMEDIATE R&B

## 2020-11-25 PROCEDURE — 84443 ASSAY THYROID STIM HORMONE: CPT

## 2020-11-25 PROCEDURE — 82330 ASSAY OF CALCIUM: CPT

## 2020-11-25 PROCEDURE — 82550 ASSAY OF CK (CPK): CPT

## 2020-11-25 PROCEDURE — 83735 ASSAY OF MAGNESIUM: CPT

## 2020-11-25 PROCEDURE — 96372 THER/PROPH/DIAG INJ SC/IM: CPT

## 2020-11-25 PROCEDURE — 80053 COMPREHEN METABOLIC PANEL: CPT

## 2020-11-25 PROCEDURE — 2580000003 HC RX 258: Performed by: NURSE PRACTITIONER

## 2020-11-25 PROCEDURE — 81001 URINALYSIS AUTO W/SCOPE: CPT

## 2020-11-25 PROCEDURE — 99285 EMERGENCY DEPT VISIT HI MDM: CPT

## 2020-11-25 PROCEDURE — 99223 1ST HOSP IP/OBS HIGH 75: CPT | Performed by: FAMILY MEDICINE

## 2020-11-25 PROCEDURE — G0480 DRUG TEST DEF 1-7 CLASSES: HCPCS

## 2020-11-25 PROCEDURE — 96374 THER/PROPH/DIAG INJ IV PUSH: CPT

## 2020-11-25 PROCEDURE — 6360000002 HC RX W HCPCS: Performed by: NURSE PRACTITIONER

## 2020-11-25 PROCEDURE — 51798 US URINE CAPACITY MEASURE: CPT

## 2020-11-25 PROCEDURE — 96375 TX/PRO/DX INJ NEW DRUG ADDON: CPT

## 2020-11-25 PROCEDURE — 87500 VANOMYCIN DNA AMP PROBE: CPT

## 2020-11-25 PROCEDURE — 80307 DRUG TEST PRSMV CHEM ANLYZR: CPT

## 2020-11-25 PROCEDURE — 87081 CULTURE SCREEN ONLY: CPT

## 2020-11-25 PROCEDURE — 36415 COLL VENOUS BLD VENIPUNCTURE: CPT

## 2020-11-25 RX ORDER — LORAZEPAM 2 MG/ML
1 INJECTION INTRAMUSCULAR
Status: DISCONTINUED | OUTPATIENT
Start: 2020-11-25 | End: 2020-11-25

## 2020-11-25 RX ORDER — 0.9 % SODIUM CHLORIDE 0.9 %
1000 INTRAVENOUS SOLUTION INTRAVENOUS ONCE
Status: COMPLETED | OUTPATIENT
Start: 2020-11-25 | End: 2020-11-25

## 2020-11-25 RX ORDER — NALOXONE HYDROCHLORIDE 0.4 MG/ML
0.4 INJECTION, SOLUTION INTRAMUSCULAR; INTRAVENOUS; SUBCUTANEOUS ONCE
Status: COMPLETED | OUTPATIENT
Start: 2020-11-25 | End: 2020-11-25

## 2020-11-25 RX ORDER — LORAZEPAM 2 MG/ML
1 INJECTION INTRAMUSCULAR EVERY 6 HOURS PRN
Status: DISCONTINUED | OUTPATIENT
Start: 2020-11-25 | End: 2020-11-26

## 2020-11-25 RX ORDER — SODIUM CHLORIDE 0.9 % (FLUSH) 0.9 %
10 SYRINGE (ML) INJECTION PRN
Status: DISCONTINUED | OUTPATIENT
Start: 2020-11-25 | End: 2020-11-26 | Stop reason: HOSPADM

## 2020-11-25 RX ORDER — SODIUM CHLORIDE, SODIUM LACTATE, POTASSIUM CHLORIDE, CALCIUM CHLORIDE 600; 310; 30; 20 MG/100ML; MG/100ML; MG/100ML; MG/100ML
INJECTION, SOLUTION INTRAVENOUS ONCE
Status: COMPLETED | OUTPATIENT
Start: 2020-11-25 | End: 2020-11-25

## 2020-11-25 RX ORDER — NALOXONE HYDROCHLORIDE 0.4 MG/ML
0.4 INJECTION, SOLUTION INTRAMUSCULAR; INTRAVENOUS; SUBCUTANEOUS PRN
Status: DISCONTINUED | OUTPATIENT
Start: 2020-11-25 | End: 2020-11-26 | Stop reason: HOSPADM

## 2020-11-25 RX ORDER — DIPHENHYDRAMINE HYDROCHLORIDE 50 MG/ML
25 INJECTION INTRAMUSCULAR; INTRAVENOUS ONCE
Status: COMPLETED | OUTPATIENT
Start: 2020-11-25 | End: 2020-11-25

## 2020-11-25 RX ORDER — SODIUM CHLORIDE, SODIUM LACTATE, POTASSIUM CHLORIDE, CALCIUM CHLORIDE 600; 310; 30; 20 MG/100ML; MG/100ML; MG/100ML; MG/100ML
1000 INJECTION, SOLUTION INTRAVENOUS ONCE
Status: COMPLETED | OUTPATIENT
Start: 2020-11-25 | End: 2020-11-25

## 2020-11-25 RX ORDER — PROMETHAZINE HYDROCHLORIDE 25 MG/1
12.5 TABLET ORAL EVERY 6 HOURS PRN
Status: DISCONTINUED | OUTPATIENT
Start: 2020-11-25 | End: 2020-11-26 | Stop reason: HOSPADM

## 2020-11-25 RX ORDER — LORAZEPAM 2 MG/ML
2 INJECTION INTRAMUSCULAR ONCE
Status: COMPLETED | OUTPATIENT
Start: 2020-11-25 | End: 2020-11-25

## 2020-11-25 RX ORDER — HALOPERIDOL 5 MG/ML
5 INJECTION INTRAMUSCULAR ONCE
Status: COMPLETED | OUTPATIENT
Start: 2020-11-25 | End: 2020-11-25

## 2020-11-25 RX ORDER — ONDANSETRON 2 MG/ML
4 INJECTION INTRAMUSCULAR; INTRAVENOUS EVERY 6 HOURS PRN
Status: DISCONTINUED | OUTPATIENT
Start: 2020-11-25 | End: 2020-11-26 | Stop reason: HOSPADM

## 2020-11-25 RX ORDER — SODIUM CHLORIDE 9 MG/ML
INJECTION, SOLUTION INTRAVENOUS CONTINUOUS
Status: DISCONTINUED | OUTPATIENT
Start: 2020-11-25 | End: 2020-11-26 | Stop reason: HOSPADM

## 2020-11-25 RX ORDER — SODIUM CHLORIDE 0.9 % (FLUSH) 0.9 %
10 SYRINGE (ML) INJECTION EVERY 12 HOURS SCHEDULED
Status: DISCONTINUED | OUTPATIENT
Start: 2020-11-25 | End: 2020-11-26 | Stop reason: HOSPADM

## 2020-11-25 RX ADMIN — SODIUM CHLORIDE 1000 ML: 9 INJECTION, SOLUTION INTRAVENOUS at 11:19

## 2020-11-25 RX ADMIN — SODIUM CHLORIDE, POTASSIUM CHLORIDE, SODIUM LACTATE AND CALCIUM CHLORIDE: 600; 310; 30; 20 INJECTION, SOLUTION INTRAVENOUS at 12:15

## 2020-11-25 RX ADMIN — SODIUM CHLORIDE: 9 INJECTION, SOLUTION INTRAVENOUS at 18:05

## 2020-11-25 RX ADMIN — LORAZEPAM 1 MG: 2 INJECTION INTRAMUSCULAR; INTRAVENOUS at 13:16

## 2020-11-25 RX ADMIN — NALOXONE HYDROCHLORIDE 0.4 MG: 0.4 INJECTION, SOLUTION INTRAMUSCULAR; INTRAVENOUS; SUBCUTANEOUS at 13:10

## 2020-11-25 RX ADMIN — LORAZEPAM 2 MG: 2 INJECTION INTRAMUSCULAR; INTRAVENOUS at 11:01

## 2020-11-25 RX ADMIN — SODIUM CHLORIDE, POTASSIUM CHLORIDE, SODIUM LACTATE AND CALCIUM CHLORIDE 1000 ML: 600; 310; 30; 20 INJECTION, SOLUTION INTRAVENOUS at 13:22

## 2020-11-25 RX ADMIN — HALOPERIDOL LACTATE 5 MG: 5 INJECTION, SOLUTION INTRAMUSCULAR at 11:03

## 2020-11-25 RX ADMIN — DIPHENHYDRAMINE HYDROCHLORIDE 25 MG: 50 INJECTION, SOLUTION INTRAMUSCULAR; INTRAVENOUS at 11:01

## 2020-11-25 RX ADMIN — Medication 10 ML: at 23:37

## 2020-11-25 ASSESSMENT — ENCOUNTER SYMPTOMS
VOMITING: 0
NAUSEA: 0
SHORTNESS OF BREATH: 1
ABDOMINAL PAIN: 0
COUGH: 0

## 2020-11-25 NOTE — ED NOTES
ED nurse-to-nurse bedside report    Chief Complaint   Patient presents with    Addiction Problem      LOC: alert to only name  Vital signs   Vitals:    11/25/20 1204 11/25/20 1241 11/25/20 1251 11/25/20 1319   BP: (!) 75/43  (!) 78/38 (!) 92/53   Pulse: 96  82 103   Resp: 15  12 20   Temp:       TempSrc:       SpO2: 97%  97% 97%   Weight:  185 lb (83.9 kg)        Pain:    Pain Interventions: none  Pain Goal: 0  Oxygen: No    Current needs required none   Telemetry: Yes  LDAs:   Peripheral IV 11/25/20 Right Forearm (Active)   Site Assessment Clean;Dry; Intact 11/25/20 1119   Dressing Status Clean;Dry; Intact 11/25/20 1119       Peripheral IV 11/25/20 Left Hand (Active)   Site Assessment Clean;Dry; Intact 11/25/20 1310   Dressing Status Clean;Dry; Intact 11/25/20 1310     Continuous Infusions:    lactated ringers      norepinephrine       Mobility: Requires assistance * 2  Rich Fall Risk Score: No flowsheet data found. Fall Interventions: call light  Report given to:  Yulia Young RN  11/25/20 0176

## 2020-11-25 NOTE — ED NOTES
Hold levophed per verbal order Ursula Brenner NP. Patient alert in bed. Shaking and agitated.       Cathy Lisa RN  11/25/20 [id number]

## 2020-11-25 NOTE — H&P
HISTORY & PHYSICAL       Patient:  Cheryl Mederos  YOB: 1985    MRN: [id number]     Acct: [de-identified]    PCP: PAPA Tinsley - CNP    Date of Admission: 11/25/2020    Date of Service: Pt seen/examined on 11/25/2020 and admitted to Inpatient with expected LOS greater than two midnights due to medical therapy. ASSESSMENT/PLAN:    Polysubstance abuse:  - As per chart review, patient used cocaine, amphetamines and opiates prior to admission  - UDS positive for amphetamines and opiates  - Patient responded to Narcan in the ED  - Narcan PRN on order  - Ativan PRN for agitation  - Psychiatry consult for further recommendations  - Will obtain blood cultures  - Will re-access in 24 hours    Hypotension, resolved:  - Patient noted to be hypotensive in the ED. Responded to IVF boluses and Narcan  - BP stable at this time. Will re-access need for pressors.   - Admit to Step-Down given hypotension in the ED.     Rhabdomyolysis:  - CK elevated, myoglobin positive in urine.   - No electrolyte abnormalities noted on labs  - Will check magnesium and ionized calcium now   - Will continue hydration with IVF NS at 125 cc/hour  - Repeat CK, electrolytes in the AM    SATYA likely secondary to above:  - BUN/Cr 25/1.3  - Hydration as noted above  - Recheck BMP in the AM    Anion gap metabolic acidosis likely secondary to rhabdomyolysis:  - Hydration as noted above  - Repeat BMP in the AM    Leukocytosis, likely reactive:  - WBC elevated on admission  - Will check blood cultures x 2  - Repeat CBC in the AM    Hx of mixed depression and anxiety:  - Hold home meds    Hx of essential HTN:  - Hold home meds given hypotension in the ED    DVT prophylaxis: [x] Lovenox                                 [] SCDs                                 [] SQ Heparin                                 [] Encourage ambulation           [] Already on Anticoagulation        Code Status: Full Code  Disposition: Admit to Step-Down unit    Chief Complaint:  Polysubstance abuse       History Of Present Illness:  History obtained from chart review     38 y.o. male with PMHx significant for opiate abuse, depression and anxiety presented to [facility] for an evaluation of substance abuse disorder. Patient reported that he used cocaine, heroin and methamphetamines prior to coming to the ED. He reported that his mother recently passed away prompting him to take such action. He reported becoming short of breath and anxious and came to the ED for further evaluation. As per the ED provider's note, the patient has been combing his methadone with Lyrica and marijuana. In the ED, the patient was noted to be in acute distress with significant tachycardia and tachypnea. He was noted to be agitated and hyperactive. He was given sedatives for this reason and his BP noted to have dropped. He was then given multiple fluid bolus and Narcan. Labs significant for WBC 17.3, Hgb/Hct 12.4/38.1, BUN/Cr 25/1.3, CK 5,691. UDS positive for amphetamine+methamphetamine and opiates. Urine myoglobin positive. Patient admitted under Hospitalist service for further management. Past Medical History:          Diagnosis Date    Anxiety     Depression     Kidney stone     Liver disease     Hep C    Opiate abuse, continuous ([address]        Past Surgical History:      History reviewed. No pertinent surgical history. Medications Prior to Admission:      Prior to Admission medications    Medication Sig Start Date End Date Taking? Authorizing Provider   pattlafacorinne [facility]) 100 MG tablet Take 1 tablet by mouth 3 times daily 11/2/20   PAPA Avila CNP   pregabalin (LYRICA) 200 MG capsule Take 1 capsule by mouth 3 times daily for 30 days.  11/2/20 12/2/20  PAPA Avila CNP   lisinopril (PRINIVIL;ZESTRIL) 20 MG tablet Take 1 tablet by mouth daily 11/2/20   PAPA Avila CNP   mirtazapine (REMERON) 45 MG tablet Take 1 tablet by mouth nightly 11/2/20   PAPA Damico CNP   oxybutynin (DITROPAN) 5 MG tablet Take 1 tablet by mouth 3 times daily as needed (flank pain) 10/30/20 11/4/20  Donna Aguilar PA-C   ondansetron (ZOFRAN ODT) 4 MG disintegrating tablet Take 1 tablet by mouth every 8 hours as needed for Nausea or Vomiting 10/23/20   PAPA Damico CNP   tamsulosin [facility]) 0.4 MG capsule Take 1 capsule by mouth daily for 7 days  Patient not taking: Reported on 10/13/2020 10/12/20 10/19/20  Grupo [employer]S MD   pregabalin (LYRICA) 150 MG capsule Take 1 capsule by mouth 4 times daily for 30 days. 9/29/20 10/29/20  PAPA Damico CNP   venlafaxine (EFFEXOR XR) 150 MG extended release capsule Take 1 capsule by mouth 2 times daily 9/29/20 10/29/20  PAPA Damico CNP   naloxone 4 MG/0.1ML LIQD nasal spray 1 spray by Nasal route as needed for Opioid Reversal 8/27/20   Kenny Baker MD   neomycin-bacitracin-polymyxin (NEOSPORIN) 3.5-400-5000 OINT ointment Apply topically 4 times daily. 8/27/20   Kenny Baker MD   methadone (DOLOPHINE) 10 MG/ML solution Take 70 mg by mouth daily. Dispensed at [address] [facility] Provider, MD       Allergies:  Patient has no known allergies. Social History:      The patient currently lives unknkown. TOBACCO:   reports that he has quit smoking. His smoking use included cigarettes. He smoked 0.50 packs per day. He has never used smokeless tobacco.  ETOH:   reports current alcohol use. Family History:    Positive as follows:    History reviewed. No pertinent family history. Diet:  No diet orders on file    REVIEW OF SYSTEMS:   Unable to access    PHYSICAL EXAM:    /67   Pulse 60   Temp 98.9 °F (37.2 °C) (Oral)   Resp 20   Wt 185 lb (83.9 kg)   SpO2 97%   BMI 26.54 kg/m²     General appearance:  Not awake, sleeping, appears stated age. Does not respond to his name or open his eyes. HEENT:  Normal cephalic. Multiple abrasions on forehead. Pupils are dilated. Neck: Supple, with full range of motion. No jugular venous distention. Trachea midline. Respiratory:  Normal respiratory effort. Clear to auscultation, bilaterally without Rales/Wheezes/Rhonchi. Cardiovascular:  Regular rate and rhythm with normal S1/S2 without murmurs, rubs or gallops. Abdomen: Soft, non-tender, non-distended with normal bowel sounds. Musculoskeletal:  No LE edema noted. Skin: Multiple abrasions throughout. Track marks seen on right antecubital fossa. Neurologic:  Not awake, not alert. Does not respond to his name. Does respond to painful stimuli    Labs:     Recent Labs     11/25/20  1115   WBC 17.3*   HGB 12.4*   HCT 38.1*   *     Recent Labs     11/25/20  1115      K 4.2      CO2 23   BUN 25*   CREATININE 1.3*   CALCIUM 10.5     Recent Labs     11/25/20  1115   *   ALT 48   BILITOT 0.3   ALKPHOS 99     No results for input(s): INR in the last 72 hours. Recent Labs     11/25/20  1115   CKTOTAL 5,691*       Urinalysis:      Lab Results   Component Value Date    NITRU NEGATIVE 11/25/2020    WBCUA 0-2 11/25/2020    BACTERIA NONE SEEN 11/25/2020    RBCUA 10-15 11/25/2020    BLOODU SMALL 11/25/2020    SPECGRAV 1.016 11/25/2020    GLUCOSEU NEGATIVE 11/05/2020       Intake & Output:  No intake/output data recorded. I/O this shift:  In: -   Out: 500 [Urine:500]      Radiology:   None available       Thank you PAPA Melchor CNP for the opportunity to be involved in this patient's care.     Electronically signed by Rodolfo Blanca MD on 11/25/2020 at 5:33 PM

## 2020-11-25 NOTE — ED TRIAGE NOTES
Presents to ED with c/o drug use. Patient reports taking meth injection with his lyrica today. Patient diaphoretic and unable to sit still. States he took crack a couple days ago.

## 2020-11-25 NOTE — ED NOTES
Notified Mckayla Diaz NP of patients bp of 75/43. Pressure bag started on IV fluids.       Hanh Reyes RN  11/25/20 7588

## 2020-11-25 NOTE — ED NOTES
ED to inpatient nurses report    Chief Complaint   Patient presents with    Addiction Problem      Present to ED from home  LOC: alert to only name  Vital signs   Vitals:    11/25/20 1458 11/25/20 1524 11/25/20 1626 11/25/20 1832   BP: 94/61 90/64 100/67 93/62   Pulse:  77 60 66   Resp:  22 20 10   Temp:       TempSrc:       SpO2:  96% 97% 99%   Weight:          Oxygen Baseline RA    Current needs required RA Bipap/Cpap No  LDAs:   Peripheral IV 11/25/20 Right Forearm (Active)   Site Assessment Clean;Dry; Intact 11/25/20 1832   Dressing Status Clean;Dry; Intact 11/25/20 1119       Peripheral IV 11/25/20 Left Hand (Active)   Site Assessment Clean;Dry; Intact 11/25/20 1832   Dressing Status Clean;Dry; Intact 11/25/20 1310     Mobility: Fully dependent  Pending ED orders: None  Present condition: Stable. Pt responds to pain or stimulation but no alert.       Electronically signed by Mahad Townsend RN on 11/25/2020 at 6:47 PM       Mahad Townsend RN  11/25/20 9314

## 2020-11-25 NOTE — ED NOTES
Patient resting in bed with eyes closed. Respirations easy and unlabored. Updated girlfriend on POC. Will monitor. Patient unresponsive.              Robert Arellano RN  11/25/20 8766

## 2020-11-25 NOTE — ED PROVIDER NOTES
[facility] Emergency Department    CHIEF COMPLAINT       Chief Complaint   Patient presents with    Addiction Problem       Nurses Notes reviewed and I agree except as noted in the HPI. HISTORY OF PRESENT ILLNESS    Yaritza Tavarez is a 38 y.o. male with a history of opiate abuse, cocaine abuse, and methamphetamine abuse, depression, and anxiety who presents to the ED for evaluation of substance use disorder. Patient states that he used cocaine, heroine, and methamphetamines today before he came to the ED. He states his mother recently passed away and went on a winters. He became SOB, diaphoretic, and anxious so decided to come to the ED for treatment. Patient states he has been combining his methadone with Lyrica and marijuana. He was previously seen in the ED for substance abuse with associated rhabdomyolysis and SATYA. History is limited due to agitation. He currently denies chest pain. HPI was provided by the patient. REVIEW OF SYSTEMS     Review of Systems   Constitutional: Positive for diaphoresis. Negative for fever. HENT: Negative for congestion. Respiratory: Positive for shortness of breath. Negative for cough. Cardiovascular: Positive for palpitations. Negative for chest pain. Gastrointestinal: Negative for abdominal pain, nausea and vomiting. Neurological: Positive for tremors. Psychiatric/Behavioral: Positive for agitation, behavioral problems and decreased concentration. The patient is nervous/anxious and is hyperactive. PAST MEDICAL HISTORY     Past Medical History:   Diagnosis Date    Anxiety     Depression     Kidney stone     Liver disease     Hep C    Opiate abuse, continuous (HCC)        SURGICALHISTORY      has no past surgical history on file. CURRENT MEDICATIONS       Previous Medications    LISINOPRIL (PRINIVIL;ZESTRIL) 20 MG TABLET    Take 1 tablet by mouth daily    METHADONE (DOLOPHINE) 10 MG/ML SOLUTION    Take 70 mg by mouth daily.  Dispensed at GLOMERULAR FILTRATION RATE, ESTIMATED - Abnormal; Notable for the following components:    Est, Glom Filt Rate 62 (*)     All other components within normal limits   TSH WITHOUT REFLEX   ACETAMINOPHEN LEVEL   SALICYLATE LEVEL   ETHANOL   OSMOLALITY   URINE DRUG SCREEN   URINALYSIS WITH MICROSCOPIC       EMERGENCY DEPARTMENT COURSE:   Vitals:    Vitals:    11/25/20 1204 11/25/20 1241 11/25/20 1251 11/25/20 1319   BP: (!) 75/43  (!) 78/38 (!) 92/53   Pulse: 96  82 103   Resp: 15  12 20   Temp:       TempSrc:       SpO2: 97%  97% 97%   Weight:  185 lb (83.9 kg)       MDM    Patient was seen and evaluated in the emergency department, patient appeared to be in acute distress, vital signs were reviewed, significant tachycardia tachypnea were noted. Physical exam was completed, he was agitated he was hyperactive, he was diaphoretic, he was difficult to assess due to this. I seen this patient before in this emergency department, he is in acute renal failure due to rhabdomyolysis. He notes polysubstance abuse today. Because of his agitation he was given sedatives, this was effective, but his blood pressure trended down. He was given multiple IV fluid boluses, minimal improvement was noted. He was given a dose of Narcan, significant provement blood pressure was noted. Signs of withdrawal were noted afterwards. Tremors, goosebumps noted. Patient was given another dose of Ativan. Blood pressure remained stable. Urine was attempted to be obtained via cath, no urine available. CK was noted to be significantly elevated, creatinine and BUN not significantly elevated, leukocytosis noted. Concerning hypotension and lack of urine output. Continue to give patient IV fluids. Think the patient requires admission. Discussed this with the hospitalist service who accepts him for admission. While here in the emergency department patient maintained stable course appropriate for admission.   Medications   LORazepam (ATIVAN) injection 1 mg (1 mg Intravenous Given 11/25/20 1316)   lactated ringers infusion 1,000 mL (has no administration in time range)   norepinephrine (LEVOPHED) 16 mg in dextrose 5% 250 mL infusion (has no administration in time range)   LORazepam (ATIVAN) injection 2 mg (2 mg Intramuscular Given 11/25/20 1101)   haloperidol lactate (HALDOL) injection 5 mg (5 mg Intramuscular Given 11/25/20 1103)   diphenhydrAMINE (BENADRYL) injection 25 mg (25 mg Intramuscular Given 11/25/20 1101)   0.9 % sodium chloride bolus (0 mLs Intravenous Stopped 11/25/20 1219)   lactated ringers infusion ( Intravenous New Bag 11/25/20 1215)   naloxone (NARCAN) injection 0.4 mg (0.4 mg Intravenous Given 11/25/20 1310)       Patient was seenindependently by myself. The patient's final impression and disposition and plan was determined by myself. CRITICAL CARE:   None    CONSULTS:  Jessy Carney PA-C    PROCEDURES:  None    FINAL IMPRESSION     1. Polysubstance abuse (HCC)    2. Elevated CK    3. Decreased urine output    4. Hypotension due to drugs          DISPOSITION/PLAN   Patient admitted to the hospital service    PATIENT REFERREDTO:  No follow-up provider specified. DISCHARGE MEDICATIONS:  New Prescriptions    No medications on file       (Please note that portions of this note were completed with a voice recognition program.  Efforts were made to edit the dictations but occasionally words are mis-transcribed.)    Provider:  I personally performed the services described in the documentation,reviewed and edited the documentation which was dictated to the scribe in my presence, and it accurately records my words and actions.     Jeremiah Perez CNP 11/25/20 3:12 PM    Mai Perez, APRN - CNP       [employer], APRN - CNP  11/25/20 8603

## 2020-11-25 NOTE — ED NOTES
Pt straight cathed. Pt responsive to stimuli but not alert or oriented.        Kelly Ovalle RN  11/25/20 4381

## 2020-11-26 ENCOUNTER — APPOINTMENT (OUTPATIENT)
Dept: GENERAL RADIOLOGY | Age: 35
DRG: 770 | End: 2020-11-26
Payer: MEDICARE

## 2020-11-26 VITALS
RESPIRATION RATE: 16 BRPM | DIASTOLIC BLOOD PRESSURE: 67 MMHG | BODY MASS INDEX: 24.87 KG/M2 | OXYGEN SATURATION: 100 % | SYSTOLIC BLOOD PRESSURE: 111 MMHG | WEIGHT: 173.3 LBS | HEART RATE: 64 BPM | TEMPERATURE: 98 F

## 2020-11-26 PROBLEM — M62.82 NON-TRAUMATIC RHABDOMYOLYSIS: Status: ACTIVE | Noted: 2020-11-26

## 2020-11-26 LAB
ACETAMINOPHEN LEVEL: < 5 UG/ML (ref 0–20)
ALBUMIN SERPL-MCNC: 3.6 G/DL (ref 3.5–5.1)
ALP BLD-CCNC: 77 U/L (ref 38–126)
ALT SERPL-CCNC: 45 U/L (ref 11–66)
ANION GAP SERPL CALCULATED.3IONS-SCNC: 11 MEQ/L (ref 8–16)
AST SERPL-CCNC: 116 U/L (ref 5–40)
BASOPHILS # BLD: 0.6 %
BASOPHILS ABSOLUTE: 0 THOU/MM3 (ref 0–0.1)
BILIRUB SERPL-MCNC: 0.5 MG/DL (ref 0.3–1.2)
BUN BLDV-MCNC: 17 MG/DL (ref 7–22)
CALCIUM IONIZED: 1.21 MMOL/L (ref 1.12–1.32)
CALCIUM SERPL-MCNC: 9.5 MG/DL (ref 8.5–10.5)
CHLORIDE BLD-SCNC: 104 MEQ/L (ref 98–111)
CO2: 25 MEQ/L (ref 23–33)
CREAT SERPL-MCNC: 0.9 MG/DL (ref 0.4–1.2)
EOSINOPHIL # BLD: 5.5 %
EOSINOPHILS ABSOLUTE: 0.4 THOU/MM3 (ref 0–0.4)
ERYTHROCYTE [DISTWIDTH] IN BLOOD BY AUTOMATED COUNT: 14.9 % (ref 11.5–14.5)
ERYTHROCYTE [DISTWIDTH] IN BLOOD BY AUTOMATED COUNT: 48 FL (ref 35–45)
GFR SERPL CREATININE-BSD FRML MDRD: > 90 ML/MIN/1.73M2
GLUCOSE BLD-MCNC: 73 MG/DL (ref 70–108)
HCT VFR BLD CALC: 36.6 % (ref 42–52)
HEMOGLOBIN: 11.7 GM/DL (ref 14–18)
IMMATURE GRANS (ABS): 0.02 THOU/MM3 (ref 0–0.07)
IMMATURE GRANULOCYTES: 0.3 %
LYMPHOCYTES # BLD: 28.1 %
LYMPHOCYTES ABSOLUTE: 2.2 THOU/MM3 (ref 1–4.8)
MCH RBC QN AUTO: 28.1 PG (ref 26–33)
MCHC RBC AUTO-ENTMCNC: 32 GM/DL (ref 32.2–35.5)
MCV RBC AUTO: 88 FL (ref 80–94)
MONOCYTES # BLD: 10.9 %
MONOCYTES ABSOLUTE: 0.9 THOU/MM3 (ref 0.4–1.3)
NUCLEATED RED BLOOD CELLS: 0 /100 WBC
PHOSPHORUS: 3.7 MG/DL (ref 2.4–4.7)
PLATELET # BLD: 329 THOU/MM3 (ref 130–400)
PMV BLD AUTO: 9.1 FL (ref 9.4–12.4)
POTASSIUM REFLEX MAGNESIUM: 3.9 MEQ/L (ref 3.5–5.2)
RBC # BLD: 4.16 MILL/MM3 (ref 4.7–6.1)
SEG NEUTROPHILS: 54.6 %
SEGMENTED NEUTROPHILS ABSOLUTE COUNT: 4.3 THOU/MM3 (ref 1.8–7.7)
SODIUM BLD-SCNC: 140 MEQ/L (ref 135–145)
TOTAL CK: 3961 U/L (ref 55–170)
TOTAL PROTEIN: 6.7 G/DL (ref 6.1–8)
VANCOMYCIN RESISTANT ENTEROCOCCUS: NEGATIVE
WBC # BLD: 7.8 THOU/MM3 (ref 4.8–10.8)

## 2020-11-26 PROCEDURE — 93010 ELECTROCARDIOGRAM REPORT: CPT | Performed by: INTERNAL MEDICINE

## 2020-11-26 PROCEDURE — 6360000002 HC RX W HCPCS: Performed by: STUDENT IN AN ORGANIZED HEALTH CARE EDUCATION/TRAINING PROGRAM

## 2020-11-26 PROCEDURE — 99239 HOSP IP/OBS DSCHRG MGMT >30: CPT | Performed by: INTERNAL MEDICINE

## 2020-11-26 PROCEDURE — 2580000003 HC RX 258: Performed by: STUDENT IN AN ORGANIZED HEALTH CARE EDUCATION/TRAINING PROGRAM

## 2020-11-26 PROCEDURE — 6370000000 HC RX 637 (ALT 250 FOR IP): Performed by: PSYCHIATRY & NEUROLOGY

## 2020-11-26 PROCEDURE — 82550 ASSAY OF CK (CPK): CPT

## 2020-11-26 PROCEDURE — 84100 ASSAY OF PHOSPHORUS: CPT

## 2020-11-26 PROCEDURE — 36415 COLL VENOUS BLD VENIPUNCTURE: CPT

## 2020-11-26 PROCEDURE — 80053 COMPREHEN METABOLIC PANEL: CPT

## 2020-11-26 PROCEDURE — 85025 COMPLETE CBC W/AUTO DIFF WBC: CPT

## 2020-11-26 PROCEDURE — 73502 X-RAY EXAM HIP UNI 2-3 VIEWS: CPT

## 2020-11-26 PROCEDURE — 82330 ASSAY OF CALCIUM: CPT

## 2020-11-26 PROCEDURE — 99253 IP/OBS CNSLTJ NEW/EST LOW 45: CPT | Performed by: PSYCHIATRY & NEUROLOGY

## 2020-11-26 RX ORDER — PHENOBARBITAL 32.4 MG/1
32.4 TABLET ORAL 2 TIMES DAILY
Status: DISCONTINUED | OUTPATIENT
Start: 2020-11-27 | End: 2020-11-26

## 2020-11-26 RX ORDER — HALOPERIDOL 5 MG/ML
10 INJECTION INTRAMUSCULAR EVERY 6 HOURS PRN
Status: DISCONTINUED | OUTPATIENT
Start: 2020-11-26 | End: 2020-11-26 | Stop reason: HOSPADM

## 2020-11-26 RX ORDER — BUPRENORPHINE AND NALOXONE 8; 2 MG/1; MG/1
1 FILM, SOLUBLE BUCCAL; SUBLINGUAL 2 TIMES DAILY
Status: DISCONTINUED | OUTPATIENT
Start: 2020-11-26 | End: 2020-11-26 | Stop reason: HOSPADM

## 2020-11-26 RX ORDER — DIVALPROEX SODIUM 500 MG/1
500 TABLET, EXTENDED RELEASE ORAL DAILY
Status: DISCONTINUED | OUTPATIENT
Start: 2020-11-26 | End: 2020-11-26 | Stop reason: HOSPADM

## 2020-11-26 RX ORDER — PHENOBARBITAL 32.4 MG/1
64.8 TABLET ORAL 2 TIMES DAILY
Status: DISCONTINUED | OUTPATIENT
Start: 2020-11-26 | End: 2020-11-26

## 2020-11-26 RX ORDER — QUETIAPINE FUMARATE 25 MG/1
50 TABLET, FILM COATED ORAL 3 TIMES DAILY
Status: DISCONTINUED | OUTPATIENT
Start: 2020-11-26 | End: 2020-11-26 | Stop reason: HOSPADM

## 2020-11-26 RX ADMIN — SODIUM CHLORIDE: 9 INJECTION, SOLUTION INTRAVENOUS at 10:01

## 2020-11-26 ASSESSMENT — PAIN - FUNCTIONAL ASSESSMENT: PAIN_FUNCTIONAL_ASSESSMENT: ACTIVITIES ARE NOT PREVENTED

## 2020-11-26 ASSESSMENT — PAIN DESCRIPTION - FREQUENCY: FREQUENCY: CONTINUOUS

## 2020-11-26 ASSESSMENT — PAIN SCALES - GENERAL: PAINLEVEL_OUTOF10: 3

## 2020-11-26 ASSESSMENT — PAIN DESCRIPTION - ORIENTATION: ORIENTATION: LOWER

## 2020-11-26 ASSESSMENT — PAIN DESCRIPTION - PROGRESSION: CLINICAL_PROGRESSION: NOT CHANGED

## 2020-11-26 ASSESSMENT — PAIN DESCRIPTION - PAIN TYPE: TYPE: CHRONIC PAIN

## 2020-11-26 ASSESSMENT — PAIN DESCRIPTION - LOCATION: LOCATION: BACK

## 2020-11-26 ASSESSMENT — PAIN DESCRIPTION - ONSET: ONSET: ON-GOING

## 2020-11-26 ASSESSMENT — PAIN DESCRIPTION - DESCRIPTORS: DESCRIPTORS: ACHING

## 2020-11-26 NOTE — PROGRESS NOTES
Pt admitted to  4 from ED and via cart/stretcher. Complaints: None. IV normal saline infusing into the wrist right, condition patent and no redness at a rate of 125 mls/ hour with about 500 mls in the bag still. IV site free of s/s of infection or infiltration. Vital signs obtained. Assessment and data collection initiated. Two nurse skin assessment performed by Ned Slater RN and Nathan Taylor. Oriented to room. Policies and procedures for  explained. All questions answered with no further questions at this time. Fall prevention and safety brochure discussed with patient. Bed alarm on. Call light in reach. Would you like your Primary Care Physician notified?  no   The best day to schedule a follow up Dr appointment is:  Monday a.m.

## 2020-11-26 NOTE — DISCHARGE SUMMARY
Hospitalist Discharge Summary        Patient: Cristóbal Tracy  YOB: 1985  MRN: [id number]   Acct: [de-identified]    Primary Care Physician: PAPA Anna - CNP    Admit date  11/25/2020    Discharge date:  11/26/2020 3:32 PM    Chief Complaint on presentation :-  Shortness of Breath and Anxiety     Discharge Assessment and Plan:-   Polysubstance abuse: History of cocaine, vitamins and opioids. Urine drug tox positive for amphetamines and opioids. Patient received Narcan in the emergency department. Patient has had orders for Narcan as needed Ativan 1 mg every 6 hours as needed as needed as needed as well as psychiatric consult. 11/26/20:  Discontinued ativan. Psychiatry saw patient and placing patient on suboxone, Depakote and Seroquel  to prevent withdrawals and stabilize mood       Rhabdomyolysis: Elevated CK 5691and positive myoglobin in the urine. 11/26/20: CK 3961 we will continue to monitor and trend, continue IVF at 125 ml/hr. Will continue to monitor for hyperkalemia, hypocalcemia, hypophosphatemia, and hyperuricemia      Hypertension: Hold home medications due to hypotension  11/26/20: Patient has remained slightly hypotensive will continue fluids at 125 ml/ hr     Depression/anxiety: Hold all medication until psych evaluates patient  11/26/20: psychiatry saw patient and placed patient on Depakote to stabilize mood      Leukocytosis-resolved: On arrival patient's white blood cell count was 17.3. Reactive in nature. Blood cultures were drawn   11/26/20: WBC 7.8, cultures pending we will continue to monitor     SATYA -resolved: while in the emergency department patient was found to BUN of 25 and a creatinine of 1.3.   Secondary to dehydration  11/26/20: BUN 17, creatinine 0.9     Hypotension-resolved: Noted to be hypotensive in the emergency room department patient was given multiple fluid boluses as well as Narcan and has since resolved    Patient left Against Medical Advise: At [phone] patient asked the nurse if he could leave AMA. Lauren Barrera RN, contacted me and I immediately went into patient's room to speak with patient. I expressed my concern with the patient that I recommend that he stay and get treatment for his rhabdomyolysis. I discussed at length with the patient the possible consequences of rhabdomyolysis, patient stated he has never heard of that condition before. Despite understanding the risk of leaving and stopping current treatment patient was adamant that he was leaving. Patient unhooked his own IV and pulled off telemetry. I obtained AMA papers from nurse and also had nurse call [place] police as well as [facility] Department as directed to do when patient was admitted. Patient signed AMA papers, went into bathroom to change and when patient came out of bathroom [place] police as well as [facility] were waiting for him. Patient was escorted off 4k floor in handcuffs with police. Initial H and P and Hospital course:-  79-year-old male presented to [facility] emergency department for shortness of breath and feeling anxious. Patient has a significant past medical history of opioid abuse, depression and anxiety. Patient reported using cocaine, heroin and methamphetamines prior to come to the emergency department. Patient stated that his mother recently passed away that is what he was using the drugs.     In the emergency room department patient was found to have tachypnea as well as tachycardia with agitation and hyperactivity. Patient was given sedatives which resulted in patient's blood pressure dropping. Patient was given multiple boluses of fluid as well as Narcan. Patient responded nicely to Narcan. Emergency room labs were significant for white blood cell count of 17.3, hemoglobin hematocrit 12.4/38.1, BUN/creatinine 25/1.3, CK 5691. Urine drug screen was positive for amphetamines plus methamphetamines and opioids. Positive urine myoglobin.     Per ED provider note patient has been combining methadone with Lyrica as well as smoking marijuana. Physical Exam:-  Vitals:   Patient Vitals for the past 24 hrs:   BP Temp Temp src Pulse Resp SpO2 Weight   11/26/20 1215 111/67 98 °F (36.7 °C) Oral 64 16 100 % --   11/26/20 0945 109/67 97.9 °F (36.6 °C) Oral 57 12 98 % --   11/26/20 0415 104/63 98.2 °F (36.8 °C) Axillary 66 16 96 % --   11/26/20 0330 -- -- -- -- -- -- 173 lb 4.8 oz (78.6 kg)   11/25/20 2302 (!) 91/54 97.3 °F (36.3 °C) Axillary 74 14 97 % --   11/25/20 2000 (!) 109/49 97.4 °F (36.3 °C) Axillary 70 14 97 % --   11/25/20 1832 93/62 -- -- 66 10 99 % --   11/25/20 1626 100/67 -- -- 60 20 97 % --     Weight:   Weight: 173 lb 4.8 oz (78.6 kg)   24 hour intake/output:     Intake/Output Summary (Last 24 hours) at 11/26/2020 1532  Last data filed at 11/26/2020 1345  Gross per 24 hour   Intake 2805.07 ml   Output 2850 ml   Net -44.93 ml       General appearance: No apparent distress, appears stated age and cooperative. Resting in bed when I walked in   HEENT: Pupils equal, round, and reactive to light. Conjunctivae/corneas clear. Mucus membranes are dry   Neck: Supple, with full range of motion. No jugular venous distention. Trachea midline. Respiratory:  Normal respiratory effort. Clear to auscultation, bilaterally without Rales/Wheezes/Rhonchi. Cardiovascular: Tachycardic rate and regular rhythm with normal S1/S2 without murmurs, rubs or gallops. Abdomen: Soft, non-tender, non-distended with normal bowel sounds. Musculoskeletal: passive and active ROM x 4 extremities. Skin: Skin color, texture, turgor normal.  Multiple excoriations on entire body. Multiple tattoos   Neurologic:  Neurovascularly intact without any focal sensory/motor deficits.  Cranial nerves: II-XII intact, grossly non-focal.  Psychiatric: Alert and oriented, thought content appropriate, normal insight  Capillary Refill: Brisk,< 3 seconds   Peripheral Pulses: +2 palpable, equal bilaterally       Discharge Medications:-      Medication List      ASK your doctor about these medications    lisinopril 20 MG tablet  Commonly known as:  PRINIVIL;ZESTRIL  Take 1 tablet by mouth daily     methadone 10 MG/ML solution  Commonly known as:  DOLOPHINE     mirtazapine 45 MG tablet  Commonly known as:  Remeron  Take 1 tablet by mouth nightly     naloxone 4 MG/0.1ML Liqd nasal spray  1 spray by Nasal route as needed for Opioid Reversal     neomycin-bacitracin-polymyxin 3.5-400-5000 Oint ointment  Apply topically 4 times daily. ondansetron 4 MG disintegrating tablet  Commonly known as:  Zofran ODT  Take 1 tablet by mouth every 8 hours as needed for Nausea or Vomiting     oxybutynin 5 MG tablet  Commonly known as:  DITROPAN  Take 1 tablet by mouth 3 times daily as needed (flank pain)     * pregabalin 150 MG capsule  Commonly known as:  LYRICA  Take 1 capsule by mouth 4 times daily for 30 days. * pregabalin 200 MG capsule  Commonly known as:  LYRICA  Take 1 capsule by mouth 3 times daily for 30 days. tamsulosin 0.4 MG capsule  Commonly known as:  FLOMAX  Take 1 capsule by mouth daily for 7 days     * venlafaxine 150 MG extended release capsule  Commonly known as:  EFFEXOR XR  Take 1 capsule by mouth 2 times daily     * venlafaxine 100 MG tablet  Commonly known as:  EFFEXOR  Take 1 tablet by mouth 3 times daily         * This list has 4 medication(s) that are the same as other medications prescribed for you. Read the directions carefully, and ask your doctor or other care provider to review them with you.                  Labs :-  Recent Results (from the past 72 hour(s))   EKG Altered Mental Status    Collection Time: 11/25/20 11:08 AM   Result Value Ref Range    Ventricular Rate 131 BPM    Atrial Rate 131 BPM    P-R Interval 158 ms    QRS Duration 88 ms    Q-T Interval 288 ms    QTc Calculation (Bazett) 425 ms    P Axis 82 degrees    R Axis 52 degrees    T Axis 63 degrees   CBC auto differential    Collection Time: 11/25/20 11:15 AM   Result Value Ref Range    WBC 17.3 (H) 4.8 - 10.8 thou/mm3    RBC 4.48 (L) 4.70 - 6.10 mill/mm3    Hemoglobin 12.4 (L) 14.0 - 18.0 gm/dl    Hematocrit 38.1 (L) 42.0 - 52.0 %    MCV 85.0 80.0 - 94.0 fL    MCH 27.7 26.0 - 33.0 pg    MCHC 32.5 32.2 - 35.5 gm/dl    RDW-CV 14.7 (H) 11.5 - 14.5 %    RDW-SD 45.2 (H) 35.0 - 45.0 fL    Platelets 266 (H) 477 - 400 thou/mm3    MPV 8.8 (L) 9.4 - 12.4 fL    Seg Neutrophils 70.6 %    Lymphocytes 17.7 %    Monocytes 8.5 %    Eosinophils 2.2 %    Basophils 0.5 %    Immature Granulocytes 0.5 %    Segs Absolute 12.2 (H) 1.8 - 7.7 thou/mm3    Lymphocytes Absolute 3.1 1.0 - 4.8 thou/mm3    Monocytes Absolute 1.5 (H) 0.4 - 1.3 thou/mm3    Eosinophils Absolute 0.4 0.0 - 0.4 thou/mm3    Basophils Absolute 0.1 0.0 - 0.1 thou/mm3    Immature Grans (Abs) 0.08 (H) 0.00 - 0.07 thou/mm3    nRBC 0 /100 wbc   Comprehensive Metabolic Panel    Collection Time: 11/25/20 11:15 AM   Result Value Ref Range    Glucose 77 70 - 108 mg/dL    CREATININE 1.3 (H) 0.4 - 1.2 mg/dL    BUN 25 (H) 7 - 22 mg/dL    Sodium 145 135 - 145 meq/L    Potassium 4.2 3.5 - 5.2 meq/L    Chloride 104 98 - 111 meq/L    CO2 23 23 - 33 meq/L    Calcium 10.5 8.5 - 10.5 mg/dL     (H) 5 - 40 U/L    Alkaline Phosphatase 99 38 - 126 U/L    Total Protein 8.3 (H) 6.1 - 8.0 g/dL    Alb 4.6 3.5 - 5.1 g/dL    Total Bilirubin 0.3 0.3 - 1.2 mg/dL    ALT 48 11 - 66 U/L   TSH without Reflex    Collection Time: 11/25/20 11:15 AM   Result Value Ref Range    TSH 3.580 0.400 - 4.200 uIU/mL   Acetaminophen level    Collection Time: 11/25/20 11:15 AM   Result Value Ref Range    Acetaminophen Level 5.5 0.0 - 39.4 ug/mL   Salicylate Level    Collection Time: 11/25/20 11:15 AM   Result Value Ref Range    Salicylate, Serum 5.4 2.0 - 10.0 mg/dL   Ethanol    Collection Time: 11/25/20 11:15 AM   Result Value Ref Range    ETHYL ALCOHOL, SERUM < 0.01 0.00 %   CK    Collection Time: 11/25/20 11:15 AM   Result Value Ref Range    Total CK 5,691 (H) 55 - 170 U/L   Anion Gap    Collection Time: 11/25/20 11:15 AM   Result Value Ref Range    Anion Gap 18.0 (H) 8.0 - 16.0 meq/L   Osmolality    Collection Time: 11/25/20 11:15 AM   Result Value Ref Range    Osmolality Calc 291.9 275.0 - 300.0 mOsmol/kg   Glomerular Filtration Rate, Estimated    Collection Time: 11/25/20 11:15 AM   Result Value Ref Range    Est, Glom Filt Rate 62 (A) ml/min/1.73m2   Magnesium    Collection Time: 11/25/20 11:15 AM   Result Value Ref Range    Magnesium 2.2 1.6 - 2.4 mg/dL   Urine Drug Screen    Collection Time: 11/25/20  4:20 PM   Result Value Ref Range    AMPHETAMINE+METHAMPHETAMINE URINE SCREEN POSITIVE NEGATIVE    Barbiturate Quant, Ur Negative NEGATIVE    Benzodiazepine Quant, Ur Negative NEGATIVE    Cannabinoid Quant, Ur Negative NEGATIVE    Cocaine Metab Quant, Ur Negative NEGATIVE    Opiates, Urine POSITIVE NEGATIVE    Oxycodone Negative NEGATIVE    PCP Quant, Ur Negative NEGATIVE   Urinalysis with microscopic    Collection Time: 11/25/20  4:20 PM   Result Value Ref Range    Glucose, Urine NEGATIVE NEGATIVE mg/dl    Bilirubin Urine NEGATIVE NEGATIVE    Ketones, Urine TRACE (A) NEGATIVE    Specific Gravity, UA 1.016 1.002 - 1.030    Blood, Urine SMALL (A) NEGATIVE    pH, UA 6.5 5.0 - 9.0    Protein, UA NEGATIVE NEGATIVE mg/dl    Urobilinogen, Urine 0.2 0.0 - 1.0 eu/dl    Nitrite, Urine NEGATIVE NEGATIVE    Leukocyte Esterase, Urine NEGATIVE NEGATIVE    Color, UA YELLOW YELLOW-STRAW    Character, Urine CLEAR [id number]    RBC, UA 10-15 0-2/hpf /hpf    WBC, UA 0-2 0-4/hpf /hpf    Epithelial Cells, UA 0-2 3-5/hpf /hpf    Bacteria, UA NONE SEEN FEW/NONE SEEN    Casts NONE SEEN NONE SEEN /lpf    Crystals NONE SEEN NONE SEEN    Renal Epithelial, UA NONE SEEN NONE SEEN    Yeast, UA NONE SEEN NONE SEEN    Casts NONE SEEN /lpf    Miscellaneous Lab Test Result NONE SEEN    Myoglobin, urine    Collection Time: 11/25/20 4:20 PM   Result Value Ref Range    Myoglobin, Ur POSITIVE (A) NEGATIVE   Calcium, Ionized    Collection Time: 11/25/20  6:19 PM   Result Value Ref Range    Calcium, Ion 1.15 1.12 - 1.32 mmol/L   VRE Screen by PCR    Collection Time: 11/25/20 10:00 PM    Specimen: Rectal Swab   Result Value Ref Range    Vancomycin Resistant Enterococcus NEGATIVE    Acetaminophen Level    Collection Time: 11/25/20 11:11 PM   Result Value Ref Range    Acetaminophen Level < 5.0 0.0 - 20.0 ug/mL   Culture, Blood 1    Collection Time: 11/25/20 11:11 PM    Specimen: Blood   Result Value Ref Range    Blood Culture, Routine No growth-preliminary     Calcium, Ionized    Collection Time: 11/26/20  4:11 AM   Result Value Ref Range    Calcium, Ion 1.21 1.12 - 1.32 mmol/L   Comprehensive Metabolic Panel w/ Reflex to MG    Collection Time: 11/26/20  4:11 AM   Result Value Ref Range    Glucose 73 70 - 108 mg/dL    CREATININE 0.9 0.4 - 1.2 mg/dL    BUN 17 7 - 22 mg/dL    Sodium 140 135 - 145 meq/L    Potassium reflex Magnesium 3.9 3.5 - 5.2 meq/L    Chloride 104 98 - 111 meq/L    CO2 25 23 - 33 meq/L    Calcium 9.5 8.5 - 10.5 mg/dL     (H) 5 - 40 U/L    Alkaline Phosphatase 77 38 - 126 U/L    Total Protein 6.7 6.1 - 8.0 g/dL    Alb 3.6 3.5 - 5.1 g/dL    Total Bilirubin 0.5 0.3 - 1.2 mg/dL    ALT 45 11 - 66 U/L   CBC auto differential    Collection Time: 11/26/20  4:11 AM   Result Value Ref Range    WBC 7.8 4.8 - 10.8 thou/mm3    RBC 4.16 (L) 4.70 - 6.10 mill/mm3    Hemoglobin 11.7 (L) 14.0 - 18.0 gm/dl    Hematocrit 36.6 (L) 42.0 - 52.0 %    MCV 88.0 80.0 - 94.0 fL    MCH 28.1 26.0 - 33.0 pg    MCHC 32.0 (L) 32.2 - 35.5 gm/dl    RDW-CV 14.9 (H) 11.5 - 14.5 %    RDW-SD 48.0 (H) 35.0 - 45.0 fL    Platelets 183 599 - 463 thou/mm3    MPV 9.1 (L) 9.4 - 12.4 fL    Seg Neutrophils 54.6 %    Lymphocytes 28.1 %    Monocytes 10.9 %    Eosinophils 5.5 %    Basophils 0.6 %    Immature Granulocytes 0.3 %    Segs Absolute 4.3 1.8 - 7.7 thou/mm3 Lymphocytes Absolute 2.2 1.0 - 4.8 thou/mm3    Monocytes Absolute 0.9 0.4 - 1.3 thou/mm3    Eosinophils Absolute 0.4 0.0 - 0.4 thou/mm3    Basophils Absolute 0.0 0.0 - 0.1 thou/mm3    Immature Grans (Abs) 0.02 0.00 - 0.07 thou/mm3    nRBC 0 /100 wbc   CK    Collection Time: 11/26/20  4:11 AM   Result Value Ref Range    Total CK 3,961 (H) 55 - 170 U/L   Phosphorus    Collection Time: 11/26/20  4:11 AM   Result Value Ref Range    Phosphorus 3.7 2.4 - 4.7 mg/dL   Anion Gap    Collection Time: 11/26/20  4:11 AM   Result Value Ref Range    Anion Gap 11.0 8.0 - 16.0 meq/L   Glomerular Filtration Rate, Estimated    Collection Time: 11/26/20  4:11 AM   Result Value Ref Range    Est, Glom Filt Rate >90 ml/min/1.73m2        Microbiology:    Blood culture #1:   Lab Results   Component Value Date    BC No growth-preliminary  11/25/2020       Blood culture #2:No results found for: [place]    Organism:  No results found for: LABGRAM    MRSA culture only:No results found for: [facility]    Urine culture:   Lab Results   Component Value Date    LABURIN No growth-preliminary No growth  08/28/2020     Lab Results   Component Value Date    ORG Culture screen is positive for MRSA colonization 08/25/2020        Respiratory culture: No results found for: CULTRESP    Aerobic and Anaerobic :  No results found for: LABAERO  No results found for: LABANAE    Urinalysis:      Lab Results   Component Value Date    NITRU NEGATIVE 11/25/2020    WBCUA 0-2 11/25/2020    BACTERIA NONE SEEN 11/25/2020    RBCUA 10-15 11/25/2020    BLOODU SMALL 11/25/2020    SPECGRAV 1.016 11/25/2020    [address] [place] [address] NEGATIVE 11/05/2020       Radiology:-  Xr Hip Right (2-3 Views)    Result Date: 11/26/2020  PROCEDURE: XR HIP RIGHT (2-3 VIEWS) CLINICAL INFORMATION: pain with possible fall. Posterior hip pain. COMPARISON: No prior study. TECHNIQUE: AP and frog-leg lateral views were obtained  of the right hip. FINDINGS: There is no fracture or dislocation.  The joint space is preserved. No degenerative changes are noted. The superior and inferior pubic rami are normal. The sacroiliac joint is normal.  There is a small area of sclerosis along the superior acetabulum. This is most likely a bone island. This measures 9 mm. Normal hip. **This report has been created using voice recognition software. It may contain minor errors which are inherent in voice recognition technology. ** Final report electronically signed by Dr. Rayne Lundborg on 11/26/2020 11:21 AM       Follow-up scheduled after discharge :-    Unable to schedule follow up due to patient leaving [facility]     Consultations during this hospital stay:-  [] NONE [] Cardiology  [] Nephrology  [] Hemo onco   [] GI   [] ID  [] Endocrine  [] Pulm    [] Neuro    [x] Psych   [] Urology  [] ENT   [] G SURGERY   []Ortho    []CV surg    [] Palliative  [] Hospice [] Pain management   []    []TCU   [] PT/OT  OTHERS:-    Disposition: Against Medical Advice - patient left in the custody of [facility] Department    Condition at Discharge: Unstable    Time Spent:- 40 minutes    Electronically signed by Karol Chau DO on 11/26/2020 at 3:32 PM  Discharging Hospitalist

## 2020-11-26 NOTE — CONSULTS
Illness    PHYSICAL EXAM:    VITALS:  /67   Pulse 57   Temp 97.9 °F (36.6 °C) (Oral)   Resp 12   Wt 173 lb 4.8 oz (78.6 kg)   SpO2 98%   BMI 24.87 kg/m²     Mental Status Examination:  Level of consciousness:  Mild dysattention (reduced clarity of awareness with impaired ability to focus, sustain, or shift attention)  Appearance:  ill-appearing  Behavior/Motor:  psychomotor retardation  Attitude toward examiner:  cooperative  Speech:  slow  Mood:  anxious  Affect:  mood congruent  Thought processes:  linear and goal directed  Thought content:  Homocidal ideation denies  Suicidal Ideation:  denies suicidal ideation  Delusions:  no evidence of delusions  Perceptual Disturbance:  denies any perceptual disturbance  Cognition:  oriented to person, place, and time  Concentration failed 5-letter word backwards  Memory impaired immediate recall  Insight:  improving  Judgment:  improving        DSM-IV DIAGNOSIS:      Impression (Axis I):      Stimulant Use Disorder  OpiateWithdrawals  Major depressive disorder; moderate      PLAN:    Medications: Gannon tart him on Depakote  mg po BID, Seroquel 50 mg po TID and Suboxone 2-8 mg S/L.

## 2020-11-26 NOTE — PROGRESS NOTES
Pt to lethargic to go over home medication list. Will attempt to cover home medication list when he arouses.

## 2020-11-26 NOTE — PROGRESS NOTES
1110 attempted to give patient suboxone and opened package, however patient refused and states he still has methadone in his system and this medication will cause him to withdrawl. Wasted medication with Nidia Montes RN. 1445 patient wanting to leave A. Dr. Jorge Vargas came to floor and spoke with patient at bedside about importance of staying and being medically treated. Patient still refusing. Patient unhooked his own IV and pulled off telemetry. This RN removed IV's and notified campus security about patients discharge, per their request.  Patient leaving with campus security.

## 2020-11-26 NOTE — PROGRESS NOTES
SNF       [] [facility]       [] Other-    Chief Complaint: Shortness of breath and anxiety    Hospital Treatment Course:     50-year-old male presented to [facility] emergency department for shortness of breath and feeling anxious. Patient has a significant past medical history of opioid abuse, depression and anxiety. Patient reported using cocaine, heroin and methamphetamines prior to come to the emergency department. Patient stated that his mother recently passed away that is what he was using the drugs. In the emergency room department patient was found to have tachypnea as well as tachycardia with agitation and hyperactivity. Patient was given sedatives which resulted in patient's blood pressure dropping. Patient was given multiple boluses of fluid as well as Narcan. Patient responded nicely to Narcan. Emergency room labs were significant for white blood cell count of 17.3, hemoglobin hematocrit 12.4/38.1, BUN/creatinine 25/1.3, CK 5691. Urine drug screen was positive for amphetamines plus methamphetamines and opioids. Positive urine myoglobin. Per ED provider note patient has been combining methadone with Lyrica as well as smoking marijuana. Subjective (past 24 hours):   No acute events overnight patient got up twice to urinate  Patient denies any shortness of breath, chest pain, headache, dizziness, abdominal pain  Patient admits to having to urinate     Past medical history, family history, social history and allergies reviewed again and is unchanged since admission. ROS (12 point review of systems completed. Pertinent positives noted.  Otherwise ROS is negative)     Medications:  Reviewed    Infusion Medications    norepinephrine Stopped (11/25/20 1322)    sodium chloride 125 mL/hr at 11/25/20 1805     Scheduled Medications    sodium chloride flush  10 mL Intravenous 2 times per day    enoxaparin  40 mg Subcutaneous Daily     PRN Meds: sodium chloride flush, promethazine **OR** ondansetron, LORazepam, naloxone      Intake/Output Summary (Last 24 hours) at 11/26/2020 0701  Last data filed at 11/26/2020 0345  Gross per 24 hour   Intake 1358.19 ml   Output 1650 ml   Net -291.81 ml       Diet:  DIET CLEAR LIQUID;    Exam:  /63   Pulse 66   Temp 98.2 °F (36.8 °C) (Axillary)   Resp 16   Wt 173 lb 4.8 oz (78.6 kg)   SpO2 96%   BMI 24.87 kg/m²   General appearance: No apparent distress, appears stated age and cooperative. Resting in bed when I walked in   HEENT: Pupils equal, round, and reactive to light. Conjunctivae/corneas clear. Mucus membranes are dry   Neck: Supple, with full range of motion. No jugular venous distention. Trachea midline. Respiratory:  Normal respiratory effort. Clear to auscultation, bilaterally without Rales/Wheezes/Rhonchi. Cardiovascular: Tachycardic rate and regular rhythm with normal S1/S2 without murmurs, rubs or gallops. Abdomen: Soft, non-tender, non-distended with normal bowel sounds. Musculoskeletal: passive and active ROM x 4 extremities. Skin: Skin color, texture, turgor normal.  Multiple excoriations on entire body. Multiple tattoos   Neurologic:  Neurovascularly intact without any focal sensory/motor deficits. Cranial nerves: II-XII intact, grossly non-focal.  Psychiatric: Alert and oriented, thought content appropriate, normal insight  Capillary Refill: Brisk,< 3 seconds   Peripheral Pulses: +2 palpable, equal bilaterally     Labs:   Recent Labs     11/25/20  1115 11/26/20  0411   WBC 17.3* 7.8   HGB 12.4* 11.7*   HCT 38.1* 36.6*   * 329     Recent Labs     11/25/20  1115 11/26/20  0411    140   K 4.2 3.9    104   CO2 23 25   BUN 25* 17   CREATININE 1.3* 0.9   CALCIUM 10.5 9.5   PHOS  --  3.7     Recent Labs     11/25/20  1115 11/26/20  0411   * 116*   ALT 48 45   BILITOT 0.3 0.5   ALKPHOS 99 77     No results for input(s): INR in the last 72 hours.   Recent Labs     11/25/20  1114

## 2020-11-27 LAB — MRSA SCREEN: NORMAL

## 2020-11-29 LAB
MRSA SCREEN: ABNORMAL
MYOGLOBIN URINE: 2 MG/L (ref 0–1)
ORGANISM: ABNORMAL

## 2020-12-01 LAB — BLOOD CULTURE, ROUTINE: NORMAL

## 2020-12-02 RX ORDER — PREGABALIN 200 MG/1
200 CAPSULE ORAL 3 TIMES DAILY
Qty: 90 CAPSULE | Refills: 0 | Status: SHIPPED | OUTPATIENT
Start: 2020-12-02 | End: 2020-12-29 | Stop reason: DRUGHIGH

## 2020-12-16 RX ORDER — LISINOPRIL 20 MG/1
20 TABLET ORAL DAILY
Qty: 30 TABLET | Refills: 1 | Status: SHIPPED | OUTPATIENT
Start: 2020-12-16 | End: 2021-02-22 | Stop reason: SDUPTHER

## 2020-12-16 RX ORDER — OXYBUTYNIN CHLORIDE 5 MG/1
5 TABLET ORAL 3 TIMES DAILY PRN
Qty: 15 TABLET | Refills: 0 | Status: SHIPPED | OUTPATIENT
Start: 2020-12-16 | End: 2021-01-06 | Stop reason: ALTCHOICE

## 2020-12-16 RX ORDER — VENLAFAXINE 100 MG/1
100 TABLET ORAL 3 TIMES DAILY
Qty: 90 TABLET | Refills: 1 | Status: SHIPPED | OUTPATIENT
Start: 2020-12-16 | End: 2021-02-22 | Stop reason: SDUPTHER

## 2020-12-16 RX ORDER — MIRTAZAPINE 45 MG/1
45 TABLET, FILM COATED ORAL NIGHTLY
Qty: 30 TABLET | Refills: 1 | Status: SHIPPED | OUTPATIENT
Start: 2020-12-16 | End: 2021-02-22 | Stop reason: SDUPTHER

## 2020-12-16 NOTE — TELEPHONE ENCOUNTER
Last visit- 11/2/2020  Next visit- 12/17/2020    Requested Prescriptions     Pending Prescriptions Disp Refills    venlafaxine (EFFEXOR) 100 MG tablet 90 tablet 1     Sig: Take 1 tablet by mouth 3 times daily    lisinopril (PRINIVIL;ZESTRIL) 20 MG tablet 30 tablet 1     Sig: Take 1 tablet by mouth daily    mirtazapine (REMERON) 45 MG tablet 30 tablet 1     Sig: Take 1 tablet by mouth nightly    oxybutynin (DITROPAN) 5 MG tablet 15 tablet 0     Sig: Take 1 tablet by mouth 3 times daily as needed (flank pain)

## 2020-12-17 ENCOUNTER — OFFICE VISIT (OUTPATIENT)
Dept: INTERNAL MEDICINE CLINIC | Age: 35
End: 2020-12-17
Payer: MEDICARE

## 2020-12-17 VITALS
SYSTOLIC BLOOD PRESSURE: 136 MMHG | HEIGHT: 70 IN | DIASTOLIC BLOOD PRESSURE: 84 MMHG | WEIGHT: 172 LBS | BODY MASS INDEX: 24.62 KG/M2 | TEMPERATURE: 98.1 F | HEART RATE: 131 BPM

## 2020-12-17 LAB
ALCOHOL URINE: ABNORMAL
AMPHETAMINE SCREEN, URINE: ABNORMAL
BARBITURATE SCREEN, URINE: ABNORMAL
BENZODIAZEPINE SCREEN, URINE: ABNORMAL
BUPRENORPHINE URINE: ABNORMAL
COCAINE METABOLITE SCREEN URINE: ABNORMAL
FENTANYL SCREEN, URINE: ABNORMAL
GABAPENTIN SCREEN, URINE: ABNORMAL
MDMA URINE: ABNORMAL
METHADONE SCREEN, URINE: ABNORMAL
METHAMPHETAMINE, URINE: ABNORMAL
OPIATE SCREEN URINE: ABNORMAL
OXYCODONE SCREEN URINE: ABNORMAL
PHENCYCLIDINE SCREEN URINE: ABNORMAL
PROPOXYPHENE SCREEN, URINE: ABNORMAL
SYNTHETIC CANNABINOIDS(K2) SCREEN, URINE: ABNORMAL
THC SCREEN, URINE: ABNORMAL
TRAMADOL SCREEN URINE: ABNORMAL
TRICYCLIC ANTIDEPRESSANTS, UR: ABNORMAL

## 2020-12-17 PROCEDURE — 1036F TOBACCO NON-USER: CPT | Performed by: INTERNAL MEDICINE

## 2020-12-17 PROCEDURE — 80305 DRUG TEST PRSMV DIR OPT OBS: CPT | Performed by: INTERNAL MEDICINE

## 2020-12-17 PROCEDURE — 1111F DSCHRG MED/CURRENT MED MERGE: CPT | Performed by: INTERNAL MEDICINE

## 2020-12-17 PROCEDURE — 99213 OFFICE O/P EST LOW 20 MIN: CPT | Performed by: INTERNAL MEDICINE

## 2020-12-17 PROCEDURE — G8484 FLU IMMUNIZE NO ADMIN: HCPCS | Performed by: INTERNAL MEDICINE

## 2020-12-17 PROCEDURE — G8420 CALC BMI NORM PARAMETERS: HCPCS | Performed by: INTERNAL MEDICINE

## 2020-12-17 PROCEDURE — G8427 DOCREV CUR MEDS BY ELIG CLIN: HCPCS | Performed by: INTERNAL MEDICINE

## 2020-12-17 RX ORDER — PREGABALIN 300 MG/1
300 CAPSULE ORAL 2 TIMES DAILY
Qty: 8 CAPSULE | Refills: 0 | Status: SHIPPED | OUTPATIENT
Start: 2020-12-17 | End: 2020-12-21 | Stop reason: SDUPTHER

## 2020-12-17 RX ORDER — BUPRENORPHINE AND NALOXONE 8; 2 MG/1; MG/1
1 FILM, SOLUBLE BUCCAL; SUBLINGUAL 2 TIMES DAILY
Qty: 10 FILM | Refills: 0 | Status: SHIPPED | OUTPATIENT
Start: 2020-12-17 | End: 2020-12-21 | Stop reason: SDUPTHER

## 2020-12-21 ENCOUNTER — OFFICE VISIT (OUTPATIENT)
Dept: INTERNAL MEDICINE CLINIC | Age: 35
End: 2020-12-21
Payer: MEDICARE

## 2020-12-21 VITALS
HEART RATE: 109 BPM | DIASTOLIC BLOOD PRESSURE: 86 MMHG | TEMPERATURE: 98.1 F | WEIGHT: 183 LBS | BODY MASS INDEX: 25.62 KG/M2 | HEIGHT: 71 IN | SYSTOLIC BLOOD PRESSURE: 139 MMHG

## 2020-12-21 PROCEDURE — 99213 OFFICE O/P EST LOW 20 MIN: CPT | Performed by: INTERNAL MEDICINE

## 2020-12-21 PROCEDURE — 1111F DSCHRG MED/CURRENT MED MERGE: CPT | Performed by: INTERNAL MEDICINE

## 2020-12-21 PROCEDURE — G8484 FLU IMMUNIZE NO ADMIN: HCPCS | Performed by: INTERNAL MEDICINE

## 2020-12-21 PROCEDURE — 80305 DRUG TEST PRSMV DIR OPT OBS: CPT | Performed by: INTERNAL MEDICINE

## 2020-12-21 PROCEDURE — G8427 DOCREV CUR MEDS BY ELIG CLIN: HCPCS | Performed by: INTERNAL MEDICINE

## 2020-12-21 PROCEDURE — G8419 CALC BMI OUT NRM PARAM NOF/U: HCPCS | Performed by: INTERNAL MEDICINE

## 2020-12-21 PROCEDURE — 1036F TOBACCO NON-USER: CPT | Performed by: INTERNAL MEDICINE

## 2020-12-21 RX ORDER — PREGABALIN 300 MG/1
300 CAPSULE ORAL 2 TIMES DAILY
Qty: 14 CAPSULE | Refills: 0 | Status: SHIPPED | OUTPATIENT
Start: 2020-12-21 | End: 2020-12-29 | Stop reason: SDUPTHER

## 2020-12-21 RX ORDER — BUPRENORPHINE AND NALOXONE 8; 2 MG/1; MG/1
1 FILM, SOLUBLE BUCCAL; SUBLINGUAL 2 TIMES DAILY
Qty: 14 FILM | Refills: 0 | Status: SHIPPED | OUTPATIENT
Start: 2020-12-21 | End: 2020-12-28 | Stop reason: SDUPTHER

## 2020-12-21 NOTE — PROGRESS NOTES
Verbal order per Dr. Jayla Rogers for urine drug screen. Positive for BUP, TRAM. Verified results with Heidy Duarte., DAYLIN. Dr. Jayla Rogers ordered Suboxone 8 mg film BID for patient. Verified dose with patient. Patient was sent home with 1 week script for Suboxonr 8 mg film BID and 1 week script for and Lyrica 300mg BID will be seen back in the office on 12/28/20. UC and oral swab sent.

## 2020-12-26 NOTE — PROGRESS NOTES
MEDICATION ASSISTED TREATMENT ENCOUNTER    HISTORY OF PRESENT ILLNESS  Patient presents for evaluation of opioid use and would like to be placed on medication assisted treatment  I saw him here 7/27  He was lost to follow-up  He said he was in the methadone clinic until recently  He relapsed on meth  He says he wants to get clean he has been using Suboxone off the street  I see his girlfriend as well    Past Medical History:   Diagnosis Date    Anxiety     Depression     Kidney stone     Liver disease     Hep C    Opiate abuse, continuous ([address]      ROS     General: Depressed, crying, shaky, hurts all over  PHYSICAL EXAM  Blood pressure 136/84, pulse 131, temperature 98.1 °F (36.7 °C), height 5' 10\" (1.778 m), weight 172 lb (78 kg).          General: Patient agitated crying     Mental status: Alert and oriented to person place and time, no hallucinations or delusions     Eyes: Pupils are normal          URINE DRUG SCREEN TODAY:  Alcohol, Urine 12/17/2020  1:12 PM Unknown   POS    Amphetamine Screen, Urine 12/17/2020  1:12 PM Unknown   POS    Barbiturate Screen, Urine 12/17/2020  1:12 PM Unknown   NEG    Benzodiazepine Screen, Urine 12/17/2020  1:12 PM Unknown   NEG    Buprenorphine Urine 12/17/2020  1:12 PM Unknown   POS    Cocaine Metabolite Screen, Urine 12/17/2020  1:12 PM Unknown   NEG    FENTANYL SCREEN, URINE 12/17/2020  1:12 PM Unknown   NEG    Gabapentin Screen, Urine 12/17/2020  1:12 PM Unknown   N/A    MDMA, Urine 12/17/2020  1:12 PM Unknown   NEG    Methadone Screen, Urine 12/17/2020  1:12 PM Unknown   NEG    Methamphetamine, Urine 12/17/2020  1:12 PM Unknown   POS    Opiate Scrn, Ur 12/17/2020  1:12 PM Unknown   NEG    Oxycodone Screen, Ur 12/17/2020  1:12 PM Unknown   NEG    PCP Screen, Urine 12/17/2020  1:12 PM Unknown   NEG    Propoxyphene Screen, Urine 12/17/2020  1:12 PM Unknown   N/A    Synthetic Cannabinoids (K2) Screen, Urine 12/17/2020  1:12 PM Unknown   NEG    THC Screen, Urine 12/17/2020  1:12 PM Unknown   NEG    Tramadol Scrn, Ur 12/17/2020  1:12 PM Unknown   POS    Tricyclic Antidepressants, Urine 12/17/2020  1:12 PM Unknown   N/A             Diagnosis Orders   1. Severe opioid use disorder (HCC)  POCT Rapid Drug Screen    DISCONTINUED: pregabalin (LYRICA) 300 MG capsule    DISCONTINUED: buprenorphine-naloxone (SUBOXONE) 8-2 MG FILM SL film   2. Essential hypertension     3. Anxiety and depression     4. Fibromyalgia     5. Encounter for monitoring Suboxone maintenance therapy     6. Hepatitis C antibody positive in blood     7.  Polysubstance abuse ([facility] [address]           PLAN:   Methadone is negative he said he stopped it about 2 weeks ago  I am going to give him Suboxone 8 mg twice daily  He has had 3 overdoses in the past couple of months  I am afraid for his life actually  I did refill his Lyrica  Follow-up here 12/21

## 2020-12-26 NOTE — PROGRESS NOTES
MEDICATION ASSISTED TREATMENT ENCOUNTER    HISTORY OF PRESENT ILLNESS  Patient presents for evaluation of opioid use and would like to be placed on medication assisted treatment  I saw him here 12/17  He was lost to follow-up  He said he was in the methadone clinic until recently  He relapsed on meth  He says he wants to get clean he has been using Suboxone off the street  I see his girlfriend as well    Past Medical History:   Diagnosis Date    Anxiety     Depression     Kidney stone     Liver disease     Hep C    Opiate abuse, continuous ([address]      ROS     General: He is in no acute distress denies any withdrawal symptoms r  PHYSICAL EXAM  Blood pressure 139/86, pulse 109, temperature 98.1 °F (36.7 °C), height 5' 11\" (1.803 m), weight 183 lb (83 kg).          General: Patient agitated crying     Mental status: Alert and oriented to person place and time, no hallucinations or delusions     Eyes: Pupils are normal          URINE DRUG SCREEN TODAY:  Alcohol, Urine 12/21/2020  2:52 PM Unknown   NEG    Amphetamine Screen, Urine 12/21/2020  2:52 PM Unknown   NEG    Barbiturate Screen, Urine 12/21/2020  2:52 PM Unknown   NEG    Benzodiazepine Screen, Urine 12/21/2020  2:52 PM Unknown   NEG    Buprenorphine Urine 12/21/2020  2:52 PM Unknown   POS    Cocaine Metabolite Screen, Urine 12/21/2020  2:52 PM Unknown   NEG    FENTANYL SCREEN, URINE 12/21/2020  2:52 PM Unknown   NEG    Gabapentin Screen, Urine 12/21/2020  2:52 PM Unknown   N/A    MDMA, Urine 12/21/2020  2:52 PM Unknown   NEG    Methadone Screen, Urine 12/21/2020  2:52 PM Unknown   NEG    Methamphetamine, Urine 12/21/2020  2:52 PM Unknown   NEG    Opiate Scrn, Ur 12/21/2020  2:52 PM Unknown   NEG    Oxycodone Screen, Ur 12/21/2020  2:52 PM Unknown   NEG    PCP Screen, Urine 12/21/2020  2:52 PM Unknown   NEG    Propoxyphene Screen, Urine 12/21/2020  2:52 PM Unknown   N/A Synthetic Cannabinoids (K2) Screen, Urine 12/21/2020  2:52 PM Unknown   NEG    THC Screen, Urine 12/21/2020  2:52 PM Unknown   NEG    Tramadol Scrn, Ur 12/21/2020  2:52 PM Unknown   POS    Tricyclic Antidepressants, Urine 12/21/2020  2:52 PM Unknown   N/A         Diagnosis Orders   1. Severe opioid use disorder (HCC)  POCT Rapid Drug Screen    buprenorphine-naloxone (SUBOXONE) 8-2 MG FILM SL film    pregabalin (LYRICA) 300 MG capsule   2. Encounter for monitoring Suboxone maintenance therapy     3. Fibromyalgia     4. Hepatitis C antibody positive in blood     5.  Polysubstance abuse ([facility] [address]           PLAN:   Methadone is negative he said he stopped it about 2 weeks ago  I am going to give him Suboxone 8 mg twice daily  Urine does have tramadol  He says he has not used recently  He has had 3 overdoses in the past couple of months  I am afraid for his life actually  I did refill his Lyrica  Follow-up here 12/28

## 2020-12-28 ENCOUNTER — OFFICE VISIT (OUTPATIENT)
Dept: INTERNAL MEDICINE CLINIC | Age: 35
End: 2020-12-28
Payer: MEDICARE

## 2020-12-28 VITALS
SYSTOLIC BLOOD PRESSURE: 156 MMHG | BODY MASS INDEX: 27.58 KG/M2 | DIASTOLIC BLOOD PRESSURE: 93 MMHG | HEART RATE: 113 BPM | TEMPERATURE: 97.4 F | WEIGHT: 197 LBS | HEIGHT: 71 IN

## 2020-12-28 DIAGNOSIS — Z79.899 ENCOUNTER FOR MONITORING SUBOXONE MAINTENANCE THERAPY: ICD-10-CM

## 2020-12-28 DIAGNOSIS — F11.20 SEVERE OPIOID USE DISORDER (HCC): Primary | ICD-10-CM

## 2020-12-28 DIAGNOSIS — M79.7 FIBROMYALGIA: ICD-10-CM

## 2020-12-28 DIAGNOSIS — R76.8 HEPATITIS C ANTIBODY POSITIVE IN BLOOD: ICD-10-CM

## 2020-12-28 DIAGNOSIS — F41.9 ANXIETY AND DEPRESSION: ICD-10-CM

## 2020-12-28 DIAGNOSIS — Z51.81 ENCOUNTER FOR MONITORING SUBOXONE MAINTENANCE THERAPY: ICD-10-CM

## 2020-12-28 DIAGNOSIS — F19.10 POLYSUBSTANCE ABUSE (HCC): ICD-10-CM

## 2020-12-28 DIAGNOSIS — F32.A ANXIETY AND DEPRESSION: ICD-10-CM

## 2020-12-28 PROCEDURE — 99213 OFFICE O/P EST LOW 20 MIN: CPT | Performed by: INTERNAL MEDICINE

## 2020-12-28 PROCEDURE — G8484 FLU IMMUNIZE NO ADMIN: HCPCS | Performed by: INTERNAL MEDICINE

## 2020-12-28 PROCEDURE — 80305 DRUG TEST PRSMV DIR OPT OBS: CPT | Performed by: INTERNAL MEDICINE

## 2020-12-28 PROCEDURE — G8427 DOCREV CUR MEDS BY ELIG CLIN: HCPCS | Performed by: INTERNAL MEDICINE

## 2020-12-28 PROCEDURE — G8419 CALC BMI OUT NRM PARAM NOF/U: HCPCS | Performed by: INTERNAL MEDICINE

## 2020-12-28 PROCEDURE — 1036F TOBACCO NON-USER: CPT | Performed by: INTERNAL MEDICINE

## 2020-12-28 RX ORDER — BUPRENORPHINE AND NALOXONE 8; 2 MG/1; MG/1
1 FILM, SOLUBLE BUCCAL; SUBLINGUAL 2 TIMES DAILY
Qty: 14 FILM | Refills: 0 | Status: SHIPPED | OUTPATIENT
Start: 2020-12-28 | End: 2021-01-04 | Stop reason: SDUPTHER

## 2020-12-28 NOTE — PROGRESS NOTES
MEDICATION ASSISTED TREATMENT ENCOUNTER    HISTORY OF PRESENT ILLNESS  Patient presents for evaluation of opioid use and would like to be placed on medication assisted treatment  I saw him here 12/21  He was lost to follow-up  He said he was in the methadone clinic until recently  He relapsed on meth  He says he wants to get clean he has been using Suboxone off the street  I see his girlfriend as well  I put him on Suboxone a week ago  He said he has been clean ever since  Past Medical History:   Diagnosis Date    Anxiety     Depression     Kidney stone     Liver disease     Hep C    Opiate abuse, continuous ([facility] [address]      ROS     General: He is in no acute distress denies any withdrawal symptoms r  PHYSICAL EXAM  Blood pressure (!) 156/93, pulse 113, temperature 97.4 °F (36.3 °C), height 5' 11\" (1.803 m), weight 197 lb (89.4 kg).          General: Patient agitated crying     Mental status: Alert and oriented to person place and time, no hallucinations or delusions     Eyes: Pupils are normal          URINE DRUG SCREEN TODAY:  Alcohol, Urine 12/28/2020  2:20 PM Unknown   NEG    Amphetamine Screen, Urine 12/28/2020  2:20 PM Unknown   NEG    Barbiturate Screen, Urine 12/28/2020  2:20 PM Unknown   NEG    Benzodiazepine Screen, Urine 12/28/2020  2:20 PM Unknown   NEG    Buprenorphine Urine 12/28/2020  2:20 PM Unknown   POS    Cocaine Metabolite Screen, Urine 12/28/2020  2:20 PM Unknown   NEG    FENTANYL SCREEN, URINE 12/28/2020  2:20 PM Unknown   NEG    Gabapentin Screen, Urine 12/28/2020  2:20 PM Unknown   N/A    MDMA, Urine 12/28/2020  2:20 PM Unknown   NEG    Methadone Screen, Urine 12/28/2020  2:20 PM Unknown   NEG    Methamphetamine, Urine 12/28/2020  2:20 PM Unknown   NEG    Opiate Scrn, Ur 12/28/2020  2:20 PM Unknown   NEG    Oxycodone Screen, Ur 12/28/2020  2:20 PM Unknown   NEG    PCP Screen, Urine 12/28/2020  2:20 PM Unknown   NEG Propoxyphene Screen, Urine 12/28/2020  2:20 PM Unknown   N/A    Synthetic Cannabinoids (K2) Screen, Urine 12/28/2020  2:20 PM Unknown   NEG    THC Screen, Urine 12/28/2020  2:20 PM Unknown   NEG    Tramadol Scrn, Ur 12/28/2020  2:20 PM Unknown   NEG    Tricyclic Antidepressants, Urine 12/28/2020  2:20 PM Unknown   N/A           Diagnosis Orders   1. Severe opioid use disorder (HCC)  POCT Rapid Drug Screen    buprenorphine-naloxone (SUBOXONE) 8-2 MG FILM SL film   2. Encounter for monitoring Suboxone maintenance therapy     3. Fibromyalgia     4. Polysubstance abuse ([facility] [address]     5. Hepatitis C antibody positive in blood     6.  Anxiety and depression           PLAN:   Methadone is negative he said he stopped it about 3 weeks ago  I am going to give him Suboxone 8 mg twice daily  Urine did have tramadol last visit but oral swab did not show any  He says a  is going to decide whether it is drug court, sober living facility or [facility]  His hearing is January 27  He has had 3 overdoses in the past couple of months  I am afraid for his life actually  I did refill his Lyrica  Follow-up here 1 week

## 2020-12-28 NOTE — PROGRESS NOTES
Verbal order per Dr. Edmond Bosworth for urine drug screen. Positive for BUP. Verified results with Masood Mead LPN. Dr. Edmond Bosworth ordered Suboxone 8 mg film BID for patient. Verified dose with patient. Patient was sent home with 1 week script for Suboxone 8 mg film BID and will be seen back in the office on 1/4/21. UC sent.

## 2020-12-29 ENCOUNTER — OFFICE VISIT (OUTPATIENT)
Dept: INTERNAL MEDICINE CLINIC | Age: 35
End: 2020-12-29
Payer: MEDICARE

## 2020-12-29 VITALS
BODY MASS INDEX: 27.19 KG/M2 | HEIGHT: 71 IN | WEIGHT: 194.2 LBS | HEART RATE: 102 BPM | SYSTOLIC BLOOD PRESSURE: 131 MMHG | DIASTOLIC BLOOD PRESSURE: 71 MMHG | TEMPERATURE: 97.5 F

## 2020-12-29 DIAGNOSIS — F32.A ANXIETY AND DEPRESSION: ICD-10-CM

## 2020-12-29 DIAGNOSIS — R30.0 DYSURIA: ICD-10-CM

## 2020-12-29 DIAGNOSIS — I10 ESSENTIAL HYPERTENSION: ICD-10-CM

## 2020-12-29 DIAGNOSIS — M79.7 FIBROMYALGIA: Primary | ICD-10-CM

## 2020-12-29 DIAGNOSIS — F41.9 ANXIETY AND DEPRESSION: ICD-10-CM

## 2020-12-29 PROCEDURE — 1036F TOBACCO NON-USER: CPT | Performed by: NURSE PRACTITIONER

## 2020-12-29 PROCEDURE — G8427 DOCREV CUR MEDS BY ELIG CLIN: HCPCS | Performed by: NURSE PRACTITIONER

## 2020-12-29 PROCEDURE — G8484 FLU IMMUNIZE NO ADMIN: HCPCS | Performed by: NURSE PRACTITIONER

## 2020-12-29 PROCEDURE — 99214 OFFICE O/P EST MOD 30 MIN: CPT | Performed by: NURSE PRACTITIONER

## 2020-12-29 PROCEDURE — G8419 CALC BMI OUT NRM PARAM NOF/U: HCPCS | Performed by: NURSE PRACTITIONER

## 2020-12-29 RX ORDER — PREGABALIN 300 MG/1
300 CAPSULE ORAL 2 TIMES DAILY
Qty: 60 CAPSULE | Refills: 2 | Status: SHIPPED | OUTPATIENT
Start: 2020-12-29 | End: 2021-03-22 | Stop reason: SDUPTHER

## 2020-12-29 NOTE — PROGRESS NOTES
2020     Alyson De La Cruz (:  1985) is a 38 y.o. male, here for evaluation of the following medical concerns: fibromyalgia, depression/anxiety, HTN, and dysuria      I last seen Amaris Chance 3 months ago. I follow him for PCP. He is following with Dr. Elvira Cagle for MAT. He has had multiple ER visits and hospitalizations since last visit. He states that on  he went to the hospital for an overdose, and when he was going to be discharged, he was arrested. He spent 3 weeks in senior living. States that he is clean now, following with his  daily and having daily urine toxicology screenings. He reports that he is taking medications as instructed, with no medication side effects. Does not perform home BP monitoring. Denies chest pain on exertion, swelling of ankles, palpitations, and orthostatic dizziness or lightheadedness. Does have shortness of breath on exertion. Continue Lisinopril 20 mg daily.      Fibromyalgia has been controlled with Lyrica 300 mg BID. He is not due for refill yet, however states that his girlfriend picked up last refill while he was in senior living and he does not have any left. He has had multiple issues with \"losing\" or having his Lyrica \"stolen\". Discussed that if he requests early refills or has any other issues, we will lower dose and discontinue. Depression/anxiety controlled with buspirone 30 mg daily, mirtazapine 45 mg daily, and venlafaxine 100 mg TID. If needed, will refer to psychiatry; however he currently denies any issues or concerns with current medication regimen. Symptoms are controlled, he denies any dysphoria and is sleeping well. Demarco complains of dysuria, decreased urination, and cloudy urine. He declines any discharge, or hematuria. Review of Systems   Constitutional: Negative for chills, fatigue and fever. HENT: Negative for congestion, rhinorrhea, sinus pressure, sinus pain, sore throat, tinnitus and trouble swallowing. Eyes: Negative for photophobia and visual disturbance. Respiratory: Positive for shortness of breath (on exertion). Negative for cough and wheezing. Cardiovascular: Negative for chest pain, palpitations and leg swelling. Gastrointestinal: Negative for abdominal distention, abdominal pain, blood in stool, constipation, diarrhea, nausea and vomiting. Endocrine: Negative for polydipsia, polyphagia and polyuria. Genitourinary: Negative for difficulty urinating, dysuria, frequency, hematuria and urgency. Musculoskeletal: Positive for myalgias (generalized- fibromyalgia). Negative for arthralgias. Skin: Negative for rash and wound. Neurological: Negative for dizziness, light-headedness and headaches. Psychiatric/Behavioral: Negative for dysphoric mood, sleep disturbance and suicidal ideas. The patient is not nervous/anxious. Prior to Visit Medications    Medication Sig Taking? Authorizing Provider   pregabalin (LYRICA) 300 MG capsule Take 1 capsule by mouth 2 times daily for 90 days. Yes PAPA Willson CNP   buprenorphine-naloxone (SUBOXONE) 8-2 MG FILM SL film Place 1 Film under the tongue 2 times daily for 7 days.  Yes Suzi Funes MD   venlafaxine (EFFEXOR) 100 MG tablet Take 1 tablet by mouth 3 times daily Yes PAPA Willson CNP   lisinopril (PRINIVIL;ZESTRIL) 20 MG tablet Take 1 tablet by mouth daily Yes PAPA Willson CNP   mirtazapine (REMERON) 45 MG tablet Take 1 tablet by mouth nightly Yes PAPA Willson CNP   oxybutynin (DITROPAN) 5 MG tablet Take 1 tablet by mouth 3 times daily as needed (flank pain) Yes PAPA Willson CNP   ondansetron (ZOFRAN ODT) 4 MG disintegrating tablet Take 1 tablet by mouth every 8 hours as needed for Nausea or Vomiting Yes PAPA Willson CNP   naloxone 4 MG/0.1ML LIQD nasal spray 1 spray by Nasal route as needed for Opioid Reversal Yes Ivette Tamez MD neomycin-bacitracin-polymyxin (NEOSPORIN) 3.5-400-5000 OINT ointment Apply topically 4 times daily. Yes Deny Mendoza MD        Social History     Tobacco Use    Smoking status: Former Smoker     Packs/day: 0.50     Types: Cigarettes    Smokeless tobacco: Never Used   Substance Use Topics    Alcohol use: Yes     Comment: occasional        Vitals:    12/29/20 1302   BP: 131/71   Site: Right Upper Arm   Position: Sitting   Cuff Size: Large Adult   Pulse: 102   Temp: 97.5 °F (36.4 °C)   TempSrc: Temporal   Weight: 194 lb 3.2 oz (88.1 kg)   Height: 5' 11\" (1.803 m)     Estimated body mass index is 27.09 kg/m² as calculated from the following:    Height as of this encounter: 5' 11\" (1.803 m). Weight as of this encounter: 194 lb 3.2 oz (88.1 kg). Physical Exam  Vitals signs reviewed. Constitutional:       General: He is not in acute distress. Appearance: Normal appearance. He is not ill-appearing. HENT:      Head: Normocephalic and atraumatic. Right Ear: Tympanic membrane, ear canal and external ear normal.      Left Ear: Tympanic membrane, ear canal and external ear normal.      Nose: Nose normal. No congestion or rhinorrhea. Mouth/Throat:      Mouth: Mucous membranes are moist.      Pharynx: Oropharynx is clear. No oropharyngeal exudate or posterior oropharyngeal erythema. Eyes:      Extraocular Movements: Extraocular movements intact. Conjunctiva/sclera: Conjunctivae normal.      Pupils: Pupils are equal, round, and reactive to light. Neck:      Musculoskeletal: Normal range of motion and neck supple. Cardiovascular:      Rate and Rhythm: Normal rate and regular rhythm. Pulses: Normal pulses. Heart sounds: Normal heart sounds. No murmur. No gallop. Pulmonary:      Effort: Pulmonary effort is normal. No respiratory distress. Breath sounds: No wheezing, rhonchi or rales.    Abdominal: General: Abdomen is flat. Bowel sounds are normal. There is no distension. Tenderness: There is no abdominal tenderness. Musculoskeletal: Normal range of motion. Right lower leg: No edema. Left lower leg: No edema. Skin:     General: Skin is warm and dry. Findings: No lesion or rash. Neurological:      General: No focal deficit present. Mental Status: He is alert and oriented to person, place, and time. Motor: No weakness. Coordination: Coordination normal.      Gait: Gait normal.   Psychiatric:         Mood and Affect: Mood normal. Affect is not tearful. Behavior: Behavior normal.         Thought Content: Thought content normal.         Judgment: Judgment normal.         ASSESSMENT/PLAN:    1. Fibromyalgia    - pregabalin (LYRICA) 300 MG capsule; Take 1 capsule by mouth 2 times daily for 90 days. Dispense: 60 capsule; Refill: 2  - OARRS reviewed- no discrepancies    2. Dysuria    - Urinalysis With Microscopic; Future    3. Anxiety and depression    - Continue current medication regimen  - Consider follow with psychiatry    4. Essential hypertension    - CBC; Future  - Comprehensive Metabolic Panel; Future  - Continue Lisinopril 20 mg daily      Return in about 3 months (around 3/29/2021). An electronic signature was used to authenticate this note.     --PAPA Srinivasan - CNP on 1/4/2021 at 2:01 PM

## 2021-01-04 ENCOUNTER — OFFICE VISIT (OUTPATIENT)
Dept: INTERNAL MEDICINE CLINIC | Age: 36
End: 2021-01-04
Payer: MEDICARE

## 2021-01-04 VITALS
SYSTOLIC BLOOD PRESSURE: 139 MMHG | DIASTOLIC BLOOD PRESSURE: 84 MMHG | HEIGHT: 71 IN | TEMPERATURE: 98.2 F | WEIGHT: 203 LBS | BODY MASS INDEX: 28.42 KG/M2 | HEART RATE: 115 BPM

## 2021-01-04 DIAGNOSIS — M79.7 FIBROMYALGIA: ICD-10-CM

## 2021-01-04 DIAGNOSIS — Z79.899 ENCOUNTER FOR MONITORING SUBOXONE MAINTENANCE THERAPY: ICD-10-CM

## 2021-01-04 DIAGNOSIS — F41.9 ANXIETY AND DEPRESSION: ICD-10-CM

## 2021-01-04 DIAGNOSIS — F32.A ANXIETY AND DEPRESSION: ICD-10-CM

## 2021-01-04 DIAGNOSIS — Z51.81 ENCOUNTER FOR MONITORING SUBOXONE MAINTENANCE THERAPY: ICD-10-CM

## 2021-01-04 DIAGNOSIS — F11.20 SEVERE OPIOID USE DISORDER (HCC): Primary | ICD-10-CM

## 2021-01-04 DIAGNOSIS — F19.10 POLYSUBSTANCE ABUSE (HCC): ICD-10-CM

## 2021-01-04 DIAGNOSIS — R76.8 HEPATITIS C ANTIBODY POSITIVE IN BLOOD: ICD-10-CM

## 2021-01-04 PROCEDURE — G8419 CALC BMI OUT NRM PARAM NOF/U: HCPCS | Performed by: INTERNAL MEDICINE

## 2021-01-04 PROCEDURE — 1036F TOBACCO NON-USER: CPT | Performed by: INTERNAL MEDICINE

## 2021-01-04 PROCEDURE — 99213 OFFICE O/P EST LOW 20 MIN: CPT | Performed by: INTERNAL MEDICINE

## 2021-01-04 PROCEDURE — G8484 FLU IMMUNIZE NO ADMIN: HCPCS | Performed by: INTERNAL MEDICINE

## 2021-01-04 PROCEDURE — 80305 DRUG TEST PRSMV DIR OPT OBS: CPT | Performed by: INTERNAL MEDICINE

## 2021-01-04 PROCEDURE — G8427 DOCREV CUR MEDS BY ELIG CLIN: HCPCS | Performed by: INTERNAL MEDICINE

## 2021-01-04 RX ORDER — BUPRENORPHINE AND NALOXONE 8; 2 MG/1; MG/1
1 FILM, SOLUBLE BUCCAL; SUBLINGUAL 2 TIMES DAILY
Qty: 16 FILM | Refills: 0 | Status: SHIPPED | OUTPATIENT
Start: 2021-01-04 | End: 2021-01-12 | Stop reason: SDUPTHER

## 2021-01-04 ASSESSMENT — ENCOUNTER SYMPTOMS
EYES NEGATIVE: 1
NAUSEA: 0
COUGH: 0
CONSTIPATION: 0
WHEEZING: 0
SHORTNESS OF BREATH: 1
ABDOMINAL PAIN: 0
SINUS PAIN: 0
PHOTOPHOBIA: 0
SINUS PRESSURE: 0
BLOOD IN STOOL: 0
TROUBLE SWALLOWING: 0
DIARRHEA: 0
RHINORRHEA: 0
SORE THROAT: 0
ABDOMINAL DISTENTION: 0
VOMITING: 0

## 2021-01-04 NOTE — PROGRESS NOTES
Sandip Modi (:  1985) is a 38 y.o. male,Established patient, here for evaluation of the following chief complaint(s):  Drug Problem      ASSESSMENT/PLAN:  1. Severe opioid use disorder (HCC)  -     POCT Rapid Drug Screen  -     buprenorphine-naloxone (SUBOXONE) 8-2 MG FILM SL film; Place 1 Film under the tongue 2 times daily for 8 days. , Disp-16 Film, R-0Normal  2. Fibromyalgia  3. Encounter for monitoring Suboxone maintenance therapy  4. Anxiety and depression  5. Polysubstance abuse ([facility] [place] [address]  6. Hepatitis C antibody positive in blood      Continue Suboxone 8 mg twice daily  we will send comprehensive urine  follow-up 8 days  SUBJECTIVE/OBJECTIVE:  HPI  I saw him here   He was lost to follow-up  He said he was in the methadone clinic until recently  He relapsed on meth  He says he wants to get clean he has been using Suboxone off the street  I see his girlfriend as well  I put him on Suboxone two weeks ago  He said he has been clean ever since  Review of Systems   Constitutional: Negative. Eyes: Negative. Neurological: Negative. Psychiatric/Behavioral: Negative. Physical Exam  Constitutional:       Appearance: Normal appearance. Eyes:      Pupils: Pupils are equal, round, and reactive to light. Neurological:      General: No focal deficit present. Mental Status: He is alert. Psychiatric:         Mood and Affect: Mood normal.         Behavior: Behavior normal.         Thought Content:  Thought content normal.       Urine tox screen today    Alcohol, Urine 2021  2:59 PM Unknown   NEG    Amphetamine Screen, Urine 2021  2:59 PM Unknown   NEG    Barbiturate Screen, Urine 2021  2:59 PM Unknown   NEG    Benzodiazepine Screen, Urine 2021  2:59 PM Unknown   NEG    Buprenorphine Urine 2021  2:59 PM Unknown   POS    Cocaine Metabolite Screen, Urine 2021  2:59 PM Unknown   NEG    FENTANYL SCREEN, URINE 2021  2:59 PM Unknown   NEG    Gabapentin

## 2021-01-04 NOTE — PROGRESS NOTES
Verbal order per Dr. Reed Sahu for urine drug screen. Positive for BUP, TRAM. Verified results with Shadi Santo LPN. Dr. Reed Sahu ordered Suboxone 8 mg film BID for patient. Verified dose with patient. Patient was sent home with 8 day script for Suboxone 8 mg film BID and will be seen back in the office on 1/12/21. UC sent.

## 2021-01-05 ENCOUNTER — NURSE ONLY (OUTPATIENT)
Dept: LAB | Age: 36
End: 2021-01-05

## 2021-01-05 DIAGNOSIS — I10 ESSENTIAL HYPERTENSION: ICD-10-CM

## 2021-01-05 DIAGNOSIS — I10 HYPERTENSION, UNSPECIFIED TYPE: ICD-10-CM

## 2021-01-05 DIAGNOSIS — R30.0 DYSURIA: ICD-10-CM

## 2021-01-05 LAB
ALBUMIN SERPL-MCNC: 4.6 G/DL (ref 3.5–5.1)
ALP BLD-CCNC: 96 U/L (ref 38–126)
ALT SERPL-CCNC: 85 U/L (ref 11–66)
ANION GAP SERPL CALCULATED.3IONS-SCNC: 14 MEQ/L (ref 8–16)
AST SERPL-CCNC: 56 U/L (ref 5–40)
BACTERIA: NORMAL
BILIRUB SERPL-MCNC: < 0.2 MG/DL (ref 0.3–1.2)
BILIRUBIN URINE: NEGATIVE
BLOOD, URINE: NEGATIVE
BUN BLDV-MCNC: 21 MG/DL (ref 7–22)
CALCIUM SERPL-MCNC: 10 MG/DL (ref 8.5–10.5)
CASTS: NORMAL /LPF
CASTS: NORMAL /LPF
CHARACTER, URINE: CLEAR
CHLORIDE BLD-SCNC: 102 MEQ/L (ref 98–111)
CO2: 24 MEQ/L (ref 23–33)
COLOR: YELLOW
CREAT SERPL-MCNC: 0.8 MG/DL (ref 0.4–1.2)
CRYSTALS: NORMAL
EPITHELIAL CELLS, UA: NORMAL /HPF
ERYTHROCYTE [DISTWIDTH] IN BLOOD BY AUTOMATED COUNT: 14.5 % (ref 11.5–14.5)
ERYTHROCYTE [DISTWIDTH] IN BLOOD BY AUTOMATED COUNT: 46.6 FL (ref 35–45)
GFR SERPL CREATININE-BSD FRML MDRD: > 90 ML/MIN/1.73M2
GLUCOSE BLD-MCNC: 105 MG/DL (ref 70–108)
GLUCOSE, URINE: NEGATIVE MG/DL
HCT VFR BLD CALC: 39.9 % (ref 42–52)
HEMOGLOBIN: 12.5 GM/DL (ref 14–18)
KETONES, URINE: NEGATIVE
LEUKOCYTE ESTERASE, URINE: NEGATIVE
MCH RBC QN AUTO: 27.7 PG (ref 26–33)
MCHC RBC AUTO-ENTMCNC: 31.3 GM/DL (ref 32.2–35.5)
MCV RBC AUTO: 88.3 FL (ref 80–94)
MISCELLANEOUS LAB TEST RESULT: NORMAL
NITRITE, URINE: NEGATIVE
PH UA: 5 (ref 5–9)
PLATELET # BLD: 431 THOU/MM3 (ref 130–400)
PMV BLD AUTO: 10.1 FL (ref 9.4–12.4)
POTASSIUM SERPL-SCNC: 4.5 MEQ/L (ref 3.5–5.2)
PROTEIN UA: NEGATIVE MG/DL
RBC # BLD: 4.52 MILL/MM3 (ref 4.7–6.1)
RBC URINE: NORMAL /HPF
RENAL EPITHELIAL, UA: NORMAL
SODIUM BLD-SCNC: 140 MEQ/L (ref 135–145)
SPECIFIC GRAVITY UA: 1.02 (ref 1–1.03)
TOTAL PROTEIN: 7.9 G/DL (ref 6.1–8)
UROBILINOGEN, URINE: 0.2 EU/DL (ref 0–1)
WBC # BLD: 8.5 THOU/MM3 (ref 4.8–10.8)
WBC UA: NORMAL /HPF
YEAST: NORMAL

## 2021-01-06 ENCOUNTER — HOSPITAL ENCOUNTER (OUTPATIENT)
Dept: GENERAL RADIOLOGY | Age: 36
Discharge: HOME | End: 2021-01-06
Payer: MEDICARE

## 2021-01-06 ENCOUNTER — HOSPITAL ENCOUNTER (OUTPATIENT)
Age: 36
Discharge: HOME | End: 2021-01-06
Payer: MEDICARE

## 2021-01-06 ENCOUNTER — OFFICE VISIT (OUTPATIENT)
Dept: INTERNAL MEDICINE CLINIC | Age: 36
End: 2021-01-06
Payer: MEDICARE

## 2021-01-06 VITALS
HEIGHT: 71 IN | BODY MASS INDEX: 28.56 KG/M2 | DIASTOLIC BLOOD PRESSURE: 80 MMHG | SYSTOLIC BLOOD PRESSURE: 118 MMHG | TEMPERATURE: 97.6 F | WEIGHT: 204 LBS | HEART RATE: 108 BPM

## 2021-01-06 DIAGNOSIS — R52 PAIN: ICD-10-CM

## 2021-01-06 DIAGNOSIS — M54.2 CERVICAL PAIN: ICD-10-CM

## 2021-01-06 DIAGNOSIS — G43.811 OTHER MIGRAINE WITH STATUS MIGRAINOSUS, INTRACTABLE: Primary | ICD-10-CM

## 2021-01-06 PROCEDURE — 72050 X-RAY EXAM NECK SPINE 4/5VWS: CPT

## 2021-01-06 PROCEDURE — 1036F TOBACCO NON-USER: CPT | Performed by: NURSE PRACTITIONER

## 2021-01-06 PROCEDURE — G8419 CALC BMI OUT NRM PARAM NOF/U: HCPCS | Performed by: NURSE PRACTITIONER

## 2021-01-06 PROCEDURE — G8427 DOCREV CUR MEDS BY ELIG CLIN: HCPCS | Performed by: NURSE PRACTITIONER

## 2021-01-06 PROCEDURE — 72074 X-RAY EXAM THORAC SPINE4/>VW: CPT

## 2021-01-06 PROCEDURE — G8484 FLU IMMUNIZE NO ADMIN: HCPCS | Performed by: NURSE PRACTITIONER

## 2021-01-06 PROCEDURE — 99213 OFFICE O/P EST LOW 20 MIN: CPT | Performed by: NURSE PRACTITIONER
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
## 2021-01-12 ENCOUNTER — OFFICE VISIT (OUTPATIENT)
Dept: INTERNAL MEDICINE CLINIC | Age: 36
End: 2021-01-12
Payer: MEDICARE

## 2021-01-12 VITALS
HEART RATE: 112 BPM | BODY MASS INDEX: 28.56 KG/M2 | SYSTOLIC BLOOD PRESSURE: 129 MMHG | HEIGHT: 71 IN | WEIGHT: 204 LBS | DIASTOLIC BLOOD PRESSURE: 88 MMHG

## 2021-01-12 DIAGNOSIS — F11.20 SEVERE OPIOID USE DISORDER (HCC): Primary | ICD-10-CM

## 2021-01-12 DIAGNOSIS — M79.7 FIBROMYALGIA: ICD-10-CM

## 2021-01-12 DIAGNOSIS — F41.9 ANXIETY AND DEPRESSION: ICD-10-CM

## 2021-01-12 DIAGNOSIS — Z79.899 ENCOUNTER FOR MONITORING SUBOXONE MAINTENANCE THERAPY: ICD-10-CM

## 2021-01-12 DIAGNOSIS — F19.10 POLYSUBSTANCE ABUSE (HCC): ICD-10-CM

## 2021-01-12 DIAGNOSIS — Z51.81 ENCOUNTER FOR MONITORING SUBOXONE MAINTENANCE THERAPY: ICD-10-CM

## 2021-01-12 DIAGNOSIS — F32.A ANXIETY AND DEPRESSION: ICD-10-CM

## 2021-01-12 DIAGNOSIS — R76.8 HEPATITIS C ANTIBODY POSITIVE IN BLOOD: ICD-10-CM

## 2021-01-12 PROCEDURE — G8484 FLU IMMUNIZE NO ADMIN: HCPCS | Performed by: INTERNAL MEDICINE

## 2021-01-12 PROCEDURE — 99213 OFFICE O/P EST LOW 20 MIN: CPT | Performed by: INTERNAL MEDICINE

## 2021-01-12 PROCEDURE — 1036F TOBACCO NON-USER: CPT | Performed by: INTERNAL MEDICINE

## 2021-01-12 PROCEDURE — 80305 DRUG TEST PRSMV DIR OPT OBS: CPT | Performed by: INTERNAL MEDICINE

## 2021-01-12 PROCEDURE — G8419 CALC BMI OUT NRM PARAM NOF/U: HCPCS | Performed by: INTERNAL MEDICINE

## 2021-01-12 PROCEDURE — G8428 CUR MEDS NOT DOCUMENT: HCPCS | Performed by: INTERNAL MEDICINE

## 2021-01-12 RX ORDER — BUPRENORPHINE AND NALOXONE 8; 2 MG/1; MG/1
1 FILM, SOLUBLE BUCCAL; SUBLINGUAL 2 TIMES DAILY
Qty: 28 FILM | Refills: 0 | Status: SHIPPED | OUTPATIENT
Start: 2021-01-12 | End: 2021-01-25 | Stop reason: SDUPTHER

## 2021-01-12 NOTE — PROGRESS NOTES
Verbal order per Dr. Pan Pérez for urine drug screen. Positive for BUP TRAM. Verified results with Td Jeff .RN. Dr. Pan Pérez ordered Suboxone 8mg film BID  for patient. Verified dose with patient. Patient was sent home with 2 week script of Suboxone 8mg film BID and will be seen back in the office 1/26/21.

## 2021-01-12 NOTE — PROGRESS NOTES
Jadyn Scott (:  1985) is a 38 y.o. male,Established patient, here for evaluation of the following chief complaint(s):  Drug Problem      ASSESSMENT/PLAN:  1. Severe opioid use disorder (HCC)  -     POCT Rapid Drug Screen  -     buprenorphine-naloxone (SUBOXONE) 8-2 MG FILM SL film; Place 1 Film under the tongue 2 times daily for 14 days. , Disp-28 Film, R-0Normal  2. Encounter for monitoring Suboxone maintenance therapy  3. Fibromyalgia  4. Anxiety and depression  5. Polysubstance abuse ([facility] [address]  6. Hepatitis C antibody positive in blood      Patient is doing great  Comprehensive urine from last visit showed no abnormalities  Continue Suboxone 8 mg twice daily  Follow-up 14 days  SUBJECTIVE/OBJECTIVE:  HPI  I saw him here   He was lost to follow-up  He said he was in the methadone clinic until recently  He relapsed on meth  He says he wants to get clean he has been using Suboxone off the street  I see his girlfriend as well  I put him on Suboxone  weeks ago  He said he has been clean ever since  Review of Systems   Patient is feeling well  Patient is not experiencing  withdrawal symptoms ,no urges or cravings  Patient is not having any side effects from the buprenorphine    Physical Exam  Constitutional:       Appearance: Normal appearance. Skin:     General: Skin is warm and dry. Neurological:      Mental Status: He is alert. Psychiatric:         Mood and Affect: Mood normal.         Behavior: Behavior normal.         Thought Content:  Thought content normal.         Judgment: Judgment normal.     Urine tox screen today  Alcohol, Urine 2021  1:05 PM Unknown   NEG    Amphetamine Screen, Urine 2021  1:05 PM Unknown   NEG    Barbiturate Screen, Urine 2021  1:05 PM Unknown   NEG    Benzodiazepine Screen, Urine 2021  1:05 PM Unknown   NEG    Buprenorphine Urine 2021  1:05 PM Unknown   POS    Cocaine Metabolite Screen, Urine 2021  1:05 PM Unknown   NEG

## 2021-01-20 ASSESSMENT — ENCOUNTER SYMPTOMS
PHOTOPHOBIA: 0
CONSTIPATION: 0
DIARRHEA: 0
ABDOMINAL PAIN: 0
NAUSEA: 0
WHEEZING: 0
BLOOD IN STOOL: 0
TROUBLE SWALLOWING: 0
VOMITING: 0
SHORTNESS OF BREATH: 1
COUGH: 0
RHINORRHEA: 0
SINUS PAIN: 0
ABDOMINAL DISTENTION: 0
SINUS PRESSURE: 0
SORE THROAT: 0

## 2021-01-21 ENCOUNTER — HOSPITAL ENCOUNTER (EMERGENCY)
Age: 36
Discharge: HOME | End: 2021-01-21
Attending: EMERGENCY MEDICINE
Payer: MEDICARE

## 2021-01-21 ENCOUNTER — APPOINTMENT (OUTPATIENT)
Dept: GENERAL RADIOLOGY | Age: 36
End: 2021-01-21
Payer: MEDICARE

## 2021-01-21 VITALS
WEIGHT: 190 LBS | DIASTOLIC BLOOD PRESSURE: 82 MMHG | TEMPERATURE: 99.2 F | RESPIRATION RATE: 9 BRPM | HEART RATE: 94 BPM | OXYGEN SATURATION: 98 % | SYSTOLIC BLOOD PRESSURE: 116 MMHG | HEIGHT: 70 IN | BODY MASS INDEX: 27.2 KG/M2

## 2021-01-21 DIAGNOSIS — B34.9 ACUTE VIRAL SYNDROME: Primary | ICD-10-CM

## 2021-01-21 DIAGNOSIS — F41.9 ANXIETY: ICD-10-CM

## 2021-01-21 LAB
ALBUMIN SERPL-MCNC: 4.1 G/DL (ref 3.5–5.1)
ALP BLD-CCNC: 88 U/L (ref 38–126)
ALT SERPL-CCNC: 73 U/L (ref 11–66)
ANION GAP SERPL CALCULATED.3IONS-SCNC: 6 MEQ/L (ref 8–16)
AST SERPL-CCNC: 50 U/L (ref 5–40)
BASOPHILS # BLD: 0.7 %
BASOPHILS ABSOLUTE: 0 THOU/MM3 (ref 0–0.1)
BILIRUB SERPL-MCNC: < 0.2 MG/DL (ref 0.3–1.2)
BUN BLDV-MCNC: 22 MG/DL (ref 7–22)
C-REACTIVE PROTEIN: 0.61 MG/DL (ref 0–1)
CALCIUM SERPL-MCNC: 10.1 MG/DL (ref 8.5–10.5)
CHLORIDE BLD-SCNC: 103 MEQ/L (ref 98–111)
CO2: 28 MEQ/L (ref 23–33)
CREAT SERPL-MCNC: 0.8 MG/DL (ref 0.4–1.2)
D-DIMER QUANTITATIVE: 298 NG/ML FEU (ref 0–500)
EOSINOPHIL # BLD: 4.3 %
EOSINOPHILS ABSOLUTE: 0.2 THOU/MM3 (ref 0–0.4)
ERYTHROCYTE [DISTWIDTH] IN BLOOD BY AUTOMATED COUNT: 13.4 % (ref 11.5–14.5)
ERYTHROCYTE [DISTWIDTH] IN BLOOD BY AUTOMATED COUNT: 42 FL (ref 35–45)
FLU A ANTIGEN: NEGATIVE
FLU B ANTIGEN: NEGATIVE
GFR SERPL CREATININE-BSD FRML MDRD: > 90 ML/MIN/1.73M2
GLUCOSE BLD-MCNC: 82 MG/DL (ref 70–108)
HCT VFR BLD CALC: 37.5 % (ref 42–52)
HEMOGLOBIN: 12.4 GM/DL (ref 14–18)
IMMATURE GRANS (ABS): 0.01 THOU/MM3 (ref 0–0.07)
IMMATURE GRANULOCYTES: 0.2 %
LYMPHOCYTES # BLD: 38.9 %
LYMPHOCYTES ABSOLUTE: 2.3 THOU/MM3 (ref 1–4.8)
MCH RBC QN AUTO: 28.6 PG (ref 26–33)
MCHC RBC AUTO-ENTMCNC: 33.1 GM/DL (ref 32.2–35.5)
MCV RBC AUTO: 86.4 FL (ref 80–94)
MONOCYTES # BLD: 12.2 %
MONOCYTES ABSOLUTE: 0.7 THOU/MM3 (ref 0.4–1.3)
NUCLEATED RED BLOOD CELLS: 0 /100 WBC
OSMOLALITY CALCULATION: 276.2 MOSMOL/KG (ref 275–300)
PLATELET # BLD: 310 THOU/MM3 (ref 130–400)
PMV BLD AUTO: 9.5 FL (ref 9.4–12.4)
POTASSIUM REFLEX MAGNESIUM: 4.4 MEQ/L (ref 3.5–5.2)
RBC # BLD: 4.34 MILL/MM3 (ref 4.7–6.1)
SARS-COV-2, NAAT: NOT DETECTED
SEG NEUTROPHILS: 43.7 %
SEGMENTED NEUTROPHILS ABSOLUTE COUNT: 2.5 THOU/MM3 (ref 1.8–7.7)
SODIUM BLD-SCNC: 137 MEQ/L (ref 135–145)
TOTAL PROTEIN: 7.6 G/DL (ref 6.1–8)
TROPONIN T: < 0.01 NG/ML
WBC # BLD: 5.8 THOU/MM3 (ref 4.8–10.8)

## 2021-01-21 PROCEDURE — 71045 X-RAY EXAM CHEST 1 VIEW: CPT

## 2021-01-21 PROCEDURE — U0002 COVID-19 LAB TEST NON-CDC: HCPCS

## 2021-01-21 PROCEDURE — 86140 C-REACTIVE PROTEIN: CPT

## 2021-01-21 PROCEDURE — 99283 EMERGENCY DEPT VISIT LOW MDM: CPT

## 2021-01-21 PROCEDURE — 6370000000 HC RX 637 (ALT 250 FOR IP): Performed by: EMERGENCY MEDICINE

## 2021-01-21 PROCEDURE — 2580000003 HC RX 258: Performed by: EMERGENCY MEDICINE

## 2021-01-21 PROCEDURE — 85025 COMPLETE CBC W/AUTO DIFF WBC: CPT

## 2021-01-21 PROCEDURE — 36415 COLL VENOUS BLD VENIPUNCTURE: CPT

## 2021-01-21 PROCEDURE — 80053 COMPREHEN METABOLIC PANEL: CPT

## 2021-01-21 PROCEDURE — 84484 ASSAY OF TROPONIN QUANT: CPT

## 2021-01-21 PROCEDURE — 6360000002 HC RX W HCPCS: Performed by: EMERGENCY MEDICINE

## 2021-01-21 PROCEDURE — 96374 THER/PROPH/DIAG INJ IV PUSH: CPT

## 2021-01-21 PROCEDURE — 93005 ELECTROCARDIOGRAM TRACING: CPT | Performed by: EMERGENCY MEDICINE

## 2021-01-21 PROCEDURE — 85379 FIBRIN DEGRADATION QUANT: CPT

## 2021-01-21 PROCEDURE — 87804 INFLUENZA ASSAY W/OPTIC: CPT

## 2021-01-21 RX ORDER — KETOROLAC TROMETHAMINE 30 MG/ML
30 INJECTION, SOLUTION INTRAMUSCULAR; INTRAVENOUS ONCE
Status: DISCONTINUED | OUTPATIENT
Start: 2021-01-21 | End: 2021-01-21 | Stop reason: HOSPADM

## 2021-01-21 RX ORDER — 0.9 % SODIUM CHLORIDE 0.9 %
1000 INTRAVENOUS SOLUTION INTRAVENOUS ONCE
Status: COMPLETED | OUTPATIENT
Start: 2021-01-21 | End: 2021-01-21

## 2021-01-21 RX ORDER — ONDANSETRON 2 MG/ML
4 INJECTION INTRAMUSCULAR; INTRAVENOUS ONCE
Status: COMPLETED | OUTPATIENT
Start: 2021-01-21 | End: 2021-01-21

## 2021-01-21 RX ORDER — IBUPROFEN 200 MG
600 TABLET ORAL ONCE
Status: COMPLETED | OUTPATIENT
Start: 2021-01-21 | End: 2021-01-21

## 2021-01-21 RX ORDER — HYDROXYZINE PAMOATE 25 MG/1
25 CAPSULE ORAL 3 TIMES DAILY PRN
Qty: 30 CAPSULE | Refills: 0 | Status: SHIPPED | OUTPATIENT
Start: 2021-01-21 | End: 2021-02-04

## 2021-01-21 RX ADMIN — IBUPROFEN 600 MG: 200 TABLET, FILM COATED ORAL at 16:54

## 2021-01-21 RX ADMIN — ONDANSETRON 4 MG: 2 INJECTION INTRAMUSCULAR; INTRAVENOUS at 16:51

## 2021-01-21 RX ADMIN — SODIUM CHLORIDE 1000 ML: 9 INJECTION, SOLUTION INTRAVENOUS at 16:30

## 2021-01-21 ASSESSMENT — ENCOUNTER SYMPTOMS
EYE REDNESS: 0
TROUBLE SWALLOWING: 0
BACK PAIN: 0
DIARRHEA: 0
CONSTIPATION: 0
VOMITING: 0
ABDOMINAL PAIN: 0
COUGH: 0
NAUSEA: 1
SHORTNESS OF BREATH: 0

## 2021-01-21 NOTE — ED NOTES
Released with discharge instructions- no questions or concerns noted at this time-Pt states feeling better at this time     Liza Grant RN  01/21/21 0491

## 2021-01-21 NOTE — ED PROVIDER NOTES
[address] [facility] [address] [address] EMERGENCY DEPT    EMERGENCY MEDICINE     Pt Name: Dung Way  MRN: [id number]  Gladisgflilia 1985  Date of evaluation: 1/21/2021  Provider: Eileen Tai MD,     [address] [place] [address]       Chief Complaint   Patient presents with    Chest Pain    Anxiety       HISTORY OF PRESENT ILLNESS    Dung Way is a pleasant 39 y.o. male who presents to the emergency department from home for chest pain. Patient states that this morning he woke up with a fever of 101. He states that improved with Tylenol however he has had headache and body aches and some intermittent chest pain throughout the day. He also complains of a little bit of nausea. He denies any cough, chills, difficulty breathing, vomiting, diarrhea, or difficulty urinating. He denies any new rash. He does state that he is a \" \"and anytime he notices anything he does tend to pick at it. However he states that all of his lesions appear normal to him. He denies being around anyone sick. He did not get a flu shot this year. Triage notes and Nursing notes were reviewed by myself. Any discrepancies are addressed above. PAST MEDICAL HISTORY     Past Medical History:   Diagnosis Date    Anxiety     Depression     Kidney stone     Liver disease     Hep C    Opiate abuse, continuous ([facility] [address]        SURGICAL HISTORY     History reviewed. No pertinent surgical history. CURRENT MEDICATIONS       Discharge Medication List as of 1/21/2021  5:44 PM      CONTINUE these medications which have NOT CHANGED    Details   buprenorphine-naloxone (SUBOXONE) 8-2 MG FILM SL film Place 1 Film under the tongue 2 times daily for 14 days. , Disp-28 Film, R-0Normal      Aspirin-Acetaminophen-Caffeine (EXCEDRIN EXTRA STRENGTH PO) Take by mouth as neededHistorical Med      pregabalin (LYRICA) 300 MG capsule Take 1 capsule by mouth 2 times daily for 90 days. , Disp-60 capsule, R-2Normal      venlafaxine (EFFEXOR) 100 MG tablet Take 1 tablet by mouth 3 times daily, Disp-90 tablet, R-1Normal      lisinopril (PRINIVIL;ZESTRIL) 20 MG tablet Take 1 tablet by mouth daily, Disp-30 tablet, R-1Normal      mirtazapine (REMERON) 45 MG tablet Take 1 tablet by mouth nightly, Disp-30 tablet, R-1Normal      ondansetron (ZOFRAN ODT) 4 MG disintegrating tablet Take 1 tablet by mouth every 8 hours as needed for Nausea or Vomiting, Disp-15 tablet,R-0Normal      neomycin-bacitracin-polymyxin (NEOSPORIN) 3.5-400-5000 OINT ointment Apply topically 4 times daily. , Disp-1 Tube,R-1, Normal      naloxone 4 MG/0.1ML LIQD nasal spray 1 spray by Nasal route as needed for Opioid Reversal, Disp-1 each,R-5Normal             ALLERGIES     Patient has no known allergies. FAMILY HISTORY     History reviewed. No pertinent family history.      SOCIAL HISTORY       Social History     Socioeconomic History    Marital status: Single     Spouse name: None    Number of children: None    Years of education: None    Highest education level: None   Occupational History    None   Social Needs    Financial resource strain: None    Food insecurity     Worry: None     Inability: None    Transportation needs     Medical: None     Non-medical: None   Tobacco Use    Smoking status: Former Smoker     Packs/day: 0.50     Types: Cigarettes    Smokeless tobacco: Never Used   Substance and Sexual Activity    Alcohol use: Yes     Comment: occasional    Drug use: Not Currently     Comment: Last used in December 2020    Sexual activity: Not Currently   Lifestyle    Physical activity     Days per week: None     Minutes per session: None    Stress: None   Relationships    Social connections     Talks on phone: None     Gets together: None     Attends Shinto service: None     Active member of club or organization: None     Attends meetings of clubs or organizations: None     Relationship status: None    Intimate partner violence     Fear of current or ex partner: None     Emotionally abused: None Physically abused: None     Forced sexual activity: None   Other Topics Concern    None   Social History Narrative    None       REVIEW OF SYSTEMS     Review of Systems   Constitutional: Positive for fever. Negative for fatigue. HENT: Negative for congestion and trouble swallowing. Eyes: Negative for redness. Respiratory: Negative for cough and shortness of breath. Cardiovascular: Positive for chest pain. Gastrointestinal: Positive for nausea. Negative for abdominal pain, constipation, diarrhea and vomiting. Genitourinary: Negative for difficulty urinating. Musculoskeletal: Positive for myalgias. Negative for back pain. Skin: Negative for rash. Allergic/Immunologic: Negative for immunocompromised state. Neurological: Positive for headaches. Negative for light-headedness. Hematological: Does not bruise/bleed easily. Except as noted above the remainder of the review of systems was reviewed and is. PHYSICAL EXAM    (up to 7 for level 4, 8 or more for level 5)     ED Triage Vitals [01/21/21 1544]   BP Temp Temp Source Pulse Resp SpO2 Height Weight   (!) 153/90 99.2 °F (37.3 °C) Oral 116 16 98 % 5' 10\" (1.778 m) 190 lb (86.2 kg)       Physical Exam  Vitals signs and nursing note reviewed. Constitutional:       General: He is not in acute distress. Appearance: He is normal weight. He is not ill-appearing. HENT:      Head: Normocephalic and atraumatic. Right Ear: Tympanic membrane, ear canal and external ear normal.      Left Ear: Tympanic membrane, ear canal and external ear normal.      Nose: Nose normal.      Mouth/Throat:      Mouth: Mucous membranes are moist.      Pharynx: Oropharynx is clear. Eyes:      General: No scleral icterus. Extraocular Movements: Extraocular movements intact. Pupils: Pupils are equal, round, and reactive to light. Neck:      Musculoskeletal: Neck supple. Meningeal: Brudzinski's sign and Kernig's sign absent.    Cardiovascular: Rate and Rhythm: Regular rhythm. Tachycardia present. Heart sounds: S1 normal and S2 normal. Heart sounds not distant. No murmur. Pulmonary:      Effort: Pulmonary effort is normal. No tachypnea or respiratory distress. Breath sounds: Normal breath sounds. No decreased breath sounds or wheezing. Chest:      Chest wall: No crepitus. Abdominal:      General: Abdomen is flat. Bowel sounds are normal. There is no distension. Palpations: Abdomen is soft. Tenderness: There is no abdominal tenderness. There is no guarding or rebound. Musculoskeletal:      Right lower leg: No edema. Left lower leg: No edema. Skin:     General: Skin is warm and dry. Capillary Refill: Capillary refill takes less than 2 seconds. Comments: Multiple old scabs and lesions consistent with picking. No evidence of cellulitis, abscess, or induration. Neurological:      General: No focal deficit present. Mental Status: He is alert. DIAGNOSTIC RESULTS     EKG:(none if blank)  All EKG's are interpreted by [facility] Phys[facility] who either signs or Co-signs this chart in the absence of a cardiologist.        RADIOLOGY: (none if blank)   Interpretation per the Radiologistbelow, if available at the time of this note:    XR CHEST PORTABLE   Final Result   No acute disease. **This report has been created using voice recognition software. It may contain minor errors which are inherent in voice recognition technology. **      Final report electronically signed by Dr. Linda Ugalde on 1/21/2021 4:50 PM          LABS:  Labs Reviewed   CBC WITH AUTO DIFFERENTIAL - Abnormal; Notable for the following components:       Result Value    RBC 4.34 (*)     Hemoglobin 12.4 (*)     Hematocrit 37.5 (*)     All other components within normal limits   COMPREHENSIVE METABOLIC PANEL W/ REFLEX TO MG FOR LOW K - Abnormal; Notable for the following components:    AST 50 (*)     Total Bilirubin <0.2 (*)     ALT 73 (*)     All other components within normal limits   ANION GAP - Abnormal; Notable for the following components:    Anion Gap 6.0 (*)     All other components within normal limits   RAPID INFLUENZA A/B ANTIGENS   D-DIMER, QUANTITATIVE   C-REACTIVE PROTEIN   TROPONIN   COVID-19   GLOMERULAR FILTRATION RATE, ESTIMATED   OSMOLALITY   URINE RT REFLEX TO CULTURE       All other labs were within normal range or not returned as of this dictation. Please note, any cultures that may have been sent were not resulted at the time of this patient visit. EMERGENCY DEPARTMENT COURSE andMedical Decision Making:     MDM  Number of Diagnoses or Management Options  Acute viral syndrome  Anxiety  Diagnosis management comments: 23-year-old male presents emergency room for myalgias, fever, chest pain. EKG is within normal limits. Will evaluate with CBC, CMP, Trope, chest x-ray, Covid swab, influenza swab, D-dimer given his tachycardia. We will hydrate him with of normal saline bolus. Will give Motrin for body aches and fever. /  ED Course as of Jan 21 2129   Thu Jan 21, 2021   1744 Covid and influenza swabs negative. D-dimer is negative which decreases my suspicion of PE. No evidence of pneumonia on chest x-ray. He is feeling better after Motrin and Zofran. Will discharge home as a viral syndrome.    [DD]      ED Course User Index  [DD] Maribell Pimentel MD         The patient was evaluated during the global COVID-19 pandemic, and that diagnosis was considered upon their initial presentation. Their evaluation, treatment and testing was consistent with current guidelines for patients who present with complaints or symptoms that may be related to COVID-19.     Strict returnprecautions and follow up instructions were discussed with the patient with which the patient agrees        ED Medications administered this visit:    Medications   0.9 % sodium chloride bolus (0 mLs Intravenous Stopped 1/21/21 1730) ondansetron (ZOFRAN) injection 4 mg (4 mg Intravenous Given 1/21/21 1651)   ibuprofen (ADVIL;MOTRIN) tablet 600 mg (600 mg Oral Given 1/21/21 1654)         EKG 12 Lead    Date/Time: 1/21/2021 3:59 PM  Performed by: Kisha Goldberg MD  Authorized by: Kisha Goldberg MD     ECG reviewed by ED Physician in the absence of a cardiologist: yes    Previous ECG:     Previous ECG:  Unavailable  Interpretation:     Interpretation: normal    Rate:     ECG rate:  108    ECG rate assessment: tachycardic    Rhythm:     Rhythm: sinus tachycardia    Ectopy:     Ectopy: none    QRS:     QRS axis:  Normal    QRS intervals:  Normal  Conduction:     Conduction: normal    ST segments:     ST segments:  Normal  T waves:     T waves: normal      : (None if blank)       CLINICAL       1. Acute viral syndrome    2.  Anxiety          DISPOSITION/PLAN   DISPOSITION Decision To Discharge 01/21/2021 05:41:31 PM      PATIENT REFERRED TO:  PAPA Doran - [facility]  [address]   [phone]    Schedule an appointment as soon as possible for a visit   If symptoms worsen      DISCHARGE MEDICATIONS:  Discharge Medication List as of 1/21/2021  5:44 PM      START taking these medications    Details   hydrOXYzine (VISTARIL) 25 MG capsule Take 1 capsule by mouth 3 times daily as needed for Itching or Anxiety, Disp-30 capsule, R-0Normal                    (Please note that portions of this note were completed with a voice recognition program.  Efforts were made to edit the dictations but occasionallywords are mis-transcribed.)      Electronically signed by Abbie Mayer MD on 1/21/21 at 5:40 PM EST    Attending Physician, Emergency Department       Kisha Goldberg MD  01/21/21 0339

## 2021-01-22 LAB
EKG ATRIAL RATE: 108 BPM
EKG P AXIS: 68 DEGREES
EKG P-R INTERVAL: 186 MS
EKG Q-T INTERVAL: 306 MS
EKG QRS DURATION: 90 MS
EKG QTC CALCULATION (BAZETT): 410 MS
EKG R AXIS: 29 DEGREES
EKG T AXIS: 56 DEGREES
EKG VENTRICULAR RATE: 108 BPM

## 2021-01-25 ENCOUNTER — OFFICE VISIT (OUTPATIENT)
Dept: INTERNAL MEDICINE CLINIC | Age: 36
End: 2021-01-25
Payer: MEDICARE

## 2021-01-25 VITALS
SYSTOLIC BLOOD PRESSURE: 130 MMHG | HEART RATE: 112 BPM | BODY MASS INDEX: 28.92 KG/M2 | HEIGHT: 70 IN | WEIGHT: 202 LBS | DIASTOLIC BLOOD PRESSURE: 97 MMHG

## 2021-01-25 DIAGNOSIS — F41.9 ANXIETY AND DEPRESSION: ICD-10-CM

## 2021-01-25 DIAGNOSIS — Z79.899 ENCOUNTER FOR MONITORING SUBOXONE MAINTENANCE THERAPY: ICD-10-CM

## 2021-01-25 DIAGNOSIS — M79.7 FIBROMYALGIA: ICD-10-CM

## 2021-01-25 DIAGNOSIS — Z51.81 ENCOUNTER FOR MONITORING SUBOXONE MAINTENANCE THERAPY: ICD-10-CM

## 2021-01-25 DIAGNOSIS — R76.8 HEPATITIS C ANTIBODY POSITIVE IN BLOOD: ICD-10-CM

## 2021-01-25 DIAGNOSIS — F11.20 SEVERE OPIOID USE DISORDER (HCC): Primary | ICD-10-CM

## 2021-01-25 DIAGNOSIS — F19.10 POLYSUBSTANCE ABUSE (HCC): ICD-10-CM

## 2021-01-25 DIAGNOSIS — F32.A ANXIETY AND DEPRESSION: ICD-10-CM

## 2021-01-25 PROCEDURE — G8427 DOCREV CUR MEDS BY ELIG CLIN: HCPCS | Performed by: INTERNAL MEDICINE

## 2021-01-25 PROCEDURE — 1036F TOBACCO NON-USER: CPT | Performed by: INTERNAL MEDICINE

## 2021-01-25 PROCEDURE — 80305 DRUG TEST PRSMV DIR OPT OBS: CPT | Performed by: INTERNAL MEDICINE

## 2021-01-25 PROCEDURE — G8484 FLU IMMUNIZE NO ADMIN: HCPCS | Performed by: INTERNAL MEDICINE

## 2021-01-25 PROCEDURE — G8419 CALC BMI OUT NRM PARAM NOF/U: HCPCS | Performed by: INTERNAL MEDICINE

## 2021-01-25 PROCEDURE — 99213 OFFICE O/P EST LOW 20 MIN: CPT | Performed by: INTERNAL MEDICINE

## 2021-01-25 RX ORDER — BUPRENORPHINE AND NALOXONE 8; 2 MG/1; MG/1
1 FILM, SOLUBLE BUCCAL; SUBLINGUAL 2 TIMES DAILY
Qty: 6 FILM | Refills: 0 | Status: SHIPPED | OUTPATIENT
Start: 2021-01-25 | End: 2021-01-27 | Stop reason: SDUPTHER

## 2021-01-25 NOTE — PROGRESS NOTES
Verbal order per Dr. Cal Rodriguez for urine drug screen. Positive for BUP FTY TRAM. Verified results with Macario Russo RN. Dr. Cal Rodriguez ordered Suboxone 8mg film BID for patient. Verified dose with patient. Patient was sent home with 3 day script of Suboxone 8mg film BID and will be seen back in the office 1/27/21.

## 2021-01-25 NOTE — PROGRESS NOTES
Jw Morrissey (:  1985) is a 39 y.o. male,Established patient, here for evaluation of the following chief complaint(s):  Drug Problem      ASSESSMENT/PLAN:  1. Severe opioid use disorder (HCC)  -     POCT Rapid Drug Screen  -     buprenorphine-naloxone (SUBOXONE) 8-2 MG FILM SL film; Place 1 Film under the tongue 2 times daily for 3 days. , Disp-6 Film, R-0Normal  2. Encounter for monitoring Suboxone maintenance therapy  3. Anxiety and depression  4. Fibromyalgia  5. Polysubstance abuse ([facility] [address]  6. Hepatitis C antibody positive in blood      Urine has fentanyl consistent with his relapse  He says he is going to [facility] for a week for the   I told him I will not give him that much Suboxone  I told him he needs to come back here Wednesday  He said that is not possible  I said if he wants to get Suboxone here that is what he has to do if not he can go back to the methadone clinic  SUBJECTIVE/OBJECTIVE:  HPI  I saw him here   He was lost to follow-up  He said he was in the methadone clinic until recently  He relapsed on meth  He says he wants to get clean he has been using Suboxone off the street  I see his girlfriend as well  I put him on Suboxone  weeks ago  He says they both relapsed on heroin last Friday  He said he has an aunt in [facility] that passed away  Review of Systems   Patient is feeling well  Patient is not experiencing  withdrawal symptoms ,no urges or cravings  Patient is not having any side effects from the buprenorphine    Physical Exam  Constitutional:       Appearance: Normal appearance. Skin:     General: Skin is warm and dry. Neurological:      Mental Status: He is alert. Psychiatric:         Mood and Affect: Mood normal.         Behavior: Behavior normal.         Thought Content:  Thought content normal.         Judgment: Judgment normal.     Urine tox screen today  Alcohol, Urine 2021  1:50 PM Unknown   NEG    Amphetamine Screen, Urine 2021  1:50 PM Unknown NEG    Barbiturate Screen, Urine 01/25/2021  1:50 PM Unknown   NEG    Benzodiazepine Screen, Urine 01/25/2021  1:50 PM Unknown   NEG    Buprenorphine Urine 01/25/2021  1:50 PM Unknown   POS    Cocaine Metabolite Screen, Urine 01/25/2021  1:50 PM Unknown   NEG    FENTANYL SCREEN, URINE 01/25/2021  1:50 PM Unknown   POS    Gabapentin Screen, Urine 01/25/2021  1:50 PM Unknown   N/A    MDMA, Urine 01/25/2021  1:50 PM Unknown   NEG    Methadone Screen, Urine 01/25/2021  1:50 PM Unknown   NEG    Methamphetamine, Urine 01/25/2021  1:50 PM Unknown   NEG    Opiate Scrn, Ur 01/25/2021  1:50 PM Unknown   NEG    Oxycodone Screen, Ur 01/25/2021  1:50 PM Unknown   NEG    PCP Screen, Urine 01/25/2021  1:50 PM Unknown   NEG    Propoxyphene Screen, Urine 01/25/2021  1:50 PM Unknown   N/A    Synthetic Cannabinoids (K2) Screen, Urine 01/25/2021  1:50 PM Unknown   NEG    THC Screen, Urine 01/25/2021  1:50 PM Unknown   NEG    Tramadol Scrn, Ur 01/25/2021  1:50 PM Unknown   POS    Tricyclic Antidepressants, Urine 01/25/2021  1:50 PM Unknown   N/A        I reviewed the [address] [place] [address] Dr Automated Rx Reporting System report     There does not appear to be any discrepancies or overprescribing of controlled substances  He is prescribed Lyrica          An electronic signature was used to authenticate this note.     --Carlos Thompson MD

## 2021-01-27 ENCOUNTER — OFFICE VISIT (OUTPATIENT)
Dept: INTERNAL MEDICINE CLINIC | Age: 36
End: 2021-01-27
Payer: MEDICARE

## 2021-01-27 VITALS
DIASTOLIC BLOOD PRESSURE: 61 MMHG | SYSTOLIC BLOOD PRESSURE: 131 MMHG | HEART RATE: 82 BPM | HEIGHT: 70 IN | BODY MASS INDEX: 29.2 KG/M2 | WEIGHT: 204 LBS

## 2021-01-27 DIAGNOSIS — R76.8 HEPATITIS C ANTIBODY POSITIVE IN BLOOD: ICD-10-CM

## 2021-01-27 DIAGNOSIS — F11.20 SEVERE OPIOID USE DISORDER (HCC): Primary | ICD-10-CM

## 2021-01-27 DIAGNOSIS — M79.7 FIBROMYALGIA: ICD-10-CM

## 2021-01-27 DIAGNOSIS — F19.10 POLYSUBSTANCE ABUSE (HCC): ICD-10-CM

## 2021-01-27 DIAGNOSIS — F32.A ANXIETY AND DEPRESSION: ICD-10-CM

## 2021-01-27 DIAGNOSIS — Z79.899 ENCOUNTER FOR MONITORING SUBOXONE MAINTENANCE THERAPY: ICD-10-CM

## 2021-01-27 DIAGNOSIS — F41.9 ANXIETY AND DEPRESSION: ICD-10-CM

## 2021-01-27 DIAGNOSIS — Z51.81 ENCOUNTER FOR MONITORING SUBOXONE MAINTENANCE THERAPY: ICD-10-CM

## 2021-01-27 PROCEDURE — 1036F TOBACCO NON-USER: CPT | Performed by: INTERNAL MEDICINE

## 2021-01-27 PROCEDURE — G8427 DOCREV CUR MEDS BY ELIG CLIN: HCPCS | Performed by: INTERNAL MEDICINE

## 2021-01-27 PROCEDURE — G8419 CALC BMI OUT NRM PARAM NOF/U: HCPCS | Performed by: INTERNAL MEDICINE

## 2021-01-27 PROCEDURE — 99213 OFFICE O/P EST LOW 20 MIN: CPT | Performed by: INTERNAL MEDICINE

## 2021-01-27 PROCEDURE — 80305 DRUG TEST PRSMV DIR OPT OBS: CPT | Performed by: INTERNAL MEDICINE

## 2021-01-27 PROCEDURE — G8484 FLU IMMUNIZE NO ADMIN: HCPCS | Performed by: INTERNAL MEDICINE

## 2021-01-27 RX ORDER — BUPRENORPHINE AND NALOXONE 8; 2 MG/1; MG/1
1 FILM, SOLUBLE BUCCAL; SUBLINGUAL 2 TIMES DAILY
Qty: 26 FILM | Refills: 0 | Status: SHIPPED | OUTPATIENT
Start: 2021-01-27 | End: 2021-02-08 | Stop reason: SDUPTHER

## 2021-01-27 NOTE — PROGRESS NOTES
Verbal order per Dr. Neda Higgins for urine drug screen. Positive for BUP TRAM. Verified results with Bev Bateman RN. Dr. Neda Higgins ordered Suboxone 8mg film BID  for patient. Verified dose with patient. Patient was sent home with 13 day script of Suboxone 8mg film BID and will be seen back in the office 2/8/21.

## 2021-01-27 NOTE — PROGRESS NOTES
Barbi Gutiérrez (:  1985) is a 39 y.o. male,Established patient, here for evaluation of the following chief complaint(s):  Drug Problem      ASSESSMENT/PLAN:  1. Severe opioid use disorder (HCC)  -     POCT Rapid Drug Screen  -     buprenorphine-naloxone (SUBOXONE) 8-2 MG FILM SL film; Place 1 Film under the tongue 2 times daily for 13 days. , Disp-26 Film, R-0Normal  2. Encounter for monitoring Suboxone maintenance therapy  3. Fibromyalgia  4. Anxiety and depression  5. Polysubstance abuse ([facility] [address]  6. Hepatitis C antibody positive in blood    Urine is clean today  His aunt  in [facility]  When he told his sister he relapsed she did not want him coming up for the   Once again he might go to half-way versus be put on probation today  Suboxone 8 mg twice daily  Tentatively follow-up here          SUBJECTIVE/OBJECTIVE:  Drug Problem      I saw him here   He was lost to follow-up  He said he was in the methadone clinic until recently  He relapsed on meth  He says he wants to get clean he has been using Suboxone off the street  I see his girlfriend as well  I put him on Suboxone  weeks ago  He says they both relapsed on heroin last Friday  He said he has an aunt in [place] [facility] that passed away    Patient says he gets sentenced today  He said it is 50-50 whether he goes to half-way or probation  Review of Systems   Patient is feeling well  Patient is not experiencing  withdrawal symptoms ,no urges or cravings  Patient is not having any side effects from the buprenorphine    Physical Exam  Constitutional:       Appearance: Normal appearance. Skin:     General: Skin is warm and dry. Neurological:      Mental Status: He is alert. Psychiatric:         Mood and Affect: Mood normal.         Behavior: Behavior normal.         Thought Content:  Thought content normal.         Judgment: Judgment normal.     Urine tox screen today  Alcohol, Urine 2021  8:32 AM Unknown   NEG    Amphetamine Screen, Urine 2021  8:32 AM Unknown   NEG    Barbiturate Screen, Urine 01/27/2021  8:32 AM Unknown   NEG    Benzodiazepine Screen, Urine 01/27/2021  8:32 AM Unknown   NEG    Buprenorphine Urine 01/27/2021  8:32 AM Unknown   POS    Cocaine Metabolite Screen, Urine 01/27/2021  8:32 AM Unknown   NEG    FENTANYL SCREEN, URINE 01/27/2021  8:32 AM Unknown   NEG    Gabapentin Screen, Urine 01/27/2021  8:32 AM Unknown   N/A    MDMA, Urine 01/27/2021  8:32 AM Unknown   NEG    Methadone Screen, Urine 01/27/2021  8:32 AM Unknown   NEG    Methamphetamine, Urine 01/27/2021  8:32 AM Unknown   NEG    Opiate Scrn, Ur 01/27/2021  8:32 AM Unknown   NEG    Oxycodone Screen, Ur 01/27/2021  8:32 AM Unknown   NEG    PCP Screen, Urine 01/27/2021  8:32 AM Unknown   NEG    Propoxyphene Screen, Urine 01/27/2021  8:32 AM Unknown   N/A    Synthetic Cannabinoids (K2) Screen, Urine 01/27/2021  8:32 AM Unknown   NEG    THC Screen, Urine 01/27/2021  8:32 AM Unknown   NEG    Tramadol Scrn, Ur 01/27/2021  8:32 AM Unknown   POS    Tricyclic Antidepressants, Urine 01/27/2021  8:32 AM Unknown   N/A        I reviewed the [place] Automated Rx Reporting System report     There does not appear to be any discrepancies or overprescribing of controlled substances  He is prescribed Lyrica          An electronic signature was used to authenticate this note.     --Diane Thomson MD

## 2021-02-08 ENCOUNTER — OFFICE VISIT (OUTPATIENT)
Dept: INTERNAL MEDICINE CLINIC | Age: 36
End: 2021-02-08
Payer: MEDICARE

## 2021-02-08 VITALS
SYSTOLIC BLOOD PRESSURE: 130 MMHG | HEIGHT: 70 IN | DIASTOLIC BLOOD PRESSURE: 89 MMHG | TEMPERATURE: 97.5 F | BODY MASS INDEX: 29.49 KG/M2 | HEART RATE: 110 BPM | WEIGHT: 206 LBS

## 2021-02-08 DIAGNOSIS — R76.8 HEPATITIS C ANTIBODY POSITIVE IN BLOOD: ICD-10-CM

## 2021-02-08 DIAGNOSIS — Z79.899 ENCOUNTER FOR MONITORING SUBOXONE MAINTENANCE THERAPY: ICD-10-CM

## 2021-02-08 DIAGNOSIS — M79.7 FIBROMYALGIA: ICD-10-CM

## 2021-02-08 DIAGNOSIS — F41.9 ANXIETY AND DEPRESSION: ICD-10-CM

## 2021-02-08 DIAGNOSIS — Z51.81 ENCOUNTER FOR MONITORING SUBOXONE MAINTENANCE THERAPY: ICD-10-CM

## 2021-02-08 DIAGNOSIS — F19.10 POLYSUBSTANCE ABUSE (HCC): ICD-10-CM

## 2021-02-08 DIAGNOSIS — F32.A ANXIETY AND DEPRESSION: ICD-10-CM

## 2021-02-08 DIAGNOSIS — F11.20 SEVERE OPIOID USE DISORDER (HCC): Primary | ICD-10-CM

## 2021-02-08 PROCEDURE — G8427 DOCREV CUR MEDS BY ELIG CLIN: HCPCS | Performed by: INTERNAL MEDICINE

## 2021-02-08 PROCEDURE — G8484 FLU IMMUNIZE NO ADMIN: HCPCS | Performed by: INTERNAL MEDICINE

## 2021-02-08 PROCEDURE — 80305 DRUG TEST PRSMV DIR OPT OBS: CPT | Performed by: INTERNAL MEDICINE

## 2021-02-08 PROCEDURE — G8419 CALC BMI OUT NRM PARAM NOF/U: HCPCS | Performed by: INTERNAL MEDICINE

## 2021-02-08 PROCEDURE — 99213 OFFICE O/P EST LOW 20 MIN: CPT | Performed by: INTERNAL MEDICINE

## 2021-02-08 PROCEDURE — 1036F TOBACCO NON-USER: CPT | Performed by: INTERNAL MEDICINE

## 2021-02-08 RX ORDER — BUPRENORPHINE AND NALOXONE 8; 2 MG/1; MG/1
1 FILM, SOLUBLE BUCCAL; SUBLINGUAL 2 TIMES DAILY
Qty: 28 FILM | Refills: 0 | Status: SHIPPED | OUTPATIENT
Start: 2021-02-08 | End: 2021-02-22 | Stop reason: SDUPTHER

## 2021-02-08 NOTE — PROGRESS NOTES
Verbal order per Dr. Andry Brooke for urine drug screen. Positive for bup. Verified results with Victor M Saleem LPN. Dr. Andry Brooke ordered Suboxone 8 mg film BID for patient. Verified dose with patient. Patient was sent home with 2 week script for Suboxone 8 mg film BID and will be seen back in the office on 2/22/21. UC sent.

## 2021-02-13 NOTE — PROGRESS NOTES
Alyson De La Cruz (:  1985) is a 39 y.o. male,Established patient, here for evaluation of the following chief complaint(s):  Drug Problem      ASSESSMENT/PLAN:  1. Severe opioid use disorder (HCC)  -     POCT Rapid Drug Screen  -     buprenorphine-naloxone (SUBOXONE) 8-2 MG FILM SL film; Place 1 Film under the tongue 2 times daily for 14 days. , Disp-28 Film, R-0Normal  2. Encounter for monitoring Suboxone maintenance therapy  3. Fibromyalgia  4. Polysubstance abuse ([facility] [address]  5. Hepatitis C antibody positive in blood  6. Anxiety and depression    Urine is clean today    Suboxone 8 mg twice daily  Tentatively follow-up here 2 weeks         SUBJECTIVE/OBJECTIVE:  Drug Problem      I saw him here   He was lost to follow-up until recently  He said he was in the methadone clinic until recently  He relapsed on meth  He says he wants to get clean he has been using Suboxone off the street  I see his girlfriend as well  I put him on Suboxone  weeks ago  He says they both relapsed on heroin last Friday  He said he has an aunt in [place] [facility] that passed away    He had charges pending he was placed on probation  Review of Systems   Patient is feeling well  Patient is not experiencing  withdrawal symptoms ,no urges or cravings  Patient is not having any side effects from the buprenorphine    Physical Exam  Constitutional:       Appearance: Normal appearance. Skin:     General: Skin is warm and dry. Neurological:      Mental Status: He is alert. Psychiatric:         Mood and Affect: Mood normal.         Behavior: Behavior normal.         Thought Content:  Thought content normal.         Judgment: Judgment normal.     Urine tox screen today  Alcohol, Urine 2021  2:46 PM Unknown   NEG    Amphetamine Screen, Urine 2021  2:46 PM Unknown   NEG    Barbiturate Screen, Urine 2021  2:46 PM Unknown   NEG    Benzodiazepine Screen, Urine 2021  2:46 PM Unknown   NEG Buprenorphine Urine 02/08/2021  2:46 PM Unknown   POS    Cocaine Metabolite Screen, Urine 02/08/2021  2:46 PM Unknown   NEG    FENTANYL SCREEN, URINE 02/08/2021  2:46 PM Unknown   NEG    Gabapentin Screen, Urine 02/08/2021  2:46 PM Unknown   N/A    MDMA, Urine 02/08/2021  2:46 PM Unknown   NEG    Methadone Screen, Urine 02/08/2021  2:46 PM Unknown   NEG    Methamphetamine, Urine 02/08/2021  2:46 PM Unknown   NEG    Opiate Scrn, Ur 02/08/2021  2:46 PM Unknown   NEG    Oxycodone Screen, Ur 02/08/2021  2:46 PM Unknown   NEG    PCP Screen, Urine 02/08/2021  2:46 PM Unknown   NEG    Propoxyphene Screen, Urine 02/08/2021  2:46 PM Unknown   N/A    Synthetic Cannabinoids (K2) Screen, Urine 02/08/2021  2:46 PM Unknown   NEG    THC Screen, Urine 02/08/2021  2:46 PM Unknown   NEG    Tramadol Scrn, Ur 02/08/2021  2:46 PM Unknown   NEG    Tricyclic Antidepressants, Urine 02/08/2021  2:46 PM Unknown   N/A         reviewed the [place] Automated Rx Reporting System report     There does not appear to be any discrepancies or overprescribing of controlled substances  He is prescribed Lyrica          An electronic signature was used to authenticate this note.     --Johnnie Barragan MD

## 2021-02-22 ENCOUNTER — OFFICE VISIT (OUTPATIENT)
Dept: INTERNAL MEDICINE CLINIC | Age: 36
End: 2021-02-22
Payer: MEDICARE

## 2021-02-22 VITALS
DIASTOLIC BLOOD PRESSURE: 101 MMHG | BODY MASS INDEX: 31.21 KG/M2 | HEART RATE: 107 BPM | WEIGHT: 218 LBS | HEIGHT: 70 IN | SYSTOLIC BLOOD PRESSURE: 154 MMHG

## 2021-02-22 DIAGNOSIS — Z51.81 ENCOUNTER FOR MONITORING SUBOXONE MAINTENANCE THERAPY: ICD-10-CM

## 2021-02-22 DIAGNOSIS — F32.A ANXIETY AND DEPRESSION: ICD-10-CM

## 2021-02-22 DIAGNOSIS — M79.7 FIBROMYALGIA: ICD-10-CM

## 2021-02-22 DIAGNOSIS — F19.10 POLYSUBSTANCE ABUSE (HCC): ICD-10-CM

## 2021-02-22 DIAGNOSIS — F11.20 SEVERE OPIOID USE DISORDER (HCC): Primary | ICD-10-CM

## 2021-02-22 DIAGNOSIS — R76.8 HEPATITIS C ANTIBODY POSITIVE IN BLOOD: ICD-10-CM

## 2021-02-22 DIAGNOSIS — F41.9 ANXIETY AND DEPRESSION: ICD-10-CM

## 2021-02-22 DIAGNOSIS — Z79.899 ENCOUNTER FOR MONITORING SUBOXONE MAINTENANCE THERAPY: ICD-10-CM

## 2021-02-22 DIAGNOSIS — I10 ESSENTIAL HYPERTENSION: ICD-10-CM

## 2021-02-22 PROCEDURE — 1036F TOBACCO NON-USER: CPT | Performed by: INTERNAL MEDICINE

## 2021-02-22 PROCEDURE — G8417 CALC BMI ABV UP PARAM F/U: HCPCS | Performed by: INTERNAL MEDICINE

## 2021-02-22 PROCEDURE — G8484 FLU IMMUNIZE NO ADMIN: HCPCS | Performed by: INTERNAL MEDICINE

## 2021-02-22 PROCEDURE — 99213 OFFICE O/P EST LOW 20 MIN: CPT | Performed by: INTERNAL MEDICINE

## 2021-02-22 PROCEDURE — 80305 DRUG TEST PRSMV DIR OPT OBS: CPT | Performed by: INTERNAL MEDICINE

## 2021-02-22 PROCEDURE — G8428 CUR MEDS NOT DOCUMENT: HCPCS | Performed by: INTERNAL MEDICINE

## 2021-02-22 RX ORDER — VENLAFAXINE 100 MG/1
100 TABLET ORAL 3 TIMES DAILY
Qty: 90 TABLET | Refills: 1 | Status: SHIPPED | OUTPATIENT
Start: 2021-02-22 | End: 2021-03-31 | Stop reason: SDUPTHER

## 2021-02-22 RX ORDER — LISINOPRIL 20 MG/1
20 TABLET ORAL DAILY
Qty: 30 TABLET | Refills: 1 | Status: SHIPPED | OUTPATIENT
Start: 2021-02-22 | End: 2021-06-29 | Stop reason: SDUPTHER

## 2021-02-22 RX ORDER — MIRTAZAPINE 45 MG/1
45 TABLET, FILM COATED ORAL NIGHTLY
Qty: 30 TABLET | Refills: 1 | Status: SHIPPED | OUTPATIENT
Start: 2021-02-22 | End: 2021-03-31 | Stop reason: SDUPTHER

## 2021-02-22 RX ORDER — BUPRENORPHINE AND NALOXONE 8; 2 MG/1; MG/1
1 FILM, SOLUBLE BUCCAL; SUBLINGUAL 2 TIMES DAILY
Qty: 28 FILM | Refills: 0 | Status: SHIPPED | OUTPATIENT
Start: 2021-02-22 | End: 2021-03-08 | Stop reason: SDUPTHER

## 2021-02-22 NOTE — TELEPHONE ENCOUNTER
Requested Prescriptions     Pending Prescriptions Disp Refills    venlafaxine (EFFEXOR) 100 MG tablet 90 tablet 1     Sig: Take 1 tablet by mouth 3 times daily    lisinopril (PRINIVIL;ZESTRIL) 20 MG tablet 30 tablet 1     Sig: Take 1 tablet by mouth daily    mirtazapine (REMERON) 45 MG tablet 30 tablet 1     Sig: Take 1 tablet by mouth nightly     [employer]

## 2021-02-22 NOTE — PROGRESS NOTES
Verbal order per Dr. Debi Duff for urine drug screen. Positive for BUP. Verified results with Brianna Mejia RN. Dr. Debi Duff ordered Suboxone 8mg film BID for patient. Verified dose with patient. Patient was sent home with 2 week script of Suboxone 8mg film BID and will be seen back in the office 3/8/21.

## 2021-02-22 NOTE — PROGRESS NOTES
Clifton Lutz (:  1985) is a 39 y.o. male,Established patient, here for evaluation of the following chief complaint(s):  Drug Problem      ASSESSMENT/PLAN:  1. Severe opioid use disorder (HCC)  -     POCT Rapid Drug Screen  -     buprenorphine-naloxone (SUBOXONE) 8-2 MG FILM SL film; Place 1 Film under the tongue 2 times daily for 14 days. , Disp-28 Film, R-0Normal  2. Encounter for monitoring Suboxone maintenance therapy  3. Polysubstance abuse ([facility] [address]  4. Fibromyalgia  5. Hepatitis C antibody positive in blood    Urine is clean today    Suboxone 8 mg twice daily  Tentatively follow-up here 2 weeks         SUBJECTIVE/OBJECTIVE:  Drug Problem      I saw him here   He was lost to follow-up until recently  He said he was in the methadone clinic until recently  He relapsed on meth weeks ago  He says he wants to get clean he has been using Suboxone off the street  I see his girlfriend as well  I put him on Suboxone  weeks ago  He says they both relapsed on heroin 2 weeks ago  He said he has an aunt in [place] that passed away    He had charges pending he was placed on probation  Review of Systems   Patient is feeling well  Patient is not experiencing  withdrawal symptoms ,no urges or cravings  Patient is not having any side effects from the buprenorphine    Physical Exam  Constitutional:       Appearance: Normal appearance. Skin:     General: Skin is warm and dry. Neurological:      Mental Status: He is alert. Psychiatric:         Mood and Affect: Mood normal.         Behavior: Behavior normal.         Thought Content:  Thought content normal.         Judgment: Judgment normal.     Urine tox screen today  Alcohol, Urine 2021  1:09 PM Unknown   NEG    Amphetamine Screen, Urine 2021  1:09 PM Unknown   NEG    Barbiturate Screen, Urine 2021  1:09 PM Unknown   NEG    Benzodiazepine Screen, Urine 2021  1:09 PM Unknown   NEG    Buprenorphine Urine 2021  1:09 PM Unknown   POS Cocaine Metabolite Screen, Urine 02/22/2021  1:09 PM Unknown   NEG    FENTANYL SCREEN, URINE 02/22/2021  1:09 PM Unknown   NEG    Gabapentin Screen, Urine 02/22/2021  1:09 PM Unknown   N/A    MDMA, Urine 02/22/2021  1:09 PM Unknown   NEG    Methadone Screen, Urine 02/22/2021  1:09 PM Unknown   NEG    Methamphetamine, Urine 02/22/2021  1:09 PM Unknown   NEG    Opiate Scrn, Ur 02/22/2021  1:09 PM Unknown   NEG    Oxycodone Screen, Ur 02/22/2021  1:09 PM Unknown   NEG    PCP Screen, Urine 02/22/2021  1:09 PM Unknown   NEG    Propoxyphene Screen, Urine 02/22/2021  1:09 PM Unknown   N/A    Synthetic Cannabinoids (K2) Screen, Urine 02/22/2021  1:09 PM Unknown   NEG    THC Screen, Urine 02/22/2021  1:09 PM Unknown   NEG    Tramadol Scrn, Ur 02/22/2021  1:09 PM Unknown   NEG    Tricyclic Antidepressants, Urine 02/22/2021  1:09 PM Unknown   N/A         reviewed the [place] Automated Rx Reporting System report     There does not appear to be any discrepancies or overprescribing of controlled substances  He is prescribed Lyrica          An electronic signature was used to authenticate this note.     --Mimi Acosta MD

## 2021-03-08 ENCOUNTER — OFFICE VISIT (OUTPATIENT)
Dept: INTERNAL MEDICINE CLINIC | Age: 36
End: 2021-03-08
Payer: MEDICARE

## 2021-03-08 VITALS
DIASTOLIC BLOOD PRESSURE: 86 MMHG | SYSTOLIC BLOOD PRESSURE: 116 MMHG | BODY MASS INDEX: 32.64 KG/M2 | HEIGHT: 70 IN | HEART RATE: 119 BPM | TEMPERATURE: 96.6 F | WEIGHT: 228 LBS

## 2021-03-08 DIAGNOSIS — Z51.81 ENCOUNTER FOR MONITORING SUBOXONE MAINTENANCE THERAPY: ICD-10-CM

## 2021-03-08 DIAGNOSIS — I10 ESSENTIAL HYPERTENSION: ICD-10-CM

## 2021-03-08 DIAGNOSIS — R76.8 HEPATITIS C ANTIBODY POSITIVE IN BLOOD: ICD-10-CM

## 2021-03-08 DIAGNOSIS — F11.20 SEVERE OPIOID USE DISORDER (HCC): Primary | ICD-10-CM

## 2021-03-08 DIAGNOSIS — M79.7 FIBROMYALGIA: ICD-10-CM

## 2021-03-08 DIAGNOSIS — F19.10 POLYSUBSTANCE ABUSE (HCC): ICD-10-CM

## 2021-03-08 DIAGNOSIS — Z79.899 ENCOUNTER FOR MONITORING SUBOXONE MAINTENANCE THERAPY: ICD-10-CM

## 2021-03-08 DIAGNOSIS — F32.A ANXIETY AND DEPRESSION: ICD-10-CM

## 2021-03-08 DIAGNOSIS — F41.9 ANXIETY AND DEPRESSION: ICD-10-CM

## 2021-03-08 PROCEDURE — 99213 OFFICE O/P EST LOW 20 MIN: CPT | Performed by: INTERNAL MEDICINE

## 2021-03-08 PROCEDURE — G8417 CALC BMI ABV UP PARAM F/U: HCPCS | Performed by: INTERNAL MEDICINE

## 2021-03-08 PROCEDURE — G8484 FLU IMMUNIZE NO ADMIN: HCPCS | Performed by: INTERNAL MEDICINE

## 2021-03-08 PROCEDURE — G8428 CUR MEDS NOT DOCUMENT: HCPCS | Performed by: INTERNAL MEDICINE

## 2021-03-08 PROCEDURE — 1036F TOBACCO NON-USER: CPT | Performed by: INTERNAL MEDICINE

## 2021-03-08 PROCEDURE — 80305 DRUG TEST PRSMV DIR OPT OBS: CPT | Performed by: INTERNAL MEDICINE

## 2021-03-08 RX ORDER — BUPRENORPHINE AND NALOXONE 8; 2 MG/1; MG/1
1 FILM, SOLUBLE BUCCAL; SUBLINGUAL 2 TIMES DAILY
Qty: 28 FILM | Refills: 0 | Status: SHIPPED | OUTPATIENT
Start: 2021-03-08 | End: 2021-03-22 | Stop reason: SDUPTHER

## 2021-03-08 NOTE — PROGRESS NOTES
Vivienne Tee (:  1985) is a 39 y.o. male,Established patient, here for evaluation of the following chief complaint(s):  Drug Problem      ASSESSMENT/PLAN:  1. Severe opioid use disorder (HCC)  -     POCT Rapid Drug Screen  -     buprenorphine-naloxone (SUBOXONE) 8-2 MG FILM SL film; Place 1 Film under the tongue 2 times daily for 14 days. , Disp-28 Film, R-0Normal  2. Essential hypertension  3. Anxiety and depression  4. Encounter for monitoring Suboxone maintenance therapy  5. Polysubstance abuse ([facility] [address]  6. Fibromyalgia  7. Hepatitis C antibody positive in blood    Urine is clean today    Suboxone 8 mg twice daily  Tentatively follow-up here 2 weeks         SUBJECTIVE/OBJECTIVE:  Drug Problem      I saw him here   He was lost to follow-up until recently  He said he was in the methadone clinic until recently  He relapsed on meth weeks ago  He says he wants to get clean he has been using Suboxone off the street  I see his girlfriend as well  I put him on Suboxone  weeks ago  He says they both relapsed on heroin 2 weeks ago  He said he has an aunt in [facility] that passed away    He had charges pending he was placed on probation  Review of Systems   Patient is feeling well  Patient is not experiencing  withdrawal symptoms ,no urges or cravings  Patient is not having any side effects from the buprenorphine    Physical Exam  Constitutional:       Appearance: Normal appearance. Skin:     General: Skin is warm and dry. Neurological:      Mental Status: He is alert. Psychiatric:         Mood and Affect: Mood normal.         Behavior: Behavior normal.         Thought Content:  Thought content normal.         Judgment: Judgment normal.     Urine tox screen today  Alcohol, Urine 2021  1:20 PM Unknown   NEG    Amphetamine Screen, Urine 2021  1:20 PM Unknown   NEG    Barbiturate Screen, Urine 2021  1:20 PM Unknown   NEG    Benzodiazepine Screen, Urine 2021  1:20 PM Unknown   NEG Buprenorphine Urine 03/08/2021  1:20 PM Unknown   POS    Cocaine Metabolite Screen, Urine 03/08/2021  1:20 PM Unknown   NEG    FENTANYL SCREEN, URINE 03/08/2021  1:20 PM Unknown   NEG    Gabapentin Screen, Urine 03/08/2021  1:20 PM Unknown   N/A    MDMA, Urine 03/08/2021  1:20 PM Unknown   NEG    Methadone Screen, Urine 03/08/2021  1:20 PM Unknown   NEG    Methamphetamine, Urine 03/08/2021  1:20 PM Unknown   NEG    Opiate Scrn, Ur 03/08/2021  1:20 PM Unknown   NEG    Oxycodone Screen, Ur 03/08/2021  1:20 PM Unknown   NEG    PCP Screen, Urine 03/08/2021  1:20 PM Unknown   NEG    Propoxyphene Screen, Urine 03/08/2021  1:20 PM Unknown   N/A    Synthetic Cannabinoids (K2) Screen, Urine 03/08/2021  1:20 PM Unknown   NEG    THC Screen, Urine 03/08/2021  1:20 PM Unknown   NEG    Tramadol Scrn, Ur 03/08/2021  1:20 PM Unknown   POS    Tricyclic Antidepressants, Urine 03/08/2021  1:20 PM Unknown   N/A         reviewed the [place] Automated Rx Reporting System report     There does not appear to be any discrepancies or overprescribing of controlled substances  He is prescribed Lyrica          An electronic signature was used to authenticate this note.     --Karly May MD

## 2021-03-08 NOTE — PROGRESS NOTES
Verbal order per Dr. Katty Gay for urine drug screen. Positive for BUP TRAM. Verified results with Verena Serna RN. Dr. Katty Gay ordered Suboxone 8mg film BID  for patient. Verified dose with patient. Patient was sent home with 2 week script of Suboxone 8mg film BID and will be seen back in the office 3/22/21. UC sent.

## 2021-03-21 ENCOUNTER — HOSPITAL ENCOUNTER (EMERGENCY)
Age: 36
Discharge: HOME | End: 2021-03-21
Payer: MEDICARE

## 2021-03-21 VITALS
BODY MASS INDEX: 32.64 KG/M2 | SYSTOLIC BLOOD PRESSURE: 131 MMHG | RESPIRATION RATE: 16 BRPM | OXYGEN SATURATION: 98 % | HEART RATE: 80 BPM | WEIGHT: 228 LBS | HEIGHT: 70 IN | TEMPERATURE: 98.6 F | DIASTOLIC BLOOD PRESSURE: 89 MMHG

## 2021-03-21 DIAGNOSIS — L02.419 CELLULITIS AND ABSCESS OF UPPER EXTREMITY: ICD-10-CM

## 2021-03-21 DIAGNOSIS — L03.119 CELLULITIS AND ABSCESS OF UPPER EXTREMITY: ICD-10-CM

## 2021-03-21 DIAGNOSIS — L98.9 SKIN LESIONS: ICD-10-CM

## 2021-03-21 DIAGNOSIS — R11.2 NON-INTRACTABLE VOMITING WITH NAUSEA, UNSPECIFIED VOMITING TYPE: Primary | ICD-10-CM

## 2021-03-21 LAB
ALBUMIN SERPL-MCNC: 4.2 G/DL (ref 3.5–5.1)
ALP BLD-CCNC: 108 U/L (ref 38–126)
ALT SERPL-CCNC: 73 U/L (ref 11–66)
AMPHETAMINE+METHAMPHETAMINE URINE SCREEN: NEGATIVE
ANION GAP SERPL CALCULATED.3IONS-SCNC: 11 MEQ/L (ref 8–16)
AST SERPL-CCNC: 51 U/L (ref 5–40)
BARBITURATE QUANTITATIVE URINE: NEGATIVE
BASOPHILS # BLD: 0.3 %
BASOPHILS ABSOLUTE: 0 THOU/MM3 (ref 0–0.1)
BENZODIAZEPINE QUANTITATIVE URINE: NEGATIVE
BILIRUB SERPL-MCNC: 0.2 MG/DL (ref 0.3–1.2)
BILIRUBIN DIRECT: < 0.2 MG/DL (ref 0–0.3)
BILIRUBIN URINE: NEGATIVE
BLOOD, URINE: NEGATIVE
BUN BLDV-MCNC: 17 MG/DL (ref 7–22)
CALCIUM SERPL-MCNC: 9.7 MG/DL (ref 8.5–10.5)
CANNABINOID QUANTITATIVE URINE: NEGATIVE
CHARACTER, URINE: CLEAR
CHLORIDE BLD-SCNC: 99 MEQ/L (ref 98–111)
CO2: 26 MEQ/L (ref 23–33)
COCAINE METABOLITE QUANTITATIVE URINE: NEGATIVE
COLOR: YELLOW
CREAT SERPL-MCNC: 1 MG/DL (ref 0.4–1.2)
EOSINOPHIL # BLD: 2.1 %
EOSINOPHILS ABSOLUTE: 0.2 THOU/MM3 (ref 0–0.4)
ERYTHROCYTE [DISTWIDTH] IN BLOOD BY AUTOMATED COUNT: 13.9 % (ref 11.5–14.5)
ERYTHROCYTE [DISTWIDTH] IN BLOOD BY AUTOMATED COUNT: 43.2 FL (ref 35–45)
GFR SERPL CREATININE-BSD FRML MDRD: 84 ML/MIN/1.73M2
GLUCOSE BLD-MCNC: 94 MG/DL (ref 70–108)
GLUCOSE URINE: NEGATIVE MG/DL
HCT VFR BLD CALC: 43.2 % (ref 42–52)
HEMOGLOBIN: 13.6 GM/DL (ref 14–18)
IMMATURE GRANS (ABS): 0.02 THOU/MM3 (ref 0–0.07)
IMMATURE GRANULOCYTES: 0.2 %
KETONES, URINE: ABNORMAL
LACTIC ACID: 1.2 MMOL/L (ref 0.5–2.2)
LEUKOCYTE ESTERASE, URINE: NEGATIVE
LIPASE: 10.9 U/L (ref 5.6–51.3)
LYMPHOCYTES # BLD: 27.3 %
LYMPHOCYTES ABSOLUTE: 2.4 THOU/MM3 (ref 1–4.8)
MCH RBC QN AUTO: 26.9 PG (ref 26–33)
MCHC RBC AUTO-ENTMCNC: 31.5 GM/DL (ref 32.2–35.5)
MCV RBC AUTO: 85.4 FL (ref 80–94)
MONOCYTES # BLD: 8.3 %
MONOCYTES ABSOLUTE: 0.7 THOU/MM3 (ref 0.4–1.3)
NITRITE, URINE: NEGATIVE
NUCLEATED RED BLOOD CELLS: 0 /100 WBC
OPIATES, URINE: NEGATIVE
OSMOLALITY CALCULATION: 273.3 MOSMOL/KG (ref 275–300)
OXYCODONE: NEGATIVE
PH UA: 5 (ref 5–9)
PHENCYCLIDINE QUANTITATIVE URINE: POSITIVE
PLATELET # BLD: 401 THOU/MM3 (ref 130–400)
PMV BLD AUTO: 9 FL (ref 9.4–12.4)
POTASSIUM SERPL-SCNC: 3.8 MEQ/L (ref 3.5–5.2)
PROTEIN UA: NEGATIVE
RBC # BLD: 5.06 MILL/MM3 (ref 4.7–6.1)
SEG NEUTROPHILS: 61.8 %
SEGMENTED NEUTROPHILS ABSOLUTE COUNT: 5.5 THOU/MM3 (ref 1.8–7.7)
SODIUM BLD-SCNC: 136 MEQ/L (ref 135–145)
SPECIFIC GRAVITY, URINE: > 1.03 (ref 1–1.03)
TOTAL PROTEIN: 8.4 G/DL (ref 6.1–8)
UROBILINOGEN, URINE: 1 EU/DL (ref 0–1)
WBC # BLD: 8.9 THOU/MM3 (ref 4.8–10.8)

## 2021-03-21 PROCEDURE — 83605 ASSAY OF LACTIC ACID: CPT

## 2021-03-21 PROCEDURE — 2580000003 HC RX 258: Performed by: NURSE PRACTITIONER

## 2021-03-21 PROCEDURE — 81003 URINALYSIS AUTO W/O SCOPE: CPT

## 2021-03-21 PROCEDURE — 80307 DRUG TEST PRSMV CHEM ANLYZR: CPT

## 2021-03-21 PROCEDURE — 36415 COLL VENOUS BLD VENIPUNCTURE: CPT

## 2021-03-21 PROCEDURE — 80053 COMPREHEN METABOLIC PANEL: CPT

## 2021-03-21 PROCEDURE — 85025 COMPLETE CBC W/AUTO DIFF WBC: CPT

## 2021-03-21 PROCEDURE — 99283 EMERGENCY DEPT VISIT LOW MDM: CPT

## 2021-03-21 PROCEDURE — 83690 ASSAY OF LIPASE: CPT

## 2021-03-21 PROCEDURE — 82248 BILIRUBIN DIRECT: CPT

## 2021-03-21 PROCEDURE — 96374 THER/PROPH/DIAG INJ IV PUSH: CPT

## 2021-03-21 PROCEDURE — 6360000002 HC RX W HCPCS: Performed by: NURSE PRACTITIONER

## 2021-03-21 RX ORDER — ONDANSETRON 4 MG/1
4 TABLET, ORALLY DISINTEGRATING ORAL EVERY 8 HOURS PRN
Qty: 20 TABLET | Refills: 0 | Status: SHIPPED | OUTPATIENT
Start: 2021-03-21 | End: 2021-08-29 | Stop reason: ALTCHOICE

## 2021-03-21 RX ORDER — MUPIROCIN CALCIUM 20 MG/G
CREAM TOPICAL
Qty: 30 G | Refills: 1 | Status: SHIPPED | OUTPATIENT
Start: 2021-03-21 | End: 2021-04-20

## 2021-03-21 RX ORDER — SULFAMETHOXAZOLE AND TRIMETHOPRIM 800; 160 MG/1; MG/1
1 TABLET ORAL 2 TIMES DAILY
Qty: 20 TABLET | Refills: 0 | Status: SHIPPED | OUTPATIENT
Start: 2021-03-21 | End: 2021-03-31

## 2021-03-21 RX ORDER — ONDANSETRON 2 MG/ML
4 INJECTION INTRAMUSCULAR; INTRAVENOUS ONCE
Status: COMPLETED | OUTPATIENT
Start: 2021-03-21 | End: 2021-03-21

## 2021-03-21 RX ORDER — 0.9 % SODIUM CHLORIDE 0.9 %
1000 INTRAVENOUS SOLUTION INTRAVENOUS ONCE
Status: COMPLETED | OUTPATIENT
Start: 2021-03-21 | End: 2021-03-21

## 2021-03-21 RX ORDER — CEPHALEXIN 500 MG/1
500 CAPSULE ORAL 4 TIMES DAILY
Qty: 28 CAPSULE | Refills: 0 | Status: SHIPPED | OUTPATIENT
Start: 2021-03-21 | End: 2021-03-28

## 2021-03-21 RX ADMIN — SODIUM CHLORIDE 1000 ML: 9 INJECTION, SOLUTION INTRAVENOUS at 21:17

## 2021-03-21 RX ADMIN — ONDANSETRON 4 MG: 2 INJECTION INTRAMUSCULAR; INTRAVENOUS at 21:17

## 2021-03-22 ENCOUNTER — OFFICE VISIT (OUTPATIENT)
Dept: INTERNAL MEDICINE CLINIC | Age: 36
End: 2021-03-22
Payer: MEDICARE

## 2021-03-22 VITALS
HEIGHT: 70 IN | BODY MASS INDEX: 31.78 KG/M2 | TEMPERATURE: 97.3 F | SYSTOLIC BLOOD PRESSURE: 146 MMHG | WEIGHT: 222 LBS | HEART RATE: 84 BPM | DIASTOLIC BLOOD PRESSURE: 100 MMHG

## 2021-03-22 DIAGNOSIS — M79.7 FIBROMYALGIA: ICD-10-CM

## 2021-03-22 DIAGNOSIS — F32.A ANXIETY AND DEPRESSION: ICD-10-CM

## 2021-03-22 DIAGNOSIS — Z79.899 ENCOUNTER FOR MONITORING SUBOXONE MAINTENANCE THERAPY: ICD-10-CM

## 2021-03-22 DIAGNOSIS — Z51.81 ENCOUNTER FOR MONITORING SUBOXONE MAINTENANCE THERAPY: ICD-10-CM

## 2021-03-22 DIAGNOSIS — F19.10 POLYSUBSTANCE ABUSE (HCC): ICD-10-CM

## 2021-03-22 DIAGNOSIS — F41.9 ANXIETY AND DEPRESSION: ICD-10-CM

## 2021-03-22 DIAGNOSIS — F11.20 SEVERE OPIOID USE DISORDER (HCC): Primary | ICD-10-CM

## 2021-03-22 DIAGNOSIS — R76.8 HEPATITIS C ANTIBODY POSITIVE IN BLOOD: ICD-10-CM

## 2021-03-22 PROCEDURE — 1036F TOBACCO NON-USER: CPT | Performed by: INTERNAL MEDICINE

## 2021-03-22 PROCEDURE — G8428 CUR MEDS NOT DOCUMENT: HCPCS | Performed by: INTERNAL MEDICINE

## 2021-03-22 PROCEDURE — 80305 DRUG TEST PRSMV DIR OPT OBS: CPT | Performed by: INTERNAL MEDICINE

## 2021-03-22 PROCEDURE — G8417 CALC BMI ABV UP PARAM F/U: HCPCS | Performed by: INTERNAL MEDICINE

## 2021-03-22 PROCEDURE — 99213 OFFICE O/P EST LOW 20 MIN: CPT | Performed by: INTERNAL MEDICINE

## 2021-03-22 PROCEDURE — G8484 FLU IMMUNIZE NO ADMIN: HCPCS | Performed by: INTERNAL MEDICINE

## 2021-03-22 RX ORDER — BUPRENORPHINE AND NALOXONE 8; 2 MG/1; MG/1
1 FILM, SOLUBLE BUCCAL; SUBLINGUAL 2 TIMES DAILY
Qty: 28 FILM | Refills: 0 | Status: SHIPPED | OUTPATIENT
Start: 2021-03-22 | End: 2021-04-05 | Stop reason: SDUPTHER

## 2021-03-22 RX ORDER — PREGABALIN 300 MG/1
300 CAPSULE ORAL 2 TIMES DAILY
Qty: 60 CAPSULE | Refills: 0 | Status: SHIPPED | OUTPATIENT
Start: 2021-03-22 | End: 2021-03-31 | Stop reason: SDUPTHER

## 2021-03-22 ASSESSMENT — ENCOUNTER SYMPTOMS
ABDOMINAL PAIN: 0
COUGH: 0
BACK PAIN: 0
CHEST TIGHTNESS: 0
VOMITING: 1
NAUSEA: 1
RHINORRHEA: 0

## 2021-03-22 NOTE — PROGRESS NOTES
Verbal order per Dr. Neda Higgins for urine drug screen. Positive for BUP TRAM. Verified results with Bev Bateman RN. Dr. Neda Higgins ordered Suboxone 8mg film BID  for patient. Verified dose with patient. Patient was sent home with 2 week script of Suboxone 8mg film BID and will be seen back in the office 4/5/21.

## 2021-03-22 NOTE — ED PROVIDER NOTES
[facility] Emergency Department    CHIEF COMPLAINT       Chief Complaint   Patient presents with    Nausea       Nurses Notes reviewed and I agree except as noted in the HPI. HISTORY OF PRESENT ILLNESS    Shirley Juarez 39 y.o. male who presents to the ED for evaluation of nausea and vomiting since Monday. Had a fever the first few days. No fever now. Has a rash/sores on face. No belly pain, just nauseated. No neck pain. No one else has been sick. Has not used in 95 days. Already had covid. HPI was provided by the patient    REVIEW OF SYSTEMS     Review of Systems   Constitutional: Positive for fever. Negative for chills and fatigue. HENT: Negative for congestion, ear discharge, ear pain, postnasal drip and rhinorrhea. Respiratory: Negative for cough and chest tightness. Cardiovascular: Negative for chest pain, palpitations and leg swelling. Gastrointestinal: Positive for nausea and vomiting. Negative for abdominal pain. Genitourinary: Negative for difficulty urinating, dysuria, enuresis, flank pain and hematuria. Musculoskeletal: Negative for back pain and joint swelling. Skin: Positive for wound. Neurological: Negative for dizziness, light-headedness, numbness and headaches. Psychiatric/Behavioral: Negative for agitation, behavioral problems and confusion. All other systems negative except as noted. PAST MEDICAL HISTORY     Past Medical History:   Diagnosis Date    Anxiety     Depression     Kidney stone     Liver disease     Hep C    Opiate abuse, continuous (HCC)        SURGICALHISTORY      has no past surgical history on file. CURRENT MEDICATIONS       Discharge Medication List as of 3/21/2021 10:37 PM      CONTINUE these medications which have NOT CHANGED    Details   buprenorphine-naloxone (SUBOXONE) 8-2 MG FILM SL film Place 1 Film under the tongue 2 times daily for 14 days. , Disp-28 Film, R-0Normal      venlafaxine (EFFEXOR) 100 MG tablet Take 1 tablet by mouth 3 times daily, Disp-90 tablet, R-1Normal      lisinopril (PRINIVIL;ZESTRIL) 20 MG tablet Take 1 tablet by mouth daily, Disp-30 tablet, R-1Normal      mirtazapine (REMERON) 45 MG tablet Take 1 tablet by mouth nightly, Disp-30 tablet, R-1Normal      Aspirin-Acetaminophen-Caffeine (EXCEDRIN EXTRA STRENGTH PO) Take by mouth as neededHistorical Med      pregabalin (LYRICA) 300 MG capsule Take 1 capsule by mouth 2 times daily for 90 days. , Disp-60 capsule, R-2Normal      naloxone 4 MG/0.1ML LIQD nasal spray 1 spray by Nasal route as needed for Opioid Reversal, Disp-1 each,R-5Normal      neomycin-bacitracin-polymyxin (NEOSPORIN) 3.5-400-5000 OINT ointment Apply topically 4 times daily. , Disp-1 Tube,R-1, Normal             ALLERGIES     has No Known Allergies. FAMILY HISTORY     has no family status information on file. family history is not on file.     SOCIAL HISTORY       Social History     Socioeconomic History    Marital status: Single     Spouse name: Not on file    Number of children: Not on file    Years of education: Not on file    Highest education level: Not on file   Occupational History    Not on file   Social Needs    Financial resource strain: Not on file    Food insecurity     Worry: Not on file     Inability: Not on file    Transportation needs     Medical: Not on file     Non-medical: Not on file   Tobacco Use    Smoking status: Former Smoker     Packs/day: 0.50     Types: Cigarettes    Smokeless tobacco: Never Used   Substance and Sexual Activity    Alcohol use: Yes     Comment: occasional    Drug use: Not Currently     Comment: denied    Sexual activity: Not Currently   Lifestyle    Physical activity     Days per week: Not on file     Minutes per session: Not on file    Stress: Not on file   Relationships    Social connections     Talks on phone: Not on file     Gets together: Not on file     Attends Zoroastrianism service: Not on file     Active member of club or organization: Not on file     Attends meetings of clubs or organizations: Not on file     Relationship status: Not on file    Intimate partner violence     Fear of current or ex partner: Not on file     Emotionally abused: Not on file     Physically abused: Not on file     Forced sexual activity: Not on file   Other Topics Concern    Not on file   Social History Narrative    Not on file       PHYSICAL EXAM     INITIAL VITALS:  height is 5' 10\" (1.778 m) and weight is 228 lb (103.4 kg). His oral temperature is 98.6 °F (37 °C). His blood pressure is 131/89 and his pulse is 80. His respiration is 16 and oxygen saturation is 98%. Physical Exam  Vitals signs and nursing note reviewed. Constitutional:       General: He is not in acute distress. Appearance: He is well-developed. He is not diaphoretic. HENT:      Head: Normocephalic and atraumatic. Mouth/Throat:      Mouth: Mucous membranes are moist.      Pharynx: Oropharynx is clear. Eyes:      General:         Right eye: No discharge. Left eye: No discharge. Conjunctiva/sclera: Conjunctivae normal.   Neck:      Musculoskeletal: Normal range of motion. Trachea: No tracheal deviation. Cardiovascular:      Rate and Rhythm: Normal rate and regular rhythm. Heart sounds: Normal heart sounds. No murmur. No gallop. Comments: Normal capillary refill  Pulmonary:      Effort: Pulmonary effort is normal. No respiratory distress. Breath sounds: Normal breath sounds. No stridor. Abdominal:      General: Bowel sounds are normal.      Palpations: Abdomen is soft. Musculoskeletal: Normal range of motion. General: No tenderness or deformity. Skin:     General: Skin is warm and dry. Capillary Refill: Capillary refill takes less than 2 seconds. Coloration: Skin is not pale. Findings: No erythema or rash. Comments: Multiple open sores, some with purulent exudate and surrounding erythema. Neurological:      General: No focal deficit present. Mental Status: He is alert and oriented to person, place, and time. Cranial Nerves: No cranial nerve deficit. Psychiatric:         Behavior: Behavior normal.         DIFFERENTIAL DIAGNOSIS:   Gastroenteritis, cellulitis, abscess,     DIAGNOSTIC RESULTS     EKG: All EKG's are interpreted by the Emergency Department Physician who eithersigns or Co-signs this chart in the absence of a cardiologist.        RADIOLOGY: non-plainfilm images(s) such as CT, Ultrasound and MRI are read by the radiologist.  Plain radiographic images are visualized and preliminarily interpreted by the emergency physician unless otherwise stated below. No orders to display         LABS:   Labs Reviewed   CBC WITH AUTO DIFFERENTIAL - Abnormal; Notable for the following components:       Result Value    Hemoglobin 13.6 (*)     MCHC 31.5 (*)     Platelets 157 (*)     MPV 9.0 (*)     All other components within normal limits   HEPATIC FUNCTION PANEL - Abnormal; Notable for the following components:     Total Bilirubin 0.2 (*)     AST 51 (*)     ALT 73 (*)     Total Protein 8.4 (*)     All other components within normal limits   URINE RT REFLEX TO CULTURE - Abnormal; Notable for the following components:    Ketones, Urine TRACE (*)     Specific Gravity, Urine > 1.030 (*)     All other components within normal limits   GLOMERULAR FILTRATION RATE, ESTIMATED - Abnormal; Notable for the following components:    Est, Glom Filt Rate 84 (*)     All other components within normal limits   OSMOLALITY - Abnormal; Notable for the following components:    Osmolality Calc 273.3 (*)     All other components within normal limits   RAPID INFLUENZA A/B ANTIGENS   BASIC METABOLIC PANEL   LIPASE   LACTIC ACID, PLASMA   URINE DRUG SCREEN   ANION GAP       EMERGENCY DEPARTMENT COURSE:   Vitals:    Vitals:    03/21/21 2022 03/21/21 2145   BP:  131/89   Pulse:  80   Resp: 16 16   Temp: 98.6 °F (37 °C) TempSrc: Oral    SpO2: 100% 98%   Weight: 228 lb (103.4 kg)    Height: 5' 10\" (1.778 m)          MDM                         MDM    Patient was seen in the ER for nausea, vomiting and wounds. Appropriate labs are ordered and reviewed. Labs are reassuring. They are at his baseline. Wounds are scattered. Nothing for me to I&D. Will dc the patient with bactroban and oral abx to treat for them. He is feeling better after zofran and is comfortable with discharge home. Medications   0.9 % sodium chloride bolus (0 mLs Intravenous Stopped 3/21/21 2240)   ondansetron (ZOFRAN) injection 4 mg (4 mg Intravenous Given 3/21/21 2117)         Patient was seen independently by myself. The patient's final impression and disposition and plan was determined by myself. Strict return precautions and follow up instructions were discussed with the patient prior to discharge, with which the patient agrees. Physical assessment findings, diagnostic testing(s) if applicable, and vital signs reviewed with patient/patient representative. Questions answered. Medications asdirected, including OTC medications for supportive care. Education provided on medications. Differential diagnosis(s) discussed with patient/patient representative. Home care/self care instructions reviewed withpatient/patient representative. Patient is to follow-up with family care provider in 2-3 days if no improvement. Patient is to go to the emergency department if symptoms worsen. Patient/patient representative isaware of care plan, questions answered, verbalizes understanding and is in agreement. CRITICAL CARE:   None    CONSULTS:  None    PROCEDURES:  None    FINAL IMPRESSION     1. Non-intractable vomiting with nausea, unspecified vomiting type    2. Cellulitis and abscess of upper extremity    3.  Skin lesions          DISPOSITION/PLAN   DISPOSITION Decision To Discharge 03/21/2021 10:36:27 PM      PATIENT REFERREDTO:  Yola Houston Drinda Rubinstein CNP  [address] [facility] [address]  [phone]    Schedule an appointment as soon as possible for a visit in 3 days  For wound re-check, For follow up      DISCHARGE MEDICATIONS:  Discharge Medication List as of 3/21/2021 10:37 PM      START taking these medications    Details   mupirocin (BACTROBAN) 2 % cream Apply topically 3 times daily after washing lesions, Disp-30 g, R-1, Normal      cephALEXin (KEFLEX) 500 MG capsule Take 1 capsule by mouth 4 times daily for 7 days, Disp-28 capsule, R-0Normal      sulfamethoxazole-trimethoprim (BACTRIM DS) 800-160 MG per tablet Take 1 tablet by mouth 2 times daily for 10 days, Disp-20 tablet, R-0Normal             (Please note that portions of this note were completed with a voice recognition program.  Efforts were made to edit the dictations but occasionally words are mis-transcribed.)         PAPA Turner - PAPA Lazo CNP  03/22/21 2804

## 2021-03-22 NOTE — PROGRESS NOTES
Clifton Lutz (:  1985) is a 39 y.o. male,Established patient, here for evaluation of the following chief complaint(s):  Drug Problem      ASSESSMENT/PLAN:  1. Severe opioid use disorder (HCC)  -     POCT Rapid Drug Screen  -     buprenorphine-naloxone (SUBOXONE) 8-2 MG FILM SL film; Place 1 Film under the tongue 2 times daily for 14 days. , Disp-28 Film, R-0Normal  2. Encounter for monitoring Suboxone maintenance therapy  3. Anxiety and depression  4. Fibromyalgia  5. Hepatitis C antibody positive in blood  6. Polysubstance abuse ([facility] [address]    Urine is clean today  He does have some pick marks on his face  We will check a comprehensive urine for meth  Suboxone 8 mg twice daily  Tentatively follow-up here 2 weeks         SUBJECTIVE/OBJECTIVE:  Drug Problem      I saw him here 3/8    He was lost to follow-up until recently  He said he was in the methadone clinic until recently  He relapsed on meth weeks ago  He says he wants to get clean he has been using Suboxone off the street  I see his girlfriend as well  I put him on Suboxone  weeks ago  He says they both relapsed on heroin 2 weeks ago  He said he has an aunt in [place] that passed away    He had charges pending he was placed on probation  He says he has been clean for 96 days  Review of Systems   Patient is feeling well  Patient is not experiencing  withdrawal symptoms ,no urges or cravings  Patient is not having any side effects from the buprenorphine    Physical Exam  Constitutional:       Appearance: Normal appearance. Skin:     General: Skin is warm and dry. Neurological:      Mental Status: He is alert. Psychiatric:         Mood and Affect: Mood normal.         Behavior: Behavior normal.         Thought Content:  Thought content normal.         Judgment: Judgment normal.     Urine tox screen today  Alcohol, Urine 2021  1:55 PM Unknown   NEG    Amphetamine Screen, Urine 2021  1:55 PM Unknown   NEG    Barbiturate Screen, Urine 2021

## 2021-03-22 NOTE — TELEPHONE ENCOUNTER
Last visit- 3/8/2021  Next visit- 3/22/2021    Requested Prescriptions     Pending Prescriptions Disp Refills    pregabalin (LYRICA) 300 MG capsule 60 capsule 0     Sig: Take 1 capsule by mouth 2 times daily for 30 days.      [employer]

## 2021-03-31 ENCOUNTER — OFFICE VISIT (OUTPATIENT)
Dept: INTERNAL MEDICINE CLINIC | Age: 36
End: 2021-03-31
Payer: MEDICARE

## 2021-03-31 VITALS
SYSTOLIC BLOOD PRESSURE: 150 MMHG | DIASTOLIC BLOOD PRESSURE: 101 MMHG | WEIGHT: 232 LBS | BODY MASS INDEX: 33.21 KG/M2 | TEMPERATURE: 97 F | HEIGHT: 70 IN

## 2021-03-31 DIAGNOSIS — F32.A ANXIETY AND DEPRESSION: ICD-10-CM

## 2021-03-31 DIAGNOSIS — G47.00 INSOMNIA, UNSPECIFIED TYPE: ICD-10-CM

## 2021-03-31 DIAGNOSIS — F90.9 ATTENTION DEFICIT HYPERACTIVITY DISORDER (ADHD), UNSPECIFIED ADHD TYPE: Primary | ICD-10-CM

## 2021-03-31 DIAGNOSIS — F41.9 ANXIETY AND DEPRESSION: ICD-10-CM

## 2021-03-31 DIAGNOSIS — M79.7 FIBROMYALGIA: ICD-10-CM

## 2021-03-31 PROCEDURE — G8417 CALC BMI ABV UP PARAM F/U: HCPCS | Performed by: NURSE PRACTITIONER

## 2021-03-31 PROCEDURE — 1036F TOBACCO NON-USER: CPT | Performed by: NURSE PRACTITIONER

## 2021-03-31 PROCEDURE — G8484 FLU IMMUNIZE NO ADMIN: HCPCS | Performed by: NURSE PRACTITIONER

## 2021-03-31 PROCEDURE — 99214 OFFICE O/P EST MOD 30 MIN: CPT | Performed by: NURSE PRACTITIONER

## 2021-03-31 PROCEDURE — G8427 DOCREV CUR MEDS BY ELIG CLIN: HCPCS | Performed by: NURSE PRACTITIONER

## 2021-03-31 RX ORDER — PREGABALIN 200 MG/1
200 CAPSULE ORAL 3 TIMES DAILY
Qty: 90 CAPSULE | Refills: 2 | Status: SHIPPED | OUTPATIENT
Start: 2021-03-31 | End: 2021-04-26 | Stop reason: SDUPTHER

## 2021-03-31 RX ORDER — VENLAFAXINE 100 MG/1
100 TABLET ORAL 3 TIMES DAILY
Qty: 90 TABLET | Refills: 1 | Status: SHIPPED | OUTPATIENT
Start: 2021-03-31 | End: 2021-04-26 | Stop reason: SDUPTHER

## 2021-03-31 RX ORDER — MIRTAZAPINE 45 MG/1
45 TABLET, FILM COATED ORAL NIGHTLY
Qty: 30 TABLET | Refills: 1 | Status: SHIPPED | OUTPATIENT
Start: 2021-03-31 | End: 2021-04-26 | Stop reason: SDUPTHER

## 2021-03-31 RX ORDER — MODAFINIL 200 MG/1
200 TABLET ORAL DAILY
Qty: 30 TABLET | Refills: 2 | Status: SHIPPED | OUTPATIENT
Start: 2021-03-31 | End: 2021-04-26
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
## 2021-04-01 ENCOUNTER — TELEPHONE (OUTPATIENT)
Dept: INTERNAL MEDICINE CLINIC | Age: 36
End: 2021-04-01

## 2021-04-01 RX ORDER — GUANFACINE 1 MG/1
1 TABLET, EXTENDED RELEASE ORAL NIGHTLY
Qty: 30 TABLET | Refills: 1 | Status: SHIPPED | OUTPATIENT
Start: 2021-04-01 | End: 2021-06-29

## 2021-04-01 NOTE — TELEPHONE ENCOUNTER
Called [facility] to follow up on pt PA for modanifil and its currently in review and its a 24 hour turn around.

## 2021-04-05 ENCOUNTER — OFFICE VISIT (OUTPATIENT)
Dept: INTERNAL MEDICINE CLINIC | Age: 36
End: 2021-04-05
Payer: MEDICARE

## 2021-04-05 VITALS
HEART RATE: 114 BPM | WEIGHT: 226 LBS | DIASTOLIC BLOOD PRESSURE: 92 MMHG | SYSTOLIC BLOOD PRESSURE: 128 MMHG | HEIGHT: 70 IN | TEMPERATURE: 97.1 F | BODY MASS INDEX: 32.35 KG/M2

## 2021-04-05 DIAGNOSIS — F11.20 SEVERE OPIOID USE DISORDER (HCC): Primary | ICD-10-CM

## 2021-04-05 DIAGNOSIS — F32.A ANXIETY AND DEPRESSION: ICD-10-CM

## 2021-04-05 DIAGNOSIS — R76.8 HEPATITIS C ANTIBODY POSITIVE IN BLOOD: ICD-10-CM

## 2021-04-05 DIAGNOSIS — F19.10 POLYSUBSTANCE ABUSE (HCC): ICD-10-CM

## 2021-04-05 DIAGNOSIS — Z51.81 ENCOUNTER FOR MONITORING SUBOXONE MAINTENANCE THERAPY: ICD-10-CM

## 2021-04-05 DIAGNOSIS — M79.7 FIBROMYALGIA: ICD-10-CM

## 2021-04-05 DIAGNOSIS — Z79.899 ENCOUNTER FOR MONITORING SUBOXONE MAINTENANCE THERAPY: ICD-10-CM

## 2021-04-05 DIAGNOSIS — F41.9 ANXIETY AND DEPRESSION: ICD-10-CM

## 2021-04-05 DIAGNOSIS — F90.9 ATTENTION DEFICIT HYPERACTIVITY DISORDER (ADHD), UNSPECIFIED ADHD TYPE: ICD-10-CM

## 2021-04-05 PROCEDURE — 80305 DRUG TEST PRSMV DIR OPT OBS: CPT | Performed by: INTERNAL MEDICINE

## 2021-04-05 PROCEDURE — G8417 CALC BMI ABV UP PARAM F/U: HCPCS | Performed by: INTERNAL MEDICINE

## 2021-04-05 PROCEDURE — 99213 OFFICE O/P EST LOW 20 MIN: CPT | Performed by: INTERNAL MEDICINE

## 2021-04-05 PROCEDURE — G8428 CUR MEDS NOT DOCUMENT: HCPCS | Performed by: INTERNAL MEDICINE

## 2021-04-05 PROCEDURE — 1036F TOBACCO NON-USER: CPT | Performed by: INTERNAL MEDICINE

## 2021-04-05 RX ORDER — BUPRENORPHINE AND NALOXONE 8; 2 MG/1; MG/1
1 FILM, SOLUBLE BUCCAL; SUBLINGUAL 2 TIMES DAILY
Qty: 28 FILM | Refills: 0 | Status: SHIPPED | OUTPATIENT
Start: 2021-04-05 | End: 2021-04-19 | Stop reason: SDUPTHER

## 2021-04-05 NOTE — PROGRESS NOTES
Martin Bains (:  1985) is a 39 y.o. male,Established patient, here for evaluation of the following chief complaint(s):  Drug Problem      ASSESSMENT/PLAN:  1. Severe opioid use disorder (HCC)  -     POCT Rapid Drug Screen  -     buprenorphine-naloxone (SUBOXONE) 8-2 MG FILM SL film; Place 1 Film under the tongue 2 times daily for 14 days. , Disp-28 Film, R-0Normal  2. Attention deficit hyperactivity disorder (ADHD), unspecified ADHD type  3. Anxiety and depression  4. Fibromyalgia  5. Hepatitis C antibody positive in blood  6. Encounter for monitoring Suboxone maintenance therapy  7. Polysubstance abuse ([facility] [address]    Urine is clean today  Send out from  showed no illicit substances  Suboxone 8 mg twice daily  Tentatively follow-up here 2 weeks         SUBJECTIVE/OBJECTIVE:  Drug Problem      I saw him here 3/22    He was lost to follow-up until recently  He said he was in the methadone clinic until recently  He relapsed on meth weeks ago  He says he wants to get clean he has been using Suboxone off the street  I see his girlfriend as well  I put him on Suboxone  weeks ago  He says they both relapsed on heroin 2 weeks ago  He said he has an aunt in [place] that passed away    He had charges pending he was placed on probation  He says he has been clean for 110 days  Patient said he and his girlfriend have a recovery class today  Review of Systems   Patient is feeling well  Patient is not experiencing  withdrawal symptoms ,no urges or cravings  Patient is not having any side effects from the buprenorphine    Physical Exam  Constitutional:       Appearance: Normal appearance. Skin:     General: Skin is warm and dry. Neurological:      Mental Status: He is alert. Psychiatric:         Mood and Affect: Mood normal.         Behavior: Behavior normal.         Thought Content:  Thought content normal.         Judgment: Judgment normal.     Urine tox screen today  Alcohol, Urine 2021  1:30 PM Unknown NEG    Amphetamine Screen, Urine 04/05/2021  1:30 PM Unknown   NEG    Barbiturate Screen, Urine 04/05/2021  1:30 PM Unknown   NEG    Benzodiazepine Screen, Urine 04/05/2021  1:30 PM Unknown   NEG    Buprenorphine Urine 04/05/2021  1:30 PM Unknown   POS    Cocaine Metabolite Screen, Urine 04/05/2021  1:30 PM Unknown   NEG    FENTANYL SCREEN, URINE 04/05/2021  1:30 PM Unknown   NEG    Gabapentin Screen, Urine 04/05/2021  1:30 PM Unknown   N/A    MDMA, Urine 04/05/2021  1:30 PM Unknown   NEG    Methadone Screen, Urine 04/05/2021  1:30 PM Unknown   NEG    Methamphetamine, Urine 04/05/2021  1:30 PM Unknown   NEG    Opiate Scrn, Ur 04/05/2021  1:30 PM Unknown   NEG    Oxycodone Screen, Ur 04/05/2021  1:30 PM Unknown   NEG    PCP Screen, Urine 04/05/2021  1:30 PM Unknown   NEG    Propoxyphene Screen, Urine 04/05/2021  1:30 PM Unknown   N/A    Synthetic Cannabinoids (K2) Screen, Urine 04/05/2021  1:30 PM Unknown   NEG    THC Screen, Urine 04/05/2021  1:30 PM Unknown   NEG    Tramadol Scrn, Ur 04/05/2021  1:30 PM Unknown   NEG    Tricyclic Antidepressants, Urine 04/05/2021  1:30 PM Unknown   N/A         reviewed the [place] Automated Rx Reporting System report     There does not appear to be any discrepancies or overprescribing of controlled substances  He is prescribed Lyrica          An electronic signature was used to authenticate this note.     --Corazon Macias MD

## 2021-04-05 NOTE — PROGRESS NOTES
Verbal order per Dr. Cristian Frost for urine drug screen. Positive for BUP. Verified results with Roc Robles LPN. Dr. Cristian Frost ordered Suboxone 8 mg film BID for patient. Verified dose with patient. Patient was sent home with 2 week script for Suboxone 8 mg film BID and will be seen back in the office on 4/19/21.

## 2021-04-05 NOTE — PROGRESS NOTES
Verbal order per Dr. Jackie Zambrano for urine drug screen. Positive for BUP. Verified results with Danny Pulliam. Dr. Jackie Zambrano ordered Suboxone 8mg film BID for patient. Verified dose with patient. Patient was sent home with 2 week script of Suboxone 8mg film BID and will be seen back in the office 4/19/21.

## 2021-04-17 ASSESSMENT — ENCOUNTER SYMPTOMS
RHINORRHEA: 0
VOMITING: 0
ABDOMINAL DISTENTION: 0
SORE THROAT: 0
WHEEZING: 0
NAUSEA: 0
SINUS PRESSURE: 0
CONSTIPATION: 0
BLOOD IN STOOL: 0
PHOTOPHOBIA: 0
DIARRHEA: 0
SINUS PAIN: 0
SHORTNESS OF BREATH: 1
TROUBLE SWALLOWING: 0
COUGH: 0
ABDOMINAL PAIN: 0

## 2021-04-19 ENCOUNTER — VIRTUAL VISIT (OUTPATIENT)
Dept: INTERNAL MEDICINE CLINIC | Age: 36
End: 2021-04-19
Payer: MEDICARE

## 2021-04-19 DIAGNOSIS — F90.9 ATTENTION DEFICIT HYPERACTIVITY DISORDER (ADHD), UNSPECIFIED ADHD TYPE: ICD-10-CM

## 2021-04-19 DIAGNOSIS — F41.9 ANXIETY AND DEPRESSION: ICD-10-CM

## 2021-04-19 DIAGNOSIS — F32.A ANXIETY AND DEPRESSION: ICD-10-CM

## 2021-04-19 DIAGNOSIS — F11.20 SEVERE OPIOID USE DISORDER (HCC): Primary | ICD-10-CM

## 2021-04-19 DIAGNOSIS — F19.10 POLYSUBSTANCE ABUSE (HCC): ICD-10-CM

## 2021-04-19 DIAGNOSIS — R76.8 HEPATITIS C ANTIBODY POSITIVE IN BLOOD: ICD-10-CM

## 2021-04-19 DIAGNOSIS — M79.7 FIBROMYALGIA: ICD-10-CM

## 2021-04-19 DIAGNOSIS — Z79.899 ENCOUNTER FOR MONITORING SUBOXONE MAINTENANCE THERAPY: ICD-10-CM

## 2021-04-19 DIAGNOSIS — Z51.81 ENCOUNTER FOR MONITORING SUBOXONE MAINTENANCE THERAPY: ICD-10-CM

## 2021-04-19 PROCEDURE — 99213 OFFICE O/P EST LOW 20 MIN: CPT | Performed by: INTERNAL MEDICINE

## 2021-04-19 PROCEDURE — G8428 CUR MEDS NOT DOCUMENT: HCPCS | Performed by: INTERNAL MEDICINE

## 2021-04-19 PROCEDURE — 1036F TOBACCO NON-USER: CPT | Performed by: INTERNAL MEDICINE

## 2021-04-19 PROCEDURE — G8417 CALC BMI ABV UP PARAM F/U: HCPCS | Performed by: INTERNAL MEDICINE

## 2021-04-19 RX ORDER — BUPRENORPHINE AND NALOXONE 8; 2 MG/1; MG/1
1 FILM, SOLUBLE BUCCAL; SUBLINGUAL 2 TIMES DAILY
Qty: 8 FILM | Refills: 0 | Status: SHIPPED | OUTPATIENT
Start: 2021-04-19 | End: 2021-04-22 | Stop reason: SDUPTHER

## 2021-04-19 NOTE — PROGRESS NOTES
2021    TELEHEALTH EVALUATION -- Audio/Visual (During NJOEB-51 public health emergency)    HPI:  A video conference was done with the patient  Patient was at home, I was in the office  There was no  one else  present during the conference    Balta Staples (:  1985) has requested an audio/video evaluation for the following concern(s):    HPI     I saw him here   Was supposed to come with his girlfriend today but he felt ill  He was lost to follow-up until recently  He said he was in the methadone clinic until recently  He relapsed on meth weeks ago  He says he wants to get clean he has been using Suboxone off the street  I see his girlfriend as well  I put him on Suboxone  weeks ago  He says they both relapsed on heroin 2 weeks ago  He said he has an aunt in [facility] that passed away     He had charges pending he was placed on probation  He says he has been clean for 120 days    ROS-Patient is not feeling well  He thinks he has a virus  Patient is not experiencing  withdrawal symptoms ,no urges or cravings  Patient is not having any side effects from the buprenorphine      Prior to Visit Medications    Medication Sig Taking? Authorizing Provider   buprenorphine-naloxone (SUBOXONE) 8-2 MG FILM SL film Place 1 Film under the tongue 2 times daily for 4 days. Yes Beka Mistry MD   guanFACINE (INTUNIV) 1 MG TB24 extended release tablet Take 1 tablet by mouth nightly  PAPA Abreu CNP   mirtazapine (REMERON) 45 MG tablet Take 1 tablet by mouth nightly  PAPA Abreu CNP   venlafaxine (EFFEXOR) 100 MG tablet Take 1 tablet by mouth 3 times daily  PAPA Abreu CNP   modafinil (PROVIGIL) 200 MG tablet Take 1 tablet by mouth daily for 90 days. PAPA Abreu CNP   pregabalin (LYRICA) 200 MG capsule Take 1 capsule by mouth 3 times daily for 90 days.   PAPA Abreu CNP   mupirocin (BACTROBAN) 2 % cream Apply topically 3 times daily after washing lesions  PAPA Archuleta CNP   ondansetron (ZOFRAN ODT) 4 MG disintegrating tablet Take 1 tablet by mouth every 8 hours as needed for Nausea  PAPA Archuleta CNP   lisinopril (PRINIVIL;ZESTRIL) 20 MG tablet Take 1 tablet by mouth daily  Riyajose alejandro Moe, APRN - CNP   Aspirin-Acetaminophen-Caffeine (EXCEDRIN EXTRA STRENGTH PO) Take by mouth as needed  Historical Provider, MD   naloxone 4 MG/0.1ML LIQD nasal spray 1 spray by Nasal route as needed for Opioid Reversal  Ayde Bourne MD       Social History     Tobacco Use    Smoking status: Former Smoker     Packs/day: 0.50     Types: Cigarettes    Smokeless tobacco: Never Used   Substance Use Topics    Alcohol use: Yes     Comment: occasional    Drug use: Not Currently     Comment: denied            PHYSICAL EXAMINATION:  [ INSTRUCTIONS:  \"[x]\" Indicates a positive item  \"[]\" Indicates a negative item  -- DELETE ALL ITEMS NOT EXAMINED]  No vitals done    Constitutional: [x] Appears well-developed and well-nourished [x] No apparent distress      [] Abnormal-   Mental status  [x] Alert and awake  [x] Oriented to person/place/time [x]Able to follow commands      Eyes:  EOM    [x]  Normal  [] Abnormal-  Sclera  []  Normal  [] Abnormal -         Discharge []  None visible  [] Abnormal -  Pupils normal           Psychiatric:       [x] Normal Affect [] No Hallucinations        [] Abnormal-        Diagnosis Orders   1. Severe opioid use disorder (HCC)  buprenorphine-naloxone (SUBOXONE) 8-2 MG FILM SL film   2. Attention deficit hyperactivity disorder (ADHD), unspecified ADHD type     3. Fibromyalgia     4. Anxiety and depression     5. Hepatitis C antibody positive in blood     6. Encounter for monitoring Suboxone maintenance therapy     7. Polysubstance abuse ([facility] [address]           Sylvester Covert is a 39 y.o. male being evaluated by a Virtual Visit (video visit) encounter to address concerns as mentioned above.   A caregiver was present when appropriate. Due to this being a TeleHealth encounter (During ODPRG-95 public health emergency), evaluation of the following organ systems was limited: Vitals/Constitutional/EENT/Resp/CV/GI//MS/Neuro/Skin/Heme-Lymph-Imm. Pursuant to the emergency declaration under the [address] [place] [address], [address] [place] [address] and the [employer] Act, this Virtual Visit was conducted with patient's (and/or legal guardian's) consent, to reduce the patient's risk of exposure to COVID-19 and provide necessary medical care. The patient (and/or legal guardian) has also been advised to contact this office for worsening conditions or problems, and seek emergency medical treatment and/or call 911 if deemed necessary. Services were provided through a video synchronous discussion virtually to substitute for in-person clinic visit. Unfortunately his girlfriend relapsed, she did not tell him  I am going to have him come in Thursday with her  I told him that she was just having some urges so I wanted to see her sooner  --Jovanny Esparza MD on 4/19/2021 at 3:30 PM    An electronic signature was used to authenticate this note.

## 2021-04-22 ENCOUNTER — TELEPHONE (OUTPATIENT)
Dept: INTERNAL MEDICINE CLINIC | Age: 36
End: 2021-04-22

## 2021-04-22 ENCOUNTER — OFFICE VISIT (OUTPATIENT)
Dept: INTERNAL MEDICINE CLINIC | Age: 36
End: 2021-04-22
Payer: MEDICARE

## 2021-04-22 VITALS
HEIGHT: 70 IN | BODY MASS INDEX: 32.35 KG/M2 | WEIGHT: 226 LBS | SYSTOLIC BLOOD PRESSURE: 137 MMHG | DIASTOLIC BLOOD PRESSURE: 88 MMHG | TEMPERATURE: 98.2 F | HEART RATE: 131 BPM

## 2021-04-22 DIAGNOSIS — F11.20 SEVERE OPIOID USE DISORDER (HCC): Primary | ICD-10-CM

## 2021-04-22 DIAGNOSIS — Z79.899 ENCOUNTER FOR MONITORING SUBOXONE MAINTENANCE THERAPY: ICD-10-CM

## 2021-04-22 DIAGNOSIS — R76.8 HEPATITIS C ANTIBODY POSITIVE IN BLOOD: ICD-10-CM

## 2021-04-22 DIAGNOSIS — F32.A ANXIETY AND DEPRESSION: ICD-10-CM

## 2021-04-22 DIAGNOSIS — Z51.81 ENCOUNTER FOR MONITORING SUBOXONE MAINTENANCE THERAPY: ICD-10-CM

## 2021-04-22 DIAGNOSIS — F19.10 POLYSUBSTANCE ABUSE (HCC): ICD-10-CM

## 2021-04-22 DIAGNOSIS — M79.7 FIBROMYALGIA: ICD-10-CM

## 2021-04-22 DIAGNOSIS — F90.9 ATTENTION DEFICIT HYPERACTIVITY DISORDER (ADHD), UNSPECIFIED ADHD TYPE: ICD-10-CM

## 2021-04-22 DIAGNOSIS — F41.9 ANXIETY AND DEPRESSION: ICD-10-CM

## 2021-04-22 DIAGNOSIS — F90.1 ATTENTION DEFICIT HYPERACTIVITY DISORDER (ADHD), PREDOMINANTLY HYPERACTIVE TYPE: Primary | ICD-10-CM

## 2021-04-22 PROCEDURE — 99213 OFFICE O/P EST LOW 20 MIN: CPT | Performed by: INTERNAL MEDICINE

## 2021-04-22 PROCEDURE — 80305 DRUG TEST PRSMV DIR OPT OBS: CPT | Performed by: INTERNAL MEDICINE

## 2021-04-22 PROCEDURE — 1036F TOBACCO NON-USER: CPT | Performed by: INTERNAL MEDICINE

## 2021-04-22 PROCEDURE — G8417 CALC BMI ABV UP PARAM F/U: HCPCS | Performed by: INTERNAL MEDICINE

## 2021-04-22 PROCEDURE — G8427 DOCREV CUR MEDS BY ELIG CLIN: HCPCS | Performed by: INTERNAL MEDICINE

## 2021-04-22 RX ORDER — BUPRENORPHINE AND NALOXONE 8; 2 MG/1; MG/1
1 FILM, SOLUBLE BUCCAL; SUBLINGUAL 2 TIMES DAILY
Qty: 16 FILM | Refills: 0 | Status: SHIPPED | OUTPATIENT
Start: 2021-04-22 | End: 2021-04-30

## 2021-04-22 NOTE — PROGRESS NOTES
Verbal order per Dr. Amado Olson for urine drug screen. Positive for BUp. Verified results with Benny Lopez RN. Dr. Amado Olson ordered Suboxone 8mg film BID  for patient. Verified dose with patient. Patient was sent home with 8 day script of Suboxone 8mg film BID and will be seen back in the office 4/29/21.

## 2021-04-22 NOTE — PROGRESS NOTES
Martin Bains (:  1985) is a 39 y.o. male,Established patient, here for evaluation of the following chief complaint(s):  Drug Problem      ASSESSMENT/PLAN:  1. Severe opioid use disorder (HCC)  -     POCT Rapid Drug Screen  -     buprenorphine-naloxone (SUBOXONE) 8-2 MG FILM SL film; Place 1 Film under the tongue 2 times daily for 8 days. , Disp-16 Film, R-0Normal  2. Attention deficit hyperactivity disorder (ADHD), unspecified ADHD type  3. Fibromyalgia  4. Anxiety and depression  5. Encounter for monitoring Suboxone maintenance therapy  6. Hepatitis C antibody positive in blood  7. Polysubstance abuse ([facility] [address]    Urine is clean today  Send out from  showed no illicit substances  Suboxone 8 mg twice daily  I would like to see him in a week  He got very upset, I said while I am going to see his girlfriend in a week so hopefully he can be here with her for her  He agrees        SUBJECTIVE/OBJECTIVE:  Drug Problem      I saw him here 3/22  We did a virtual visit   as he was ill  His girlfriend and relapse she was keeping him from him but she finally came clean  He was lost to follow-up until recently  He said he was in the methadone clinic until recently  He relapsed on meth weeks ago  He says he wants to get clean he has been using Suboxone off the street  I see his girlfriend as well  I put him on Suboxone  weeks ago  He says they both relapsed on heroin 2 weeks ago  He said he has an aunt in [facility] that passed away    He had charges pending he was placed on probation  He says he has been clean for 120 days    Review of Systems   Patient is feeling well  Patient is not experiencing  withdrawal symptoms ,no urges or cravings  Patient is not having any side effects from the buprenorphine    Physical Exam  Constitutional:       Appearance: Normal appearance. Skin:     General: Skin is warm and dry. Neurological:      Mental Status: He is alert.    Psychiatric:         Mood and Affect: Mood normal. Behavior: Behavior normal.         Thought Content: Thought content normal.         Judgment: Judgment normal.     Urine tox screen today  Alcohol, Urine 04/22/2021  1:36 PM Unknown   NEG    Amphetamine Screen, Urine 04/22/2021  1:36 PM Unknown   NEG    Barbiturate Screen, Urine 04/22/2021  1:36 PM Unknown   NEG    Benzodiazepine Screen, Urine 04/22/2021  1:36 PM Unknown   NEG    Buprenorphine Urine 04/22/2021  1:36 PM Unknown   POS    Cocaine Metabolite Screen, Urine 04/22/2021  1:36 PM Unknown   NEG    FENTANYL SCREEN, URINE 04/22/2021  1:36 PM Unknown   NEG    Gabapentin Screen, Urine 04/22/2021  1:36 PM Unknown   N/A    MDMA, Urine 04/22/2021  1:36 PM Unknown   NEG    Methadone Screen, Urine 04/22/2021  1:36 PM Unknown   NEG    Methamphetamine, Urine 04/22/2021  1:36 PM Unknown   NEG    Opiate Scrn, Ur 04/22/2021  1:36 PM Unknown   NEG    Oxycodone Screen, Ur 04/22/2021  1:36 PM Unknown   NEG    PCP Screen, Urine 04/22/2021  1:36 PM Unknown   NEG    Propoxyphene Screen, Urine 04/22/2021  1:36 PM Unknown   N/A    Synthetic Cannabinoids (K2) Screen, Urine 04/22/2021  1:36 PM Unknown   NEG    THC Screen, Urine 04/22/2021  1:36 PM Unknown   NEG    Tramadol Scrn, Ur 04/22/2021  1:36 PM Unknown   NEG    Tricyclic Antidepressants, Urine 04/22/2021  1:36 PM Unknown   N/A         reviewed the [place] Automated Rx Reporting System report     There does not appear to be any discrepancies or overprescribing of controlled substances  He is prescribed Lyrica          An electronic signature was used to authenticate this note.     --Elisha Pimentel MD

## 2021-04-26 ENCOUNTER — TELEPHONE (OUTPATIENT)
Dept: INTERNAL MEDICINE CLINIC | Age: 36
End: 2021-04-26

## 2021-04-26 ENCOUNTER — OFFICE VISIT (OUTPATIENT)
Dept: INTERNAL MEDICINE CLINIC | Age: 36
End: 2021-04-26
Payer: MEDICARE

## 2021-04-26 VITALS
DIASTOLIC BLOOD PRESSURE: 85 MMHG | HEART RATE: 126 BPM | HEIGHT: 70 IN | WEIGHT: 228 LBS | TEMPERATURE: 97.8 F | BODY MASS INDEX: 32.64 KG/M2 | SYSTOLIC BLOOD PRESSURE: 138 MMHG

## 2021-04-26 DIAGNOSIS — F32.A ANXIETY AND DEPRESSION: ICD-10-CM

## 2021-04-26 DIAGNOSIS — G47.00 INSOMNIA, UNSPECIFIED TYPE: ICD-10-CM

## 2021-04-26 DIAGNOSIS — M79.7 FIBROMYALGIA: ICD-10-CM

## 2021-04-26 DIAGNOSIS — F90.9 ATTENTION DEFICIT HYPERACTIVITY DISORDER (ADHD), UNSPECIFIED ADHD TYPE: Primary | ICD-10-CM

## 2021-04-26 DIAGNOSIS — F41.9 ANXIETY AND DEPRESSION: ICD-10-CM

## 2021-04-26 PROCEDURE — G8417 CALC BMI ABV UP PARAM F/U: HCPCS | Performed by: NURSE PRACTITIONER

## 2021-04-26 PROCEDURE — 99214 OFFICE O/P EST MOD 30 MIN: CPT | Performed by: NURSE PRACTITIONER

## 2021-04-26 PROCEDURE — 1036F TOBACCO NON-USER: CPT | Performed by: NURSE PRACTITIONER

## 2021-04-26 PROCEDURE — G8427 DOCREV CUR MEDS BY ELIG CLIN: HCPCS | Performed by: NURSE PRACTITIONER

## 2021-04-26 RX ORDER — VENLAFAXINE 100 MG/1
150 TABLET ORAL 2 TIMES DAILY
Qty: 45 TABLET | Refills: 1 | Status: SHIPPED | OUTPATIENT
Start: 2021-04-26 | End: 2021-06-02 | Stop reason: SDUPTHER

## 2021-04-26 RX ORDER — MIRTAZAPINE 45 MG/1
45 TABLET, FILM COATED ORAL NIGHTLY
Qty: 30 TABLET | Refills: 1 | Status: SHIPPED | OUTPATIENT
Start: 2021-04-26 | End: 2021-06-02 | Stop reason: SDUPTHER

## 2021-04-26 RX ORDER — DEXTROAMPHETAMINE SACCHARATE, AMPHETAMINE ASPARTATE, DEXTROAMPHETAMINE SULFATE AND AMPHETAMINE SULFATE 2.5; 2.5; 2.5; 2.5 MG/1; MG/1; MG/1; MG/1
10 TABLET ORAL 2 TIMES DAILY
Qty: 60 TABLET | Refills: 0 | Status: SHIPPED | OUTPATIENT
Start: 2021-04-26 | End: 2021-05-24 | Stop reason: SDUPTHER

## 2021-04-26 RX ORDER — VENLAFAXINE 100 MG/1
100 TABLET ORAL 3 TIMES DAILY
Qty: 90 TABLET | Refills: 1 | Status: SHIPPED | OUTPATIENT
Start: 2021-04-26 | End: 2021-04-26 | Stop reason: SDUPTHER

## 2021-04-26 RX ORDER — PREGABALIN 300 MG/1
300 CAPSULE ORAL 2 TIMES DAILY
Qty: 60 CAPSULE | Refills: 1 | Status: SHIPPED | OUTPATIENT
Start: 2021-04-26 | End: 2021-06-09 | Stop reason: SDUPTHER

## 2021-04-26 NOTE — PROGRESS NOTES
[facility]  [address]  Dept: [phone]  Dept Fax: [phone]: [phone]     Visit Date:  4/26/2021    Patient:  James Oneil  YOB: 1985    HPI:     Chief Complaint   Patient presents with    1 Month Follow-Up    ADHD    Anxiety     Bonnie Vaughn presents today for 1 month follow up of anxiety/depression, ADHD, chronic pain, and insomnia. I last seen him 4 weeks ago. He follows with Dr. Mckayla Whyte for MAT routinely. Provigil not covered by insurance. Vyvanse caused a rash on his face reportedly, however the rash itself is very similar to meth induced rash I have seen him with many times. He adamantly declines any use, but reports that if his ADHD is not controlled, he will have no choice but to use methamphetamines. He was on Adderall in the past, and it was helpful. Observed urine with Dr. Mckayla Whyte, however Bonnie Vaughn kept his back turned as if he was hiding something; and afterwards had urine all over his clothes. Will send for comprehensive analysis.      He also was diagnosed with Fibromyalgia at a young age. States that he had mono as a teenager and was very ill. Required hospitalization in [facility]. Also diagnosed with cardiogenic syncope at that time. His muscles are painful to touch. Lyrica is helpful.      Remeron is effective in treating his insomnia. Effexor is effective in treating his anxiety/depression. Medications    Current Outpatient Medications:     mirtazapine (REMERON) 45 MG tablet, Take 1 tablet by mouth nightly, Disp: 30 tablet, Rfl: 1    amphetamine-dextroamphetamine (ADDERALL, 10MG,) 10 MG tablet, Take 1 tablet by mouth 2 times daily for 30 days. , Disp: 60 tablet, Rfl: 0    pregabalin (LYRICA) 300 MG capsule, Take 1 capsule by mouth 2 times daily for 60 days. , Disp: 60 capsule, Rfl: 1    venlafaxine (EFFEXOR) 100 MG tablet, Take 1.5 tablets by mouth 2 times daily, Disp: 45 tablet, Rfl: 1    ondansetron (ZOFRAN ODT) 4 MG disintegrating tablet, Take 1 tablet by mouth every 8 hours as needed for Nausea, Disp: 20 tablet, Rfl: 0    lisinopril (PRINIVIL;ZESTRIL) 20 MG tablet, Take 1 tablet by mouth daily, Disp: 30 tablet, Rfl: 1    Aspirin-Acetaminophen-Caffeine (EXCEDRIN EXTRA STRENGTH PO), Take by mouth as needed, Disp: , Rfl:     naloxone 4 MG/0.1ML LIQD nasal spray, 1 spray by Nasal route as needed for Opioid Reversal, Disp: 1 each, Rfl: 5    guanFACINE (INTUNIV) 1 MG TB24 extended release tablet, Take 1 tablet by mouth nightly, Disp: 30 tablet, Rfl: 1    The patient has No Known Allergies. Past Medical History  Shekhar Godinez  has a past medical history of Anxiety, Depression, Kidney stone, Liver disease, and Opiate abuse, continuous ([facility] [address] Past Surgical History  The patient  has no past surgical history on file. Family History  This patient's family history is not on file. Social History  Shekhar Godinez  reports that he has quit smoking. His smoking use included cigarettes. He smoked 0.50 packs per day. He has never used smokeless tobacco. He reports current alcohol use. He reports previous drug use. Health Maintenance:    Health Maintenance   Topic Date Due    Varicella vaccine (1 of 2 - 2-dose childhood series) Never done    COVID-19 Vaccine (1) Never done    DTaP/Tdap/Td vaccine (1 - Tdap) Never done    Flu vaccine (Season Ended) 09/01/2021    Potassium monitoring  03/21/2022    Creatinine monitoring  03/21/2022    Hepatitis C screen  Completed    HIV screen  Completed    Hepatitis A vaccine  Aged Out    Hepatitis B vaccine  Aged Out    Hib vaccine  Aged Out    Meningococcal (ACWY) vaccine  Aged Out    Pneumococcal 0-64 years Vaccine  Aged Out       Subjective:      Review of Systems   Constitutional: Negative for chills, fatigue and fever.    HENT: Negative for congestion, rhinorrhea, sinus pressure, sinus pain, sore throat, tinnitus and trouble swallowing. Eyes: Negative for photophobia and visual disturbance. Respiratory: Positive for shortness of breath (on exertion). Negative for cough and wheezing. Cardiovascular: Negative for chest pain, palpitations and leg swelling. Gastrointestinal: Negative for abdominal distention, abdominal pain, blood in stool, constipation, diarrhea, nausea and vomiting. Endocrine: Negative for polydipsia, polyphagia and polyuria. Genitourinary: Negative for difficulty urinating, dysuria, frequency, hematuria and urgency. Musculoskeletal: Positive for myalgias (generalized- fibromyalgia). Negative for arthralgias and neck pain. Skin: Negative for rash and wound. Neurological: Negative for dizziness, light-headedness and headaches. Psychiatric/Behavioral: Positive for decreased concentration. Negative for dysphoric mood, sleep disturbance and suicidal ideas. The patient is not nervous/anxious. Objective:     /85   Pulse 126   Temp 97.8 °F (36.6 °C) (Temporal)   Ht 5' 10\" (1.778 m)   Wt 228 lb (103.4 kg)   BMI 32.71 kg/m²     Physical Exam  Vitals signs reviewed. Constitutional:       General: He is not in acute distress. Appearance: Normal appearance. He is not ill-appearing. HENT:      Head: Normocephalic and atraumatic. Right Ear: Tympanic membrane, ear canal and external ear normal.      Left Ear: Tympanic membrane, ear canal and external ear normal.      Nose: Nose normal. No congestion or rhinorrhea. Mouth/Throat:      Mouth: Mucous membranes are moist.      Pharynx: Oropharynx is clear. No oropharyngeal exudate or posterior oropharyngeal erythema. Eyes:      Extraocular Movements: Extraocular movements intact. Conjunctiva/sclera: Conjunctivae normal.      Pupils: Pupils are equal, round, and reactive to light. Neck:      Musculoskeletal: Normal range of motion and neck supple.    Cardiovascular:      Rate and Rhythm: Normal rate and Psychiatry  - amphetamine-dextroamphetamine (ADDERALL, 10MG,) 10 MG tablet; Take 1 tablet by mouth 2 times daily for 30 days. Dispense: 60 tablet; Refill: 0  - OARRS reviewed, no discrepancies  - Send urine for comprehensive analysis    4. Fibromyalgia    - pregabalin (LYRICA) 300 MG capsule; Take 1 capsule by mouth 2 times daily for 60 days. Dispense: 60 capsule; Refill: 1      Return in about 4 weeks (around 5/24/2021). Patient given educational materials - see patient instructions. Discussed use, benefit, and side effects of prescribed medications. All patient questions answered. Pt voiced understanding. Reviewed health maintenance.        Electronically signed PAPA Eduardo - CNP on 4/26/21 at 2:40 PM EDT

## 2021-04-26 NOTE — TELEPHONE ENCOUNTER
Pharmacist at [facility] informed changing dosage on Lyrica to 300mg BID to cancel out any Lyrica 200mg scripts and changing from Vyvanse to Adderall since patient had a reaction to Vyvanse.

## 2021-04-27 ENCOUNTER — TELEPHONE (OUTPATIENT)
Dept: INTERNAL MEDICINE CLINIC | Age: 36
End: 2021-04-27
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
## 2021-04-29 ENCOUNTER — OFFICE VISIT (OUTPATIENT)
Dept: INTERNAL MEDICINE CLINIC | Age: 36
End: 2021-04-29

## 2021-04-29 DIAGNOSIS — F11.20 SEVERE OPIOID USE DISORDER (HCC): Primary | ICD-10-CM

## 2021-04-29 PROCEDURE — 99024 POSTOP FOLLOW-UP VISIT: CPT | Performed by: INTERNAL MEDICINE

## 2021-04-29 RX ORDER — BUPRENORPHINE AND NALOXONE 8; 2 MG/1; MG/1
1 FILM, SOLUBLE BUCCAL; SUBLINGUAL 2 TIMES DAILY
Qty: 16 FILM | Refills: 0 | Status: CANCELLED | OUTPATIENT
Start: 2021-04-29 | End: 2021-05-07

## 2021-05-06 ENCOUNTER — OFFICE VISIT (OUTPATIENT)
Dept: INTERNAL MEDICINE CLINIC | Age: 36
End: 2021-05-06
Payer: MEDICARE

## 2021-05-06 VITALS
BODY MASS INDEX: 33.21 KG/M2 | DIASTOLIC BLOOD PRESSURE: 105 MMHG | TEMPERATURE: 97.8 F | HEART RATE: 134 BPM | HEIGHT: 70 IN | SYSTOLIC BLOOD PRESSURE: 150 MMHG | WEIGHT: 232 LBS

## 2021-05-06 DIAGNOSIS — F11.20 SEVERE OPIOID USE DISORDER (HCC): Primary | ICD-10-CM

## 2021-05-06 DIAGNOSIS — Z51.81 ENCOUNTER FOR MONITORING SUBOXONE MAINTENANCE THERAPY: ICD-10-CM

## 2021-05-06 DIAGNOSIS — Z79.899 ENCOUNTER FOR MONITORING SUBOXONE MAINTENANCE THERAPY: ICD-10-CM

## 2021-05-06 DIAGNOSIS — F19.10 POLYSUBSTANCE ABUSE (HCC): ICD-10-CM

## 2021-05-06 DIAGNOSIS — F32.A ANXIETY AND DEPRESSION: ICD-10-CM

## 2021-05-06 DIAGNOSIS — F90.9 ATTENTION DEFICIT HYPERACTIVITY DISORDER (ADHD), UNSPECIFIED ADHD TYPE: ICD-10-CM

## 2021-05-06 DIAGNOSIS — Z91.199 NONCOMPLIANCE: ICD-10-CM

## 2021-05-06 DIAGNOSIS — F41.9 ANXIETY AND DEPRESSION: ICD-10-CM

## 2021-05-06 DIAGNOSIS — R76.8 HEPATITIS C ANTIBODY POSITIVE IN BLOOD: ICD-10-CM

## 2021-05-06 DIAGNOSIS — M79.7 FIBROMYALGIA: ICD-10-CM

## 2021-05-06 PROCEDURE — 99213 OFFICE O/P EST LOW 20 MIN: CPT | Performed by: INTERNAL MEDICINE

## 2021-05-06 PROCEDURE — G8417 CALC BMI ABV UP PARAM F/U: HCPCS | Performed by: INTERNAL MEDICINE

## 2021-05-06 PROCEDURE — G8427 DOCREV CUR MEDS BY ELIG CLIN: HCPCS | Performed by: INTERNAL MEDICINE

## 2021-05-06 PROCEDURE — 80305 DRUG TEST PRSMV DIR OPT OBS: CPT | Performed by: INTERNAL MEDICINE

## 2021-05-06 PROCEDURE — 1036F TOBACCO NON-USER: CPT | Performed by: INTERNAL MEDICINE

## 2021-05-06 RX ORDER — BUPRENORPHINE AND NALOXONE 8; 2 MG/1; MG/1
1 FILM, SOLUBLE BUCCAL; SUBLINGUAL 2 TIMES DAILY
Qty: 2 FILM | Refills: 0 | Status: SHIPPED | OUTPATIENT
Start: 2021-05-06 | End: 2021-05-07 | Stop reason: SDUPTHER

## 2021-05-06 ASSESSMENT — ENCOUNTER SYMPTOMS
SINUS PRESSURE: 0
NAUSEA: 0
WHEEZING: 0
BLOOD IN STOOL: 0
VOMITING: 0
TROUBLE SWALLOWING: 0
PHOTOPHOBIA: 0
ABDOMINAL DISTENTION: 0
ABDOMINAL PAIN: 0
DIARRHEA: 0
CONSTIPATION: 0
SHORTNESS OF BREATH: 1
COUGH: 0
SINUS PAIN: 0
RHINORRHEA: 0
SORE THROAT: 0

## 2021-05-06 NOTE — PROGRESS NOTES
Verbal order per Dr. Skylar Hassan for urine drug screen. Positive for AMP BUP TRAM. Verified results with Katty Castro RN. Dr. Skylar Hassan ordered Suboxone 8mg film BID for patient. Verified dose with patient. Patient was sent home with 1 day script of Suboxone 8mg film BID and will be seen back in the office 5/7/21.

## 2021-05-07 ENCOUNTER — OFFICE VISIT (OUTPATIENT)
Dept: INTERNAL MEDICINE CLINIC | Age: 36
End: 2021-05-07
Payer: MEDICARE

## 2021-05-07 VITALS
DIASTOLIC BLOOD PRESSURE: 109 MMHG | WEIGHT: 232 LBS | SYSTOLIC BLOOD PRESSURE: 146 MMHG | BODY MASS INDEX: 33.21 KG/M2 | HEIGHT: 70 IN | HEART RATE: 119 BPM | TEMPERATURE: 96.8 F

## 2021-05-07 DIAGNOSIS — L02.91 ABSCESS: ICD-10-CM

## 2021-05-07 DIAGNOSIS — F11.20 SEVERE OPIOID USE DISORDER (HCC): Primary | ICD-10-CM

## 2021-05-07 PROCEDURE — 80305 DRUG TEST PRSMV DIR OPT OBS: CPT | Performed by: NURSE PRACTITIONER

## 2021-05-07 PROCEDURE — G8427 DOCREV CUR MEDS BY ELIG CLIN: HCPCS | Performed by: NURSE PRACTITIONER

## 2021-05-07 PROCEDURE — 99213 OFFICE O/P EST LOW 20 MIN: CPT | Performed by: NURSE PRACTITIONER

## 2021-05-07 PROCEDURE — 1036F TOBACCO NON-USER: CPT | Performed by: NURSE PRACTITIONER

## 2021-05-07 PROCEDURE — G8417 CALC BMI ABV UP PARAM F/U: HCPCS | Performed by: NURSE PRACTITIONER

## 2021-05-07 RX ORDER — SULFAMETHOXAZOLE AND TRIMETHOPRIM 800; 160 MG/1; MG/1
1 TABLET ORAL 2 TIMES DAILY
Qty: 10 TABLET | Refills: 0 | Status: SHIPPED | OUTPATIENT
Start: 2021-05-07 | End: 2021-05-12

## 2021-05-07 RX ORDER — BUPRENORPHINE AND NALOXONE 8; 2 MG/1; MG/1
1 FILM, SOLUBLE BUCCAL; SUBLINGUAL 2 TIMES DAILY
Qty: 6 FILM | Refills: 0 | Status: SHIPPED | OUTPATIENT
Start: 2021-05-07 | End: 2021-05-10

## 2021-05-07 NOTE — PROGRESS NOTES
history of Anxiety, Depression, Kidney stone, Liver disease, and Opiate abuse, continuous ([address] Past Surgical History  The patient  has no past surgical history on file. Family History  This patient's family history is not on file. Social History  Shankar Dotson  reports that he has quit smoking. His smoking use included cigarettes. He smoked 0.50 packs per day. He has never used smokeless tobacco. He reports current alcohol use. He reports previous drug use. Health Maintenance:    Health Maintenance   Topic Date Due    Varicella vaccine (1 of 2 - 2-dose childhood series) Never done    COVID-19 Vaccine (1) Never done    DTaP/Tdap/Td vaccine (1 - Tdap) Never done    Flu vaccine (Season Ended) 09/01/2021    Potassium monitoring  03/21/2022    Creatinine monitoring  03/21/2022    Hepatitis C screen  Completed    HIV screen  Completed    Hepatitis A vaccine  Aged Out    Hepatitis B vaccine  Aged Out    Hib vaccine  Aged Out    Meningococcal (ACWY) vaccine  Aged Out    Pneumococcal 0-64 years Vaccine  Aged Out       Subjective:      Review of Systems   Constitutional: Negative for chills, fatigue and fever. HENT: Negative for congestion, rhinorrhea, sinus pressure, sinus pain, sore throat, tinnitus and trouble swallowing. Eyes: Negative for photophobia and visual disturbance. Respiratory: Positive for shortness of breath (on exertion). Negative for cough and wheezing. Cardiovascular: Negative for chest pain, palpitations and leg swelling. Gastrointestinal: Negative for abdominal distention, abdominal pain, blood in stool, constipation, diarrhea, nausea and vomiting. Endocrine: Negative for polydipsia, polyphagia and polyuria. Genitourinary: Negative for difficulty urinating, dysuria, frequency, hematuria and urgency. Musculoskeletal: Positive for myalgias (generalized- fibromyalgia). Negative for arthralgias and neck pain. Skin: Positive for wound (LUE abscess). Negative for rash. CREATININE 1.0 03/21/2021    BUN 17 03/21/2021    CO2 26 03/21/2021    TSH 3.580 11/25/2020    PSA 0.98 04/25/2019    INR 1.05 10/14/2020       Assessment/Plan      1. Severe opioid use disorder (HCC)    - POCT Rapid Drug Screen  - buprenorphine-naloxone (SUBOXONE) 8-2 MG FILM SL film; Place 1 Film under the tongue 2 times daily for 3 days. Dispense: 6 Film; Refill: 0  - Sustain from illicit drug use  - Narcan at home  - Attend NA/AA meetings and/or arrange for individualized counseling  - OARRS reviewed, no discrepancies    2. Abscess    - sulfamethoxazole-trimethoprim (BACTRIM DS;SEPTRA DS) 800-160 MG per tablet; Take 1 tablet by mouth 2 times daily for 5 days  Dispense: 10 tablet; Refill: 0  - Stop testosterone - not prescribed! Return in about 3 days (around 5/10/2021) for with [facility]. Patient given educational materials - see patient instructions. Discussed use, benefit, and side effects of prescribed medications. All patient questions answered. Pt voiced understanding. Reviewed health maintenance.        Electronically signed PAPA Smith - CNP on 5/7/21 at 11:24 AM EDT

## 2021-05-07 NOTE — PROGRESS NOTES
Verbal order per Josse Centeno CNP for urine drug screen. Positive for BUP AMP TRAM. Verified results with Dalia FIORE LPN. Josse Centeno CNP ordered Suboxone 8mg film BID  for patient. Verified dose with patient. Patient was sent home with 3 day script of Suboxone 8mg film BID and will be seen back in the office 5/10/21.

## 2021-05-16 NOTE — PROGRESS NOTES
Naima Lewis (:  1985) is a 39 y.o. male,Established patient, here for evaluation of the following chief complaint(s):  Drug Problem      ASSESSMENT/PLAN:  1. Severe opioid use disorder (HCC)  -     POCT Rapid Drug Screen  2. Anxiety and depression  3. Attention deficit hyperactivity disorder (ADHD), unspecified ADHD type  4. Fibromyalgia  5. Encounter for monitoring Suboxone maintenance therapy  6. Hepatitis C antibody positive in blood  7. Polysubstance abuse ([address]  8. Noncompliance    Urine has amphetamines  Crystal Jackie Come gives him Adderall  He is very demanding he wants a higher dose of Adderall he wants Lyrica  I not think he is ever going to be clean, nor his girlfriend  Will give him Suboxone 8 mg twice daily  Follow-up here tomorrow        SUBJECTIVE/OBJECTIVE:  Drug Problem      I saw him here   At that visit I was directly observing him urinate  He test me out walked out of the office his his pants down  Of course he called to be seen again no one else will see him    We have gotten word from the police that he and several other people were selling and trading their drugs on the street  I had Bear Calpine setting with this visit  He was lost to follow-up until recently  He said he was in the methadone clinic until recently  He relapsed on meth weeks ago  He says he wants to get clean he has been using Suboxone off the street  I see his girlfriend as well  I put him on Suboxone  weeks ago  He says they both relapsed on heroin 2 weeks ago  He said he has an aunt in [place] that passed away    He had told me he was clean over 100 days but he said he lied    Review of Systems   Patient is feeling well  Patient is not experiencing  withdrawal symptoms ,no urges or cravings  Patient is not having any side effects from the buprenorphine    Physical Exam  Constitutional:       Appearance: Normal appearance. Skin:     General: Skin is warm and dry.    Neurological:      Mental Status: He is alert.   Psychiatric:         Mood and Affect: Mood normal.         Behavior: Behavior normal.         Thought Content: Thought content normal.         Judgment: Judgment normal.     Urine tox screen today  Alcohol, Urine 05/06/2021  2:10 PM Unknown   NEG    Amphetamine Screen, Urine 05/06/2021  2:10 PM Unknown   POS    Barbiturate Screen, Urine 05/06/2021  2:10 PM Unknown   NEG    Benzodiazepine Screen, Urine 05/06/2021  2:10 PM Unknown   NEG    Buprenorphine Urine 05/06/2021  2:10 PM Unknown   POS    Cocaine Metabolite Screen, Urine 05/06/2021  2:10 PM Unknown   NEG    FENTANYL SCREEN, URINE 05/06/2021  2:10 PM Unknown   NEG    Gabapentin Screen, Urine 05/06/2021  2:10 PM Unknown   N/A    MDMA, Urine 05/06/2021  2:10 PM Unknown   NEG    Methadone Screen, Urine 05/06/2021  2:10 PM Unknown   NEG    Methamphetamine, Urine 05/06/2021  2:10 PM Unknown   NEG    Opiate Scrn, Ur 05/06/2021  2:10 PM Unknown   NEG    Oxycodone Screen, Ur 05/06/2021  2:10 PM Unknown   NEG    PCP Screen, Urine 05/06/2021  2:10 PM Unknown   NEG    Propoxyphene Screen, Urine 05/06/2021  2:10 PM Unknown   N/A    Synthetic Cannabinoids (K2) Screen, Urine 05/06/2021  2:10 PM Unknown   NEG    THC Screen, Urine 05/06/2021  2:10 PM Unknown   NEG    Tramadol Scrn, Ur 05/06/2021  2:10 PM Unknown   POS    Tricyclic Antidepressants, Urine 05/06/2021  2:10 PM Unknown   N/A         reviewed the [place] Automated Rx Reporting System report     There does not appear to be any discrepancies or overprescribing of controlled substances  He is prescribed Lyrica  He is prescribed Adderall        An electronic signature was used to authenticate this note.     --Lacie Crandall MD

## 2021-05-17 ASSESSMENT — ENCOUNTER SYMPTOMS
SORE THROAT: 0
SINUS PRESSURE: 0
SINUS PAIN: 0
ABDOMINAL PAIN: 0
NAUSEA: 0
SHORTNESS OF BREATH: 1
TROUBLE SWALLOWING: 0
DIARRHEA: 0
CONSTIPATION: 0
BLOOD IN STOOL: 0
ABDOMINAL DISTENTION: 0
WHEEZING: 0
VOMITING: 0
PHOTOPHOBIA: 0
RHINORRHEA: 0
COUGH: 0

## 2021-05-24 ENCOUNTER — OFFICE VISIT (OUTPATIENT)
Dept: INTERNAL MEDICINE CLINIC | Age: 36
End: 2021-05-24
Payer: MEDICARE

## 2021-05-24 VITALS
WEIGHT: 232 LBS | TEMPERATURE: 98 F | SYSTOLIC BLOOD PRESSURE: 150 MMHG | DIASTOLIC BLOOD PRESSURE: 82 MMHG | BODY MASS INDEX: 33.21 KG/M2 | HEART RATE: 75 BPM | HEIGHT: 70 IN

## 2021-05-24 DIAGNOSIS — F90.9 ATTENTION DEFICIT HYPERACTIVITY DISORDER (ADHD), UNSPECIFIED ADHD TYPE: ICD-10-CM

## 2021-05-24 DIAGNOSIS — R68.82 DECREASED SEX DRIVE: Primary | ICD-10-CM

## 2021-05-24 LAB — HBA1C MFR BLD: 11.5 % (ref 4.3–5.7)

## 2021-05-24 PROCEDURE — 99213 OFFICE O/P EST LOW 20 MIN: CPT | Performed by: NURSE PRACTITIONER

## 2021-05-24 PROCEDURE — G8417 CALC BMI ABV UP PARAM F/U: HCPCS | Performed by: NURSE PRACTITIONER

## 2021-05-24 PROCEDURE — G8427 DOCREV CUR MEDS BY ELIG CLIN: HCPCS | Performed by: NURSE PRACTITIONER

## 2021-05-24 PROCEDURE — 4004F PT TOBACCO SCREEN RCVD TLK: CPT | Performed by: NURSE PRACTITIONER

## 2021-05-24 RX ORDER — DEXTROAMPHETAMINE SACCHARATE, AMPHETAMINE ASPARTATE, DEXTROAMPHETAMINE SULFATE AND AMPHETAMINE SULFATE 2.5; 2.5; 2.5; 2.5 MG/1; MG/1; MG/1; MG/1
10 TABLET ORAL 2 TIMES DAILY
Qty: 14 TABLET | Refills: 0 | Status: SHIPPED | OUTPATIENT
Start: 2021-05-24 | End: 2021-05-24 | Stop reason: SDUPTHER

## 2021-05-24 RX ORDER — DEXTROAMPHETAMINE SACCHARATE, AMPHETAMINE ASPARTATE, DEXTROAMPHETAMINE SULFATE AND AMPHETAMINE SULFATE 2.5; 2.5; 2.5; 2.5 MG/1; MG/1; MG/1; MG/1
10 TABLET ORAL 2 TIMES DAILY
Qty: 16 TABLET | Refills: 0 | Status: SHIPPED | OUTPATIENT
Start: 2021-05-24 | End: 2021-06-02 | Stop reason: SDUPTHER

## 2021-05-24 RX ORDER — DEXTROAMPHETAMINE SACCHARATE, AMPHETAMINE ASPARTATE, DEXTROAMPHETAMINE SULFATE AND AMPHETAMINE SULFATE 2.5; 2.5; 2.5; 2.5 MG/1; MG/1; MG/1; MG/1
10 TABLET ORAL 2 TIMES DAILY
Qty: 16 TABLET | Refills: 0 | Status: SHIPPED | OUTPATIENT
Start: 2021-05-24 | End: 2021-05-24 | Stop reason: SDUPTHER

## 2021-05-24 NOTE — PROGRESS NOTES
PO), Take by mouth as needed, Disp: , Rfl:     naloxone 4 MG/0.1ML LIQD nasal spray, 1 spray by Nasal route as needed for Opioid Reversal, Disp: 1 each, Rfl: 5    venlafaxine (EFFEXOR) 100 MG tablet, Take 1.5 tablets by mouth 2 times daily, Disp: 45 tablet, Rfl: 1    mirtazapine (REMERON) 45 MG tablet, Take 1 tablet by mouth nightly, Disp: 30 tablet, Rfl: 1    sulfamethoxazole-trimethoprim (BACTRIM DS;SEPTRA DS) 800-160 MG per tablet, Take 1 tablet by mouth 2 times daily for 7 days, Disp: 14 tablet, Rfl: 0    amphetamine-dextroamphetamine (ADDERALL, 10MG,) 10 MG tablet, Take 1.5 tablets by mouth 2 times daily for 7 days. , Disp: 21 tablet, Rfl: 0    gabapentin (NEURONTIN) 600 MG tablet, Take 1 tablet by mouth 3 times daily for 30 days. , Disp: 90 tablet, Rfl: 0    buprenorphine-naloxone (SUBOXONE) 8-2 MG FILM SL film, Place 1 Film under the tongue 2 times daily. , Disp: , Rfl:     The patient has No Known Allergies. Past Medical History  Jann Mendoza  has a past medical history of Anxiety, Depression, Kidney stone, Liver disease, and Opiate abuse, continuous ([facility] [address] Past Surgical History  The patient  has no past surgical history on file. Family History  This patient's family history is not on file. Social History  Jann Mendoza  reports that he has been smoking cigarettes. He has been smoking about 0.50 packs per day. He has never used smokeless tobacco. He reports current alcohol use. He reports current drug use.     Health Maintenance:    Health Maintenance   Topic Date Due    Varicella vaccine (1 of 2 - 2-dose childhood series) Never done    Pneumococcal 0-64 years Vaccine (1 of 2 - PPSV23) Never done    Diabetic foot exam  Never done    Diabetic retinal exam  Never done    Lipid screen  Never done    COVID-19 Vaccine (1) Never done    Diabetic microalbuminuria test  Never done    DTaP/Tdap/Td vaccine (1 - Tdap) Never done    A1C test (Diabetic or Prediabetic)  08/24/2021    Flu vaccine (Season Ended) 09/01/2021    Potassium monitoring  05/26/2022    Creatinine monitoring  05/26/2022    Hepatitis C screen  Completed    HIV screen  Completed    Hepatitis A vaccine  Aged Out    Hepatitis B vaccine  Aged Out    Hib vaccine  Aged Out    Meningococcal (ACWY) vaccine  Aged Out       Subjective:      Review of Systems   Constitutional: Negative for chills, fatigue and fever. HENT: Negative for congestion, rhinorrhea, sinus pressure, sinus pain, sore throat, tinnitus and trouble swallowing. Eyes: Negative for photophobia and visual disturbance. Respiratory: Positive for shortness of breath (on exertion). Negative for cough and wheezing. Cardiovascular: Negative for chest pain, palpitations and leg swelling. Gastrointestinal: Negative for abdominal distention, abdominal pain, blood in stool, constipation, diarrhea, nausea and vomiting. Endocrine: Negative for polydipsia, polyphagia and polyuria. Genitourinary: Negative for difficulty urinating, dysuria, frequency, hematuria and urgency. Musculoskeletal: Positive for myalgias (generalized- fibromyalgia). Negative for arthralgias and neck pain. Skin: Positive for wound (LUE abscess). Negative for rash. Neurological: Negative for dizziness, light-headedness and headaches. Psychiatric/Behavioral: Positive for decreased concentration. Negative for dysphoric mood, sleep disturbance and suicidal ideas. The patient is not nervous/anxious. Objective:     BP (!) 150/82 (Site: Right Upper Arm, Position: Sitting, Cuff Size: Large Adult)   Pulse 75   Temp 98 °F (36.7 °C) (Temporal)   Ht 5' 10\" (1.778 m)   Wt 232 lb (105.2 kg)   BMI 33.29 kg/m²     Physical Exam  Vitals reviewed. Constitutional:       General: He is not in acute distress. Appearance: Normal appearance. He is not ill-appearing. HENT:      Head: Normocephalic and atraumatic.       Right Ear: External ear normal.      Left Ear: External ear normal.      Nose: Nose normal. No congestion or rhinorrhea. Mouth/Throat:      Mouth: Mucous membranes are moist.   Eyes:      Extraocular Movements: Extraocular movements intact. Conjunctiva/sclera: Conjunctivae normal.      Pupils: Pupils are equal, round, and reactive to light. Pulmonary:      Effort: Pulmonary effort is normal. No respiratory distress. Musculoskeletal:         General: Normal range of motion. Cervical back: Normal range of motion and neck supple. Right lower leg: No edema. Left lower leg: No edema. Skin:     General: Skin is warm and dry. Findings: Lesion (LUE abscess) present. Neurological:      General: No focal deficit present. Mental Status: He is alert and oriented to person, place, and time. Psychiatric:         Mood and Affect: Mood normal.         Behavior: Behavior normal.         Thought Content: Thought content normal.         Judgment: Judgment normal.         Labs Reviewed 5/24/2021:    Lab Results   Component Value Date    WBC 13.5 (H) 05/26/2021    HGB 11.9 (L) 05/26/2021    HCT 38.5 (L) 05/26/2021     (H) 05/26/2021    ALT 42 05/26/2021    AST 39 05/26/2021     05/26/2021    K 3.3 (L) 05/26/2021     05/26/2021    CREATININE 1.5 (H) 05/26/2021    BUN 16 05/26/2021    CO2 27 05/26/2021    TSH 3.580 11/25/2020    PSA 0.98 04/25/2019    INR 1.05 10/14/2020    LABA1C 11.5 (H) 05/24/2021       Assessment/Plan      1. Attention deficit hyperactivity disorder (ADHD), unspecified ADHD type    - psychiatry referral declined. Patient refuses Colemans. - amphetamine-dextroamphetamine (ADDERALL, 10MG,) 10 MG tablet; Take 1 tablet by mouth 2 times daily for 7 days. Dispense: 14 tablet; Refill: 0  - OARRS reviewed, no discrepancies    2. Decreased sex drive    - Testosterone, free, total; Future  - Testosterone; Future      Return in about 1 week (around 5/31/2021). Patient given educational materials - see patient instructions.   Discussed use, benefit, and side effects of prescribed medications. All patient questions answered. Pt voiced understanding. Reviewed health maintenance.        Electronically signed PAPA Macias CNP on 5/24/21 at 2:20 PM EDT

## 2021-05-26 ENCOUNTER — APPOINTMENT (OUTPATIENT)
Dept: CT IMAGING | Age: 36
End: 2021-05-26
Payer: MEDICARE

## 2021-05-26 ENCOUNTER — HOSPITAL ENCOUNTER (EMERGENCY)
Age: 36
Discharge: HOME | End: 2021-05-26
Payer: MEDICARE

## 2021-05-26 ENCOUNTER — OFFICE VISIT (OUTPATIENT)
Dept: INTERNAL MEDICINE CLINIC | Age: 36
End: 2021-05-26
Payer: MEDICARE

## 2021-05-26 VITALS
TEMPERATURE: 97.2 F | HEART RATE: 123 BPM | WEIGHT: 216 LBS | SYSTOLIC BLOOD PRESSURE: 137 MMHG | DIASTOLIC BLOOD PRESSURE: 89 MMHG | BODY MASS INDEX: 30.92 KG/M2 | HEIGHT: 70 IN

## 2021-05-26 VITALS
DIASTOLIC BLOOD PRESSURE: 98 MMHG | TEMPERATURE: 98 F | RESPIRATION RATE: 16 BRPM | SYSTOLIC BLOOD PRESSURE: 139 MMHG | HEART RATE: 117 BPM | BODY MASS INDEX: 31.28 KG/M2 | OXYGEN SATURATION: 100 % | WEIGHT: 218 LBS

## 2021-05-26 DIAGNOSIS — M79.7 FIBROMYALGIA: ICD-10-CM

## 2021-05-26 DIAGNOSIS — Z79.899 ENCOUNTER FOR MONITORING SUBOXONE MAINTENANCE THERAPY: ICD-10-CM

## 2021-05-26 DIAGNOSIS — F11.20 SEVERE OPIOID USE DISORDER (HCC): Primary | ICD-10-CM

## 2021-05-26 DIAGNOSIS — F19.10 POLYSUBSTANCE ABUSE (HCC): ICD-10-CM

## 2021-05-26 DIAGNOSIS — F41.9 ANXIETY AND DEPRESSION: ICD-10-CM

## 2021-05-26 DIAGNOSIS — Z91.199 NONCOMPLIANCE: ICD-10-CM

## 2021-05-26 DIAGNOSIS — N20.0 KIDNEY STONES: ICD-10-CM

## 2021-05-26 DIAGNOSIS — R10.9 LEFT FLANK PAIN: Primary | ICD-10-CM

## 2021-05-26 DIAGNOSIS — F32.A ANXIETY AND DEPRESSION: ICD-10-CM

## 2021-05-26 DIAGNOSIS — F90.9 ATTENTION DEFICIT HYPERACTIVITY DISORDER (ADHD), UNSPECIFIED ADHD TYPE: ICD-10-CM

## 2021-05-26 DIAGNOSIS — Z51.81 ENCOUNTER FOR MONITORING SUBOXONE MAINTENANCE THERAPY: ICD-10-CM

## 2021-05-26 LAB
ALBUMIN SERPL-MCNC: 4.3 G/DL (ref 3.5–5.1)
ALCOHOL URINE: ABNORMAL
ALP BLD-CCNC: 90 U/L (ref 38–126)
ALT SERPL-CCNC: 42 U/L (ref 11–66)
AMPHETAMINE SCREEN, URINE: ABNORMAL
ANION GAP SERPL CALCULATED.3IONS-SCNC: 14 MEQ/L (ref 8–16)
AST SERPL-CCNC: 39 U/L (ref 5–40)
BARBITURATE SCREEN, URINE: ABNORMAL
BASOPHILS # BLD: 0.4 %
BASOPHILS ABSOLUTE: 0.1 THOU/MM3 (ref 0–0.1)
BENZODIAZEPINE SCREEN, URINE: ABNORMAL
BILIRUB SERPL-MCNC: 0.5 MG/DL (ref 0.3–1.2)
BUN BLDV-MCNC: 16 MG/DL (ref 7–22)
BUPRENORPHINE URINE: ABNORMAL
CALCIUM SERPL-MCNC: 9.8 MG/DL (ref 8.5–10.5)
CHLORIDE BLD-SCNC: 102 MEQ/L (ref 98–111)
CO2: 27 MEQ/L (ref 23–33)
COCAINE METABOLITE SCREEN URINE: ABNORMAL
CREAT SERPL-MCNC: 1.5 MG/DL (ref 0.4–1.2)
EOSINOPHIL # BLD: 1.1 %
EOSINOPHILS ABSOLUTE: 0.1 THOU/MM3 (ref 0–0.4)
ERYTHROCYTE [DISTWIDTH] IN BLOOD BY AUTOMATED COUNT: 17.7 % (ref 11.5–14.5)
ERYTHROCYTE [DISTWIDTH] IN BLOOD BY AUTOMATED COUNT: 52.6 FL (ref 35–45)
FENTANYL SCREEN, URINE: ABNORMAL
GABAPENTIN SCREEN, URINE: ABNORMAL
GFR SERPL CREATININE-BSD FRML MDRD: 53 ML/MIN/1.73M2
GLUCOSE BLD-MCNC: 92 MG/DL (ref 70–108)
HCT VFR BLD CALC: 38.5 % (ref 42–52)
HEMOGLOBIN: 11.9 GM/DL (ref 14–18)
IMMATURE GRANS (ABS): 0.05 THOU/MM3 (ref 0–0.07)
IMMATURE GRANULOCYTES: 0.4 %
LYMPHOCYTES # BLD: 24.8 %
LYMPHOCYTES ABSOLUTE: 3.3 THOU/MM3 (ref 1–4.8)
MAGNESIUM: 2.2 MG/DL (ref 1.6–2.4)
MCH RBC QN AUTO: 25.8 PG (ref 26–33)
MCHC RBC AUTO-ENTMCNC: 30.9 GM/DL (ref 32.2–35.5)
MCV RBC AUTO: 83.3 FL (ref 80–94)
MDMA URINE: ABNORMAL
METHADONE SCREEN, URINE: ABNORMAL
METHAMPHETAMINE, URINE: ABNORMAL
MONOCYTES # BLD: 8.5 %
MONOCYTES ABSOLUTE: 1.1 THOU/MM3 (ref 0.4–1.3)
NUCLEATED RED BLOOD CELLS: 0 /100 WBC
OPIATE SCREEN URINE: ABNORMAL
OSMOLALITY CALCULATION: 285.8 MOSMOL/KG (ref 275–300)
OXYCODONE SCREEN URINE: ABNORMAL
PHENCYCLIDINE SCREEN URINE: ABNORMAL
PLATELET # BLD: 433 THOU/MM3 (ref 130–400)
PMV BLD AUTO: 9.5 FL (ref 9.4–12.4)
POTASSIUM REFLEX MAGNESIUM: 3.3 MEQ/L (ref 3.5–5.2)
PROPOXYPHENE SCREEN, URINE: ABNORMAL
RBC # BLD: 4.62 MILL/MM3 (ref 4.7–6.1)
SEG NEUTROPHILS: 64.8 %
SEGMENTED NEUTROPHILS ABSOLUTE COUNT: 8.7 THOU/MM3 (ref 1.8–7.7)
SODIUM BLD-SCNC: 143 MEQ/L (ref 135–145)
SYNTHETIC CANNABINOIDS(K2) SCREEN, URINE: ABNORMAL
THC SCREEN, URINE: ABNORMAL
TOTAL PROTEIN: 8.2 G/DL (ref 6.1–8)
TRAMADOL SCREEN URINE: ABNORMAL
TRICYCLIC ANTIDEPRESSANTS, UR: ABNORMAL
WBC # BLD: 13.5 THOU/MM3 (ref 4.8–10.8)

## 2021-05-26 PROCEDURE — 80053 COMPREHEN METABOLIC PANEL: CPT

## 2021-05-26 PROCEDURE — 36415 COLL VENOUS BLD VENIPUNCTURE: CPT

## 2021-05-26 PROCEDURE — 4004F PT TOBACCO SCREEN RCVD TLK: CPT | Performed by: INTERNAL MEDICINE

## 2021-05-26 PROCEDURE — G8417 CALC BMI ABV UP PARAM F/U: HCPCS | Performed by: INTERNAL MEDICINE

## 2021-05-26 PROCEDURE — 85025 COMPLETE CBC W/AUTO DIFF WBC: CPT

## 2021-05-26 PROCEDURE — G8427 DOCREV CUR MEDS BY ELIG CLIN: HCPCS | Performed by: INTERNAL MEDICINE

## 2021-05-26 PROCEDURE — 99282 EMERGENCY DEPT VISIT SF MDM: CPT

## 2021-05-26 PROCEDURE — 2580000003 HC RX 258: Performed by: PHYSICIAN ASSISTANT

## 2021-05-26 PROCEDURE — 99214 OFFICE O/P EST MOD 30 MIN: CPT | Performed by: INTERNAL MEDICINE

## 2021-05-26 PROCEDURE — 83735 ASSAY OF MAGNESIUM: CPT

## 2021-05-26 PROCEDURE — 80305 DRUG TEST PRSMV DIR OPT OBS: CPT | Performed by: INTERNAL MEDICINE

## 2021-05-26 RX ORDER — 0.9 % SODIUM CHLORIDE 0.9 %
1000 INTRAVENOUS SOLUTION INTRAVENOUS ONCE
Status: COMPLETED | OUTPATIENT
Start: 2021-05-26 | End: 2021-05-26

## 2021-05-26 RX ORDER — BUPRENORPHINE AND NALOXONE 8; 2 MG/1; MG/1
1 FILM, SOLUBLE BUCCAL; SUBLINGUAL 2 TIMES DAILY
Qty: 28 FILM | Refills: 0 | Status: CANCELLED | OUTPATIENT
Start: 2021-05-26 | End: 2021-06-09

## 2021-05-26 RX ORDER — KETOROLAC TROMETHAMINE 30 MG/ML
30 INJECTION, SOLUTION INTRAMUSCULAR; INTRAVENOUS ONCE
Status: DISCONTINUED | OUTPATIENT
Start: 2021-05-26 | End: 2021-05-26

## 2021-05-26 RX ORDER — IBUPROFEN 200 MG
600 TABLET ORAL ONCE
Status: DISCONTINUED | OUTPATIENT
Start: 2021-05-26 | End: 2021-05-26 | Stop reason: HOSPADM

## 2021-05-26 RX ORDER — HYDROXYZINE HYDROCHLORIDE 50 MG/ML
100 INJECTION, SOLUTION INTRAMUSCULAR ONCE
Status: DISCONTINUED | OUTPATIENT
Start: 2021-05-26 | End: 2021-05-26 | Stop reason: HOSPADM

## 2021-05-26 RX ORDER — BUPRENORPHINE AND NALOXONE 8; 2 MG/1; MG/1
1 FILM, SOLUBLE BUCCAL; SUBLINGUAL 2 TIMES DAILY
COMMUNITY
End: 2021-06-08 | Stop reason: SDUPTHER

## 2021-05-26 RX ADMIN — SODIUM CHLORIDE 1000 ML: 9 INJECTION, SOLUTION INTRAVENOUS at 15:12

## 2021-05-26 ASSESSMENT — PAIN DESCRIPTION - LOCATION: LOCATION: FLANK

## 2021-05-26 ASSESSMENT — PAIN SCALES - GENERAL: PAINLEVEL_OUTOF10: 8

## 2021-05-26 ASSESSMENT — PAIN DESCRIPTION - ORIENTATION: ORIENTATION: LEFT

## 2021-05-26 NOTE — ED NOTES
Informed pt that vistaril could be used to get him down to the scanner to see if he had a stone. Pt states \" Let me up. I am getting out of here. \" IV removed and pt ambulates out to lobby with significant other.       Humaira Gonzalez RN  05/26/21 4062

## 2021-05-26 NOTE — ED NOTES
Pt upset with vistaril order. Pt states that he is supposed to get klonopin. States that vistaril does not work on him. Pt upset and saying that he is going to get up and leave. [facility] to speak with pt.       Lizzie Iyer RN  05/26/21 7228

## 2021-05-26 NOTE — LETTER
[facility]  [address] [facility]  Brandi Parham, [address]  Phone: [phone]  Fax: [phone]    May 27, 2021    [facility] [address] [facility]  [address] [place] [address] [address] [address]    Dear Kelly Urbina,    This letter is regarding your Emergency Department (ED) visit at [facility] on 5/26/21. Chaparro Talbot wanted to make sure that you understand your discharge instructions and that you were able to fill any prescriptions that may have been ordered for you. Please contact the office at the above phone number if the ED advised you to follow up with Chaparro Talbot, or if you have any further questions or needs. Also did you know -                             *You can call your doctor even after hours so they can direct you to the most appropriate care. [address] [place] [address] can often offer you an appointment on the same day that you call. *We have some [facility] offices that offer Walk-in appointments; check our website for availability in your community, [email or web address].      *Evisits are now available for patients for $36 through [employer] for certain conditions:  * Sinus, cold and or cough       * Diarrhea            * Headache  * Heartburn                                * Poison Lisa          * Back pain     * Urinary problems                         If you do not have [employer]t and are interested, please contact the office and a staff member may assist you or go to [email or web address].     Sincerely,     PAPA Shankar CNP and your [facility]

## 2021-05-26 NOTE — PROGRESS NOTES
Verbal order per Dr. Sachin Garcia for urine drug screen. Positive for BUP AMP METH MOP FTY THC TRAM. Verified results with Meryl Lepe RN. Dr. Sachin Garcia ordered no meds for patient. Verified dose with patient. Patient was sent home with no meds and will be seen back in the office 5/27/21.

## 2021-05-26 NOTE — ED PROVIDER NOTES
[facility]  eMERGENCY dEPARTMENT eNCOUnter          [address]       Chief Complaint   Patient presents with    Flank Pain     left       Nurses Notes reviewed and I agree except as noted in the HPI. HISTORY OF PRESENT ILLNESS    Naima Lewis is a 39 y.o. male who presents to the Emergency Department for the evaluation of left flank pain. Patient states that for the past week he has been having left flank pain. States he passed 3 kidney stones at home and the pain resolved. He was using fentanyl for the pain. States that around 4 PM yesterday the pain in the left flank recurred. He states he has not used anything for the past 24 hours because he wants to get clean and he saw his outpatient provider today to restart Suboxone and was referred to the ED for possible kidney stone. Patient stated initially that he has not used meth in 24 hours, last used yesterday morning. He then later states that he has not used meth in the past 3 days and it was fentanyl that he used yesterday morning. He complains of feeling restless with heart racing which he attributes to his Adderall and Lyrica. Denied any substance use for >24 hours. Reports that he needs Ativan to help him hold still and calm down. The HPI was provided by the patient. REVIEW OF SYSTEMS     Review of Systems   Genitourinary: Positive for flank pain and hematuria. Psychiatric/Behavioral: The patient is hyperactive. PAST MEDICAL HISTORY    has a past medical history of Anxiety, Depression, Kidney stone, Liver disease, and Opiate abuse, continuous ([facility] [address] SURGICAL HISTORY      has no past surgical history on file. CURRENT MEDICATIONS       Previous Medications    AMPHETAMINE-DEXTROAMPHETAMINE (ADDERALL, 10MG,) 10 MG TABLET    Take 1 tablet by mouth 2 times daily for 8 days.     ASPIRIN-ACETAMINOPHEN-CAFFEINE (EXCEDRIN EXTRA STRENGTH PO)    Take by mouth as needed    BUPRENORPHINE-NALOXONE (SUBOXONE) 8-2 deficit present. Mental Status: He is alert. GCS: GCS eye subscore is 4. GCS verbal subscore is 5. GCS motor subscore is 6. Psychiatric:         Mood and Affect: Mood normal.         Behavior: Behavior is hyperactive. Comments: Hyperactive, bilateral lower extremities twisting, pulling on the bed         DIFFERENTIAL DIAGNOSIS:   Differential diagnoses are discussed    DIAGNOSTIC RESULTS     EKG: All EKG's are interpreted by the Emergency Department Physician who either signs or Co-signsthis chart in the absence of a cardiologist.          RADIOLOGY: non-plain film images(s) such as CT, Ultrasound and MRI are read by the radiologist.    CT ABDOMEN PELVIS WO CONTRAST Additional Contrast? None    (Results Pending)       LABS:      Labs Reviewed   CBC WITH AUTO DIFFERENTIAL - Abnormal; Notable for the following components:       Result Value    WBC 13.5 (*)     RBC 4.62 (*)     Hemoglobin 11.9 (*)     Hematocrit 38.5 (*)     MCH 25.8 (*)     MCHC 30.9 (*)     RDW-CV 17.7 (*)     RDW-SD 52.6 (*)     Platelets 387 (*)     Segs Absolute 8.7 (*)     All other components within normal limits   COMPREHENSIVE METABOLIC PANEL W/ REFLEX TO MG FOR LOW K - Abnormal; Notable for the following components:    CREATININE 1.5 (*)     Potassium reflex Magnesium 3.3 (*)     Total Protein 8.2 (*)     All other components within normal limits   GLOMERULAR FILTRATION RATE, ESTIMATED - Abnormal; Notable for the following components:    Est, Glom Filt Rate 53 (*)     All other components within normal limits   ANION GAP   MAGNESIUM   OSMOLALITY   URINE RT REFLEX TO CULTURE       EMERGENCY DEPARTMENT COURSE:   Vitals:    Vitals:    05/26/21 1426   BP: (!) 139/98   Pulse: 117   Resp: 16   SpO2: 100%   Weight: 218 lb (98.9 kg)      3:12 PM EDT: The patient was seen and evaluated. Patient presents with tachycardia and mild hypertension for complaint of left flank pain.   He has history of kidney stones, states he passed 3 at

## 2021-05-26 NOTE — ED NOTES
Pt to the Ed with left sided flank pain. Pt reports history of kidney stones and was supposed to have surgery to have it removed, but the nurses made him mad and he left without doing the surgery. Pt states that his pain is 8 out of 10 and pt reports taking something like fentanyl yesterday for the pain. IV established and fluids started. Gave pt urinal for specimen collection.      Thelma Bartlett RN  05/26/21 0658

## 2021-05-27 ENCOUNTER — OFFICE VISIT (OUTPATIENT)
Dept: INTERNAL MEDICINE CLINIC | Age: 36
End: 2021-05-27
Payer: MEDICARE

## 2021-05-27 VITALS
DIASTOLIC BLOOD PRESSURE: 104 MMHG | WEIGHT: 213 LBS | HEART RATE: 121 BPM | BODY MASS INDEX: 30.49 KG/M2 | HEIGHT: 70 IN | TEMPERATURE: 97.2 F | SYSTOLIC BLOOD PRESSURE: 150 MMHG

## 2021-05-27 DIAGNOSIS — F11.20 SEVERE OPIOID USE DISORDER (HCC): ICD-10-CM

## 2021-05-27 DIAGNOSIS — F90.9 ATTENTION DEFICIT HYPERACTIVITY DISORDER (ADHD), UNSPECIFIED ADHD TYPE: Primary | ICD-10-CM

## 2021-05-27 PROCEDURE — 80305 DRUG TEST PRSMV DIR OPT OBS: CPT | Performed by: INTERNAL MEDICINE

## 2021-05-27 PROCEDURE — 99024 POSTOP FOLLOW-UP VISIT: CPT | Performed by: INTERNAL MEDICINE

## 2021-05-27 RX ORDER — BUPRENORPHINE AND NALOXONE 8; 2 MG/1; MG/1
1 FILM, SOLUBLE BUCCAL; SUBLINGUAL 2 TIMES DAILY
Qty: 2 FILM | Refills: 0 | Status: CANCELLED | OUTPATIENT
Start: 2021-05-27 | End: 2021-05-28

## 2021-05-27 NOTE — PROGRESS NOTES
Verbal order per Dr. Silvestre Lobo for urine drug screen. Urine positive for BUP, MET, AMP FENT, TRA. Verified results with Kevin Goldberg., DEIRDREN. Pt left without being seen.

## 2021-05-27 NOTE — ED NOTES
Pt resting on cot with eyes closed. VSS. Pt respirations easy and unlabored.  Will continue to monitor     Stiven Mcdonald RN  08/28/20 7822 Parent(s)

## 2021-05-29 NOTE — PROGRESS NOTES
Bryan Benitez (:  1985) is a 39 y.o. male,Established patient, here for evaluation of the following chief complaint(s):  Drug Problem      ASSESSMENT/PLAN:  1. Severe opioid use disorder (HCC)  -     POCT Rapid Drug Screen  2. Attention deficit hyperactivity disorder (ADHD), unspecified ADHD type  3. Fibromyalgia  4. Anxiety and depression  5. Encounter for monitoring Suboxone maintenance therapy  6. Polysubstance abuse ([address]  7. Noncompliance  8.  Kidney stones    Urine has multiple substances  I think he does have kidney stones  My nurse wheeled him to the emergency room I spoke with the ER doctor  The patient promised me he was going to behave there not signed out AMA like he always does  I see his girlfriend as well  Their prognosis is guarded  This patient has overdosed at least 8 times  I feel that either USP or death is going to be the outcome unless they change        SUBJECTIVE/OBJECTIVE:  Drug Problem      I saw him here     At that visit I was directly observing him urinate  He stormed out with his pants down  He has been out of control since  He admits to using meth fentanyl  He also says he thinks he has a kidney stone he has had them before  He is writhing around in pain    We have gotten word from the police that he and several other people were selling and trading their drugs on the street  I had Bear Emery setting with this visit  He was lost to follow-up until recently  He said he was in the methadone clinic until recently  He relapsed on meth weeks ago  He says he wants to get clean he has been using Suboxone off the street  I see his girlfriend as well  I put him on Suboxone  weeks ago  He says they both relapsed on heroin 2 weeks ago  He said he has an aunt in [place] that passed away    He had told me he was clean over 100 days but he said he lied    Review of Systems  Patient is feeling terrible left flank pain cannot sit still  Blood pressure 137/89, pulse 123, temperature 97.2 °F (36.2 °C), height 5' 10\" (1.778 m), weight 216 lb (98 kg). Patient is restless cannot sit still he is in obvious pain  Skin numerous erythematous lesions on his face and extremities  Left flank is tender      Urine tox screen today  Alcohol, Urine 05/26/2021  1:49 PM Unknown   NEG    Amphetamine Screen, Urine 05/26/2021  1:49 PM Unknown   POS    Barbiturate Screen, Urine 05/26/2021  1:49 PM Unknown   NEG    Benzodiazepine Screen, Urine 05/26/2021  1:49 PM Unknown   NEG    Buprenorphine Urine 05/26/2021  1:49 PM Unknown   POS    Cocaine Metabolite Screen, Urine 05/26/2021  1:49 PM Unknown   NEG    FENTANYL SCREEN, URINE 05/26/2021  1:49 PM Unknown   POS    Gabapentin Screen, Urine 05/26/2021  1:49 PM Unknown   N/A    MDMA, Urine 05/26/2021  1:49 PM Unknown   NEG    Methadone Screen, Urine 05/26/2021  1:49 PM Unknown   NEG    Methamphetamine, Urine 05/26/2021  1:49 PM Unknown   POS    Opiate Scrn, Ur 05/26/2021  1:49 PM Unknown   POS    Oxycodone Screen, Ur 05/26/2021  1:49 PM Unknown   NEG    PCP Screen, Urine 05/26/2021  1:49 PM Unknown   NEG    Propoxyphene Screen, Urine 05/26/2021  1:49 PM Unknown   N/A    Synthetic Cannabinoids (K2) Screen, Urine 05/26/2021  1:49 PM Unknown   NEG    THC Screen, Urine 05/26/2021  1:49 PM Unknown   POS    Tramadol Scrn, Ur 05/26/2021  1:49 PM Unknown   POS    Tricyclic Antidepressants, Urine 05/26/2021  1:49 PM Unknown   N/A         reviewed the [place] Automated Rx Reporting System report     There does not appear to be any discrepancies or overprescribing of controlled substances  He is prescribed Lyrica  He is prescribed Adderall        An electronic signature was used to authenticate this note.     --Beka Mistry MD

## 2021-06-02 ENCOUNTER — OFFICE VISIT (OUTPATIENT)
Dept: INTERNAL MEDICINE CLINIC | Age: 36
End: 2021-06-02
Payer: MEDICARE

## 2021-06-02 VITALS
BODY MASS INDEX: 30.49 KG/M2 | HEIGHT: 70 IN | DIASTOLIC BLOOD PRESSURE: 97 MMHG | HEART RATE: 93 BPM | WEIGHT: 213 LBS | SYSTOLIC BLOOD PRESSURE: 161 MMHG

## 2021-06-02 DIAGNOSIS — F41.9 ANXIETY AND DEPRESSION: ICD-10-CM

## 2021-06-02 DIAGNOSIS — M79.7 FIBROMYALGIA: ICD-10-CM

## 2021-06-02 DIAGNOSIS — G47.00 INSOMNIA, UNSPECIFIED TYPE: ICD-10-CM

## 2021-06-02 DIAGNOSIS — F32.A ANXIETY AND DEPRESSION: ICD-10-CM

## 2021-06-02 DIAGNOSIS — L02.414 ABSCESS OF LEFT UPPER EXTREMITY: ICD-10-CM

## 2021-06-02 DIAGNOSIS — F90.9 ATTENTION DEFICIT HYPERACTIVITY DISORDER (ADHD), UNSPECIFIED ADHD TYPE: ICD-10-CM

## 2021-06-02 DIAGNOSIS — F11.20 SEVERE OPIOID USE DISORDER (HCC): Primary | ICD-10-CM

## 2021-06-02 PROCEDURE — 80305 DRUG TEST PRSMV DIR OPT OBS: CPT | Performed by: NURSE PRACTITIONER

## 2021-06-02 PROCEDURE — G8417 CALC BMI ABV UP PARAM F/U: HCPCS | Performed by: NURSE PRACTITIONER

## 2021-06-02 PROCEDURE — G8427 DOCREV CUR MEDS BY ELIG CLIN: HCPCS | Performed by: NURSE PRACTITIONER

## 2021-06-02 PROCEDURE — 4004F PT TOBACCO SCREEN RCVD TLK: CPT | Performed by: NURSE PRACTITIONER

## 2021-06-02 PROCEDURE — 99214 OFFICE O/P EST MOD 30 MIN: CPT | Performed by: NURSE PRACTITIONER

## 2021-06-02 RX ORDER — DEXTROAMPHETAMINE SACCHARATE, AMPHETAMINE ASPARTATE, DEXTROAMPHETAMINE SULFATE AND AMPHETAMINE SULFATE 2.5; 2.5; 2.5; 2.5 MG/1; MG/1; MG/1; MG/1
15 TABLET ORAL 2 TIMES DAILY
Qty: 21 TABLET | Refills: 0 | Status: SHIPPED | OUTPATIENT
Start: 2021-06-02 | End: 2021-06-09 | Stop reason: SDUPTHER

## 2021-06-02 RX ORDER — MIRTAZAPINE 45 MG/1
45 TABLET, FILM COATED ORAL NIGHTLY
Qty: 30 TABLET | Refills: 1 | Status: SHIPPED | OUTPATIENT
Start: 2021-06-02 | End: 2021-06-29 | Stop reason: SDUPTHER

## 2021-06-02 RX ORDER — SULFAMETHOXAZOLE AND TRIMETHOPRIM 800; 160 MG/1; MG/1
1 TABLET ORAL 2 TIMES DAILY
Qty: 14 TABLET | Refills: 0 | Status: SHIPPED | OUTPATIENT
Start: 2021-06-02 | End: 2021-06-09

## 2021-06-02 RX ORDER — VENLAFAXINE 100 MG/1
150 TABLET ORAL 2 TIMES DAILY
Qty: 45 TABLET | Refills: 1 | Status: SHIPPED | OUTPATIENT
Start: 2021-06-02 | End: 2021-06-25 | Stop reason: SDUPTHER

## 2021-06-02 RX ORDER — GABAPENTIN 600 MG/1
600 TABLET ORAL 3 TIMES DAILY
Qty: 90 TABLET | Refills: 0 | Status: SHIPPED | OUTPATIENT
Start: 2021-06-02 | End: 2021-06-29 | Stop reason: ALTCHOICE

## 2021-06-02 NOTE — PROGRESS NOTES
[facility] INTERNAL MEDICINE AND MEDICATION MANAGEMENT  [address]  Dept: [phone]  Dept Fax: [phone]: [phone]     Visit Date:  6/2/2021    Patient:  Ranjith Tom  YOB: 1985    HPI:     Chief Complaint   Patient presents with    ADHD     Anya Sarah presents today for medical evaluation of ADHD, insomnia, anxiety/depression, chronic pain/fibromyalgia, and LUE abscess.     MAT patient of Dr. Jocelyn Cuevas. I last seen him 1 week ago.     Urine positive for buprenorphine, fentanyl, and tramadol. He is happy because he has not used methamphetamines, and thinks that I should increase his Adderall dose and his ADHD is not well controlled. He took too much at one time, because he needed it reportedly, which is why it is not in his urine. Discussed the need to take medications as prescribed or I will not continue to prescribe them. He also cannot continue to use ANY illicit drugs. Understanding voiced. Left arm abscess, missed testosterone injection. Have treated with Bactrim, still not healed entirely. He refuses any other antibiotics. Will retreat with Bactrim. Insomnia well controlled with remeron. Discussed sleep hygiene measures. Anxiety/depression well controlled with effexor. Continue current dose. Encouraged counseling and relaxation techniques. Chronic pain previously controlled with pregabalin. He is requesting to be changed to gabapentin. OARRS reviewed, no discrepancies. Medications    Current Outpatient Medications:     venlafaxine (EFFEXOR) 100 MG tablet, Take 1.5 tablets by mouth 2 times daily, Disp: 45 tablet, Rfl: 1    mirtazapine (REMERON) 45 MG tablet, Take 1 tablet by mouth nightly, Disp: 30 tablet, Rfl: 1    gabapentin (NEURONTIN) 600 MG tablet, Take 1 tablet by mouth 3 times daily for 30 days. , Disp: 90 tablet, Rfl: 0    guanFACINE (INTUNIV) 1 MG TB24 extended release tablet, Take 1 tablet by mouth nightly, Disp: 30 tablet, Rfl: 1    ondansetron (ZOFRAN ODT) 4 MG disintegrating tablet, Take 1 tablet by mouth every 8 hours as needed for Nausea, Disp: 20 tablet, Rfl: 0    lisinopril (PRINIVIL;ZESTRIL) 20 MG tablet, Take 1 tablet by mouth daily, Disp: 30 tablet, Rfl: 1    Aspirin-Acetaminophen-Caffeine (EXCEDRIN EXTRA STRENGTH PO), Take by mouth as needed, Disp: , Rfl:     naloxone 4 MG/0.1ML LIQD nasal spray, 1 spray by Nasal route as needed for Opioid Reversal, Disp: 1 each, Rfl: 5    testosterone (ANDROGEL) 25 MG/2.5GM (1%) GEL 1 % gel, Apply 5 g topically daily for 30 days. , Disp: 30 Package, Rfl: 0    buprenorphine-naloxone (SUBOXONE) 8-2 MG FILM SL film, Place 1 Film under the tongue 2 times daily for 6 days. , Disp: 12 Film, Rfl: 0    sulfamethoxazole-trimethoprim (BACTRIM DS;SEPTRA DS) 800-160 MG per tablet, Take 1 tablet by mouth 2 times daily for 14 days, Disp: 10 tablet, Rfl: 0    Testosterone Undecanoate 158 MG CAPS, Take 156 mg by mouth 2 times daily for 21 days, Disp: 42 capsule, Rfl: 0    pregabalin (LYRICA) 200 MG capsule, Take 1 capsule by mouth 3 times daily for 30 days. , Disp: 90 capsule, Rfl: 0    amphetamine-dextroamphetamine (ADDERALL, 10MG,) 10 MG tablet, Take 1.5 tablets by mouth 2 times daily for 14 days. , Disp: 42 tablet, Rfl: 0    The patient has No Known Allergies. Past Medical History  Cassidy Van  has a past medical history of Anxiety, Depression, Kidney stone, Liver disease, and Opiate abuse, continuous ([facility] [address] Past Surgical History  The patient  has no past surgical history on file. Family History  This patient's family history is not on file. Social History  Cassidy Van  reports that he has been smoking cigarettes. He has been smoking about 0.50 packs per day. He has never used smokeless tobacco. He reports current alcohol use. He reports current drug use.     Health Maintenance:    Health Maintenance   Topic Date Due    Varicella vaccine (1 of 2 - 2-dose childhood series) Never done    Pneumococcal 0-64 years Vaccine (1 of 2 - PPSV23) Never done    Diabetic foot exam  Never done    Diabetic retinal exam  Never done    Lipid screen  Never done    COVID-19 Vaccine (1) Never done    Diabetic microalbuminuria test  Never done    DTaP/Tdap/Td vaccine (1 - Tdap) Never done    A1C test (Diabetic or Prediabetic)  08/24/2021    Flu vaccine (Season Ended) 09/01/2021    Potassium monitoring  05/26/2022    Creatinine monitoring  05/26/2022    Hepatitis C screen  Completed    HIV screen  Completed    Hepatitis A vaccine  Aged Out    Hepatitis B vaccine  Aged Out    Hib vaccine  Aged Out    Meningococcal (ACWY) vaccine  Aged Out       Subjective:      Review of Systems   Constitutional: Negative for chills, fatigue and fever. HENT: Negative for congestion, rhinorrhea, sinus pressure, sinus pain, sore throat, tinnitus and trouble swallowing. Eyes: Negative for photophobia and visual disturbance. Respiratory: Positive for shortness of breath (on exertion). Negative for cough and wheezing. Cardiovascular: Negative for chest pain, palpitations and leg swelling. Gastrointestinal: Negative for abdominal distention, abdominal pain, blood in stool, constipation, diarrhea, nausea and vomiting. Endocrine: Negative for polydipsia, polyphagia and polyuria. Genitourinary: Negative for difficulty urinating, dysuria, frequency, hematuria and urgency. Musculoskeletal: Positive for myalgias (generalized- fibromyalgia). Negative for arthralgias and neck pain. Skin: Positive for wound (LUE abscess). Negative for rash. Neurological: Negative for dizziness, light-headedness and headaches. Psychiatric/Behavioral: Positive for decreased concentration. Negative for dysphoric mood, sleep disturbance and suicidal ideas. The patient is not nervous/anxious.         Objective:     BP (!) 161/97   Pulse 93   Ht 5' 10\" (1.778 m)   Wt 213 lb Insomnia, unspecified type    - mirtazapine (REMERON) 45 MG tablet; Take 1 tablet by mouth nightly  Dispense: 30 tablet; Refill: 1  - Encouraged sleep hygiene measures    3. Severe opioid use disorder (HCC)    - POCT Rapid Drug Screen    4. Attention deficit hyperactivity disorder (ADHD), unspecified ADHD type    - Increase Adderall to 15 mg BID  - Must sustain from illicit drug use    5. Abscess of left upper extremity    - sulfamethoxazole-trimethoprim (BACTRIM DS;SEPTRA DS) 800-160 MG per tablet; Take 1 tablet by mouth 2 times daily for 7 days  Dispense: 14 tablet; Refill: 0    6. Fibromyalgia    - gabapentin (NEURONTIN) 600 MG tablet; Take 1 tablet by mouth 3 times daily for 30 days. Dispense: 90 tablet; Refill: 0  - OARRS reviewed, no discrepancies      Return in about 1 week (around 6/9/2021). Patient given educational materials - see patient instructions. Discussed use, benefit, and side effects of prescribed medications. All patient questions answered. Pt voiced understanding. Reviewed health maintenance.        Electronically signed PAPA Sarabia - CNP on 6/2/21 at 3:13 PM EDT

## 2021-06-07 ASSESSMENT — ENCOUNTER SYMPTOMS
DIARRHEA: 0
PHOTOPHOBIA: 0
NAUSEA: 0
TROUBLE SWALLOWING: 0
SORE THROAT: 0
CONSTIPATION: 0
VOMITING: 0
WHEEZING: 0
ABDOMINAL PAIN: 0
SHORTNESS OF BREATH: 1
COUGH: 0
ABDOMINAL DISTENTION: 0
SINUS PAIN: 0
BLOOD IN STOOL: 0
RHINORRHEA: 0
SINUS PRESSURE: 0

## 2021-06-08 ENCOUNTER — OFFICE VISIT (OUTPATIENT)
Dept: INTERNAL MEDICINE CLINIC | Age: 36
End: 2021-06-08
Payer: MEDICARE

## 2021-06-08 VITALS
HEIGHT: 70 IN | TEMPERATURE: 96.9 F | WEIGHT: 211 LBS | SYSTOLIC BLOOD PRESSURE: 149 MMHG | BODY MASS INDEX: 30.21 KG/M2 | DIASTOLIC BLOOD PRESSURE: 109 MMHG | HEART RATE: 123 BPM

## 2021-06-08 DIAGNOSIS — Z91.199 NONCOMPLIANCE: ICD-10-CM

## 2021-06-08 DIAGNOSIS — F90.9 ATTENTION DEFICIT HYPERACTIVITY DISORDER (ADHD), UNSPECIFIED ADHD TYPE: ICD-10-CM

## 2021-06-08 DIAGNOSIS — F11.20 SEVERE OPIOID USE DISORDER (HCC): Primary | ICD-10-CM

## 2021-06-08 DIAGNOSIS — Z51.81 ENCOUNTER FOR MONITORING SUBOXONE MAINTENANCE THERAPY: ICD-10-CM

## 2021-06-08 DIAGNOSIS — M79.7 FIBROMYALGIA: ICD-10-CM

## 2021-06-08 DIAGNOSIS — F19.10 POLYSUBSTANCE ABUSE (HCC): ICD-10-CM

## 2021-06-08 DIAGNOSIS — Z79.899 ENCOUNTER FOR MONITORING SUBOXONE MAINTENANCE THERAPY: ICD-10-CM

## 2021-06-08 DIAGNOSIS — G47.00 INSOMNIA, UNSPECIFIED TYPE: ICD-10-CM

## 2021-06-08 DIAGNOSIS — N20.0 KIDNEY STONES: ICD-10-CM

## 2021-06-08 PROCEDURE — G8427 DOCREV CUR MEDS BY ELIG CLIN: HCPCS | Performed by: INTERNAL MEDICINE

## 2021-06-08 PROCEDURE — 99213 OFFICE O/P EST LOW 20 MIN: CPT | Performed by: INTERNAL MEDICINE

## 2021-06-08 PROCEDURE — G8417 CALC BMI ABV UP PARAM F/U: HCPCS | Performed by: INTERNAL MEDICINE

## 2021-06-08 PROCEDURE — 80305 DRUG TEST PRSMV DIR OPT OBS: CPT | Performed by: INTERNAL MEDICINE

## 2021-06-08 PROCEDURE — 4004F PT TOBACCO SCREEN RCVD TLK: CPT | Performed by: INTERNAL MEDICINE

## 2021-06-08 RX ORDER — BUPRENORPHINE AND NALOXONE 8; 2 MG/1; MG/1
1 FILM, SOLUBLE BUCCAL; SUBLINGUAL 2 TIMES DAILY
Qty: 4 FILM | Refills: 0 | Status: SHIPPED | OUTPATIENT
Start: 2021-06-08 | End: 2021-06-09 | Stop reason: SDUPTHER

## 2021-06-08 NOTE — PROGRESS NOTES
Verbal order per Dr. Bar Reasons for urine drug screen. Positive for BUP AMP FTY METH TRAM THC. Verified results with Delaney Stoner RN. Dr. Bar Reasons ordered Suboxone 8mg film BID  for patient. Verified dose with patient. Patient was sent home with 2 day script of Suboxone 8mg film BID and will be seen back in the office 6/10/21.

## 2021-06-09 ENCOUNTER — OFFICE VISIT (OUTPATIENT)
Dept: INTERNAL MEDICINE CLINIC | Age: 36
End: 2021-06-09
Payer: MEDICARE

## 2021-06-09 VITALS
BODY MASS INDEX: 29.2 KG/M2 | WEIGHT: 204 LBS | DIASTOLIC BLOOD PRESSURE: 117 MMHG | HEART RATE: 113 BPM | TEMPERATURE: 96.8 F | HEIGHT: 70 IN | SYSTOLIC BLOOD PRESSURE: 177 MMHG

## 2021-06-09 VITALS
HEART RATE: 113 BPM | BODY MASS INDEX: 29.2 KG/M2 | DIASTOLIC BLOOD PRESSURE: 117 MMHG | WEIGHT: 204 LBS | HEIGHT: 70 IN | SYSTOLIC BLOOD PRESSURE: 177 MMHG | TEMPERATURE: 96.8 F

## 2021-06-09 DIAGNOSIS — F32.A ANXIETY AND DEPRESSION: ICD-10-CM

## 2021-06-09 DIAGNOSIS — N20.0 KIDNEY STONES: ICD-10-CM

## 2021-06-09 DIAGNOSIS — G47.00 INSOMNIA, UNSPECIFIED TYPE: ICD-10-CM

## 2021-06-09 DIAGNOSIS — Z51.81 ENCOUNTER FOR MONITORING SUBOXONE MAINTENANCE THERAPY: ICD-10-CM

## 2021-06-09 DIAGNOSIS — N52.9 ERECTILE DYSFUNCTION, UNSPECIFIED ERECTILE DYSFUNCTION TYPE: ICD-10-CM

## 2021-06-09 DIAGNOSIS — F11.20 SEVERE OPIOID USE DISORDER (HCC): Primary | ICD-10-CM

## 2021-06-09 DIAGNOSIS — F90.9 ATTENTION DEFICIT HYPERACTIVITY DISORDER (ADHD), UNSPECIFIED ADHD TYPE: ICD-10-CM

## 2021-06-09 DIAGNOSIS — F19.10 POLYSUBSTANCE ABUSE (HCC): ICD-10-CM

## 2021-06-09 DIAGNOSIS — Z91.199 NONCOMPLIANCE: ICD-10-CM

## 2021-06-09 DIAGNOSIS — F41.9 ANXIETY AND DEPRESSION: ICD-10-CM

## 2021-06-09 DIAGNOSIS — R30.0 DYSURIA: Primary | ICD-10-CM

## 2021-06-09 DIAGNOSIS — M79.7 FIBROMYALGIA: ICD-10-CM

## 2021-06-09 DIAGNOSIS — Z79.899 ENCOUNTER FOR MONITORING SUBOXONE MAINTENANCE THERAPY: ICD-10-CM

## 2021-06-09 PROCEDURE — 4004F PT TOBACCO SCREEN RCVD TLK: CPT | Performed by: NURSE PRACTITIONER

## 2021-06-09 PROCEDURE — G8417 CALC BMI ABV UP PARAM F/U: HCPCS | Performed by: NURSE PRACTITIONER

## 2021-06-09 PROCEDURE — G8417 CALC BMI ABV UP PARAM F/U: HCPCS | Performed by: INTERNAL MEDICINE

## 2021-06-09 PROCEDURE — 4004F PT TOBACCO SCREEN RCVD TLK: CPT | Performed by: INTERNAL MEDICINE

## 2021-06-09 PROCEDURE — 80305 DRUG TEST PRSMV DIR OPT OBS: CPT | Performed by: INTERNAL MEDICINE

## 2021-06-09 PROCEDURE — 99214 OFFICE O/P EST MOD 30 MIN: CPT | Performed by: NURSE PRACTITIONER

## 2021-06-09 PROCEDURE — 99213 OFFICE O/P EST LOW 20 MIN: CPT | Performed by: INTERNAL MEDICINE

## 2021-06-09 PROCEDURE — G8428 CUR MEDS NOT DOCUMENT: HCPCS | Performed by: INTERNAL MEDICINE

## 2021-06-09 PROCEDURE — G8427 DOCREV CUR MEDS BY ELIG CLIN: HCPCS | Performed by: NURSE PRACTITIONER

## 2021-06-09 RX ORDER — PREGABALIN 200 MG/1
200 CAPSULE ORAL 3 TIMES DAILY
Qty: 90 CAPSULE | Refills: 0 | Status: SHIPPED | OUTPATIENT
Start: 2021-06-09 | End: 2021-06-29 | Stop reason: SDUPTHER

## 2021-06-09 RX ORDER — DEXTROAMPHETAMINE SACCHARATE, AMPHETAMINE ASPARTATE, DEXTROAMPHETAMINE SULFATE AND AMPHETAMINE SULFATE 2.5; 2.5; 2.5; 2.5 MG/1; MG/1; MG/1; MG/1
15 TABLET ORAL 2 TIMES DAILY
Qty: 42 TABLET | Refills: 0 | Status: SHIPPED | OUTPATIENT
Start: 2021-06-09 | End: 2021-06-25 | Stop reason: SDUPTHER

## 2021-06-09 RX ORDER — METHYLPREDNISOLONE 4 MG/1
TABLET ORAL
Qty: 1 KIT | Refills: 0 | Status: SHIPPED | OUTPATIENT
Start: 2021-06-09 | End: 2021-06-15

## 2021-06-09 RX ORDER — BUPRENORPHINE AND NALOXONE 8; 2 MG/1; MG/1
1 FILM, SOLUBLE BUCCAL; SUBLINGUAL 2 TIMES DAILY
Qty: 2 FILM | Refills: 0 | Status: SHIPPED | OUTPATIENT
Start: 2021-06-09 | End: 2021-06-14 | Stop reason: SDUPTHER

## 2021-06-09 RX ORDER — SULFAMETHOXAZOLE AND TRIMETHOPRIM 800; 160 MG/1; MG/1
1 TABLET ORAL 2 TIMES DAILY
Qty: 14 TABLET | Refills: 0 | Status: SHIPPED | OUTPATIENT
Start: 2021-06-09 | End: 2021-06-16

## 2021-06-09 NOTE — PROGRESS NOTES
Verbal order per Dr. Nimisha Luu for urine drug screen. Positive for BUP, MET, AMP, FENT, THC, TRA. Verified results with Rosa Burnett LPN. Dr. Nimisha Luu ordered Suboxone 8 mg film BID for patient. Verified dose with patient. Patient was sent home with 1 day script for Suboxone 8 mg film BID and will be seen back in the office on 6/10/21.

## 2021-06-09 NOTE — PROGRESS NOTES
[facility] INTERNAL MEDICINE AND MEDICATION MANAGEMENT  [address]  Dept: [phone]  Dept Fax: [phone]: [phone]     Visit Date:  6/9/2021    Patient:  Tana Chung  YOB: 1985    HPI:     Chief Complaint   Patient presents with    ADHD     1 week follow up     Manuel Washington presents today for medical evaluation of ADHD, dysuria, fibromyalgia, and ED     I last seen him 1 week ago. He follows with Dr. Jayla Rogers for MAT.     Urine positive for amphetamines, buprenorphine, fentanyl, methamphetamines, THC, and tramadol. He denies any use since last visit. Complains of dysuria. Will send for urinalysis and treat with Bactrim. Discussed at length that he cannot continue to use illicit drugs and be on Adderall. I agreed to prescribe under the agreement that use would stop, and as harm reduction; as he complained that the reason for his methamphetamine use was because his ADHD was not controlled. He states today that he needs dose increased. He will need to be clean first.      Has been taking testosterone injections illegally, given to him by his brother for erectile dysfunction. Problem has been present for >1 year reportedly. He feels better with injections. Antibiotic prescribed for arm abscess, he did not take. Agrees to take antibiotic now, will check testosterone levels, however he needs to stop all injections for a couple of weeks prior to testing to ensure accuracy. Understanding voiced.      Medications    Current Outpatient Medications:     ondansetron (ZOFRAN ODT) 4 MG disintegrating tablet, Take 1 tablet by mouth every 8 hours as needed for Nausea, Disp: 20 tablet, Rfl: 0    Aspirin-Acetaminophen-Caffeine (EXCEDRIN EXTRA STRENGTH PO), Take by mouth as needed, Disp: , Rfl:     naloxone 4 MG/0.1ML LIQD nasal spray, 1 spray by Nasal route as needed for Opioid Reversal, Disp: 1 each, Rfl: 5   Never done    Diabetic microalbuminuria test  Never done    DTaP/Tdap/Td vaccine (1 - Tdap) Never done    A1C test (Diabetic or Prediabetic)  08/24/2021    Flu vaccine (1) 09/01/2021    Potassium monitoring  05/26/2022    Creatinine monitoring  05/26/2022    Hepatitis C screen  Completed    HIV screen  Completed    Hepatitis A vaccine  Aged Out    Hepatitis B vaccine  Aged Out    Hib vaccine  Aged Out    Meningococcal (ACWY) vaccine  Aged Out       Subjective:      Review of Systems   Constitutional: Negative for chills, fatigue and fever. HENT: Negative for congestion, rhinorrhea, sinus pressure, sinus pain, sore throat, tinnitus and trouble swallowing. Eyes: Negative for photophobia and visual disturbance. Respiratory: Positive for shortness of breath (on exertion). Negative for cough and wheezing. Cardiovascular: Negative for chest pain, palpitations and leg swelling. Gastrointestinal: Negative for abdominal distention, abdominal pain, blood in stool, constipation, diarrhea, nausea and vomiting. Endocrine: Negative for polydipsia, polyphagia and polyuria. Genitourinary: Positive for dysuria. Negative for difficulty urinating, frequency, hematuria and urgency. Musculoskeletal: Positive for myalgias (generalized- fibromyalgia). Negative for arthralgias and neck pain. Skin: Positive for wound (LUE abscess). Negative for rash. Neurological: Negative for dizziness, light-headedness and headaches. Psychiatric/Behavioral: Positive for decreased concentration. Negative for dysphoric mood, sleep disturbance and suicidal ideas. The patient is not nervous/anxious. Objective:     BP (!) 177/117   Pulse 113   Temp 96.8 °F (36 °C) (Temporal)   Ht 5' 10\" (1.778 m)   Wt 204 lb (92.5 kg)   BMI 29.27 kg/m²     Physical Exam  Vitals reviewed. Constitutional:       General: He is not in acute distress. Appearance: Normal appearance. He is not ill-appearing.    HENT:      Head: Normocephalic and atraumatic. Right Ear: External ear normal.      Left Ear: External ear normal.      Nose: Nose normal. No congestion or rhinorrhea. Mouth/Throat:      Mouth: Mucous membranes are moist.   Eyes:      Extraocular Movements: Extraocular movements intact. Conjunctiva/sclera: Conjunctivae normal.      Pupils: Pupils are equal, round, and reactive to light. Pulmonary:      Effort: Pulmonary effort is normal. No respiratory distress. Musculoskeletal:         General: Normal range of motion. Cervical back: Normal range of motion and neck supple. Right lower leg: No edema. Left lower leg: No edema. Skin:     General: Skin is warm and dry. Findings: Lesion (LUE abscess) present. Neurological:      General: No focal deficit present. Mental Status: He is alert and oriented to person, place, and time. Psychiatric:         Mood and Affect: Mood normal.         Behavior: Behavior is hyperactive. Thought Content: Thought content normal.         Judgment: Judgment normal.         Labs Reviewed 6/9/2021:    Lab Results   Component Value Date    WBC 13.5 (H) 05/26/2021    HGB 11.9 (L) 05/26/2021    HCT 38.5 (L) 05/26/2021     (H) 05/26/2021    ALT 42 05/26/2021    AST 39 05/26/2021     05/26/2021    K 3.3 (L) 05/26/2021     05/26/2021    CREATININE 1.5 (H) 05/26/2021    BUN 16 05/26/2021    CO2 27 05/26/2021    TSH 3.580 11/25/2020    PSA 0.98 04/25/2019    INR 1.05 10/14/2020    LABA1C 11.5 (H) 05/24/2021       Assessment/Plan      1. Fibromyalgia    - methylPREDNISolone (MEDROL DOSEPACK) 4 MG tablet; Take by mouth. Dispense: 1 kit; Refill: 0  - Continue pregabalin - OARRS reviewed, no discrepancies    2. Attention deficit hyperactivity disorder (ADHD), unspecified ADHD type    - Continue current Adderall dose. - If he continues to use illicit drugs, will no longer prescribe. Understanding voiced.      3. Dysuria    - sulfamethoxazole-trimethoprim (BACTRIM DS;SEPTRA DS) 800-160 MG per tablet; Take 1 tablet by mouth 2 times daily for 7 days  Dispense: 14 tablet; Refill: 0  - Comprehensive Metabolic Panel; Future  - CBC; Future  - Send urine for UA    4. Erectile dysfunction, unspecified erectile dysfunction type    - Obtain testosterone levels ordered previously      Return in about 2 weeks (around 6/23/2021). Patient given educational materials - see patient instructions. Discussed use, benefit, and side effects of prescribed medications. All patient questions answered. Pt voiced understanding. Reviewed health maintenance.        Electronically signed PAPA Doherty CNP on 6/9/21 at 3:25 PM EDT

## 2021-06-14 ENCOUNTER — OFFICE VISIT (OUTPATIENT)
Dept: INTERNAL MEDICINE CLINIC | Age: 36
End: 2021-06-14
Payer: MEDICARE

## 2021-06-14 VITALS
WEIGHT: 206 LBS | BODY MASS INDEX: 29.56 KG/M2 | SYSTOLIC BLOOD PRESSURE: 151 MMHG | DIASTOLIC BLOOD PRESSURE: 109 MMHG | HEART RATE: 108 BPM | TEMPERATURE: 97 F

## 2021-06-14 DIAGNOSIS — R79.89 LOW TESTOSTERONE: ICD-10-CM

## 2021-06-14 DIAGNOSIS — Z79.899 ENCOUNTER FOR MONITORING SUBOXONE MAINTENANCE THERAPY: ICD-10-CM

## 2021-06-14 DIAGNOSIS — L08.9 SKIN INFECTION: ICD-10-CM

## 2021-06-14 DIAGNOSIS — F19.10 POLYSUBSTANCE ABUSE (HCC): ICD-10-CM

## 2021-06-14 DIAGNOSIS — G47.00 INSOMNIA, UNSPECIFIED TYPE: ICD-10-CM

## 2021-06-14 DIAGNOSIS — M79.7 FIBROMYALGIA: ICD-10-CM

## 2021-06-14 DIAGNOSIS — Z51.81 ENCOUNTER FOR MONITORING SUBOXONE MAINTENANCE THERAPY: ICD-10-CM

## 2021-06-14 DIAGNOSIS — F11.20 SEVERE OPIOID USE DISORDER (HCC): Primary | ICD-10-CM

## 2021-06-14 DIAGNOSIS — F32.A ANXIETY AND DEPRESSION: ICD-10-CM

## 2021-06-14 DIAGNOSIS — F90.9 ATTENTION DEFICIT HYPERACTIVITY DISORDER (ADHD), UNSPECIFIED ADHD TYPE: ICD-10-CM

## 2021-06-14 DIAGNOSIS — F41.9 ANXIETY AND DEPRESSION: ICD-10-CM

## 2021-06-14 DIAGNOSIS — L02.91 ABSCESS: ICD-10-CM

## 2021-06-14 PROCEDURE — 99213 OFFICE O/P EST LOW 20 MIN: CPT | Performed by: INTERNAL MEDICINE

## 2021-06-14 PROCEDURE — 4004F PT TOBACCO SCREEN RCVD TLK: CPT | Performed by: INTERNAL MEDICINE

## 2021-06-14 PROCEDURE — G8427 DOCREV CUR MEDS BY ELIG CLIN: HCPCS | Performed by: INTERNAL MEDICINE

## 2021-06-14 PROCEDURE — G8417 CALC BMI ABV UP PARAM F/U: HCPCS | Performed by: INTERNAL MEDICINE

## 2021-06-14 PROCEDURE — 80305 DRUG TEST PRSMV DIR OPT OBS: CPT | Performed by: INTERNAL MEDICINE

## 2021-06-14 RX ORDER — SULFAMETHOXAZOLE AND TRIMETHOPRIM 800; 160 MG/1; MG/1
1 TABLET ORAL 2 TIMES DAILY
Qty: 10 TABLET | Refills: 0 | Status: SHIPPED | OUTPATIENT
Start: 2021-06-14 | End: 2021-06-28

## 2021-06-14 RX ORDER — BUPRENORPHINE AND NALOXONE 8; 2 MG/1; MG/1
1 FILM, SOLUBLE BUCCAL; SUBLINGUAL 2 TIMES DAILY
Qty: 2 FILM | Refills: 0 | Status: SHIPPED | OUTPATIENT
Start: 2021-06-14 | End: 2021-06-15

## 2021-06-14 NOTE — PROGRESS NOTES
Verbal order per Dr. Arsen Ho for urine drug screen. Positive for BUP TRAM FTY BZO MICHI AMP. Verified results with Adi Howell RN. Dr. Arsen Ho ordered Suboxone 8mg film BID  for patient. Verified dose with patient. Patient was sent home with 2 day script of Suboxone 8mg film BID and will be seen back in the office 6/16/21.

## 2021-06-16 ENCOUNTER — OFFICE VISIT (OUTPATIENT)
Dept: INTERNAL MEDICINE CLINIC | Age: 36
End: 2021-06-16
Payer: MEDICARE

## 2021-06-16 VITALS
WEIGHT: 207 LBS | HEIGHT: 70 IN | TEMPERATURE: 97.5 F | SYSTOLIC BLOOD PRESSURE: 140 MMHG | HEART RATE: 116 BPM | DIASTOLIC BLOOD PRESSURE: 82 MMHG | BODY MASS INDEX: 29.63 KG/M2

## 2021-06-16 DIAGNOSIS — R79.89 LOW TESTOSTERONE: ICD-10-CM

## 2021-06-16 DIAGNOSIS — Z51.81 ENCOUNTER FOR MONITORING SUBOXONE MAINTENANCE THERAPY: ICD-10-CM

## 2021-06-16 DIAGNOSIS — F32.A ANXIETY AND DEPRESSION: ICD-10-CM

## 2021-06-16 DIAGNOSIS — L02.91 ABSCESS: ICD-10-CM

## 2021-06-16 DIAGNOSIS — Z79.899 ENCOUNTER FOR MONITORING SUBOXONE MAINTENANCE THERAPY: ICD-10-CM

## 2021-06-16 DIAGNOSIS — F11.20 SEVERE OPIOID USE DISORDER (HCC): Primary | ICD-10-CM

## 2021-06-16 DIAGNOSIS — F19.10 POLYSUBSTANCE ABUSE (HCC): ICD-10-CM

## 2021-06-16 DIAGNOSIS — F90.9 ATTENTION DEFICIT HYPERACTIVITY DISORDER (ADHD), UNSPECIFIED ADHD TYPE: ICD-10-CM

## 2021-06-16 DIAGNOSIS — F41.9 ANXIETY AND DEPRESSION: ICD-10-CM

## 2021-06-16 DIAGNOSIS — G47.00 INSOMNIA, UNSPECIFIED TYPE: ICD-10-CM

## 2021-06-16 DIAGNOSIS — R79.89 LOW TESTOSTERONE IN MALE: ICD-10-CM

## 2021-06-16 DIAGNOSIS — L08.9 SKIN INFECTION: ICD-10-CM

## 2021-06-16 DIAGNOSIS — M79.7 FIBROMYALGIA: ICD-10-CM

## 2021-06-16 PROCEDURE — 99213 OFFICE O/P EST LOW 20 MIN: CPT | Performed by: INTERNAL MEDICINE

## 2021-06-16 PROCEDURE — G8428 CUR MEDS NOT DOCUMENT: HCPCS | Performed by: INTERNAL MEDICINE

## 2021-06-16 PROCEDURE — 4004F PT TOBACCO SCREEN RCVD TLK: CPT | Performed by: INTERNAL MEDICINE

## 2021-06-16 PROCEDURE — 80305 DRUG TEST PRSMV DIR OPT OBS: CPT | Performed by: INTERNAL MEDICINE

## 2021-06-16 PROCEDURE — G8417 CALC BMI ABV UP PARAM F/U: HCPCS | Performed by: INTERNAL MEDICINE

## 2021-06-16 RX ORDER — BUPRENORPHINE AND NALOXONE 8; 2 MG/1; MG/1
1 FILM, SOLUBLE BUCCAL; SUBLINGUAL 2 TIMES DAILY
Qty: 12 FILM | Refills: 0 | Status: SHIPPED | OUTPATIENT
Start: 2021-06-16 | End: 2021-06-22 | Stop reason: SDUPTHER

## 2021-06-16 RX ORDER — TESTOSTERONE 12.5 MG/1.25G
5 GEL TOPICAL DAILY
Qty: 30 PACKAGE | Refills: 0 | Status: SHIPPED | OUTPATIENT
Start: 2021-06-16 | End: 2021-06-22 | Stop reason: SDUPTHER

## 2021-06-16 RX ORDER — BUPRENORPHINE AND NALOXONE 8; 2 MG/1; MG/1
1 FILM, SOLUBLE BUCCAL; SUBLINGUAL DAILY
Qty: 12 FILM | Refills: 0 | Status: SHIPPED | OUTPATIENT
Start: 2021-06-16 | End: 2021-06-16 | Stop reason: CLARIF

## 2021-06-16 NOTE — PROGRESS NOTES
Sw assisted patient planning for him to attend detox/ MH treatment at the [facility]. Then patient opted to call [facility] due to [facility] being referred by Karl Carias. Sw assisted patient in calling and completing point of entry. Patient was provided with multiple other options and phone numbers to call. Sw reminded to call or come to the office if needing more assistance.

## 2021-06-19 NOTE — PROGRESS NOTES
Cheryl Sparks (:  1985) is a 39 y.o. male,Established patient, here for evaluation of the following chief complaint(s):  Drug Problem      ASSESSMENT/PLAN:  1. Severe opioid use disorder (HCC)  -     POCT Rapid Drug Screen  2. Insomnia, unspecified type  3. Attention deficit hyperactivity disorder (ADHD), unspecified ADHD type  4. Polysubstance abuse ([address]  5. Fibromyalgia  6. Encounter for monitoring Suboxone maintenance therapy  7. Kidney stones  8. Noncompliance    Urine has multiple substances    I see his girlfriend as well  Their prognosis is guarded  This patient has overdosed at least 8 times  I feel that either [facility] or death is going to be the outcome unless they change      Follow-up here tomorrow  SUBJECTIVE/OBJECTIVE:  Drug Problem      I saw him here     At that visit I was directly observing him urinate  He stormed out with his pants down  He has been out of control since  He admits to using meth fentanyl  He also says he thinks he has a kidney stone he has had them before  He is writhing around in pain    We have gotten word from the police that he and several other people were selling and trading their drugs on the street  I had [place] setting with this visit  He was lost to follow-up until recently  He said he was in the methadone clinic until recently  He relapsed on meth weeks ago  He says he wants to get clean he has been using Suboxone off the street  I see his girlfriend as well  I put him on Suboxone  weeks ago  He says they both relapsed on heroin 2 weeks ago  He said he has an aunt in [place] that passed away    He had told me he was clean over 100 days but he said he lied    Review of Systems  Patient is feeling terrible left flank pain cannot sit still  Blood pressure (!) 149/109, pulse 123, temperature 96.9 °F (36.1 °C), height 5' 10\" (1.778 m), weight 211 lb (95.7 kg).   Patient is restless cannot sit still he is in obvious pain  Skin numerous erythematous lesions

## 2021-06-19 NOTE — PROGRESS NOTES
Teodora Ho (:  1985) is a 39 y.o. male,Established patient, here for evaluation of the following chief complaint(s):  Drug Problem      ASSESSMENT/PLAN:  1. Severe opioid use disorder (HCC)  -     POCT Rapid Drug Screen  -     buprenorphine-naloxone (SUBOXONE) 8-2 MG FILM SL film; Place 1 Film under the tongue 2 times daily for 6 days. , Disp-12 Film, R-0Normal  2. Low testosterone in male  -     testosterone (ANDROGEL) 25 MG/2.5GM (1%) GEL 1 % gel; Apply 5 g topically daily for 30 days. , Disp-30 Package, R-0Normal  3. Abscess  4. Low testosterone  5. Insomnia, unspecified type  6. Attention deficit hyperactivity disorder (ADHD), unspecified ADHD type  7. Fibromyalgia  8. Anxiety and depression  9. Encounter for monitoring Suboxone maintenance therapy  10. Polysubstance abuse ([address]  11.  Skin infection    Urine has multiple substances  I think he does have kidney stones  My nurse wheeled him to the emergency room I spoke with the ER doctor  The patient promised me he was going to behave there not signed out AMA like he always does  I see his girlfriend as well  Their prognosis is guarded  This patient has overdosed at least 8 times  I feel that either nursing home or death is going to be the outcome unless they change        SUBJECTIVE/OBJECTIVE:  Drug Problem      I saw him here   He missed an appointment yesterday  Carol gutierrez short he was clean for several months but relapsed  He has missed several appointments since last visit because he was dirty  I see his girlfriend as well  A prior visit I was directly observing his urine sample he threw the cup in the sink stormed out with his pants down      We have gotten word from the police that he and several other people were selling and trading their drugs on the street  I had Magdiel Veloz setting with this visit  He was lost to follow-up until recently  He said he was in the methadone clinic until recently  He relapsed on meth weeks ago  He says he wants to get clean he has been using Suboxone off the street  I see his girlfriend as well  I put him on Suboxone  weeks ago  He says they both relapsed on heroin 2 weeks ago  He said he has an aunt in [place] that passed away    He had told me he was clean over 100 days but he said he lied    Review of Systems  Patient is feeling terrible left flank pain cannot sit still  Blood pressure (!) 140/82, pulse 116, temperature 97.5 °F (36.4 °C), height 5' 10\" (1.778 m), weight 207 lb (93.9 kg).   Patient is restless cannot sit still he is in obvious pain  Animated and loud as usual  Skin numerous erythematous lesions on his face and extremities        Urine tox screen today  Alcohol, Urine 06/16/2021  1:25 PM Unknown   NEG    Amphetamine Screen, Urine 06/16/2021  1:25 PM Unknown   POS    Barbiturate Screen, Urine 06/16/2021  1:25 PM Unknown   NEG    Benzodiazepine Screen, Urine 06/16/2021  1:25 PM Unknown   POS    Buprenorphine Urine 06/16/2021  1:25 PM Unknown   POS    Cocaine Metabolite Screen, Urine 06/16/2021  1:25 PM Unknown   NEG    FENTANYL SCREEN, URINE 06/16/2021  1:25 PM Unknown   POS    Gabapentin Screen, Urine 06/16/2021  1:25 PM Unknown   N/A    MDMA, Urine 06/16/2021  1:25 PM Unknown   NEG    Methadone Screen, Urine 06/16/2021  1:25 PM Unknown   NEG    Methamphetamine, Urine 06/16/2021  1:25 PM Unknown   POS    Opiate Scrn, Ur 06/16/2021  1:25 PM Unknown   NEG    Oxycodone Screen, Ur 06/16/2021  1:25 PM Unknown   NEG    PCP Screen, Urine 06/16/2021  1:25 PM Unknown   NEG    Propoxyphene Screen, Urine 06/16/2021  1:25 PM Unknown   N/A    Synthetic Cannabinoids (K2) Screen, Urine 06/16/2021  1:25 PM Unknown   NEG    THC Screen, Urine 06/16/2021  1:25 PM Unknown   NEG    Tramadol Scrn, Ur 06/16/2021  1:25 PM Unknown   NEG    Tricyclic Antidepressants, Urine 06/16/2021  1:25 PM Unknown   N/A        Urine has multiple substances  I am really had a loss of what to do for the patient and his girlfriend  Prognosis guarded  I had [facility] see the patient and his girlfriend  The plan is to get them into a facility in [facility]  They are willing to go  I told both of them outcome likely half-way or death unless they go somewhere  He shows me sores on his back he has least 30 erythematous lesions with a papular-like lesion with pus  He is a  from his meth  We will give us a Bactrim   reviewed the [place] Automated [facility] Reporting [facility] report     There does not appear to be any discrepancies or overprescribing of controlled substances  He is prescribed Lyrica  He is prescribed Adderall  Follow-up after he gets out of rehab      An electronic signature was used to authenticate this note.     --Sherice Cherry MD

## 2021-06-19 NOTE — PROGRESS NOTES
Bettye Cortez (:  1985) is a 39 y.o. male,Established patient, here for evaluation of the following chief complaint(s):  Drug Problem      ASSESSMENT/PLAN:  1. Severe opioid use disorder (HCC)  -     POCT Rapid Drug Screen  -     buprenorphine-naloxone (SUBOXONE) 8-2 MG FILM SL film; Place 1 Film under the tongue 2 times daily for 1 day., Disp-2 Film, R-0Normal  2. Abscess  -     sulfamethoxazole-trimethoprim (BACTRIM DS;SEPTRA DS) 800-160 MG per tablet; Take 1 tablet by mouth 2 times daily for 14 days, Disp-10 tablet, R-0Normal  3. Low testosterone  -     Testosterone Undecanoate 158 MG CAPS; Take 156 mg by mouth 2 times daily for 21 days, Disp-42 capsule, R-0Normal  4. Fibromyalgia  5. Attention deficit hyperactivity disorder (ADHD), unspecified ADHD type  6. Insomnia, unspecified type  7. Anxiety and depression  8. Encounter for monitoring Suboxone maintenance therapy  9. Polysubstance abuse ([address]  10.  Skin infection    Urine has multiple substances  I think he does have kidney stones  My nurse wheeled him to the emergency room I spoke with the ER doctor  The patient promised me he was going to behave there not signed out AMA like he always does  I see his girlfriend as well  Their prognosis is guarded  This patient has overdosed at least 8 times  I feel that either [facility] or death is going to be the outcome unless they change        SUBJECTIVE/OBJECTIVE:  Drug Problem      I saw him here   Long story short he was clean for several months but relapsed  He has missed several appointments since last visit because he was dirty  I see his girlfriend as well  A prior visit I was directly observing his urine sample he threw the cup in the sink stormed out with his pants down      We have gotten word from the police that he and several other people were selling and trading their drugs on the street  I had [place] setting with this visit  He was lost to follow-up until recently  He said he was in the methadone clinic until recently  He relapsed on meth weeks ago  He says he wants to get clean he has been using Suboxone off the street  I see his girlfriend as well  I put him on Suboxone  weeks ago  He says they both relapsed on heroin 2 weeks ago  He said he has an aunt in [place] that passed away    He had told me he was clean over 100 days but he said he lied    Review of Systems  Patient is feeling terrible left flank pain cannot sit still  Blood pressure (!) 151/109, pulse 108, temperature 97 °F (36.1 °C), weight 206 lb (93.4 kg).   Patient is restless cannot sit still he is in obvious pain  Skin numerous erythematous lesions on his face and extremities  Left flank is tender      Urine tox screen today  Alcohol, Urine 06/14/2021  4:22 PM Unknown   NEG    Amphetamine Screen, Urine 06/14/2021  4:22 PM Unknown   POS    Barbiturate Screen, Urine 06/14/2021  4:22 PM Unknown   NEG    Benzodiazepine Screen, Urine 06/14/2021  4:22 PM Unknown   POS    Buprenorphine Urine 06/14/2021  4:22 PM Unknown   POS    Cocaine Metabolite Screen, Urine 06/14/2021  4:22 PM Unknown   POS    FENTANYL SCREEN, URINE 06/14/2021  4:22 PM Unknown   POS    Gabapentin Screen, Urine 06/14/2021  4:22 PM Unknown   N/A    MDMA, Urine 06/14/2021  4:22 PM Unknown   NEG    Methadone Screen, Urine 06/14/2021  4:22 PM Unknown   NEG    Methamphetamine, Urine 06/14/2021  4:22 PM Unknown   NEG    Opiate Scrn, Ur 06/14/2021  4:22 PM Unknown   NEG    Oxycodone Screen, Ur 06/14/2021  4:22 PM Unknown   NEG    PCP Screen, Urine 06/14/2021  4:22 PM Unknown   NEG    Propoxyphene Screen, Urine 06/14/2021  4:22 PM Unknown   N/A    Synthetic Cannabinoids (K2) Screen, Urine 06/14/2021  4:22 PM Unknown   NEG    THC Screen, Urine 06/14/2021  4:22 PM Unknown   NEG    Tramadol Scrn, Ur 06/14/2021  4:22 PM Unknown   POS    Tricyclic Antidepressan       Urine has multiple substances  I am really had a loss of what to do for the patient and his girlfriend  Prognosis guarded  I told both of them is likely prison or death  He shows me sores on his back he has least 30 erythematous lesions with a papular-like lesion with pus  He is a  from his meth  We will give us a Bactrim   reviewed the [address] [place] [address]  Automated Rx Reporting System report     There does not appear to be any discrepancies or overprescribing of controlled substances  He is prescribed Lyrica  He is prescribed Adderall  Follow-up tomorrow      An electronic signature was used to authenticate this note.     --Elizabeth Parks MD

## 2021-06-21 ASSESSMENT — ENCOUNTER SYMPTOMS
TROUBLE SWALLOWING: 0
ABDOMINAL PAIN: 0
SINUS PAIN: 0
NAUSEA: 0
SHORTNESS OF BREATH: 1
COUGH: 0
DIARRHEA: 0
SINUS PRESSURE: 0
SORE THROAT: 0
VOMITING: 0
PHOTOPHOBIA: 0
BLOOD IN STOOL: 0
WHEEZING: 0
RHINORRHEA: 0
CONSTIPATION: 0
ABDOMINAL DISTENTION: 0

## 2021-06-22 ENCOUNTER — OFFICE VISIT (OUTPATIENT)
Dept: INTERNAL MEDICINE CLINIC | Age: 36
End: 2021-06-22
Payer: MEDICARE

## 2021-06-22 VITALS
DIASTOLIC BLOOD PRESSURE: 89 MMHG | HEART RATE: 81 BPM | TEMPERATURE: 96.4 F | WEIGHT: 203 LBS | SYSTOLIC BLOOD PRESSURE: 126 MMHG | BODY MASS INDEX: 29.06 KG/M2 | HEIGHT: 70 IN

## 2021-06-22 DIAGNOSIS — F19.10 POLYSUBSTANCE ABUSE (HCC): ICD-10-CM

## 2021-06-22 DIAGNOSIS — F11.20 SEVERE OPIOID USE DISORDER (HCC): Primary | ICD-10-CM

## 2021-06-22 DIAGNOSIS — R79.89 LOW TESTOSTERONE IN MALE: ICD-10-CM

## 2021-06-22 DIAGNOSIS — F41.9 ANXIETY AND DEPRESSION: ICD-10-CM

## 2021-06-22 DIAGNOSIS — L02.91 ABSCESS: ICD-10-CM

## 2021-06-22 DIAGNOSIS — R79.89 LOW TESTOSTERONE: ICD-10-CM

## 2021-06-22 DIAGNOSIS — F90.9 ATTENTION DEFICIT HYPERACTIVITY DISORDER (ADHD), UNSPECIFIED ADHD TYPE: ICD-10-CM

## 2021-06-22 DIAGNOSIS — F32.A ANXIETY AND DEPRESSION: ICD-10-CM

## 2021-06-22 DIAGNOSIS — M79.7 FIBROMYALGIA: ICD-10-CM

## 2021-06-22 PROCEDURE — 4004F PT TOBACCO SCREEN RCVD TLK: CPT | Performed by: INTERNAL MEDICINE

## 2021-06-22 PROCEDURE — 99213 OFFICE O/P EST LOW 20 MIN: CPT | Performed by: INTERNAL MEDICINE

## 2021-06-22 PROCEDURE — G8417 CALC BMI ABV UP PARAM F/U: HCPCS | Performed by: INTERNAL MEDICINE

## 2021-06-22 PROCEDURE — G8428 CUR MEDS NOT DOCUMENT: HCPCS | Performed by: INTERNAL MEDICINE

## 2021-06-22 PROCEDURE — 80305 DRUG TEST PRSMV DIR OPT OBS: CPT | Performed by: INTERNAL MEDICINE

## 2021-06-22 RX ORDER — BUPRENORPHINE AND NALOXONE 8; 2 MG/1; MG/1
1 FILM, SOLUBLE BUCCAL; SUBLINGUAL 2 TIMES DAILY
Qty: 14 FILM | Refills: 0 | Status: SHIPPED | OUTPATIENT
Start: 2021-06-22 | End: 2021-06-28 | Stop reason: SDUPTHER

## 2021-06-22 RX ORDER — TESTOSTERONE 12.5 MG/1.25G
2.5 GEL TOPICAL DAILY
Qty: 30 PACKAGE | Refills: 0 | Status: SHIPPED | OUTPATIENT
Start: 2021-06-22 | End: 2021-06-24 | Stop reason: SDUPTHER

## 2021-06-22 NOTE — PROGRESS NOTES
Verbal order per Dr. Mirian Negron for urine drug screen. Positive for BUP FTY METH. Verified results with Yessy Monique RN. Dr. Mirian Negron ordered Suboxone 8mg film BID for patient. Verified dose with patient. Patient was sent home with 6 day script of Suboxone 8mg film BID and will be seen back in the office 6/29/21.

## 2021-06-22 NOTE — PROGRESS NOTES
Laurie Escobar (:  1985) is a 39 y.o. male,Established patient, here for evaluation of the following chief complaint(s):  Drug Problem      ASSESSMENT/PLAN:  1. Severe opioid use disorder (HCC)  -     POCT Rapid Drug Screen  2. Insomnia, unspecified type  3. Polysubstance abuse ([address]  4. Noncompliance  5. Kidney stones  6. Encounter for monitoring Suboxone maintenance therapy  7. Anxiety and depression    Urine has multiple substances  I really think he needs to go away for rehab somewhere  I see his girlfriend as well  Their prognosis is guarded  This patient has overdosed at least 8 times  I feel that either intermediate or death is going to be the outcome unless they change      Follow-up here 1 day  SUBJECTIVE/OBJECTIVE:  Drug Problem      I saw him here     At a prior visit I was directly observing him urinate  He stormed out with his pants down  He has been out of control since  He admits to using meth fentanyl  He also says he thinks he has a kidney stone he has had them before  He is writhing around in pain    We have gotten word from the police that he and several other people were selling and trading their drugs on the street  I had [place] setting with this visit  He was lost to follow-up until recently  He said he was in the methadone clinic until recently  He relapsed on meth weeks ago  He says he wants to get clean he has been using Suboxone off the street  I see his girlfriend as well  I put him on Suboxone  weeks ago  He says they both relapsed on heroin 2 weeks ago  He said he has an aunt in [place] that passed away    He had told me he was clean over 100 days but he said he lied    Review of Systems  Patient is feeling terrible left flank pain cannot sit still  Blood pressure (!) 177/117, pulse 113, temperature 96.8 °F (36 °C), height 5' 10\" (1.778 m), weight 204 lb (92.5 kg).   Patient is restless cannot sit still he is in obvious pain  Skin numerous erythematous lesions on his face and extremities  Left flank is tender      Urine tox screen today  Alcohol, Urine 06/09/2021  2:56 PM Unknown   NEG    Amphetamine Screen, Urine 06/09/2021  2:56 PM Unknown   POS    Barbiturate Screen, Urine 06/09/2021  2:56 PM Unknown   NEG    Benzodiazepine Screen, Urine 06/09/2021  2:56 PM Unknown   NEG    Buprenorphine Urine 06/09/2021  2:56 PM Unknown   POS    Cocaine Metabolite Screen, Urine 06/09/2021  2:56 PM Unknown   NEG    FENTANYL SCREEN, URINE 06/09/2021  2:56 PM Unknown   POS    Gabapentin Screen, Urine 06/09/2021  2:56 PM Unknown   N/A    MDMA, Urine 06/09/2021  2:56 PM Unknown   NEG    Methadone Screen, Urine 06/09/2021  2:56 PM Unknown   NEG    Methamphetamine, Urine 06/09/2021  2:56 PM Unknown   POS    Opiate Scrn, Ur 06/09/2021  2:56 PM Unknown   NEG    Oxycodone Screen, Ur 06/09/2021  2:56 PM Unknown   NEG    PCP Screen, Urine 06/09/2021  2:56 PM Unknown   NEG    Propoxyphene Screen, Urine 06/09/2021  2:56 PM Unknown   N/A    Synthetic Cannabinoids (K2) Screen, Urine 06/09/2021  2:56 PM Unknown   NEG    THC Screen, Urine 06/09/2021  2:56 PM Unknown   POS    Tramadol Scrn, Ur 06/09/2021  2:56 PM Unknown   POS    Tricyclic Antidepressants, Urine 06/09/2021  2:56 PM Unknown   N/A         reviewed the [place] Automated Rx Reporting System report     There does not appear to be any discrepancies or overprescribing of controlled substances  He is prescribed Lyrica  He is prescribed Adderall        An electronic signature was used to authenticate this note.     --Kimmy Brown MD

## 2021-06-24 RX ORDER — TESTOSTERONE 12.5 MG/1.25G
2.5 GEL TOPICAL DAILY
Qty: 30 PACKAGE | Refills: 0 | Status: SHIPPED | OUTPATIENT
Start: 2021-06-24 | End: 2022-03-22

## 2021-06-25 ENCOUNTER — OFFICE VISIT (OUTPATIENT)
Dept: INTERNAL MEDICINE CLINIC | Age: 36
End: 2021-06-25
Payer: MEDICARE

## 2021-06-25 VITALS
RESPIRATION RATE: 12 BRPM | HEART RATE: 72 BPM | DIASTOLIC BLOOD PRESSURE: 86 MMHG | WEIGHT: 202 LBS | BODY MASS INDEX: 28.92 KG/M2 | HEIGHT: 70 IN | SYSTOLIC BLOOD PRESSURE: 128 MMHG | TEMPERATURE: 97.2 F

## 2021-06-25 DIAGNOSIS — F90.9 ATTENTION DEFICIT HYPERACTIVITY DISORDER (ADHD), UNSPECIFIED ADHD TYPE: Primary | ICD-10-CM

## 2021-06-25 PROCEDURE — G8417 CALC BMI ABV UP PARAM F/U: HCPCS | Performed by: NURSE PRACTITIONER

## 2021-06-25 PROCEDURE — 80305 DRUG TEST PRSMV DIR OPT OBS: CPT | Performed by: NURSE PRACTITIONER

## 2021-06-25 PROCEDURE — 99213 OFFICE O/P EST LOW 20 MIN: CPT | Performed by: NURSE PRACTITIONER

## 2021-06-25 PROCEDURE — G8427 DOCREV CUR MEDS BY ELIG CLIN: HCPCS | Performed by: NURSE PRACTITIONER

## 2021-06-25 PROCEDURE — 4004F PT TOBACCO SCREEN RCVD TLK: CPT | Performed by: NURSE PRACTITIONER

## 2021-06-25 RX ORDER — DEXTROAMPHETAMINE SACCHARATE, AMPHETAMINE ASPARTATE, DEXTROAMPHETAMINE SULFATE AND AMPHETAMINE SULFATE 2.5; 2.5; 2.5; 2.5 MG/1; MG/1; MG/1; MG/1
15 TABLET ORAL 2 TIMES DAILY
Qty: 24 TABLET | Refills: 0 | Status: SHIPPED | OUTPATIENT
Start: 2021-06-25 | End: 2021-06-29 | Stop reason: SINTOL

## 2021-06-25 RX ORDER — DEXTROAMPHETAMINE SACCHARATE, AMPHETAMINE ASPARTATE, DEXTROAMPHETAMINE SULFATE AND AMPHETAMINE SULFATE 2.5; 2.5; 2.5; 2.5 MG/1; MG/1; MG/1; MG/1
15 TABLET ORAL 2 TIMES DAILY
Qty: 21 TABLET | Refills: 0 | Status: SHIPPED | OUTPATIENT
Start: 2021-06-25 | End: 2021-06-25 | Stop reason: SDUPTHER

## 2021-06-25 RX ORDER — VENLAFAXINE 100 MG/1
150 TABLET ORAL 2 TIMES DAILY
Qty: 45 TABLET | Refills: 1 | Status: SHIPPED | OUTPATIENT
Start: 2021-06-25 | End: 2021-06-29 | Stop reason: SDUPTHER

## 2021-06-25 RX ORDER — PREGABALIN 200 MG/1
200 CAPSULE ORAL 3 TIMES DAILY
Qty: 90 CAPSULE | Refills: 0 | Status: CANCELLED | OUTPATIENT
Start: 2021-06-25 | End: 2021-07-25

## 2021-06-25 NOTE — PROGRESS NOTES
Verbal order per Vernal Grew CNP for urine drug screen. Positive for BUP FTY. Verified results with Angi Andre. UC and oral swab sent.
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
## 2021-06-28 ENCOUNTER — OFFICE VISIT (OUTPATIENT)
Dept: INTERNAL MEDICINE CLINIC | Age: 36
End: 2021-06-28
Payer: MEDICARE

## 2021-06-28 VITALS
BODY MASS INDEX: 28.49 KG/M2 | DIASTOLIC BLOOD PRESSURE: 86 MMHG | SYSTOLIC BLOOD PRESSURE: 121 MMHG | TEMPERATURE: 97 F | HEIGHT: 70 IN | HEART RATE: 109 BPM | WEIGHT: 199 LBS

## 2021-06-28 DIAGNOSIS — Z79.899 ENCOUNTER FOR MONITORING SUBOXONE MAINTENANCE THERAPY: ICD-10-CM

## 2021-06-28 DIAGNOSIS — G47.00 INSOMNIA, UNSPECIFIED TYPE: ICD-10-CM

## 2021-06-28 DIAGNOSIS — L08.9 SKIN INFECTION: ICD-10-CM

## 2021-06-28 DIAGNOSIS — R79.89 LOW TESTOSTERONE IN MALE: ICD-10-CM

## 2021-06-28 DIAGNOSIS — F19.10 POLYSUBSTANCE ABUSE (HCC): ICD-10-CM

## 2021-06-28 DIAGNOSIS — Z51.81 ENCOUNTER FOR MONITORING SUBOXONE MAINTENANCE THERAPY: ICD-10-CM

## 2021-06-28 DIAGNOSIS — F11.20 SEVERE OPIOID USE DISORDER (HCC): Primary | ICD-10-CM

## 2021-06-28 PROCEDURE — 80305 DRUG TEST PRSMV DIR OPT OBS: CPT | Performed by: INTERNAL MEDICINE

## 2021-06-28 PROCEDURE — G8427 DOCREV CUR MEDS BY ELIG CLIN: HCPCS | Performed by: INTERNAL MEDICINE

## 2021-06-28 PROCEDURE — G8417 CALC BMI ABV UP PARAM F/U: HCPCS | Performed by: INTERNAL MEDICINE

## 2021-06-28 PROCEDURE — 4004F PT TOBACCO SCREEN RCVD TLK: CPT | Performed by: INTERNAL MEDICINE

## 2021-06-28 PROCEDURE — 99213 OFFICE O/P EST LOW 20 MIN: CPT | Performed by: INTERNAL MEDICINE

## 2021-06-28 RX ORDER — BUPRENORPHINE AND NALOXONE 8; 2 MG/1; MG/1
1 FILM, SOLUBLE BUCCAL; SUBLINGUAL 2 TIMES DAILY
Qty: 5 FILM | Refills: 0 | Status: SHIPPED | OUTPATIENT
Start: 2021-06-28 | End: 2021-06-30 | Stop reason: SDUPTHER

## 2021-06-28 NOTE — PROGRESS NOTES
Verbal order per Dr. Shemar Gold for urine drug screen. Positive for BUP, MET, AMP, FENT, TRA. Verified results with Vernal Self., LPN. Dr. Shemar Gold ordered Suboxone 8 mg film BID  for patient. Verified dose with patient. Patient was sent home with 2 day script for Suboxone 8 mg film BID and will be seen back in the office on 6/30/21.

## 2021-06-29 ENCOUNTER — OFFICE VISIT (OUTPATIENT)
Dept: INTERNAL MEDICINE CLINIC | Age: 36
End: 2021-06-29
Payer: MEDICARE

## 2021-06-29 VITALS
WEIGHT: 198 LBS | HEIGHT: 70 IN | DIASTOLIC BLOOD PRESSURE: 98 MMHG | SYSTOLIC BLOOD PRESSURE: 164 MMHG | HEART RATE: 78 BPM | BODY MASS INDEX: 28.35 KG/M2

## 2021-06-29 DIAGNOSIS — F32.A ANXIETY AND DEPRESSION: ICD-10-CM

## 2021-06-29 DIAGNOSIS — M79.7 FIBROMYALGIA: ICD-10-CM

## 2021-06-29 DIAGNOSIS — F90.1 ATTENTION DEFICIT HYPERACTIVITY DISORDER (ADHD), PREDOMINANTLY HYPERACTIVE TYPE: Primary | ICD-10-CM

## 2021-06-29 DIAGNOSIS — I10 ESSENTIAL HYPERTENSION: ICD-10-CM

## 2021-06-29 DIAGNOSIS — F41.9 ANXIETY AND DEPRESSION: ICD-10-CM

## 2021-06-29 DIAGNOSIS — G47.00 INSOMNIA, UNSPECIFIED TYPE: ICD-10-CM

## 2021-06-29 PROCEDURE — 4004F PT TOBACCO SCREEN RCVD TLK: CPT | Performed by: NURSE PRACTITIONER

## 2021-06-29 PROCEDURE — 80305 DRUG TEST PRSMV DIR OPT OBS: CPT | Performed by: NURSE PRACTITIONER

## 2021-06-29 PROCEDURE — G8417 CALC BMI ABV UP PARAM F/U: HCPCS | Performed by: NURSE PRACTITIONER

## 2021-06-29 PROCEDURE — G8427 DOCREV CUR MEDS BY ELIG CLIN: HCPCS | Performed by: NURSE PRACTITIONER

## 2021-06-29 PROCEDURE — 99214 OFFICE O/P EST MOD 30 MIN: CPT | Performed by: NURSE PRACTITIONER

## 2021-06-29 RX ORDER — LISDEXAMFETAMINE DIMESYLATE 40 MG/1
40 CAPSULE ORAL DAILY
Qty: 30 CAPSULE | Refills: 0 | Status: ON HOLD | OUTPATIENT
Start: 2021-07-29 | End: 2022-05-21 | Stop reason: HOSPADM

## 2021-06-29 RX ORDER — VENLAFAXINE 100 MG/1
150 TABLET ORAL 2 TIMES DAILY
Qty: 45 TABLET | Refills: 3 | Status: SHIPPED | OUTPATIENT
Start: 2021-06-29 | End: 2022-06-28

## 2021-06-29 RX ORDER — PREGABALIN 300 MG/1
300 CAPSULE ORAL 2 TIMES DAILY
Qty: 60 CAPSULE | Refills: 3 | Status: SHIPPED | OUTPATIENT
Start: 2021-06-29 | End: 2021-08-16

## 2021-06-29 RX ORDER — MIRTAZAPINE 45 MG/1
45 TABLET, FILM COATED ORAL NIGHTLY
Qty: 30 TABLET | Refills: 3 | Status: SHIPPED | OUTPATIENT
Start: 2021-06-29 | End: 2022-08-30 | Stop reason: SDUPTHER

## 2021-06-29 RX ORDER — LISDEXAMFETAMINE DIMESYLATE 40 MG/1
40 CAPSULE ORAL DAILY
Qty: 30 CAPSULE | Refills: 0 | Status: ON HOLD | OUTPATIENT
Start: 2021-06-29 | End: 2021-08-02

## 2021-06-29 RX ORDER — LISINOPRIL 20 MG/1
20 TABLET ORAL DAILY
Qty: 30 TABLET | Refills: 3 | Status: SHIPPED | OUTPATIENT
Start: 2021-06-29 | End: 2022-08-30 | Stop reason: SDUPTHER

## 2021-06-29 RX ORDER — LISDEXAMFETAMINE DIMESYLATE 40 MG/1
40 CAPSULE ORAL DAILY
Qty: 30 CAPSULE | Refills: 0 | Status: SHIPPED | OUTPATIENT
Start: 2021-08-28 | End: 2021-08-11 | Stop reason: SDUPTHER

## 2021-06-29 NOTE — PROGRESS NOTES
Verbal order per [facility] CNP for urine drug screen. Positive for BUP,FEN,TRAM. Verified results with Kamron Thompson LPN.

## 2021-06-29 NOTE — PROGRESS NOTES
[facility]  [address]  Dept: [phone]  Dept Fax: [phone]: [phone]     Visit Date:  6/29/2021    Patient:  Jackson Bearden  YOB: 1985    HPI:     Chief Complaint   Patient presents with    ADHD     Dennis goodwin presents today for medical evaluation of ADHD, ED, and routine medication refill     I last seen him 4 days ago. He follows with Dr. Torin Renteria for MAT. States that he is leaving 7/6 for court ordered inpatient rehabilitation at [facility] in [facility]. Will need scripts to coincide during treatment. PO says no Adderall, will change to Vyvanse. Last used Sunday     Discussed at length that he cannot continue to use illicit drugs and be on a stimulant. I agreed to prescribe under the agreement that use would stop, and as harm reduction; as he complained that the reason for his methamphetamine use was because his ADHD was not controlled. He states today that he needs dose increased. He will need to be clean first.      Has been taking testosterone injections illegally, given to him by his brother for erectile dysfunction. Problem has been present for >1 year reportedly. He feels better with injections. Antibiotic prescribed for arm abscess, he did not take. Agrees to take antibiotic now, will check testosterone levels, however he needs to stop all injections for a couple of weeks prior to testing to ensure accuracy. Understanding voiced.      Medications    Current Outpatient Medications:     venlafaxine (EFFEXOR) 100 MG tablet, Take 1.5 tablets by mouth 2 times daily, Disp: 45 tablet, Rfl: 3    lisinopril (PRINIVIL;ZESTRIL) 20 MG tablet, Take 1 tablet by mouth daily, Disp: 30 tablet, Rfl: 3    pregabalin (LYRICA) 300 MG capsule, Take 1 capsule by mouth 2 times daily for 30 days. , Disp: 60 capsule, Rfl: 3    mirtazapine (REMERON) 45 MG tablet, Take 1 tablet by mouth nightly, Disp: 30 tablet, Rfl: 3    Lisdexamfetamine Dimesylate (VYVANSE) 40 MG CAPS, Take 40 mg by mouth daily for 30 days. , Disp: 30 capsule, Rfl: 0    [START ON 7/29/2021] Lisdexamfetamine Dimesylate (VYVANSE) 40 MG CAPS, Take 40 mg by mouth daily for 30 days. , Disp: 30 capsule, Rfl: 0    [START ON 8/28/2021] Lisdexamfetamine Dimesylate (VYVANSE) 40 MG CAPS, Take 40 mg by mouth daily for 30 days. , Disp: 30 capsule, Rfl: 0    ondansetron (ZOFRAN ODT) 4 MG disintegrating tablet, Take 1 tablet by mouth every 8 hours as needed for Nausea, Disp: 20 tablet, Rfl: 0    Aspirin-Acetaminophen-Caffeine (EXCEDRIN EXTRA STRENGTH PO), Take by mouth as needed, Disp: , Rfl:     naloxone 4 MG/0.1ML LIQD nasal spray, 1 spray by Nasal route as needed for Opioid Reversal, Disp: 1 each, Rfl: 5    buprenorphine-naloxone (SUBOXONE) 8-2 MG FILM SL film, Place 1 Film under the tongue 2 times daily for 14 days. , Disp: 28 Film, Rfl: 0    testosterone (ANDROGEL) 25 MG/2.5GM (1%) GEL 1 % gel, Apply 2.5 g topically daily for 30 days. , Disp: 30 Package, Rfl: 0    The patient has No Known Allergies. Past Medical History  Jackie Hermosillo  has a past medical history of Anxiety, Depression, Kidney stone, Liver disease, and Opiate abuse, continuous ([facility] [address] Past Surgical History  The patient  has no past surgical history on file. Family History  This patient's family history is not on file. Social History  Jackie Hermosillo  reports that he has been smoking cigarettes. He has been smoking about 0.50 packs per day. He has never used smokeless tobacco. He reports current alcohol use. He reports current drug use.     Health Maintenance:    Health Maintenance   Topic Date Due    Varicella vaccine (1 of 2 - 2-dose childhood series) Never done    Pneumococcal 0-64 years Vaccine (1 of 2 - PPSV23) Never done    Diabetic foot exam  Never done    Diabetic retinal exam  Never done    Lipid screen  Never done    COVID-19 Vaccine (1) Never done   [address] [place] [address] Diabetic microalbuminuria test  Never done    DTaP/Tdap/Td vaccine (1 - Tdap) Never done    A1C test (Diabetic or Prediabetic)  08/24/2021    Flu vaccine (1) 09/01/2021    Potassium monitoring  05/26/2022    Creatinine monitoring  05/26/2022    Hepatitis C screen  Completed    HIV screen  Completed    Hepatitis A vaccine  Aged Out    Hepatitis B vaccine  Aged Out    Hib vaccine  Aged Out    Meningococcal (ACWY) vaccine  Aged Out       Subjective:      Review of Systems   Constitutional: Negative for chills, fatigue and fever. HENT: Negative for congestion, rhinorrhea, sinus pressure, sinus pain, sore throat, tinnitus and trouble swallowing. Eyes: Negative for photophobia and visual disturbance. Respiratory: Positive for shortness of breath (on exertion). Negative for cough and wheezing. Cardiovascular: Negative for chest pain, palpitations and leg swelling. Gastrointestinal: Negative for abdominal distention, abdominal pain, blood in stool, constipation, diarrhea, nausea and vomiting. Endocrine: Negative for polydipsia, polyphagia and polyuria. Genitourinary: Negative for difficulty urinating, dysuria, frequency, hematuria and urgency. Musculoskeletal: Positive for myalgias (generalized- fibromyalgia). Negative for arthralgias and neck pain. Skin: Negative for rash and wound. Neurological: Negative for dizziness, light-headedness and headaches. Psychiatric/Behavioral: Positive for decreased concentration. Negative for dysphoric mood, sleep disturbance and suicidal ideas. The patient is not nervous/anxious. Objective:     BP (!) 164/98   Pulse 78   Ht 5' 10\" (1.778 m)   Wt 198 lb (89.8 kg)   BMI 28.41 kg/m²     Physical Exam  Vitals reviewed. Constitutional:       General: He is not in acute distress. Appearance: Normal appearance. He is not ill-appearing. HENT:      Head: Normocephalic and atraumatic.       Right Ear: External ear normal.      Left Ear: External ear normal.      Nose: Nose normal. No congestion or rhinorrhea. Mouth/Throat:      Mouth: Mucous membranes are moist.   Eyes:      Extraocular Movements: Extraocular movements intact. Conjunctiva/sclera: Conjunctivae normal.      Pupils: Pupils are equal, round, and reactive to light. Pulmonary:      Effort: Pulmonary effort is normal. No respiratory distress. Musculoskeletal:         General: Normal range of motion. Cervical back: Normal range of motion and neck supple. Right lower leg: No edema. Left lower leg: No edema. Skin:     General: Skin is warm and dry. Findings: No lesion. Neurological:      General: No focal deficit present. Mental Status: He is alert and oriented to person, place, and time. Psychiatric:         Mood and Affect: Mood normal.         Behavior: Behavior is hyperactive. Thought Content: Thought content normal.         Judgment: Judgment normal.         Labs Reviewed 6/29/2021:    Lab Results   Component Value Date    WBC 13.5 (H) 05/26/2021    HGB 11.9 (L) 05/26/2021    HCT 38.5 (L) 05/26/2021     (H) 05/26/2021    ALT 42 05/26/2021    AST 39 05/26/2021     05/26/2021    K 3.3 (L) 05/26/2021     05/26/2021    CREATININE 1.5 (H) 05/26/2021    BUN 16 05/26/2021    CO2 27 05/26/2021    TSH 3.580 11/25/2020    PSA 0.98 04/25/2019    INR 1.05 10/14/2020    LABA1C 11.5 (H) 05/24/2021       Assessment/Plan      1. Attention deficit hyperactivity disorder (ADHD), predominantly hyperactive type    - POCT Rapid Drug Screen  - Lisdexamfetamine Dimesylate (VYVANSE) 40 MG CAPS; Take 40 mg by mouth daily for 30 days. Dispense: 30 capsule; Refill: 0  - Lisdexamfetamine Dimesylate (VYVANSE) 40 MG CAPS; Take 40 mg by mouth daily for 30 days. Dispense: 30 capsule; Refill: 0  - Lisdexamfetamine Dimesylate (VYVANSE) 40 MG CAPS; Take 40 mg by mouth daily for 30 days. Dispense: 30 capsule; Refill: 0    2.  Anxiety and depression    - venlafaxine (EFFEXOR) 100 MG tablet; Take 1.5 tablets by mouth 2 times daily  Dispense: 45 tablet; Refill: 3    3. Essential hypertension    - lisinopril (PRINIVIL;ZESTRIL) 20 MG tablet; Take 1 tablet by mouth daily  Dispense: 30 tablet; Refill: 3    4. Fibromyalgia    - pregabalin (LYRICA) 300 MG capsule; Take 1 capsule by mouth 2 times daily for 30 days. Dispense: 60 capsule; Refill: 3    5. Insomnia, unspecified type    - mirtazapine (REMERON) 45 MG tablet; Take 1 tablet by mouth nightly  Dispense: 30 tablet; Refill: 3      Return in about 3 months (around 9/29/2021). Patient given educational materials - see patient instructions. Discussed use, benefit, and side effects of prescribed medications. All patient questions answered. Pt voiced understanding. Reviewed health maintenance.        Electronically signed [facility] [place] CNP on 6/29/21 at 1:16 PM EDT

## 2021-06-30 ENCOUNTER — OFFICE VISIT (OUTPATIENT)
Dept: INTERNAL MEDICINE CLINIC | Age: 36
End: 2021-06-30
Payer: MEDICARE

## 2021-06-30 VITALS
HEART RATE: 123 BPM | TEMPERATURE: 97.2 F | SYSTOLIC BLOOD PRESSURE: 139 MMHG | WEIGHT: 194 LBS | HEIGHT: 70 IN | DIASTOLIC BLOOD PRESSURE: 87 MMHG | BODY MASS INDEX: 27.77 KG/M2

## 2021-06-30 DIAGNOSIS — G47.00 INSOMNIA, UNSPECIFIED TYPE: ICD-10-CM

## 2021-06-30 DIAGNOSIS — F41.9 ANXIETY AND DEPRESSION: ICD-10-CM

## 2021-06-30 DIAGNOSIS — E29.1 HYPOGONADISM IN MALE: ICD-10-CM

## 2021-06-30 DIAGNOSIS — F11.20 SEVERE OPIOID USE DISORDER (HCC): Primary | ICD-10-CM

## 2021-06-30 DIAGNOSIS — F19.10 POLYSUBSTANCE ABUSE (HCC): ICD-10-CM

## 2021-06-30 DIAGNOSIS — L08.9 SKIN INFECTION: ICD-10-CM

## 2021-06-30 DIAGNOSIS — M79.7 FIBROMYALGIA: ICD-10-CM

## 2021-06-30 DIAGNOSIS — Z51.81 ENCOUNTER FOR MONITORING SUBOXONE MAINTENANCE THERAPY: ICD-10-CM

## 2021-06-30 DIAGNOSIS — F32.A ANXIETY AND DEPRESSION: ICD-10-CM

## 2021-06-30 DIAGNOSIS — Z79.899 ENCOUNTER FOR MONITORING SUBOXONE MAINTENANCE THERAPY: ICD-10-CM

## 2021-06-30 PROCEDURE — 4004F PT TOBACCO SCREEN RCVD TLK: CPT | Performed by: INTERNAL MEDICINE

## 2021-06-30 PROCEDURE — G8417 CALC BMI ABV UP PARAM F/U: HCPCS | Performed by: INTERNAL MEDICINE

## 2021-06-30 PROCEDURE — 99213 OFFICE O/P EST LOW 20 MIN: CPT | Performed by: INTERNAL MEDICINE

## 2021-06-30 PROCEDURE — 80305 DRUG TEST PRSMV DIR OPT OBS: CPT | Performed by: INTERNAL MEDICINE

## 2021-06-30 PROCEDURE — G8427 DOCREV CUR MEDS BY ELIG CLIN: HCPCS | Performed by: INTERNAL MEDICINE

## 2021-06-30 RX ORDER — BUPRENORPHINE AND NALOXONE 8; 2 MG/1; MG/1
1 FILM, SOLUBLE BUCCAL; SUBLINGUAL 2 TIMES DAILY
Qty: 28 FILM | Refills: 0 | Status: SHIPPED | OUTPATIENT
Start: 2021-06-30 | End: 2021-07-14

## 2021-06-30 NOTE — PROGRESS NOTES
Verbal order per Dr. Tiffany Kc for urine drug screen. Positive for BUP AMP METH FTY TRAM. Verified results with Sariah Hull RN. Dr. Tiffany Kc ordered Suboxone 8mg film BID for patient. Verified dose with patient. Patient was sent home with 2 week script of Suboxone 8mg film BID and will be seen back in the office 7/14/21.

## 2021-07-01 NOTE — PROGRESS NOTES
Jesus Echavarria (:  1985) is a 39 y.o. male,Established patient, here for evaluation of the following chief complaint(s):  Drug Problem      ASSESSMENT/PLAN:  1. Severe opioid use disorder (HCC)  -     POCT Rapid Drug Screen  -     buprenorphine-naloxone (SUBOXONE) 8-2 MG FILM SL film; Place 1 Film under the tongue 2 times daily for 14 days. , Disp-28 Film, R-0Normal  2. Anxiety and depression  3. Fibromyalgia  4. Insomnia, unspecified type  5. Encounter for monitoring Suboxone maintenance therapy  6. Polysubstance abuse ([facility] [address]  7. Skin infection  8.  Hypogonadism in male    Urine has multiple substances  I think he does have kidney stones  My nurse wheeled him to the emergency room I spoke with the ER doctor  The patient promised me he was going to behave there not signed out AMA like he always does  I see his girlfriend as well  Their prognosis is guarded  This patient has overdosed at least 8 times  I feel that either detention or death is going to be the outcome unless they change        SUBJECTIVE/OBJECTIVE:  Drug Problem      I saw him here       Long story short he was clean for several months but relapsed  He has missed several appointments since last visit because he was dirty  I see his girlfriend as well  A prior visit I was directly observing his urine sample he threw the cup in the sink stormed out with his pants down      We have gotten word from the police that he and several other people were selling and trading their drugs on the street  I had Bear [place] setting with this visit  He was lost to follow-up until recently  He said he was in the methadone clinic until recently  He relapsed on meth weeks ago  He says he wants to get clean he has been using Suboxone off the street  I see his girlfriend as well  I put him on Suboxone  weeks ago  He says they both relapsed on heroin 2 weeks ago  He said he has an aunt in [facility] that passed away    He had told me he was clean over 100 days but he said he lied  He says the judges order him to go to a treatment facility in [place] on July 6  It was either that or shelter  Review of Systems  Patient is feeling terrible left flank pain cannot sit still  Blood pressure 139/87, pulse 123, temperature 97.2 °F (36.2 °C), height 5' 10\" (1.778 m), weight 194 lb (88 kg).   Patient is restless cannot sit still he is in obvious pain  Animated and loud as usual  Skin numerous erythematous lesions on his face and extremities        Urine tox screen today  Alcohol, Urine 06/28/2021 12:50 PM Unknown   NEG    Amphetamine Screen, Urine 06/28/2021 12:50 PM Unknown   POS    Barbiturate Screen, Urine 06/28/2021 12:50 PM Unknown   NEG    Benzodiazepine Screen, Urine 06/28/2021 12:50 PM Unknown   NEG    Buprenorphine Urine 06/28/2021 12:50 PM Unknown   POS    Cocaine Metabolite Screen, Urine 06/28/2021 12:50 PM Unknown   NEG    FENTANYL SCREEN, URINE 06/28/2021 12:50 PM Unknown   POS    Gabapentin Screen, Urine 06/28/2021 12:50 PM Unknown   N/A    MDMA, Urine 06/28/2021 12:50 PM Unknown   NEG    Methadone Screen, Urine 06/28/2021 12:50 PM Unknown   NEG    Methamphetamine, Urine 06/28/2021 12:50 PM Unknown   POS    Opiate Scrn, Ur 06/28/2021 12:50 PM Unknown   NEG    Oxycodone Screen, Ur 06/28/2021 12:50 PM Unknown   NEG    PCP Screen, Urine 06/28/2021 12:50 PM Unknown   NEG    Propoxyphene Screen, Urine 06/28/2021 12:50 PM Unknown   N/A    Synthetic Cannabinoids (K2) Screen, Urine 06/28/2021 12:50 PM Unknown   NEG    THC Screen, Urine 06/28/2021 12:50 PM Unknown   NEG    Tramadol Scrn, Ur 06/28/2021 12:50 PM Unknown   POS    Tricyclic Antidepressants, Urine 06/28/2021 12:50 PM Unknown   N/A        I think of treatment facilities exactly what he needs  He says they will let him take Suboxone I will give him his 14-day prescription    Patient is requesting testosterone  His level back in May was less than 12 with a low normal being 300  We will prescribe AndroGel        An electronic signature was used to authenticate this note.     --Mckenna Perez MD

## 2021-07-01 NOTE — PROGRESS NOTES
Andrew Kiran (:  1985) is a 39 y.o. male,Established patient, here for evaluation of the following chief complaint(s):  Drug Problem      ASSESSMENT/PLAN:  1. Severe opioid use disorder (HCC)  -     POCT Rapid Drug Screen  2. Low testosterone in male  3. Insomnia, unspecified type  4. Encounter for monitoring Suboxone maintenance therapy  5. Polysubstance abuse ([address]  6.  Skin infection    Urine has multiple substances  I think he does have kidney stones  My nurse wheeled him to the emergency room I spoke with the ER doctor  The patient promised me he was going to behave there not signed out AMA like he always does  I see his girlfriend as well  Their prognosis is guarded  This patient has overdosed at least 8 times  I feel that either long-term or death is going to be the outcome unless they change        SUBJECTIVE/OBJECTIVE:  Drug Problem      I saw him here   He has missed several appointments since    Long story short he was clean for several months but relapsed  He has missed several appointments since last visit because he was dirty  I see his girlfriend as well  A prior visit I was directly observing his urine sample he threw the cup in the sink stormed out with his pants down      We have gotten word from the police that he and several other people were selling and trading their drugs on the street  I had Bear [place] setting with this visit  He was lost to follow-up until recently  He said he was in the methadone clinic until recently  He relapsed on meth weeks ago  He says he wants to get clean he has been using Suboxone off the street  I see his girlfriend as well  I put him on Suboxone  weeks ago  He says they both relapsed on heroin 2 weeks ago  He said he has an aunt in [facility] that passed away    He had told me he was clean over 100 days but he said he lied  He says the judges order him to go to a treatment facility in [facility] on   It was either that or correction  Review of Systems  Patient is feeling terrible left flank pain cannot sit still  Blood pressure 121/86, pulse 109, temperature 97 °F (36.1 °C), height 5' 10\" (1.778 m), weight 199 lb (90.3 kg). Patient is restless cannot sit still he is in obvious pain  Animated and loud as usual  Skin numerous erythematous lesions on his face and extremities        Urine tox screen today  Alcohol, Urine 06/28/2021 12:50 PM Unknown   NEG    Amphetamine Screen, Urine 06/28/2021 12:50 PM Unknown   POS    Barbiturate Screen, Urine 06/28/2021 12:50 PM Unknown   NEG    Benzodiazepine Screen, Urine 06/28/2021 12:50 PM Unknown   NEG    Buprenorphine Urine 06/28/2021 12:50 PM Unknown   POS    Cocaine Metabolite Screen, Urine 06/28/2021 12:50 PM Unknown   NEG    FENTANYL SCREEN, URINE 06/28/2021 12:50 PM Unknown   POS    Gabapentin Screen, Urine 06/28/2021 12:50 PM Unknown   N/A    MDMA, Urine 06/28/2021 12:50 PM Unknown   NEG    Methadone Screen, Urine 06/28/2021 12:50 PM Unknown   NEG    Methamphetamine, Urine 06/28/2021 12:50 PM Unknown   POS    Opiate Scrn, Ur 06/28/2021 12:50 PM Unknown   NEG    Oxycodone Screen, Ur 06/28/2021 12:50 PM Unknown   NEG    PCP Screen, Urine 06/28/2021 12:50 PM Unknown   NEG    Propoxyphene Screen, Urine 06/28/2021 12:50 PM Unknown   N/A    Synthetic Cannabinoids (K2) Screen, Urine 06/28/2021 12:50 PM Unknown   NEG    THC Screen, Urine 06/28/2021 12:50 PM Unknown   NEG    Tramadol Scrn, Ur 06/28/2021 12:50 PM Unknown   POS    Tricyclic Antidepressants, Urine 06/28/2021 12:50 PM Unknown   N/A        I think of treatment facilities exactly what he needs  He says they will let him take Suboxone  I am going to see him back here 2 days because I will not be working after that until after he goes away  Patient is requesting testosterone  His level back in May was less than 12 with a low normal being 300  We will prescribe AndroGel        An electronic signature was used to authenticate this note.     --Brenna Murillo MD

## 2021-07-04 NOTE — PROGRESS NOTES
96.4 °F (35.8 °C), height 5' 10\" (1.778 m), weight 203 lb (92.1 kg).   Patient is restless cannot sit still he is in obvious pain  Animated and loud as usual  Skin numerous erythematous lesions on his face and extremities        Urine tox screen today  Alcohol, Urine 06/22/2021  1:09 PM Unknown   NEG    Amphetamine Screen, Urine 06/22/2021  1:09 PM Unknown   NEG    Barbiturate Screen, Urine 06/22/2021  1:09 PM Unknown   NEG    Benzodiazepine Screen, Urine 06/22/2021  1:09 PM Unknown   NEG    Buprenorphine Urine 06/22/2021  1:09 PM Unknown   POS    Cocaine Metabolite Screen, Urine 06/22/2021  1:09 PM Unknown   NEG    FENTANYL SCREEN, URINE 06/22/2021  1:09 PM Unknown   POS    Gabapentin Screen, Urine 06/22/2021  1:09 PM Unknown   N/A    MDMA, Urine 06/22/2021  1:09 PM Unknown   NEG    Methadone Screen, Urine 06/22/2021  1:09 PM Unknown   NEG    Methamphetamine, Urine 06/22/2021  1:09 PM Unknown   POS    Opiate Scrn, Ur 06/22/2021  1:09 PM Unknown   NEG    Oxycodone Screen, Ur 06/22/2021  1:09 PM Unknown   NEG    PCP Screen, Urine 06/22/2021  1:09 PM Unknown   NEG    Propoxyphene Screen, Urine 06/22/2021  1:09 PM Unknown   N/A    Synthetic Cannabinoids (K2) Screen, Urine 06/22/2021  1:09 PM Unknown   NEG    THC Screen, Urine 06/22/2021  1:09 PM Unknown   NEG    Tramadol Scrn, Ur 06/22/2021  1:09 PM Unknown   NEG    Tricyclic Antidepressants, Urine 06/22/2021  1:09 PM Unknown   N/A        Urine has multiple substances  I am really had a loss of what to do for the patient and his girlfriend  Prognosis guarded  I had Elijah Enrique see the patient and his girlfriend  The plan is to get them into a facility in [place]  They are willing to go  I told both of them outcome likely residential or death unless they go somewhere  He shows me sores on his back he has least 30 erythematous lesions with a papular-like lesion with pus  He is a  from his meth  We will give us a Bactrim   reviewed the [place] [employer] Reporting System report There does not appear to be any discrepancies or overprescribing of controlled substances  He is prescribed Lyrica  He is prescribed Adderall  Follow-up after he gets out of rehab      An electronic signature was used to authenticate this note.     --Avila Medrano MD

## 2021-07-05 ASSESSMENT — ENCOUNTER SYMPTOMS
BLOOD IN STOOL: 0
SINUS PRESSURE: 0
CONSTIPATION: 0
ABDOMINAL DISTENTION: 0
SHORTNESS OF BREATH: 1
WHEEZING: 0
PHOTOPHOBIA: 0
TROUBLE SWALLOWING: 0
COUGH: 0
RHINORRHEA: 0
DIARRHEA: 0
VOMITING: 0
ABDOMINAL PAIN: 0
SINUS PAIN: 0
NAUSEA: 0
SORE THROAT: 0

## 2021-07-12 ASSESSMENT — ENCOUNTER SYMPTOMS
RHINORRHEA: 0
PHOTOPHOBIA: 0
DIARRHEA: 0
ABDOMINAL DISTENTION: 0
WHEEZING: 0
SORE THROAT: 0
SINUS PRESSURE: 0
SINUS PAIN: 0
NAUSEA: 0
TROUBLE SWALLOWING: 0
BLOOD IN STOOL: 0
VOMITING: 0
ABDOMINAL PAIN: 0
COUGH: 0
SHORTNESS OF BREATH: 1
CONSTIPATION: 0

## 2021-07-13 ASSESSMENT — ENCOUNTER SYMPTOMS
RHINORRHEA: 0
COUGH: 0
DIARRHEA: 0
SINUS PRESSURE: 0
ABDOMINAL DISTENTION: 0
SINUS PAIN: 0
CONSTIPATION: 0
WHEEZING: 0
ABDOMINAL PAIN: 0
PHOTOPHOBIA: 0
TROUBLE SWALLOWING: 0
SORE THROAT: 0
VOMITING: 0
NAUSEA: 0
BLOOD IN STOOL: 0
SHORTNESS OF BREATH: 1

## 2021-07-15 ENCOUNTER — OFFICE VISIT (OUTPATIENT)
Dept: INTERNAL MEDICINE CLINIC | Age: 36
End: 2021-07-15
Payer: MEDICARE

## 2021-07-15 VITALS
HEART RATE: 86 BPM | WEIGHT: 193.6 LBS | BODY MASS INDEX: 27.72 KG/M2 | DIASTOLIC BLOOD PRESSURE: 84 MMHG | TEMPERATURE: 97.3 F | HEIGHT: 70 IN | SYSTOLIC BLOOD PRESSURE: 145 MMHG

## 2021-07-15 DIAGNOSIS — F41.9 ANXIETY AND DEPRESSION: ICD-10-CM

## 2021-07-15 DIAGNOSIS — F19.10 POLYSUBSTANCE ABUSE (HCC): ICD-10-CM

## 2021-07-15 DIAGNOSIS — Z51.81 ENCOUNTER FOR MONITORING SUBOXONE MAINTENANCE THERAPY: ICD-10-CM

## 2021-07-15 DIAGNOSIS — F32.A ANXIETY AND DEPRESSION: ICD-10-CM

## 2021-07-15 DIAGNOSIS — G47.00 INSOMNIA, UNSPECIFIED TYPE: ICD-10-CM

## 2021-07-15 DIAGNOSIS — E29.1 HYPOGONADISM IN MALE: ICD-10-CM

## 2021-07-15 DIAGNOSIS — M79.7 FIBROMYALGIA: ICD-10-CM

## 2021-07-15 DIAGNOSIS — Z79.899 ENCOUNTER FOR MONITORING SUBOXONE MAINTENANCE THERAPY: ICD-10-CM

## 2021-07-15 DIAGNOSIS — L08.9 SKIN INFECTION: ICD-10-CM

## 2021-07-15 DIAGNOSIS — F11.20 SEVERE OPIOID USE DISORDER (HCC): Primary | ICD-10-CM

## 2021-07-15 PROCEDURE — 99213 OFFICE O/P EST LOW 20 MIN: CPT | Performed by: INTERNAL MEDICINE

## 2021-07-15 PROCEDURE — G8427 DOCREV CUR MEDS BY ELIG CLIN: HCPCS | Performed by: INTERNAL MEDICINE

## 2021-07-15 PROCEDURE — G8417 CALC BMI ABV UP PARAM F/U: HCPCS | Performed by: INTERNAL MEDICINE

## 2021-07-15 PROCEDURE — 4004F PT TOBACCO SCREEN RCVD TLK: CPT | Performed by: INTERNAL MEDICINE

## 2021-07-15 PROCEDURE — 80305 DRUG TEST PRSMV DIR OPT OBS: CPT | Performed by: INTERNAL MEDICINE

## 2021-07-15 RX ORDER — BUPRENORPHINE AND NALOXONE 8; 2 MG/1; MG/1
1 FILM, SOLUBLE BUCCAL; SUBLINGUAL 2 TIMES DAILY
Qty: 28 FILM | Refills: 0 | Status: SHIPPED | OUTPATIENT
Start: 2021-07-15 | End: 2021-07-29

## 2021-07-15 SDOH — ECONOMIC STABILITY: FOOD INSECURITY: WITHIN THE PAST 12 MONTHS, THE FOOD YOU BOUGHT JUST DIDN'T LAST AND YOU DIDN'T HAVE MONEY TO GET MORE.: NEVER TRUE

## 2021-07-15 SDOH — ECONOMIC STABILITY: FOOD INSECURITY: WITHIN THE PAST 12 MONTHS, YOU WORRIED THAT YOUR FOOD WOULD RUN OUT BEFORE YOU GOT MONEY TO BUY MORE.: NEVER TRUE

## 2021-07-15 ASSESSMENT — SOCIAL DETERMINANTS OF HEALTH (SDOH): HOW HARD IS IT FOR YOU TO PAY FOR THE VERY BASICS LIKE FOOD, HOUSING, MEDICAL CARE, AND HEATING?: NOT HARD AT ALL

## 2021-07-15 NOTE — PROGRESS NOTES
Jesus Echavarria (:  1985) is a 39 y.o. male,Established patient, here for evaluation of the following chief complaint(s):  Drug Problem      ASSESSMENT/PLAN:  1. Severe opioid use disorder (HCC)  -     POCT Rapid Drug Screen  -     buprenorphine-naloxone (SUBOXONE) 8-2 MG FILM SL film; Place 1 Film under the tongue 2 times daily for 14 days. , Disp-28 Film, R-0Normal  2. Anxiety and depression  3. Insomnia, unspecified type  4. Fibromyalgia  5. Encounter for monitoring Suboxone maintenance therapy  6. Polysubstance abuse ([facility] [address]  7. Hypogonadism in male  8.  Skin infection    Urine has multiple substances  I think he does have kidney stones  My nurse wheeled him to the emergency room I spoke with the ER doctor  The patient promised me he was going to behave there not signed out AMA like he always does  I see his girlfriend as well  Their prognosis is guarded  This patient has overdosed at least 8 times  I feel that either correction or death is going to be the outcome unless they change        SUBJECTIVE/OBJECTIVE:  Drug Problem      I saw him here   He was supposed to go to a rehab facility in [place] but apparently did not have a bed  He is waiting for 1    Long story short he was clean for several months but relapsed  He has missed several appointments since last visit because he was dirty  I see his girlfriend as well  A prior visit I was directly observing his urine sample he threw the cup in the sink stormed out with his pants down      We have gotten word from the police that he and several other people were selling and trading their drugs on the street  I had Bear [place] setting with this visit  He was lost to follow-up until recently  He said he was in the methadone clinic until recently  He relapsed on meth weeks ago  He says he wants to get clean he has been using Suboxone off the street  I see his girlfriend as well  I put him on Suboxone  weeks ago  He says they both relapsed on heroin 2 weeks ago  He said he has an aunt in [place] that passed away    He had told me he was clean over 100 days but he said he lied  He says the judges order him to go to a treatment facility in [place] on July 6  It was either that or half-way  Review of Systems  Patient is feeling terrible left flank pain cannot sit still  Blood pressure (!) 145/84, pulse 86, temperature 97.3 °F (36.3 °C), temperature source Temporal, height 5' 10\" (1.778 m), weight 193 lb 9.6 oz (87.8 kg).   Patient is restless cannot sit still he is in obvious pain  Animated and loud as usual  Skin numerous erythematous lesions on his face and extremities        Urine tox screen today    I   Alcohol, Urine 07/15/2021  1:47 PM Unknown   NEG    Amphetamine Screen, Urine 07/15/2021  1:47 PM Unknown   NEG    Barbiturate Screen, Urine 07/15/2021  1:47 PM Unknown   NEG    Benzodiazepine Screen, Urine 07/15/2021  1:47 PM Unknown   NEG    Buprenorphine Urine 07/15/2021  1:47 PM Unknown   POS    Cocaine Metabolite Screen, Urine 07/15/2021  1:47 PM Unknown   NEG    FENTANYL SCREEN, URINE 07/15/2021  1:47 PM Unknown   POS    Gabapentin Screen, Urine 07/15/2021  1:47 PM Unknown   N/A    MDMA, Urine 07/15/2021  1:47 PM Unknown   NEG    Methadone Screen, Urine 07/15/2021  1:47 PM Unknown   NEG    Methamphetamine, Urine 07/15/2021  1:47 PM Unknown   NEG    Opiate Scrn, Ur 07/15/2021  1:47 PM Unknown   POS    Oxycodone Screen, Ur 07/15/2021  1:47 PM Unknown   NEG    PCP Screen, Urine 07/15/2021  1:47 PM Unknown   NEG    Propoxyphene Screen, Urine 07/15/2021  1:47 PM Unknown   N/A    Synthetic Cannabinoids (K2) Screen, Urine 07/15/2021  1:47 PM Unknown   NEG    THC Screen, Urine 07/15/2021  1:47 PM Unknown   POS    Tramadol Scrn, Ur 07/15/2021  1:47 PM Unknown   POS    Tricyclic Antidepressants, Urine 07/15/2021  1:47 PM Unknown   N/A      think a treatment facilities exactly what he needs  He says they will let him take Suboxone I will give him his 14-day prescription  His girlfriend says she is going to do intensive outpatient therapy once he goes to rehab  He is on testosterone for a level of 12      An electronic signature was used to authenticate this note.     --Mónica Wadsworth MD

## 2021-07-15 NOTE — PROGRESS NOTES
Verbal order per Dr. Jayla Rogers for urine drug screen. Positive for BUP FTY METH TRAM MOP THC. Verified results with Lisa Choi. Dr. Jayla Rogers ordered Suboxone 8mg film BID for patient. Verified dose with patient. Patient was sent home with 2 week script of Suboxone 8mg film BID and will be seen back in the office 7/29/21.

## 2021-07-29 ENCOUNTER — VIRTUAL VISIT (OUTPATIENT)
Dept: INTERNAL MEDICINE CLINIC | Age: 36
End: 2021-07-29

## 2021-07-29 DIAGNOSIS — F11.20 SEVERE OPIOID USE DISORDER (HCC): ICD-10-CM

## 2021-07-29 PROCEDURE — 99024 POSTOP FOLLOW-UP VISIT: CPT | Performed by: INTERNAL MEDICINE

## 2021-07-29 RX ORDER — BUPRENORPHINE AND NALOXONE 8; 2 MG/1; MG/1
1 FILM, SOLUBLE BUCCAL; SUBLINGUAL 2 TIMES DAILY
Qty: 28 FILM | Refills: 0 | Status: CANCELLED | OUTPATIENT
Start: 2021-07-29 | End: 2021-08-12

## 2021-07-30 ENCOUNTER — HOSPITAL ENCOUNTER (INPATIENT)
Age: 36
LOS: 3 days | Discharge: HOME | DRG: 817 | End: 2021-08-02
Attending: INTERNAL MEDICINE | Admitting: FAMILY MEDICINE
Payer: MEDICARE

## 2021-07-30 DIAGNOSIS — M62.82 NON-TRAUMATIC RHABDOMYOLYSIS: ICD-10-CM

## 2021-07-30 DIAGNOSIS — N17.9 ACUTE RENAL FAILURE, UNSPECIFIED ACUTE RENAL FAILURE TYPE (HCC): Primary | ICD-10-CM

## 2021-07-30 LAB
ACETAMINOPHEN LEVEL: < 5 UG/ML (ref 0–20)
ALBUMIN SERPL-MCNC: 4.7 G/DL (ref 3.5–5.1)
ALP BLD-CCNC: 131 U/L (ref 38–126)
ALT SERPL-CCNC: 171 U/L (ref 11–66)
ANION GAP SERPL CALCULATED.3IONS-SCNC: 22 MEQ/L (ref 8–16)
AST SERPL-CCNC: 204 U/L (ref 5–40)
BASOPHILS # BLD: 0.3 %
BASOPHILS ABSOLUTE: 0.1 THOU/MM3 (ref 0–0.1)
BILIRUB SERPL-MCNC: 0.3 MG/DL (ref 0.3–1.2)
BUN BLDV-MCNC: 53 MG/DL (ref 7–22)
CALCIUM SERPL-MCNC: 9.5 MG/DL (ref 8.5–10.5)
CHLORIDE BLD-SCNC: 90 MEQ/L (ref 98–111)
CO2: 21 MEQ/L (ref 23–33)
CREAT SERPL-MCNC: 9.7 MG/DL (ref 0.4–1.2)
EOSINOPHIL # BLD: 1.3 %
EOSINOPHILS ABSOLUTE: 0.3 THOU/MM3 (ref 0–0.4)
ERYTHROCYTE [DISTWIDTH] IN BLOOD BY AUTOMATED COUNT: 17.6 % (ref 11.5–14.5)
ERYTHROCYTE [DISTWIDTH] IN BLOOD BY AUTOMATED COUNT: 47.9 FL (ref 35–45)
GFR SERPL CREATININE-BSD FRML MDRD: 6 ML/MIN/1.73M2
GLUCOSE BLD-MCNC: 123 MG/DL (ref 70–108)
HCT VFR BLD CALC: 41.7 % (ref 42–52)
HEMOGLOBIN: 13.1 GM/DL (ref 14–18)
IMMATURE GRANS (ABS): 0.08 THOU/MM3 (ref 0–0.07)
IMMATURE GRANULOCYTES: 0.4 %
LYMPHOCYTES # BLD: 16.8 %
LYMPHOCYTES ABSOLUTE: 3.4 THOU/MM3 (ref 1–4.8)
MCH RBC QN AUTO: 24.9 PG (ref 26–33)
MCHC RBC AUTO-ENTMCNC: 31.4 GM/DL (ref 32.2–35.5)
MCV RBC AUTO: 79.1 FL (ref 80–94)
MONOCYTES # BLD: 9.1 %
MONOCYTES ABSOLUTE: 1.8 THOU/MM3 (ref 0.4–1.3)
NUCLEATED RED BLOOD CELLS: 0 /100 WBC
OSMOLALITY CALCULATION: 282.1 MOSMOL/KG (ref 275–300)
PLATELET # BLD: 439 THOU/MM3 (ref 130–400)
PMV BLD AUTO: 9.7 FL (ref 9.4–12.4)
POTASSIUM REFLEX MAGNESIUM: 4.2 MEQ/L (ref 3.5–5.2)
RBC # BLD: 5.27 MILL/MM3 (ref 4.7–6.1)
SALICYLATE, SERUM: < 0.3 MG/DL (ref 2–10)
SARS-COV-2, NAAT: NOT DETECTED
SEG NEUTROPHILS: 72.1 %
SEGMENTED NEUTROPHILS ABSOLUTE COUNT: 14.6 THOU/MM3 (ref 1.8–7.7)
SODIUM BLD-SCNC: 133 MEQ/L (ref 135–145)
TOTAL CK: ABNORMAL U/L (ref 55–170)
TOTAL PROTEIN: 8.6 G/DL (ref 6.1–8)
WBC # BLD: 20.3 THOU/MM3 (ref 4.8–10.8)

## 2021-07-30 PROCEDURE — 83874 ASSAY OF MYOGLOBIN: CPT

## 2021-07-30 PROCEDURE — 82550 ASSAY OF CK (CPK): CPT

## 2021-07-30 PROCEDURE — 1200000003 HC TELEMETRY R&B

## 2021-07-30 PROCEDURE — 36415 COLL VENOUS BLD VENIPUNCTURE: CPT

## 2021-07-30 PROCEDURE — 6360000002 HC RX W HCPCS: Performed by: STUDENT IN AN ORGANIZED HEALTH CARE EDUCATION/TRAINING PROGRAM

## 2021-07-30 PROCEDURE — 2580000003 HC RX 258: Performed by: INTERNAL MEDICINE

## 2021-07-30 PROCEDURE — 80143 DRUG ASSAY ACETAMINOPHEN: CPT

## 2021-07-30 PROCEDURE — 80053 COMPREHEN METABOLIC PANEL: CPT

## 2021-07-30 PROCEDURE — 80179 DRUG ASSAY SALICYLATE: CPT

## 2021-07-30 PROCEDURE — 85025 COMPLETE CBC W/AUTO DIFF WBC: CPT

## 2021-07-30 PROCEDURE — 96361 HYDRATE IV INFUSION ADD-ON: CPT

## 2021-07-30 PROCEDURE — 6360000002 HC RX W HCPCS: Performed by: INTERNAL MEDICINE

## 2021-07-30 PROCEDURE — 99284 EMERGENCY DEPT VISIT MOD MDM: CPT

## 2021-07-30 PROCEDURE — 99223 1ST HOSP IP/OBS HIGH 75: CPT | Performed by: FAMILY MEDICINE

## 2021-07-30 PROCEDURE — 93005 ELECTROCARDIOGRAM TRACING: CPT | Performed by: INTERNAL MEDICINE

## 2021-07-30 PROCEDURE — 96374 THER/PROPH/DIAG INJ IV PUSH: CPT

## 2021-07-30 PROCEDURE — 87635 SARS-COV-2 COVID-19 AMP PRB: CPT

## 2021-07-30 RX ORDER — 0.9 % SODIUM CHLORIDE 0.9 %
2000 INTRAVENOUS SOLUTION INTRAVENOUS ONCE
Status: COMPLETED | OUTPATIENT
Start: 2021-07-30 | End: 2021-07-31

## 2021-07-30 RX ORDER — LORAZEPAM 2 MG/ML
1 INJECTION INTRAMUSCULAR ONCE
Status: COMPLETED | OUTPATIENT
Start: 2021-07-30 | End: 2021-07-30

## 2021-07-30 RX ORDER — SODIUM CHLORIDE 9 MG/ML
INJECTION, SOLUTION INTRAVENOUS CONTINUOUS
Status: DISCONTINUED | OUTPATIENT
Start: 2021-07-30 | End: 2021-08-02 | Stop reason: HOSPADM

## 2021-07-30 RX ORDER — 0.9 % SODIUM CHLORIDE 0.9 %
1000 INTRAVENOUS SOLUTION INTRAVENOUS ONCE
Status: COMPLETED | OUTPATIENT
Start: 2021-07-30 | End: 2021-07-30

## 2021-07-30 RX ORDER — 0.9 % SODIUM CHLORIDE 0.9 %
1000 INTRAVENOUS SOLUTION INTRAVENOUS ONCE
Status: COMPLETED | OUTPATIENT
Start: 2021-07-30 | End: 2021-07-31

## 2021-07-30 RX ADMIN — SODIUM CHLORIDE 1000 ML: 9 INJECTION, SOLUTION INTRAVENOUS at 22:53

## 2021-07-30 RX ADMIN — LORAZEPAM 1 MG: 2 INJECTION INTRAMUSCULAR; INTRAVENOUS at 23:25

## 2021-07-30 RX ADMIN — SODIUM CHLORIDE 1000 ML: 9 INJECTION, SOLUTION INTRAVENOUS at 21:12

## 2021-07-30 RX ADMIN — LORAZEPAM 1 MG: 2 INJECTION INTRAMUSCULAR; INTRAVENOUS at 21:59

## 2021-07-30 ASSESSMENT — PAIN DESCRIPTION - PAIN TYPE: TYPE: ACUTE PAIN

## 2021-07-30 ASSESSMENT — PAIN DESCRIPTION - LOCATION: LOCATION: NECK

## 2021-07-30 ASSESSMENT — PAIN SCALES - GENERAL: PAINLEVEL_OUTOF10: 8

## 2021-07-31 ENCOUNTER — APPOINTMENT (OUTPATIENT)
Dept: GENERAL RADIOLOGY | Age: 36
DRG: 817 | End: 2021-07-31
Payer: MEDICARE

## 2021-07-31 LAB
ALBUMIN SERPL-MCNC: 3.9 G/DL (ref 3.5–5.1)
ALP BLD-CCNC: 80 U/L (ref 38–126)
ALT SERPL-CCNC: 145 U/L (ref 11–66)
AMPHETAMINE+METHAMPHETAMINE URINE SCREEN: POSITIVE
ANION GAP SERPL CALCULATED.3IONS-SCNC: 13 MEQ/L (ref 8–16)
ANION GAP SERPL CALCULATED.3IONS-SCNC: 16 MEQ/L (ref 8–16)
ANION GAP SERPL CALCULATED.3IONS-SCNC: 19 MEQ/L (ref 8–16)
AST SERPL-CCNC: 235 U/L (ref 5–40)
BACTERIA: ABNORMAL
BARBITURATE QUANTITATIVE URINE: NEGATIVE
BASOPHILS # BLD: 0.3 %
BASOPHILS ABSOLUTE: 0.1 THOU/MM3 (ref 0–0.1)
BENZODIAZEPINE QUANTITATIVE URINE: NEGATIVE
BILIRUB SERPL-MCNC: 0.3 MG/DL (ref 0.3–1.2)
BILIRUBIN DIRECT: < 0.2 MG/DL (ref 0–0.3)
BILIRUBIN URINE: NEGATIVE
BLOOD, URINE: ABNORMAL
BUN BLDV-MCNC: 42 MG/DL (ref 7–22)
BUN BLDV-MCNC: 46 MG/DL (ref 7–22)
BUN BLDV-MCNC: 51 MG/DL (ref 7–22)
CALCIUM SERPL-MCNC: 8.6 MG/DL (ref 8.5–10.5)
CALCIUM SERPL-MCNC: 8.7 MG/DL (ref 8.5–10.5)
CALCIUM SERPL-MCNC: 9.1 MG/DL (ref 8.5–10.5)
CANNABINOID QUANTITATIVE URINE: NEGATIVE
CASTS: ABNORMAL /LPF
CASTS: ABNORMAL /LPF
CHARACTER, URINE: ABNORMAL
CHLORIDE BLD-SCNC: 100 MEQ/L (ref 98–111)
CHLORIDE BLD-SCNC: 102 MEQ/L (ref 98–111)
CHLORIDE BLD-SCNC: 96 MEQ/L (ref 98–111)
CO2: 18 MEQ/L (ref 23–33)
CO2: 22 MEQ/L (ref 23–33)
CO2: 22 MEQ/L (ref 23–33)
COCAINE METABOLITE QUANTITATIVE URINE: POSITIVE
COLOR: YELLOW
CREAT SERPL-MCNC: 6.1 MG/DL (ref 0.4–1.2)
CREAT SERPL-MCNC: 7.9 MG/DL (ref 0.4–1.2)
CREAT SERPL-MCNC: 9.4 MG/DL (ref 0.4–1.2)
CREATININE URINE: 42.7 MG/DL
CRYSTALS: ABNORMAL
EKG ATRIAL RATE: 121 BPM
EKG P AXIS: 82 DEGREES
EKG P-R INTERVAL: 156 MS
EKG Q-T INTERVAL: 300 MS
EKG QRS DURATION: 86 MS
EKG QTC CALCULATION (BAZETT): 426 MS
EKG R AXIS: 42 DEGREES
EKG T AXIS: 66 DEGREES
EKG VENTRICULAR RATE: 121 BPM
EOSINOPHIL # BLD: 2.5 %
EOSINOPHILS ABSOLUTE: 0.4 THOU/MM3 (ref 0–0.4)
EPITHELIAL CELLS, UA: ABNORMAL /HPF
ERYTHROCYTE [DISTWIDTH] IN BLOOD BY AUTOMATED COUNT: 17.1 % (ref 11.5–14.5)
ERYTHROCYTE [DISTWIDTH] IN BLOOD BY AUTOMATED COUNT: 49.2 FL (ref 35–45)
ETHYL ALCOHOL, SERUM: < 0.01 %
GFR SERPL CREATININE-BSD FRML MDRD: 10 ML/MIN/1.73M2
GFR SERPL CREATININE-BSD FRML MDRD: 6 ML/MIN/1.73M2
GFR SERPL CREATININE-BSD FRML MDRD: 8 ML/MIN/1.73M2
GLUCOSE BLD-MCNC: 100 MG/DL (ref 70–108)
GLUCOSE BLD-MCNC: 104 MG/DL (ref 70–108)
GLUCOSE BLD-MCNC: 110 MG/DL (ref 70–108)
GLUCOSE BLD-MCNC: 92 MG/DL (ref 70–108)
GLUCOSE, URINE: 100 MG/DL
HBV SURFACE AB TITR SER: POSITIVE {TITER}
HCT VFR BLD CALC: 35.5 % (ref 42–52)
HEMOGLOBIN: 11.2 GM/DL (ref 14–18)
HEPATITIS B CORE IGM ANTIBODY: NEGATIVE
HEPATITIS B SURFACE ANTIGEN: NEGATIVE
HEPATITIS C ANTIBODY: POSITIVE
IMMATURE GRANS (ABS): 0.07 THOU/MM3 (ref 0–0.07)
IMMATURE GRANULOCYTES: 0.4 %
KETONES, URINE: NEGATIVE
LEUKOCYTE EST, POC: ABNORMAL
LYMPHOCYTES # BLD: 20.3 %
LYMPHOCYTES ABSOLUTE: 3.6 THOU/MM3 (ref 1–4.8)
MAGNESIUM: 2.4 MG/DL (ref 1.6–2.4)
MAGNESIUM: 2.5 MG/DL (ref 1.6–2.4)
MCH RBC QN AUTO: 25.3 PG (ref 26–33)
MCHC RBC AUTO-ENTMCNC: 31.5 GM/DL (ref 32.2–35.5)
MCV RBC AUTO: 80.3 FL (ref 80–94)
MISCELLANEOUS LAB TEST RESULT: ABNORMAL
MONOCYTES # BLD: 8.6 %
MONOCYTES ABSOLUTE: 1.5 THOU/MM3 (ref 0.4–1.3)
MYOGLOBIN URINE: POSITIVE
NITRITE, URINE: NEGATIVE
NUCLEATED RED BLOOD CELLS: 0 /100 WBC
OPIATES, URINE: NEGATIVE
OXYCODONE: NEGATIVE
PH UA: 5 (ref 5–9)
PHENCYCLIDINE QUANTITATIVE URINE: NEGATIVE
PHOSPHORUS: 4.9 MG/DL (ref 2.4–4.7)
PHOSPHORUS: 5.5 MG/DL (ref 2.4–4.7)
PLATELET # BLD: 386 THOU/MM3 (ref 130–400)
PMV BLD AUTO: 10.2 FL (ref 9.4–12.4)
POTASSIUM REFLEX MAGNESIUM: 4 MEQ/L (ref 3.5–5.2)
POTASSIUM SERPL-SCNC: 4.1 MEQ/L (ref 3.5–5.2)
POTASSIUM SERPL-SCNC: 4.3 MEQ/L (ref 3.5–5.2)
PROCALCITONIN: 13.08 NG/ML (ref 0.01–0.09)
PROCALCITONIN: 7.58 NG/ML (ref 0.01–0.09)
PROTEIN UA: 300 MG/DL
PROTEIN, URINE: 19 MG/DL
RBC # BLD: 4.42 MILL/MM3 (ref 4.7–6.1)
RBC URINE: ABNORMAL /HPF
RENAL EPITHELIAL, UA: ABNORMAL
SEG NEUTROPHILS: 67.9 %
SEGMENTED NEUTROPHILS ABSOLUTE COUNT: 12 THOU/MM3 (ref 1.8–7.7)
SODIUM BLD-SCNC: 133 MEQ/L (ref 135–145)
SODIUM BLD-SCNC: 137 MEQ/L (ref 135–145)
SODIUM BLD-SCNC: 138 MEQ/L (ref 135–145)
SPECIFIC GRAVITY UA: 1.02 (ref 1–1.03)
TOTAL CK: ABNORMAL U/L (ref 55–170)
TOTAL PROTEIN: 6.8 G/DL (ref 6.1–8)
TSH SERPL DL<=0.05 MIU/L-ACNC: 2.22 UIU/ML (ref 0.4–4.2)
UROBILINOGEN, URINE: 1 EU/DL (ref 0–1)
WBC # BLD: 17.6 THOU/MM3 (ref 4.8–10.8)
WBC UA: ABNORMAL /HPF
YEAST: ABNORMAL

## 2021-07-31 PROCEDURE — 81001 URINALYSIS AUTO W/SCOPE: CPT

## 2021-07-31 PROCEDURE — 71045 X-RAY EXAM CHEST 1 VIEW: CPT

## 2021-07-31 PROCEDURE — 2580000003 HC RX 258: Performed by: INTERNAL MEDICINE

## 2021-07-31 PROCEDURE — 6360000002 HC RX W HCPCS: Performed by: FAMILY MEDICINE

## 2021-07-31 PROCEDURE — 80307 DRUG TEST PRSMV CHEM ANLYZR: CPT

## 2021-07-31 PROCEDURE — 6360000002 HC RX W HCPCS: Performed by: STUDENT IN AN ORGANIZED HEALTH CARE EDUCATION/TRAINING PROGRAM

## 2021-07-31 PROCEDURE — 2000000000 HC ICU R&B

## 2021-07-31 PROCEDURE — 86706 HEP B SURFACE ANTIBODY: CPT

## 2021-07-31 PROCEDURE — 82595 ASSAY OF CRYOGLOBULIN: CPT

## 2021-07-31 PROCEDURE — 2500000003 HC RX 250 WO HCPCS: Performed by: FAMILY MEDICINE

## 2021-07-31 PROCEDURE — 2500000003 HC RX 250 WO HCPCS

## 2021-07-31 PROCEDURE — 2580000003 HC RX 258: Performed by: FAMILY MEDICINE

## 2021-07-31 PROCEDURE — 99255 IP/OBS CONSLTJ NEW/EST HI 80: CPT | Performed by: INTERNAL MEDICINE

## 2021-07-31 PROCEDURE — 85027 COMPLETE CBC AUTOMATED: CPT

## 2021-07-31 PROCEDURE — 82550 ASSAY OF CK (CPK): CPT

## 2021-07-31 PROCEDURE — 2580000003 HC RX 258: Performed by: STUDENT IN AN ORGANIZED HEALTH CARE EDUCATION/TRAINING PROGRAM

## 2021-07-31 PROCEDURE — 86803 HEPATITIS C AB TEST: CPT

## 2021-07-31 PROCEDURE — 84156 ASSAY OF PROTEIN URINE: CPT

## 2021-07-31 PROCEDURE — 84100 ASSAY OF PHOSPHORUS: CPT

## 2021-07-31 PROCEDURE — 6370000000 HC RX 637 (ALT 250 FOR IP): Performed by: STUDENT IN AN ORGANIZED HEALTH CARE EDUCATION/TRAINING PROGRAM

## 2021-07-31 PROCEDURE — 83874 ASSAY OF MYOGLOBIN: CPT

## 2021-07-31 PROCEDURE — 2500000003 HC RX 250 WO HCPCS: Performed by: STUDENT IN AN ORGANIZED HEALTH CARE EDUCATION/TRAINING PROGRAM

## 2021-07-31 PROCEDURE — 87086 URINE CULTURE/COLONY COUNT: CPT

## 2021-07-31 PROCEDURE — 82077 ASSAY SPEC XCP UR&BREATH IA: CPT

## 2021-07-31 PROCEDURE — 99254 IP/OBS CNSLTJ NEW/EST MOD 60: CPT | Performed by: INTERNAL MEDICINE

## 2021-07-31 PROCEDURE — 87340 HEPATITIS B SURFACE AG IA: CPT

## 2021-07-31 PROCEDURE — 87522 HEPATITIS C REVRS TRNSCRPJ: CPT

## 2021-07-31 PROCEDURE — 82948 REAGENT STRIP/BLOOD GLUCOSE: CPT

## 2021-07-31 PROCEDURE — 36415 COLL VENOUS BLD VENIPUNCTURE: CPT

## 2021-07-31 PROCEDURE — 82570 ASSAY OF URINE CREATININE: CPT

## 2021-07-31 PROCEDURE — 83735 ASSAY OF MAGNESIUM: CPT

## 2021-07-31 PROCEDURE — 82248 BILIRUBIN DIRECT: CPT

## 2021-07-31 PROCEDURE — 6360000002 HC RX W HCPCS

## 2021-07-31 PROCEDURE — 80053 COMPREHEN METABOLIC PANEL: CPT

## 2021-07-31 PROCEDURE — 84145 PROCALCITONIN (PCT): CPT

## 2021-07-31 PROCEDURE — 6360000002 HC RX W HCPCS: Performed by: INTERNAL MEDICINE

## 2021-07-31 PROCEDURE — 86705 HEP B CORE ANTIBODY IGM: CPT

## 2021-07-31 PROCEDURE — 84443 ASSAY THYROID STIM HORMONE: CPT

## 2021-07-31 PROCEDURE — 93005 ELECTROCARDIOGRAM TRACING: CPT | Performed by: FAMILY MEDICINE

## 2021-07-31 RX ORDER — PHENOBARBITAL SODIUM 65 MG/ML
260 INJECTION INTRAMUSCULAR
Status: DISCONTINUED | OUTPATIENT
Start: 2021-07-31 | End: 2021-08-01

## 2021-07-31 RX ORDER — ATROPINE SULFATE 0.1 MG/ML
0.5 INJECTION INTRAVENOUS ONCE
Status: COMPLETED | OUTPATIENT
Start: 2021-07-31 | End: 2021-07-31

## 2021-07-31 RX ORDER — LORAZEPAM 2 MG/ML
2 INJECTION INTRAMUSCULAR ONCE
Status: COMPLETED | OUTPATIENT
Start: 2021-07-31 | End: 2021-07-31

## 2021-07-31 RX ORDER — VENLAFAXINE 37.5 MG/1
150 TABLET ORAL 2 TIMES DAILY
Status: DISCONTINUED | OUTPATIENT
Start: 2021-07-31 | End: 2021-07-31

## 2021-07-31 RX ORDER — 0.9 % SODIUM CHLORIDE 0.9 %
1000 INTRAVENOUS SOLUTION INTRAVENOUS ONCE
Status: COMPLETED | OUTPATIENT
Start: 2021-07-31 | End: 2021-07-31

## 2021-07-31 RX ORDER — THIAMINE HYDROCHLORIDE 100 MG/ML
100 INJECTION, SOLUTION INTRAMUSCULAR; INTRAVENOUS ONCE
Status: DISCONTINUED | OUTPATIENT
Start: 2021-07-31 | End: 2021-07-31 | Stop reason: SDUPTHER

## 2021-07-31 RX ORDER — MIDAZOLAM HYDROCHLORIDE 1 MG/ML
INJECTION INTRAMUSCULAR; INTRAVENOUS
Status: DISCONTINUED
Start: 2021-07-31 | End: 2021-07-31 | Stop reason: WASHOUT

## 2021-07-31 RX ORDER — NOREPINEPHRINE BIT/0.9 % NACL 16MG/250ML
INFUSION BOTTLE (ML) INTRAVENOUS
Status: COMPLETED
Start: 2021-07-31 | End: 2021-07-31

## 2021-07-31 RX ORDER — DOPAMINE HYDROCHLORIDE 160 MG/100ML
INJECTION, SOLUTION INTRAVENOUS
Status: COMPLETED
Start: 2021-07-31 | End: 2021-07-31

## 2021-07-31 RX ORDER — MIRTAZAPINE 15 MG/1
7.5 TABLET, FILM COATED ORAL NIGHTLY
Status: DISCONTINUED | OUTPATIENT
Start: 2021-07-31 | End: 2021-08-02 | Stop reason: HOSPADM

## 2021-07-31 RX ORDER — LORAZEPAM 2 MG/ML
INJECTION INTRAMUSCULAR
Status: COMPLETED
Start: 2021-07-31 | End: 2021-07-31

## 2021-07-31 RX ORDER — SODIUM CHLORIDE 0.9 % (FLUSH) 0.9 %
10 SYRINGE (ML) INJECTION PRN
Status: DISCONTINUED | OUTPATIENT
Start: 2021-07-30 | End: 2021-08-01 | Stop reason: SDUPTHER

## 2021-07-31 RX ORDER — VENLAFAXINE 37.5 MG/1
37.5 TABLET ORAL 2 TIMES DAILY
Status: DISCONTINUED | OUTPATIENT
Start: 2021-07-31 | End: 2021-08-02 | Stop reason: HOSPADM

## 2021-07-31 RX ORDER — ATROPINE SULFATE 0.1 MG/ML
1 INJECTION INTRAVENOUS ONCE
Status: COMPLETED | OUTPATIENT
Start: 2021-07-31 | End: 2021-07-31

## 2021-07-31 RX ORDER — MIRTAZAPINE 45 MG/1
45 TABLET, FILM COATED ORAL NIGHTLY
Status: DISCONTINUED | OUTPATIENT
Start: 2021-07-31 | End: 2021-07-31

## 2021-07-31 RX ORDER — PHENOBARBITAL SODIUM 65 MG/ML
130 INJECTION INTRAMUSCULAR
Status: DISCONTINUED | OUTPATIENT
Start: 2021-07-31 | End: 2021-08-01

## 2021-07-31 RX ORDER — LORAZEPAM 2 MG/ML
4 INJECTION INTRAMUSCULAR ONCE
Status: COMPLETED | OUTPATIENT
Start: 2021-07-31 | End: 2021-07-31

## 2021-07-31 RX ORDER — MIDAZOLAM HYDROCHLORIDE 1 MG/ML
2 INJECTION INTRAMUSCULAR; INTRAVENOUS ONCE
Status: COMPLETED | OUTPATIENT
Start: 2021-07-31 | End: 2021-07-31

## 2021-07-31 RX ORDER — PREGABALIN 75 MG/1
300 CAPSULE ORAL 2 TIMES DAILY
Status: DISCONTINUED | OUTPATIENT
Start: 2021-07-31 | End: 2021-07-31

## 2021-07-31 RX ORDER — DOPAMINE HYDROCHLORIDE 160 MG/100ML
2-20 INJECTION, SOLUTION INTRAVENOUS CONTINUOUS
Status: DISCONTINUED | OUTPATIENT
Start: 2021-07-31 | End: 2021-08-01

## 2021-07-31 RX ORDER — SODIUM CHLORIDE 0.9 % (FLUSH) 0.9 %
5-40 SYRINGE (ML) INJECTION EVERY 12 HOURS SCHEDULED
Status: DISCONTINUED | OUTPATIENT
Start: 2021-07-31 | End: 2021-08-01 | Stop reason: SDUPTHER

## 2021-07-31 RX ORDER — MIDAZOLAM HYDROCHLORIDE 1 MG/ML
INJECTION INTRAMUSCULAR; INTRAVENOUS
Status: DISCONTINUED
Start: 2021-07-31 | End: 2021-07-31

## 2021-07-31 RX ORDER — LISINOPRIL 20 MG/1
20 TABLET ORAL DAILY
Status: DISCONTINUED | OUTPATIENT
Start: 2021-07-31 | End: 2021-07-31

## 2021-07-31 RX ORDER — SODIUM CHLORIDE 9 MG/ML
25 INJECTION, SOLUTION INTRAVENOUS PRN
Status: DISCONTINUED | OUTPATIENT
Start: 2021-07-30 | End: 2021-08-01 | Stop reason: SDUPTHER

## 2021-07-31 RX ORDER — NOREPINEPHRINE BIT/0.9 % NACL 16MG/250ML
2-100 INFUSION BOTTLE (ML) INTRAVENOUS CONTINUOUS
Status: DISCONTINUED | OUTPATIENT
Start: 2021-07-31 | End: 2021-07-31

## 2021-07-31 RX ADMIN — SODIUM CHLORIDE: 9 INJECTION, SOLUTION INTRAVENOUS at 04:01

## 2021-07-31 RX ADMIN — ATROPINE SULFATE 0.5 MG: 0.1 INJECTION, SOLUTION ENDOTRACHEAL; INTRAMUSCULAR; INTRAVENOUS; SUBCUTANEOUS at 03:18

## 2021-07-31 RX ADMIN — DEXMEDETOMIDINE 0.2 MCG/KG/HR: 100 INJECTION, SOLUTION, CONCENTRATE INTRAVENOUS at 02:41

## 2021-07-31 RX ADMIN — VENLAFAXINE HYDROCHLORIDE 37.5 MG: 37.5 TABLET ORAL at 20:50

## 2021-07-31 RX ADMIN — SODIUM CHLORIDE: 9 INJECTION, SOLUTION INTRAVENOUS at 20:11

## 2021-07-31 RX ADMIN — PHENOBARBITAL SODIUM 130 MG: 65 INJECTION INTRAMUSCULAR at 09:01

## 2021-07-31 RX ADMIN — ATROPINE SULFATE 1 MG: 0.1 INJECTION, SOLUTION ENDOTRACHEAL; INTRAMUSCULAR; INTRAVENOUS; SUBCUTANEOUS at 06:01

## 2021-07-31 RX ADMIN — Medication 2 MCG/MIN: at 03:00

## 2021-07-31 RX ADMIN — SODIUM BICARBONATE: 84 INJECTION, SOLUTION INTRAVENOUS at 16:49

## 2021-07-31 RX ADMIN — LORAZEPAM 2 MG: 2 INJECTION INTRAMUSCULAR; INTRAVENOUS at 17:11

## 2021-07-31 RX ADMIN — LORAZEPAM 2 MG: 2 INJECTION INTRAMUSCULAR; INTRAVENOUS at 16:02

## 2021-07-31 RX ADMIN — PHENOBARBITAL SODIUM 260 MG: 65 INJECTION INTRAMUSCULAR at 20:51

## 2021-07-31 RX ADMIN — CEFTRIAXONE SODIUM 1000 MG: 1 INJECTION, POWDER, FOR SOLUTION INTRAMUSCULAR; INTRAVENOUS at 05:47

## 2021-07-31 RX ADMIN — PHENOBARBITAL SODIUM 260 MG: 65 INJECTION INTRAMUSCULAR at 06:30

## 2021-07-31 RX ADMIN — LORAZEPAM 4 MG: 2 INJECTION INTRAMUSCULAR; INTRAVENOUS at 01:10

## 2021-07-31 RX ADMIN — LORAZEPAM 2 MG: 2 INJECTION INTRAMUSCULAR at 16:02

## 2021-07-31 RX ADMIN — PHENOBARBITAL SODIUM 260 MG: 65 INJECTION INTRAMUSCULAR at 11:06

## 2021-07-31 RX ADMIN — MIDAZOLAM 2 MG: 1 INJECTION INTRAMUSCULAR; INTRAVENOUS at 02:44

## 2021-07-31 RX ADMIN — SODIUM CHLORIDE 1000 ML: 9 INJECTION, SOLUTION INTRAVENOUS at 02:24

## 2021-07-31 RX ADMIN — THIAMINE HYDROCHLORIDE 100 MG: 100 INJECTION, SOLUTION INTRAMUSCULAR; INTRAVENOUS at 04:41

## 2021-07-31 RX ADMIN — SODIUM CHLORIDE 1000 ML: 9 INJECTION, SOLUTION INTRAVENOUS at 00:15

## 2021-07-31 RX ADMIN — DOPAMINE HYDROCHLORIDE IN DEXTROSE 5 MCG/KG/MIN: 1.6 INJECTION, SOLUTION INTRAVENOUS at 06:00

## 2021-07-31 RX ADMIN — SODIUM CHLORIDE: 9 INJECTION, SOLUTION INTRAVENOUS at 09:58

## 2021-07-31 RX ADMIN — SODIUM BICARBONATE: 84 INJECTION, SOLUTION INTRAVENOUS at 05:50

## 2021-07-31 RX ADMIN — DOPAMINE HYDROCHLORIDE 5 MCG/KG/MIN: 160 INJECTION, SOLUTION INTRAVENOUS at 06:00

## 2021-07-31 RX ADMIN — DEXMEDETOMIDINE HYDROCHLORIDE 91 MCG: 100 INJECTION, SOLUTION INTRAVENOUS at 02:26

## 2021-07-31 ASSESSMENT — PAIN SCALES - GENERAL
PAINLEVEL_OUTOF10: 6
PAINLEVEL_OUTOF10: 0

## 2021-07-31 NOTE — ED NOTES
ED to inpatient nurses report    Chief Complaint   Patient presents with    Dysuria    Muscle Pain      Present to ED from home  LOC: alert and orientated to name, place, date  Vital signs   Vitals:    07/30/21 2050 07/30/21 2153 07/30/21 2239 07/30/21 2255   BP: 94/71 (!) 106/58 93/68 (!) 104/56   Pulse: 120 110 112 117   Resp: 20 (!) 33 20    Temp: 98.1 °F (36.7 °C)      TempSrc: Oral      SpO2: 99% 98% 96%    Weight: 200 lb (90.7 kg)      Height: 5' 10\" (1.778 m)         Oxygen Baseline 96%      Current needs required RA     LDAs:   Peripheral IV 07/30/21 Left Antecubital (Active)   Site Assessment Clean;Dry; Intact 07/30/21 2202   Line Status Infusing 07/30/21 2202   Dressing Status Clean;Dry; Intact 07/30/21 2202   Dressing Intervention New 07/30/21 2056     Mobility: Independent  Pending ED orders: urine    Present condition: VSS, pt agitated and figgidy in bed.  Pt states if he does not get ativan he is going to walk out of the hospital      Electronically signed by Jamie Castro RN on 7/30/2021 at 11:07 PM       Jamie Castro RN  07/30/21 [id number]

## 2021-07-31 NOTE — PROGRESS NOTES
Utilize [facility] alcohol withdrawal scale (Based on [place] Modified Alcohol Withdrawal Scale). Tabulate score based on classifications including Tremor, Sweating, Hallucination, Orientation, and Agitation. Tremor: 0  Sweatin  Hallucinations: 0  Orientation: GEORGE  Agitation: 0  Total Score: 0    Patient asleep at this time     Action perform as described below. Tremor:  No tremor is 0 points. Tremor on movement is 1 point. Tremor at rest is 2 points. Sweating: No Sweat 0 points. Moist is 1 point. Drenching sweats is 2 points. Hallucinations: No present 0 points. Dissuadable is 1 point. Not dissuadable is 2 points. Orientation: Oriented 0 points. Vague/detached 1 point. Disoriented/no contact 2 points. Agitation: Calm 0 points. Anxious 1 point. Panicky 2 points. Check scale every 2 hours. Discontinue scoring with 4 consecutive scorings of 0. Scale 0: No phenobarbital given. Re-assess every 60 minutes as needed. Scale 1-3: Phenobarbital 130 mg IV over 3 minutes. Re-assess every 60 minutes as needed. May administer every 60 minutes to a maximum dose of phenobarbital 1040 mg in 24 hours! Scale 4-8: Phenobarbital 260 mg IV over 5 minutes. Re-assess every 60 minutes as needed. May administer every 60 minutes to a maximum dose of phenobarbital 1040mg in 24 hours! Scale 9-10: Transfer to ICU (if not already in ICU). Administer 10mg/kg phenobarbital IV over 60 minutes. Maximum dose phenobarbital is 1040mg in 24 hours!

## 2021-07-31 NOTE — ED TRIAGE NOTES
PT comes to ED with c/o N/V, not producing any urine the last 36 hours and muscles cramps. PT states he had kidney failure last year and saw a nephrologist. Pt states he has not followed up with one in the last 3 months. Pt states he used to do meth and is 3 weeks clean of it. Pt states he is not on dialysis or anything for kidney failure. Pt states he gets dizzy and SOB. As well.

## 2021-07-31 NOTE — CONSULTS
Patient - Hosea Vera   MRN -  [id number]   Annabelle # - [de-identified]   - 1985      Date of Admission -  2021  8:47 PM  Primary Care Physician - PAPA Moore CNP  Date of evaluation -  2021  [facility]   HD - 1  Problem List      Patient Active Problem List   Diagnosis    Drug overdose, intentional ([address]    Methamphetamine abuse ([address]    Acute kidney injury ([address]    Metabolic acidosis, normal anion gap (NAG)    Drug overdose, intentional, initial encounter ([address]    Leukocytosis    Normocytic anemia    Anxiety    History of kidney stones    Acute and chronic respiratory failure with hypoxia ([address]    COVID-19    Drug overdose, accidental or unintentional, initial encounter    Drug overdose, multiple drugs, accidental or unintentional, initial encounter    Polysubstance abuse ([address]    Acute encephalopathy    Fever    Bilateral leg edema    Severe fentanyl use disorder ([address]    Elevated liver enzymes    Altered mental status    Calculus of ureterovesical junction (UVJ)    Non-traumatic rhabdomyolysis    Depression    Acute renal failure (ARF) ([address]     Assessment and plan   Acute metabolic encephalopathy: Secondary to drugs/withdrawal/acute renal failure. On Precedex and as needed phenobarbital and Ativan. Chest x-ray is negative. Patient does not have focal deficit. Acute renal failure: Secondary to rhabdomyolysis. Continue IV fluid and bicarb drip. Monitor CPK. Improving. Hypotension: Secondary to dehydration versus drugs versus sepsis. Chest x-ray negative. Patient on ceftriaxone for empiric treatment. Required low-dose pressors that was weaned off, most likely the hypotension was secondary to medications. .    High anion gap metabolic acidosis: Secondary to acute renal failure. Rhabdomyolysis:  Secondary to drug abuse. Elevated liver enzymes: Hep C serology was sent.     Bradycardia: Was on dopamine that was weaned off.    Polysubstance abuse  Urine drug screen is positive for cocaine and amphetamine. Patient has history of such and presented with rhabdomyolysis after abusing drugs. History of positive hepatitis C antibody:   Hepatitis C antibody is positive. Hepatitis B:   With elevated liver enzymes. Major depressive disorder. Chief Complaint/Reason for Admission   Acute renal failure    REY Desai is a 39 y.o. male admitted for drug overdose  The patient does have history of substance abuse including cocaine, opioids on Suboxone, he has history of hepatitis C, depression and anxiety in addition to hypertension who presented to ER in [facility] because of muscle pain and agitation. The patient complained of muscle pain that was diffuse, patient had meth and fentanyl. And he stated that he took 3 tablets of Lyrica 300 mg 3 tablets of Vyvanse 40 mg. The patient did not have any seizure activity or loss of consciousness. The patient was noted to have acute renal failure with creatinine of 9.7 in addition to very high CPK. Patient was admitted to the floor then closed transferred to the intensive care unit. Patient had bradycardia that required dopamine and then was weaned off. Luís Murphy He is confused requiring Precedex, on room air. No fever. PMHx         Diagnosis Date    Anxiety     Depression     Kidney stone     Liver disease     Hep C    Opiate abuse, continuous ([facility]       History reviewed. No pertinent surgical history.   Meds       sodium chloride flush  5-40 mL Intravenous 2 times per day    mirtazapine  7.5 mg Oral Nightly    venlafaxine  37.5 mg Oral BID    cefTRIAXone (ROCEPHIN) IV  1,000 mg Intravenous Q24H     sodium chloride flush, sodium chloride, PHENobarbital **OR** PHENobarbital **OR** PHENobarbital IVPB   sodium chloride      sodium bicarbonate infusion 100 mL/hr at 07/31/21 0550    DOPamine 2.5 mcg/kg/min (07/31/21 0905)    sodium chloride 250 mL/hr at 07/31/21 2960     Medications Prior to Admission: venlafaxine (EFFEXOR) 100 MG tablet, Take 1.5 tablets by mouth 2 times daily  lisinopril (PRINIVIL;ZESTRIL) 20 MG tablet, Take 1 tablet by mouth daily  pregabalin (LYRICA) 300 MG capsule, Take 1 capsule by mouth 2 times daily for 30 days. mirtazapine (REMERON) 45 MG tablet, Take 1 tablet by mouth nightly  Lisdexamfetamine Dimesylate (VYVANSE) 40 MG CAPS, Take 40 mg by mouth daily for 30 days. Lisdexamfetamine Dimesylate (VYVANSE) 40 MG CAPS, Take 40 mg by mouth daily for 30 days. [START ON 8/28/2021] Lisdexamfetamine Dimesylate (VYVANSE) 40 MG CAPS, Take 40 mg by mouth daily for 30 days. testosterone (ANDROGEL) 25 MG/2.5GM (1%) GEL 1 % gel, Apply 2.5 g topically daily for 30 days. ondansetron (ZOFRAN ODT) 4 MG disintegrating tablet, Take 1 tablet by mouth every 8 hours as needed for Nausea  Aspirin-Acetaminophen-Caffeine (EXCEDRIN EXTRA STRENGTH PO), Take by mouth as needed  naloxone 4 MG/0.1ML LIQD nasal spray, 1 spray by Nasal route as needed for Opioid Reversal  Allergies    Patient has no known allergies. Social History    Tobacco  Social History     Tobacco Use   Smoking Status Current Every Day Smoker    Packs/day: 0.50    Types: Cigarettes   Smokeless Tobacco Never Used     Alcohol  Social History     Substance and Sexual Activity   Alcohol Use Yes    Comment: occasional     Family History    History reviewed. No pertinent family history. ROS   Patient is confused unable to give reliable history. Vitals     height is 5' 10\" (1.778 m) and weight is 200 lb (90.7 kg). His oral temperature is 98.5 °F (36.9 °C). His blood pressure is 126/110 (abnormal) and his pulse is 75. His respiration is 18 and oxygen saturation is 100%.         I/O        Intake/Output Summary (Last 24 hours) at 7/31/2021 1004  Last data filed at 7/31/2021 0647  Gross per 24 hour   Intake 1631 ml   Output 800 ml   Net 831 ml     [unfilled]  @[email or web address]@  Exam    HEENT: Normocephalic / atraumatic. PERRL. EOM intact. Conjunctivae appear normal. Dry mucous membranes. Poor dentition. Neck: Supple. No JVD. Respiratory: Unlabored on RA. CTAB. No wheezes / rales / rhonchi. Cardiovascular: Tachycardic rate. Regular rhythm. Normal S1/S2. No murmurs / rubs / gallops. Abdomen: Soft / non-tender / non-distended. BS present. Musculoskeletal: No cyanosis or edema. Skin: Warm / dry. Poor turgor. No pallor / diaphoresis. Scattered excoriations noted throughout face and body. \"Shi\" tattoo noted to R neck.   Lab Data  - Old records and notes have been reviewed in [facility]   CBC     Lab Results   Component Value Date    WBC 17.6 07/31/2021    RBC 4.42 07/31/2021    HGB 11.2 07/31/2021    HCT 35.5 07/31/2021     07/31/2021    MCV 80.3 07/31/2021    MCH 25.3 07/31/2021    MCHC 31.5 07/31/2021    NRBC 0 07/31/2021    SEGSPCT 67.9 07/31/2021    MONOPCT 8.6 07/31/2021    MONOSABS 1.5 07/31/2021    LYMPHSABS 3.6 07/31/2021    EOSABS 0.4 07/31/2021    BASOSABS 0.1 07/31/2021     BMP   Lab Results   Component Value Date     07/31/2021    K 4.0 07/31/2021    CL 96 07/31/2021    CO2 18 07/31/2021    BUN 51 07/31/2021    CREATININE 9.4 07/31/2021    GLUCOSE 104 07/31/2021    CALCIUM 8.6 07/31/2021    MG 2.4 07/31/2021     LFTS  Lab Results   Component Value Date    ALKPHOS 80 07/31/2021     07/31/2021     07/31/2021    PROT 6.8 07/31/2021    BILITOT 0.3 07/31/2021    BILIDIR <0.2 07/31/2021    LABALBU 3.9 07/31/2021     ABG   Lab Results   Component Value Date    PH 7.30 08/25/2019    PCO2 40 08/25/2019    PO2 58 08/25/2019    HCO3 20 08/25/2019    O2SAT 87 08/25/2019     No results found for: IFIO2, MODE, SETTIDVOL, SETPEEP  INR  Lab Results   Component Value Date    INR 1.05 10/14/2020    INR 1.08 10/12/2020    INR 1.20 (H) 08/28/2020     PTT   Lab Results   Component Value Date    APTT 30.8 10/14/2020     EKG    Radiology    CXR  Negative    (See actual reports for details)    Electronically signed by     Jose Antonio Rosenbaum MD on 7/31/2021 at 10:04 AM

## 2021-07-31 NOTE — PROGRESS NOTES
Pharmacy Renal Adjustment    Krystal Hdez is a 39 y.o. male. Pharmacy renally adjust medications per P&T approved policy. Recent Labs     07/30/21 2055 07/31/21  0337   BUN 53* 51*   CREATININE 9.7* 9.4*     Estimated Creatinine Clearance: 12 mL/min (A) (based on SCr of 9.4 mg/dL [facility])). Height:   Ht Readings from Last 1 Encounters:   07/30/21 5' 10\" (1.778 m)     Weight:  Wt Readings from Last 1 Encounters:   07/30/21 200 lb (90.7 kg)     Baseline SCr: 0.8-1    Plan: No medications need to be renally adjusted at this time. Will continue to monitor.     Candie Arrington PharmD, BCPS  7/31/2021  6:36 AM

## 2021-07-31 NOTE — ED NOTES
Bed: 004A  Expected date: 7/30/21  Expected time: 8:44 PM  Means of arrival: [facility]  Comments:     Lawrence Tran RN  07/30/21 2047

## 2021-07-31 NOTE — PROGRESS NOTES
Utilize [facility] alcohol withdrawal scale (Based on [place] Modified Alcohol Withdrawal Scale). Tabulate score based on classifications including Tremor, Sweating, Hallucination, Orientation, and Agitation. Tremor: 2  Sweatin  Hallucinations: 2  Orientation: 2  Agitation: 2  Total Score: 8  Action perform as described below     Tremor:  No tremor is 0 points. Tremor on movement is 1 point. Tremor at rest is 2 points. Sweating: No Sweat 0 points. Moist is 1 point. Drenching sweats is 2 points. Hallucinations: No present 0 points. Dissuadable is 1 point. Not dissuadable is 2 points. Orientation: Oriented 0 points. Vague/detached 1 point. Disoriented/no contact 2 points. Agitation: Calm 0 points. Anxious 1 point. Panicky 2 points. Check scale every 2 hours. Discontinue scoring with 4 consecutive scorings of 0. Scale 0: No phenobarbital given. Re-assess every 60 minutes as needed. Scale 1-3: Phenobarbital 130 mg IV over 3 minutes. Re-assess every 60 minutes as needed. May administer every 60 minutes to a maximum dose of phenobarbital 1040 mg in 24 hours! Scale 4-8: Phenobarbital 260 mg IV over 5 minutes. Re-assess every 60 minutes as needed. May administer every 60 minutes to a maximum dose of phenobarbital 1040mg in 24 hours! Scale 9-10: Transfer to ICU (if not already in ICU). Administer 10mg/kg phenobarbital IV over 60 minutes. Maximum dose phenobarbital is 1040mg in 24 hours! Updated Dr Warren Glassing this on the patient's increased agitation.  Ordered ativan 2mg IVP

## 2021-07-31 NOTE — H&P
History & Physical       Patient: Randall Rose  YOB: 1985    MRN: [id number]     Acct: [de-identified]    PCP: PAPA Horta CNP    Date of Admission: 7/30/2021    Date of Service: Patient seen / examined on 07/30/21 and admitted to Inpatient with expected LOS greater than two midnights due to medical therapy. Patient transferred to ICU for precedex shortly following administration. Please see Plan of Care note by ICU provider for additional management following transfer. ASSESSMENT / PLAN:    Rhabdomyolysis with associated renal and liver injury, secondary to cocaine abuse  Excruciating leg and muscle pain. CK 10K. Urine myoglobin positive. K 4.6. No clear indication for emergent HD. Currently receiving 4th L IVF. -Aggressive IVF resuscitation   -Bicarb gtt initiated  -Monitor CK, daily BMP/Mg/Phos/HFP  -Avoid nephrotoxins, renally dose medications  -Martin catheter placed for strict I/O  -Nephrology following    SATYA  Secondary to above, with compounded injury from outpatient ACE-I, lyrica and vyvanse use. -See above    High-AG metabolic acidosis  Secondary to uremia. -Management as above  -Trend until closed. Hepatocellular transaminitis     DDx: rhabdomyolysis, chronic hepatitis C or drug-induced. Tylenol level negative.  -As above    Polysubstance abuse with acute   Methamphetamines, cocaine, fentanyl (reported); prescribed suboxone  Normotensive, afebrile, tachycardic and tachypneic on admission, very agitated   -Initially given ativan with minimal response. Subsequently transferred to ICU for initiation of precedex (see plan of care note by ICU provider for ongoing management)  -[facility] when mentation improves    Chronic tobacco abuse, ? alcohol use  Patient reports red wine intake -- amount unspecified. Cessation counseling. Offer NRT.     Chronic hepatitis C (untreated)  Ab positive in 2019.   - HCV viral load, hepatitis B SAg/Ab pending    Asymptomatic bacteruria in male  Denies dysuria, endorses back pain.  -Started on rocephin, de-escalate as indicated   -Urine cultures pending     Primary HTN   BP low-normal on admission.  -Held ACE-I due to SATYA, plan to continue upon resolution of SATYA   -Monitor BP    Fibromyalgia   -Held lyrica due to SATYA, plan to continue upon resolution of SATYA     Major depressive disorder, anxiety, ? ADHD  Appears anxious, reports taking more doses than prescribed of his medications   -Vyvanse held  -Renally adjusted and continued remeron to 7.5mg (takes 45mg)   -Renally adjusted and continued venlafaxine to 37.5mg (takes 100mg)     Elevated procalcitonin, improving  Currently receiving treatment for UTI. Afebrile. Low suspicion for sepsis despite tachycardia and tachypnea on arrival (active withdrawal). Elevated procal seems more likely related to severe SATYA and decreased renal clearance. Chief Complaint:  Drug withdrawal and muscle pain     History of Present Illness:  39 y.o. male PMHx polysubstance abuse (including cocaine, opioids; on suboxone), tobacco abuse, untreated hepatitis C, depression, anxiety, ? ADHD and HTN -- presents to [facility] with a chief complaint of muscle pain and agitation. History obtained from the patient. Patient presented to ED for excruciating muscle pain rated at a 10/10. Patient is described as constant throbbing and stabbing that is diffuse but worse in his legs. States nothing has helped or made the pain worse. Pt reportedly did meth yesterday and then after feeling pain in his muscles snorted some fentanyl. He also reports taking 6o589ze lyrica, half a suboxone and 3x40mg vyvanse today. He reports 3 episodes of non-bilious, non-bloody vomiting and unknown episodes of dark purple stool. Also endorses back pain for which he takes fentanyl often.  Patient also reports falling many times in the past two days and not being able to get up, which at one point led to the patient pooping on his rug because he was unable to make it to the toilet. Pt denies history of epilepsy but reports drug-induced seizures in the past, the last one being over 4 years ago. Pt denies any alcohol use in the past 72 hours. No additional concerns or questions were addressed. ED course: VS: BP 94/71, , RR 20, T 98.1, 99% on RA. Workup remarkable for: Cr 9.7, GFR 6, , Anion gap 22[place] [address], Alk phos 131, , , WBC 20.3, Hgb 13.1, MCV 79.1. Received: 1L NS bolus and ativan. Hospitalist service contacted for admission. Please see A&P for additional details. Past Medical History:    Past Medical History:   Diagnosis Date    Anxiety     Depression     Kidney stone     Liver disease     Hep C    Opiate abuse, continuous (HCC)    Chronic tobacco abuse    Past Surgical History:    History reviewed. No pertinent surgical history. Medications Prior to Admission:   No current facility-administered medications on file prior to encounter. Current Outpatient Medications on File Prior to Encounter   Medication Sig Dispense Refill    venlafaxine (EFFEXOR) 100 MG tablet Take 1.5 tablets by mouth 2 times daily 45 tablet 3    lisinopril (PRINIVIL;ZESTRIL) 20 MG tablet Take 1 tablet by mouth daily 30 tablet 3    pregabalin (LYRICA) 300 MG capsule Take 1 capsule by mouth 2 times daily for 30 days. 60 capsule 3    mirtazapine (REMERON) 45 MG tablet Take 1 tablet by mouth nightly 30 tablet 3    Lisdexamfetamine Dimesylate (VYVANSE) 40 MG CAPS Take 40 mg by mouth daily for 30 days. 30 capsule 0    Lisdexamfetamine Dimesylate (VYVANSE) 40 MG CAPS Take 40 mg by mouth daily for 30 days. 30 capsule 0    [START ON 8/28/2021] Lisdexamfetamine Dimesylate (VYVANSE) 40 MG CAPS Take 40 mg by mouth daily for 30 days. 30 capsule 0    testosterone (ANDROGEL) 25 MG/2.5GM (1%) GEL 1 % gel Apply 2.5 g topically daily for 30 days.  30 Package 0    ondansetron (ZOFRAN ODT) 4 MG disintegrating tablet Take 1 tablet by mouth every 8 hours as needed for Nausea 20 tablet 0    Aspirin-Acetaminophen-Caffeine (EXCEDRIN EXTRA STRENGTH PO) Take by mouth as needed      naloxone 4 MG/0.1ML LIQD nasal spray 1 spray by Nasal route as needed for Opioid Reversal 1 each 5     Allergies:   Patient has no known allergies. Social History:   Social History     Socioeconomic History    Marital status: Single     Spouse name: Not on file    Number of children: Not on file    Years of education: Not on file    Highest education level: Not on file   Occupational History    Not on file   Tobacco Use    Smoking status: Current Every Day Smoker     Packs/day: 0.50     Types: Cigarettes    Smokeless tobacco: Never Used   Vaping Use    Vaping Use: Never assessed   Substance and Sexual Activity    Alcohol use: Yes     Comment: occasional    Drug use: Yes     Comment: yesterday    Sexual activity: Not Currently     Family History:    History reviewed. No pertinent family history. REVIEW OF SYSTEMS:  A 14-point ROS was obtained and negative, with the exception of pertinent positives as stated in the HPI. PHYSICAL EXAM:  Vitals:    07/30/21 2050 07/30/21 2153 07/30/21 2239   BP: 94/71 (!) 106/58 93/68   Pulse: 120 110 112   Resp: 20 (!) 33 20   Temp: 98.1 °F (36.7 °C)     TempSrc: Oral     SpO2: 99% 98% 96%   Weight: 200 lb (90.7 kg)     Height: 5' 10\" (1.778 m)       General appearance: Alert / anxious-appearing male. Fairly cooperative. Agitated and constantly moving around in bed. HEENT: Normocephalic / atraumatic. PERRL. EOM intact. Conjunctivae appear normal. Dry mucous membranes. Poor dentition. Neck: Supple. No JVD. Respiratory: Unlabored on RA. CTAB. No wheezes / rales / rhonchi. Cardiovascular: Tachycardic rate. Regular rhythm. Normal S1/S2. No murmurs / rubs / gallops. Abdomen: Soft / non-tender / non-distended. BS present. Musculoskeletal: No cyanosis or edema. Skin: Warm / dry. Poor turgor.  No pallor / diaphoresis. Scattered excoriations noted throughout face and body. \"Shi\" tattoo noted to R neck. Neurologic: A/O x 3 (in the ED). Answers questions appropriately. No obvious focal neurologic deficits. Psychiatric: Thought content / judgment / insight appear appropriate in ED, however started hallucinating and became altered on the floor .     Labs:   Results for orders placed or performed during the hospital encounter of 07/30/21   COVID-19, Rapid    Specimen: Nasopharyngeal Swab   Result Value Ref Range    SARS-CoV-2, NAAT NOT DETECTED NOT DETECTED   CBC Auto Differential   Result Value Ref Range    WBC 20.3 (H) 4.8 - 10.8 thou/mm3    RBC 5.27 4.70 - 6.10 mill/mm3    Hemoglobin 13.1 (L) 14.0 - 18.0 gm/dl    Hematocrit 41.7 (L) 42.0 - 52.0 %    MCV 79.1 (L) 80.0 - 94.0 fL    MCH 24.9 (L) 26.0 - 33.0 pg    MCHC 31.4 (L) 32.2 - 35.5 gm/dl    RDW-CV 17.6 (H) 11.5 - 14.5 %    RDW-SD 47.9 (H) 35.0 - 45.0 fL    Platelets 994 (H) 161 - 400 thou/mm3    MPV 9.7 9.4 - 12.4 fL    Seg Neutrophils 72.1 %    Lymphocytes 16.8 %    Monocytes 9.1 %    Eosinophils 1.3 %    Basophils 0.3 %    Immature Granulocytes 0.4 %    Segs Absolute 14.6 (H) 1 - 7 thou/mm3    Lymphocytes Absolute 3.4 1.0 - 4.8 thou/mm3    Monocytes Absolute 1.8 (H) 0.4 - 1.3 thou/mm3    Eosinophils Absolute 0.3 0.0 - 0.4 thou/mm3    Basophils Absolute 0.1 0.0 - 0.1 thou/mm3    Immature Grans (Abs) 0.08 (H) 0.00 - 0.07 thou/mm3    nRBC 0 /100 wbc   Comprehensive Metabolic Panel w/ Reflex to MG   Result Value Ref Range    Glucose 123 (H) 70 - 108 mg/dL    CREATININE 9.7 (HH) 0.4 - 1.2 mg/dL    BUN 53 (H) 7 - 22 mg/dL    Sodium 133 (L) 135 - 145 meq/L    Potassium reflex Magnesium 4.2 3.5 - 5.2 meq/L    Chloride 90 (L) 98 - 111 meq/L    CO2 21 (L) 23 - 33 meq/L    Calcium 9.5 8.5 - 10.5 mg/dL     (H) 5 - 40 U/L    Alkaline Phosphatase 131 (H) 38 - 126 U/L    Total Protein 8.6 (H) 6.1 - 8.0 g/dL    Albumin 4.7 3.5 - 5.1 g/dL    Total Bilirubin 0.3 0.3 - 1.2 mg/dL     (H) 11 - 66 U/L   CK   Result Value Ref Range    Total CK 10,743 (H) 55 - 170 U/L   Acetaminophen level   Result Value Ref Range    Acetaminophen Level < 5.0 0.0 - 30.7 ug/mL   Salicylate Level   Result Value Ref Range    Salicylate, Serum < 0.3 (L) 2.0 - 10.0 mg/dL   Anion Gap   Result Value Ref Range    Anion Gap 22.0 (H) 8.0 - 16.0 meq/L   Glomerular Filtration Rate, Estimated   Result Value Ref Range    Est, Glom Filt Rate 6 (A) ml/min/1.73m2   Osmolality   Result Value Ref Range    Osmolality Calc 282.1 275.0 - 300.0 mOsmol/kg   EKG 12 Lead   Result Value Ref Range    Ventricular Rate 121 BPM    Atrial Rate 121 BPM    P-R Interval 156 ms    QRS Duration 86 ms    Q-T Interval 300 ms    QTc Calculation (Bazett) 426 ms    P Axis 82 degrees    R Axis 42 degrees    T Axis 66 degrees     EKG / Radiology:     EKG:  Reviewed by me -- sinus tachycardia     FEN/GI/DVT:  IVF: NS @ 250  Electrolytes: Monitor and replace per protocols  Diet: General  GI PPX: No  DVT Prophylaxis: No prophylaxis/Already on anticoagulation: denied    CODE STATUS:  Full    Active Hospital Problems    Diagnosis Date Noted    [facility] [N17.9] 07/30/2021     Thank you PAPA Moore - NAI for the opportunity to be involved in this patient's care.     Electronically signed by Donaldo Ortiz DO on 7/30/2021 at 10:49 PM

## 2021-07-31 NOTE — ED NOTES
Pt bladder scanned at this time. 72 mL found at this time. Pt denies having any pressure in his bladder.  Pt states his pain is mostly in his muscles neck and kidney area     Jodi Sim RN  07/30/21 7458

## 2021-07-31 NOTE — PROGRESS NOTES
Patient very restless, agitated and removed his own restraints. Getting aggressive at times. Attempted to call family per patient's request but no working number and patient was unable to give a phone number, did drink some pop without difficulty swallowing. Tele sitter placed at bedside and updated Dr Nimisha Hooks and ordered to give ativan 2mg IVP.

## 2021-07-31 NOTE — ED PROVIDER NOTES
eMERGENCY dEPARTMENT eNCOUnter      [address] [facility]    Chief Complaint   Patient presents with    Dysuria    Muscle Pain       HPI    Carrol Cough is a 39 y.o. male who presents to the emergency department by an ambulance, patient thinks he is in renal failure. According the patient he had a bout of renal failure last year when he took drugs. Patient has been feeling sick for last 2 days but does not have any urine output in last 36 hours. Patient does not have any nausea, vomiting but has been feeling dizzy and has been complaining of generalized body aches. PAST MEDICAL HISTORY    Past Medical History:   Diagnosis Date    Anxiety     Depression     Kidney stone     Liver disease     Hep C    Opiate abuse, continuous ([facility] [address]        SURGICAL HISTORY    History reviewed. No pertinent surgical history. CURRENT MEDICATIONS    Current Outpatient Rx   Medication Sig Dispense Refill    venlafaxine (EFFEXOR) 100 MG tablet Take 1.5 tablets by mouth 2 times daily 45 tablet 3    lisinopril (PRINIVIL;ZESTRIL) 20 MG tablet Take 1 tablet by mouth daily 30 tablet 3    pregabalin (LYRICA) 300 MG capsule Take 1 capsule by mouth 2 times daily for 30 days. 60 capsule 3    mirtazapine (REMERON) 45 MG tablet Take 1 tablet by mouth nightly 30 tablet 3    Lisdexamfetamine Dimesylate (VYVANSE) 40 MG CAPS Take 40 mg by mouth daily for 30 days. 30 capsule 0    Lisdexamfetamine Dimesylate (VYVANSE) 40 MG CAPS Take 40 mg by mouth daily for 30 days. 30 capsule 0    [START ON 8/28/2021] Lisdexamfetamine Dimesylate (VYVANSE) 40 MG CAPS Take 40 mg by mouth daily for 30 days. 30 capsule 0    testosterone (ANDROGEL) 25 MG/2.5GM (1%) GEL 1 % gel Apply 2.5 g topically daily for 30 days.  30 Package 0    ondansetron (ZOFRAN ODT) 4 MG disintegrating tablet Take 1 tablet by mouth every 8 hours as needed for Nausea 20 tablet 0    Aspirin-Acetaminophen-Caffeine (EXCEDRIN EXTRA STRENGTH PO) Take by mouth as needed      naloxone 4 MG/0.1ML LIQD nasal spray 1 spray by Nasal route as needed for Opioid Reversal 1 each 5       ALLERGIES    No Known Allergies    FAMILY HISTORY    History reviewed. No pertinent family history. SOCIAL HISTORY    Social History     Socioeconomic History    Marital status: Single     Spouse name: None    Number of children: None    Years of education: None    Highest education level: None   Occupational History    None   Tobacco Use    Smoking status: Current Every Day Smoker     Packs/day: 0.50     Types: Cigarettes    Smokeless tobacco: Never Used   Vaping Use    Vaping Use: Never assessed   Substance and Sexual Activity    Alcohol use: Yes     Comment: occasional    Drug use: Yes     Comment: yesterday    Sexual activity: Not Currently   Other Topics Concern    None   Social History Narrative    None     Social Determinants of Health     Financial Resource Strain: Low Risk     Difficulty of Paying Living Expenses: Not hard at all   Food Insecurity: No Food Insecurity    Worried About Running Out of Food in the Last Year: Never true    Ana of Food in the Last Year: Never true   Transportation Needs:     Lack of Transportation (Medical):      Lack of Transportation (Non-Medical):    Physical Activity:     Days of Exercise per Week:     Minutes of Exercise per Session:    Stress:     Feeling of Stress :    Social Connections:     Frequency of Communication with Friends and Family:     Frequency of Social Gatherings with Friends and Family:     Attends Temple Services:     Active Member of Clubs or Organizations:     Attends Club or Organization Meetings:     Marital Status:    Intimate Partner Violence:     Fear of Current or Ex-Partner:     Emotionally Abused:     Physically Abused:     Sexually Abused:        REVIEW OF SYSTEMS    Constitutional:  Denies fever, chills, weight loss or weakness   Eyes:  Denies photophobia or discharge   HENT:  Denies sore throat or ear pain Respiratory:  Denies cough or shortness of breath   Cardiovascular:  Denies chest pain, palpitations or swelling   GI:  Denies abdominal pain, nausea, vomiting, or diarrhea   Musculoskeletal:  Denies back pain   Skin:  Denies rash   Neurologic:  Denies headache, focal weakness or sensory changes   Endocrine:  Denies polyuria or polydypsia   Lymphatic:  Denies swollen glands   Psychiatric:  Denies depression, suicidal ideation or homicidal ideation   All systems negative except as marked. PHYSICAL EXAM    VITAL SIGNS: BP (!) 104/56   Pulse 117   Temp 98.1 °F (36.7 °C) (Oral)   Resp 20   Ht 5' 10\" (1.778 m)   Wt 200 lb (90.7 kg)   SpO2 96%   BMI 28.70 kg/m²    Constitutional:  Well developed, Well nourished, No acute distress, Non-toxic appearance. HENT:  Normocephalic, Atraumatic, Bilateral external ears normal, Oropharynx moist, No oral exudates, Nose normal. Neck- Normal range of motion, No tenderness, Supple, No stridor. Eyes:  PERRL, EOMI, Conjunctiva normal, No discharge. Respiratory:  Normal breath sounds, No respiratory distress, No wheezing, No chest tenderness. Cardiovascular:  Normal heart rate, Normal rhythm, No murmurs, No rubs, No gallops. GI:  Bowel sounds normal, Soft, No tenderness, No masses, No pulsatile masses. : External genitalia appear normal, No masses or lesions. No discharge. No CVA tenderness. Musculoskeletal:  Intact distal pulses, No edema, No tenderness, No cyanosis, No clubbing. Good range of motion in all major joints. No tenderness to palpation or major deformities noted. Back- No tenderness. Integument:  Warm, Dry, No erythema, No rash. Lymphatic:  No lymphadenopathy noted. Neurologic:  Alert & oriented x 3, Normal motor function, Normal sensory function, No focal deficits noted.    Psychiatric:  Affect normal, Judgment normal, Mood very anxious and fidgety    EKG    Sinus tachycardia possible left atrial enlargement  We will ventricular rate 121 bpm  ME interval 156 ms  QRS duration 86 ms  QTc interval 426 ms      LABS    Labs Reviewed   CBC WITH AUTO DIFFERENTIAL - Abnormal; Notable for the following components:       Result Value    WBC 20.3 (*)     Hemoglobin 13.1 (*)     Hematocrit 41.7 (*)     MCV 79.1 (*)     MCH 24.9 (*)     MCHC 31.4 (*)     RDW-CV 17.6 (*)     RDW-SD 47.9 (*)     Platelets 316 (*)     Segs Absolute 14.6 (*)     Monocytes Absolute 1.8 (*)     Immature Grans (Abs) 0.08 (*)     All other components within normal limits   COMPREHENSIVE METABOLIC PANEL W/ REFLEX TO MG FOR LOW K - Abnormal; Notable for the following components:    Glucose 123 (*)     CREATININE 9.7 (*)     BUN 53 (*)     Sodium 133 (*)     Chloride 90 (*)     CO2 21 (*)      (*)     Alkaline Phosphatase 131 (*)     Total Protein 8.6 (*)      (*)     All other components within normal limits   CK - Abnormal; Notable for the following components: Total CK 10,743 (*)     All other components within normal limits   SALICYLATE LEVEL - Abnormal; Notable for the following components:    Salicylate, Serum < 0.3 (*)     All other components within normal limits   ANION GAP - Abnormal; Notable for the following components:    Anion Gap 22.0 (*)     All other components within normal limits   GLOMERULAR FILTRATION RATE, ESTIMATED - Abnormal; Notable for the following components:    Est, Glom Filt Rate 6 (*)     All other components within normal limits   COVID-19, RAPID   ACETAMINOPHEN LEVEL   OSMOLALITY   URINE RT REFLEX TO CULTURE   MYOGLOBIN, SERUM   MYOGLOBIN, URINE   URINE DRUG SCREEN       CRITICAL CARE   Due to the immediate potential for life-threatening deterioration due to acute renal failure, rhabdomyolysis, I spent 55 minutes providing critical care. This time is excluding time spent performing procedures. CONSULTS  Dr Jeremiah Willard    [place] [id number] [facility] studies reviewed.  (See chart for details)  Patient was started on IV fluids based on history of anuria. Patient is in acute renal failure. I consulted with nephrologist on-call Dr. Marko Figueroa. Patient is going to be admitted by hospitalist for further work-up and management. Patient was given 2 L of IV fluid in the emergency department. Patient's creatinine is 9.7 with BUN of 53. Patient's CK is 90,568 and patient's anion gap is 22    FINAL IMPRESSION    1. Acute renal failure, unspecified acute renal failure type ([address]    2.  Non-traumatic rhabdomyolysis             Judy Walker MD  07/30/21 4102

## 2021-07-31 NOTE — CONSULTS
Nephrology Consult Note  Patient's Name: Sedonia Hamman  9:13 AM  7/31/2021    Nephrologist: Isabel Yanez    Reason for Consult: Acute kidney injury. Rhabdomyolysis. Metabolic acidosis. Requesting Physician:  Onel Rocha MD  PCP: [facility]    Chief Complaint: None  Assessment  1. Acute kidney injury likely due to rhabdomyolysis compounded by recent episodes of hypotension. There may also be a component of volume contraction. 2. Metabolic acidosis from acute kidney injury compounded by tissue hypoxia from recent hypotension. 3. Hyponatremia may be due to acute kidney injury and inability of the kidneys to excrete free water  4. Illicit drug abuse polysubstance  5. Hepatitis C viral disease? 6. Elevated liver enzyme may be due to hepatitis C.  7. Leukocytosis  8. Normocytic anemia  9. Yeast UTI  10. Bradycardia improved  11. Hypotension improved    Plan    1. Lab results comprehensively reviewed  2. Decrease to 0.9 saline from 250 mill per hour to 150 mill per hour  3. Continue sodium bicarbonate infusion for now  4. Hepatitis serology to include B and C respectively  5. Cryoglobulin level  6. Random urine protein creatinine ratio  7. BMP every 8 hours x 3  8. Monitor patient closely  9. Will follow      History Obtained From: Staff and electronic medical record  History of Present Ilness:    Sedonia Hamman is a 39 y.o. male with history of polysubstance abuse, hepatitis C viral infection, chronic anxiety, nephrolithiasis among other things admitted through the emergency department after the patient presented there with muscle pain and signs of drug withdrawal.  No modifying factor for the muscle pain. No chest pain or shortness of breath however. He did report some episode of nausea and vomiting respectively with no fever or chills. .  He had multiple falls at home with no apparent injury. No seizure activities however. No loss of consciousness. No headaches or blurry vision.   The muscle pain started after the patient snorted some fentanyl. He also took some Lyrica and Suboxone. In the emergency department he was noted to have acute kidney injury with serum creatinine of 9.7 mg/dL. CPK was noted to be over 10,000. Admitted for evaluation and management. Blood pressure has been low. He also had some bradycardia requiring the use of intravenous atropine. He remains agitated with  incoherent speech. Past Medical History:   Diagnosis Date    Anxiety     Depression     Kidney stone     Liver disease     Hep C    Opiate abuse, continuous ([facility] [address]        History reviewed. No pertinent surgical history. History reviewed. No pertinent family history. reports that he has been smoking cigarettes. He has been smoking about 0.50 packs per day. He has never used smokeless tobacco. He reports current alcohol use. He reports current drug use. Allergies:  Patient has no known allergies. Current Medications:    sodium chloride flush 0.9 % injection 5-40 mL, 2 times per day  sodium chloride flush 0.9 % injection 10 mL, PRN  0.9 % sodium chloride infusion, PRN  mirtazapine (REMERON) tablet 7.5 mg, Nightly  venlafaxine (EFFEXOR) tablet 37.5 mg, BID  sodium bicarbonate 100 mEq in dextrose 5 % 1,000 mL infusion, Continuous  cefTRIAXone (ROCEPHIN) 1000 mg IVPB in 50 mL D5W minibag, Q24H  DOPamine (INTROPIN) 400 mg in dextrose 5 % 250 mL infusion, Continuous  PHENobarbital (LUMINAL) injection 130 mg, Q1H PRN   Or  PHENobarbital (LUMINAL) injection 260 mg, Q1H PRN   Or  PHENobarbital (LUMINAL) 729.95 mg in sodium chloride 0.9 % 100 mL IVPB, Daily PRN  0.9 % sodium chloride infusion, Continuous        Review of Systems:   Review of Systems   Pertinent positives stated above in HPI. All other systems were reviewed and were negative.   ROS: As in HPI    Physical exam:   Physical Exam   Constitutional: Well-developed young gentleman in no distress  Vitals:   Vitals:    07/31/21 0900   BP: (!) 126/110   Pulse: 75   Resp: 18   Temp:    SpO2: 100%       Skin: Skin excoriations diffusely noted  Heent: Pupils are reactive to light. Throat is clear. Oral mucosa is moist.  Neck: no bruits or jvd noted   Cardiovascular:  S1, S2 without murmur   Respiratory: Clear with no wheezes,rhonchi or rales   Abdomen: soft,non tender,good bowel sound and no palpable mass  Ext: No lower extremity edema  Psychiatric: Appearing anxious  Musculoskeletal: Intact  CNS: Very awake and very alert. Incoherent speech. Occasional lower extremity tremors noted. Data:   Labs:  Lab Results   Component Value Date     (L) 07/31/2021     (L) 07/30/2021     05/26/2021    K 4.0 07/31/2021    K 4.2 07/30/2021    K 3.3 (L) 05/26/2021    CL 96 (L) 07/31/2021    CO2 18 (L) 07/31/2021    CO2 21 (L) 07/30/2021    CO2 27 05/26/2021    CREATININE 9.4 (HH) 07/31/2021    CREATININE 9.7 (HH) 07/30/2021    CREATININE 1.5 (H) 05/26/2021    BUN 51 (H) 07/31/2021    BUN 53 (H) 07/30/2021    BUN 16 05/26/2021    GLUCOSE 104 07/31/2021    GLUCOSE 123 (H) 07/30/2021    GLUCOSE 92 05/26/2021    PHOS 4.9 (H) 07/31/2021    PHOS 3.7 11/26/2020    PHOS 3.8 10/14/2020    WBC 17.6 (H) 07/31/2021    WBC 20.3 (H) 07/30/2021    WBC 13.5 (H) 05/26/2021    HGB 11.2 (L) 07/31/2021    HGB 13.1 (L) 07/30/2021    HGB 11.9 (L) 05/26/2021    HCT 35.5 (L) 07/31/2021    HCT 41.7 (L) 07/30/2021    HCT 38.5 (L) 05/26/2021    MCV 80.3 07/31/2021     07/31/2021     {Labs reviewed    Imaging:  CXR results: Thank you Dr. Jesus Walker, APRN - CNP for allowing us to participate in care of Padmini Freeman   **This report has been created using voice recognition software. It maycontain minor  errors which are inherent in voice recognition technology. **    Electronically signed by Kem Mueller MD on 7/31/2021 at 9:13 AM

## 2021-07-31 NOTE — PROGRESS NOTES
Utilize [facility] alcohol withdrawal scale (Based on [place] Modified Alcohol Withdrawal Scale). Tabulate score based on classifications including Tremor, Sweating, Hallucination, Orientation, and Agitation. Tremor: 1  Sweatin  Hallucinations: 0  Orientation: 1  Agitation: 1  Total Score: 3  Action perform as described below     Tremor:  No tremor is 0 points. Tremor on movement is 1 point. Tremor at rest is 2 points. Sweating: No Sweat 0 points. Moist is 1 point. Drenching sweats is 2 points. Hallucinations: No present 0 points. Dissuadable is 1 point. Not dissuadable is 2 points. Orientation: Oriented 0 points. Vague/detached 1 point. Disoriented/no contact 2 points. Agitation: Calm 0 points. Anxious 1 point. Panicky 2 points. Check scale every 2 hours. Discontinue scoring with 4 consecutive scorings of 0. Scale 0: No phenobarbital given. Re-assess every 60 minutes as needed. Scale 1-3: Phenobarbital 130 mg IV over 3 minutes. Re-assess every 60 minutes as needed. May administer every 60 minutes to a maximum dose of phenobarbital 1040 mg in 24 hours! Scale 4-8: Phenobarbital 260 mg IV over 5 minutes. Re-assess every 60 minutes as needed. May administer every 60 minutes to a maximum dose of phenobarbital 1040mg in 24 hours! Scale 9-10: Transfer to ICU (if not already in ICU). Administer 10mg/kg phenobarbital IV over 60 minutes. Maximum dose phenobarbital is 1040mg in 24 hours!

## 2021-07-31 NOTE — PROGRESS NOTES
Utilize [facility] alcohol withdrawal scale (Based on [place] Modified Alcohol Withdrawal Scale). Tabulate score based on classifications including Tremor, Sweating, Hallucination, Orientation, and Agitation. Tremor: 2  Sweatin  Hallucinations: 1  Orientation: 1  Agitation: 2  Total Score: 6  Action perform as described below     Tremor:  No tremor is 0 points. Tremor on movement is 1 point. Tremor at rest is 2 points. Sweating: No Sweat 0 points. Moist is 1 point. Drenching sweats is 2 points. Hallucinations: No present 0 points. Dissuadable is 1 point. Not dissuadable is 2 points. Orientation: Oriented 0 points. Vague/detached 1 point. Disoriented/no contact 2 points. Agitation: Calm 0 points. Anxious 1 point. Panicky 2 points. Check scale every 2 hours. Discontinue scoring with 4 consecutive scorings of 0. Scale 0: No phenobarbital given. Re-assess every 60 minutes as needed. Scale 1-3: Phenobarbital 130 mg IV over 3 minutes. Re-assess every 60 minutes as needed. May administer every 60 minutes to a maximum dose of phenobarbital 1040 mg in 24 hours! Scale 4-8: Phenobarbital 260 mg IV over 5 minutes. Re-assess every 60 minutes as needed. May administer every 60 minutes to a maximum dose of phenobarbital 1040mg in 24 hours! Scale 9-10: Transfer to ICU (if not already in ICU). Administer 10mg/kg phenobarbital IV over 60 minutes. Maximum dose phenobarbital is 1040mg in 24 hours!

## 2021-07-31 NOTE — FLOWSHEET NOTE
07/31/21 1545   Restraint Type   Non-Violent Restraint Type from Order question Soft Restraint Bilateral Wrist   Soft Restraint Bilateral Wrist DISCONTINUED   Education   Discontinuation Criteria Absence   Criteria Explained Yes   Patient's Response Needs reinforcement   Family Notification Already completed   Patient removed his own restraints at this time and will not let staff place back on. Dr Nieves Young notified.

## 2021-07-31 NOTE — PROGRESS NOTES
Utilize [facility] alcohol withdrawal scale (Based on [place] Modified Alcohol Withdrawal Scale). Tabulate score based on classifications including Tremor, Sweating, Hallucination, Orientation, and Agitation. Tremor: 2  Sweatin  Hallucinations: 1  Orientation:2  Agitation: 1  Total Score: 6  Action perform as described below     Tremor:  No tremor is 0 points. Tremor on movement is 1 point. Tremor at rest is 2 points. Sweating: No Sweat 0 points. Moist is 1 point. Drenching sweats is 2 points. Hallucinations: No present 0 points. Dissuadable is 1 point. Not dissuadable is 2 points. Orientation: Oriented 0 points. Vague/detached 1 point. Disoriented/no contact 2 points. Agitation: Calm 0 points. Anxious 1 point. Panicky 2 points. Check scale every 2 hours. Discontinue scoring with 4 consecutive scorings of 0. Scale 0: No phenobarbital given. Re-assess every 60 minutes as needed. Scale 1-3: Phenobarbital 130 mg IV over 3 minutes. Re-assess every 60 minutes as needed. May administer every 60 minutes to a maximum dose of phenobarbital 1040 mg in 24 hours! Scale 4-8: Phenobarbital 260 mg IV over 5 minutes. Re-assess every 60 minutes as needed. May administer every 60 minutes to a maximum dose of phenobarbital 1040mg in 24 hours! Scale 9-10: Transfer to ICU (if not already in ICU). Administer 10mg/kg phenobarbital IV over 60 minutes. Maximum dose phenobarbital is 1040mg in 24 hours!

## 2021-07-31 NOTE — ED NOTES
Went in to check on pt. Pt states the ativan didn't work. Pt made comment stating he wanted to walk out. This RN notified Dr. Carly Shell.  Dr. Carly Shell in to talk to pt      Marek Gillis RN  07/30/21 3513

## 2021-08-01 LAB
ANION GAP SERPL CALCULATED.3IONS-SCNC: 11 MEQ/L (ref 8–16)
ANION GAP SERPL CALCULATED.3IONS-SCNC: 12 MEQ/L (ref 8–16)
ANION GAP SERPL CALCULATED.3IONS-SCNC: 12 MEQ/L (ref 8–16)
BUN BLDV-MCNC: 25 MG/DL (ref 7–22)
BUN BLDV-MCNC: 28 MG/DL (ref 7–22)
BUN BLDV-MCNC: 36 MG/DL (ref 7–22)
CALCIUM SERPL-MCNC: 8.8 MG/DL (ref 8.5–10.5)
CALCIUM SERPL-MCNC: 8.8 MG/DL (ref 8.5–10.5)
CALCIUM SERPL-MCNC: 9 MG/DL (ref 8.5–10.5)
CHLORIDE BLD-SCNC: 101 MEQ/L (ref 98–111)
CHLORIDE BLD-SCNC: 101 MEQ/L (ref 98–111)
CHLORIDE BLD-SCNC: 102 MEQ/L (ref 98–111)
CO2: 25 MEQ/L (ref 23–33)
CO2: 26 MEQ/L (ref 23–33)
CO2: 26 MEQ/L (ref 23–33)
CREAT SERPL-MCNC: 2.7 MG/DL (ref 0.4–1.2)
CREAT SERPL-MCNC: 3.2 MG/DL (ref 0.4–1.2)
CREAT SERPL-MCNC: 4.3 MG/DL (ref 0.4–1.2)
EKG ATRIAL RATE: 57 BPM
EKG P AXIS: 54 DEGREES
EKG P-R INTERVAL: 168 MS
EKG Q-T INTERVAL: 438 MS
EKG QRS DURATION: 92 MS
EKG QTC CALCULATION (BAZETT): 426 MS
EKG R AXIS: 54 DEGREES
EKG T AXIS: 58 DEGREES
EKG VENTRICULAR RATE: 57 BPM
ERYTHROCYTE [DISTWIDTH] IN BLOOD BY AUTOMATED COUNT: 16.9 % (ref 11.5–14.5)
ERYTHROCYTE [DISTWIDTH] IN BLOOD BY AUTOMATED COUNT: 48.2 FL (ref 35–45)
GFR SERPL CREATININE-BSD FRML MDRD: 16 ML/MIN/1.73M2
GFR SERPL CREATININE-BSD FRML MDRD: 22 ML/MIN/1.73M2
GFR SERPL CREATININE-BSD FRML MDRD: 27 ML/MIN/1.73M2
GLUCOSE BLD-MCNC: 102 MG/DL (ref 70–108)
GLUCOSE BLD-MCNC: 79 MG/DL (ref 70–108)
GLUCOSE BLD-MCNC: 89 MG/DL (ref 70–108)
HCT VFR BLD CALC: 32.8 % (ref 42–52)
HEMOGLOBIN: 10.3 GM/DL (ref 14–18)
MCH RBC QN AUTO: 25.1 PG (ref 26–33)
MCHC RBC AUTO-ENTMCNC: 31.4 GM/DL (ref 32.2–35.5)
MCV RBC AUTO: 80 FL (ref 80–94)
PLATELET # BLD: 295 THOU/MM3 (ref 130–400)
PMV BLD AUTO: 9.8 FL (ref 9.4–12.4)
POTASSIUM SERPL-SCNC: 3.6 MEQ/L (ref 3.5–5.2)
POTASSIUM SERPL-SCNC: 3.9 MEQ/L (ref 3.5–5.2)
POTASSIUM SERPL-SCNC: 4 MEQ/L (ref 3.5–5.2)
RBC # BLD: 4.1 MILL/MM3 (ref 4.7–6.1)
SODIUM BLD-SCNC: 138 MEQ/L (ref 135–145)
SODIUM BLD-SCNC: 139 MEQ/L (ref 135–145)
SODIUM BLD-SCNC: 139 MEQ/L (ref 135–145)
TOTAL CK: 5830 U/L (ref 55–170)
WBC # BLD: 9.8 THOU/MM3 (ref 4.8–10.8)

## 2021-08-01 PROCEDURE — 2140000000 HC CCU INTERMEDIATE R&B

## 2021-08-01 PROCEDURE — 2580000003 HC RX 258: Performed by: INTERNAL MEDICINE

## 2021-08-01 PROCEDURE — 36415 COLL VENOUS BLD VENIPUNCTURE: CPT

## 2021-08-01 PROCEDURE — 6360000002 HC RX W HCPCS: Performed by: FAMILY MEDICINE

## 2021-08-01 PROCEDURE — 6370000000 HC RX 637 (ALT 250 FOR IP): Performed by: INTERNAL MEDICINE

## 2021-08-01 PROCEDURE — 99232 SBSQ HOSP IP/OBS MODERATE 35: CPT | Performed by: INTERNAL MEDICINE

## 2021-08-01 PROCEDURE — 2580000003 HC RX 258: Performed by: STUDENT IN AN ORGANIZED HEALTH CARE EDUCATION/TRAINING PROGRAM

## 2021-08-01 PROCEDURE — 80048 BASIC METABOLIC PNL TOTAL CA: CPT

## 2021-08-01 PROCEDURE — 6370000000 HC RX 637 (ALT 250 FOR IP): Performed by: STUDENT IN AN ORGANIZED HEALTH CARE EDUCATION/TRAINING PROGRAM

## 2021-08-01 PROCEDURE — 6360000002 HC RX W HCPCS: Performed by: INTERNAL MEDICINE

## 2021-08-01 PROCEDURE — 6360000002 HC RX W HCPCS: Performed by: STUDENT IN AN ORGANIZED HEALTH CARE EDUCATION/TRAINING PROGRAM

## 2021-08-01 PROCEDURE — 99233 SBSQ HOSP IP/OBS HIGH 50: CPT | Performed by: INTERNAL MEDICINE

## 2021-08-01 PROCEDURE — 85027 COMPLETE CBC AUTOMATED: CPT

## 2021-08-01 PROCEDURE — 82550 ASSAY OF CK (CPK): CPT

## 2021-08-01 RX ORDER — LORAZEPAM 1 MG/1
1 TABLET ORAL
Status: DISCONTINUED | OUTPATIENT
Start: 2021-08-01 | End: 2021-08-02 | Stop reason: HOSPADM

## 2021-08-01 RX ORDER — LORAZEPAM 2 MG/1
4 TABLET ORAL
Status: DISCONTINUED | OUTPATIENT
Start: 2021-08-01 | End: 2021-08-02 | Stop reason: HOSPADM

## 2021-08-01 RX ORDER — ACETAMINOPHEN 650 MG/1
650 SUPPOSITORY RECTAL EVERY 6 HOURS PRN
Status: DISCONTINUED | OUTPATIENT
Start: 2021-08-01 | End: 2021-08-02 | Stop reason: HOSPADM

## 2021-08-01 RX ORDER — LORAZEPAM 2 MG/ML
2 INJECTION INTRAMUSCULAR
Status: DISCONTINUED | OUTPATIENT
Start: 2021-08-01 | End: 2021-08-02 | Stop reason: HOSPADM

## 2021-08-01 RX ORDER — ACETAMINOPHEN 325 MG/1
650 TABLET ORAL EVERY 6 HOURS PRN
Status: DISCONTINUED | OUTPATIENT
Start: 2021-08-01 | End: 2021-08-02 | Stop reason: HOSPADM

## 2021-08-01 RX ORDER — ONDANSETRON 4 MG/1
4 TABLET, ORALLY DISINTEGRATING ORAL EVERY 8 HOURS PRN
Status: DISCONTINUED | OUTPATIENT
Start: 2021-08-01 | End: 2021-08-02 | Stop reason: HOSPADM

## 2021-08-01 RX ORDER — SODIUM CHLORIDE 0.9 % (FLUSH) 0.9 %
5-40 SYRINGE (ML) INJECTION PRN
Status: DISCONTINUED | OUTPATIENT
Start: 2021-08-01 | End: 2021-08-02 | Stop reason: HOSPADM

## 2021-08-01 RX ORDER — LORAZEPAM 2 MG/ML
3 INJECTION INTRAMUSCULAR
Status: DISCONTINUED | OUTPATIENT
Start: 2021-08-01 | End: 2021-08-02 | Stop reason: HOSPADM

## 2021-08-01 RX ORDER — CHLORDIAZEPOXIDE HYDROCHLORIDE 5 MG/1
5 CAPSULE, GELATIN COATED ORAL 3 TIMES DAILY
Status: DISCONTINUED | OUTPATIENT
Start: 2021-08-01 | End: 2021-08-02 | Stop reason: HOSPADM

## 2021-08-01 RX ORDER — SODIUM CHLORIDE 0.9 % (FLUSH) 0.9 %
5-40 SYRINGE (ML) INJECTION EVERY 12 HOURS SCHEDULED
Status: DISCONTINUED | OUTPATIENT
Start: 2021-08-01 | End: 2021-08-02 | Stop reason: HOSPADM

## 2021-08-01 RX ORDER — LORAZEPAM 2 MG/1
2 TABLET ORAL
Status: DISCONTINUED | OUTPATIENT
Start: 2021-08-01 | End: 2021-08-02 | Stop reason: HOSPADM

## 2021-08-01 RX ORDER — LORAZEPAM 2 MG/ML
4 INJECTION INTRAMUSCULAR
Status: DISCONTINUED | OUTPATIENT
Start: 2021-08-01 | End: 2021-08-02 | Stop reason: HOSPADM

## 2021-08-01 RX ORDER — LORAZEPAM 2 MG/ML
1 INJECTION INTRAMUSCULAR
Status: DISCONTINUED | OUTPATIENT
Start: 2021-08-01 | End: 2021-08-02 | Stop reason: HOSPADM

## 2021-08-01 RX ORDER — POLYETHYLENE GLYCOL 3350 17 G/17G
17 POWDER, FOR SOLUTION ORAL DAILY PRN
Status: DISCONTINUED | OUTPATIENT
Start: 2021-08-01 | End: 2021-08-02 | Stop reason: HOSPADM

## 2021-08-01 RX ORDER — ONDANSETRON 2 MG/ML
4 INJECTION INTRAMUSCULAR; INTRAVENOUS EVERY 6 HOURS PRN
Status: DISCONTINUED | OUTPATIENT
Start: 2021-08-01 | End: 2021-08-02 | Stop reason: HOSPADM

## 2021-08-01 RX ORDER — SODIUM CHLORIDE 9 MG/ML
25 INJECTION, SOLUTION INTRAVENOUS PRN
Status: DISCONTINUED | OUTPATIENT
Start: 2021-08-01 | End: 2021-08-02 | Stop reason: HOSPADM

## 2021-08-01 RX ADMIN — PHENOBARBITAL SODIUM 260 MG: 65 INJECTION INTRAMUSCULAR at 02:23

## 2021-08-01 RX ADMIN — PHENOBARBITAL SODIUM 130 MG: 65 INJECTION INTRAMUSCULAR at 10:37

## 2021-08-01 RX ADMIN — LORAZEPAM 1 MG: 2 INJECTION INTRAMUSCULAR; INTRAVENOUS at 16:21

## 2021-08-01 RX ADMIN — VENLAFAXINE HYDROCHLORIDE 37.5 MG: 37.5 TABLET ORAL at 21:58

## 2021-08-01 RX ADMIN — LORAZEPAM 2 MG: 2 INJECTION INTRAMUSCULAR; INTRAVENOUS at 18:48

## 2021-08-01 RX ADMIN — CHLORDIAZEPOXIDE HYDROCHLORIDE 5 MG: 5 CAPSULE ORAL at 21:58

## 2021-08-01 RX ADMIN — SODIUM CHLORIDE: 9 INJECTION, SOLUTION INTRAVENOUS at 02:54

## 2021-08-01 RX ADMIN — CEFTRIAXONE SODIUM 1000 MG: 1 INJECTION, POWDER, FOR SOLUTION INTRAMUSCULAR; INTRAVENOUS at 05:43

## 2021-08-01 RX ADMIN — SODIUM CHLORIDE: 9 INJECTION, SOLUTION INTRAVENOUS at 21:46

## 2021-08-01 RX ADMIN — PHENOBARBITAL SODIUM 130 MG: 65 INJECTION INTRAMUSCULAR at 13:55

## 2021-08-01 RX ADMIN — MIRTAZAPINE 7.5 MG: 15 TABLET, FILM COATED ORAL at 21:58

## 2021-08-01 RX ADMIN — VENLAFAXINE HYDROCHLORIDE 37.5 MG: 37.5 TABLET ORAL at 09:16

## 2021-08-01 RX ADMIN — SODIUM CHLORIDE: 9 INJECTION, SOLUTION INTRAVENOUS at 17:34

## 2021-08-01 RX ADMIN — SODIUM CHLORIDE, PRESERVATIVE FREE 10 ML: 5 INJECTION INTRAVENOUS at 21:50

## 2021-08-01 ASSESSMENT — PAIN SCALES - GENERAL
PAINLEVEL_OUTOF10: 0

## 2021-08-01 NOTE — PROGRESS NOTES
Mona David received a call from his [facility]. He was able to talk with her on house phone. She arrived a short while later and brought him [employer] for lunch. She sat and fed him. She said she feeds him at home \"Because he is like a baby. \"  Mona David has been confused, thinking he was in a school, with noisy computers. He had complained of pain in back and legs. He is drowsy and falls asleep easily. She talked to him about his home medications and said \"I have them with me. \"  As witnessed by Vita Montiel, nurse tech. Sitting with him at the time. She pulled them out and stated that she didn't think she was allowed to give him anything. He stated \"By law they have to give me. \"  Vita Montiel stated that she put them back in her pocket. After arriving to the room, the pt. Told Dr. Wilian Higgins that he did not want phenobarb. Anymore, that he wanted Ativan and suboxone. He has refused his Lithium and SQ heparin. The pt. Has been resting with eyes closed for most of the time. When he wakes he has  Asked multiple times  for ativan. When he received ativan 1 mg. He stated to, \"push it in fast\"    I told him I am here to help him get better, not get him high. He then asked to [facility] the needle. \"  Ada Rodriguess human sitter is in the room currently.

## 2021-08-01 NOTE — PLAN OF CARE
Problem: Pain:  Goal: Pain level will decrease  Description: Pain level will decrease  8/1/2021 1004 by Amara Zafar RN  Outcome: Ongoing  Note: Pt has been resting off and on this AM. Pt denies pain at this time. PRN medication available per MAR. Will continue to monitor. Problem: Skin Integrity:  Goal: Absence of new skin breakdown  Description: Absence of new skin breakdown  8/1/2021 1004 by Amara Zafar RN  Outcome: Ongoing  Note: No new skin breakdown present this shift. Pt turns himself in bed frequently. Turns offered q2h with pillow support. Will continue to monitor. Problem: Falls - Risk of:  Goal: Will remain free from falls  Description: Will remain free from falls  8/1/2021 1004 by Amara Zafar RN  Outcome: Ongoing  Note: Pt remains free from falls this shift. Standard fall and safety precautions in place. Live sitter at bedside. Pt remains in bed. Will continue to monitor. Care plan reviewed with patient. Patient verbalizes understanding of the plan of care and contribute to goal setting.

## 2021-08-01 NOTE — PROGRESS NOTES
This Rn called Dr. Jalyn Good, to report that the pt. Is getting agitated and has demanded \"stronger ativan. \"  He has just received 2 mg. He states that he is ready to \"rip out all this shit and go home. \"    Dr. Jalyn Good states that he is not medically held and that he can go AMA if he wants. He has received a dinner tray and is drinking pepsi. Stating that he is going to leave. Sitter's have changed. Shey Delacruz is Rn is taking over care.

## 2021-08-01 NOTE — PROGRESS NOTES
Utilize [facility] alcohol withdrawal scale (Based on [place] Modified Alcohol Withdrawal Scale). Tabulate score based on classifications including Tremor, Sweating, Hallucination, Orientation, and Agitation. Tremor: 0  Sweatin  Hallucinations: 1  Orientation: 1  Agitation: 0  Total Score: 3  Action perform as described below     Tremor:  No tremor is 0 points. Tremor on movement is 1 point. Tremor at rest is 2 points. Sweating: No Sweat 0 points. Moist is 1 point. Drenching sweats is 2 points. Hallucinations: No present 0 points. Dissuadable is 1 point. Not dissuadable is 2 points. Orientation: Oriented 0 points. Vague/detached 1 point. Disoriented/no contact 2 points. Agitation: Calm 0 points. Anxious 1 point. Panicky 2 points. Check scale every 2 hours. Discontinue scoring with 4 consecutive scorings of 0. Scale 0: No phenobarbital given. Re-assess every 60 minutes as needed. Scale 1-3: Phenobarbital 130 mg IV over 3 minutes. Re-assess every 60 minutes as needed. May administer every 60 minutes to a maximum dose of phenobarbital 1040 mg in 24 hours! Scale 4-8: Phenobarbital 260 mg IV over 5 minutes. Re-assess every 60 minutes as needed. May administer every 60 minutes to a maximum dose of phenobarbital 1040mg in 24 hours! Scale 9-10: Transfer to ICU (if not already in ICU). Administer 10mg/kg phenobarbital IV over 60 minutes. Maximum dose phenobarbital is 1040mg in 24 hours!

## 2021-08-01 NOTE — PLAN OF CARE
Problem: Non-Violent Restraints  Goal: Removal from restraints as soon as assessed to be safe  Outcome: Met This Shift  Note: Patient's restraints removed prior to shift. Problem: Non-Violent Restraints  Goal: No harm/injury to patient while restraints in use  Outcome: Met This Shift     Problem: Non-Violent Restraints  Goal: Patient's dignity will be maintained  Outcome: Met This Shift     Problem: Pain:  Goal: Pain level will decrease  Description: Pain level will decrease  Outcome: Ongoing  Note: Patient denies presence of pain at this time. Problem: Pain:  Goal: Control of acute pain  Description: Control of acute pain  Outcome: Ongoing  Note: Denies acute pain. Problem: Pain:  Goal: Control of chronic pain  Description: Control of chronic pain  Outcome: Ongoing  Note: Denies chronic pain. Problem: Skin Integrity:  Goal: Will show no infection signs and symptoms  Description: Will show no infection signs and symptoms  Outcome: Ongoing  Note: Patient afebrile, WBC 9.8. Remains on IV Rocephin. Problem: Skin Integrity:  Goal: Absence of new skin breakdown  Description: Absence of new skin breakdown  Outcome: Ongoing  Note: Patient has pre-existing areas of skin breakdown. Patient turns/repositions self in bed frequently. Will monitor for patient's need to be turned/repositioned. Problem: Falls - Risk of:  Goal: Will remain free from falls  Description: Will remain free from falls  Outcome: Ongoing  Note: Falling star program in place. Bed alarm on. Non skid socks applied. Call light within reach. No falls noted this shift. Will continue to monitor. Problem: Falls - Risk of:  Goal: Absence of physical injury  Description: Absence of physical injury  Outcome: Ongoing  Note: No signs of physical injury present at this time. Care plan reviewed with patient. Patient verbalize understanding of the plan of care and contribute to goal setting.

## 2021-08-01 NOTE — PROGRESS NOTES
Pt transferred via central transport to 3B with live sitter at bedside. IVF infusing at 150 ml/hr via Alaris Pump. Martin catheter in place. CHRIS Guajardo on 3B made aware of pt's transfer.

## 2021-08-01 NOTE — PROGRESS NOTES
Renal Progress Note    Assessment and Plan:   1. Acute kidney injury due to rhabdomyolysis improving  2. Rhabdomyolysis nontraumatic  3. Illicit drug abuse  4. Normocytic anemia  5. Metabolic acidosis resolving  6. Hepatitis C viral infection  7. Hyperphosphatemia due to rhabdomyolysis    PLAN:  1. Labs reviewed  2. Serum creatinine is progressively improving  3. Serum bicarbonate is back to normal  4. He has positive hep C but uncertain if he was treated in the past  5. Medications reviewed  6. May discontinue sodium bicarbonate infusion  7. Labs in the morning  8. We will continue to follow    Patient Active Problem List:     Drug overdose, intentional ([address]     Methamphetamine abuse ([address]     Acute kidney injury ([address]     Metabolic acidosis, normal anion gap (NAG)     Drug overdose, intentional, initial encounter ([address]     Leukocytosis     Normocytic anemia     Anxiety     History of kidney stones     Acute and chronic respiratory failure with hypoxia ([address]     COVID-19     Drug overdose, accidental or unintentional, initial encounter     Drug overdose, multiple drugs, accidental or unintentional, initial encounter     Polysubstance abuse ([address]     Acute encephalopathy     Fever     Bilateral leg edema     Severe fentanyl use disorder (HCC)     Elevated liver enzymes     Altered mental status     Calculus of ureterovesical junction (UVJ)     Non-traumatic rhabdomyolysis     Depression     Acute renal failure (ARF) (HCC)      Subjective:   Admit Date: 7/30/2021    Interval History:   Seen and examined. Seen for acute kidney injury among other things. Sleeping this morning. Updated by the staff. No new issues. Blood pressure is improved. Very good urine output.       Medications:   Scheduled Meds:   sodium chloride flush  5-40 mL Intravenous 2 times per day    mirtazapine  7.5 mg Oral Nightly    venlafaxine  37.5 mg Oral BID    cefTRIAXone (ROCEPHIN) IV  1,000 mg Intravenous Q24H     Continuous Infusions:   sodium chloride      sodium bicarbonate infusion 100 mL/hr at 07/31/21 1649    DOPamine Stopped (07/31/21 1659)    sodium chloride 150 mL/hr at 08/01/21 0254       CBC:   Recent Labs     07/30/21  2055 07/31/21  0337 08/01/21  0415   WBC 20.3* 17.6* 9.8   HGB 13.1* 11.2* 10.3*   * 386 295     CMP:    Recent Labs     07/31/21  0955 07/31/21  1804 08/01/21  0415    137 139   K 4.1 4.3 3.6    102 101   CO2 22* 22* 26   BUN 46* 42* 36*   CREATININE 7.9* 6.1* 4.3*   GLUCOSE 100 92 102   CALCIUM 9.1 8.7 8.8   LABGLOM 8* 10* 16*     Troponin: No results for input(s): TROPONINI in the last 72 hours. BNP: No results for input(s): BNP in the last 72 hours. INR: No results for input(s): INR in the last 72 hours. Lipids: No results for input(s): CHOL, LDLDIRECT, TRIG, HDL, AMYLASE, LIPASE in the last 72 hours. Liver:   Recent Labs     07/31/21 0337   *   *   ALKPHOS 80   PROT 6.8   LABALBU 3.9   BILITOT 0.3     Iron:  No results for input(s): IRONS, FERRITIN in the last 72 hours. Invalid input(s): LABIRONS  XR CHEST PORTABLE   Final Result   1. Normal heart size. Moderate left anterior obliquity of the patient. 2. No acute findings. No infiltrates or effusions are seen. **This report has been created using voice recognition software. It may contain minor errors which are inherent in voice recognition technology. **      Final report electronically signed by Dr. Tyshawn Ontiveros on 7/31/2021 12:53 PM            Objective:   Vitals: /72   Pulse 62   Temp 97.9 °F (36.6 °C) (Oral)   Resp 17   Ht 5' 10\" (1.778 m)   Wt 200 lb (90.7 kg)   SpO2 97%   BMI 28.70 kg/m²    Wt Readings from Last 3 Encounters:   07/30/21 200 lb (90.7 kg)   07/15/21 193 lb 9.6 oz (87.8 kg)   06/30/21 194 lb (88 kg)      24HR INTAKE/OUTPUT:      Intake/Output Summary (Last 24 hours) at 8/1/2021 0740  Last data filed at 8/1/2021 0441  Gross per 24 hour   Intake 6087 ml   Output 77048 ml   Net -5313 ml       Constitutional: Well-developed young gentleman comfortably asleep, no apparent distress   Skin:normal with no rash or lesions. HEENT:Pupils are reactive . Throat is clear . Oral mucosa is moist   Neck:supple   Cardiovascular:  S1, S2 without murmur or rubs   Respiratory:  Clear to ausculation without wheezes, rhonchi or rales in the anterior  Abdomen: Soft. Good bowel sounds. Ext: No LE edema  Musculoskeletal:Intact  Neuro: Comfortably asleep      Electronically signed by Aileen Christensen MD on 8/1/2021 at 7:40 AM  **This report has been created using voice recognition software. It maycontain minor  errors which are inherent in voice recognition technology. **

## 2021-08-01 NOTE — PROGRESS NOTES
Report called to CHRIS Guajardo on 3B for pt's transfer out of ICU. Pt's fiance called for an update, and was made aware of the pt's transfer. Pt and fiance deny any further questions/complaints at this time. Will continue to monitor.

## 2021-08-01 NOTE — PROGRESS NOTES
Utilize [facility] alcohol withdrawal scale (Based on Dali Modified Alcohol Withdrawal Scale). Tabulate score based on classifications including Tremor, Sweating, Hallucination, Orientation, and Agitation. Tremor: 2   Sweatin  Hallucinations: 1  Orientation: 1  Agitation: 2  Total Score: 7  Action perform as described below     Tremor:  No tremor is 0 points. Tremor on movement is 1 point. Tremor at rest is 2 points. Sweating: No Sweat 0 points. Moist is 1 point. Drenching sweats is 2 points. Hallucinations: No present 0 points. Dissuadable is 1 point. Not dissuadable is 2 points. Orientation: Oriented 0 points. Vague/detached 1 point. Disoriented/no contact 2 points. Agitation: Calm 0 points. Anxious 1 point. Panicky 2 points. Check scale every 2 hours. Discontinue scoring with 4 consecutive scorings of 0. Scale 0: No phenobarbital given. Re-assess every 60 minutes as needed. Scale 1-3: Phenobarbital 130 mg IV over 3 minutes. Re-assess every 60 minutes as needed. May administer every 60 minutes to a maximum dose of phenobarbital 1040 mg in 24 hours! Scale 4-8: Phenobarbital 260 mg IV over 5 minutes. Re-assess every 60 minutes as needed. May administer every 60 minutes to a maximum dose of phenobarbital 1040mg in 24 hours! Scale 9-10: Transfer to ICU (if not already in ICU). Administer 10mg/kg phenobarbital IV over 60 minutes. Maximum dose phenobarbital is 1040mg in 24 hours!

## 2021-08-01 NOTE — PROGRESS NOTES
Intensive Care Unit  Intensivist Progress Note    Patient: Randall Rose  : 1985  MRN#: [id number]  2021 10:39 AM  ADMISSION DAY:  2021  8:47 PM     Subjective:  Patient appears much better today. He is alert and oriented. No fever or chills. Creatinine is trending down. He still requiring phenobarbital however Precedex is off. HPI:  Randall Rose is a 39 y.o. male admitted for drug overdose  The patient does have history of substance abuse including cocaine, opioids on Suboxone, he has history of hepatitis C, depression and anxiety in addition to hypertension who presented to ER in [facility] because of muscle pain and agitation. The patient complained of muscle pain that was diffuse, patient had meth and fentanyl. And he stated that he took 3 tablets of Lyrica 300 mg 3 tablets of Vyvanse 40 mg. The patient did not have any seizure activity or loss of consciousness. The patient was noted to have acute renal failure with creatinine of 9.7 in addition to very high CPK. Patient was admitted to the floor then closed transferred to the intensive care unit. Patient had bradycardia that required dopamine and then was weaned off. Bacilio Kiera He is confused requiring Precedex, on room air. No fever. Patient Vitals for the past 8 hrs:   BP Temp Temp src Pulse Resp SpO2   21 1000 115/79 -- -- 57 16 --   21 0945 -- -- -- 61 16 --   21 0900 113/81 98 °F (36.7 °C) Oral 53 16 95 %   21 0845 -- -- -- 83 17 --   21 0700 110/72 -- -- 62 17 --   21 0600 113/74 -- -- 63 16 --   21 0500 111/75 -- -- 61 17 --   21 0400 104/71 97.9 °F (36.6 °C) Oral 57 16 97 %   21 0300 104/64 -- -- 70 16 --       EXAM:  General: No distress. Eyes: PERRL. No sclera icterus. No conjunctival injection. ENT: No discharge. Pharynx clear. Neck: Trachea midline. Normal thyroid. Resp: No accessory muscle use. No crackles. No wheezing. No rhonchi.  No dullness on percussion. CV: Regular rate. Regular rhythm. No mumur or rub. No edema. Abd: Non-tender. Non-distended. No masses. No organmegaly. Normal bowel sounds. No hernia. Skin: Multiple scattered excoriations. Lymph: No cervical LAD. No supraclavicular LAD. M/S: No cyanosis. No joint deformity. No clubbing. Neuro: Awake. Follows commands. Intake/Output Summary (Last 24 hours) at 2021 1039  Last data filed at 2021 0441  Gross per 24 hour   Intake 6087 ml   Output 61584 ml   Net -5313 ml     I/O last 3 completed shifts: In: 6087 [I.V.:6087]  Out: 79464 [WRIQ]   Date 21 0000 - 21 2359   Shift 0275-6810 9386-7240 1635-0903 24 Hour Total   INTAKE   I.V.(mL/kg) 3692(40.7)   3692(40.7)   Shift Total(mL/kg) 7740(62.1)   3692(40.7)   OUTPUT   Urine(mL/kg/hr) 5400(7.4)   5400   Shift Total(mL/kg) 5400(59.5)   5400(59.5)   Weight (kg) 90.7 90.7 90.7 90.7     Wt Readings from Last 3 Encounters:   21 200 lb (90.7 kg)   07/15/21 193 lb 9.6 oz (87.8 kg)   21 194 lb (88 kg)      Body mass index is 28.7 kg/m².          Scheduled Meds:   sodium chloride flush  5-40 mL Intravenous 2 times per day    mirtazapine  7.5 mg Oral Nightly    venlafaxine  37.5 mg Oral BID    cefTRIAXone (ROCEPHIN) IV  1,000 mg Intravenous Q24H     Continuous Infusions:   sodium chloride      DOPamine Stopped (21 1659)    sodium chloride 150 mL/hr at 21 0254     PRN Meds:sodium chloride flush, 10 mL, PRN  sodium chloride, 25 mL, PRN  PHENobarbital, 130 mg, Q1H PRN   Or  PHENobarbital, 260 mg, Q1H PRN   Or  PHENobarbital IVPB, 10 mg/kg (Ideal), Daily PRN        NUTRITION SUPPORT:    SUPPORT DEVICES:    Oxygen Delivery -    VENT SETTINGS (Comprehensive)  Vent Information  SpO2: 95 %  Additional Respiratory  Assessments  Pulse: 57  Resp: 16  SpO2: 95 %  Oral Care: Lip moisturizer applied, Mouthwash, Teeth brushed      DATA:    CBC:   Recent Labs     215 21  0337 21  0415 WBC 20.3* 17.6* 9.8   RBC 5.27 4.42* 4.10*   HGB 13.1* 11.2* 10.3*   HCT 41.7* 35.5* 32.8*   MCV 79.1* 80.3 80.0   MCH 24.9* 25.3* 25.1*   MCHC 31.4* 31.5* 31.4*   * 386 295   MPV 9.7 10.2 9.8      BMP/CMP:   Recent Labs     07/30/21 2055 07/30/21 2055 07/31/21 0337 07/31/21 0337 07/31/21  0955 07/31/21  1804 08/01/21  0415   *   < > 133*   < > 138 137 139   K 4.2   < > 4.0  --  4.1 4.3 3.6   CL 90*   < > 96*   < > 100 102 101   CO2 21*   < > 18*   < > 22* 22* 26   ANIONGAP 22.0*   < > 19.0*   < > 16.0 13.0 12.0   BUN 53*   < > 51*   < > 46* 42* 36*   CREATININE 9.7*   < > 9.4*   < > 7.9* 6.1* 4.3*   GLUCOSE 123*   < > 104   < > 100 92 102   CALCIUM 9.5   < > 8.6   < > 9.1 8.7 8.8   PROT 8.6*  --  6.8  --   --   --   --    LABALBU 4.7  --  3.9  --   --   --   --    BILITOT 0.3  --  0.3  --   --   --   --    ALKPHOS 131*  --  80  --   --   --   --    *  --  235*  --   --   --   --    *  --  145*  --   --   --   --     < > = values in this interval not displayed. Other Electrolytes:   Recent Labs     07/31/21 0337 07/31/21 0955   PHOS 4.9* 5.5*   MG 2.4 2.5*     Serum Osmolality  No results for input(s): OSMOMEASER in the last 72 hours. Procalcitonin:  Recent Labs     07/30/21 2105 07/31/21 0337   PROCAL 13.08* 7.58*     Cardiac: No results for input(s): TROPONINT in the last 72 hours. Lipids: No results for input(s): CHOL, HDL in the last 72 hours. Invalid input(s): LDLCALCU  Coagulation: No results for input(s): INR in the last 72 hours. Lactic Acid: No results for input(s): LACTA in the last 72 hours.    ABGs:   Lab Results   Component Value Date    PH 7.30 08/25/2019    PCO2 40 08/25/2019    PO2 58 08/25/2019    HCO3 20 08/25/2019    O2SAT 87 08/25/2019     No results found for: Loretta Valentine    Radiology/Imaging:   XR CHEST PORTABLE [[id number]] Resulted: 07/31/21 1253     Order Status: Completed Updated: 07/31/21 1255     Narrative:       PROCEDURE: XR CHEST PORTABLE     CLINICAL INFORMATION: cough     COMPARISON: 1/21/2021     TECHNIQUE: A single mobile view of the chest was obtained.        Impression:       1. Normal heart size. Moderate left anterior obliquity of the patient. 2. No acute findings. No infiltrates or effusions are seen. Patient Active Problem List   Diagnosis    Drug overdose, intentional ([address]    Methamphetamine abuse ([address]    Acute kidney injury ([address]    Metabolic acidosis, normal anion gap (NAG)    Drug overdose, intentional, initial encounter ([address]    Leukocytosis    Normocytic anemia    Anxiety    History of kidney stones    Acute and chronic respiratory failure with hypoxia ([address]    COVID-19    Drug overdose, accidental or unintentional, initial encounter    Drug overdose, multiple drugs, accidental or unintentional, initial encounter    Polysubstance abuse ([address]    Acute encephalopathy    Fever    Bilateral leg edema    Severe fentanyl use disorder (HCC)    Elevated liver enzymes    Altered mental status    Calculus of ureterovesical junction (UVJ)    Non-traumatic rhabdomyolysis    Depression    Acute [facility] failure (ARF) ([facility])       Assessment and Plan:    Acute metabolic encephalopathy: Secondary to drugs/withdrawal/acute renal failure. On Precedex and as needed phenobarbital and Ativan. Chest x-ray is negative. Patient does not have focal deficit. Precedex was weaned off, continue as needed phenobarbital and Ativan. Okay to transfer out of ICU.     Acute renal failure: Secondary to rhabdomyolysis. Continue IV fluid. Monitor CPK. Improving.     Hypotension: Secondary to dehydration versus drugs. Chest x-ray negative. Resolved. Patient was started on ceftriaxone for empiric treatment. most likely the hypotension was secondary to medications. No source of infection was identified.   Stop ceftriaxone.     High anion gap metabolic acidosis: Secondary to acute renal failure.     Rhabdomyolysis:  Secondary to drug abuse. Continue to monitor CK.     Elevated liver enzymes: Secondary to hepatitis C. antibodies is positive. He will need to follow-up with GI as outpatient and get genotyping and PCR as outpatient. Check liver enzymes in a.m.     Transient bradycardia: Was on dopamine that was weaned off.     Polysubstance abuse  Urine drug screen is positive for cocaine and amphetamine. Patient has history of such and presented with rhabdomyolysis after abusing drugs.     History of positive hepatitis C antibody:   Hepatitis C antibody is positive.     Hepatitis B:   With elevated liver enzymes.     Major depressive disorder.         Mario Cortez MD, MD

## 2021-08-02 ENCOUNTER — HOSPITAL ENCOUNTER (EMERGENCY)
Age: 36
Discharge: HOME | DRG: 351 | End: 2021-08-02
Attending: EMERGENCY MEDICINE
Payer: MEDICARE

## 2021-08-02 VITALS
OXYGEN SATURATION: 100 % | WEIGHT: 198 LBS | BODY MASS INDEX: 28.41 KG/M2 | SYSTOLIC BLOOD PRESSURE: 148 MMHG | DIASTOLIC BLOOD PRESSURE: 111 MMHG | HEART RATE: 51 BPM | RESPIRATION RATE: 10 BRPM | TEMPERATURE: 98.4 F

## 2021-08-02 VITALS
SYSTOLIC BLOOD PRESSURE: 120 MMHG | BODY MASS INDEX: 28.37 KG/M2 | TEMPERATURE: 98.5 F | OXYGEN SATURATION: 97 % | DIASTOLIC BLOOD PRESSURE: 78 MMHG | RESPIRATION RATE: 16 BRPM | WEIGHT: 198.2 LBS | HEIGHT: 70 IN | HEART RATE: 49 BPM

## 2021-08-02 DIAGNOSIS — T40.414A: Primary | ICD-10-CM

## 2021-08-02 PROBLEM — B37.49 YEAST UTI: Status: ACTIVE | Noted: 2021-08-02

## 2021-08-02 PROBLEM — F14.929 COCAINE INTOXICATION WITH COMPLICATION (HCC): Status: ACTIVE | Noted: 2021-08-02

## 2021-08-02 PROBLEM — B18.2 CHRONIC HEPATITIS C WITHOUT HEPATIC COMA (HCC): Status: ACTIVE | Noted: 2021-08-02

## 2021-08-02 PROBLEM — G92.8 TOXIC METABOLIC ENCEPHALOPATHY: Status: ACTIVE | Noted: 2020-08-28

## 2021-08-02 PROBLEM — N17.0 ACUTE KIDNEY INJURY (AKI) WITH ACUTE TUBULAR NECROSIS (ATN) (HCC): Status: ACTIVE | Noted: 2021-07-30

## 2021-08-02 LAB
ALBUMIN SERPL-MCNC: 3.4 G/DL (ref 3.5–5.1)
ALP BLD-CCNC: 58 U/L (ref 38–126)
ALT SERPL-CCNC: 81 U/L (ref 11–66)
ANION GAP SERPL CALCULATED.3IONS-SCNC: 11 MEQ/L (ref 8–16)
ANION GAP SERPL CALCULATED.3IONS-SCNC: 11 MEQ/L (ref 8–16)
AST SERPL-CCNC: 88 U/L (ref 5–40)
BILIRUB SERPL-MCNC: < 0.2 MG/DL (ref 0.3–1.2)
BILIRUBIN DIRECT: < 0.2 MG/DL (ref 0–0.3)
BUN BLDV-MCNC: 17 MG/DL (ref 7–22)
BUN BLDV-MCNC: 19 MG/DL (ref 7–22)
CALCIUM SERPL-MCNC: 8.5 MG/DL (ref 8.5–10.5)
CALCIUM SERPL-MCNC: 8.7 MG/DL (ref 8.5–10.5)
CHLORIDE BLD-SCNC: 106 MEQ/L (ref 98–111)
CHLORIDE BLD-SCNC: 107 MEQ/L (ref 98–111)
CO2: 21 MEQ/L (ref 23–33)
CO2: 22 MEQ/L (ref 23–33)
CREAT SERPL-MCNC: 1.8 MG/DL (ref 0.4–1.2)
CREAT SERPL-MCNC: 2 MG/DL (ref 0.4–1.2)
ERYTHROCYTE [DISTWIDTH] IN BLOOD BY AUTOMATED COUNT: 17.3 % (ref 11.5–14.5)
ERYTHROCYTE [DISTWIDTH] IN BLOOD BY AUTOMATED COUNT: 49.4 FL (ref 35–45)
GFR SERPL CREATININE-BSD FRML MDRD: 38 ML/MIN/1.73M2
GFR SERPL CREATININE-BSD FRML MDRD: 43 ML/MIN/1.73M2
GLUCOSE BLD-MCNC: 84 MG/DL (ref 70–108)
GLUCOSE BLD-MCNC: 92 MG/DL (ref 70–108)
GLUCOSE BLD-MCNC: 94 MG/DL (ref 70–108)
HCT VFR BLD CALC: 31.6 % (ref 42–52)
HEMOGLOBIN: 9.7 GM/DL (ref 14–18)
MAGNESIUM: 1.8 MG/DL (ref 1.6–2.4)
MCH RBC QN AUTO: 25.1 PG (ref 26–33)
MCHC RBC AUTO-ENTMCNC: 30.7 GM/DL (ref 32.2–35.5)
MCV RBC AUTO: 81.9 FL (ref 80–94)
MYOGLOBIN URINE: 2 MG/L (ref 0–1)
MYOGLOBIN: ABNORMAL NG/ML (ref 28–72)
PHOSPHORUS: 3.7 MG/DL (ref 2.4–4.7)
PLATELET # BLD: 281 THOU/MM3 (ref 130–400)
PMV BLD AUTO: 10.3 FL (ref 9.4–12.4)
POTASSIUM SERPL-SCNC: 3.5 MEQ/L (ref 3.5–5.2)
POTASSIUM SERPL-SCNC: 3.9 MEQ/L (ref 3.5–5.2)
RBC # BLD: 3.86 MILL/MM3 (ref 4.7–6.1)
SODIUM BLD-SCNC: 139 MEQ/L (ref 135–145)
SODIUM BLD-SCNC: 139 MEQ/L (ref 135–145)
TOTAL CK: 3373 U/L (ref 55–170)
TOTAL PROTEIN: 6.2 G/DL (ref 6.1–8)
URINE CULTURE, ROUTINE: NORMAL
WBC # BLD: 7.8 THOU/MM3 (ref 4.8–10.8)

## 2021-08-02 PROCEDURE — 2580000003 HC RX 258: Performed by: INTERNAL MEDICINE

## 2021-08-02 PROCEDURE — 80053 COMPREHEN METABOLIC PANEL: CPT

## 2021-08-02 PROCEDURE — 99233 SBSQ HOSP IP/OBS HIGH 50: CPT | Performed by: INTERNAL MEDICINE

## 2021-08-02 PROCEDURE — 82550 ASSAY OF CK (CPK): CPT

## 2021-08-02 PROCEDURE — 99232 SBSQ HOSP IP/OBS MODERATE 35: CPT | Performed by: INTERNAL MEDICINE

## 2021-08-02 PROCEDURE — 6370000000 HC RX 637 (ALT 250 FOR IP): Performed by: STUDENT IN AN ORGANIZED HEALTH CARE EDUCATION/TRAINING PROGRAM

## 2021-08-02 PROCEDURE — 99285 EMERGENCY DEPT VISIT HI MDM: CPT

## 2021-08-02 PROCEDURE — 36415 COLL VENOUS BLD VENIPUNCTURE: CPT

## 2021-08-02 PROCEDURE — 85027 COMPLETE CBC AUTOMATED: CPT

## 2021-08-02 PROCEDURE — 84100 ASSAY OF PHOSPHORUS: CPT

## 2021-08-02 PROCEDURE — 82248 BILIRUBIN DIRECT: CPT

## 2021-08-02 PROCEDURE — 83735 ASSAY OF MAGNESIUM: CPT

## 2021-08-02 PROCEDURE — 82948 REAGENT STRIP/BLOOD GLUCOSE: CPT

## 2021-08-02 RX ORDER — FLUCONAZOLE 200 MG/1
200 TABLET ORAL DAILY
Status: DISCONTINUED | OUTPATIENT
Start: 2021-08-02 | End: 2021-08-02 | Stop reason: HOSPADM

## 2021-08-02 RX ADMIN — SODIUM CHLORIDE, PRESERVATIVE FREE 10 ML: 5 INJECTION INTRAVENOUS at 09:00

## 2021-08-02 RX ADMIN — SODIUM CHLORIDE: 9 INJECTION, SOLUTION INTRAVENOUS at 10:48

## 2021-08-02 RX ADMIN — VENLAFAXINE HYDROCHLORIDE 37.5 MG: 37.5 TABLET ORAL at 09:50

## 2021-08-02 ASSESSMENT — ENCOUNTER SYMPTOMS
CHEST TIGHTNESS: 0
VOICE CHANGE: 0
EYE DISCHARGE: 0
SINUS PRESSURE: 0
CHOKING: 0
SORE THROAT: 0
WHEEZING: 0
RECTAL PAIN: 0
TROUBLE SWALLOWING: 0
RHINORRHEA: 0
COLOR CHANGE: 0
VOMITING: 0
EYE REDNESS: 0
ANAL BLEEDING: 0
SHORTNESS OF BREATH: 0
EYE ITCHING: 0
BACK PAIN: 0
CONSTIPATION: 0
FACIAL SWELLING: 0
BLOOD IN STOOL: 0
DIARRHEA: 0
ABDOMINAL PAIN: 0
COUGH: 0
NAUSEA: 0
PHOTOPHOBIA: 0
ABDOMINAL DISTENTION: 0
STRIDOR: 0
EYE PAIN: 0
SINUS PAIN: 0
APNEA: 0

## 2021-08-02 ASSESSMENT — PAIN SCALES - GENERAL
PAINLEVEL_OUTOF10: 0

## 2021-08-02 NOTE — ED NOTES
ED nurse-to-nurse bedside report    Chief Complaint   Patient presents with    Ingestion      LOC: alert and orientated to name, place, date  Vital signs   Vitals:    08/02/21 1830 08/02/21 1855   BP: 125/88 (!) 144/98   Pulse: 75 59   Resp: 16 15   Temp: 98.4 °F (36.9 °C)    TempSrc: Oral    SpO2: 93% 99%   Weight: 198 lb (89.8 kg)       Pain:    Pain Interventions: n/a  Pain Goal: n/a  Oxygen: Yes    Current needs required 2L NC    Telemetry: Yes  LDAs:   Peripheral IV 07/31/21 Left;Ventral Forearm (Active)       Peripheral IV 07/31/21 Right; Anterior Forearm (Active)     Continuous Infusions:   Mobility: Requires assistance * 1  Rich Fall Risk Score: No flowsheet data found. Fall Interventions: both side rails up, call light in reach and close to nurses station for observation.    Report given to: Diane Yap RN  08/02/21 0043

## 2021-08-02 NOTE — PROGRESS NOTES
Hospitalist Progress Note  [facility]       Patient: Manuel Arreguin    Unit/Bed: 3B-31/031-A    YOB: 1985    MRN: [id number]    Acct: [de-identified]     Admitting Diagnosis:Acute renal failure (ARF) [facility]) [N17.9]    Admit Date:  7/30/2021    Hospital Day: 3    Subjective:    Patient is mostly somnolent. No new complaints. Has been threatening to leave [address] since yesterday. Transferred to hospitalist service from ICU level of care yesterday. Patient Seen, Chart, Labs, Radiology studies, and Consults reviewed. Objective:   /74   Pulse 50   Temp 97.7 °F (36.5 °C) (Oral)   Resp 18   Ht 5' 10\" (1.778 m)   Wt 198 lb 3.2 oz (89.9 kg)   SpO2 97%   BMI 28.44 kg/m²       Intake/Output Summary (Last 24 hours) at 8/2/2021 0940  Last data filed at 8/2/2021 0850  Gross per 24 hour   Intake 4084.82 ml   Output 4050 ml   Net 34.82 ml       Review of Systems   Constitutional: Positive for activity change. Negative for appetite change, chills, diaphoresis, fatigue, fever and unexpected weight change. HENT: Negative for congestion, dental problem, drooling, ear discharge, ear pain, facial swelling, hearing loss, mouth sores, nosebleeds, postnasal drip, rhinorrhea, sinus pressure, sinus pain, sneezing, sore throat, tinnitus, trouble swallowing and voice change. Eyes: Negative for photophobia, pain, discharge, redness, itching and visual disturbance. Respiratory: Negative for apnea, cough, choking, chest tightness, shortness of breath, wheezing and stridor. Cardiovascular: Negative for chest pain, palpitations and leg swelling. Gastrointestinal: Negative for abdominal distention, abdominal pain, anal bleeding, blood in stool, constipation, diarrhea, nausea, rectal pain and vomiting. Endocrine: Negative for cold intolerance, heat intolerance, polydipsia, polyphagia and polyuria.    Genitourinary: Negative for decreased urine volume, difficulty urinating, discharge, dysuria, enuresis, flank pain, frequency, genital sores, hematuria, penile pain, penile swelling, scrotal swelling, testicular pain and urgency. Musculoskeletal: Negative for arthralgias, back pain, gait problem, joint swelling, myalgias, neck pain and neck stiffness. Skin: Negative for color change, pallor, rash and wound. Allergic/Immunologic: Negative for food allergies. Neurological: Negative for dizziness, tremors, seizures, syncope, facial asymmetry, speech difficulty, weakness, light-headedness, numbness and headaches. Hematological: Negative for adenopathy. Does not bruise/bleed easily. Psychiatric/Behavioral: Positive for behavioral problems. Negative for agitation, confusion, decreased concentration, dysphoric mood, hallucinations, self-injury, sleep disturbance and suicidal ideas. The patient is not nervous/anxious and is not hyperactive. Physical Exam  Vitals and nursing note reviewed. Constitutional:       General: He is not in acute distress. Appearance: Normal appearance. He is normal weight. He is not ill-appearing, toxic-appearing or diaphoretic. Comments: Somnolent   HENT:      Head: Normocephalic and atraumatic. Right Ear: External ear normal.      Left Ear: External ear normal.      Nose: Nose normal. No congestion or rhinorrhea. Mouth/Throat:      Mouth: Mucous membranes are moist.      Pharynx: Oropharynx is clear. No oropharyngeal exudate or posterior oropharyngeal erythema. Eyes:      General: No scleral icterus. Right eye: No discharge. Left eye: No discharge. Extraocular Movements: Extraocular movements intact. Conjunctiva/sclera: Conjunctivae normal.      Pupils: Pupils are equal, round, and reactive to light. Neck:      Vascular: No carotid bruit. Cardiovascular:      Rate and Rhythm: Normal rate and regular rhythm. Pulses: Normal pulses. Heart sounds: Normal heart sounds.  No murmur heard.   No friction rub. No gallop. Pulmonary:      Effort: Pulmonary effort is normal. No respiratory distress. Breath sounds: Normal breath sounds. No stridor. No wheezing, rhonchi or rales. Chest:      Chest wall: No tenderness. Abdominal:      General: Abdomen is flat. Bowel sounds are normal. There is no distension. Palpations: Abdomen is soft. There is no mass. Tenderness: There is no abdominal tenderness. There is no right CVA tenderness, left CVA tenderness, guarding or rebound. Hernia: No hernia is present. Musculoskeletal:         General: No swelling, tenderness, deformity or signs of injury. Normal range of motion. Cervical back: Normal range of motion and neck supple. No rigidity or tenderness. Right lower leg: No edema. Left lower leg: No edema. Lymphadenopathy:      Cervical: No cervical adenopathy. Skin:     General: Skin is warm. Coloration: Skin is not jaundiced or pale. Findings: No bruising, erythema, lesion or rash. Comments: Multiple new and old skin sores   Neurological:      General: No focal deficit present. Mental Status: He is alert and oriented to person, place, and time. Mental status is at baseline. Cranial Nerves: No cranial nerve deficit. Sensory: No sensory deficit. Motor: No weakness.       Coordination: Coordination normal.      Gait: Gait normal.      Deep Tendon Reflexes: Reflexes normal.   Psychiatric:         Mood and Affect: Mood normal.         Medications:    fluconazole  200 mg Oral Daily    chlordiazePOXIDE  5 mg Oral TID    sodium chloride flush  5-40 mL Intravenous 2 times per day    enoxaparin  40 mg Subcutaneous Q24H    mirtazapine  7.5 mg Oral Nightly    venlafaxine  37.5 mg Oral BID       Continuous Infusions:   sodium chloride      sodium chloride 150 mL/hr at 08/01/21 2146       PRN Meds:sodium chloride flush, sodium chloride, ondansetron **OR** ondansetron, polyethylene glycol, acetaminophen **OR** acetaminophen, LORazepam **OR** LORazepam **OR** LORazepam **OR** LORazepam **OR** LORazepam **OR** LORazepam **OR** LORazepam **OR** LORazepam    Data:    CBC:   Recent Labs     07/31/21 0337 08/01/21  0415 08/02/21  0349   WBC 17.6* 9.8 7.8   RBC 4.42* 4.10* 3.86*   HGB 11.2* 10.3* 9.7*   HCT 35.5* 32.8* 31.6*   MCV 80.3 80.0 81.9    295 281       BMP:   Recent Labs     07/31/21 0337 07/31/21 0337 07/31/21  0955 07/31/21  1804 08/01/21  1032 08/01/21  1808 08/02/21  0349   *   < > 138   < > 138 139 139   K 4.0  --  4.1   < > 3.9 4.0 3.9   CL 96*   < > 100   < > 101 102 106   CO2 18*   < > 22*   < > 25 26 22*   PHOS 4.9*  --  5.5*  --   --   --  3.7   BUN 51*   < > 46*   < > 28* 25* 19   CREATININE 9.4*   < > 7.9*   < > 3.2* 2.7* 2.0*    < > = values in this interval not displayed. BNP: No results for input(s): BNP in the last 72 hours. PT/INR:No results for input(s): PROTIME, INR in the last 72 hours. APTT: No results for input(s): APTT in the last 72 hours. CARDIAC ENZYMES: No results for input(s): CKMB, CKMBINDEX, TROPONINT in the last 72 hours. Invalid input(s): CKTOTAL;3    FASTING LIPID PANEL:No results found for: CHOL, HDL, TRIG    LIVER PROFILE:   Recent Labs     07/30/21 2055 07/31/21 0337 08/02/21  0349   * 235* 88*   * 145* 81*   BILIDIR  --  <0.2 <0.2   BILITOT 0.3 0.3 <0.2*   ALKPHOS 131* 80 58        ABGs:   Lab Results   Component Value Date    PH 7.30 08/25/2019    PCO2 40 08/25/2019    PO2 58 08/25/2019    HCO3 20 08/25/2019    O2SAT 87 08/25/2019     Results for Rebecca Fulling (MRN [id number]) as of 8/2/2021 09:30   Ref. Range 7/30/2021 20:55 7/31/2021 03:37 8/1/2021 10:32 8/2/2021 03:49   Total CK Latest Ref Range: 55 - 170 U/L 10,743 (H) 15,659 (H) 5,830 (H) 3,373 (H)     Results for Rebecca Fulling (MRN [id number]) as of 8/2/2021 09:30   Ref.  Range 8/1/2021 04:15 8/1/2021 10:32 8/1/2021 18:08 8/2/2021 03:49 Creatinine Latest Ref Range: 0.4 - 1.2 mg/dL 4.3 (HH) 3.2 (HH) 2.7 (H) 2.0 (H)     Results for Robinson Muhammad (MRN [id number]) as of 8/2/2021 09:30   Ref. Range 7/30/2021 21:05 7/31/2021 03:37   Procalcitonin Latest Ref Range: 0.01 - 0.09 ng/mL 13.08 (H) 7.58 (H)       URINALYSIS. Results for Robinson Muhammad (MRN [id number]) as of 8/2/2021 09:30   Ref. Range 7/31/2021 03:30   Color, UA Latest Ref Range: YELLOW-STRAW  YELLOW   Bilirubin, Urine Latest Ref Range: NEGATIVE  NEGATIVE   Ketones, Urine Latest Ref Range: NEGATIVE  NEGATIVE   Specific Gravity, UA Latest Ref Range: 1.002 - 1.030  1.020   Blood, Urine Latest Ref Range: NEGATIVE  LARGE (A)   pH, UA Latest Ref Range: 5.0 - 9.0  5.0   Protein, UA Latest Ref Range: NEGATIVE mg/dl 300 (A)   Urobilinogen, Urine Latest Ref Range: 0.0 - 1.0 eu/dl 1.0   Nitrite, Urine Latest Ref Range: NEGATIVE  NEGATIVE   Leukocytes, UA Latest Ref Range: NEGATIVE  TRACE (A)   Glucose, Urine Latest Ref Range: NEGATIVE mg/dl 100 (A)   Casts Latest Units: /lpf NONE SEEN   WBC, UA Latest Ref Range: 0-4/hpf /hpf 5-9   RBC, UA Latest Ref Range: 0-2/hpf /hpf 3-5   Epithelial Cells, UA Latest Ref Range: 3-5/hpf /hpf 3-5   Renal Epithelial, UA Latest Ref Range: NONE SEEN  NONE SEEN   Bacteria, UA Latest Ref Range: FEW/NONE SEEN  MODERATE   Yeast, Urine Latest Ref Range: NONE SEEN  FEW BUDDING   Crystals Latest Ref Range: NONE SEEN  NONE SEEN   Character, Urine Latest Ref Range: CLR-SL.CLOUD  CLOUDY (A)   Myoglobin, Ur Latest Ref Range: NEGATIVE  POSITIVE (A)       SEROLOGY  Results for Robinson Muhammad (MRN [id number]) as of 8/2/2021 09:30   Ref. Range 7/31/2021 09:55   Hep B S Ab Unknown POSITIVE   Hepatitis B Surface Ag Unknown Negative   Hepatitis C Ab Unknown POSITIVE (A)   Hep B Core Ab, IgM Unknown Negative     Results for Robinson Muhammad (MRN [id number]) as of 8/2/2021 09:30   Ref.  Range 8/28/2020 08:11   TATYANA SCREEN Latest Ref Range: None Detected  None Detected   HCV QNT by NAAT IU/ML Latest Units: IU/mL 34,433   HCV Qnt by NAAT log IU/ml Latest Units: log IU/mL 4.54     TUMOR MARKERS. None. MICROBIOLOGY   Urine Culture, Routine No growth-preliminary   No growth        HISTOPATHOLOGY. None. TOXICOLOGY. Results for Nelda Flores (MRN [id number]) as of 8/2/2021 09:30   Ref. Range 7/31/2021 03:37   ETHYL ALCOHOL, SERUM Latest Ref Range: 0.00 % < 0.01       Results for Nelda Flores (MRN [id number]) as of 8/2/2021 09:30   Ref. Range 7/31/2021 03:30   AMPHETAMINE+METHAMPHETAMINE URINE SCREEN Latest Ref Range: NEGATIVE  POSITIVE   Barbiturate Quant, Ur Latest Ref Range: NEGATIVE  Negative   Benzodiazepine Quant, Ur Latest Ref Range: NEGATIVE  Negative   Cannabinoid Quant, Ur Latest Ref Range: NEGATIVE  Negative   Cocaine Metab Quant, Ur Latest Ref Range: NEGATIVE  POSITIVE   PCP Quant, Ur Latest Ref Range: NEGATIVE  Negative   Opiates, Urine Latest Ref Range: NEGATIVE  Negative       ENDOSCOPE STUDIES. None. PROCEDURES. None. RADIOLOGY. Chest x-ray-07/31/2021.   IMPRESSION:  1. Normal heart size. Moderate left anterior obliquity of the patient. 2. No acute findings. No infiltrates or effusions are seen. CT abdomen pelvis w/o contrast-11/05/2020. FINDINGS:  Stable 5 mm right lower lung nodule.     The liver, gallbladder, spleen, pancreas, and adrenal glands are unremarkable.     Both kidneys are similar in size without hydronephrosis. Nonobstructing stones are seen in both kidneys measuring up to 3 mm in the   right renal midpole. No ureteral calculi.     No bowel obstruction, pneumoperitoneum, or pneumatosis. The appendix is normal.     The abdominal aorta is not aneurysmal.      There are no enlarged abdominal or pelvic lymph nodes.     The urinary bladder and prostate are unremarkable. No free fluid in the pelvis.     There are no aggressive osseous lesions.     IMPRESSION:  1.Stable nonobstructing bilateral renal stones.          No ureteral calculi or Hydronephrosis. 2.Appendix is normal.         No other acute findings.     This document has been electronically signed by: Holly Mcnulty MD on 11/05/2020 09:48 PM     ASSESSMENT AND  PLAN. 1.NEUROVASCULAR. Acute toxic metabolic encephalopathy due to meth/amphetamine/ cocaine intoxication. 2.CARDIOVASCULAR. H/o HTN -hypotensive on admission-on IVF w/ NS. 3.GI. Chronic hepatitis C-untreated. AFP check    4. RENAL AND ELECTROLYTES. SATYA due to ATN from acute rhabdomyolysis-on IV fluid normal saline. 5.ID. Budding yeast UTI-p.o. fluconazole 200 mg x 3 days. 6.ENDO.     TSH and A1c check. 7.MUSCULOSKELETAL. Acute rhabdomyolysis due to cocaine and meth intoxication. 8.PSYCH. H/o anxiety and depression ,ADHD. 9. LIFE STYLE.     H/o polysubstance abuse UDS positive for amphetamine/methamphetamine/cocaine. 10.HEM-ONC. Chronic normocytic anemia-Hb 9.7, MCV 81.9. Serum Q05, folic acid, and iron check    11. TOXICOLOGY. Amphetamine/methamphetamine and cocaine intoxication-resolving. 12.DISPO. Planned d/c to home vs rehab soon.       Electronically signed Faizan Vizcaino MD on 8/2/2021 at 9:40 AM    Rounding Hospitalist

## 2021-08-02 NOTE — DISCHARGE SUMMARY
Hospitalist Discharge Summary-Internal Medicine. Patient Identification:   Vanessa Terry     : 1985    MRN: [id number]     Account: [de-identified]      Patient's PCP: PAPA Willis - CNP    Admit Date: 2021     Discharge Date: 2021      Admitting Physician: Namita Willard MD     Discharge Physician: Carly Richard MD     Discharge Diagnoses: Active Hospital Problems    Diagnosis Date Noted    Cocaine intoxication with complication [facility]) [G39.348] 2021    Chronic hepatitis C without hepatic coma ([facility] [B18.2] 2021    Yeast UTI [B37.49] 2021    Acute kidney injury (SATYA) with acute tubular necrosis (ATN) ([facility] [N17.0] 2021    Non-traumatic rhabdomyolysis [M62.82] 2020    Anxiety and depression [F41.9, F32.9]     Toxic metabolic encephalopathy [L00] 2020    Polysubstance abuse ([facility] [F19.10]     History of kidney stones [Z87.442] 2019    Normocytic anemia [D64.9] 2019    Methamphetamine abuse ([facility] [address] [F15.10] 2019       The patient was seen and examined on day of discharge and this discharge summary is in conjunction with any daily progress note from day of discharge. Hospital Course:     Vanessa Terry is a 39 y.o. male admitted to [address] [place] [address] on 2021 for drug withdrawal  And generalized muscle pain due to acute methamphetamine/cocaine induced acute rhabdomyolysis with intoxication. Additionally treated for SATYA with ATN due to acute cocaine/meth/amphetamine induced rhabdomyolysis. Patient left AGAINST MEDICAL ADVICE this afternoon. HPI and Physical Exam:    39 y.o. male PMHx polysubstance abuse (including cocaine, opioids; on suboxone), tobacco abuse, untreated hepatitis C, depression, anxiety, ? ADHD and HTN -- presents to [facility] with a chief complaint of muscle pain and agitation.  History obtained from the patient.     Patient presented to ED for excruciating muscle pain rated at a 10/10. Patient is described as constant throbbing and stabbing that is diffuse but worse in his legs. States nothing has helped or made the pain worse. Pt reportedly did meth yesterday and then after feeling pain in his muscles snorted some fentanyl. He also reports taking [id number] lyrica, half a suboxone and 3x40mg vyvanse today. He reports 3 episodes of non-bilious, non-bloody vomiting and unknown episodes of dark purple stool. Also endorses back pain for which he takes fentanyl often. Patient also reports falling many times in the past two days and not being able to get up, which at one point led to the patient pooping on his rug because he was unable to make it to the toilet. Pt denies history of epilepsy but reports drug-induced seizures in the past, the last one being over 4 years ago. Pt denies any alcohol use in the past 72 hours.      No additional concerns or questions were addressed.      ED course: VS: BP 94/71, , RR 20, T 98.1, 99% on RA. Workup remarkable for: Cr 9.7, GFR 6, , [place]ion [place] 22[place] [address], Alk phos 131, , , WBC 20.3, Hgb 13.1, MCV 79.1. Received: 1L NS bolus and ativan. Hospitalist service contacted for admission. Please see A&P for additional details. Vitals:  Vitals:    08/02/21 0015 08/02/21 0352 08/02/21 0930 08/02/21 1206   BP: 119/72 115/74 109/67 120/78   Pulse: 66 50 66 (!) 49   Resp: 18 18 16 16   Temp: 98.5 °F (36.9 °C) 97.7 °F (36.5 °C) 97.7 °F (36.5 °C) 98.5 °F (36.9 °C)   TempSrc: Oral Oral  Oral   SpO2: 97% 97% 99% 97%   Weight:  198 lb 3.2 oz (89.9 kg)     Height:           Weight: Weight: 198 lb 3.2 oz (89.9 kg)     24 hour intake/output:    Intake/Output Summary (Last 24 hours) at 8/2/2021 1855  Last data filed at 8/2/2021 1000  Gross per 24 hour   Intake 2814.82 ml   Output 3650 ml   Net -835.18 ml       Physical Exam  Vitals and nursing note reviewed. Constitutional:       General: He is not in acute distress.      Appearance: Normal appearance. He is normal weight. He is not ill-appearing, toxic-appearing or diaphoretic. HENT:      Head: Normocephalic and atraumatic. Right Ear: External ear normal.      Left Ear: External ear normal.      Nose: Nose normal. No congestion or rhinorrhea. Mouth/Throat:      Mouth: Mucous membranes are moist.      Pharynx: Oropharynx is clear. No oropharyngeal exudate or posterior oropharyngeal erythema. Eyes:      General: No scleral icterus. Right eye: No discharge. Left eye: No discharge. Extraocular Movements: Extraocular movements intact. Conjunctiva/sclera: Conjunctivae normal.      Pupils: Pupils are equal, round, and reactive to light. Neck:      Vascular: No carotid bruit. Cardiovascular:      Rate and Rhythm: Normal rate and regular rhythm. Pulses: Normal pulses. Heart sounds: Normal heart sounds. No murmur heard. No friction rub. No gallop. Pulmonary:      Effort: Pulmonary effort is normal. No respiratory distress. Breath sounds: Normal breath sounds. No stridor. No wheezing, rhonchi or rales. Chest:      Chest wall: No tenderness. Abdominal:      General: Abdomen is flat. Bowel sounds are normal. There is no distension. Palpations: Abdomen is soft. There is no mass. Tenderness: There is no abdominal tenderness. There is no right CVA tenderness, left CVA tenderness, guarding or rebound. Hernia: No hernia is present. Musculoskeletal:         General: No swelling, tenderness, deformity or signs of injury. Normal range of motion. Cervical back: Normal range of motion and neck supple. No rigidity or tenderness. Right lower leg: No edema. Left lower leg: No edema. Lymphadenopathy:      Cervical: No cervical adenopathy. Skin:     General: Skin is warm. Coloration: Skin is not jaundiced or pale. Findings: No bruising, erythema, lesion or rash.       Comments: Multiple generalized skin sores/wounds Neurological:      General: No focal deficit present. Mental Status: He is alert and oriented to person, place, and time. Mental status is at baseline. Cranial Nerves: No cranial nerve deficit. Sensory: No sensory deficit. Motor: No weakness. Coordination: Coordination normal.      Gait: Gait normal.      Deep Tendon Reflexes: Reflexes normal.   Psychiatric:         Mood and Affect: Mood normal.     CBC:    Lab Results   Component Value Date    WBC 7.8 08/02/2021    HGB 9.7 08/02/2021    HCT 31.6 08/02/2021     08/02/2021       Renal:    Lab Results   Component Value Date     08/02/2021    K 3.5 08/02/2021    K 4.0 07/31/2021     08/02/2021    CO2 21 08/02/2021    BUN 17 08/02/2021    CREATININE 1.8 08/02/2021    CALCIUM 8.7 08/02/2021    PHOS 3.7 08/02/2021      Results for Noah Franco (MRN [id number]) as of 8/2/2021 09:30    Ref. Range 7/30/2021 20:55 7/31/2021 03:37 8/1/2021 10:32 8/2/2021 03:49   Total CK Latest Ref Range: 55 - 170 U/L 10,743 (H) 15,659 (H) 5,830 (H) 3,373 (H)      Results for Noah Franco (MRN [id number]) as of 8/2/2021 09:30    Ref. Range 8/1/2021 04:15 8/1/2021 10:32 8/1/2021 18:08 8/2/2021 03:49   Creatinine Latest Ref Range: 0.4 - 1.2 mg/dL 4.3 (HH) 3.2 (HH) 2.7 (H) 2.0 (H)      Results for Noah Franco (MRN [id number]) as of 8/2/2021 09:30    Ref. Range 7/30/2021 21:05 7/31/2021 03:37   Procalcitonin Latest Ref Range: 0.01 - 0.09 ng/mL 13.08 (H) 7.58 (H)         URINALYSIS. Results for Noah Franco (MRN [id number]) as of 8/2/2021 09:30    Ref.  Range 7/31/2021 03:30   Color, UA Latest Ref Range: YELLOW-STRAW  YELLOW   Bilirubin, Urine Latest Ref Range: NEGATIVE  NEGATIVE   Ketones, Urine Latest Ref Range: NEGATIVE  NEGATIVE   Specific Gravity, UA Latest Ref Range: 1.002 - 1.030  1.020   Blood, Urine Latest Ref Range: NEGATIVE  LARGE (A)   pH, UA Latest Ref Range: 5.0 - 9.0  5.0   Protein, UA Latest Ref Range: NEGATIVE mg/dl 300 (A) Urobilinogen, Urine Latest Ref Range: 0.0 - 1.0 eu/dl 1.0   Nitrite, Urine Latest Ref Range: NEGATIVE  NEGATIVE   Leukocytes, UA Latest Ref Range: NEGATIVE  TRACE (A)   Glucose, Urine Latest Ref Range: NEGATIVE mg/dl 100 (A)   Casts Latest Units: /lpf NONE SEEN   WBC, UA Latest Ref Range: 0-4/hpf /hpf 5-9   RBC, UA Latest Ref Range: 0-2/hpf /hpf 3-5   Epithelial Cells, UA Latest Ref Range: 3-5/hpf /hpf 3-5   Renal Epithelial, UA Latest Ref Range: NONE SEEN  NONE SEEN   Bacteria, UA Latest Ref Range: FEW/NONE SEEN  MODERATE   Yeast, Urine Latest Ref Range: NONE SEEN  FEW BUDDING   Crystals Latest Ref Range: NONE SEEN  NONE SEEN   Character, Urine Latest Ref Range: CLR-SL.CLOUD  CLOUDY (A)   Myoglobin, Ur Latest Ref Range: NEGATIVE  POSITIVE (A)         SEROLOGY  Results for Tyler Braxton (MRN [id number]) as of 8/2/2021 09:30    Ref. Range 7/31/2021 09:55   Hep B S Ab Unknown POSITIVE   Hepatitis B Surface Ag Unknown Negative   Hepatitis C Ab Unknown POSITIVE (A)   Hep B Core Ab, IgM Unknown Negative      Results for Tyler Braxton (MRN [id number]) as of 8/2/2021 09:30    Ref. Range 8/28/2020 08:11   TATYANA SCREEN Latest Ref Range: None Detected  None Detected   HCV QNT by NAAT IU/ML Latest Units: IU/mL 34,433   HCV Qnt by NAAT log IU/ml Latest Units: log IU/mL 4.54      TUMOR MARKERS. None.     MICROBIOLOGY   Urine Culture, Routine No growth-preliminary   No growth          HISTOPATHOLOGY. None.     TOXICOLOGY. Results for Tyler Braxton (MRN [id number]) as of 8/2/2021 09:30    Ref. Range 7/31/2021 03:37   ETHYL ALCOHOL, SERUM Latest Ref Range: 0.00 % < 0.01         Results for Tyler Braxton (MRN [id number]) as of 8/2/2021 09:30    Ref.  Range 7/31/2021 03:30   AMPHETAMINE+METHAMPHETAMINE URINE SCREEN Latest Ref Range: NEGATIVE  POSITIVE   Barbiturate Quant, Ur Latest Ref Range: NEGATIVE  Negative   Benzodiazepine Quant, Ur Latest Ref Range: NEGATIVE  Negative   Cannabinoid Quant, Ur Latest Ref Range: NEGATIVE Negative   Cocaine Metab Quant, Ur Latest Ref Range: NEGATIVE  POSITIVE   PCP Quant, Ur Latest Ref Range: NEGATIVE  Negative   Opiates, Urine Latest Ref Range: NEGATIVE  Negative        PROCEDURES. None. ENDOSCOPE STUDIES. None. RADIOLOGY STUDIES. Chest x-ray-07/31/2021.   IMPRESSION:  1. Normal heart size.         Moderate left anterior obliquity of the patient.     2. No acute findings.          No infiltrates or effusions are seen.     CT abdomen pelvis w/o contrast-11/05/2020. FINDINGS:  Stable 5 mm right lower lung nodule.     The liver, gallbladder, spleen, pancreas, and adrenal glands are unremarkable.     Both kidneys are similar in size without hydronephrosis.     Nonobstructing stones are seen in both kidneys measuring up to 3 mm in the   right renal midpole.     No ureteral calculi.     No bowel obstruction, pneumoperitoneum, or pneumatosis.      The appendix is normal.     The abdominal aorta is not aneurysmal.       There are no enlarged abdominal or pelvic lymph nodes.     The urinary bladder and prostate are unremarkable.     No free fluid in the pelvis.     There are no aggressive osseous lesions.     IMPRESSION:  1.Stable nonobstructing bilateral renal stones.          No ureteral calculi or Hydronephrosis.     2. Appendix is normal.          No other acute findings.     This document has been electronically signed by: Ivana Jean MD on 11/05/2020 09:48 PM     Consults:     IP CONSULT TO NEPHROLOGY  IP CONSULT TO SOCIAL WORK  PHARMACY CONSULT FOR RENAL DOSING  IP CONSULT TO SOCIAL WORK  IP CONSULT TO ADDICTION SERVICES    Disposition:    [x] Home       [] TCU       [] Rehab       [] Psych       [] SNF       [] [place]       [] Other-    Condition at Discharge: Stable    Code Status:  Prior     Patient Instructions:      Discharge lab work: Activity: activity as tolerated    Diet: No diet orders on file      Follow-up visits:     No follow-up provider specified. Discharge Medications:      Iain Hawkins   Home Medication Instructions YUQ:[id number]    Printed on:08/02/21 0415   Medication Information                      Aspirin-Acetaminophen-Caffeine (EXCEDRIN EXTRA STRENGTH PO)  Take by mouth as needed             Lisdexamfetamine Dimesylate (VYVANSE) 40 MG CAPS  Take 40 mg by mouth daily for 30 days. Lisdexamfetamine Dimesylate (VYVANSE) 40 MG CAPS  Take 40 mg by mouth daily for 30 days. lisinopril (PRINIVIL;ZESTRIL) 20 MG tablet  Take 1 tablet by mouth daily             mirtazapine (REMERON) 45 MG tablet  Take 1 tablet by mouth nightly             naloxone 4 MG/0.1ML LIQD nasal spray  1 spray by Nasal route as needed for Opioid Reversal             ondansetron (ZOFRAN ODT) 4 MG disintegrating tablet  Take 1 tablet by mouth every 8 hours as needed for Nausea             pregabalin (LYRICA) 300 MG capsule  Take 1 capsule by mouth 2 times daily for 30 days. testosterone (ANDROGEL) 25 MG/2.5GM (1%) GEL 1 % gel  Apply 2.5 g topically daily for 30 days. venlafaxine (EFFEXOR) 100 MG tablet  Take 1.5 tablets by mouth 2 times daily                 Time Spent on discharge is less than 15 minutes in the examination, evaluation, counseling and review of medications and discharge plan. Signed: Thank you PAPA Meraz CNP for the opportunity to be involved in this patient's care.     Electronically signed by Travon Nunn MD on 8/2/2021 at 6:55 PM

## 2021-08-02 NOTE — ED PROVIDER NOTES
[facility]  EMERGENCY MEDICINE ATTENDING ATTESTATION      Evaluation of Krystal Hdez. Case discussed and care plan developed with resident physician. I agree with the resident physician documentation and plan as documented by him, except if my documentation differs. Patient seen, interviewed and examined by me. I reviewed the medical, surgical, family and social history, medications and allergies. I have reviewed the nursing documentation. I have reviewed the patient's vital signs and are abnormal from Hypertension, bradypnea per my interpretation. Body mass index is 28.41 kg/m². Pulsoxymetry is normal per my interpretation. Brief H&P   Patient brought in by EMS for possible overdose. Patient is easily arousable and acknowledges having snorted Xanax and fentanyl today, prior to arrival.  Denies having been on detox program before and denies IVDA. Physical exam is notable for well appearing, multiple excoriations in upper extremities and face. No visible injection track marks. Patient is easily arousable, has slow but steady respiratory pattern and is able to maintain an acceptable oxygen saturation at rest while sleeping. Medical Decision Making   MDM:   1. Opioid and benzodiazepine overdose  Plan:    Will observe for sobriety   If needed we will give naloxone.  Once awake will talk with him about the possibility of starting Suboxone on our addiction clinic        Please see the resident physician completed note for final disposition except as documented on this attestation. I have reviewed and interpreted all available lab, radiology and ekg results available at the moment. Diagnosis, treatment and disposition plans were discussed and agreed upon by patient. This transcription was electronically signed. It was dictated by use of voice recognition software and electronically transcribed.  The transcription may contain errors not detected in proofreading.      I performed direct supervision and was present for the critical portion following procedures: None  Critical care time on this case: None    Electronically signed by Oumar Sullivan MD on 8/2/21 at 7:39 PM EDT       Oumar Sullivan MD  08/02/21 6488

## 2021-08-02 NOTE — ED NOTES
Bed: 005A  Expected date: 8/2/21  Expected time: 6:21 PM  Means of arrival: [facility] Dept  Comments:     Rebecca Rivas RN  08/02/21 5628

## 2021-08-02 NOTE — ED TRIAGE NOTES
Patient presents to the ED via [facility] for concerns of a possible overdose after being found unreponsive. [facility] responded to scene before [facility]. Narcan was Not given as patient became more arousable and able to maintain airway. Patient was placed on 4: NC en route. Current oxygen level at room air is 93-94%. Patient appears very drowsy but is able to answer with slow responses. Per medics, patient has a history of opiate use. Patient has multiple healing scabs of different stages on extremities. Patient also has a history of Hep C.

## 2021-08-02 NOTE — ED NOTES
Report received from [facility]. Pt awakens to verbal. Pt VSS. Pt respirations easy and unlabored. Pt denies needs.  Call light in reach, will continue to monitor      Alex Rowe RN  08/02/21 6745

## 2021-08-02 NOTE — PROGRESS NOTES
Renal Progress Note    Assessment and Plan:   1. Acute kidney injury due to rhabdomyolysis much improved  2. Rhabdomyolysis nontraumatic  3. Polysubstance abuse  4. Normocytic anemia  5. Metabolic acidosis   6. Hepatitis C viral infection  7. Hyperphosphatemia due to rhabdomyolysis and is resolving  8. Deconditioned state    PLAN:  1. Labs reviewed  2. Serum creatinine is progressively improving  3. Serum bicarbonate declined this morning  4. Medications reviewed  5. Decrease intravenous fluid to 125 from well per hour from 150 mill per hour  6. Labs in the morning  7. We will continue to follow    Patient Active Problem List:     Drug overdose, intentional ([address]     Methamphetamine abuse ([address]     Acute kidney injury ([address]     Metabolic acidosis, normal anion gap (NAG)     Drug overdose, intentional, initial encounter ([address]     Leukocytosis     Normocytic anemia     Anxiety     History of kidney stones     Acute and chronic respiratory failure with hypoxia ([address]     COVID-19     Drug overdose, accidental or unintentional, initial encounter     Drug overdose, multiple drugs, accidental or unintentional, initial encounter     Polysubstance abuse ([address]     Acute encephalopathy     Fever     Bilateral leg edema     Severe fentanyl use disorder (HCC)     Elevated liver enzymes     Altered mental status     Calculus of ureterovesical junction (UVJ)     Non-traumatic rhabdomyolysis     Depression     Acute renal failure (ARF) (HCC)      Subjective:   Admit Date: 7/30/2021    Interval History:   Seen and examined. Seen for acute kidney injury   Sleeping this morning. Updated by the staff. No new issues from staff  Blood pressure is stable  Very good urine output.       Medications:   Scheduled Meds:   fluconazole  200 mg Oral Daily    chlordiazePOXIDE  5 mg Oral TID    sodium chloride flush  5-40 mL Intravenous 2 times per day    enoxaparin  40 mg Subcutaneous Q24H    mirtazapine  7.5 mg Oral Nightly    venlafaxine 37.5 mg Oral BID     Continuous Infusions:   sodium chloride      sodium chloride 150 mL/hr at 08/01/21 2146       CBC:   Recent Labs     07/31/21  0337 08/01/21  0415 08/02/21  0349   WBC 17.6* 9.8 7.8   HGB 11.2* 10.3* 9.7*    295 281     CMP:    Recent Labs     08/01/21  1032 08/01/21  1808 08/02/21  0349    139 139   K 3.9 4.0 3.9    102 106   CO2 25 26 22*   BUN 28* 25* 19   CREATININE 3.2* 2.7* 2.0*   GLUCOSE 79 89 84   CALCIUM 8.8 9.0 8.5   LABGLOM 22* 27* 38*     Troponin: No results for input(s): TROPONINI in the last 72 hours. BNP: No results for input(s): BNP in the last 72 hours. INR: No results for input(s): INR in the last 72 hours. Lipids: No results for input(s): CHOL, LDLDIRECT, TRIG, HDL, AMYLASE, LIPASE in the last 72 hours. Liver:   Recent Labs     08/02/21  0349   AST 88*   ALT 81*   ALKPHOS 58   PROT 6.2   LABALBU 3.4*   BILITOT <0.2*     Iron:  No results for input(s): IRONS, FERRITIN in the last 72 hours. Invalid input(s): LABIRONS  XR CHEST PORTABLE   Final Result   1. Normal heart size. Moderate left anterior obliquity of the patient. 2. No acute findings. No infiltrates or effusions are seen. **This report has been created using voice recognition software. It may contain minor errors which are inherent in voice recognition technology. **      Final report electronically signed by Dr. José Miguel Ortega on 7/31/2021 12:53 PM            Objective:   Vitals: /67   Pulse 66   Temp 97.7 °F (36.5 °C)   Resp 16   Ht 5' 10\" (1.778 m)   Wt 198 lb 3.2 oz (89.9 kg)   SpO2 99%   BMI 28.44 kg/m²    Wt Readings from Last 3 Encounters:   08/02/21 198 lb 3.2 oz (89.9 kg)   07/15/21 193 lb 9.6 oz (87.8 kg)   06/30/21 194 lb (88 kg)      24HR INTAKE/OUTPUT:      Intake/Output Summary (Last 24 hours) at 8/2/2021 1032  Last data filed at 8/2/2021 1000  Gross per 24 hour   Intake 4084.82 ml   Output 4900 ml   Net -815.18 ml       Constitutional: Well-developed young gentleman comfortably asleep, no apparent distress   Skin:normal with no rash or lesions. HEENT:Pupils are reactive . Throat is clear . Oral mucosa is moist   Neck:supple   Cardiovascular:  S1, S2 without murmur or rubs   Respiratory:  Clear to ausculation without wheezes, rhonchi or rales in the anterior  Abdomen: Soft. Good bowel sounds. Ext: No LE edema  Musculoskeletal:Intact  Neuro: Comfortably asleep      Electronically signed by Mitchell Castellanos MD on 8/2/2021 at 10:32 AM  **This report has been created using voice recognition software. It maycontain minor  errors which are inherent in voice recognition technology. **

## 2021-08-02 NOTE — ED PROVIDER NOTES
[facility] ENCOUNTER          Pt Name: Jewell Aase  MRN: [id number]  [place] 1985  Date of evaluation: 8/2/2021  Treating Resident Physician: Zachariah Mcgraw MD  Supervising Physician: Wilbur Avila MD    [address] [place] [address]       Chief Complaint   Patient presents with    Ingestion     History obtained from the patient and chart review. HISTORY OF PRESENT ILLNESS    Jewell Aase is a 39 y.o. male who presents to the emergency department for evaluation of altered mental status. EMS Report: Found unconscious and bystander called police and EMS. He did not receive naloxone in the field as he was alert to verbal stimuli. William Fields states he snorted xanax and fentanyl. No other complaints. The patient has no other acute complaints at this time. REVIEW OF SYSTEMS   Review of Systems   Unable to perform ROS: Mental status change (ROS not possible as patient intoxicated from fentanyl and alprazolam)         PAST MEDICAL AND SURGICAL HISTORY     Past Medical History:   Diagnosis Date    Anxiety     Depression     Kidney stone     Liver disease     Hep C    Opiate abuse, continuous ([facility]      History reviewed. No pertinent surgical history. MEDICATIONS   No current facility-administered medications for this encounter. Current Outpatient Medications:     Naloxone HCl (NALOXONE OPIATE OVERDOSE KIT), 1 each by Nasal route once for 1 dose, Disp: 1 kit, Rfl: 0    venlafaxine (EFFEXOR) 100 MG tablet, Take 1.5 tablets by mouth 2 times daily, Disp: 45 tablet, Rfl: 3    lisinopril (PRINIVIL;ZESTRIL) 20 MG tablet, Take 1 tablet by mouth daily, Disp: 30 tablet, Rfl: 3    pregabalin (LYRICA) 300 MG capsule, Take 1 capsule by mouth 2 times daily for 30 days. , Disp: 60 capsule, Rfl: 3    mirtazapine (REMERON) 45 MG tablet, Take 1 tablet by mouth nightly, Disp: 30 tablet, Rfl: 3    Lisdexamfetamine Dimesylate (VYVANSE) 40 MG CAPS, Take 40 mg by mouth daily for 30 days. , Disp: 30 capsule, Rfl: 0    [START ON 8/28/2021] Lisdexamfetamine Dimesylate (VYVANSE) 40 MG CAPS, Take 40 mg by mouth daily for 30 days. , Disp: 30 capsule, Rfl: 0    testosterone (ANDROGEL) 25 MG/2.5GM (1%) GEL 1 % gel, Apply 2.5 g topically daily for 30 days. , Disp: 30 Package, Rfl: 0    ondansetron (ZOFRAN ODT) 4 MG disintegrating tablet, Take 1 tablet by mouth every 8 hours as needed for Nausea, Disp: 20 tablet, Rfl: 0    Aspirin-Acetaminophen-Caffeine (EXCEDRIN EXTRA STRENGTH PO), Take by mouth as needed, Disp: , Rfl:     naloxone 4 MG/0.1ML LIQD nasal spray, 1 spray by Nasal route as needed for Opioid Reversal, Disp: 1 each, Rfl: 5      SOCIAL HISTORY     Social History     Social History Narrative    Not on file     Social History     Tobacco Use    Smoking status: Current Every Day Smoker     Packs/day: 0.50     Types: Cigarettes    Smokeless tobacco: Never Used   Vaping Use    Vaping Use: Never assessed   Substance Use Topics    Alcohol use: Yes     Comment: occasional    Drug use: Yes     Comment: yesterday         ALLERGIES   No Known Allergies      FAMILY HISTORY   History reviewed. No pertinent family history. PREVIOUS RECORDS   Previous records reviewed: last ED note, last d/c summary. PHYSICAL EXAM     ED Triage Vitals [08/02/21 1830]   BP Temp Temp Source Pulse Resp SpO2 Height Weight   125/88 98.4 °F (36.9 °C) Oral 75 16 93 % -- 198 lb (89.8 kg)     Initial vital signs and nursing assessment reviewed and decreased respiration rate. Body mass index is 28.41 kg/m². Pulsoximetry is normal per my interpretation. Additional Vital Signs:  Vitals:    08/02/21 1924   BP:    Pulse: 57   Resp: (!) 6   Temp:    SpO2: 100%       Physical Exam  Vitals and nursing note reviewed. Constitutional:       Appearance: He is normal weight. He is ill-appearing. Comments: Alert to his name   HENT:      Head: Normocephalic.       Mouth/Throat:      Mouth: Prescriptions    NALOXONE HCL (NALOXONE OPIATE OVERDOSE KIT)    1 each by Nasal route once for 1 dose         FINAL DISPOSITION     Final diagnoses:   Fentanyl poisoning of undetermined intent, initial encounter [facility])     Care transferred to Dr. Merle Gusman at 2000. This transcription was electronically signed. Parts of this transcriptions may have been dictated by use of voice recognition software and electronically transcribed, and parts may have been transcribed with the assistance of an ED scribe. The transcription may contain errors not detected in proofreading. Please refer to my supervising physician's documentation if my documentation differs.     Electronically Signed: Martina Pitt MD, 08/02/21, 8:00 PM         Krystal Freeman MD  Resident  08/02/21 2000

## 2021-08-02 NOTE — CARE COORDINATION
8/2/21, 10:45 AM EDT  DISCHARGE PLANNING EVALUATION:    Cheryl Mederos       Admitted: 7/30/2021/ 2047   Hospital day: 3   Location: -31/031-A Reason for admit: Acute renal failure (ARF) ([facility] [N17.9]   PMH:  has a past medical history of Anxiety, Depression, Kidney stone, Liver disease, and Opiate abuse, continuous ([facility] Procedure:   7/31 CXR - Normal heart size. Moderate left anterior obliquity of the patient. No acute findings. No infiltrates or effusions are seen. Barriers to Discharge:  Admitted to ICU through ED with muscle pain. Found to have Rhabdomyolysis, and needed Precedex upon admission. Transferred gto 3B over the weekend. Pt has history of drug/alcohol abuse. Pt was positive for meth and cocaine upon arrival. Positive for Hepatitis B and C. WBC 20.3, down to 7.8 today. BUN 19, creatinine 2.0. Total CK upon admission was 76765, down to 3373 today. Myoglobin was 24279. UA positive blood, Protein, leukocytes, glucose, trace leukocytes, moderate bacteria and few budding yeast. Consulted Addiction Services and CHANDLER Martin, to come out today. BUN was 53, down to 19, creatinine 9.7, down to 2.0. IVF. Librium, Lovenox, Diflucan po daily, Remeron, Effexor. Had been on Rocephin iv daily, stopped today. PCP: PAPA Tinsley - CNP  Readmission Risk Score: 31%    Patient Goals/Plan/Treatment Preferences: Spoke with Opal Ghotra. He lives at home with his girlfriend. States he does not drive, he either walks or takes the bus if needed. Denies any DME or HH currently, refuses any at discharge. Verified his PCP and insurance. Monitor for needs. Transportation/Food Security/Housekeeping Addressed:  No issues identified. 8/2/21, 2:54 PM EDT    Patient goals/plan/ treatment preferences discussed by  and . Patient goals/plan/ treatment preferences reviewed with patient/ family.   Patient/ family verbalize understanding of discharge plan and are in agreement with goal/plan/treatment preferences. Understanding was demonstrated using the teach back method. AVS provided by RN at time of discharge, which includes all necessary medical information pertaining to the patients current course of illness, treatment, post-discharge goals of care, and treatment preferences. Pt left AMA.

## 2021-08-02 NOTE — CONSULTS
[address] attempted, pt somnolent however declined assessment, Clinician left resources and informed Nurse if pt changes his mind to contact the [facility].

## 2021-08-02 NOTE — PROGRESS NOTES
1440 Pt ripped iv out at 1430. I assisted gavin Brock in cleaning up pts bleeding site. Pt stated that he wanted to leave. Primary RNCasandra aware. Casandra RN stated that pt wanted to leave night prior. Pt states that he feels much better and is ready to go. I informed pt that the dr has not released him. Pt stated that he didn't care and was ready to go. Casandra stated that she was perfectserving primary Dr. Nguyen Medrano of [facility]. Pt dressed with assistance of gavin Brock. Pt is wobbly and unsteady on his feet. Pt signed AMA form. No concerns voiced.

## 2021-08-03 ENCOUNTER — CARE COORDINATION (OUTPATIENT)
Dept: CARE COORDINATION | Age: 36
End: 2021-08-03

## 2021-08-03 NOTE — ED NOTES
Pt resting on cot with eyes closed. Pt VSS. Pt denies needs.  Will continue to monitor      Stiven Mcdonald RN  08/02/21 2033

## 2021-08-03 NOTE — CARE COORDINATION
Care Transitions Outreach Attempt    Call within 2 business days of discharge: Yes   Attempted to reach patient for transitions of care follow up. Unable to reach patient. Left HIPAA compliant voicemail with contact information. Will attempt to reach patient again. Patient: Liat Chew Patient : 1985 MRN: <[id number]>    Last Discharge [facility]       Complaint Diagnosis Description Type Department Provider    21 Ingestion Fentanyl poisoning of undetermined intent, initial encounter [facility]) ED (DISCHARGE) [facility] ED Fain Bloch, MD; Fernanda Parham Ch. .. Was this an external facility discharge?  No Discharge Facility: [facility]    Noted following upcoming appointments from discharge chart review:   [facility] follow up appointment(s):   Future Appointments   Date Time Provider Isael Kinney   2021  9:40 AM PAPA Limon - CNP [facility] [place]     Non-[facility] follow up appointment(s):

## 2021-08-03 NOTE — ED PROVIDER NOTES
I examined the patient at 2120 - he was lucid and follows commands, and will be taken home by his girlfriend.       Jesu Wallace MD  08/02/21 2126

## 2021-08-04 ENCOUNTER — APPOINTMENT (OUTPATIENT)
Dept: GENERAL RADIOLOGY | Age: 36
DRG: 351 | End: 2021-08-04
Payer: MEDICARE

## 2021-08-04 ENCOUNTER — HOSPITAL ENCOUNTER (EMERGENCY)
Age: 36
Discharge: HOME | DRG: 351 | End: 2021-08-04
Payer: MEDICARE

## 2021-08-04 VITALS
RESPIRATION RATE: 24 BRPM | HEART RATE: 127 BPM | SYSTOLIC BLOOD PRESSURE: 123 MMHG | WEIGHT: 198 LBS | HEIGHT: 70 IN | BODY MASS INDEX: 28.35 KG/M2 | TEMPERATURE: 98.9 F | DIASTOLIC BLOOD PRESSURE: 90 MMHG

## 2021-08-04 DIAGNOSIS — F19.90 SUBSTANCE USE DISORDER: Primary | ICD-10-CM

## 2021-08-04 DIAGNOSIS — S93.409A SPRAIN OF ANKLE, UNSPECIFIED LATERALITY, UNSPECIFIED LIGAMENT, INITIAL ENCOUNTER: ICD-10-CM

## 2021-08-04 LAB
ACETAMINOPHEN LEVEL: < 5 UG/ML (ref 0–20)
ALBUMIN SERPL-MCNC: 4.3 G/DL (ref 3.5–5.1)
ALP BLD-CCNC: 70 U/L (ref 38–126)
ALT SERPL-CCNC: 75 U/L (ref 11–66)
ANION GAP SERPL CALCULATED.3IONS-SCNC: 12 MEQ/L (ref 8–16)
AST SERPL-CCNC: 69 U/L (ref 5–40)
BASOPHILS # BLD: 0.4 %
BASOPHILS ABSOLUTE: 0.1 THOU/MM3 (ref 0–0.1)
BILIRUB SERPL-MCNC: 0.2 MG/DL (ref 0.3–1.2)
BILIRUBIN DIRECT: < 0.2 MG/DL (ref 0–0.3)
BUN BLDV-MCNC: 9 MG/DL (ref 7–22)
CALCIUM SERPL-MCNC: 9.7 MG/DL (ref 8.5–10.5)
CHLORIDE BLD-SCNC: 101 MEQ/L (ref 98–111)
CO2: 24 MEQ/L (ref 23–33)
CREAT SERPL-MCNC: 1.8 MG/DL (ref 0.4–1.2)
EOSINOPHIL # BLD: 2.4 %
EOSINOPHILS ABSOLUTE: 0.3 THOU/MM3 (ref 0–0.4)
ERYTHROCYTE [DISTWIDTH] IN BLOOD BY AUTOMATED COUNT: 17.2 % (ref 11.5–14.5)
ERYTHROCYTE [DISTWIDTH] IN BLOOD BY AUTOMATED COUNT: 49 FL (ref 35–45)
ETHYL ALCOHOL, SERUM: < 0.01 %
GFR SERPL CREATININE-BSD FRML MDRD: 43 ML/MIN/1.73M2
GLUCOSE BLD-MCNC: 88 MG/DL (ref 70–108)
HCT VFR BLD CALC: 33 % (ref 42–52)
HEMOGLOBIN: 10.1 GM/DL (ref 14–18)
IMMATURE GRANS (ABS): 0.06 THOU/MM3 (ref 0–0.07)
IMMATURE GRANULOCYTES: 0.5 %
LYMPHOCYTES # BLD: 16.5 %
LYMPHOCYTES ABSOLUTE: 2.1 THOU/MM3 (ref 1–4.8)
MCH RBC QN AUTO: 24.7 PG (ref 26–33)
MCHC RBC AUTO-ENTMCNC: 30.6 GM/DL (ref 32.2–35.5)
MCV RBC AUTO: 80.7 FL (ref 80–94)
MONOCYTES # BLD: 6.7 %
MONOCYTES ABSOLUTE: 0.9 THOU/MM3 (ref 0.4–1.3)
NUCLEATED RED BLOOD CELLS: 0 /100 WBC
OSMOLALITY CALCULATION: 271.9 MOSMOL/KG (ref 275–300)
PLATELET # BLD: 272 THOU/MM3 (ref 130–400)
PMV BLD AUTO: 9.5 FL (ref 9.4–12.4)
POTASSIUM SERPL-SCNC: 3.7 MEQ/L (ref 3.5–5.2)
RBC # BLD: 4.09 MILL/MM3 (ref 4.7–6.1)
SALICYLATE, SERUM: < 0.3 MG/DL (ref 2–10)
SEG NEUTROPHILS: 73.5 %
SEGMENTED NEUTROPHILS ABSOLUTE COUNT: 9.6 THOU/MM3 (ref 1.8–7.7)
SODIUM BLD-SCNC: 137 MEQ/L (ref 135–145)
TOTAL PROTEIN: 7.4 G/DL (ref 6.1–8)
TSH SERPL DL<=0.05 MIU/L-ACNC: 2.53 UIU/ML (ref 0.4–4.2)
WBC # BLD: 13 THOU/MM3 (ref 4.8–10.8)

## 2021-08-04 PROCEDURE — 84443 ASSAY THYROID STIM HORMONE: CPT

## 2021-08-04 PROCEDURE — 80053 COMPREHEN METABOLIC PANEL: CPT

## 2021-08-04 PROCEDURE — 80143 DRUG ASSAY ACETAMINOPHEN: CPT

## 2021-08-04 PROCEDURE — 80179 DRUG ASSAY SALICYLATE: CPT

## 2021-08-04 PROCEDURE — 82077 ASSAY SPEC XCP UR&BREATH IA: CPT

## 2021-08-04 PROCEDURE — 73630 X-RAY EXAM OF FOOT: CPT

## 2021-08-04 PROCEDURE — 73610 X-RAY EXAM OF ANKLE: CPT

## 2021-08-04 PROCEDURE — 36415 COLL VENOUS BLD VENIPUNCTURE: CPT

## 2021-08-04 PROCEDURE — 85025 COMPLETE CBC W/AUTO DIFF WBC: CPT

## 2021-08-04 PROCEDURE — 82248 BILIRUBIN DIRECT: CPT

## 2021-08-04 NOTE — ED NOTES
Pt updated on plan of care. Ace wraps applied. Pt was offered a cab and denied, said he wanted to walk home. Pt calm and cooperative.       Nishant Garcia RN  08/04/21 [address], RN  08/04/21 8583

## 2021-08-04 NOTE — ED NOTES
Bed: 022A  Expected date: 8/4/21  Expected time: 1:39 AM  Means of arrival: [facility]  Comments:     Felicia Francis  08/04/21 0141

## 2021-08-05 ENCOUNTER — OFFICE VISIT (OUTPATIENT)
Dept: INTERNAL MEDICINE CLINIC | Age: 36
End: 2021-08-05
Payer: MEDICARE

## 2021-08-05 ENCOUNTER — CARE COORDINATION (OUTPATIENT)
Dept: CASE MANAGEMENT | Age: 36
End: 2021-08-05

## 2021-08-05 ENCOUNTER — HOSPITAL ENCOUNTER (INPATIENT)
Age: 36
LOS: 1 days | Discharge: LEFT BEFORE DISCHARGE | DRG: 351 | End: 2021-08-06
Attending: EMERGENCY MEDICINE | Admitting: FAMILY MEDICINE
Payer: MEDICARE

## 2021-08-05 ENCOUNTER — HOSPITAL ENCOUNTER (EMERGENCY)
Age: 36
Discharge: HOME | DRG: 351 | End: 2021-08-05
Payer: MEDICARE

## 2021-08-05 ENCOUNTER — APPOINTMENT (OUTPATIENT)
Dept: GENERAL RADIOLOGY | Age: 36
DRG: 351 | End: 2021-08-05
Payer: MEDICARE

## 2021-08-05 VITALS
TEMPERATURE: 98.5 F | SYSTOLIC BLOOD PRESSURE: 110 MMHG | BODY MASS INDEX: 28.35 KG/M2 | DIASTOLIC BLOOD PRESSURE: 65 MMHG | RESPIRATION RATE: 18 BRPM | WEIGHT: 198 LBS | OXYGEN SATURATION: 95 % | HEART RATE: 99 BPM | HEIGHT: 70 IN

## 2021-08-05 DIAGNOSIS — Z79.899 ENCOUNTER FOR MONITORING SUBOXONE MAINTENANCE THERAPY: ICD-10-CM

## 2021-08-05 DIAGNOSIS — Z87.39 HISTORY OF RHABDOMYOLYSIS: ICD-10-CM

## 2021-08-05 DIAGNOSIS — F14.10 NONDEPENDENT COCAINE ABUSE (HCC): ICD-10-CM

## 2021-08-05 DIAGNOSIS — Z51.81 ENCOUNTER FOR MONITORING SUBOXONE MAINTENANCE THERAPY: ICD-10-CM

## 2021-08-05 DIAGNOSIS — F19.10 SUBSTANCE ABUSE (HCC): Primary | ICD-10-CM

## 2021-08-05 DIAGNOSIS — F19.10 POLYSUBSTANCE ABUSE (HCC): ICD-10-CM

## 2021-08-05 DIAGNOSIS — E29.1 HYPOGONADISM IN MALE: ICD-10-CM

## 2021-08-05 DIAGNOSIS — F32.A ANXIETY AND DEPRESSION: ICD-10-CM

## 2021-08-05 DIAGNOSIS — F41.9 ANXIETY AND DEPRESSION: ICD-10-CM

## 2021-08-05 DIAGNOSIS — L08.9 SKIN INFECTION: ICD-10-CM

## 2021-08-05 DIAGNOSIS — R74.8 ELEVATED CK: ICD-10-CM

## 2021-08-05 DIAGNOSIS — M62.82 NON-TRAUMATIC RHABDOMYOLYSIS: ICD-10-CM

## 2021-08-05 DIAGNOSIS — F11.20 SEVERE OPIOID USE DISORDER (HCC): Primary | ICD-10-CM

## 2021-08-05 DIAGNOSIS — M79.7 FIBROMYALGIA: ICD-10-CM

## 2021-08-05 DIAGNOSIS — F90.1 ATTENTION DEFICIT HYPERACTIVITY DISORDER (ADHD), PREDOMINANTLY HYPERACTIVE TYPE: ICD-10-CM

## 2021-08-05 DIAGNOSIS — G47.00 INSOMNIA, UNSPECIFIED TYPE: ICD-10-CM

## 2021-08-05 DIAGNOSIS — Z87.448 HISTORY OF ACUTE RENAL FAILURE: ICD-10-CM

## 2021-08-05 DIAGNOSIS — N17.9 AKI (ACUTE KIDNEY INJURY) (HCC): Primary | ICD-10-CM

## 2021-08-05 LAB
ACETAMINOPHEN LEVEL: < 5 UG/ML (ref 0–20)
ALBUMIN SERPL-MCNC: 4.2 G/DL (ref 3.5–5.1)
ALBUMIN SERPL-MCNC: 4.6 G/DL (ref 3.5–5.1)
ALP BLD-CCNC: 74 U/L (ref 38–126)
ALP BLD-CCNC: 79 U/L (ref 38–126)
ALT SERPL-CCNC: 63 U/L (ref 11–66)
ALT SERPL-CCNC: 67 U/L (ref 11–66)
AMPHETAMINE+METHAMPHETAMINE URINE SCREEN: NEGATIVE
ANION GAP SERPL CALCULATED.3IONS-SCNC: 13 MEQ/L (ref 8–16)
ANION GAP SERPL CALCULATED.3IONS-SCNC: 15 MEQ/L (ref 8–16)
AST SERPL-CCNC: 58 U/L (ref 5–40)
AST SERPL-CCNC: 66 U/L (ref 5–40)
BACTERIA: ABNORMAL /HPF
BARBITURATE QUANTITATIVE URINE: POSITIVE
BASOPHILS # BLD: 0.6 %
BASOPHILS # BLD: 0.8 %
BASOPHILS ABSOLUTE: 0.1 THOU/MM3 (ref 0–0.1)
BASOPHILS ABSOLUTE: 0.1 THOU/MM3 (ref 0–0.1)
BENZODIAZEPINE QUANTITATIVE URINE: NEGATIVE
BILIRUB SERPL-MCNC: 0.2 MG/DL (ref 0.3–1.2)
BILIRUB SERPL-MCNC: 0.2 MG/DL (ref 0.3–1.2)
BILIRUBIN URINE: NEGATIVE
BLOOD, URINE: ABNORMAL
BUN BLDV-MCNC: 22 MG/DL (ref 7–22)
BUN BLDV-MCNC: 22 MG/DL (ref 7–22)
CALCIUM SERPL-MCNC: 9.6 MG/DL (ref 8.5–10.5)
CALCIUM SERPL-MCNC: 9.8 MG/DL (ref 8.5–10.5)
CANNABINOID QUANTITATIVE URINE: NEGATIVE
CASTS 2: ABNORMAL /LPF
CASTS UA: ABNORMAL /LPF
CHARACTER, URINE: CLEAR
CHLORIDE BLD-SCNC: 93 MEQ/L (ref 98–111)
CHLORIDE BLD-SCNC: 98 MEQ/L (ref 98–111)
CO2: 22 MEQ/L (ref 23–33)
CO2: 23 MEQ/L (ref 23–33)
COCAINE METABOLITE QUANTITATIVE URINE: POSITIVE
COLOR: YELLOW
CREAT SERPL-MCNC: 1.4 MG/DL (ref 0.4–1.2)
CREAT SERPL-MCNC: 1.6 MG/DL (ref 0.4–1.2)
CRYSTALS, UA: ABNORMAL
EKG ATRIAL RATE: 83 BPM
EKG P AXIS: 54 DEGREES
EKG P-R INTERVAL: 182 MS
EKG Q-T INTERVAL: 330 MS
EKG QRS DURATION: 90 MS
EKG QTC CALCULATION (BAZETT): 387 MS
EKG R AXIS: 39 DEGREES
EKG T AXIS: 48 DEGREES
EKG VENTRICULAR RATE: 83 BPM
EOSINOPHIL # BLD: 2.5 %
EOSINOPHIL # BLD: 5.7 %
EOSINOPHILS ABSOLUTE: 0.3 THOU/MM3 (ref 0–0.4)
EOSINOPHILS ABSOLUTE: 0.5 THOU/MM3 (ref 0–0.4)
EPITHELIAL CELLS, UA: ABNORMAL /HPF
ERYTHROCYTE [DISTWIDTH] IN BLOOD BY AUTOMATED COUNT: 16.6 % (ref 11.5–14.5)
ERYTHROCYTE [DISTWIDTH] IN BLOOD BY AUTOMATED COUNT: 16.8 % (ref 11.5–14.5)
ERYTHROCYTE [DISTWIDTH] IN BLOOD BY AUTOMATED COUNT: 46.5 FL (ref 35–45)
ERYTHROCYTE [DISTWIDTH] IN BLOOD BY AUTOMATED COUNT: 48.1 FL (ref 35–45)
ETHYL ALCOHOL, SERUM: < 0.01 %
GFR SERPL CREATININE-BSD FRML MDRD: 49 ML/MIN/1.73M2
GFR SERPL CREATININE-BSD FRML MDRD: 57 ML/MIN/1.73M2
GLUCOSE BLD-MCNC: 86 MG/DL (ref 70–108)
GLUCOSE BLD-MCNC: 89 MG/DL (ref 70–108)
GLUCOSE URINE: 250 MG/DL
HCT VFR BLD CALC: 33.2 % (ref 42–52)
HCT VFR BLD CALC: 33.2 % (ref 42–52)
HCV QNT BY NAAT INTERPRETATION: DETECTED
HCV QNT BY NAAT IU/ML: ABNORMAL
HCV QNT BY NAAT LOG IU/ML: 5.36 LOG IU/ML
HEMOGLOBIN: 10.3 GM/DL (ref 14–18)
HEMOGLOBIN: 10.5 GM/DL (ref 14–18)
IMMATURE GRANS (ABS): 0.03 THOU/MM3 (ref 0–0.07)
IMMATURE GRANS (ABS): 0.05 THOU/MM3 (ref 0–0.07)
IMMATURE GRANULOCYTES: 0.3 %
IMMATURE GRANULOCYTES: 0.4 %
KETONES, URINE: NEGATIVE
LEUKOCYTE ESTERASE, URINE: NEGATIVE
LYMPHOCYTES # BLD: 26.3 %
LYMPHOCYTES # BLD: 38.9 %
LYMPHOCYTES ABSOLUTE: 3.6 THOU/MM3 (ref 1–4.8)
LYMPHOCYTES ABSOLUTE: 3.6 THOU/MM3 (ref 1–4.8)
MCH RBC QN AUTO: 24.8 PG (ref 26–33)
MCH RBC QN AUTO: 25.1 PG (ref 26–33)
MCHC RBC AUTO-ENTMCNC: 31 GM/DL (ref 32.2–35.5)
MCHC RBC AUTO-ENTMCNC: 31.6 GM/DL (ref 32.2–35.5)
MCV RBC AUTO: 78.5 FL (ref 80–94)
MCV RBC AUTO: 80.8 FL (ref 80–94)
MISCELLANEOUS 2: ABNORMAL
MONOCYTES # BLD: 10.6 %
MONOCYTES # BLD: 10.7 %
MONOCYTES ABSOLUTE: 1 THOU/MM3 (ref 0.4–1.3)
MONOCYTES ABSOLUTE: 1.4 THOU/MM3 (ref 0.4–1.3)
NITRITE, URINE: NEGATIVE
NUCLEATED RED BLOOD CELLS: 0 /100 WBC
NUCLEATED RED BLOOD CELLS: 0 /100 WBC
OPIATES, URINE: NEGATIVE
OSMOLALITY CALCULATION: 271 MOSMOL/KG (ref 275–300)
OXYCODONE: NEGATIVE
PH UA: 5.5 (ref 5–9)
PHENCYCLIDINE QUANTITATIVE URINE: NEGATIVE
PLATELET # BLD: 284 THOU/MM3 (ref 130–400)
PLATELET # BLD: 301 THOU/MM3 (ref 130–400)
PMV BLD AUTO: 10.2 FL (ref 9.4–12.4)
PMV BLD AUTO: 10.9 FL (ref 9.4–12.4)
POTASSIUM REFLEX MAGNESIUM: 4.5 MEQ/L (ref 3.5–5.2)
POTASSIUM SERPL-SCNC: 4.7 MEQ/L (ref 3.5–5.2)
PROTEIN UA: 30
RBC # BLD: 4.11 MILL/MM3 (ref 4.7–6.1)
RBC # BLD: 4.23 MILL/MM3 (ref 4.7–6.1)
RBC URINE: ABNORMAL /HPF
RENAL EPITHELIAL, UA: ABNORMAL
SALICYLATE, SERUM: < 0.3 MG/DL (ref 2–10)
SEG NEUTROPHILS: 43.7 %
SEG NEUTROPHILS: 59.5 %
SEGMENTED NEUTROPHILS ABSOLUTE COUNT: 4 THOU/MM3 (ref 1.8–7.7)
SEGMENTED NEUTROPHILS ABSOLUTE COUNT: 8 THOU/MM3 (ref 1.8–7.7)
SODIUM BLD-SCNC: 130 MEQ/L (ref 135–145)
SODIUM BLD-SCNC: 134 MEQ/L (ref 135–145)
SPECIFIC GRAVITY, URINE: 1.01 (ref 1–1.03)
TOTAL CK: 1214 U/L (ref 55–170)
TOTAL CK: 1350 U/L (ref 55–170)
TOTAL CK: 1513 U/L (ref 55–170)
TOTAL PROTEIN: 7.1 G/DL (ref 6.1–8)
TOTAL PROTEIN: 7.8 G/DL (ref 6.1–8)
TROPONIN T: < 0.01 NG/ML
UROBILINOGEN, URINE: 0.2 EU/DL (ref 0–1)
WBC # BLD: 13.5 THOU/MM3 (ref 4.8–10.8)
WBC # BLD: 9.2 THOU/MM3 (ref 4.8–10.8)
WBC UA: ABNORMAL /HPF
YEAST: ABNORMAL

## 2021-08-05 PROCEDURE — 36415 COLL VENOUS BLD VENIPUNCTURE: CPT

## 2021-08-05 PROCEDURE — 82550 ASSAY OF CK (CPK): CPT

## 2021-08-05 PROCEDURE — 1200000000 HC SEMI PRIVATE

## 2021-08-05 PROCEDURE — 2580000003 HC RX 258: Performed by: PHYSICIAN ASSISTANT

## 2021-08-05 PROCEDURE — 2580000003 HC RX 258: Performed by: EMERGENCY MEDICINE

## 2021-08-05 PROCEDURE — 80143 DRUG ASSAY ACETAMINOPHEN: CPT

## 2021-08-05 PROCEDURE — G8417 CALC BMI ABV UP PARAM F/U: HCPCS | Performed by: INTERNAL MEDICINE

## 2021-08-05 PROCEDURE — 82077 ASSAY SPEC XCP UR&BREATH IA: CPT

## 2021-08-05 PROCEDURE — 80053 COMPREHEN METABOLIC PANEL: CPT

## 2021-08-05 PROCEDURE — 6360000002 HC RX W HCPCS: Performed by: EMERGENCY MEDICINE

## 2021-08-05 PROCEDURE — G0378 HOSPITAL OBSERVATION PER HR: HCPCS

## 2021-08-05 PROCEDURE — 1111F DSCHRG MED/CURRENT MED MERGE: CPT | Performed by: INTERNAL MEDICINE

## 2021-08-05 PROCEDURE — 71045 X-RAY EXAM CHEST 1 VIEW: CPT

## 2021-08-05 PROCEDURE — 85025 COMPLETE CBC W/AUTO DIFF WBC: CPT

## 2021-08-05 PROCEDURE — 99223 1ST HOSP IP/OBS HIGH 75: CPT | Performed by: FAMILY MEDICINE

## 2021-08-05 PROCEDURE — 99283 EMERGENCY DEPT VISIT LOW MDM: CPT

## 2021-08-05 PROCEDURE — 93005 ELECTROCARDIOGRAM TRACING: CPT | Performed by: PHYSICIAN ASSISTANT

## 2021-08-05 PROCEDURE — 4004F PT TOBACCO SCREEN RCVD TLK: CPT | Performed by: INTERNAL MEDICINE

## 2021-08-05 PROCEDURE — 96374 THER/PROPH/DIAG INJ IV PUSH: CPT

## 2021-08-05 PROCEDURE — 80307 DRUG TEST PRSMV CHEM ANLYZR: CPT

## 2021-08-05 PROCEDURE — 93005 ELECTROCARDIOGRAM TRACING: CPT | Performed by: EMERGENCY MEDICINE

## 2021-08-05 PROCEDURE — 96360 HYDRATION IV INFUSION INIT: CPT

## 2021-08-05 PROCEDURE — 99213 OFFICE O/P EST LOW 20 MIN: CPT | Performed by: INTERNAL MEDICINE

## 2021-08-05 PROCEDURE — 96361 HYDRATE IV INFUSION ADD-ON: CPT

## 2021-08-05 PROCEDURE — G8428 CUR MEDS NOT DOCUMENT: HCPCS | Performed by: INTERNAL MEDICINE

## 2021-08-05 PROCEDURE — 81001 URINALYSIS AUTO W/SCOPE: CPT

## 2021-08-05 PROCEDURE — 84484 ASSAY OF TROPONIN QUANT: CPT

## 2021-08-05 PROCEDURE — 99284 EMERGENCY DEPT VISIT MOD MDM: CPT

## 2021-08-05 PROCEDURE — 80179 DRUG ASSAY SALICYLATE: CPT

## 2021-08-05 RX ORDER — SODIUM CHLORIDE 0.9 % (FLUSH) 0.9 %
5-40 SYRINGE (ML) INJECTION PRN
Status: DISCONTINUED | OUTPATIENT
Start: 2021-08-05 | End: 2021-08-06 | Stop reason: HOSPADM

## 2021-08-05 RX ORDER — ONDANSETRON 2 MG/ML
4 INJECTION INTRAMUSCULAR; INTRAVENOUS EVERY 6 HOURS PRN
Status: DISCONTINUED | OUTPATIENT
Start: 2021-08-05 | End: 2021-08-06 | Stop reason: HOSPADM

## 2021-08-05 RX ORDER — 0.9 % SODIUM CHLORIDE 0.9 %
1000 INTRAVENOUS SOLUTION INTRAVENOUS ONCE
Status: COMPLETED | OUTPATIENT
Start: 2021-08-05 | End: 2021-08-05

## 2021-08-05 RX ORDER — SODIUM CHLORIDE 9 MG/ML
INJECTION, SOLUTION INTRAVENOUS CONTINUOUS
Status: DISCONTINUED | OUTPATIENT
Start: 2021-08-05 | End: 2021-08-06 | Stop reason: HOSPADM

## 2021-08-05 RX ORDER — ONDANSETRON 4 MG/1
4 TABLET, ORALLY DISINTEGRATING ORAL EVERY 8 HOURS PRN
Status: DISCONTINUED | OUTPATIENT
Start: 2021-08-05 | End: 2021-08-06 | Stop reason: HOSPADM

## 2021-08-05 RX ORDER — SODIUM CHLORIDE 0.9 % (FLUSH) 0.9 %
5-40 SYRINGE (ML) INJECTION EVERY 12 HOURS SCHEDULED
Status: DISCONTINUED | OUTPATIENT
Start: 2021-08-05 | End: 2021-08-06 | Stop reason: HOSPADM

## 2021-08-05 RX ORDER — 0.9 % SODIUM CHLORIDE 0.9 %
1000 INTRAVENOUS SOLUTION INTRAVENOUS ONCE
Status: COMPLETED | OUTPATIENT
Start: 2021-08-05 | End: 2021-08-06

## 2021-08-05 RX ORDER — ACETAMINOPHEN 325 MG/1
650 TABLET ORAL EVERY 6 HOURS PRN
Status: DISCONTINUED | OUTPATIENT
Start: 2021-08-05 | End: 2021-08-06 | Stop reason: HOSPADM

## 2021-08-05 RX ORDER — DIAZEPAM 5 MG/ML
5 INJECTION, SOLUTION INTRAMUSCULAR; INTRAVENOUS ONCE
Status: COMPLETED | OUTPATIENT
Start: 2021-08-05 | End: 2021-08-05

## 2021-08-05 RX ORDER — ACETAMINOPHEN 650 MG/1
650 SUPPOSITORY RECTAL EVERY 6 HOURS PRN
Status: DISCONTINUED | OUTPATIENT
Start: 2021-08-05 | End: 2021-08-06 | Stop reason: HOSPADM

## 2021-08-05 RX ORDER — SODIUM CHLORIDE 9 MG/ML
25 INJECTION, SOLUTION INTRAVENOUS PRN
Status: DISCONTINUED | OUTPATIENT
Start: 2021-08-05 | End: 2021-08-06 | Stop reason: HOSPADM

## 2021-08-05 RX ADMIN — DIAZEPAM 5 MG: 5 INJECTION, SOLUTION INTRAMUSCULAR; INTRAVENOUS at 20:23

## 2021-08-05 RX ADMIN — SODIUM CHLORIDE 1000 ML: 9 INJECTION, SOLUTION INTRAVENOUS at 12:09

## 2021-08-05 RX ADMIN — SODIUM CHLORIDE 1000 ML: 9 INJECTION, SOLUTION INTRAVENOUS at 20:22

## 2021-08-05 ASSESSMENT — PAIN DESCRIPTION - DESCRIPTORS: DESCRIPTORS: ACHING;BURNING

## 2021-08-05 ASSESSMENT — ENCOUNTER SYMPTOMS
COUGH: 0
RHINORRHEA: 0
COUGH: 0
DIARRHEA: 0
PHOTOPHOBIA: 0
SORE THROAT: 0
NAUSEA: 0
BACK PAIN: 0
SHORTNESS OF BREATH: 0
SHORTNESS OF BREATH: 0
ABDOMINAL PAIN: 0
ABDOMINAL PAIN: 0
VOMITING: 0
VOMITING: 0

## 2021-08-05 ASSESSMENT — PAIN SCALES - GENERAL: PAINLEVEL_OUTOF10: 9

## 2021-08-05 ASSESSMENT — PAIN DESCRIPTION - LOCATION: LOCATION: ABDOMEN

## 2021-08-05 ASSESSMENT — PAIN DESCRIPTION - PAIN TYPE: TYPE: ACUTE PAIN

## 2021-08-05 NOTE — ED NOTES
EKG complete. Pt unable to stay still, pt writhing in bed. Side rails up x 2, bed alarm activated.       Krista Chandra RN  08/05/21 5344

## 2021-08-05 NOTE — ED NOTES
Bed: 022A  Expected date: 8/5/21  Expected time: 6:23 PM  Means of arrival: [facility]  Comments:     Nati Villagran RN  08/05/21 9340

## 2021-08-05 NOTE — ED PROVIDER NOTES
[facility] [address] [address] EMERGENCY DEPT    EMERGENCY MEDICINE     Pt Name: Elizabeth Faith  MRN: [id number]  [place] 1985  Date of evaluation: 8/5/2021  Provider: Jenni Dewitt MD    [address] [place] [address]       Chief Complaint   Patient presents with    Acute Renal Failure       HISTORY OF PRESENT ILLNESS    Elizabeth Faith is a pleasant 39 y.o. male who presents to the emergency department from home complaining of b/l leg muscle cramps. He reports he just left AMA from the hospital earlier today. He admits he did use fentanyl after he left, but denies any methamphetamine use in the last 4 weeks. He reports he has been urinating normally. He denies any swelling. No fever or cp/cough. He denies any new injury/trauma, no other complaints, no other known exacerbating/relieving factors. Triage notes and Nursing notes were reviewed by myself. Any discrepancies are addressed above. PAST MEDICAL HISTORY     Past Medical History:   Diagnosis Date    Anxiety     Depression     Kidney stone     Liver disease     Hep C    Opiate abuse, continuous ([facility]        SURGICAL HISTORY     No past surgical history on file. CURRENT MEDICATIONS       Previous Medications    ASPIRIN-ACETAMINOPHEN-CAFFEINE (EXCEDRIN EXTRA STRENGTH PO)    Take by mouth as needed    LISDEXAMFETAMINE DIMESYLATE (VYVANSE) 40 MG CAPS    Take 40 mg by mouth daily for 30 days. LISDEXAMFETAMINE DIMESYLATE (VYVANSE) 40 MG CAPS    Take 40 mg by mouth daily for 30 days. LISINOPRIL (PRINIVIL;ZESTRIL) 20 MG TABLET    Take 1 tablet by mouth daily    MIRTAZAPINE (REMERON) 45 MG TABLET    Take 1 tablet by mouth nightly    NALOXONE 4 MG/0.1ML LIQD NASAL SPRAY    1 spray by Nasal route as needed for Opioid Reversal    ONDANSETRON (ZOFRAN ODT) 4 MG DISINTEGRATING TABLET    Take 1 tablet by mouth every 8 hours as needed for Nausea    PREGABALIN (LYRICA) 300 MG CAPSULE    Take 1 capsule by mouth 2 times daily for 30 days.     TESTOSTERONE (ANDROGEL) 25 MG/2.5GM (1%) GEL 1 % GEL    Apply 2.5 g topically daily for 30 days. VENLAFAXINE (EFFEXOR) 100 MG TABLET    Take 1.5 tablets by mouth 2 times daily       ALLERGIES     Patient has no known allergies. FAMILY HISTORY     No family history on file. SOCIAL HISTORY       Social History     Socioeconomic History    Marital status: Single     Spouse name: Not on file    Number of children: Not on file    Years of education: Not on file    Highest education level: Not on file   Occupational History    Not on file   Tobacco Use    Smoking status: Current Every Day Smoker     Packs/day: 0.50     Types: Cigarettes    Smokeless tobacco: Never Used   Vaping Use    Vaping Use: Never assessed   Substance and Sexual Activity    Alcohol use: Yes     Comment: occasional    Drug use: Yes     Comment: yesterday    Sexual activity: Not Currently   Other Topics Concern    Not on file   Social History Narrative    Not on file     Social Determinants of Health     Financial Resource Strain: Low Risk     Difficulty of Paying Living Expenses: Not hard at all   Food Insecurity: No Food Insecurity    Worried About Running Out of Food in the Last Year: Never true    Ana of Food in the Last Year: Never true   Transportation Needs:     Lack of Transportation (Medical):      Lack of Transportation (Non-Medical):    Physical Activity:     Days of Exercise per Week:     Minutes of Exercise per Session:    Stress:     Feeling of Stress :    Social Connections:     Frequency of Communication with Friends and Family:     Frequency of Social Gatherings with Friends and Family:     Attends [employer] Services:     Active Member of Clubs or Organizations:     Attends Club or Organization Meetings:     Marital Status:    Intimate Partner Violence:     Fear of Current or Ex-Partner:     Emotionally Abused:     Physically Abused:     Sexually Abused:        REVIEW OF SYSTEMS     Review of Systems   Constitutional: Negative for chills and fever. Respiratory: Negative for cough and shortness of breath. Cardiovascular: Negative for chest pain and leg swelling. Gastrointestinal: Negative for abdominal pain and vomiting. Musculoskeletal: Negative for back pain and neck pain. Skin: Negative for rash and wound. Neurological: Negative for weakness and numbness. All other systems reviewed and are negative. Except as noted above the remainder of the review of systems was reviewed and is. PHYSICAL EXAM    (up to 7 for level 4, 8 or more for level 5)     ED Triage Vitals [08/05/21 1834]   BP Temp Temp Source Pulse Resp SpO2 Height Weight   106/64 98.2 °F (36.8 °C) Oral 94 22 98 % 5' 10\" (1.778 m) 190 lb (86.2 kg)       Physical Exam  Vitals and nursing note reviewed. Constitutional:       General: He is awake. HENT:      Head: Normocephalic and atraumatic. Mouth/Throat:      Mouth: Mucous membranes are moist.   Eyes:      General: Lids are normal.      Conjunctiva/sclera: Conjunctivae normal.   Neck:      Vascular: No JVD. Trachea: No tracheal deviation. Cardiovascular:      Rate and Rhythm: Normal rate and regular rhythm. Pulses: Normal pulses. Heart sounds: No murmur heard. No friction rub. No gallop. Pulmonary:      Effort: Pulmonary effort is normal.      Breath sounds: Normal breath sounds. No wheezing, rhonchi or rales. Abdominal:      Palpations: Abdomen is soft. Tenderness: There is no abdominal tenderness. Musculoskeletal:      Cervical back: Neck supple. Skin:     General: Skin is warm. Comments: Scattered lesions diffusely, mostly scabbed in various stages of healing, no drainage, no swelling or fluctuance, some with mild surrounding redness, but not indurated and normal temp to touch   Neurological:      General: No focal deficit present. Mental Status: He is alert. Sensory: No sensory deficit. Motor: No weakness.    Psychiatric:         Behavior: Behavior is cooperative. Comments: Flailing legs intermittantly, writhing around in bed, anxious, no SI/HI, no hallucinations         DIAGNOSTIC RESULTS     EKG:(none if blank)  All EKG's are interpreted by [facility] Phys[facility] who either signs or Co-signs this chart in the absence of a cardiologist.        RADIOLOGY: (none if blank)   Interpretation per the Radiologistbelow, if available at the time of this note:    No orders to display       LABS:  Labs Reviewed   CBC WITH AUTO DIFFERENTIAL - Abnormal; Notable for the following components:       Result Value    WBC 13.5 (*)     RBC 4.23 (*)     Hemoglobin 10.5 (*)     Hematocrit 33.2 (*)     MCV 78.5 (*)     MCH 24.8 (*)     MCHC 31.6 (*)     RDW-CV 16.6 (*)     RDW-SD 46.5 (*)     Segs Absolute 8.0 (*)     Monocytes Absolute 1.4 (*)     All other components within normal limits   COMPREHENSIVE METABOLIC PANEL W/ REFLEX TO MG FOR LOW K - Abnormal; Notable for the following components:    CREATININE 1.6 (*)     Sodium 130 (*)     Chloride 93 (*)     CO2 22 (*)     AST 66 (*)     Total Bilirubin 0.2 (*)     ALT 67 (*)     All other components within normal limits   CK - Abnormal; Notable for the following components: Total CK 1,513 (*)     All other components within normal limits   GLOMERULAR FILTRATION RATE, ESTIMATED - Abnormal; Notable for the following components:    Est, Glom Filt Rate 49 (*)     All other components within normal limits   URINE WITH REFLEXED MICRO - Abnormal; Notable for the following components:    Glucose, Ur 250 (*)     Blood, Urine MODERATE (*)     Protein, UA 30 (*)     All other components within normal limits   URINE DRUG SCREEN   ANION GAP       All other labs were within normal range or not returned as of this dictation. Please note, any cultures that may have been sent were not resulted at the time of this patient visit.     EMERGENCY DEPARTMENT COURSE andMedical Decision Making:     MDM/   Pt presents to the ER with muscle cramps, polysubstance abuse, found to have worsening renal function and ck compared to earlier today. Admit, ivf, continue to monitor. I d/w Dr. Vik Herrera, accepts admit. Strict returnprecautions and follow up instructions were discussed with the patient with which the patient agrees        ED Medications administered this visit:    Medications   0.9 % sodium chloride bolus (1,000 mLs Intravenous New Bag 8/5/21 2022)   diazePAM (VALIUM) injection 5 mg (5 mg Intravenous Given 8/5/21 2023)         Procedures: (None if blank)       CLINICAL       1. SATYA (acute kidney injury) ([facility]    2. Non-traumatic rhabdomyolysis    3. Nondependent cocaine abuse [facility])          DISPOSITION/PLAN   DISPOSITION Decision To Admit 08/05/2021 09:19:01 PM      PATIENT REFERRED TO:  No follow-up provider specified.     DISCHARGE MEDICATIONS:  New Prescriptions    No medications on file              (Please note that portions of this note were completed with a voice recognition program.  Efforts were made to edit the dictations but occasionallywords are mis-transcribed.)      Awilda Potter MD,FACEP (electronically signed)  Attending Physician, Emergency Department          Noe Delarosa MD  08/05/21 2122

## 2021-08-05 NOTE — PROGRESS NOTES
Patient showed up to office staggering in the waiting room- LPN brought him back to restroom for a urine drug screen and patient said to LPN \"why so pushy today, but looking good. \" LPN asked patient to urinate in cup- patient stated he would be dirty. Patient was unable to urinate d/t dropping urine cup in toilet. Dr. Oksana Pendleton asked for patient to come directly into his office to talk. Dr. Oksana Pendleton ended up sending patient to ED Patient was wheeled down to ED by NP and LPN.

## 2021-08-05 NOTE — ED TRIAGE NOTES
Pt arrives from home by ambulance. Was here earlier today, signed himself out AMA. He says his pain is intolerable and he feels very weak. Pt states he did snort fentanyl around 10am this morning. He is very restless, denies chest pain or shortness of breath. He does state he feels nauseated. Pt is lying bed, very restless and irritated. Call light within reach.

## 2021-08-05 NOTE — Clinical Note
The patient has decided to leave against medical advice, because \"he is irritable and can't sit still and wants to get out of here\". He has normal mental status and adequate capacity to make medical decisions. The patient refuses hospital admission  and wants to be discharged. The risks have been explained to the patient, including worsening illness, chronic pain, permanent disability, and death. The benefits of admission have also been explained, including the availability and proximity of n urses, physicians, monitoring, diagnostic testing, and treatment. The patient was able to understand and state the risks and benefits of hospital admission. This was witnessed by nurse Joann Caceres RN and me. He had the opportunity to ask questions ab out his medical condition. The patient was treated to the extent that he would allow, and knows that he may return for care at any time.

## 2021-08-05 NOTE — ED NOTES
Pt called out on call light when this RN entered room pt states \"he wants to leave\". He states he is \"irritable and can't sit still and he wants to leave\". This RN informed pt that he would be leaving AMA and states\" that is fine but he wants to leave\".       Miguel Brasher RN  08/05/21 1539

## 2021-08-05 NOTE — ED PROVIDER NOTES
nervous/anxious. PAST MEDICAL HISTORY    has a past medical history of Anxiety, Depression, Kidney stone, Liver disease, and Opiate abuse, continuous ([facility] [address] SURGICAL HISTORY      has no past surgical history on file. CURRENT MEDICATIONS       Discharge Medication List as of 8/5/2021 12:42 PM      CONTINUE these medications which have NOT CHANGED    Details   venlafaxine (EFFEXOR) 100 MG tablet Take 1.5 tablets by mouth 2 times daily, Disp-45 tablet, R-3Normal      lisinopril (PRINIVIL;ZESTRIL) 20 MG tablet Take 1 tablet by mouth daily, Disp-30 tablet, R-3Normal      pregabalin (LYRICA) 300 MG capsule Take 1 capsule by mouth 2 times daily for 30 days. , Disp-60 capsule, R-3Normal      mirtazapine (REMERON) 45 MG tablet Take 1 tablet by mouth nightly, Disp-30 tablet, R-3Normal      !! Lisdexamfetamine Dimesylate (VYVANSE) 40 MG CAPS Take 40 mg by mouth daily for 30 days. , Disp-30 capsule, R-0Print      !! Lisdexamfetamine Dimesylate (VYVANSE) 40 MG CAPS Take 40 mg by mouth daily for 30 days. , Disp-30 capsule, R-0Print      testosterone (ANDROGEL) 25 MG/2.5GM (1%) GEL 1 % gel Apply 2.5 g topically daily for 30 days. , Disp-30 Package, R-0Normal      ondansetron (ZOFRAN ODT) 4 MG disintegrating tablet Take 1 tablet by mouth every 8 hours as needed for Nausea, Disp-20 tablet, R-0Normal      Aspirin-Acetaminophen-Caffeine (EXCEDRIN EXTRA STRENGTH PO) Take by mouth as neededHistorical Med      naloxone 4 MG/0.1ML LIQD nasal spray 1 spray by Nasal route as needed for Opioid Reversal, Disp-1 each,R-5Normal       !! - Potential duplicate medications found. Please discuss with provider. ALLERGIES     has No Known Allergies. FAMILY HISTORY     has no family status information on file. family history is not on file. SOCIAL HISTORY    reports that he has been smoking cigarettes. He has been smoking about 0.50 packs per day. He has never used smokeless tobacco. He reports current alcohol use.  He reports current drug use. PHYSICAL EXAM     INITIAL VITALS:  height is 5' 10\" (1.778 m) and weight is 198 lb (89.8 kg). His oral temperature is 98.5 °F (36.9 °C). His blood pressure is 110/65 and his pulse is 99. His respiration is 18 and oxygen saturation is 95%. Physical Exam  Vitals and nursing note reviewed. Constitutional:       General: He is awake. He is not in acute distress. Appearance: Normal appearance. He is well-developed. He is not ill-appearing, toxic-appearing or diaphoretic. Comments: Intoxicated appearing   HENT:      Head: Normocephalic and atraumatic. Right Ear: Hearing normal.      Left Ear: Hearing normal.      Nose: Nose normal. No rhinorrhea. Mouth/Throat:      Lips: Pink. Mouth: Mucous membranes are moist.      Pharynx: Oropharynx is clear. Uvula midline. No oropharyngeal exudate. Eyes:      General: Lids are normal. No scleral icterus. Extraocular Movements: Extraocular movements intact. Conjunctiva/sclera: Conjunctivae normal.      Pupils: Pupils are equal, round, and reactive to light. Neck:      Trachea: Trachea normal. No tracheal deviation. Cardiovascular:      Rate and Rhythm: Normal rate and regular rhythm. Heart sounds: Normal heart sounds. No murmur heard. Pulmonary:      Effort: Pulmonary effort is normal. No respiratory distress. Breath sounds: Normal breath sounds and air entry. No stridor. No decreased breath sounds, wheezing or rhonchi. Abdominal:      General: There is no distension. Palpations: Abdomen is soft. Abdomen is not rigid. Tenderness: There is no abdominal tenderness. There is no guarding. Musculoskeletal:         General: Normal range of motion. Cervical back: Normal range of motion and neck supple. No rigidity. Comments: Well perfused; movement normal as observed; no signs of DVT   Lymphadenopathy:      Cervical: No cervical adenopathy. Skin:     General: Skin is warm and dry. Coloration: Skin is not pale. Findings: Wound (Over body in various stages of healing. No signs of infection) present. No rash. Neurological:      General: No focal deficit present. Mental Status: He is oriented to person, place, and time. GCS: GCS eye subscore is 4. GCS verbal subscore is 5. GCS motor subscore is 6. Sensory: Sensation is intact. Motor: Motor function is intact. Gait: Gait is intact. Gait normal.      Comments: No gross deficits observed   Psychiatric:         Mood and Affect: Mood normal.         Speech: Speech is slurred. Behavior: Behavior is cooperative. Thought Content: Thought content normal.      Comments: Patient appears intoxicated. He has trouble keeping his eyes open. Patient is continually moving his arms and legs. DIFFERENTIAL DIAGNOSIS:   Including but not limited to: Substance abuse, SATYA, chronic kidney disease, rhabdomyolysis, ACS    DIAGNOSTIC RESULTS     EKG: All EKG's are [facility] who either signs or Co-signs this chart in the absence of a cardiologist.  Ventricular Rate BPM 83 P    Atrial Rate BPM 83 P    P-R Interval ms 182 P    QRS Duration ms 90 P    Q-T Interval ms 330 P    QTc Calculation (Bazett) ms 387 P    P Axis degrees 54 P    R Axis degrees 39 P    T Axis degrees 48 P    Narrative & Impression     Poor data quality, interpretation may be adversely affected  Normal sinus rhythm  Normal ECG  When compared with ECG of 31-JUL-2021 03:24,  No significant change was found         RADIOLOGY: non-plain film images(s) such as CT,Ultrasound and MRI are read by the radiologist.  Plain radiographic images are visualized and preliminarily interpreted by the emergency physician unless otherwise stated below. XR CHEST PORTABLE   Final Result   Normal mobile chest.            **This report has been created using voice recognition software.   It may contain minor errors which are inherent in voice recognition technology. **      Final report electronically signed by Dr. Briseyda Guerrier on 8/5/2021 12:07 PM          LABS:   Labs Reviewed   CBC WITH AUTO DIFFERENTIAL - Abnormal; Notable for the following components:       Result Value    RBC 4.11 (*)     Hemoglobin 10.3 (*)     Hematocrit 33.2 (*)     MCH 25.1 (*)     MCHC 31.0 (*)     RDW-CV 16.8 (*)     RDW-SD 48.1 (*)     Eosinophils Absolute 0.5 (*)     All other components within normal limits   COMPREHENSIVE METABOLIC PANEL - Abnormal; Notable for the following components:    CREATININE 1.4 (*)     Sodium 134 (*)     AST 58 (*)     Total Bilirubin 0.2 (*)     All other components within normal limits   CK - Abnormal; Notable for the following components: Total CK 1,214 (*)     All other components within normal limits   SALICYLATE LEVEL - Abnormal; Notable for the following components:    Salicylate, Serum < 0.3 (*)     All other components within normal limits   GLOMERULAR FILTRATION RATE, ESTIMATED - Abnormal; Notable for the following components:    Est, Glom Filt Rate 57 (*)     All other components within normal limits   OSMOLALITY - Abnormal; Notable for the following components:    Osmolality Calc 271.0 (*)     All other components within normal limits   TROPONIN   ETHANOL   ACETAMINOPHEN LEVEL   ANION GAP   URINE DRUG SCREEN       EMERGENCY DEPARTMENT COURSE:   Vitals:    Vitals:    08/05/21 1124   BP: 110/65   Pulse: 99   Resp: 18   Temp: 98.5 °F (36.9 °C)   TempSrc: Oral   SpO2: 95%   Weight: 198 lb (89.8 kg)   Height: 5' 10\" (1.778 m)     MDM:  Patient was seen by me in the emergency department from Dr. Tammie Hernandez office for concerns for acute renal failure. Patient was recently admitted on 7-30 for nontraumatic rhabdomyolysis and acute kidney failure with total CK greater than 10,000 and a creatinine of 9.7. Patient then left AGAINST MEDICAL ADVICE on 8-2. Physical exam revealed a 39-year-old male who is nontoxic-appearing. He was moving his arms and legs continually but eyes appeared fatigued. Patient speech was mildly slurred however he answered all questions appropriately. He was neurologically intact. Vital signs reviewed and noted stable. Appropriate labs and imaging were ordered and reviewed upon completion. Before I could reevaluate the patient I was told he left AGAINST MEDICAL ADVICE. Apparently patient became very anxious and agitated and wanted to leave. Nurse informed me that he would not wait for me to come talk to him and signed out [address] CRITICAL CARE:   None    CONSULTS:  None    PROCEDURES:  None    FINAL IMPRESSION      1. Substance abuse ([facility] [address]    2. History of rhabdomyolysis    3. History of acute renal failure    4. Elevated CK          DISPOSITION/PLAN     1. Substance abuse ([facility] [address]    2. History of rhabdomyolysis    3. History of acute renal failure    4.  Elevated CK      AMA    (Please note that portions of this note were completed with a voice recognition program.  Efforts were made to edit the dictations but occasionally words are mis-transcribed.)    Debbie Tomas PA-C 08/05/21 1:19 PM    ELSY West PA-C  08/05/21 8839

## 2021-08-06 ENCOUNTER — APPOINTMENT (OUTPATIENT)
Dept: ULTRASOUND IMAGING | Age: 36
DRG: 351 | End: 2021-08-06
Payer: MEDICARE

## 2021-08-06 VITALS
SYSTOLIC BLOOD PRESSURE: 88 MMHG | BODY MASS INDEX: 26.48 KG/M2 | HEIGHT: 70 IN | OXYGEN SATURATION: 99 % | TEMPERATURE: 98 F | DIASTOLIC BLOOD PRESSURE: 52 MMHG | RESPIRATION RATE: 16 BRPM | WEIGHT: 185 LBS | HEART RATE: 67 BPM

## 2021-08-06 LAB
ALBUMIN SERPL-MCNC: 4.1 G/DL (ref 3.5–5.1)
ALP BLD-CCNC: 76 U/L (ref 38–126)
ALT SERPL-CCNC: 58 U/L (ref 11–66)
ANION GAP SERPL CALCULATED.3IONS-SCNC: 14 MEQ/L (ref 8–16)
AST SERPL-CCNC: 56 U/L (ref 5–40)
BILIRUB SERPL-MCNC: 0.4 MG/DL (ref 0.3–1.2)
BUN BLDV-MCNC: 18 MG/DL (ref 7–22)
CALCIUM SERPL-MCNC: 9.8 MG/DL (ref 8.5–10.5)
CHLORIDE BLD-SCNC: 100 MEQ/L (ref 98–111)
CO2: 20 MEQ/L (ref 23–33)
CREAT SERPL-MCNC: 1.2 MG/DL (ref 0.4–1.2)
EKG ATRIAL RATE: 95 BPM
EKG P AXIS: 63 DEGREES
EKG P-R INTERVAL: 178 MS
EKG Q-T INTERVAL: 320 MS
EKG QRS DURATION: 84 MS
EKG QTC CALCULATION (BAZETT): 402 MS
EKG R AXIS: 51 DEGREES
EKG T AXIS: 68 DEGREES
EKG VENTRICULAR RATE: 95 BPM
ERYTHROCYTE [DISTWIDTH] IN BLOOD BY AUTOMATED COUNT: 17.1 % (ref 11.5–14.5)
ERYTHROCYTE [DISTWIDTH] IN BLOOD BY AUTOMATED COUNT: 48.2 FL (ref 35–45)
GFR SERPL CREATININE-BSD FRML MDRD: 68 ML/MIN/1.73M2
GLUCOSE BLD-MCNC: 73 MG/DL (ref 70–108)
HCT VFR BLD CALC: 36.9 % (ref 42–52)
HEMOGLOBIN: 11.7 GM/DL (ref 14–18)
MCH RBC QN AUTO: 25.4 PG (ref 26–33)
MCHC RBC AUTO-ENTMCNC: 31.7 GM/DL (ref 32.2–35.5)
MCV RBC AUTO: 80 FL (ref 80–94)
OSMOLALITY: 281 MOSMOL/KG (ref 275–295)
PLATELET # BLD: 306 THOU/MM3 (ref 130–400)
PMV BLD AUTO: 10.8 FL (ref 9.4–12.4)
POTASSIUM REFLEX MAGNESIUM: 4.5 MEQ/L (ref 3.5–5.2)
RBC # BLD: 4.61 MILL/MM3 (ref 4.7–6.1)
SODIUM BLD-SCNC: 134 MEQ/L (ref 135–145)
TOTAL CK: 1037 U/L (ref 55–170)
TOTAL CK: 869 U/L (ref 55–170)
TOTAL PROTEIN: 7.3 G/DL (ref 6.1–8)
WBC # BLD: 9.1 THOU/MM3 (ref 4.8–10.8)

## 2021-08-06 PROCEDURE — G0378 HOSPITAL OBSERVATION PER HR: HCPCS

## 2021-08-06 PROCEDURE — 6370000000 HC RX 637 (ALT 250 FOR IP)

## 2021-08-06 PROCEDURE — 80053 COMPREHEN METABOLIC PANEL: CPT

## 2021-08-06 PROCEDURE — 82550 ASSAY OF CK (CPK): CPT

## 2021-08-06 PROCEDURE — 36415 COLL VENOUS BLD VENIPUNCTURE: CPT

## 2021-08-06 PROCEDURE — 96372 THER/PROPH/DIAG INJ SC/IM: CPT

## 2021-08-06 PROCEDURE — 2580000003 HC RX 258

## 2021-08-06 PROCEDURE — 83930 ASSAY OF BLOOD OSMOLALITY: CPT

## 2021-08-06 PROCEDURE — 85027 COMPLETE CBC AUTOMATED: CPT

## 2021-08-06 PROCEDURE — 96361 HYDRATE IV INFUSION ADD-ON: CPT

## 2021-08-06 PROCEDURE — 6370000000 HC RX 637 (ALT 250 FOR IP): Performed by: FAMILY MEDICINE

## 2021-08-06 PROCEDURE — 6360000002 HC RX W HCPCS

## 2021-08-06 RX ORDER — VENLAFAXINE 37.5 MG/1
150 TABLET ORAL 2 TIMES DAILY
Status: DISCONTINUED | OUTPATIENT
Start: 2021-08-06 | End: 2021-08-06 | Stop reason: HOSPADM

## 2021-08-06 RX ORDER — MIRTAZAPINE 45 MG/1
45 TABLET, FILM COATED ORAL NIGHTLY
Status: DISCONTINUED | OUTPATIENT
Start: 2021-08-06 | End: 2021-08-06 | Stop reason: HOSPADM

## 2021-08-06 RX ORDER — PREGABALIN 50 MG/1
100 CAPSULE ORAL 2 TIMES DAILY
Status: DISCONTINUED | OUTPATIENT
Start: 2021-08-06 | End: 2021-08-06 | Stop reason: HOSPADM

## 2021-08-06 RX ORDER — NALOXONE HYDROCHLORIDE 4 MG/.1ML
1 SPRAY NASAL PRN
Status: DISCONTINUED | OUTPATIENT
Start: 2021-08-06 | End: 2021-08-06 | Stop reason: HOSPADM

## 2021-08-06 RX ORDER — HEPARIN SODIUM 5000 [USP'U]/ML
5000 INJECTION, SOLUTION INTRAVENOUS; SUBCUTANEOUS EVERY 8 HOURS SCHEDULED
Status: DISCONTINUED | OUTPATIENT
Start: 2021-08-06 | End: 2021-08-06 | Stop reason: HOSPADM

## 2021-08-06 RX ADMIN — ONDANSETRON 4 MG: 2 INJECTION INTRAMUSCULAR; INTRAVENOUS at 09:20

## 2021-08-06 RX ADMIN — SODIUM CHLORIDE: 9 INJECTION, SOLUTION INTRAVENOUS at 04:33

## 2021-08-06 RX ADMIN — SODIUM CHLORIDE: 9 INJECTION, SOLUTION INTRAVENOUS at 09:20

## 2021-08-06 RX ADMIN — VENLAFAXINE HYDROCHLORIDE 150 MG: 37.5 TABLET ORAL at 11:05

## 2021-08-06 RX ADMIN — ACETAMINOPHEN 650 MG: 325 TABLET ORAL at 04:31

## 2021-08-06 RX ADMIN — PREGABALIN 100 MG: 50 CAPSULE ORAL at 09:22

## 2021-08-06 RX ADMIN — SODIUM CHLORIDE, PRESERVATIVE FREE 10 ML: 5 INJECTION INTRAVENOUS at 00:33

## 2021-08-06 ASSESSMENT — PAIN DESCRIPTION - PROGRESSION
CLINICAL_PROGRESSION: GRADUALLY WORSENING

## 2021-08-06 ASSESSMENT — PAIN DESCRIPTION - DESCRIPTORS: DESCRIPTORS: ACHING;BURNING

## 2021-08-06 ASSESSMENT — PAIN DESCRIPTION - LOCATION: LOCATION: LEG

## 2021-08-06 ASSESSMENT — PAIN DESCRIPTION - ONSET: ONSET: ON-GOING

## 2021-08-06 ASSESSMENT — PAIN SCALES - GENERAL
PAINLEVEL_OUTOF10: 8
PAINLEVEL_OUTOF10: 8

## 2021-08-06 ASSESSMENT — PAIN DESCRIPTION - PAIN TYPE: TYPE: ACUTE PAIN

## 2021-08-06 ASSESSMENT — PAIN DESCRIPTION - FREQUENCY: FREQUENCY: CONTINUOUS

## 2021-08-06 ASSESSMENT — PAIN DESCRIPTION - ORIENTATION: ORIENTATION: RIGHT;LEFT

## 2021-08-06 ASSESSMENT — PAIN - FUNCTIONAL ASSESSMENT: PAIN_FUNCTIONAL_ASSESSMENT: ACTIVITIES ARE NOT PREVENTED

## 2021-08-06 NOTE — ED NOTES
ED to inpatient nurses report    Chief Complaint   Patient presents with    Acute Renal Failure      Present to ED from home  LOC: alert and orientated to name, place, date  Vital signs   Vitals:    08/05/21 1834 08/05/21 2023 08/05/21 2125   BP: 106/64 129/69 121/66   Pulse: 94 100    Resp: 22 20 19   Temp: 98.2 °F (36.8 °C) 98.2 °F (36.8 °C)    TempSrc: Oral     SpO2: 98% 100% 99%   Weight: 190 lb (86.2 kg)     Height: 5' 10\" (1.778 m)        Oxygen Baseline 0 Liters    Current needs required none   LDAs:   Peripheral IV 08/05/21 Right Antecubital (Active)     Mobility: Requires assistance * 1  Pending ED orders: none  Present condition: stable      Electronically signed by Nakul Sommer RN on 8/5/2021 at 9:36 PM       Nakul Sommer RN  08/05/21 2137

## 2021-08-06 NOTE — CARE COORDINATION
8/6/21, 7:50 AM EDT  DISCHARGE PLANNING EVALUATION:    Keegan Wilcox       Admitted: 8/5/2021/ 100 [facility] day: 1   Location: [address] [place] [address] Reason for admit: Rhabdomyolysis [M62.82]  Nondependent cocaine abuse ([facility] [address] [F14.10]  SATYA (acute kidney injury) ([facility] [address] [N17.9]  Non-traumatic rhabdomyolysis [M62.82]   PMH:  has a past medical history of Anxiety, Depression, Kidney stone, Liver disease, and Opiate abuse, continuous ([facility] [address] Procedure: No.  Barriers to Discharge: To ER with hx Opiate use disorder and liver disease. Pt presented with bilat LE cramping. Was in ER earlier in day but left AMA to go snort Fentanyl then returned. CK 1350 and resolving from previous admission on 7-30. Today . Creat 1.2. IVF at 250/hr. Pt/OT ordered. Therapy note states pt plans to leave today even if AMA. PCP: PAPA Lock CNP  Readmission Risk Score: 35%    Patient Goals/Plan/Treatment Preferences: Met with pt today. Pt does not offer much in discussion. Lives alone. No home services or DME. He has a PCP, he does not drive but walks to where he needs to go. He is insured and denies issues getting medications. Transportation/Food Security/Housekeeping Addressed:  No issues identified.

## 2021-08-06 NOTE — PROGRESS NOTES
Cristóbal Tracy (:  1985) is a 39 y.o. male,Established patient, here for evaluation of the following chief complaint(s):  Drug Problem      ASSESSMENT/PLAN:  1. Severe opioid use disorder ([facility] [address]  2. Anxiety and depression  3. Insomnia, unspecified type  4. Polysubstance abuse ([facility] [address]  5. Encounter for monitoring Suboxone maintenance therapy  6. Fibromyalgia  7. Hypogonadism in male  8. Skin infection  9.  Attention deficit hyperactivity disorder (ADHD), predominantly hyperactive type    I called the emergency room and spoke to nurse practitioner  We are going to take the patient directly over to the hospital  He is not stable to discharge from this office  His kidneys may be worse  We told him he needs to stay to get a work-up he said he would but he always signs out  His prognosis is guarded  I want him to go to a rehab facility  I think that or shelter is the only way that he is going to survive          SUBJECTIVE/OBJECTIVE:  Drug Problem    I last saw him 7/15  We are going to do a virtual visit  but he did not get on line  He has had several visits to the ER as well as an admission  His creatinine was over 10  He did not require dialysis but he did sign out AMA  He was supposed to go to a rehab facility in [place] but apparently did not have a bed  Today he staggered into the office into the waiting room  He was high on drugs  Admits to doing meth fentanyl  It was documented when he was in the hospital after he got an Ativan shot he asked the nurse if he can like the needle    Long story short he was clean for several months but relapsed  He has missed several appointments since last visit because he was dirty  I see his girlfriend as well  A prior visit I was directly observing his urine sample he threw the cup in the sink stormed out with his pants down      We have gotten word from the police that he and several other people were selling and trading their drugs on the street  I had Carmen Love

## 2021-08-06 NOTE — ED NOTES
Patient resting in bed. Respirations easy and unlabored. No distress noted. Call light within reach.        Lyn Charlton RN  08/06/21 9250

## 2021-08-06 NOTE — ED NOTES
Patient resting in bed. Respirations easy and unlabored. No distress noted. Call light within reach.        Jerry Alarcon RN  08/05/21 2785

## 2021-08-06 NOTE — H&P
Medicine Admission History & Physical      Patient:  Sadie Walker  YOB: 1985  Date of Service: 8/6/2021  MRN: [id number]   Acct: [de-identified]   Primary Care Physician: PAPA Hernandez - CNP    Admitting Faculty MD: Brenda Castro MD    Code Status: Full Code Limited Code details: Intubation/Re-intubation No Comment; Defibrillation/Cardioversion No Comment; Chest Compressions No Comment; Resuscitative Medications No Comment; Other No Comment    Date of Service: Pt seen/examined in consultation on 8/6/2021     General Medicine History and Physical     History provided by: patient and chart review  History limited by: patient condition  Patient presents from: home? Chief Complaint with Duration:  B/l LE muscle cramps    History of Present Illness:  Sadie Walker is a 39 y.o. male with a PMH of opiate use disorder, anxiety, depression, nephrolithiasis, and liver disease who presented with b/l LE muscle cramps starting this morning. Pt initially presented earlier today but left AMA to snort a line of fentanyl then returned. Also endorses diffuse abdominal pain but then sporadically denies it. Pt is AOx2 only (person and place but not time and situation). Denies fever, chills, chest pain, back pain, headaches, dizziness, nausea, vomiting, shortness of breath, dysuria, constipation, diarrhea, dyspnea, and urinary retention. Of note, he does stat that his current symptoms resemble the ones he had from his earlier admission from this this month. Pt was here for throbbing and stabbing muscle pain that was 10/10. At the time, his UDS was positive for cocaine, opioids, and suboxone. Summarized ED course: Initial vital signs included RR 22. Remainder of vital signs were stable.      Admission Labs:   Serum sodium 130   Chloride 93   Bicarb 22   Creatinine 1.6   CK 1350   ALT 67   AST 66   Bilirubin 0.2   TSH 2.530   Serum ethyl alcohol <0.01   UDS positive for barbiturates and cocaine.  WBC 13.5   Hgb 10.5 MCV 78.5   UA - mild glycosuria, moderate hematuria, and microprotenuria. spGrav 1.015    Admission Diagnostics/Imaging:   EKG: NSR    Assessment / Plan     #Rhabdomyolysis: Resolving  CK 1350. Resolving from last admission. Concern for rhabdomyolysis returning given reoccurring leg cramping and persistent cocaine use as apparent from UDS however cocaine can stay in urine up to 3 days. His fentanyl was likely cut with cocaine. Plan:  - Continue trending CK every 24 hours  - Continue IVNS at 250 cc/hr  - Strict I&Os  - Daily weights  - Daily BMPs. #Intrinsic SATYA Stage 1 on CKD Stage IIIB eGFR 49 mL/min/1.73m2: Resolving   Serum creatinine 1.6 on baseline 0.9 back in 01/2021. It may be that pt has new baseline around 1.5. BUN:sCr 13.75 favoring intrinsic renal etiology. SATYA from previous admission likely resolving now. Plan:  - Avoid nephrotoxic substances  - Will consider nephrology consult    #Opioid Use Disorder: Active  Pt admits to using fentanyl. UDS positive for barbiturates and cocaine which his fentanyl is likely cut with. Plan:  - Initiate COWS protocol with buprenorphine  - Will consider inpatient addition services consult    #Asymptomatic Hypotonic Hypovolemic Hyponatremia: Active  Serum sodium 130, serum osmolality 271, and patient is volume down likely secondary to poor PO fluid intake. Plan:  -Order Serum osmolality  -Order Urine osmolality  -Order Urine sodium  -Do not exceed 6-8 mg/dL correction from initial serum sodium within 12 hours and 12 m/dL in 24 hours. (0.5 mg/kg/hr correction rate)    #Mild Transaminitis: Resolving  ALT 67, AST 66 but improving. This is likely a resolving ALI from pt's previous admission. Plan:   - Will continue to monitor  - Order abdominal ultrasound for evaluation. #H/o Hepatitis C infection: Stable  Hep C Ab positive 07/31/2021. Viral load 230,051.    Plan:  - Pt will require management of this outpatient  - Will consider consulting infectious disease vs GI for inpatient management however no evidence of acute hepatitis flare at this time. #H/o ADHD?: Stable  On Vyvanse at home  Plan: Continue vyvanse    #H/o Migraine?: Stable  Takes excedrin  Plan: Continue excedrin    #H/o Neuropathy?: Stable  Takes gabapentin  Plan: Continue gabapentin. Fluids: as above  Electrolyte: Replete as needed  Nutrition:  Regular  GI: none  DVT ppx: heparin (porcine) subQ q8h  PT/OT/SLP: PT/OT for early mobility. Code status: Full Code Limited Code details: Intubation/Re-intubation No Comment; Defibrillation/Cardioversion No Comment; Chest Compressions No Comment; Resuscitative Medications No Comment; Other No Comment    Admit to: Med/Surg    Past Medical History Past Surgical History    has a past medical history of Anxiety, Depression, Kidney stone, Liver disease, and Opiate abuse, continuous ([facility] [address] has no past surgical history on file. Social History Family History    reports that he has been smoking cigarettes. He has been smoking about 0.50 packs per day. He has never used smokeless tobacco. He reports current alcohol use. He reports current drug use. family history is not on file.      - Travel: unknown  - Pets: unknown  - Diet: Diet NPO    Medications       Current Facility-Administered Medications:     naloxone (NALOXONE TO-GO) 4 mg/0.1 mL nasal spray, 1 spray, Nasal, PRN, Brenda Castro MD    venlafaxine [facility]) tablet 150 mg, 150 mg, Oral, BID, Brenda Castro MD    pregabalin (LYRICA) capsule 100 mg, 100 mg, Oral, BID, Brenda Castro MD    mirtazapine (REMERON) tablet 45 mg, 45 mg, Oral, Nightly, Brenda Castro MD    Lisdexamfetamine Dimesylate CAPS 40 mg, 40 mg, Oral, Daily, Brenda Castro MD    heparin (porcine) injection 5,000 Units, 5,000 Units, Subcutaneous, 3 times per day, Brenda Castro MD    sodium chloride flush 0.9 % injection 5-40 mL, 5-40 mL, Intravenous, 2 times per day, Natividad Ibarra MD, 10 mL at 08/06/21 0033    sodium chloride flush 0.9 % injection 5-40 mL, 5-40 mL, Intravenous, PRN, Teresa Chadwick MD    0.9 % sodium chloride infusion, 25 mL, Intravenous, PRN, Teresa Chadwick MD    ondansetron (ZOFRAN-ODT) disintegrating tablet 4 mg, 4 mg, Oral, Q8H PRN **OR** ondansetron (ZOFRAN) injection 4 mg, 4 mg, Intravenous, Q6H PRN, Teresa Chadwick MD    acetaminophen (TYLENOL) tablet 650 mg, 650 mg, Oral, Q6H PRN, 650 mg at 08/06/21 0431 **OR** acetaminophen (TYLENOL) suppository 650 mg, 650 mg, Rectal, Q6H PRN, Teresa Chadwick MD    0.9 % sodium chloride infusion, , Intravenous, Continuous, Teresa Chadwick MD, Last Rate: 250 mL/hr at 08/06/21 0433, New Bag at 08/06/21 0433       Allergies   Patient has no known allergies. Immunizations     There is no immunization history on file for this patient. Review of Systems - 14-point ROS negative except for the aforementioned    Objective     Vitals:  /87   Pulse 75   Temp 97.7 °F (36.5 °C) (Oral)   Resp 19   Ht 5' 10\" (1.778 m)   Wt 185 lb (83.9 kg)   SpO2 99%   BMI 26.54 kg/m²     Physical Exam  Constitutional:       Appearance: Normal appearance. He is obese. He is not diaphoretic. HENT:      Head: Normocephalic and atraumatic. Mouth/Throat:      Mouth: Mucous membranes are moist.      Pharynx: Oropharynx is clear. No oropharyngeal exudate or posterior oropharyngeal erythema. Eyes:      General: No scleral icterus. Extraocular Movements: Extraocular movements intact. Pupils: Pupils are equal, round, and reactive to light. Cardiovascular:      Rate and Rhythm: Normal rate and regular rhythm. Pulses: Normal pulses. Heart sounds: Normal heart sounds. No murmur heard. No friction rub. No gallop. Pulmonary:      Effort: Pulmonary effort is normal.      Breath sounds: Normal breath sounds. No wheezing, rhonchi or rales. Abdominal:      General: Bowel sounds are normal.      Palpations: Abdomen is soft. Tenderness: There is no abdominal tenderness. There is no guarding or rebound. Musculoskeletal:         General: Tenderness (b/l LE) present. Cervical back: Normal range of motion and neck supple. Right lower leg: No edema. Left lower leg: No edema. Skin:     General: Skin is warm and dry. Capillary Refill: Capillary refill takes less than 2 seconds. Neurological:      General: No focal deficit present. Mental Status: He is lethargic and disoriented. Psychiatric:         Attention and Perception: He is inattentive. Mood and Affect: Mood normal.         Speech: Speech is slurred. Behavior: Behavior normal. Behavior is cooperative.          Labs:   Recent Labs     Units 08/06/21  0418 08/05/21 1857 08/05/21  1208 08/04/21  0230   WBC thou/mm3 9.1 13.5* 9.2 13.0*   HGB gm/dl 11.7* 10.5* 10.3* 10.1*   HCT % 36.9* 33.2* 33.2* 33.0*   PLT thou/mm3 306 301 284 272   MPV fL 10.8 10.2 10.9 9.5   EOSABS thou/mm3  --  0.3 0.5* 0.3   LYMPHSABS thou/mm3  --  3.6 3.6 2.1   MONOPCT %  --  10.7 10.6 6.7   MONOSABS thou/mm3  --  1.4* 1.0 0.9   IMMGRAN thou/mm3  --  0.4  0.05 0.3  0.03 0.5  0.06   NRBC /100 wbc  --  0 0 0     Recent Labs     Units 08/05/21 1857 08/05/21  1208 08/04/21  0230   NA meq/L 130* 134* 137   K meq/L 4.5 4.7 3.7   CL meq/L 93* 98 101   CO2 meq/L 22* 23 24   BUN mg/dL 22 22 9   CREATININE mg/dL 1.6* 1.4* 1.8*   GLUCOSE mg/dL 86 89 88     Recent Labs     Units 08/05/21 1857 08/05/21  1208 08/04/21  0230   BILITOT mg/dL 0.2* 0.2* 0.2*   BILIDIR mg/dL  --   --  <0.2   PROT g/dL 7.8 7.1 7.4   AST U/L 66* 58* 69*     Recent Labs     Units 08/05/21  2020 08/05/21  1208 08/04/21  0230   COLORU  YELLOW  --   --    UROBILINOGEN eu/dl 0.2  --   --    OSMOLALITY mOsmol/kg  --  271.0* 271.9*     Recent Labs     Units 08/04/21  0230   TSH uIU/mL 2.530       Diagnostics:  XR CHEST PORTABLE  Result Date: 8/5/2021  Normal mobile chest. **This report has been created using voice recognition software. It may contain minor errors which are inherent in voice recognition technology. ** Final report electronically signed by Dr. Jennifer France on 8/5/2021 12:07 PM    ECG/Tele Events:   EKG: NSR, otherwise unremarkable    Micro:  MRSA    Signed:  Cristina Warren MD  Internal Medicine, PGY-1  08/06/21  5:13 AM    Staff: Brady Mora MD

## 2021-08-06 NOTE — DISCHARGE SUMMARY
This provider saw patient this a.m. and spoke about how important it was to have liver ultrasound and to follow-up with his labs. Patient states that he was having severe leg cramping and known is doing thing about his pain. This provider states that fluids and Lyrica would help his pain. Patient states that he was leaving AMA. Patient left AMA at 53-69-10-18.   RN notified hospitalist.    Electronically signed by EDIS Guillen on 8/6/2021 at 3:52 PM

## 2021-08-06 NOTE — PROGRESS NOTES
[facility]  OCCUPATIONAL THERAPY MISSED TREATMENT NOTE  STRZ MED SURG 8B  [id number]      Date: 2021  Patient Name: Yajaira Melchor        CSN: [id number]   : 1985  (39 y.o.)  Gender: male   Referring Practitioner: Yesenia Talbot MD  Diagnosis: Rhabdomylosis         REASON FOR MISSED TREATMENT: Patient Refused. Pt refusing all skilled therapy services. Pt reports he is leaving today even if it is AMA. OT will sign off.

## 2021-08-07 ASSESSMENT — ENCOUNTER SYMPTOMS
NAUSEA: 0
BACK PAIN: 0
VOMITING: 0
CHEST TIGHTNESS: 0
RHINORRHEA: 0
EYE REDNESS: 0
ABDOMINAL PAIN: 0
COUGH: 0

## 2021-08-07 NOTE — ED PROVIDER NOTES
[facility] Emergency Department    CHIEF COMPLAINT       Chief Complaint   Patient presents with    Foot Pain       Nurses Notes reviewed and I agree except as noted in the HPI. HISTORY OF PRESENT ILLNESS    Sedonia Hamman jessica 39 y.o. male who presents to the ED for evaluation of foot and leg pain. The patient states that he was running and twisted his ankles. The patient is under the influence of fentanyl (per the patient) and reports concern he might have been roofied. HPI was provided by the patient    REVIEW OF SYSTEMS     Review of Systems   Constitutional: Negative for chills, fatigue and fever. HENT: Negative for congestion, ear discharge, ear pain, postnasal drip and rhinorrhea. Eyes: Negative for redness. Respiratory: Negative for cough and chest tightness. Cardiovascular: Negative for chest pain and leg swelling. Gastrointestinal: Negative for abdominal pain, nausea and vomiting. Genitourinary: Negative for difficulty urinating, dysuria, enuresis, flank pain and hematuria. Musculoskeletal: Positive for arthralgias, gait problem and joint swelling. Negative for back pain. Skin: Negative for rash. Neurological: Negative for dizziness, light-headedness, numbness and headaches. Psychiatric/Behavioral: Positive for agitation and behavioral problems. Negative for confusion, self-injury and suicidal ideas. All other systems negative except as noted. PAST MEDICAL HISTORY     Past Medical History:   Diagnosis Date    Anxiety     Depression     Kidney stone     Liver disease     Hep C    Opiate abuse, continuous (HCC)        SURGICALHISTORY      has no past surgical history on file.     CURRENT MEDICATIONS       Discharge Medication List as of 8/4/2021  3:44 AM      CONTINUE these medications which have NOT CHANGED    Details   venlafaxine (EFFEXOR) 100 MG tablet Take 1.5 tablets by mouth 2 times daily, Disp-45 tablet, R-3Normal      lisinopril (PRINIVIL;ZESTRIL) 20 MG tablet Take 1 tablet by mouth daily, Disp-30 tablet, R-3Normal      pregabalin (LYRICA) 300 MG capsule Take 1 capsule by mouth 2 times daily for 30 days. , Disp-60 capsule, R-3Normal      mirtazapine (REMERON) 45 MG tablet Take 1 tablet by mouth nightly, Disp-30 tablet, R-3Normal      !! Lisdexamfetamine Dimesylate (VYVANSE) 40 MG CAPS Take 40 mg by mouth daily for 30 days. , Disp-30 capsule, R-0Print      !! Lisdexamfetamine Dimesylate (VYVANSE) 40 MG CAPS Take 40 mg by mouth daily for 30 days. , Disp-30 capsule, R-0Print      testosterone (ANDROGEL) 25 MG/2.5GM (1%) GEL 1 % gel Apply 2.5 g topically daily for 30 days. , Disp-30 Package, R-0Normal      ondansetron (ZOFRAN ODT) 4 MG disintegrating tablet Take 1 tablet by mouth every 8 hours as needed for Nausea, Disp-20 tablet, R-0Normal      Aspirin-Acetaminophen-Caffeine (EXCEDRIN EXTRA STRENGTH PO) Take by mouth as neededHistorical Med      naloxone 4 MG/0.1ML LIQD nasal spray 1 spray by Nasal route as needed for Opioid Reversal, Disp-1 each,R-5Normal       !! - Potential duplicate medications found. Please discuss with provider. ALLERGIES     has No Known Allergies. FAMILY HISTORY     has no family status information on file. family history is not on file.     SOCIAL HISTORY       Social History     Socioeconomic History    Marital status: Single     Spouse name: Not on file    Number of children: Not on file    Years of education: Not on file    Highest education level: Not on file   Occupational History    Not on file   Tobacco Use    Smoking status: Current Every Day Smoker     Packs/day: 0.50     Types: Cigarettes    Smokeless tobacco: Never Used   Vaping Use    Vaping Use: Never assessed   Substance and Sexual Activity    Alcohol use: Yes     Comment: occasional    Drug use: Yes     Comment: yesterday    Sexual activity: Not Currently   Other Topics Concern    Not on file   Social History Narrative    Not on file     Social Determinants of Health     Financial Resource Strain: Low Risk     Difficulty of Paying Living Expenses: Not hard at all   Food Insecurity: No Food Insecurity    Worried About Running Out of Food in the Last Year: Never true    Ana of Food in the Last Year: Never true   Transportation Needs:     Lack of Transportation (Medical):  Lack of Transportation (Non-Medical):    Physical Activity:     Days of Exercise per Week:     Minutes of Exercise per Session:    Stress:     Feeling of Stress :    Social Connections:     Frequency of Communication with Friends and Family:     Frequency of Social Gatherings with Friends and Family:     Attends Nondenominational Services:     Active Member of Clubs or Organizations:     Attends Club or Organization Meetings:     Marital Status:    Intimate Partner Violence:     Fear of Current or Ex-Partner:     Emotionally Abused:     Physically Abused:     Sexually Abused:        PHYSICAL EXAM     INITIAL VITALS:  height is 5' 10\" (1.778 m) and weight is 198 lb (89.8 kg). His axillary temperature is 98.9 °F (37.2 °C). His blood pressure is 123/90 (abnormal) and his pulse is 127. His respiration is 24. Physical Exam  Constitutional:       Appearance: Normal appearance. He is well-developed. He is not ill-appearing. HENT:      Head: Normocephalic and atraumatic. Nose: Nose normal.      Mouth/Throat:      Mouth: Mucous membranes are moist.      Pharynx: Oropharynx is clear. Eyes:      Conjunctiva/sclera: Conjunctivae normal.   Cardiovascular:      Rate and Rhythm: Normal rate. Pulses: Normal pulses. Pulmonary:      Effort: Pulmonary effort is normal.   Abdominal:      Palpations: Abdomen is soft. Musculoskeletal:      Cervical back: Normal range of motion. Right ankle: No swelling, deformity or ecchymosis. Tenderness present. Decreased range of motion. Normal pulse. Left ankle: No swelling, deformity or ecchymosis.  Tenderness present. Decreased range of motion. Normal pulse. Comments: Sores all over the patient's body   Skin:     General: Skin is warm and dry. Capillary Refill: Capillary refill takes less than 2 seconds. Neurological:      General: No focal deficit present. Mental Status: He is alert and oriented to person, place, and time. Psychiatric:         Mood and Affect: Affect is tearful. Speech: Speech is tangential.         Thought Content: Thought content does not include homicidal or suicidal ideation. Judgment: Judgment is impulsive. DIFFERENTIAL DIAGNOSIS:   Substance use, fracture, sprain, strain, contusion    DIAGNOSTIC RESULTS     EKG: All EKG's are interpreted by the Emergency Department Physician who eithersigns or Co-signs this chart in the absence of a cardiologist.        RADIOLOGY: non-plainfilm images(s) such as CT, Ultrasound and MRI are read by the radiologist.  Plain radiographic images are visualized and preliminarily interpreted by the emergency physician unless otherwise stated below. XR FOOT RIGHT (MIN 3 VIEWS)   Final Result   Impression:      No acute fracture or dislocation. This document has been electronically signed by: Adriane Boxer, MD on    08/04/2021 03:12 AM      XR FOOT LEFT (MIN 3 VIEWS)   Final Result   Impression:      Small calcaneal spur at the Achilles insertion. No acute fracture or    dislocation. This document has been electronically signed by: Adriane Boxer, MD on    08/04/2021 03:13 AM      XR ANKLE LEFT (MIN 3 VIEWS)   Final Result   Impression:      Anterior soft tissue swelling, otherwise negative 3-view left ankle. This document has been electronically signed by: Adriane Boxer, MD on    08/04/2021 03:09 AM      XR ANKLE RIGHT (MIN 3 VIEWS)   Final Result   Impression:      Anterior soft tissue swelling, otherwise negative 3-view right ankle.       This document has been electronically signed by: Adriane Boxer, MD on 08/04/2021 03:11 AM            LABS:   Labs Reviewed   BASIC METABOLIC PANEL - Abnormal; Notable for the following components:       Result Value    CREATININE 1.8 (*)     All other components within normal limits   CBC WITH AUTO DIFFERENTIAL - Abnormal; Notable for the following components:    WBC 13.0 (*)     RBC 4.09 (*)     Hemoglobin 10.1 (*)     Hematocrit 33.0 (*)     MCH 24.7 (*)     MCHC 30.6 (*)     RDW-CV 17.2 (*)     RDW-SD 49.0 (*)     Segs Absolute 9.6 (*)     All other components within normal limits   HEPATIC FUNCTION PANEL - Abnormal; Notable for the following components: Total Bilirubin 0.2 (*)     AST 69 (*)     ALT 75 (*)     All other components within normal limits   SALICYLATE LEVEL - Abnormal; Notable for the following components:    Salicylate, Serum < 0.3 (*)     All other components within normal limits   GLOMERULAR FILTRATION RATE, ESTIMATED - Abnormal; Notable for the following components:    Est, Glom Filt Rate 43 (*)     All other components within normal limits   OSMOLALITY - Abnormal; Notable for the following components:    Osmolality Calc 271.9 (*)     All other components within normal limits   ACETAMINOPHEN LEVEL   ETHANOL   TSH WITHOUT REFLEX   ANION GAP   URINE DRUG SCREEN   URINE RT REFLEX TO CULTURE       EMERGENCY DEPARTMENT COURSE:   Vitals:    Vitals:    08/04/21 0144   BP: (!) 123/90   Pulse: 127   Resp: 24   Temp: 98.9 °F (37.2 °C)   TempSrc: Axillary   Weight: 198 lb (89.8 kg)   Height: 5' 10\" (1.778 m)       [facility]    Patient was seen and evaluated in the emergency department, patient appeared to be in stable condition. Vital signs assessed, no abnormality noted. Physical exam completed. Tenderness to the bilateral ankles noted. xrays and labs Ordered. Based on my physical exam and work up I believe the patient has bilateral ankle sprain/strains. I discussed my findings and plan of care with patient.  They are amenable with ace wraps and follow up outpatient. While here in the emergency department they maintained a stable course and were appropriate for discharge. Medications - No data to display      Patient was seen independently by myself. The patient's final impression and disposition and plan was determined by myself. Strict return precautions and follow up instructions were discussed with the patient prior to discharge, with which the patient agrees. Physical assessment findings, diagnostic testing(s) if applicable, and vital signs reviewed with patient/patient representative. Questions answered. Medications asdirected, including OTC medications for supportive care. Education provided on medications. Differential diagnosis(s) discussed with patient/patient representative. Home care/self care instructions reviewed withpatient/patient representative. Patient is to follow-up with family care provider in 2-3 days if no improvement. Patient is to go to the emergency department if symptoms worsen. Patient/patient representative isaware of care plan, questions answered, verbalizes understanding and is in agreement. CRITICAL CARE:   None    CONSULTS:  None    PROCEDURES:  None    FINAL IMPRESSION     1. Substance use disorder    2.  Sprain of ankle, unspecified laterality, unspecified ligament, initial encounter          DISPOSITION/PLAN   DISPOSITION Decision To Discharge 08/04/2021 03:43:34 AM      PATIENT REFERREDTO:  PAPA Damon CNP  [address] [facility]    Schedule an appointment as soon as possible for a visit in 2 days  For follow up      [address]:  Discharge Medication List as of 8/4/2021  3:44 AM          (Please note that portions of this note were completed with a voice recognition program.  Efforts were made to edit the dictations but occasionally words are mis-transcribed.)         PAPA Alvarez CNP, APRN - CNP  08/07/21 [address]

## 2021-08-08 LAB — CRYOGLOBULIN: NORMAL

## 2021-08-09 ENCOUNTER — CARE COORDINATION (OUTPATIENT)
Dept: CASE MANAGEMENT | Age: 36
End: 2021-08-09

## 2021-08-09 NOTE — CARE COORDINATION
Care Transitions Outreach Attempt    Call within 2 business days of discharge: Yes   Attempted to reach patient for transitions of care follow up. Unable to reach patient. Patient: Anthony Roberts Patient : 1985 MRN: [id number]    Last Discharge [facility]       Complaint Diagnosis Description Type Department Provider    21 Acute Renal Failure SATYA (acute kidney injury) ([facility] [address] . .. ED to Hosp-Admission (Discharged) (ADMITTED) KARTHIKEYAN Wilcox [place], [place]; Nikkie Briones. ..    21 Addiction Problem; Other Substance abuse ([facility] [address] . .. ED [facility])  [facility] ED             Was this an external facility discharge?  No Discharge Facility: [facility]      Noted following upcoming appointments from discharge chart review:   [facility] follow up appointment(s):   Future Appointments   Date Time Provider Isael Kinney   2021  9:40 AM Cecille Duverney, APRN - CNP SRPX  MED [address] [facility]     [id number] [address] follow up appointment(s):     Angelique Suárez [phone]  Care Transitions Nurse

## 2021-08-10 ENCOUNTER — CARE COORDINATION (OUTPATIENT)
Dept: CASE MANAGEMENT | Age: 36
End: 2021-08-10

## 2021-08-10 NOTE — CARE COORDINATION
Care Transitions Outreach Attempt    Call within 2 business days of discharge: Yes   Attempted to reach patient for transitions of care follow up. Unable to reach patient. Patient: Kimberly Becker Patient : 1985 MRN: [id number]    Last Discharge [facility]       Complaint Diagnosis Description Type Department Provider    21 Acute Renal Failure SATYA (acute kidney injury) ([facility] [address] . .. ED to Hosp-Admission (Discharged) (ADMITTED) [facility] [address] Aleksandra Samantha, [address]; Blair Lara. ..    21 Addiction Problem; Other Substance abuse ([facility] [address] . .. [facility]             Was this an external facility discharge?  No Discharge Facility: [facility]      Noted following upcoming appointments from discharge chart review:   [facility] follow up appointment(s):   Future Appointments   Date Time Provider Isael Kinney   2021  9:40 AM PAPA Daniels - CNP SRPX  MED [address]     [id number] [address] follow up appointment(s):     Charli Yen [phone]  Care Transitions Nurse

## 2021-08-11 ENCOUNTER — OFFICE VISIT (OUTPATIENT)
Dept: INTERNAL MEDICINE CLINIC | Age: 36
End: 2021-08-11
Payer: MEDICARE

## 2021-08-11 VITALS
TEMPERATURE: 97.8 F | WEIGHT: 186 LBS | SYSTOLIC BLOOD PRESSURE: 139 MMHG | HEART RATE: 96 BPM | DIASTOLIC BLOOD PRESSURE: 81 MMHG | HEIGHT: 70 IN | BODY MASS INDEX: 26.63 KG/M2

## 2021-08-11 DIAGNOSIS — F32.A ANXIETY AND DEPRESSION: ICD-10-CM

## 2021-08-11 DIAGNOSIS — F19.10 POLYSUBSTANCE ABUSE (HCC): ICD-10-CM

## 2021-08-11 DIAGNOSIS — E29.1 HYPOGONADISM IN MALE: ICD-10-CM

## 2021-08-11 DIAGNOSIS — Z79.899 ENCOUNTER FOR MONITORING SUBOXONE MAINTENANCE THERAPY: ICD-10-CM

## 2021-08-11 DIAGNOSIS — F41.9 ANXIETY AND DEPRESSION: ICD-10-CM

## 2021-08-11 DIAGNOSIS — F11.20 SEVERE OPIOID USE DISORDER (HCC): Primary | ICD-10-CM

## 2021-08-11 DIAGNOSIS — F90.1 ATTENTION DEFICIT HYPERACTIVITY DISORDER (ADHD), PREDOMINANTLY HYPERACTIVE TYPE: ICD-10-CM

## 2021-08-11 DIAGNOSIS — Z51.81 ENCOUNTER FOR MONITORING SUBOXONE MAINTENANCE THERAPY: ICD-10-CM

## 2021-08-11 DIAGNOSIS — M79.7 FIBROMYALGIA: ICD-10-CM

## 2021-08-11 PROCEDURE — G8417 CALC BMI ABV UP PARAM F/U: HCPCS | Performed by: INTERNAL MEDICINE

## 2021-08-11 PROCEDURE — 4004F PT TOBACCO SCREEN RCVD TLK: CPT | Performed by: INTERNAL MEDICINE

## 2021-08-11 PROCEDURE — 80305 DRUG TEST PRSMV DIR OPT OBS: CPT | Performed by: INTERNAL MEDICINE

## 2021-08-11 PROCEDURE — 1111F DSCHRG MED/CURRENT MED MERGE: CPT | Performed by: INTERNAL MEDICINE

## 2021-08-11 PROCEDURE — 99214 OFFICE O/P EST MOD 30 MIN: CPT | Performed by: INTERNAL MEDICINE

## 2021-08-11 PROCEDURE — G8427 DOCREV CUR MEDS BY ELIG CLIN: HCPCS | Performed by: INTERNAL MEDICINE

## 2021-08-11 RX ORDER — BUPRENORPHINE AND NALOXONE 8; 2 MG/1; MG/1
1 FILM, SOLUBLE BUCCAL; SUBLINGUAL 2 TIMES DAILY
Qty: 10 FILM | Refills: 0 | Status: SHIPPED | OUTPATIENT
Start: 2021-08-11 | End: 2021-08-16 | Stop reason: SDUPTHER

## 2021-08-11 RX ORDER — GABAPENTIN 600 MG/1
600 TABLET ORAL 4 TIMES DAILY
Qty: 56 TABLET | Refills: 0 | Status: SHIPPED | OUTPATIENT
Start: 2021-08-11 | End: 2022-03-22

## 2021-08-11 NOTE — PROGRESS NOTES
Elizabeth Faith (:  1985) is a 39 y.o. male,Established patient, here for evaluation of the following chief complaint(s):  Drug Problem      ASSESSMENT/PLAN:  1. Severe opioid use disorder (HCC)  -     POCT Rapid Drug Screen  -     buprenorphine-naloxone (SUBOXONE) 8-2 MG FILM SL film; Place 1 Film under the tongue 2 times daily for 5 days. , Disp-10 Film, R-0Normal  2. Anxiety and depression  3. Polysubstance abuse ([facility] [address]  4. Encounter for monitoring Suboxone maintenance therapy  5. Hypogonadism in male  10. Fibromyalgia  7.  Attention deficit hyperactivity disorder (ADHD), predominantly hyperactive type              SUBJECTIVE/OBJECTIVE:  Drug Problem    I last saw him   We wheeled him directly to the ER he could barely stand up  Apparently Narcan him he left  He is much calmer today  He said his girlfriend is in [facility]  He is agreeable to go to a rehab program  We are going to do a virtual visit  but he did not get on line  He has had several visits to the ER as well as an admission  His creatinine was over 10  He did not require dialysis but he did sign out AMA  He was supposed to go to a rehab facility in [facility] but apparently did not have a bed  Today he staggered into the office into the waiting room  He was high on drugs  Admits to doing meth fentanyl  It was documented when he was in the hospital after he got an Ativan shot he asked the nurse if he can like the needle    Long story short he was clean for several months but relapsed  He has missed several appointments since last visit because he was dirty  I see his girlfriend as well  A prior visit I was directly observing his urine sample he threw the cup in the sink stormed out with his pants down      We have gotten word from the police that he and several other people were selling and trading their drugs on the street  I had Felicia Flores setting with this visit  He was lost to follow-up until recently  He said he was in the methadone clinic until recently  He relapsed on meth weeks ago  He says he wants to get clean he has been using Suboxone off the street  I see his girlfriend as well  I put him on Suboxone  weeks ago  He says they both relapsed on heroin 2 weeks ago  He said he has an aunt in [place] that passed away    He had told me he was clean over 100 days but he said he lied  He says the judges order him to go to a treatment facility in [place] on July 6  It was either that or long term  Review of Systems  Patient is feeling terrible left flank pain cannot sit still  Blood pressure 139/81, pulse 96, temperature 97.8 °F (36.6 °C), temperature source Temporal, height 5' 10\" (1.778 m), weight 186 lb (84.4 kg).   Patient is restless cannot sit still he is in obvious pain  Animated and loud as usual  Skin numerous erythematous lesions on his face and extremities        Urine tox screen today  Alcohol, Urine 08/11/2021  9:53 AM Unknown   NEG    Amphetamine Screen, Urine 08/11/2021  9:53 AM Unknown   POS    Barbiturate Screen, Urine 08/11/2021  9:53 AM Unknown   POS    Benzodiazepine Screen, Urine 08/11/2021  9:53 AM Unknown   POS    Buprenorphine Urine 08/11/2021  9:53 AM Unknown   POS    Cocaine Metabolite Screen, Urine 08/11/2021  9:53 AM Unknown   NEG    FENTANYL SCREEN, URINE 08/11/2021  9:53 AM Unknown   POS    Gabapentin Screen, Urine 08/11/2021  9:53 AM Unknown   N/A    MDMA, Urine 08/11/2021  9:53 AM Unknown   NEG    Methadone Screen, Urine 08/11/2021  9:53 AM Unknown   NEG    Methamphetamine, Urine 08/11/2021  9:53 AM Unknown   POS    Opiate Scrn, Ur 08/11/2021  9:53 AM Unknown   POS    Oxycodone Screen, Ur 08/11/2021  9:53 AM Unknown   NEG    PCP Screen, Urine 08/11/2021  9:53 AM Unknown   NEG    Propoxyphene Screen, Urine 08/11/2021  9:53 AM Unknown   N/A    Synthetic Cannabinoids (K2) Screen, Urine 08/11/2021  9:53 AM Unknown   NEG    THC Screen, Urine 08/11/2021  9:53 AM Unknown   POS    Tramadol Scrn, Ur 08/11/2021  9:53 AM Unknown

## 2021-08-16 ENCOUNTER — OFFICE VISIT (OUTPATIENT)
Dept: INTERNAL MEDICINE CLINIC | Age: 36
End: 2021-08-16
Payer: MEDICARE

## 2021-08-16 VITALS
HEIGHT: 70 IN | BODY MASS INDEX: 25.77 KG/M2 | DIASTOLIC BLOOD PRESSURE: 78 MMHG | HEART RATE: 75 BPM | TEMPERATURE: 97.5 F | WEIGHT: 180 LBS | SYSTOLIC BLOOD PRESSURE: 133 MMHG

## 2021-08-16 DIAGNOSIS — F11.20 SEVERE OPIOID USE DISORDER (HCC): Primary | ICD-10-CM

## 2021-08-16 DIAGNOSIS — E29.1 HYPOGONADISM IN MALE: ICD-10-CM

## 2021-08-16 DIAGNOSIS — Z51.81 ENCOUNTER FOR MONITORING SUBOXONE MAINTENANCE THERAPY: ICD-10-CM

## 2021-08-16 DIAGNOSIS — F32.A ANXIETY AND DEPRESSION: ICD-10-CM

## 2021-08-16 DIAGNOSIS — Z79.899 ENCOUNTER FOR MONITORING SUBOXONE MAINTENANCE THERAPY: ICD-10-CM

## 2021-08-16 DIAGNOSIS — F19.10 POLYSUBSTANCE ABUSE (HCC): ICD-10-CM

## 2021-08-16 DIAGNOSIS — F90.1 ATTENTION DEFICIT HYPERACTIVITY DISORDER (ADHD), PREDOMINANTLY HYPERACTIVE TYPE: ICD-10-CM

## 2021-08-16 DIAGNOSIS — F41.9 ANXIETY AND DEPRESSION: ICD-10-CM

## 2021-08-16 DIAGNOSIS — M79.7 FIBROMYALGIA: ICD-10-CM

## 2021-08-16 PROCEDURE — 4004F PT TOBACCO SCREEN RCVD TLK: CPT | Performed by: INTERNAL MEDICINE

## 2021-08-16 PROCEDURE — 80305 DRUG TEST PRSMV DIR OPT OBS: CPT | Performed by: INTERNAL MEDICINE

## 2021-08-16 PROCEDURE — 99213 OFFICE O/P EST LOW 20 MIN: CPT | Performed by: INTERNAL MEDICINE

## 2021-08-16 PROCEDURE — G8427 DOCREV CUR MEDS BY ELIG CLIN: HCPCS | Performed by: INTERNAL MEDICINE

## 2021-08-16 PROCEDURE — G8417 CALC BMI ABV UP PARAM F/U: HCPCS | Performed by: INTERNAL MEDICINE

## 2021-08-16 PROCEDURE — 1111F DSCHRG MED/CURRENT MED MERGE: CPT | Performed by: INTERNAL MEDICINE

## 2021-08-16 RX ORDER — BUPRENORPHINE AND NALOXONE 8; 2 MG/1; MG/1
1 FILM, SOLUBLE BUCCAL; SUBLINGUAL 2 TIMES DAILY
Qty: 8 FILM | Refills: 0 | Status: SHIPPED | OUTPATIENT
Start: 2021-08-16 | End: 2021-08-20

## 2021-08-16 NOTE — PROGRESS NOTES
Verbal order per Dr. Dandre Miles for urine drug screen. Positive for BZO BUP FTY MOP THC TRAM. Verified results with Dalia FIORE LPN. Dr. Dandre Miles ordered Suboxone 8mg film BID for patient. Verified dose with patient. Patient was sent home with 4 day script of Suboxone 8mg film BID and will be seen back in the office 8/19/21.

## 2021-08-22 NOTE — PROGRESS NOTES
Fely Cruz (:  1985) is a 39 y.o. male,Established patient, here for evaluation of the following chief complaint(s):  Drug Problem      ASSESSMENT/PLAN:  1. Severe opioid use disorder (HCC)  -     POCT Rapid Drug Screen  -     buprenorphine-naloxone (SUBOXONE) 8-2 MG FILM SL film; Place 1 Film under the tongue 2 times daily for 4 days. , Disp-8 Film, R-0Normal  2. Anxiety and depression  3. Encounter for monitoring Suboxone maintenance therapy  4. Fibromyalgia  5. Polysubstance abuse ([facility] [address]  6. Hypogonadism in male  7.  Attention deficit hyperactivity disorder (ADHD), predominantly hyperactive type              SUBJECTIVE/OBJECTIVE:  Drug Problem    I last saw him   We wheeled him directly to the ER he could barely stand up at a prior visit  Apparently they gave him Narcan him and he left  He is much calmer today  He said his girlfriend is in [facility]  He is agreeable to go to a rehab program  We are going to do a virtual visit  but he did not get on line  He has had several visits to the ER as well as an admission  His creatinine was over 10  He did not require dialysis but he did sign out AMA  He was supposed to go to a rehab facility in [place] but apparently did not have a bed  Today he staggered into the office into the waiting room  He was high on drugs  Admits to doing meth fentanyl  It was documented when he was in the hospital after he got an Ativan shot he asked the nurse if he can like the needle    Long story short he was clean for several months but relapsed  He has missed several appointments since last visit because he was dirty  I see his girlfriend as well  A prior visit I was directly observing his urine sample he threw the cup in the sink stormed out with his pants down      We have gotten word from the police that he and several other people were selling and trading their drugs on the street  I had [place] N[place] setting with this visit  He was lost to follow-up until recently  He said he was in the methadone clinic until recently  He relapsed on meth weeks ago  He says he wants to get clean he has been using Suboxone off the street  I see his girlfriend as well  I put him on Suboxone  weeks ago  He says they both relapsed on heroin 2 weeks ago  He said he has an aunt in [facility] that passed away    He had told me he was clean over 100 days but he said he lied  He says the judges order him to go to a treatment facility in [place] [facility] on July 6  It was either that or intermediate  Review of Systems  Patient is feeling terrible left flank pain cannot sit still  Blood pressure 133/78, pulse 75, temperature 97.5 °F (36.4 °C), temperature source Temporal, height 5' 10\" (1.778 m), weight 180 lb (81.6 kg).   Patient is restless cannot sit still he is in obvious pain  Animated and loud as usual  Skin numerous erythematous lesions on his face and extremities        Urine tox screen today  Alcohol, Urine 08/16/2021  2:29 PM Unknown   NEG    Amphetamine Screen, Urine 08/16/2021  2:29 PM Unknown   NEG    Barbiturate Screen, Urine 08/16/2021  2:29 PM Unknown   NEG    Benzodiazepine Screen, Urine 08/16/2021  2:29 PM Unknown   POS    Buprenorphine Urine 08/16/2021  2:29 PM Unknown   POS    Cocaine Metabolite Screen, Urine 08/16/2021  2:29 PM Unknown   NEG    FENTANYL SCREEN, URINE 08/16/2021  2:29 PM Unknown   POS    Gabapentin Screen, Urine 08/16/2021  2:29 PM Unknown   N/A    MDMA, Urine 08/16/2021  2:29 PM Unknown   NEG    Methadone Screen, Urine 08/16/2021  2:29 PM Unknown   NEG    Methamphetamine, Urine 08/16/2021  2:29 PM Unknown   NEG    Opiate Scrn, Ur 08/16/2021  2:29 PM Unknown   POS    Oxycodone Screen, Ur 08/16/2021  2:29 PM Unknown   NEG    PCP Screen, Urine 08/16/2021  2:29 PM Unknown   NEG    Propoxyphene Screen, Urine 08/16/2021  2:29 PM Unknown   N/A    Synthetic Cannabinoids (K2) Screen, Urine 08/16/2021  2:29 PM Unknown   NEG    THC Screen, Urine 08/16/2021  2:29 PM Unknown   POS    Tramadol Scrn, Ur 08/16/2021  2:29 PM Unknown   POS    Tricyclic Antidepressants, Urine 08/16/2021  2:29 PM Unknown   N/A            Patient is in deep  He prostitutes himself to men to get drugs  I told him it is going to end in intermediate or death unless he goes to a rehab facility    His girlfriend is in nursing home until October  I told the patient this is a perfect time to go to sober living facility to get help  I have every reason to fire the patient  I just cannot at this time  He says if he goes to rehab they will shut off his electricity he would lose everything in his refrigerator  I told him he is looking for every excuse just not to go to rehab  Aditya Bobby has been working on getting him a facility  Follow-up 8/19  An electronic signature was used to authenticate this note.     --Elisha Qureshi MD

## 2021-08-24 ENCOUNTER — TELEPHONE (OUTPATIENT)
Dept: INTERNAL MEDICINE CLINIC | Age: 36
End: 2021-08-24

## 2021-08-24 NOTE — TELEPHONE ENCOUNTER
Patient called in requesting a refill on Vyvanse. Per Jamelle Osgood CNP will not refill Vyvanse. Patient informed of this.  Patient requested that I cancel his appointment for tomorrow with Jamelle Osgood, appointment cancelled per patient request.

## 2021-08-29 ENCOUNTER — HOSPITAL ENCOUNTER (EMERGENCY)
Age: 36
Discharge: HOME | End: 2021-08-29
Attending: EMERGENCY MEDICINE
Payer: MEDICARE

## 2021-08-29 VITALS
OXYGEN SATURATION: 99 % | WEIGHT: 180 LBS | BODY MASS INDEX: 25.77 KG/M2 | TEMPERATURE: 98 F | SYSTOLIC BLOOD PRESSURE: 116 MMHG | DIASTOLIC BLOOD PRESSURE: 81 MMHG | RESPIRATION RATE: 18 BRPM | HEART RATE: 72 BPM | HEIGHT: 70 IN

## 2021-08-29 DIAGNOSIS — T40.601A OPIATE OVERDOSE, ACCIDENTAL OR UNINTENTIONAL, INITIAL ENCOUNTER (HCC): Primary | ICD-10-CM

## 2021-08-29 PROCEDURE — 6370000000 HC RX 637 (ALT 250 FOR IP): Performed by: EMERGENCY MEDICINE

## 2021-08-29 PROCEDURE — 99285 EMERGENCY DEPT VISIT HI MDM: CPT

## 2021-08-29 RX ORDER — ONDANSETRON 4 MG/1
4 TABLET, ORALLY DISINTEGRATING ORAL EVERY 8 HOURS PRN
Qty: 20 TABLET | Refills: 0 | Status: SHIPPED | OUTPATIENT
Start: 2021-08-29 | End: 2022-03-22

## 2021-08-29 RX ORDER — ONDANSETRON 4 MG/1
4 TABLET, ORALLY DISINTEGRATING ORAL ONCE
Status: COMPLETED | OUTPATIENT
Start: 2021-08-29 | End: 2021-08-29

## 2021-08-29 RX ORDER — PREGABALIN 150 MG/1
300 CAPSULE ORAL 2 TIMES DAILY
Status: ON HOLD | COMMUNITY
End: 2022-05-21 | Stop reason: HOSPADM

## 2021-08-29 RX ADMIN — ONDANSETRON 4 MG: 4 TABLET, ORALLY DISINTEGRATING ORAL at 04:56

## 2021-08-29 ASSESSMENT — ENCOUNTER SYMPTOMS
ABDOMINAL DISTENTION: 0
VOMITING: 0
CHEST TIGHTNESS: 0
BACK PAIN: 0
EYE ITCHING: 0
PHOTOPHOBIA: 0
NAUSEA: 0
SORE THROAT: 0
WHEEZING: 0
CONSTIPATION: 0
APNEA: 1
SHORTNESS OF BREATH: 0
RHINORRHEA: 0
DIARRHEA: 0
ABDOMINAL PAIN: 0
STRIDOR: 0
COUGH: 0
EYE DISCHARGE: 0
EYE REDNESS: 0
EYE PAIN: 0

## 2021-08-29 NOTE — ED PROVIDER NOTES
[address] ENCOUNTER      PATIENT NAME: Milad Garrido  MRN: [id number]  : 1985  WEIR: 2021  PROVIDER: Grupo So MD      CHIEF COMPLAINT       Chief Complaint   Patient presents with    Drug Overdose     SNORTED HEROIN       Patient is seen and evaluated in a timely fashion. Nurses Notes are reviewed and I agree except as noted in the HPI. HISTORY OF PRESENT ILLNESS    Milad Garrido is a 39 y.o. male who presents to Emergency Department with Drug Overdose (SNORTED HEROIN)     48-year-old male with past medical history of opiate abuse, hepatitis C, anxiety and depression presents with opioid overdose. Patient was found on floor at home unresponsive. EMS was called. Narcan was given, and the patient became awake immediately. Patient stated he snorted heroin and fentanyl as usual.  This happened around 1:00 in the morning (about 20 minutes ago). On my initial evaluation, he was alert and oriented x4. No trauma. No breathing difficulty. History is obtained by patient. REVIEW OF SYSTEMS   Review of Systems   Constitutional: Negative for activity change, appetite change, chills, fatigue, fever and unexpected weight change. HENT: Negative for congestion, ear discharge, ear pain, hearing loss, nosebleeds, rhinorrhea and sore throat. Eyes: Negative for photophobia, pain, discharge, redness and itching. Respiratory: Positive for apnea. Negative for cough, chest tightness, shortness of breath, wheezing and stridor. Apnea and unresponsive at home after he snorted heroin and fentanyl   Cardiovascular: Negative for chest pain, palpitations and leg swelling. Gastrointestinal: Negative for abdominal distention, abdominal pain, constipation, diarrhea, nausea and vomiting. Endocrine: Negative for cold intolerance, heat intolerance, polydipsia and polyphagia. Genitourinary: Negative for dysuria, flank pain, frequency and hematuria. Musculoskeletal: Negative for arthralgias, back pain, gait problem, myalgias, neck pain and neck stiffness. Skin: Negative for pallor, rash and wound. Allergic/Immunologic: Negative for environmental allergies and food allergies. Neurological: Negative for dizziness, tremors, syncope, weakness and headaches. Psychiatric/Behavioral: Negative for agitation, behavioral problems, confusion, self-injury, sleep disturbance and suicidal ideas. The patient is nervous/anxious. PAST MEDICAL HISTORY     Past Medical History:   Diagnosis Date    Anxiety     Depression     Kidney stone     Liver disease     Hep C    Opiate abuse, continuous ([facility] [address]        SURGICAL HISTORY     History reviewed. No pertinent surgical history. CURRENT MEDICATIONS       Discharge Medication List as of 2021  4:33 AM      CONTINUE these medications which have NOT CHANGED    Details   pregabalin (LYRICA) 150 MG capsule Take 300 mg by mouth 2 times daily. Historical Med      venlafaxine (EFFEXOR) 100 MG tablet Take 1.5 tablets by mouth 2 times daily, Disp-45 tablet, R-3Normal      lisinopril (PRINIVIL;ZESTRIL) 20 MG tablet Take 1 tablet by mouth daily, Disp-30 tablet, R-3Normal      mirtazapine (REMERON) 45 MG tablet Take 1 tablet by mouth nightly, Disp-30 tablet, R-3Normal      gabapentin (NEURONTIN) 600 MG tablet Take 1 tablet by mouth 4 times daily for 14 days. , Disp-56 tablet, R-0Normal      Lisdexamfetamine Dimesylate (VYVANSE) 40 MG CAPS Take 40 mg by mouth daily for 30 days. , Disp-30 capsule, R-0Print      testosterone (ANDROGEL) 25 MG/2.5GM (1%) GEL 1 % gel Apply 2.5 g topically daily for 30 days. , Disp-30 Package, R-0Normal      Aspirin-Acetaminophen-Caffeine (EXCEDRIN EXTRA STRENGTH PO) Take by mouth as neededHistorical Med             ALLERGIES     Patient has no known allergies. FAMILY HISTORY     He indicated that his mother is . He indicated that his father is .    family history is not on file.    SOCIAL HISTORY      reports that he has been smoking cigarettes. He has been smoking about 0.50 packs per day. He has never used smokeless tobacco. He reports current alcohol use. He reports current drug use. Drug: Opiates . PHYSICAL EXAM      height is 5' 10\" (1.778 m) and weight is 180 lb (81.6 kg). His oral temperature is 98 °F (36.7 °C). His blood pressure is 116/81 and his pulse is 72. His respiration is 18 and oxygen saturation is 99%. Physical Exam  Vitals and nursing note reviewed. Constitutional:       Appearance: He is well-developed. He is not diaphoretic. HENT:      Head: Normocephalic and atraumatic. Nose: Nose normal.   Eyes:      General: No scleral icterus. Right eye: No discharge. Left eye: No discharge. Conjunctiva/sclera: Conjunctivae normal.      Pupils: Pupils are equal, round, and reactive to light. Neck:      Vascular: No JVD. Trachea: No tracheal deviation. Cardiovascular:      Rate and Rhythm: Normal rate and regular rhythm. Heart sounds: Normal heart sounds. No murmur heard. No friction rub. No gallop. Pulmonary:      Effort: Pulmonary effort is normal. No respiratory distress. Breath sounds: Normal breath sounds. No stridor. No wheezing or rales. Chest:      Chest wall: No tenderness. Abdominal:      General: Bowel sounds are normal. There is no distension. Palpations: Abdomen is soft. There is no mass. Tenderness: There is no abdominal tenderness. There is no guarding or rebound. Hernia: No hernia is present. Musculoskeletal:         General: No tenderness or deformity. Cervical back: Normal range of motion and neck supple. Lymphadenopathy:      Cervical: No cervical adenopathy. Skin:     General: Skin is warm and dry. Capillary Refill: Capillary refill takes less than 2 seconds. Coloration: Skin is not pale. Findings: No erythema or rash.    Neurological:      Mental Status: He is alert and oriented to person, place, and time. Cranial Nerves: No cranial nerve deficit. Sensory: No sensory deficit. Motor: No abnormal muscle tone. Coordination: Coordination normal.      Deep Tendon Reflexes: Reflexes normal.   Psychiatric:         Behavior: Behavior normal.         Thought Content: Thought content normal.         Judgment: Judgment normal.         ANCILLARY TEST RESULTS   EKG: Interpreted by me  Not indicated    LAB RESULTS:  No results found for this visit on 08/29/21. RADIOLOGY REPORTS  No orders to display       [address] ED COURSE     ED Vitals:  Vitals:    08/29/21 0200 08/29/21 0230 08/29/21 0330 08/29/21 0400   BP: 112/73 118/80 121/78 116/81   Pulse: 73 72 68 72   Resp: 18 18 16 18   Temp:       TempSrc:       SpO2: 99% 100% 98% 99%   Weight:       Height:           Differential diagnsis: Opioid overdose, apnea, polysubstance abuse, anxiety, depression    Actions:   He has slight nausea/vomiting during my evaluation, otherwise no complaints. Physical exam unremarkable. Hemodynamically stable. No hypoxia. He is observed in ED for 3 hours, no recurrence of lethargy or shortness of breath. He is willing to go home. He states his friend will stay with him during the rest of day. Discharged with methadone clinic follow-up information provided. He is prescribed Narcan and Zofran. CRITICAL CARE   None    CONSULTS   None    PROCEDURES   None    FINAL IMPRESSION AND DISPOSITION      1.  Opiate overdose, accidental or unintentional, initial encounter [facility])        Discharge home    PATIENT REFERRED TO:  Levi Cuellar MD  [address] [place] [address] [address]  [phone]    In 3 days  ED discharge follow-up      DISCHARGE MEDICATIONS:  Discharge Medication List as of 8/29/2021  4:33 AM      START taking these medications    Details   ondansetron (ZOFRAN ODT) 4 MG disintegrating tablet Take 1 tablet by mouth every 8 hours as needed for Nausea or Vomiting, Disp-20 tablet, R-0Print      Naloxone HCl (NALOXONE OPIATE OVERDOSE KIT) 1 each by Nasal route once for 1 dose, Disp-1 kit, R-0Print             (Please note that portions of this note were completed with a voice recognition program.  Efforts were made to edit the dictations but occasionally words aremis-transcribed.)    MD Mary Gr MD  08/29/21 1640

## 2021-08-29 NOTE — ED NOTES
Patient resting on cart with eyes closed and resp easy and non labored.      Charisma Everett RN  08/29/21 9208

## 2021-08-29 NOTE — ED NOTES
PAtient awake and requesting to go home. States I feel fine but feel a little sick to stomach.    Dr Huitron Cea in [facility]  08/29/21 9610

## 2021-09-13 ENCOUNTER — OFFICE VISIT (OUTPATIENT)
Dept: INTERNAL MEDICINE CLINIC | Age: 36
End: 2021-09-13
Payer: MEDICARE

## 2021-09-13 VITALS
BODY MASS INDEX: 26.34 KG/M2 | HEART RATE: 93 BPM | TEMPERATURE: 97.9 F | DIASTOLIC BLOOD PRESSURE: 107 MMHG | WEIGHT: 184 LBS | HEIGHT: 70 IN | SYSTOLIC BLOOD PRESSURE: 151 MMHG

## 2021-09-13 DIAGNOSIS — M79.7 FIBROMYALGIA: ICD-10-CM

## 2021-09-13 DIAGNOSIS — E29.1 HYPOGONADISM IN MALE: ICD-10-CM

## 2021-09-13 DIAGNOSIS — F11.20 SEVERE OPIOID USE DISORDER (HCC): Primary | ICD-10-CM

## 2021-09-13 DIAGNOSIS — F19.10 POLYSUBSTANCE ABUSE (HCC): ICD-10-CM

## 2021-09-13 DIAGNOSIS — F32.A ANXIETY AND DEPRESSION: ICD-10-CM

## 2021-09-13 DIAGNOSIS — F41.9 ANXIETY AND DEPRESSION: ICD-10-CM

## 2021-09-13 DIAGNOSIS — Z51.81 ENCOUNTER FOR MONITORING SUBOXONE MAINTENANCE THERAPY: ICD-10-CM

## 2021-09-13 DIAGNOSIS — F90.1 ATTENTION DEFICIT HYPERACTIVITY DISORDER (ADHD), PREDOMINANTLY HYPERACTIVE TYPE: ICD-10-CM

## 2021-09-13 DIAGNOSIS — Z79.899 ENCOUNTER FOR MONITORING SUBOXONE MAINTENANCE THERAPY: ICD-10-CM

## 2021-09-13 PROCEDURE — G8417 CALC BMI ABV UP PARAM F/U: HCPCS | Performed by: INTERNAL MEDICINE

## 2021-09-13 PROCEDURE — 99213 OFFICE O/P EST LOW 20 MIN: CPT | Performed by: INTERNAL MEDICINE

## 2021-09-13 PROCEDURE — 80305 DRUG TEST PRSMV DIR OPT OBS: CPT | Performed by: INTERNAL MEDICINE

## 2021-09-13 PROCEDURE — G8428 CUR MEDS NOT DOCUMENT: HCPCS | Performed by: INTERNAL MEDICINE

## 2021-09-13 PROCEDURE — 4004F PT TOBACCO SCREEN RCVD TLK: CPT | Performed by: INTERNAL MEDICINE

## 2021-09-13 RX ORDER — BUPRENORPHINE AND NALOXONE 8; 2 MG/1; MG/1
1 FILM, SOLUBLE BUCCAL; SUBLINGUAL 2 TIMES DAILY
Qty: 8 FILM | Refills: 0 | Status: CANCELLED | OUTPATIENT
Start: 2021-09-13 | End: 2021-09-17

## 2021-09-13 NOTE — PROGRESS NOTES
Verbal order per Dr. Yolanda Davis for urine drug screen. Positive for MOP THC TRAM FTY. Verified results with Jaylen Rodriguez. Dr. Yolanda Davis ordered Suboxone 8mg film qty 2 out of stock  for patient. Verified dose with patient. Patient was sent home with Suboxone 8mg film qty 2 out of stock and will be seen back in the office 9/14/21.

## 2021-09-13 NOTE — PROGRESS NOTES
Elizabeth Faith (:  1985) is a 39 y.o. male,Established patient, here for evaluation of the following chief complaint(s):  Drug Problem      ASSESSMENT/PLAN:  1. Severe opioid use disorder (HCC)  -     POCT Rapid Drug Screen  2. Anxiety and depression  3. Fibromyalgia  4. Encounter for monitoring Suboxone maintenance therapy  5. Polysubstance abuse ([facility])  6. Hypogonadism in male  7.  Attention deficit hyperactivity disorder (ADHD), predominantly hyperactive type              SUBJECTIVE/OBJECTIVE:  Drug Problem    I last saw him   We wheeled him directly to the ER he could barely stand up at a prior visit  Apparently they gave him Narcan  and he left    He said his girlfriend is in [place]  He was agreeable to go to a rehab program  Basically he shows up on her feels like it and expects to be seen  He has been using says he cannot stop  He has had several visits to the ER as well as an admission  His creatinine was over 10  He did not require dialysis but he did sign out AMA  He was supposed to go to a rehab facility in [place] but apparently did not have a bed    It was documented when he was in the hospital after he got an Ativan shot he asked the nurse if he can lick the needle          We have gotten word from the police that he and several other people were selling and trading their drugs on the street  I had Bear Roselia setting with this visit  He was lost to follow-up until recently  He said he was in the methadone clinic until recently  He relapsed on meth weeks ago  He says he wants to get clean he has been using Suboxone off the street  I see his girlfriend as well  I put him on Suboxone  weeks ago  He says they both relapsed on heroin 2 weeks ago  He said he has an aunt in [place] that passed away    He had told me he was clean over 100 days but he said he lied  He says the judges order him to go to a treatment facility in [place] on   It was either that or group home  Patient has been snorting fentanyl regularly  Last use was last night at 7 PM  He ran out of Suboxone a while ago has not been getting any on the street  His girlfriend gets out of long-term October 1, she wants him clean  He says if he is using she will go back to using  Review of Systems  Patient is feeling terrible left flank pain cannot sit still  Blood pressure (!) 151/107, pulse 93, temperature 97.9 °F (36.6 °C), height 5' 10\" (1.778 m), weight 184 lb (83.5 kg).   Patient is restless cannot sit still he is in obvious pain  Animated and loud as usual  Skin numerous erythematous lesions on his face and extremities        Urine tox screen today  Alcohol, Urine 09/13/2021  1:23 PM Unknown   NEG    Amphetamine Screen, Urine 09/13/2021  1:23 PM Unknown   NEG    Barbiturate Screen, Urine 09/13/2021  1:23 PM Unknown   NEG    Benzodiazepine Screen, Urine 09/13/2021  1:23 PM Unknown   NEG    Buprenorphine Urine 09/13/2021  1:23 PM Unknown   NEG    Cocaine Metabolite Screen, Urine 09/13/2021  1:23 PM Unknown   NEG    FENTANYL SCREEN, URINE 09/13/2021  1:23 PM Unknown   POS    Gabapentin Screen, Urine 09/13/2021  1:23 PM Unknown   N/A    MDMA, Urine 09/13/2021  1:23 PM Unknown   NEG    Methadone Screen, Urine 09/13/2021  1:23 PM Unknown   NEG    Methamphetamine, Urine 09/13/2021  1:23 PM Unknown   NEG    Opiate Scrn, Ur 09/13/2021  1:23 PM Unknown   POS    Oxycodone Screen, Ur 09/13/2021  1:23 PM Unknown   NEG    PCP Screen, Urine 09/13/2021  1:23 PM Unknown   NEG    Propoxyphene Screen, Urine 09/13/2021  1:23 PM Unknown   N/A    Synthetic Cannabinoids (K2) Screen, Urine 09/13/2021  1:23 PM Unknown   NEG    THC Screen, Urine 09/13/2021  1:23 PM Unknown   POS    Tramadol Scrn, Ur 09/13/2021  1:23 PM Unknown   POS    Tricyclic Antidepressants, Urine 09/13/2021  1:23 PM Unknown   N/A    Lab and Collection          Patient is in deep  He prostitutes himself to men to get drugs  I told him it is going to end in [facility] or death unless he goes to a rehab facility    I have had Jaylene Miller work on it the patient has every excuse under the sun not to go  I told him I am willing to help him but that means he has to come back here every day this week  He says that just not possible  He is not really committed to becoming clean and I really have little else to offer him if he does not show up daily  An electronic signature was used to authenticate this note.     --Eva Saba MD

## 2021-10-01 ENCOUNTER — HOSPITAL ENCOUNTER (EMERGENCY)
Age: 36
Discharge: HOME | End: 2021-10-01
Attending: EMERGENCY MEDICINE
Payer: MEDICARE

## 2021-10-01 ENCOUNTER — HOSPITAL ENCOUNTER (EMERGENCY)
Age: 36
Discharge: HOME | End: 2021-10-01
Payer: MEDICARE

## 2021-10-01 VITALS
SYSTOLIC BLOOD PRESSURE: 126 MMHG | WEIGHT: 185 LBS | HEIGHT: 70 IN | RESPIRATION RATE: 18 BRPM | OXYGEN SATURATION: 99 % | TEMPERATURE: 99 F | DIASTOLIC BLOOD PRESSURE: 88 MMHG | BODY MASS INDEX: 26.48 KG/M2 | HEART RATE: 92 BPM

## 2021-10-01 VITALS
HEART RATE: 110 BPM | HEIGHT: 70 IN | OXYGEN SATURATION: 98 % | TEMPERATURE: 98.7 F | SYSTOLIC BLOOD PRESSURE: 130 MMHG | BODY MASS INDEX: 26.48 KG/M2 | RESPIRATION RATE: 20 BRPM | WEIGHT: 185 LBS | DIASTOLIC BLOOD PRESSURE: 86 MMHG

## 2021-10-01 DIAGNOSIS — T40.601A OPIATE OVERDOSE, ACCIDENTAL OR UNINTENTIONAL, INITIAL ENCOUNTER (HCC): Primary | ICD-10-CM

## 2021-10-01 DIAGNOSIS — F19.10 POLYSUBSTANCE ABUSE (HCC): ICD-10-CM

## 2021-10-01 DIAGNOSIS — F19.10 SUBSTANCE ABUSE (HCC): Primary | ICD-10-CM

## 2021-10-01 LAB
ACETAMINOPHEN LEVEL: < 5 UG/ML (ref 0–20)
ALBUMIN SERPL-MCNC: 4.1 G/DL (ref 3.5–5.1)
ALBUMIN SERPL-MCNC: 4.5 G/DL (ref 3.5–5.1)
ALP BLD-CCNC: 106 U/L (ref 38–126)
ALP BLD-CCNC: 107 U/L (ref 38–126)
ALT SERPL-CCNC: 24 U/L (ref 11–66)
ALT SERPL-CCNC: 24 U/L (ref 11–66)
ANION GAP SERPL CALCULATED.3IONS-SCNC: 14 MEQ/L (ref 8–16)
ANION GAP SERPL CALCULATED.3IONS-SCNC: 9 MEQ/L (ref 8–16)
AST SERPL-CCNC: 21 U/L (ref 5–40)
AST SERPL-CCNC: 23 U/L (ref 5–40)
BASOPHILS # BLD: 0.4 %
BASOPHILS ABSOLUTE: 0 THOU/MM3 (ref 0–0.1)
BILIRUB SERPL-MCNC: 0.2 MG/DL (ref 0.3–1.2)
BILIRUB SERPL-MCNC: < 0.2 MG/DL (ref 0.3–1.2)
BUN BLDV-MCNC: 17 MG/DL (ref 7–22)
BUN BLDV-MCNC: 19 MG/DL (ref 7–22)
CALCIUM SERPL-MCNC: 9.5 MG/DL (ref 8.5–10.5)
CALCIUM SERPL-MCNC: 9.8 MG/DL (ref 8.5–10.5)
CHLORIDE BLD-SCNC: 105 MEQ/L (ref 98–111)
CHLORIDE BLD-SCNC: 106 MEQ/L (ref 98–111)
CO2: 25 MEQ/L (ref 23–33)
CO2: 26 MEQ/L (ref 23–33)
CREAT SERPL-MCNC: 0.7 MG/DL (ref 0.4–1.2)
CREAT SERPL-MCNC: 0.9 MG/DL (ref 0.4–1.2)
EKG ATRIAL RATE: 112 BPM
EKG P AXIS: 69 DEGREES
EKG P-R INTERVAL: 174 MS
EKG Q-T INTERVAL: 320 MS
EKG QRS DURATION: 82 MS
EKG QTC CALCULATION (BAZETT): 436 MS
EKG R AXIS: 45 DEGREES
EKG T AXIS: 48 DEGREES
EKG VENTRICULAR RATE: 112 BPM
EOSINOPHIL # BLD: 2.2 %
EOSINOPHILS ABSOLUTE: 0.2 THOU/MM3 (ref 0–0.4)
ERYTHROCYTE [DISTWIDTH] IN BLOOD BY AUTOMATED COUNT: 17.2 % (ref 11.5–14.5)
ERYTHROCYTE [DISTWIDTH] IN BLOOD BY AUTOMATED COUNT: 17.3 % (ref 11.5–14.5)
ERYTHROCYTE [DISTWIDTH] IN BLOOD BY AUTOMATED COUNT: 49.8 FL (ref 35–45)
ERYTHROCYTE [DISTWIDTH] IN BLOOD BY AUTOMATED COUNT: 50.3 FL (ref 35–45)
ETHYL ALCOHOL, SERUM: < 0.01 %
GFR SERPL CREATININE-BSD FRML MDRD: > 90 ML/MIN/1.73M2
GFR SERPL CREATININE-BSD FRML MDRD: > 90 ML/MIN/1.73M2
GLUCOSE BLD-MCNC: 128 MG/DL (ref 70–108)
GLUCOSE BLD-MCNC: 155 MG/DL (ref 70–108)
GLUCOSE BLD-MCNC: 92 MG/DL (ref 70–108)
HCT VFR BLD CALC: 35.8 % (ref 42–52)
HCT VFR BLD CALC: 36.1 % (ref 42–52)
HEMOGLOBIN: 11 GM/DL (ref 14–18)
HEMOGLOBIN: 11.7 GM/DL (ref 14–18)
IMMATURE GRANS (ABS): 0.03 THOU/MM3 (ref 0–0.07)
IMMATURE GRANULOCYTES: 0.3 %
LYMPHOCYTES # BLD: 36.2 %
LYMPHOCYTES ABSOLUTE: 3.2 THOU/MM3 (ref 1–4.8)
MCH RBC QN AUTO: 24.8 PG (ref 26–33)
MCH RBC QN AUTO: 25.8 PG (ref 26–33)
MCHC RBC AUTO-ENTMCNC: 30.7 GM/DL (ref 32.2–35.5)
MCHC RBC AUTO-ENTMCNC: 32.4 GM/DL (ref 32.2–35.5)
MCV RBC AUTO: 79.5 FL (ref 80–94)
MCV RBC AUTO: 80.8 FL (ref 80–94)
MONOCYTES # BLD: 7.4 %
MONOCYTES ABSOLUTE: 0.7 THOU/MM3 (ref 0.4–1.3)
NUCLEATED RED BLOOD CELLS: 0 /100 WBC
OSMOLALITY CALCULATION: 285.9 MOSMOL/KG (ref 275–300)
PLATELET # BLD: 375 THOU/MM3 (ref 130–400)
PLATELET # BLD: 385 THOU/MM3 (ref 130–400)
PMV BLD AUTO: 9 FL (ref 9.4–12.4)
PMV BLD AUTO: 9.4 FL (ref 9.4–12.4)
POTASSIUM REFLEX MAGNESIUM: 4 MEQ/L (ref 3.5–5.2)
POTASSIUM SERPL-SCNC: 4.1 MEQ/L (ref 3.5–5.2)
RBC # BLD: 4.43 MILL/MM3 (ref 4.7–6.1)
RBC # BLD: 4.54 MILL/MM3 (ref 4.7–6.1)
SALICYLATE, SERUM: < 0.3 MG/DL (ref 2–10)
SEG NEUTROPHILS: 53.5 %
SEGMENTED NEUTROPHILS ABSOLUTE COUNT: 4.8 THOU/MM3 (ref 1.8–7.7)
SODIUM BLD-SCNC: 141 MEQ/L (ref 135–145)
SODIUM BLD-SCNC: 144 MEQ/L (ref 135–145)
TOTAL CK: 148 U/L (ref 55–170)
TOTAL CK: 216 U/L (ref 55–170)
TOTAL PROTEIN: 6.8 G/DL (ref 6.1–8)
TOTAL PROTEIN: 7.6 G/DL (ref 6.1–8)
TSH SERPL DL<=0.05 MIU/L-ACNC: 0.96 UIU/ML (ref 0.4–4.2)
WBC # BLD: 11 THOU/MM3 (ref 4.8–10.8)
WBC # BLD: 8.9 THOU/MM3 (ref 4.8–10.8)

## 2021-10-01 PROCEDURE — 82077 ASSAY SPEC XCP UR&BREATH IA: CPT

## 2021-10-01 PROCEDURE — 84443 ASSAY THYROID STIM HORMONE: CPT

## 2021-10-01 PROCEDURE — 80053 COMPREHEN METABOLIC PANEL: CPT

## 2021-10-01 PROCEDURE — 80179 DRUG ASSAY SALICYLATE: CPT

## 2021-10-01 PROCEDURE — 82550 ASSAY OF CK (CPK): CPT

## 2021-10-01 PROCEDURE — 80143 DRUG ASSAY ACETAMINOPHEN: CPT

## 2021-10-01 PROCEDURE — 93010 ELECTROCARDIOGRAM REPORT: CPT | Performed by: INTERNAL MEDICINE

## 2021-10-01 PROCEDURE — 93005 ELECTROCARDIOGRAM TRACING: CPT | Performed by: STUDENT IN AN ORGANIZED HEALTH CARE EDUCATION/TRAINING PROGRAM

## 2021-10-01 PROCEDURE — 93005 ELECTROCARDIOGRAM TRACING: CPT | Performed by: NURSE PRACTITIONER

## 2021-10-01 PROCEDURE — 36415 COLL VENOUS BLD VENIPUNCTURE: CPT

## 2021-10-01 PROCEDURE — 99283 EMERGENCY DEPT VISIT LOW MDM: CPT

## 2021-10-01 PROCEDURE — 2580000003 HC RX 258: Performed by: STUDENT IN AN ORGANIZED HEALTH CARE EDUCATION/TRAINING PROGRAM

## 2021-10-01 PROCEDURE — 85027 COMPLETE CBC AUTOMATED: CPT

## 2021-10-01 PROCEDURE — 82948 REAGENT STRIP/BLOOD GLUCOSE: CPT

## 2021-10-01 PROCEDURE — 85025 COMPLETE CBC W/AUTO DIFF WBC: CPT

## 2021-10-01 RX ORDER — 0.9 % SODIUM CHLORIDE 0.9 %
1000 INTRAVENOUS SOLUTION INTRAVENOUS ONCE
Status: COMPLETED | OUTPATIENT
Start: 2021-10-01 | End: 2021-10-01

## 2021-10-01 RX ADMIN — SODIUM CHLORIDE 1000 ML: 9 INJECTION, SOLUTION INTRAVENOUS at 07:34

## 2021-10-01 ASSESSMENT — ENCOUNTER SYMPTOMS
ABDOMINAL DISTENTION: 0
COUGH: 0
CONSTIPATION: 0
SHORTNESS OF BREATH: 0
SHORTNESS OF BREATH: 0
COLOR CHANGE: 0
DIARRHEA: 0
ABDOMINAL PAIN: 0
CHEST TIGHTNESS: 0
ABDOMINAL PAIN: 0
COUGH: 0
CHEST TIGHTNESS: 0

## 2021-10-01 ASSESSMENT — PAIN DESCRIPTION - PROGRESSION: CLINICAL_PROGRESSION: GRADUALLY WORSENING

## 2021-10-01 ASSESSMENT — PAIN SCALES - GENERAL: PAINLEVEL_OUTOF10: 5

## 2021-10-01 ASSESSMENT — PAIN DESCRIPTION - LOCATION: LOCATION: FLANK

## 2021-10-01 ASSESSMENT — PAIN DESCRIPTION - PAIN TYPE: TYPE: ACUTE PAIN

## 2021-10-01 ASSESSMENT — PAIN DESCRIPTION - DESCRIPTORS: DESCRIPTORS: ACHING

## 2021-10-01 ASSESSMENT — PAIN DESCRIPTION - FREQUENCY: FREQUENCY: CONTINUOUS

## 2021-10-01 NOTE — ED NOTES
Patient resting in bed.  Patient states he is unable to void at this time     Cody Loyd RN  10/01/21 0810

## 2021-10-01 NOTE — ED TRIAGE NOTES
Patient arrived to room 21 via [facility] with c/o drug overdose. Patient stated he got out of intermediate and took all of his home medications at one time. Patient stated he snorted his lyrica. Patient has continuously been talking since arriving to [place] [address]. Patient stated he was just here this morning for a drug overdose. Jeannette Sterling NP at bedside to assess patient.

## 2021-10-01 NOTE — ED PROVIDER NOTES
[facility] Emergency Department    CHIEF COMPLAINT       Chief Complaint   Patient presents with    Drug Overdose       Nurses Notes reviewed and I agree except as noted in the HPI. HISTORY OF PRESENT ILLNESS    Chadwick Oliver is a 39 y.o. male who presents to the ED for evaluation of drug overdose. Patient states he was at a [employer], and he threw up and someone called the police. Patient has extensive history of drug abuse in the past.  He was recently here today after given Narcan at the police department earlier today, and apparently improved after a \"seizure\". He was evaluated here in the ER, failed to give urine drug sample, was discharged to \"[facility]\" but patient states he was released. Patient denies using any drugs since being released. He states he took his prescription medicine which included his Vyvanse, his Suboxone, and his Lyrica. He notes he took for 300 mg tablets of Lyrica. He notes vomiting after this and that is why he was brought to the emergency department. He currently denies any significant symptoms, note that he is hungry and would like something to drink. He denies any other ingestions today. Has a history of hepatitis C, anxiety, depression, fibromyalgia. HPI was provided by the patient. REVIEW OF SYSTEMS     Review of Systems   Constitutional: Positive for fatigue. Negative for activity change, chills and fever. HENT: Negative for congestion. Respiratory: Negative for cough, chest tightness and shortness of breath. Cardiovascular: Negative for chest pain. Gastrointestinal: Negative for abdominal distention, abdominal pain, constipation and diarrhea. Genitourinary: Negative for decreased urine volume, difficulty urinating and dysuria. Musculoskeletal: Negative for arthralgias. Skin: Negative for color change and rash. Allergic/Immunologic: Negative for immunocompromised state.    Neurological: Negative for dizziness, weakness, light-headedness and headaches. Hematological: Does not bruise/bleed easily. Psychiatric/Behavioral: Negative for agitation, behavioral problems and confusion. Hyperactive and lethargic        PAST MEDICAL HISTORY     Past Medical History:   Diagnosis Date    Anxiety     Depression     Kidney stone     Liver disease     Hep C    Opiate abuse, continuous (HCC)        SURGICALHISTORY      has no past surgical history on file. CURRENT MEDICATIONS       Discharge Medication List as of 10/1/2021  9:01 PM      CONTINUE these medications which have NOT CHANGED    Details   pregabalin (LYRICA) 150 MG capsule Take 300 mg by mouth 2 times daily. Historical Med      ondansetron (ZOFRAN ODT) 4 MG disintegrating tablet Take 1 tablet by mouth every 8 hours as needed for Nausea or Vomiting, Disp-20 tablet, R-0Print      gabapentin (NEURONTIN) 600 MG tablet Take 1 tablet by mouth 4 times daily for 14 days. , Disp-56 tablet, R-0Normal      venlafaxine (EFFEXOR) 100 MG tablet Take 1.5 tablets by mouth 2 times daily, Disp-45 tablet, R-3Normal      lisinopril (PRINIVIL;ZESTRIL) 20 MG tablet Take 1 tablet by mouth daily, Disp-30 tablet, R-3Normal      mirtazapine (REMERON) 45 MG tablet Take 1 tablet by mouth nightly, Disp-30 tablet, R-3Normal      Lisdexamfetamine Dimesylate (VYVANSE) 40 MG CAPS Take 40 mg by mouth daily for 30 days. , Disp-30 capsule, R-0Print      testosterone (ANDROGEL) 25 MG/2.5GM (1%) GEL 1 % gel Apply 2.5 g topically daily for 30 days. , Disp-30 Package, R-0Normal      Aspirin-Acetaminophen-Caffeine (EXCEDRIN EXTRA STRENGTH PO) Take by mouth as neededHistorical Med             ALLERGIES     has No Known Allergies. FAMILY HISTORY     He indicated that his mother is . He indicated that his father is . family history is not on file.     SOCIAL HISTORY       Social History     Socioeconomic History    Marital status: Single     Spouse name: Not on file    Number of children: Not on file    Years of education: Not on file    Highest education level: Not on file   Occupational History    Not on file   Tobacco Use    Smoking status: Current Every Day Smoker     Packs/day: 0.50     Types: Cigarettes    Smokeless tobacco: Never Used   Vaping Use    Vaping Use: Never assessed   Substance and Sexual Activity    Alcohol use: Yes     Comment: occasional    Drug use: Yes     Types: Opiates      Comment: yesterday    Sexual activity: Not Currently   Other Topics Concern    Not on file   Social History Narrative    Not on file     Social Determinants of Health     Financial Resource Strain: Low Risk     Difficulty of Paying Living Expenses: Not hard at all   Food Insecurity: No Food Insecurity    Worried About Running Out of Food in the Last Year: Never true    Ana of Food in the Last Year: Never true   Transportation Needs:     Lack of Transportation (Medical):  Lack of Transportation (Non-Medical):    Physical Activity:     Days of Exercise per Week:     Minutes of Exercise per Session:    Stress:     Feeling of Stress :    Social Connections:     Frequency of Communication with Friends and Family:     Frequency of Social Gatherings with Friends and Family:     Attends [employer] Services:     Active Member of Clubs or Organizations:     Attends Club or Organization Meetings:     Marital Status:    Intimate Partner Violence:     Fear of Current or Ex-Partner:     Emotionally Abused:     Physically Abused:     Sexually Abused:        PHYSICAL EXAM     INITIAL VITALS:  height is 5' 10\" (1.778 m) and weight is 185 lb (83.9 kg). His oral temperature is 98.7 °F (37.1 °C). His blood pressure is 130/86 and his pulse is 110. His respiration is 20 and oxygen saturation is 98%. Physical Exam  Constitutional:       General: He is not in acute distress. Appearance: Normal appearance. He is normal weight. He is not ill-appearing or toxic-appearing. HENT:      Head: Normocephalic. Mouth/Throat:      Mouth: Mucous membranes are moist.   Eyes:      Extraocular Movements: Extraocular movements intact. Cardiovascular:      Rate and Rhythm: Normal rate. Pulses: Normal pulses. Pulmonary:      Effort: Pulmonary effort is normal.   Abdominal:      General: Abdomen is flat. Musculoskeletal:         General: Normal range of motion. Skin:     General: Skin is warm. Capillary Refill: Capillary refill takes less than 2 seconds. Neurological:      Mental Status: He is alert. Psychiatric:         Attention and Perception: Attention normal.         Mood and Affect: Mood is elated. Speech: Speech is rapid and pressured. Behavior: Behavior is cooperative. Thought Content: Thought content normal.         DIFFERENTIAL DIAGNOSIS:   Substance misuse, overdose,    DIAGNOSTIC RESULTS       RADIOLOGY: non-plainfilm images(s) such as CT, Ultrasound and MRI are read by the radiologist.  Plain radiographic images are visualized and preliminarily interpreted by the emergency physician unless otherwise stated below. No orders to display         LABS:   Labs Reviewed   CBC WITH AUTO DIFFERENTIAL - Abnormal; Notable for the following components:       Result Value    RBC 4.43 (*)     Hemoglobin 11.0 (*)     Hematocrit 35.8 (*)     MCH 24.8 (*)     MCHC 30.7 (*)     RDW-CV 17.3 (*)     RDW-SD 50.3 (*)     All other components within normal limits   COMPREHENSIVE METABOLIC PANEL - Abnormal; Notable for the following components:    Glucose 155 (*)     Total Bilirubin 0.2 (*)     All other components within normal limits   SALICYLATE LEVEL - Abnormal; Notable for the following components:    Salicylate, Serum < 0.3 (*)     All other components within normal limits   CK - Abnormal; Notable for the following components:     Total  (*)     All other components within normal limits   TSH WITHOUT REFLEX   ACETAMINOPHEN LEVEL   ETHANOL   ANION GAP   GLOMERULAR FILTRATION RATE, ESTIMATED   OSMOLALITY   URINE DRUG SCREEN   URINALYSIS WITH MICROSCOPIC       EMERGENCY DEPARTMENT COURSE:   Vitals:    Vitals:    10/01/21 1900 10/01/21 1949   BP: (!) 137/99 130/86   Pulse: 127 110   Resp: 20 20   Temp: 98.7 °F (37.1 °C)    TempSrc: Oral    SpO2: 98%    Weight: 185 lb (83.9 kg)    Height: 5' 10\" (1.778 m)        MDM  Patient was seen and evaluated in the emergency department, patient appeared to be in no acute distress, vital signs were reviewed, tachycardia was noted. Physical exam was completed, he was elated, he had rapid pressured speech, but he had episodes of lethargy in between these. Difficult to ascertain if this is from the Lyrica, or his other medicines or illicit drugs. Labs were obtained, when I went to reevaluate the patient he apparently eloped. Medications - No data to display    Patient was seenindependently by myself. The patient's final impression and disposition and plan was determined by myself. CRITICAL CARE:   None    CONSULTS:  None    PROCEDURES:  None    FINAL IMPRESSION     1. Substance abuse [facility])          DISPOSITION/PLAN   Patient eloped before discharge. PATIENT REFERREDTO:  No follow-up provider specified. DISCHARGE MEDICATIONS:  Discharge Medication List as of 10/1/2021  9:01 PM          (Please note that portions of this note were completed with a voice recognition program.  Efforts were made to edit the dictations but occasionally words are mis-transcribed.)    Provider:  I personally performed the services described in the documentation,reviewed and edited the documentation which was dictated to the scribe in my presence, and it accurately records my words and actions.     Jeremiah Perez CNP 10/02/21 5:52 AM    Nora Perez, APRN - CNP        [place], APRNAT - CNP  10/02/21 7928

## 2021-10-01 NOTE — ED PROVIDER NOTES
ATTENDING NOTE:    I supervised and discussed the history, physical exam and the management of this patient with the resident. I reviewed the resident's note and agree with the documented findings and plan of care. Please see my additional note. Brought to the emergency department by law enforcement. He was being booked [facility] the [facility] when he collapsed, has a history of polysubstance use, per law enforcement he had overdosed. He was given Narcan on scene and became alert and oriented. Patient in the emergency department remains resting in bed quietly, easily arousable with focal stimuli. We are checking labs and will observe patient for 2 hours after Narcan was given. Labs were reviewed. Patient remained stable, resting quietly in bed. He was unable to provide a urine sample. Patient will be discharged back to police custody to be returned to the [facility]. I personally saw and examined the patient. I have reviewed and agree with the resident's findings, including all diagnostic interpretations and treatment plans as written. I was present for the key portion of any procedures performed and the inclusive time noted in any critical care statement.     Electronically verified by Radha Sadler MD  10/01/21 8812

## 2021-10-01 NOTE — ED TRIAGE NOTES
Pt presents to the ED via EMS with c/o overdose. Per EMS, pt was being booked at [facility] [employer], when he collapsed in the hallway. Pt was given Narcan nasally x1. EMS report upon arrival pt was awake and alert. Pt did not admit to taking anything.

## 2021-10-01 NOTE — ED NOTES
[facility] ENCOUNTER          Pt Name: Maira Sanchez  MRN: [id number]  [place] 1985  Date of evaluation: 10/1/2021  Treating Resident Physician: Ja Rubio DO  Supervising Physician:  Dr. Nimo Canales       Chief Complaint   Patient presents with    Drug Overdose     History obtained from the patient. HISTORY OF PRESENT ILLNESS    The history is provided by the patient. The history is limited by the condition of the patient. No  was used. Maira Sanchez is a 39 y.o. male who presents to the emergency department for evaluation of opiate overdose. Patient was brought in by [facility] [place] after collapsing at the longterm when being booked at 545 this morning. Patient states that he had a seizure however he was given Narcan at the longterm and became alert and oriented. Patient denies chest pain, shortness of breath, abdominal pain, dizziness, lightheadedness. He denies any urinary incontinence or biting his tongue during his seizure episode. The patient has no other acute complaints at this time. REVIEW OF SYSTEMS   Review of Systems   Constitutional: Negative for chills, diaphoresis and fever. Respiratory: Negative for cough, chest tightness and shortness of breath. Cardiovascular: Negative for chest pain, palpitations and leg swelling. Gastrointestinal: Negative for abdominal pain. Neurological: Negative for dizziness, syncope, light-headedness and headaches. PAST MEDICAL AND SURGICAL HISTORY     Past Medical History:   Diagnosis Date    Anxiety     Depression     Kidney stone     Liver disease     Hep C    Opiate abuse, continuous ([facility] [address]      History reviewed. No pertinent surgical history. MEDICATIONS   No current facility-administered medications for this encounter. Current Outpatient Medications:     pregabalin (LYRICA) 150 MG capsule, Take 300 mg by mouth 2 times daily. , Disp: , Rfl:     ondansetron (ZOFRAN ODT) 4 MG disintegrating tablet, Take 1 tablet by mouth every 8 hours as needed for Nausea or Vomiting, Disp: 20 tablet, Rfl: 0    gabapentin (NEURONTIN) 600 MG tablet, Take 1 tablet by mouth 4 times daily for 14 days. , Disp: 56 tablet, Rfl: 0    venlafaxine (EFFEXOR) 100 MG tablet, Take 1.5 tablets by mouth 2 times daily, Disp: 45 tablet, Rfl: 3    lisinopril (PRINIVIL;ZESTRIL) 20 MG tablet, Take 1 tablet by mouth daily, Disp: 30 tablet, Rfl: 3    mirtazapine (REMERON) 45 MG tablet, Take 1 tablet by mouth nightly, Disp: 30 tablet, Rfl: 3    Lisdexamfetamine Dimesylate (VYVANSE) 40 MG CAPS, Take 40 mg by mouth daily for 30 days. , Disp: 30 capsule, Rfl: 0    testosterone (ANDROGEL) 25 MG/2.5GM (1%) GEL 1 % gel, Apply 2.5 g topically daily for 30 days. , Disp: 30 Package, Rfl: 0    Aspirin-Acetaminophen-Caffeine (EXCEDRIN EXTRA STRENGTH PO), Take by mouth as needed, Disp: , Rfl:       SOCIAL HISTORY     Social History     Social History Narrative    Not on file     Social History     Tobacco Use    Smoking status: Current Every Day Smoker     Packs/day: 0.50     Types: Cigarettes    Smokeless tobacco: Never Used   Vaping Use    Vaping Use: Never assessed   Substance Use Topics    Alcohol use: Yes     Comment: occasional    Drug use: Yes     Types: Opiates      Comment: yesterday       ALLERGIES   No Known Allergies      FAMILY HISTORY   History reviewed. No pertinent family history. PREVIOUS RECORDS   Previous records reviewed: Well-known to the ED. Sabi Jay PHYSICAL EXAM     ED Triage Vitals [10/01/21 0659]   BP Temp Temp Source Pulse Resp SpO2 Height Weight   (!) 140/106 99 °F (37.2 °C) Oral 114 18 95 % 5' 10\" (1.778 m) 185 lb (83.9 kg)     Initial vital signs and nursing assessment reviewed and normal. Body mass index is 26.54 kg/m². Pulsoximetry is normal per my interpretation.     Additional Vital Signs:  Vitals:    10/01/21 0659   BP: (!) 140/106   Pulse: 114 Resp: 18   Temp: 99 °F (37.2 °C)   SpO2: 95%       Physical Exam  Constitutional:       General: He is awake. He is not in acute distress. Appearance: He is normal weight. Comments: Disheveled   HENT:      Head: Normocephalic and atraumatic. Right Ear: External ear normal.      Left Ear: External ear normal.      Nose: Nose normal.      Mouth/Throat:      Mouth: Mucous membranes are moist.      Pharynx: Oropharynx is clear. Eyes:      Extraocular Movements: Extraocular movements intact. Conjunctiva/sclera: Conjunctivae normal.      Pupils: Pupils are equal, round, and reactive to light. Cardiovascular:      Rate and Rhythm: Regular rhythm. Tachycardia present. Pulses: Normal pulses. Heart sounds: Normal heart sounds. No murmur heard. No friction rub. No gallop. Pulmonary:      Effort: Pulmonary effort is normal. No respiratory distress. Breath sounds: Normal breath sounds. No wheezing, rhonchi or rales. Chest:      Chest wall: No tenderness. Abdominal:      General: Abdomen is flat. Bowel sounds are normal.      Palpations: Abdomen is soft. Musculoskeletal:         General: Normal range of motion. Cervical back: Normal range of motion and neck supple. Skin:     General: Skin is warm and dry. Capillary Refill: Capillary refill takes less than 2 seconds. Comments: Excoriations throughout   Neurological:      General: No focal deficit present. Mental Status: He is alert and oriented to person, place, and time. Mental status is at baseline. Psychiatric:         Mood and Affect: Mood normal.         Behavior: Behavior normal. Behavior is cooperative. Thought Content: Thought content normal.         Judgment: Judgment normal.       MEDICAL DECISION MAKING   Initial Assessment:   1. Suspected opiate overdose  2. Polysubstance abuse  3. History of hepatitis C   4.  Anemia of chronic disease   Plan:    Hgb 11.7 today, this is within his usual

## 2021-10-02 LAB
EKG ATRIAL RATE: 101 BPM
EKG P AXIS: 73 DEGREES
EKG P-R INTERVAL: 182 MS
EKG Q-T INTERVAL: 340 MS
EKG QRS DURATION: 80 MS
EKG QTC CALCULATION (BAZETT): 440 MS
EKG R AXIS: 32 DEGREES
EKG T AXIS: 51 DEGREES
EKG VENTRICULAR RATE: 101 BPM

## 2021-10-02 PROCEDURE — 93010 ELECTROCARDIOGRAM REPORT: CPT | Performed by: INTERNAL MEDICINE

## 2021-10-03 ENCOUNTER — HOSPITAL ENCOUNTER (EMERGENCY)
Age: 36
Discharge: HOME | End: 2021-10-03
Attending: EMERGENCY MEDICINE
Payer: MEDICARE

## 2021-10-03 ENCOUNTER — HOSPITAL ENCOUNTER (EMERGENCY)
Age: 36
Discharge: HOME | End: 2021-10-03
Payer: MEDICARE

## 2021-10-03 VITALS
HEART RATE: 94 BPM | HEIGHT: 70 IN | RESPIRATION RATE: 13 BRPM | OXYGEN SATURATION: 99 % | DIASTOLIC BLOOD PRESSURE: 95 MMHG | TEMPERATURE: 98.3 F | WEIGHT: 175 LBS | SYSTOLIC BLOOD PRESSURE: 143 MMHG | BODY MASS INDEX: 25.05 KG/M2

## 2021-10-03 VITALS
OXYGEN SATURATION: 96 % | HEIGHT: 70 IN | TEMPERATURE: 98.4 F | RESPIRATION RATE: 18 BRPM | SYSTOLIC BLOOD PRESSURE: 124 MMHG | HEART RATE: 135 BPM | BODY MASS INDEX: 23.62 KG/M2 | WEIGHT: 165 LBS | DIASTOLIC BLOOD PRESSURE: 80 MMHG

## 2021-10-03 DIAGNOSIS — F11.93 OPIATE WITHDRAWAL (HCC): Primary | ICD-10-CM

## 2021-10-03 DIAGNOSIS — F32.A ANXIETY AND DEPRESSION: Primary | ICD-10-CM

## 2021-10-03 DIAGNOSIS — F19.10 POLYSUBSTANCE ABUSE (HCC): ICD-10-CM

## 2021-10-03 DIAGNOSIS — F41.9 ANXIETY AND DEPRESSION: Primary | ICD-10-CM

## 2021-10-03 LAB
ACETAMINOPHEN LEVEL: < 5 UG/ML (ref 0–20)
ACETAMINOPHEN LEVEL: < 5 UG/ML (ref 0–20)
ALBUMIN SERPL-MCNC: 4.5 G/DL (ref 3.5–5.1)
ALBUMIN SERPL-MCNC: 4.6 G/DL (ref 3.5–5.1)
ALP BLD-CCNC: 111 U/L (ref 38–126)
ALP BLD-CCNC: 117 U/L (ref 38–126)
ALT SERPL-CCNC: 32 U/L (ref 11–66)
ALT SERPL-CCNC: 39 U/L (ref 11–66)
AMPHETAMINE+METHAMPHETAMINE URINE SCREEN: NEGATIVE
AMPHETAMINE+METHAMPHETAMINE URINE SCREEN: NEGATIVE
ANION GAP SERPL CALCULATED.3IONS-SCNC: 16 MEQ/L (ref 8–16)
ANION GAP SERPL CALCULATED.3IONS-SCNC: 16 MEQ/L (ref 8–16)
AST SERPL-CCNC: 38 U/L (ref 5–40)
AST SERPL-CCNC: 49 U/L (ref 5–40)
BACTERIA: ABNORMAL
BACTERIA: ABNORMAL /HPF
BARBITURATE QUANTITATIVE URINE: NEGATIVE
BARBITURATE QUANTITATIVE URINE: NEGATIVE
BASOPHILS # BLD: 0.3 %
BASOPHILS # BLD: 0.4 %
BASOPHILS ABSOLUTE: 0 THOU/MM3 (ref 0–0.1)
BASOPHILS ABSOLUTE: 0.1 THOU/MM3 (ref 0–0.1)
BENZODIAZEPINE QUANTITATIVE URINE: NEGATIVE
BENZODIAZEPINE QUANTITATIVE URINE: NEGATIVE
BILIRUB SERPL-MCNC: 0.3 MG/DL (ref 0.3–1.2)
BILIRUB SERPL-MCNC: 0.5 MG/DL (ref 0.3–1.2)
BILIRUBIN DIRECT: < 0.2 MG/DL (ref 0–0.3)
BILIRUBIN URINE: NEGATIVE
BILIRUBIN URINE: NEGATIVE
BLOOD, URINE: ABNORMAL
BLOOD, URINE: ABNORMAL
BUN BLDV-MCNC: 23 MG/DL (ref 7–22)
BUN BLDV-MCNC: 24 MG/DL (ref 7–22)
CALCIUM SERPL-MCNC: 9.6 MG/DL (ref 8.5–10.5)
CALCIUM SERPL-MCNC: 9.9 MG/DL (ref 8.5–10.5)
CANNABINOID QUANTITATIVE URINE: NEGATIVE
CANNABINOID QUANTITATIVE URINE: NEGATIVE
CASTS 2: ABNORMAL /LPF
CASTS UA: ABNORMAL /LPF
CASTS: ABNORMAL /LPF
CASTS: ABNORMAL /LPF
CHARACTER, URINE: ABNORMAL
CHARACTER, URINE: ABNORMAL
CHLORIDE BLD-SCNC: 95 MEQ/L (ref 98–111)
CHLORIDE BLD-SCNC: 99 MEQ/L (ref 98–111)
CO2: 21 MEQ/L (ref 23–33)
CO2: 22 MEQ/L (ref 23–33)
COCAINE METABOLITE QUANTITATIVE URINE: NEGATIVE
COCAINE METABOLITE QUANTITATIVE URINE: NEGATIVE
COLOR: ABNORMAL
COLOR: YELLOW
CREAT SERPL-MCNC: 1.1 MG/DL (ref 0.4–1.2)
CREAT SERPL-MCNC: 1.1 MG/DL (ref 0.4–1.2)
CRYSTALS, UA: ABNORMAL
CRYSTALS: ABNORMAL
EOSINOPHIL # BLD: 0.4 %
EOSINOPHIL # BLD: 0.8 %
EOSINOPHILS ABSOLUTE: 0.1 THOU/MM3 (ref 0–0.4)
EOSINOPHILS ABSOLUTE: 0.1 THOU/MM3 (ref 0–0.4)
EPITHELIAL CELLS, UA: ABNORMAL /HPF
EPITHELIAL CELLS, UA: ABNORMAL /HPF
ERYTHROCYTE [DISTWIDTH] IN BLOOD BY AUTOMATED COUNT: 17.3 % (ref 11.5–14.5)
ERYTHROCYTE [DISTWIDTH] IN BLOOD BY AUTOMATED COUNT: 17.4 % (ref 11.5–14.5)
ERYTHROCYTE [DISTWIDTH] IN BLOOD BY AUTOMATED COUNT: 48.7 FL (ref 35–45)
ERYTHROCYTE [DISTWIDTH] IN BLOOD BY AUTOMATED COUNT: 49 FL (ref 35–45)
ETHYL ALCOHOL, SERUM: < 0.01 %
ETHYL ALCOHOL, SERUM: < 0.01 %
GFR SERPL CREATININE-BSD FRML MDRD: 75 ML/MIN/1.73M2
GFR SERPL CREATININE-BSD FRML MDRD: 75 ML/MIN/1.73M2
GLUCOSE BLD-MCNC: 106 MG/DL (ref 70–108)
GLUCOSE BLD-MCNC: 178 MG/DL (ref 70–108)
GLUCOSE URINE: 250 MG/DL
GLUCOSE, URINE: NEGATIVE MG/DL
HCT VFR BLD CALC: 36.8 % (ref 42–52)
HCT VFR BLD CALC: 39.1 % (ref 42–52)
HEMOGLOBIN: 11.9 GM/DL (ref 14–18)
HEMOGLOBIN: 12.7 GM/DL (ref 14–18)
IMMATURE GRANS (ABS): 0.06 THOU/MM3 (ref 0–0.07)
IMMATURE GRANS (ABS): 0.08 THOU/MM3 (ref 0–0.07)
IMMATURE GRANULOCYTES: 0.4 %
IMMATURE GRANULOCYTES: 0.4 %
KETONES, URINE: ABNORMAL
KETONES, URINE: ABNORMAL
LACTIC ACID: 1.3 MMOL/L (ref 0.5–2)
LEUKOCYTE ESTERASE, URINE: NEGATIVE
LEUKOCYTE ESTERASE, URINE: NEGATIVE
LIPASE: 9 U/L (ref 5.6–51.3)
LYMPHOCYTES # BLD: 12.1 %
LYMPHOCYTES # BLD: 12.4 %
LYMPHOCYTES ABSOLUTE: 1.7 THOU/MM3 (ref 1–4.8)
LYMPHOCYTES ABSOLUTE: 2.2 THOU/MM3 (ref 1–4.8)
MAGNESIUM: 2.1 MG/DL (ref 1.6–2.4)
MCH RBC QN AUTO: 25.4 PG (ref 26–33)
MCH RBC QN AUTO: 25.6 PG (ref 26–33)
MCHC RBC AUTO-ENTMCNC: 32.3 GM/DL (ref 32.2–35.5)
MCHC RBC AUTO-ENTMCNC: 32.5 GM/DL (ref 32.2–35.5)
MCV RBC AUTO: 78.5 FL (ref 80–94)
MCV RBC AUTO: 78.8 FL (ref 80–94)
MISCELLANEOUS 2: ABNORMAL
MISCELLANEOUS LAB TEST RESULT: ABNORMAL
MONOCYTES # BLD: 7.6 %
MONOCYTES # BLD: 8 %
MONOCYTES ABSOLUTE: 1 THOU/MM3 (ref 0.4–1.3)
MONOCYTES ABSOLUTE: 1.5 THOU/MM3 (ref 0.4–1.3)
NITRITE, URINE: NEGATIVE
NITRITE, URINE: NEGATIVE
NUCLEATED RED BLOOD CELLS: 0 /100 WBC
NUCLEATED RED BLOOD CELLS: 0 /100 WBC
OPIATES, URINE: NEGATIVE
OPIATES, URINE: NEGATIVE
OSMOLALITY CALCULATION: 270.8 MOSMOL/KG (ref 275–300)
OSMOLALITY CALCULATION: 280.1 MOSMOL/KG (ref 275–300)
OXYCODONE: NEGATIVE
OXYCODONE: NEGATIVE
PH UA: 5 (ref 5–9)
PH UA: 5.5 (ref 5–9)
PHENCYCLIDINE QUANTITATIVE URINE: NEGATIVE
PHENCYCLIDINE QUANTITATIVE URINE: NEGATIVE
PLATELET # BLD: 387 THOU/MM3 (ref 130–400)
PLATELET # BLD: 395 THOU/MM3 (ref 130–400)
PMV BLD AUTO: 9 FL (ref 9.4–12.4)
PMV BLD AUTO: 9.6 FL (ref 9.4–12.4)
POTASSIUM SERPL-SCNC: 4.1 MEQ/L (ref 3.5–5.2)
POTASSIUM SERPL-SCNC: 4.2 MEQ/L (ref 3.5–5.2)
PROTEIN UA: 30
PROTEIN UA: 30 MG/DL
RBC # BLD: 4.69 MILL/MM3 (ref 4.7–6.1)
RBC # BLD: 4.96 MILL/MM3 (ref 4.7–6.1)
RBC URINE: ABNORMAL /HPF
RBC URINE: ABNORMAL /HPF
RENAL EPITHELIAL, UA: ABNORMAL
RENAL EPITHELIAL, UA: ABNORMAL
SALICYLATE, SERUM: 2.5 MG/DL (ref 2–10)
SALICYLATE, SERUM: 3.1 MG/DL (ref 2–10)
SALICYLATE, SERUM: < 0.3 MG/DL (ref 2–10)
SEG NEUTROPHILS: 78.3 %
SEG NEUTROPHILS: 78.9 %
SEGMENTED NEUTROPHILS ABSOLUTE COUNT: 10.7 THOU/MM3 (ref 1.8–7.7)
SEGMENTED NEUTROPHILS ABSOLUTE COUNT: 14.3 THOU/MM3 (ref 1.8–7.7)
SODIUM BLD-SCNC: 133 MEQ/L (ref 135–145)
SODIUM BLD-SCNC: 136 MEQ/L (ref 135–145)
SPECIFIC GRAVITY UA: 1.02 (ref 1–1.03)
SPECIFIC GRAVITY, URINE: 1.03 (ref 1–1.03)
TOTAL CK: 505 U/L (ref 55–170)
TOTAL PROTEIN: 7.5 G/DL (ref 6.1–8)
TOTAL PROTEIN: 7.9 G/DL (ref 6.1–8)
TSH SERPL DL<=0.05 MIU/L-ACNC: 0.39 UIU/ML (ref 0.4–4.2)
TSH SERPL DL<=0.05 MIU/L-ACNC: 0.56 UIU/ML (ref 0.4–4.2)
UROBILINOGEN, URINE: 0.2 EU/DL (ref 0–1)
UROBILINOGEN, URINE: 1 EU/DL (ref 0–1)
WBC # BLD: 13.5 THOU/MM3 (ref 4.8–10.8)
WBC # BLD: 18.3 THOU/MM3 (ref 4.8–10.8)
WBC UA: ABNORMAL /HPF
WBC UA: ABNORMAL /HPF
YEAST: ABNORMAL
YEAST: ABNORMAL

## 2021-10-03 PROCEDURE — 36415 COLL VENOUS BLD VENIPUNCTURE: CPT

## 2021-10-03 PROCEDURE — 80143 DRUG ASSAY ACETAMINOPHEN: CPT

## 2021-10-03 PROCEDURE — 82077 ASSAY SPEC XCP UR&BREATH IA: CPT

## 2021-10-03 PROCEDURE — 83605 ASSAY OF LACTIC ACID: CPT

## 2021-10-03 PROCEDURE — 80179 DRUG ASSAY SALICYLATE: CPT

## 2021-10-03 PROCEDURE — 83690 ASSAY OF LIPASE: CPT

## 2021-10-03 PROCEDURE — 81001 URINALYSIS AUTO W/SCOPE: CPT

## 2021-10-03 PROCEDURE — 6370000000 HC RX 637 (ALT 250 FOR IP): Performed by: EMERGENCY MEDICINE

## 2021-10-03 PROCEDURE — 85025 COMPLETE CBC W/AUTO DIFF WBC: CPT

## 2021-10-03 PROCEDURE — 80053 COMPREHEN METABOLIC PANEL: CPT

## 2021-10-03 PROCEDURE — 84443 ASSAY THYROID STIM HORMONE: CPT

## 2021-10-03 PROCEDURE — 80307 DRUG TEST PRSMV CHEM ANLYZR: CPT

## 2021-10-03 PROCEDURE — 6370000000 HC RX 637 (ALT 250 FOR IP): Performed by: PHYSICIAN ASSISTANT

## 2021-10-03 PROCEDURE — 83735 ASSAY OF MAGNESIUM: CPT

## 2021-10-03 PROCEDURE — 93005 ELECTROCARDIOGRAM TRACING: CPT | Performed by: NURSE PRACTITIONER

## 2021-10-03 PROCEDURE — 82550 ASSAY OF CK (CPK): CPT

## 2021-10-03 PROCEDURE — 99284 EMERGENCY DEPT VISIT MOD MDM: CPT

## 2021-10-03 PROCEDURE — 82248 BILIRUBIN DIRECT: CPT

## 2021-10-03 RX ORDER — LORAZEPAM 1 MG/1
1 TABLET ORAL ONCE
Status: COMPLETED | OUTPATIENT
Start: 2021-10-03 | End: 2021-10-03

## 2021-10-03 RX ADMIN — LORAZEPAM 1 MG: 1 TABLET ORAL at 22:33

## 2021-10-03 RX ADMIN — LORAZEPAM 1 MG: 1 TABLET ORAL at 07:16

## 2021-10-03 RX ADMIN — LORAZEPAM 1 MG: 1 TABLET ORAL at 21:29

## 2021-10-03 ASSESSMENT — ENCOUNTER SYMPTOMS
NAUSEA: 0
SORE THROAT: 0
COUGH: 0
PHOTOPHOBIA: 0
CHOKING: 0
BACK PAIN: 0
CHEST TIGHTNESS: 0
VOMITING: 0
NAUSEA: 0
DIARRHEA: 0
BACK PAIN: 0
EYE REDNESS: 0
SHORTNESS OF BREATH: 0
VOMITING: 0
DIARRHEA: 0
SORE THROAT: 0
COUGH: 0
TROUBLE SWALLOWING: 0
SINUS PRESSURE: 0
ABDOMINAL DISTENTION: 0
BLOOD IN STOOL: 0
ABDOMINAL PAIN: 0
CONSTIPATION: 0
EYE ITCHING: 0
RHINORRHEA: 0
EYE DISCHARGE: 0
WHEEZING: 0
VOICE CHANGE: 0
COLOR CHANGE: 0
CHEST TIGHTNESS: 0
SHORTNESS OF BREATH: 0
EYE PAIN: 0
RHINORRHEA: 0

## 2021-10-03 ASSESSMENT — SLEEP AND FATIGUE QUESTIONNAIRES
AVERAGE NUMBER OF SLEEP HOURS: 4
DIFFICULTY FALLING ASLEEP: YES
SLEEP PATTERN: DIFFICULTY FALLING ASLEEP;DISTURBED/INTERRUPTED SLEEP
DIFFICULTY STAYING ASLEEP: YES
DO YOU HAVE DIFFICULTY SLEEPING: YES
DO YOU USE A SLEEP AID: YES
RESTFUL SLEEP: NO
DIFFICULTY ARISING: NO

## 2021-10-03 ASSESSMENT — LIFESTYLE VARIABLES: HISTORY_ALCOHOL_USE: COMMENT

## 2021-10-03 NOTE — ED PROVIDER NOTES
Addendum: The care was assumed at 6 AM.   the patient was medically clear. The patient was evaluated by the behavioral access team and determined that was appropriate for discharge. They felt the majority of symptoms were bad reaction to Seroquel and his opiate withdrawal.  The patient not homicidal or suicidal.  He is much more reasonable now. Will discharge home.       EDIS Howe  10/03/21 0556

## 2021-10-03 NOTE — ED NOTES
ED nurse-to-nurse bedside report    Chief Complaint   Patient presents with    Ingestion      LOC: AOx3  Vital signs   Vitals:    10/03/21 0400 10/03/21 0415 10/03/21 0430 10/03/21 0606   BP: (!) 126/93 (!) 137/96 (!) 143/95    Pulse: 104 110 108 94   Resp: 10 15 13    Temp:       TempSrc:       SpO2: 98% 100% 99%    Weight:       Height:          Pain:  none    Oxygen: On RA, NOT requiring oxygen  Telemetry:YES  LDAs:  n/a  Continuous Infusions: n/a  Mobility:standby assist. Sitter at bedside   Fall Interventions: sitter, side rails up  Report given to:  Felicitas PEREZ     [facility]  10/03/21 1949

## 2021-10-03 NOTE — ED PROVIDER NOTES
[facility] Emergency Department    CHIEF COMPLAINT       Chief Complaint   Patient presents with    Ingestion       Nurses Notes reviewed and I agree except as noted in the HPI. HISTORY OF PRESENT ILLNESS    Pop Aj is a 39 y.o. male who presents to the ED for evaluation of ingestion. Patient notes this evening he took 400 mg of Seroquel, 90 mg of Remeron, 900 mg of Lyrica and 4000 mg of Neurontin. He notes he was playing loud music, the police were called and when they arrived he was hitting his head with a dresser. The police had arrived to the scene issued an [facility] for the patient. He denies suicidal ideation but does note that he is depressed. He does any illicit drug use for the last couple of days. He currently takes the medicines that he recently misused as well as Suboxone. He has a past medical history of anxiety, depression, hepatitis C, and opiate/amphetamine drug abuse. HPI was provided by the patient. REVIEW OF SYSTEMS     Review of Systems   Constitutional: Negative for activity change, chills, fatigue and fever. HENT: Negative for congestion, rhinorrhea and sore throat. Respiratory: Negative for cough, chest tightness and shortness of breath. Cardiovascular: Negative for chest pain. Gastrointestinal: Negative for diarrhea, nausea and vomiting. Endocrine: Negative for polyuria. Genitourinary: Negative for difficulty urinating, dysuria and urgency. Musculoskeletal: Negative for arthralgias and back pain. Skin: Negative for color change and rash. Allergic/Immunologic: Negative for immunocompromised state. Neurological: Negative for dizziness, weakness and headaches. Hematological: Does not bruise/bleed easily. Psychiatric/Behavioral: Positive for agitation, behavioral problems, confusion and self-injury. Negative for hallucinations and suicidal ideas.         PAST MEDICAL HISTORY     Past Medical History:   Diagnosis Date    Anxiety     Depression     Kidney stone     Liver disease     Hep C    Opiate abuse, continuous ([facility])        SURGICALHISTORY      has no past surgical history on file. CURRENT MEDICATIONS       Previous Medications    ASPIRIN-ACETAMINOPHEN-CAFFEINE (EXCEDRIN EXTRA STRENGTH PO)    Take by mouth as needed    GABAPENTIN (NEURONTIN) 600 MG TABLET    Take 1 tablet by mouth 4 times daily for 14 days. LISDEXAMFETAMINE DIMESYLATE (VYVANSE) 40 MG CAPS    Take 40 mg by mouth daily for 30 days. LISINOPRIL (PRINIVIL;ZESTRIL) 20 MG TABLET    Take 1 tablet by mouth daily    MIRTAZAPINE (REMERON) 45 MG TABLET    Take 1 tablet by mouth nightly    ONDANSETRON (ZOFRAN ODT) 4 MG DISINTEGRATING TABLET    Take 1 tablet by mouth every 8 hours as needed for Nausea or Vomiting    PREGABALIN (LYRICA) 150 MG CAPSULE    Take 300 mg by mouth 2 times daily. TESTOSTERONE (ANDROGEL) 25 MG/2.5GM (1%) GEL 1 % GEL    Apply 2.5 g topically daily for 30 days. VENLAFAXINE (EFFEXOR) 100 MG TABLET    Take 1.5 tablets by mouth 2 times daily       ALLERGIES     has No Known Allergies. FAMILY HISTORY     He indicated that his mother is . He indicated that his father is . family history is not on file.     SOCIAL HISTORY       Social History     Socioeconomic History    Marital status: Single     Spouse name: Not on file    Number of children: Not on file    Years of education: Not on file    Highest education level: Not on file   Occupational History    Not on file   Tobacco Use    Smoking status: Current Every Day Smoker     Packs/day: 0.50     Types: Cigarettes    Smokeless tobacco: Never Used   Vaping Use    Vaping Use: Never assessed   Substance and Sexual Activity    Alcohol use: Yes     Comment: occasional    Drug use: Yes     Types: Opiates      Comment: yesterday    Sexual activity: Not Currently   Other Topics Concern    Not on file   Social History Narrative    Not on file     Social Determinants of Health     Financial Resource Strain: Low Risk     Difficulty of Paying Living Expenses: Not hard at all   Food Insecurity: No Food Insecurity    Worried About Running Out of Food in the Last Year: Never true    Ana of Food in the Last Year: Never true   Transportation Needs:     Lack of Transportation (Medical):  Lack of Transportation (Non-Medical):    Physical Activity:     Days of Exercise per Week:     Minutes of Exercise per Session:    Stress:     Feeling of Stress :    Social Connections:     Frequency of Communication with Friends and Family:     Frequency of Social Gatherings with Friends and Family:     Attends Sabian[employer] Services:     Active Member of Clubs or Organizations:     Attends Club or Organization Meetings:     Marital Status:    Intimate Partner Violence:     Fear of Current or Ex-Partner:     Emotionally Abused:     Physically Abused:     Sexually Abused:        PHYSICAL EXAM     INITIAL VITALS:  height is 5' 10\" (1.778 m) and weight is 175 lb (79.4 kg). His oral temperature is 98.3 °F (36.8 °C). His blood pressure is 143/95 (abnormal) and his pulse is 108. His respiration is 13 and oxygen saturation is 99%. Physical Exam  Vitals and nursing note reviewed. Constitutional:       Appearance: Normal appearance. He is well-developed. HENT:      Head: Normocephalic. Right Ear: External ear normal.      Left Ear: External ear normal.      Nose: Nose normal.      Mouth/Throat:      Pharynx: Uvula midline. Eyes:      Conjunctiva/sclera: Conjunctivae normal.   Cardiovascular:      Rate and Rhythm: Regular rhythm. Tachycardia present. Heart sounds: Normal heart sounds, S1 normal and S2 normal.   Pulmonary:      Effort: Pulmonary effort is normal. No respiratory distress. Breath sounds: Normal breath sounds. Chest:      Chest wall: No tenderness. Abdominal:      General: Bowel sounds are normal. There is no distension. Palpations: Abdomen is soft.       Tenderness: Abnormal; Notable for the following components:    BUN 24 (*)     Sodium 133 (*)     Chloride 95 (*)     CO2 22 (*)     All other components within normal limits   CK - Abnormal; Notable for the following components: Total  (*)     All other components within normal limits   URINALYSIS WITH MICROSCOPIC - Abnormal; Notable for the following components:    Ketones, Urine TRACE (*)     Blood, Urine LARGE (*)     Protein, UA 30 (*)     All other components within normal limits   GLOMERULAR FILTRATION RATE, ESTIMATED - Abnormal; Notable for the following components:    Est, Glom Filt Rate 75 (*)     All other components within normal limits   OSMOLALITY - Abnormal; Notable for the following components:    Osmolality Calc 270.8 (*)     All other components within normal limits   TSH WITHOUT REFLEX   LACTIC ACID, PLASMA   ACETAMINOPHEN LEVEL   SALICYLATE LEVEL   ETHANOL   URINE DRUG SCREEN   ANION GAP       EMERGENCY DEPARTMENT COURSE:   Vitals:    Vitals:    10/03/21 0345 10/03/21 0400 10/03/21 0415 10/03/21 0430   BP: 117/76 (!) 126/93 (!) 137/96 (!) 143/95   Pulse: 101 104 110 108   Resp: 10 10 15 13   Temp:       TempSrc:       SpO2: 99% 98% 100% 99%   Weight:       Height:           MDM    Patient was seen and evaluated in the emergency department, patient appeared to be in no acute distress, vital signs reviewed, significant tachycardia initially noted this improved with no difficulty. Physical exam was completed, he is having elevated mood as he normally does, has pressured speech, he has tachycardia. Labs were obtained to medically clear the patient. Patient was signed out to Ramone Valdez, please see his note for final impression disposition and plan. Medications - No data to display    Patient was seenindependently by myself. The patient's final impression and disposition and plan was determined by myself.      CRITICAL CARE:   None    CONSULTS:  None    PROCEDURES:  None    FINAL IMPRESSION   No diagnosis found. DISPOSITION/PLAN   Please see the note of Jorge Cheema  PATIENT REFERREDTO:  No follow-up provider specified. DISCHARGE MEDICATIONS:  New Prescriptions    No medications on file       (Please note that portions of this note were completed with a voice recognition program.  Efforts were made to edit the dictations but occasionally words are mis-transcribed.)      Provider:  I personally performed the services described in the documentation,reviewed and edited the documentation which was dictated to the scribe in my presence, and it accurately records my words and actions.     Jeremiah Perez CNP 10/03/21 5:30 AM    Lonnie Perez, APRN - CNP        [employer], APRNAT - CNP  10/11/21 8622

## 2021-10-03 NOTE — PROGRESS NOTES
Provisional Diagnosis:    Opioid withdrawal    Risk, Psychosocial and Contextual Factors:  Stopped using fentanyl 4-5 days ago, active with [facility]. Reportedly had a reaction to prescribed medication. Current  Treatment:  Jelena Stauffer     Present Suicidal Behavior:      Verbal:  Denies          Attempt: Denies     Access to Weapons:  Denies     Current Suicide Risk: Low, Moderate or High:    Low     Past Suicidal Behavior:       Verbal: X    Attempt:  Denies. Self-Injurious/Self-Mutilation: Cut wrist 10-15 years ago however state \"It was to get attention\"      Traumatic Event Within Past 2 Weeks:   Denies     Current Abuse:  Denies     Legal:  Sexual conduct with a minor at age 22, robbery, currently on probation due to possession conviction    Violence:  Denies     Protective Factors:  Linked to services, motivated to remain sober    Housing:    Reside with roommates     CPAP/Oxygen/Ambulation Difficulties: Denies     Basic Vital Signs Normal?: Check with Patients Nurse prior to [address]?: Check with Patients Nurse prior to Calling Psychiatry    Clinical Summary:      Pt is a 39year old male escorted to [facility] by EMS and placed on an [facility] by [employer] due to bizzare behavior. Per the [facility], police were called to pts apartment due to loud music, upon arrival pt was sweating, beating his head on dresser drawer. Pt reportedly stopped using fentanyl 4-5 days ago, is active in treatment with [facility], prescribed suboxone. Pt is also linked to outpatient mental health services with [facility], and compliant with taking psychotropic medication as prescribed. Pt believe the combination of withdrawal and prescribed medication caused him to have a reaction. Pt does not recall hitting his head on the dresser drawer. Pt denies attempting suicide this morning, denies current suicidal thoughts, denies homicidal thoughts, denies hallucinations, no delusions noted.  Pt has a history of one inpatient psychiatric admission at [facility] in 2014. Pt is alert, oriented, cooperative, impaired insight/judgment, linear thought, good eye contact. Level of Care Disposition:      9328  Pt medically cleared by Garo Lawrence PA-C.    3840  Consult with Dr. Billy Song who recommend outpatient mental health follow up. Pt and Garo Lawrence PA-C updated, in agreement, EMC to be lifted, pt to be discharged.

## 2021-10-03 NOTE — ED NOTES
Repeat lab work send as ordered. Sitter remains at bedside. Patient in bed. Restless, but cooperative.       Leopoldo Dozier RN  10/03/21 8810

## 2021-10-03 NOTE — ED TRIAGE NOTES
Patient brought in by EMS after being called by PD. Patient took 400mg Seroquel, 90mg Remeron, 900mg Lyrica, 4000mg Neurontin. Patient denies this being purposeful. Patient incredibly jittery, hyperactive in triage. AOx4. Diaphoretic, pink, warm. Breathing w/o difficulty on RA.

## 2021-10-03 NOTE — ED NOTES
Upon first contact pt is rolling around on cot. Sitter at bedside.      Aaron Velásquez RN  10/03/21 8815

## 2021-10-04 LAB
EKG ATRIAL RATE: 103 BPM
EKG P AXIS: 68 DEGREES
EKG P-R INTERVAL: 162 MS
EKG Q-T INTERVAL: 360 MS
EKG QRS DURATION: 88 MS
EKG QTC CALCULATION (BAZETT): 471 MS
EKG R AXIS: 59 DEGREES
EKG T AXIS: 72 DEGREES
EKG VENTRICULAR RATE: 103 BPM

## 2021-10-04 PROCEDURE — 93010 ELECTROCARDIOGRAM REPORT: CPT | Performed by: INTERNAL MEDICINE

## 2021-10-04 NOTE — ED PROVIDER NOTES
[facility]  eMERGENCY dEPARTMENT eNCOUnter          CHIEF COMPLAINT       Chief Complaint   Patient presents with   [address] Problem       Nurses Notes reviewed and I agree except as noted in the HPI. HISTORY OF PRESENT ILLNESS    Ra Hadley is a 39 y.o. male who presents for probable drug overdose. Patient was apparently out in his yard making a scene and was brought in by police. Patient states he only took his medications. He states he did not take any other illicit drugs. However he was here this morning for illicit drug and the day before. Currently the patient is awake alert and oriented resting comfortably on the cot. He seems a little jittery but otherwise okay. Asking for water. No physical complaints at this time. REVIEW OF SYSTEMS     Review of Systems   Constitutional: Negative for activity change, appetite change, diaphoresis, fatigue and unexpected weight change. HENT: Negative for congestion, ear discharge, ear pain, hearing loss, rhinorrhea, sinus pressure, sore throat, trouble swallowing and voice change. Eyes: Negative for photophobia, pain, discharge, redness and itching. Respiratory: Negative for cough, choking, chest tightness, shortness of breath and wheezing. Cardiovascular: Negative for chest pain, palpitations and leg swelling. Gastrointestinal: Negative for abdominal distention, abdominal pain, blood in stool, constipation, diarrhea, nausea and vomiting. Endocrine: Negative for polydipsia, polyphagia and polyuria. Genitourinary: Negative for decreased urine volume, difficulty urinating, dysuria, enuresis, frequency, hematuria and urgency. Musculoskeletal: Negative for arthralgias, back pain, gait problem, myalgias, neck pain and neck stiffness. Skin: Negative for pallor and rash. Allergic/Immunologic: Negative for immunocompromised state.    Neurological: Negative for dizziness, tremors, seizures, syncope, facial asymmetry, weakness, light-headedness, numbness and headaches. Hematological: Negative for adenopathy. Does not bruise/bleed easily. Psychiatric/Behavioral: Positive for agitation and behavioral problems. Negative for hallucinations and suicidal ideas. The patient is nervous/anxious. PAST MEDICAL HISTORY    has a past medical history of Anxiety, Depression, Kidney stone, Liver disease, and Opiate abuse, continuous ([address] SURGICAL HISTORY      has no past surgical history on file. CURRENT MEDICATIONS       Discharge Medication List as of 10/3/2021 11:14 PM      CONTINUE these medications which have NOT CHANGED    Details   pregabalin (LYRICA) 150 MG capsule Take 300 mg by mouth 2 times daily. Historical Med      ondansetron (ZOFRAN ODT) 4 MG disintegrating tablet Take 1 tablet by mouth every 8 hours as needed for Nausea or Vomiting, Disp-20 tablet, R-0Print      gabapentin (NEURONTIN) 600 MG tablet Take 1 tablet by mouth 4 times daily for 14 days. , Disp-56 tablet, R-0Normal      venlafaxine (EFFEXOR) 100 MG tablet Take 1.5 tablets by mouth 2 times daily, Disp-45 tablet, R-3Normal      lisinopril (PRINIVIL;ZESTRIL) 20 MG tablet Take 1 tablet by mouth daily, Disp-30 tablet, R-3Normal      mirtazapine (REMERON) 45 MG tablet Take 1 tablet by mouth nightly, Disp-30 tablet, R-3Normal      Lisdexamfetamine Dimesylate (VYVANSE) 40 MG CAPS Take 40 mg by mouth daily for 30 days. , Disp-30 capsule, R-0Print      testosterone (ANDROGEL) 25 MG/2.5GM (1%) GEL 1 % gel Apply 2.5 g topically daily for 30 days. , Disp-30 Package, R-0Normal      Aspirin-Acetaminophen-Caffeine (EXCEDRIN EXTRA STRENGTH PO) Take by mouth as neededHistorical Med             ALLERGIES     has No Known Allergies. FAMILY HISTORY     He indicated that his mother is . He indicated that his father is . family history is not on file. SOCIAL HISTORY      reports that he has been smoking cigarettes.  He has been smoking about 0.50 packs per day. He has never used smokeless tobacco. He reports current alcohol use. He reports current drug use. Drug: Opiates . PHYSICAL EXAM     INITIAL VITALS:  height is 5' 10\" (1.778 m) and weight is 165 lb (74.8 kg). His oral temperature is 98.4 °F (36.9 °C). His blood pressure is 124/80 and his pulse is 135. His respiration is 18 and oxygen saturation is 96%. Physical Exam  Vitals and nursing note reviewed. Constitutional:       General: He is not in acute distress. Appearance: He is well-developed. He is not diaphoretic. HENT:      Head: Normocephalic and atraumatic. Right Ear: External ear normal.      Left Ear: External ear normal.      Nose: Nose normal.   Eyes:      General: Lids are normal. No scleral icterus. Right eye: No discharge. Left eye: No discharge. Conjunctiva/sclera: Conjunctivae normal.      Right eye: No exudate. Left eye: No exudate. Pupils: Pupils are equal, round, and reactive to light. Neck:      Thyroid: No thyromegaly. Vascular: No JVD. Trachea: No tracheal deviation. Cardiovascular:      Rate and Rhythm: Normal rate and regular rhythm. Pulses: Normal pulses. Heart sounds: Normal heart sounds, S1 normal and S2 normal. No murmur heard. No friction rub. No gallop. Pulmonary:      Effort: Pulmonary effort is normal. No respiratory distress. Breath sounds: Normal breath sounds. No stridor. No wheezing or rales. Chest:      Chest wall: No tenderness. Abdominal:      General: Bowel sounds are normal. There is no distension. Palpations: Abdomen is soft. There is no mass. Tenderness: There is no abdominal tenderness. There is no guarding or rebound. Musculoskeletal:         General: No tenderness. Normal range of motion. Cervical back: Normal range of motion and neck supple. Normal range of motion. Lymphadenopathy:      Cervical: No cervical adenopathy. Skin:     General: Skin is warm and dry. Capillary Refill: Capillary refill takes less than 2 seconds. Findings: No bruising, ecchymosis, lesion or rash. Neurological:      General: No focal deficit present. Mental Status: He is alert and oriented to person, place, and time. Mental status is at baseline. Cranial Nerves: No cranial nerve deficit. Sensory: No sensory deficit. Motor: No weakness. Coordination: Coordination normal.      Gait: Gait normal.      Deep Tendon Reflexes: Reflexes are normal and symmetric. Reflexes normal.   Psychiatric:         Mood and Affect: Mood normal.         Speech: Speech normal.         Behavior: Behavior normal.         Thought Content:  Thought content normal.         Judgment: Judgment normal.           DIFFERENTIAL DIAGNOSIS:   Drug overdose, anxiety, medication reaction    DIAGNOSTIC RESULTS     EKG: All EKG's are interpreted by the Emergency Department Physician who either signs or Co-signs this chart in the absence of a cardiologist.  None    RADIOLOGY: non-plain film images(s) such as CT, Ultrasound and MRI are read by the radiologist.  No orders to display         LABS:   Labs Reviewed   CBC WITH AUTO DIFFERENTIAL - Abnormal; Notable for the following components:       Result Value    WBC 13.5 (*)     RBC 4.69 (*)     Hemoglobin 11.9 (*)     Hematocrit 36.8 (*)     MCV 78.5 (*)     MCH 25.4 (*)     RDW-CV 17.4 (*)     RDW-SD 49.0 (*)     Segs Absolute 10.7 (*)     All other components within normal limits   BASIC METABOLIC PANEL - Abnormal; Notable for the following components:    CO2 21 (*)     Glucose 178 (*)     BUN 23 (*)     All other components within normal limits   HEPATIC FUNCTION PANEL - Abnormal; Notable for the following components:    AST 49 (*)     All other components within normal limits   TSH WITHOUT REFLEX - Abnormal; Notable for the following components:    TSH 0.390 (*)     All other components within normal limits   SALICYLATE LEVEL - Abnormal; Notable for Polysubstance abuse [facility])          DISPOSITION/PLAN   Discharge    PATIENT REFERRED TO:  [facility]  [address] [facility] [address]  [phone]    Call in 1 day        DISCHARGE MEDICATIONS:  Discharge Medication List as of 10/3/2021 11:14 PM          (Please note that portions of this note were completed with a voice recognition program.  Efforts were made to edit the dictations but occasionally words are mis-transcribed.)    Lj Sanabria, 5 [facility] Katia Rojas DO  10/05/21 1829

## 2021-10-04 NOTE — PROGRESS NOTES
24 Hour Re-Assess:   Status & Exam & Behavior Support Service Tabs      Present Suicidal Behavior:      Verbal: denies    Plan: denies    Current Suicide Risk: Low, Moderate or High: ;ow    Present Homicidal Behavior:    Verbal: denies    Plan: denies      Psychosis:    Hallucinations: denies    Delusions: denies      Clinical Re-Assessment Summary including Current Mental State of Patient:   Initial: Pt is a 39year old male escorted to [facility] by EMS and placed on an [facility] by [employer] due to bizzare behavior. Per the [facility], police were called to pts apartment due to loud music, upon arrival pt was sweating, beating his head on dresser drawer. Pt reportedly stopped using fentanyl 4-5 days ago, is active in treatment with [facility], prescribed suboxone. Pt is also linked to outpatient mental health services with [facility], and compliant with taking psychotropic medication as prescribed. Pt believe the combination of withdrawal and prescribed medication caused him to have a reaction. Pt does not recall hitting his head on the dresser drawer. Pt denies attempting suicide this morning, denies current suicidal thoughts, denies homicidal thoughts, denies hallucinations, no delusions noted. Pt has a history of one inpatient psychiatric admission at [facility] in 2014. Pt is alert, oriented, cooperative, impaired insight/judgment, linear thought, good eye contact. Reassess: Pt brought in by [employer] d/t bizarre behavior. Pt denies suicidal or homicidal ideation, plan or intent. Pt states that he is withdrawing and believes his lyrica is making him act this way. Pt A/o, cooperative, linear thought. Increased motor activity, disorganized speech    Updated Level of Care Disposition:    Provider to discharge pt.    Pt given ativan for agitation

## 2021-10-04 NOTE — ED NOTES
Pt given more to drink at this time. Patient sitting on the floor in corner of the room. Paramedic student went into room and pt got up on bed per her request. No distress noted at this time. Patient given meal tray.       Severo Taylor RN  10/03/21 5555

## 2021-10-04 NOTE — ED TRIAGE NOTES
Pt presents to ED by officers for psych evaluation. Pt has jumbled speech and is pacing floor. Pt is cooperative with nurse. Pt states he has only taken his lyrica and that is what makes him this way. Pt denies any doing any street drugs or drinking. Patient given ice water.

## 2021-10-12 DIAGNOSIS — I10 ESSENTIAL HYPERTENSION: ICD-10-CM

## 2021-10-12 RX ORDER — LISINOPRIL 20 MG/1
TABLET ORAL
Qty: 30 TABLET | Refills: 3 | OUTPATIENT
Start: 2021-10-12

## 2021-10-18 DIAGNOSIS — M79.7 FIBROMYALGIA: ICD-10-CM

## 2021-10-18 RX ORDER — PREGABALIN 300 MG/1
CAPSULE ORAL
Qty: 60 CAPSULE | OUTPATIENT
Start: 2021-10-18

## 2021-10-29 NOTE — ED PROVIDER NOTES
encounter.     Current Outpatient Medications:     pregabalin (LYRICA) 150 MG capsule, Take 300 mg by mouth 2 times daily. , Disp: , Rfl:     ondansetron (ZOFRAN ODT) 4 MG disintegrating tablet, Take 1 tablet by mouth every 8 hours as needed for Nausea or Vomiting, Disp: 20 tablet, Rfl: 0    gabapentin (NEURONTIN) 600 MG tablet, Take 1 tablet by mouth 4 times daily for 14 days. , Disp: 56 tablet, Rfl: 0    venlafaxine (EFFEXOR) 100 MG tablet, Take 1.5 tablets by mouth 2 times daily, Disp: 45 tablet, Rfl: 3    lisinopril (PRINIVIL;ZESTRIL) 20 MG tablet, Take 1 tablet by mouth daily, Disp: 30 tablet, Rfl: 3    mirtazapine (REMERON) 45 MG tablet, Take 1 tablet by mouth nightly, Disp: 30 tablet, Rfl: 3    Lisdexamfetamine Dimesylate (VYVANSE) 40 MG CAPS, Take 40 mg by mouth daily for 30 days. , Disp: 30 capsule, Rfl: 0    testosterone (ANDROGEL) 25 MG/2.5GM (1%) GEL 1 % gel, Apply 2.5 g topically daily for 30 days. , Disp: 30 Package, Rfl: 0    Aspirin-Acetaminophen-Caffeine (EXCEDRIN EXTRA STRENGTH PO), Take by mouth as needed, Disp: , Rfl:            SOCIAL HISTORY      Social History          Social History Narrative    Not on file      Social History            Tobacco Use    Smoking status: Current Every Day Smoker       Packs/day: 0.50       Types: Cigarettes    Smokeless tobacco: Never Used   Vaping Use    Vaping Use: Never assessed   Substance Use Topics    Alcohol use: Yes       Comment: occasional    Drug use: Yes       Types: Opiates        Comment: yesterday         ALLERGIES   No Known Allergies        FAMILY HISTORY   Family History   History reviewed. No pertinent family history.           PREVIOUS RECORDS   Previous records reviewed: Well-known to the ED. .           PHYSICAL EXAM                ED Triage Vitals [10/01/21 0659]   BP Temp Temp Source Pulse Resp SpO2 Height Weight   (!) 140/106 99 °F (37.2 °C) Oral 114 18 95 % 5' 10\" (1.778 m) 185 lb (83.9 kg)      Initial vital signs and nursing assessment reviewed and normal. Body mass index is 26.54 kg/m². Pulsoximetry is normal per my interpretation.     Additional Vital Signs:      Vitals:     10/01/21 0659   BP: (!) 140/106   Pulse: 114   Resp: 18   Temp: 99 °F (37.2 °C)   SpO2: 95%         Physical Exam  Constitutional:       General: He is awake. He is not in acute distress. Appearance: He is normal weight. Comments: Disheveled   HENT:      Head: Normocephalic and atraumatic. Right Ear: External ear normal.      Left Ear: External ear normal.      Nose: Nose normal.      Mouth/Throat:      Mouth: Mucous membranes are moist.      Pharynx: Oropharynx is clear. Eyes:      Extraocular Movements: Extraocular movements intact. Conjunctiva/sclera: Conjunctivae normal.      Pupils: Pupils are equal, round, and reactive to light. Cardiovascular:      Rate and Rhythm: Regular rhythm. Tachycardia present. Pulses: Normal pulses. Heart sounds: Normal heart sounds. No murmur heard. No friction rub. No gallop. Pulmonary:      Effort: Pulmonary effort is normal. No respiratory distress. Breath sounds: Normal breath sounds. No wheezing, rhonchi or rales. Chest:      Chest wall: No tenderness. Abdominal:      General: Abdomen is flat. Bowel sounds are normal.      Palpations: Abdomen is soft. Musculoskeletal:         General: Normal range of motion. Cervical back: Normal range of motion and neck supple. Skin:     General: Skin is warm and dry. Capillary Refill: Capillary refill takes less than 2 seconds. Comments: Excoriations throughout   Neurological:      General: No focal deficit present. Mental Status: He is alert and oriented to person, place, and time. Mental status is at baseline. Psychiatric:         Mood and Affect: Mood normal.         Behavior: Behavior normal. Behavior is cooperative. Thought Content:  Thought content normal.         Judgment: Judgment normal.         MEDICAL DECISION MAKING   Initial Assessment:   1. Suspected opiate overdose  2. Polysubstance abuse  3. History of hepatitis C   4. Anemia of chronic disease   Plan:   · Hgb 11.7 today, this is within his usual baseline between 10-12, plan to follow up with outpatient PCP for further workup if indicated    · CBC, CMP, EKG POCT glucose, CK within normal limits  · Unable to obtain UDS & UA due to patient not providing sample   · Monitoring labs and vitals for 2 hours after Narcan, plan to discharge to officer     ED RESULTS   Laboratory results:  Labs Reviewed - No data to display     Radiologic studies results:  No orders to display         ED Medications administered this visit: Medications - No data to display        ED COURSE      Strict return precautions and follow up instructions were discussed with the patient prior to discharge, with which the patient agrees.     MEDICATION CHANGES          New Prescriptions     No medications on file      FINAL DISPOSITION      Final diagnoses:   Opiate overdose, accidental or unintentional, initial encounter  Polysubstance abuse      Condition: condition: stable  Dispo: Discharge to care home     This transcription was electronically signed. Parts of this transcriptions may have been dictated by use of voice recognition software and electronically transcribed, and parts may have been transcribed with the assistance of an ED scribe. The transcription may contain errors not detected in proofreading. Please refer to my supervising physician's documentation if my documentation differs.     Electronically signed by Suni Moore DO on 10/29/2021 at [address] [place] [address],   Resident  10/29/21 0690

## 2022-03-21 DIAGNOSIS — G47.00 INSOMNIA, UNSPECIFIED TYPE: ICD-10-CM

## 2022-03-21 DIAGNOSIS — I10 ESSENTIAL HYPERTENSION: ICD-10-CM

## 2022-03-21 DIAGNOSIS — M79.7 FIBROMYALGIA: ICD-10-CM

## 2022-03-21 RX ORDER — PREGABALIN 300 MG/1
CAPSULE ORAL
Qty: 60 CAPSULE | OUTPATIENT
Start: 2022-03-21

## 2022-03-21 RX ORDER — LISINOPRIL 20 MG/1
TABLET ORAL
Qty: 30 TABLET | Refills: 3 | OUTPATIENT
Start: 2022-03-21

## 2022-03-21 RX ORDER — MIRTAZAPINE 45 MG/1
45 TABLET, FILM COATED ORAL NIGHTLY
Qty: 30 TABLET | Refills: 3 | OUTPATIENT
Start: 2022-03-21

## 2022-03-22 ENCOUNTER — OFFICE VISIT (OUTPATIENT)
Dept: INTERNAL MEDICINE CLINIC | Age: 37
End: 2022-03-22
Payer: MEDICARE

## 2022-03-22 VITALS
TEMPERATURE: 97 F | WEIGHT: 199.2 LBS | BODY MASS INDEX: 28.52 KG/M2 | SYSTOLIC BLOOD PRESSURE: 154 MMHG | DIASTOLIC BLOOD PRESSURE: 99 MMHG | RESPIRATION RATE: 20 BRPM | HEIGHT: 70 IN | HEART RATE: 104 BPM

## 2022-03-22 DIAGNOSIS — Z51.81 ENCOUNTER FOR MONITORING SUBOXONE MAINTENANCE THERAPY: ICD-10-CM

## 2022-03-22 DIAGNOSIS — F19.10 POLYSUBSTANCE ABUSE (HCC): ICD-10-CM

## 2022-03-22 DIAGNOSIS — E29.1 HYPOGONADISM IN MALE: ICD-10-CM

## 2022-03-22 DIAGNOSIS — R79.89 LOW TESTOSTERONE IN MALE: ICD-10-CM

## 2022-03-22 DIAGNOSIS — B18.2 CHRONIC HEPATITIS C WITHOUT HEPATIC COMA (HCC): ICD-10-CM

## 2022-03-22 DIAGNOSIS — I10 ESSENTIAL HYPERTENSION: ICD-10-CM

## 2022-03-22 DIAGNOSIS — F41.9 ANXIETY AND DEPRESSION: ICD-10-CM

## 2022-03-22 DIAGNOSIS — F11.20 SEVERE OPIOID USE DISORDER (HCC): Primary | ICD-10-CM

## 2022-03-22 DIAGNOSIS — F90.1 ATTENTION DEFICIT HYPERACTIVITY DISORDER (ADHD), PREDOMINANTLY HYPERACTIVE TYPE: ICD-10-CM

## 2022-03-22 DIAGNOSIS — M79.7 FIBROMYALGIA: ICD-10-CM

## 2022-03-22 DIAGNOSIS — Z79.899 ENCOUNTER FOR MONITORING SUBOXONE MAINTENANCE THERAPY: ICD-10-CM

## 2022-03-22 DIAGNOSIS — F32.A ANXIETY AND DEPRESSION: ICD-10-CM

## 2022-03-22 PROCEDURE — G8484 FLU IMMUNIZE NO ADMIN: HCPCS | Performed by: INTERNAL MEDICINE

## 2022-03-22 PROCEDURE — G8427 DOCREV CUR MEDS BY ELIG CLIN: HCPCS | Performed by: INTERNAL MEDICINE

## 2022-03-22 PROCEDURE — 99214 OFFICE O/P EST MOD 30 MIN: CPT | Performed by: INTERNAL MEDICINE

## 2022-03-22 PROCEDURE — G8417 CALC BMI ABV UP PARAM F/U: HCPCS | Performed by: INTERNAL MEDICINE

## 2022-03-22 PROCEDURE — 1036F TOBACCO NON-USER: CPT | Performed by: INTERNAL MEDICINE

## 2022-03-22 PROCEDURE — 80305 DRUG TEST PRSMV DIR OPT OBS: CPT | Performed by: INTERNAL MEDICINE

## 2022-03-22 RX ORDER — LISINOPRIL 20 MG/1
20 TABLET ORAL DAILY
Qty: 30 TABLET | Refills: 0 | Status: SHIPPED | OUTPATIENT
Start: 2022-03-22 | End: 2022-04-12

## 2022-03-22 RX ORDER — VENLAFAXINE HYDROCHLORIDE 75 MG/1
75 CAPSULE, EXTENDED RELEASE ORAL 3 TIMES DAILY
Qty: 21 CAPSULE | Refills: 0 | Status: SHIPPED | OUTPATIENT
Start: 2022-03-22 | End: 2022-03-23

## 2022-03-22 RX ORDER — PREGABALIN 75 MG/1
75 CAPSULE ORAL 2 TIMES DAILY
Qty: 14 CAPSULE | Refills: 0 | Status: ON HOLD | OUTPATIENT
Start: 2022-03-22 | End: 2022-05-21 | Stop reason: HOSPADM

## 2022-03-22 RX ORDER — BUPRENORPHINE AND NALOXONE 8; 2 MG/1; MG/1
1 FILM, SOLUBLE BUCCAL; SUBLINGUAL 2 TIMES DAILY
Qty: 6 FILM | Refills: 0 | Status: SHIPPED | OUTPATIENT
Start: 2022-03-22 | End: 2022-03-24 | Stop reason: SDUPTHER

## 2022-03-22 RX ORDER — GABAPENTIN 600 MG/1
800 TABLET ORAL 4 TIMES DAILY
COMMUNITY
End: 2022-06-28

## 2022-03-22 NOTE — PROGRESS NOTES
Clinician engaged with pt and reviewed OBOT clinic expectations for program participants. Clinician explained that upon starting with the program the patient will be expected to engage in individual and group counseling to enhance their recovery skills. Clinician reviewed the treatment menu with patient and identified local providers that they are comfortable seeing for counseling services. Provided pt with information about accessing services at their agency of choice. Clinician explained that upon completing their first counseling visit they need to bring a copy of a signed NAGA that allows the counselor to release attendance records to the clinic and a copy of their treatment plan. Clinician explained that they need to bring a signed form verifying their attendance after each session with their counselor so that it can be uploaded into their chart. It was explained to the pt that they need to attend either treatment groups at a local agency where they receive counseling or attend a minimum of two community support groups per week and would need to bring a signed verification form. Pt was provided with a list of local 12 step meetings and the attendance verification form. Clinician explained the drug screen policy and informed them that their provider may request that they come in for a random drug screen or medication count. In the event that they are called for a random check, they will have 24hrs to come into the office. In the event that pt is not compliant with program expectations the frequency of their visits may be increased for added accountability, they may be asked to participate in a higher level of care, or they may be terminated from the treatment program.     Clinician allowed time for pt questions and had them sign the program expectations form and clinician signed as the witness.  A copy was made for patient to present to the counselor that they schedule with so that the pt can get the necessary documentation for the clinic. The form was given to support staff to be scanned into the pt chart. Patient returned to clinic today and met with sw. Patient will sign up for RESET O ken once he obtains a phone. Patient appears to be motivated for treatment. Sw and patient discussed importance of counseling and self care. Patient is wanting to obtain employment also. Sw will meet with patient while in office. No

## 2022-03-22 NOTE — PROGRESS NOTES
Kristy Easton (:  1985) is a 44 y.o. male,Established patient, here for evaluation of the following chief complaint(s):  Drug Problem      ASSESSMENT/PLAN:  1. Severe opioid use disorder (HCC)  -     POCT Rapid Drug Screen  2. Essential hypertension  -     lisinopril (PRINIVIL;ZESTRIL) 20 MG tablet; Take 1 tablet by mouth daily, Disp-30 tablet, R-0Normal  3. Anxiety and depression  -     pregabalin (LYRICA) 75 MG capsule; Take 1 capsule by mouth 2 times daily for 7 days. , Disp-14 capsule, R-0Normal  4. Fibromyalgia  5. Polysubstance abuse ([facility] [address]  6. Hypogonadism in male  7. Low testosterone in male  8. Attention deficit hyperactivity disorder (ADHD), predominantly hyperactive type  9. Chronic hepatitis C without hepatic coma ([facility] [address]  10.  Encounter for monitoring Suboxone maintenance therapy              SUBJECTIVE/OBJECTIVE:  Drug Problem    I last saw him 2021  I wanted him to go to inpatient rehab, he refused  I have not seen him since  He went to the [facility] center in December just got out yesterday  Patient was buying Suboxone while he was in there  He has not done any fentanyl coke or meth  He said he took an Adderall yesterday from an old prescription from [facility]      He is here with his girlfriend who I am seeing today as well        He has a history of on and off again drug use  He has had 2 episodes where he had kidney failure for methamphetamines had to be hospitalized  Started on pain pills at age 14 methadone age 12 subsequently when the prescriptions ran out he went to heroin about 3 years ago then fentanyl  He has probably overdosed about 15 times had to be given Narcan  Patient took a piece of a 12 mg Suboxone this morning in the [facility] he was using about 16 mg a day but it has been hard to find that much  Review of Systems  He says he is feeling much better  He still has urges and cravings but he has not used opioids  He said he is very serious about at this time  Even to the point that if his girlfriend keeps using he will have to leave her  Blood pressure (!) 154/99, pulse 104, temperature 97 °F (36.1 °C), temperature source Tympanic, resp. rate 20, height 5' 10\" (1.778 m), weight 199 lb 3.2 oz (90.4 kg).     Mental status examination    Cognition: oriented to person place and time      Appearance: Patient is in no acute distress, normal appearance, patient does not appear intoxicated/    Memory: Normal    Behavior/motor: Normal    Mood: Good mood, upbeat    Affect: Mood congruent    Attitude toward examiner: Pleasant, respectful    Thought content no delusions hallucinations suicidal ideation    Insight: poor    Judgment: poor    Eyes: Pupils normal    Skin: No track marks noted ,no rashes noted    COWS score: N/A            Urine tox screen today  Component 3/22/22 0914   Alcohol, Urine NEG    Amphetamine Screen, Urine NEG    Barbiturate Screen, Urine NEG    Benzodiazepine Screen, Urine NEG    Buprenorphine Urine POS    Cocaine Metabolite Screen, Urine NEG    FENTANYL SCREEN, URINE NEG    Gabapentin Screen, Urine N/A    MDMA, Urine NEG    Methadone Screen, Urine NEG    Methamphetamine, Urine NEG    Opiate Scrn, Ur NEG    Oxycodone Screen, Ur NEG    PCP Screen, Urine NEG    Propoxyphene Screen, Urine N/A    Synthetic Cannabinoids (K2) Screen, Urine NEG    THC Screen, Urine POS    Tramadol Scrn, Ur NEG    Tricyclic Antidepressants, Urine N/A        I agreed to take the patient back although he was one of my most difficult patients that I have ever had  I thought he would never get clean but he seems serious this time urine only shows buprenorphine and THC  I told him I would give him another chance  He does have hepatitis C that needs treated  I told him I can do this I will order the required testing  I am going to send her down to talk to Evorn Grief about counseling, meetings, the phone recovery  I am going to give him prescription for Suboxone 8 mg twice daily for 3 days  Follow-up here 3/24  I think there is hope for this patient    --Rita Wiggins MD

## 2022-03-22 NOTE — PROGRESS NOTES
Verbal order per Dr. Silvestre Lobo for urine drug screen. Positive for BUP and THC. Verified results with [facility]. Dr. Silvestre Lobo ordered Suboxone 8 mg films BID for patient. Verified dose with patient. Patient was sent home with a script for Suboxone 8 mg films BID for 3 days and will be seen back in the office 03/24/2022.

## 2022-03-23 DIAGNOSIS — F41.9 ANXIETY AND DEPRESSION: ICD-10-CM

## 2022-03-23 DIAGNOSIS — F32.A ANXIETY AND DEPRESSION: ICD-10-CM

## 2022-03-23 RX ORDER — VENLAFAXINE HYDROCHLORIDE 75 MG/1
CAPSULE, EXTENDED RELEASE ORAL
Qty: 21 CAPSULE | Refills: 0 | Status: SHIPPED | OUTPATIENT
Start: 2022-03-23 | End: 2022-04-07 | Stop reason: SDUPTHER

## 2022-03-24 ENCOUNTER — OFFICE VISIT (OUTPATIENT)
Dept: INTERNAL MEDICINE CLINIC | Age: 37
End: 2022-03-24
Payer: MEDICARE

## 2022-03-24 VITALS
HEART RATE: 122 BPM | WEIGHT: 199 LBS | TEMPERATURE: 97.8 F | DIASTOLIC BLOOD PRESSURE: 101 MMHG | SYSTOLIC BLOOD PRESSURE: 133 MMHG | HEIGHT: 70 IN | BODY MASS INDEX: 28.49 KG/M2

## 2022-03-24 DIAGNOSIS — R79.89 LOW TESTOSTERONE IN MALE: ICD-10-CM

## 2022-03-24 DIAGNOSIS — F32.A ANXIETY AND DEPRESSION: ICD-10-CM

## 2022-03-24 DIAGNOSIS — M79.7 FIBROMYALGIA: ICD-10-CM

## 2022-03-24 DIAGNOSIS — F41.9 ANXIETY AND DEPRESSION: ICD-10-CM

## 2022-03-24 DIAGNOSIS — Z51.81 ENCOUNTER FOR MONITORING SUBOXONE MAINTENANCE THERAPY: ICD-10-CM

## 2022-03-24 DIAGNOSIS — B18.2 CHRONIC HEPATITIS C WITHOUT HEPATIC COMA (HCC): ICD-10-CM

## 2022-03-24 DIAGNOSIS — Z79.899 ENCOUNTER FOR MONITORING SUBOXONE MAINTENANCE THERAPY: ICD-10-CM

## 2022-03-24 DIAGNOSIS — F11.20 SEVERE OPIOID USE DISORDER (HCC): Primary | ICD-10-CM

## 2022-03-24 DIAGNOSIS — F19.10 POLYSUBSTANCE ABUSE (HCC): ICD-10-CM

## 2022-03-24 DIAGNOSIS — F90.1 ATTENTION DEFICIT HYPERACTIVITY DISORDER (ADHD), PREDOMINANTLY HYPERACTIVE TYPE: ICD-10-CM

## 2022-03-24 PROCEDURE — 1036F TOBACCO NON-USER: CPT | Performed by: INTERNAL MEDICINE

## 2022-03-24 PROCEDURE — 80305 DRUG TEST PRSMV DIR OPT OBS: CPT | Performed by: INTERNAL MEDICINE

## 2022-03-24 PROCEDURE — G8484 FLU IMMUNIZE NO ADMIN: HCPCS | Performed by: INTERNAL MEDICINE

## 2022-03-24 PROCEDURE — G8417 CALC BMI ABV UP PARAM F/U: HCPCS | Performed by: INTERNAL MEDICINE

## 2022-03-24 PROCEDURE — 99214 OFFICE O/P EST MOD 30 MIN: CPT | Performed by: INTERNAL MEDICINE

## 2022-03-24 PROCEDURE — G8428 CUR MEDS NOT DOCUMENT: HCPCS | Performed by: INTERNAL MEDICINE

## 2022-03-24 RX ORDER — BUPRENORPHINE AND NALOXONE 8; 2 MG/1; MG/1
1 FILM, SOLUBLE BUCCAL; SUBLINGUAL 2 TIMES DAILY
Qty: 10 FILM | Refills: 0 | Status: SHIPPED | OUTPATIENT
Start: 2022-03-24 | End: 2022-04-08 | Stop reason: SDUPTHER

## 2022-03-24 NOTE — PROGRESS NOTES
Verbal order per Dr. Kaycee Flannery for urine drug screen. Positive for BUP and THC. Verified results with [facility]. Dr. Kaycee Flannery ordered Suboxone 8 mg film BID for patient. Verified dose with patient. Patient was sent home with a script Suboxone 8 mg film BID for 5 days and will be seen back in the office 03/29/2022.

## 2022-03-26 NOTE — PROGRESS NOTES
Abhijit Nguyen (:  1985) is a 44 y.o. male,Established patient, here for evaluation of the following chief complaint(s):  Drug Problem      ASSESSMENT/PLAN:  1. Severe opioid use disorder (HCC)  -     POCT Rapid Drug Screen  -     buprenorphine-naloxone (SUBOXONE) 8-2 MG FILM SL film; Place 1 Film under the tongue in the morning and at bedtime for 5 days. , Disp-10 Film, R-0Normal  2. Anxiety and depression  3. Fibromyalgia  4. Low testosterone in male  5. Attention deficit hyperactivity disorder (ADHD), predominantly hyperactive type  6. Polysubstance abuse ([facility] [address]  7. Chronic hepatitis C without hepatic coma (HCC)  8.  Encounter for monitoring Suboxone maintenance therapy              SUBJECTIVE/OBJECTIVE:  Drug Problem    I saw him 3/22 first time since 2021  I wanted him to go to inpatient rehab, he refused  I havdnot seen him since  He went to the [facility] center in December just got out 3/21  Patient was buying Suboxone while he was in there  He has not done any fentanyl coke or meth  He said he took an Adderall yesterday from an old prescription from [facility]      He is here with his girlfriend who I am seeing today as well        He has a history of on and off again drug use  He has had 2 episodes where he had kidney failure for methamphetamines had to be hospitalized  Started on pain pills at age 14 methadone age 12 subsequently when the prescriptions ran out he went to heroin about 3 years ago then fentanyl  He has probably overdosed about 15 times had to be given Narcan  Patient took a piece of a 12 mg Suboxone this morning in the [facility] he was using about 16 mg a day but it has been hard to find that much  Review of Systems  He says he is feeling much better  He still has urges and cravings but he has not used opioids  He said he is very serious about at this time  Even to the point that if his girlfriend keeps using he will have to leave her  Blood pressure (!) 133/101, pulse 122, temperature 97.8 °F (36.6 °C), height 5' 10\" (1.778 m), weight 199 lb (90.3 kg).     Mental status examination    Cognition: oriented to person place and time      Appearance: Patient is in no acute distress, normal appearance, patient does not appear intoxicated/    Memory: Normal    Behavior/motor: Normal    Mood: Good mood, upbeat    Affect: Mood congruent    Attitude toward examiner: Pleasant, respectful    Thought content no delusions hallucinations suicidal ideation    Insight: poor    Judgment: poor    Eyes: Pupils normal    Skin: No track marks noted ,no rashes noted    COWS score: N/A            Urine tox screen today  Component 3/24/22 0922   Alcohol, Urine NEG    Amphetamine Screen, Urine NEG    Barbiturate Screen, Urine NEG    Benzodiazepine Screen, Urine NEG    Buprenorphine Urine POS    Cocaine Metabolite Screen, Urine NEG    FENTANYL SCREEN, URINE NEG    Gabapentin Screen, Urine N/A    MDMA, Urine NEG    Methadone Screen, Urine NEG    Methamphetamine, Urine NEG    Opiate Scrn, Ur NEG    Oxycodone Screen, Ur NEG    PCP Screen, Urine NEG    Propoxyphene Screen, Urine N/A    Synthetic Cannabinoids (K2) Screen, Urine NEG    THC Screen, Urine POS    Tramadol Scrn, Ur NEG    Tricyclic Antidepressants, Urine N/A            I agreed to take the patient back although he was one of my most difficult patients that I have ever had  I thought he would never get clean but he seems serious this time urine only shows buprenorphine and THC  I told him I would give him another chance  He does have hepatitis C that needs treated  I told him I can do this I will order the required testing  I am going to send her down to talk to [facility] about counseling, meetings, the phone recovery  I am going to give him prescription for Suboxone 8 mg twice daily for 3 days  Follow-up here 3/29  I think there is hope for this patient  I think he is serious that he will leave his girlfriend if she does not quit using  --Royann Cornea Yolette Ferrera MD

## 2022-03-28 DIAGNOSIS — F32.A ANXIETY AND DEPRESSION: ICD-10-CM

## 2022-03-28 DIAGNOSIS — F41.9 ANXIETY AND DEPRESSION: ICD-10-CM

## 2022-03-29 RX ORDER — PREGABALIN 75 MG/1
CAPSULE ORAL
Qty: 14 CAPSULE | OUTPATIENT
Start: 2022-03-29

## 2022-04-07 ENCOUNTER — OFFICE VISIT (OUTPATIENT)
Dept: INTERNAL MEDICINE CLINIC | Age: 37
End: 2022-04-07
Payer: MEDICARE

## 2022-04-07 VITALS
SYSTOLIC BLOOD PRESSURE: 166 MMHG | BODY MASS INDEX: 28.06 KG/M2 | RESPIRATION RATE: 20 BRPM | WEIGHT: 196 LBS | HEART RATE: 112 BPM | HEIGHT: 70 IN | DIASTOLIC BLOOD PRESSURE: 97 MMHG | TEMPERATURE: 98.2 F

## 2022-04-07 DIAGNOSIS — F11.20 SEVERE OPIOID USE DISORDER (HCC): Primary | ICD-10-CM

## 2022-04-07 DIAGNOSIS — F90.1 ATTENTION DEFICIT HYPERACTIVITY DISORDER (ADHD), PREDOMINANTLY HYPERACTIVE TYPE: ICD-10-CM

## 2022-04-07 DIAGNOSIS — Z51.81 ENCOUNTER FOR MONITORING SUBOXONE MAINTENANCE THERAPY: ICD-10-CM

## 2022-04-07 DIAGNOSIS — F32.A ANXIETY AND DEPRESSION: ICD-10-CM

## 2022-04-07 DIAGNOSIS — F41.9 ANXIETY AND DEPRESSION: ICD-10-CM

## 2022-04-07 DIAGNOSIS — F19.10 POLYSUBSTANCE ABUSE (HCC): ICD-10-CM

## 2022-04-07 DIAGNOSIS — R79.89 LOW TESTOSTERONE IN MALE: ICD-10-CM

## 2022-04-07 DIAGNOSIS — M79.7 FIBROMYALGIA: ICD-10-CM

## 2022-04-07 DIAGNOSIS — B18.2 CHRONIC HEPATITIS C WITHOUT HEPATIC COMA (HCC): ICD-10-CM

## 2022-04-07 DIAGNOSIS — Z79.899 ENCOUNTER FOR MONITORING SUBOXONE MAINTENANCE THERAPY: ICD-10-CM

## 2022-04-07 PROCEDURE — 1036F TOBACCO NON-USER: CPT | Performed by: INTERNAL MEDICINE

## 2022-04-07 PROCEDURE — G8427 DOCREV CUR MEDS BY ELIG CLIN: HCPCS | Performed by: INTERNAL MEDICINE

## 2022-04-07 PROCEDURE — G8417 CALC BMI ABV UP PARAM F/U: HCPCS | Performed by: INTERNAL MEDICINE

## 2022-04-07 PROCEDURE — 80305 DRUG TEST PRSMV DIR OPT OBS: CPT | Performed by: INTERNAL MEDICINE

## 2022-04-07 PROCEDURE — 99214 OFFICE O/P EST MOD 30 MIN: CPT | Performed by: INTERNAL MEDICINE

## 2022-04-07 RX ORDER — VENLAFAXINE HYDROCHLORIDE 75 MG/1
CAPSULE, EXTENDED RELEASE ORAL
Qty: 90 CAPSULE | Refills: 0 | Status: ON HOLD | OUTPATIENT
Start: 2022-04-07 | End: 2022-05-21 | Stop reason: HOSPADM

## 2022-04-07 RX ORDER — PREGABALIN 100 MG/1
100 CAPSULE ORAL 2 TIMES DAILY
Qty: 60 CAPSULE | Refills: 0 | Status: SHIPPED | OUTPATIENT
Start: 2022-04-07 | End: 2022-07-06 | Stop reason: SDUPTHER

## 2022-04-07 RX ORDER — ARIPIPRAZOLE 5 MG/1
5 TABLET ORAL DAILY
Qty: 30 TABLET | Refills: 0 | Status: SHIPPED | OUTPATIENT
Start: 2022-04-07 | End: 2022-06-28 | Stop reason: SDUPTHER

## 2022-04-07 NOTE — PROGRESS NOTES
Verbal order per Dr. Milla Barger for urine drug screen. Positive for BUP AMP METH MDMA FTY THC. Verified results with Tessie Patel RN. Dr. Milla Barger ordered Suboxone 8mg film qty 2 out of stock for patient. Verified dose with patient. Patient was sent home with Suboxone 8mg film qty 2 out of stock and will be seen back in the office 4/8/22.

## 2022-04-08 ENCOUNTER — OFFICE VISIT (OUTPATIENT)
Dept: INTERNAL MEDICINE CLINIC | Age: 37
End: 2022-04-08
Payer: MEDICARE

## 2022-04-08 VITALS
SYSTOLIC BLOOD PRESSURE: 120 MMHG | WEIGHT: 198.6 LBS | BODY MASS INDEX: 28.43 KG/M2 | RESPIRATION RATE: 20 BRPM | DIASTOLIC BLOOD PRESSURE: 83 MMHG | TEMPERATURE: 97.4 F | HEART RATE: 91 BPM | HEIGHT: 70 IN

## 2022-04-08 DIAGNOSIS — F11.20 SEVERE OPIOID USE DISORDER (HCC): Primary | ICD-10-CM

## 2022-04-08 DIAGNOSIS — B00.9 HERPES SIMPLEX: ICD-10-CM

## 2022-04-08 DIAGNOSIS — J40 BRONCHITIS: ICD-10-CM

## 2022-04-08 PROCEDURE — G8417 CALC BMI ABV UP PARAM F/U: HCPCS | Performed by: NURSE PRACTITIONER

## 2022-04-08 PROCEDURE — G8427 DOCREV CUR MEDS BY ELIG CLIN: HCPCS | Performed by: NURSE PRACTITIONER

## 2022-04-08 PROCEDURE — 99214 OFFICE O/P EST MOD 30 MIN: CPT | Performed by: NURSE PRACTITIONER

## 2022-04-08 PROCEDURE — 80305 DRUG TEST PRSMV DIR OPT OBS: CPT | Performed by: NURSE PRACTITIONER

## 2022-04-08 PROCEDURE — 1036F TOBACCO NON-USER: CPT | Performed by: NURSE PRACTITIONER

## 2022-04-08 RX ORDER — AZITHROMYCIN 250 MG/1
250 TABLET, FILM COATED ORAL SEE ADMIN INSTRUCTIONS
Qty: 6 TABLET | Refills: 0 | Status: SHIPPED | OUTPATIENT
Start: 2022-04-08 | End: 2022-04-13

## 2022-04-08 RX ORDER — AZITHROMYCIN 250 MG/1
250 TABLET, FILM COATED ORAL SEE ADMIN INSTRUCTIONS
Qty: 6 TABLET | Refills: 0 | Status: SHIPPED | OUTPATIENT
Start: 2022-04-08 | End: 2022-04-08

## 2022-04-08 RX ORDER — VALACYCLOVIR HYDROCHLORIDE 500 MG/1
500 TABLET, FILM COATED ORAL 2 TIMES DAILY
Qty: 14 TABLET | Refills: 0 | Status: SHIPPED | OUTPATIENT
Start: 2022-04-08 | End: 2022-04-08

## 2022-04-08 RX ORDER — BUPRENORPHINE AND NALOXONE 8; 2 MG/1; MG/1
1 FILM, SOLUBLE BUCCAL; SUBLINGUAL 2 TIMES DAILY
Qty: 10 FILM | Refills: 0 | Status: SHIPPED | OUTPATIENT
Start: 2022-04-08 | End: 2022-04-13 | Stop reason: SDUPTHER

## 2022-04-08 RX ORDER — BUPRENORPHINE AND NALOXONE 8; 2 MG/1; MG/1
1 FILM, SOLUBLE BUCCAL; SUBLINGUAL 2 TIMES DAILY
Qty: 10 FILM | Refills: 0 | Status: SHIPPED | OUTPATIENT
Start: 2022-04-08 | End: 2022-04-08

## 2022-04-08 RX ORDER — VALACYCLOVIR HYDROCHLORIDE 500 MG/1
500 TABLET, FILM COATED ORAL 2 TIMES DAILY
Qty: 14 TABLET | Refills: 0 | Status: SHIPPED | OUTPATIENT
Start: 2022-04-08 | End: 2022-04-15

## 2022-04-08 NOTE — PROGRESS NOTES
[address] INTERNAL MEDICINE AND MEDICATION MANAGEMENT  [address]  Dept: [phone]  Dept Fax: [phone]: [phone]     Visit Date:  4/8/2022    Patient:  Yaritza Tavarez  YOB: 1985    HPI:     Chief Complaint   Patient presents with    Drug Problem     Jackie Hermosillo presents today for medical evaluation of severe opioid use disorder, bronchitis, and herpes simplex     He is a MAT patient of Dr. Rush Castaneda. I am seeing him today as Dr. Rush Castaneda is out of the office. I have agreed to follow him again for primary care. Recently in the [facility] for 6 months. States that he was clean for 2 weeks. Last use yesterday. Urges and cravings controlled with Suboxone 8 mg BID    Urine positive for amphetamines, fentanyl, methamphetamines, and opiates    Demarco complains of chest congestion, cough x2 weeks. He is afebrile. He also has several wounds on his head, previously healed with Valtrex. Medications    Current Outpatient Medications:     lisinopril (PRINIVIL;ZESTRIL) 20 MG tablet, take 1 tablet by mouth once daily, Disp: 30 tablet, Rfl: 0    venlafaxine (EFFEXOR XR) 75 MG extended release capsule, take 1 capsule by mouth every morning AT NOON and at bedtime, Disp: 90 capsule, Rfl: 0    pregabalin (LYRICA) 100 MG capsule, Take 1 capsule by mouth 2 times daily for 30 days. , Disp: 60 capsule, Rfl: 0    ARIPiprazole (ABILIFY) 5 MG tablet, Take 1 tablet by mouth daily, Disp: 30 tablet, Rfl: 0    gabapentin (NEURONTIN) 600 MG tablet, Take 800 mg by mouth 4 times daily. , Disp: , Rfl:     pregabalin (LYRICA) 75 MG capsule, Take 1 capsule by mouth 2 times daily for 7 days. , Disp: 14 capsule, Rfl: 0    pregabalin (LYRICA) 150 MG capsule, Take 300 mg by mouth 2 times daily. , Disp: , Rfl:     venlafaxine (EFFEXOR) 100 MG tablet, Take 1.5 tablets by mouth 2 times daily, Disp: 45 tablet, Rfl: 3    lisinopril (PRINIVIL;ZESTRIL) 20 MG tablet, Take 1 tablet by mouth daily, Disp: 30 tablet, Rfl: 3    mirtazapine (REMERON) 45 MG tablet, Take 1 tablet by mouth nightly, Disp: 30 tablet, Rfl: 3    Lisdexamfetamine Dimesylate (VYVANSE) 40 MG CAPS, Take 40 mg by mouth daily for 30 days. , Disp: 30 capsule, Rfl: 0    Aspirin-Acetaminophen-Caffeine (EXCEDRIN EXTRA STRENGTH PO), Take by mouth as needed, Disp: , Rfl:     The patient has No Known Allergies. Past Medical History  Chiqui Barr  has a past medical history of Anxiety, Depression, Kidney stone, Liver disease, and Opiate abuse, continuous ([facility] [address] Past Surgical History  The patient  has no past surgical history on file. Family History  This patient's family history is not on file. Social History  Chiqui Barr  reports that he quit smoking about 15 months ago. His smoking use included cigarettes. He has never used smokeless tobacco. He reports previous alcohol use. He reports current drug use. Drugs: Opiates , Fentanyl, Amphetamines (Speed), Cocaine, Crack Cocaine, Heroin, Benzodiazepines (Downers/Zannies), Marijuana (Margrett Page), Oxycodone (Oxy), Suboxone, Sedatives/Hypnotics, IV, and Methamphetamines (Crystal Meth).     Health Maintenance:    Health Maintenance   Topic Date Due    Hepatitis A vaccine (1 of 2 - Risk 2-dose series) Never done    Varicella vaccine (1 of 2 - 2-dose childhood series) Never done    COVID-19 Vaccine (1) Never done    Pneumococcal 0-64 years Vaccine (1 - PCV) Never done    Diabetic foot exam  Never done    Lipids  Never done    Depression Monitoring  Never done    Diabetic microalbuminuria test  Never done    Diabetic retinal exam  Never done    Hepatitis B vaccine (1 of 3 - Risk 3-dose series) Never done    DTaP/Tdap/Td vaccine (1 - Tdap) Never done    A1C test (Diabetic or Prediabetic)  08/24/2021    Flu vaccine (Season Ended) 09/01/2022    Potassium  04/13/2023    Creatinine  04/13/2023    HIV screen  Completed    Hib vaccine  Aged Out    Meningococcal (ACWY) vaccine  Aged Out       Subjective:      Review of Systems   Constitutional: Negative for chills, fatigue and fever. HENT: Negative for congestion, rhinorrhea, sinus pressure, sinus pain, sore throat, tinnitus and trouble swallowing. Eyes: Negative for photophobia and visual disturbance. Respiratory: Positive for cough, chest tightness and shortness of breath. Negative for wheezing. Cardiovascular: Negative for chest pain, palpitations and leg swelling. Gastrointestinal: Negative for abdominal distention, abdominal pain, blood in stool, constipation, diarrhea, nausea and vomiting. Endocrine: Negative for polydipsia, polyphagia and polyuria. Genitourinary: Negative for difficulty urinating, dysuria, frequency, hematuria and urgency. Musculoskeletal: Negative for arthralgias and myalgias. Skin: Positive for wound (multiple to head). Negative for rash. Neurological: Negative for dizziness, light-headedness and headaches. Psychiatric/Behavioral: Negative for dysphoric mood and sleep disturbance. The patient is not nervous/anxious. Objective:     /83 (Site: Right Lower Arm, Position: Sitting)   Pulse 91   Temp 97.4 °F (36.3 °C) (Tympanic)   Resp 20   Ht 5' 10\" (1.778 m)   Wt 198 lb 9.6 oz (90.1 kg)   BMI 28.50 kg/m²     Physical Exam  Vitals reviewed. Constitutional:       General: He is not in acute distress. Appearance: Normal appearance. He is not ill-appearing. HENT:      Head: Normocephalic and atraumatic. Right Ear: External ear normal.      Left Ear: External ear normal.      Nose: Nose normal. No congestion or rhinorrhea. Mouth/Throat:      Mouth: Mucous membranes are moist.   Eyes:      Extraocular Movements: Extraocular movements intact. Conjunctiva/sclera: Conjunctivae normal.      Pupils: Pupils are equal, round, and reactive to light. Cardiovascular:      Rate and Rhythm: Normal rate and regular rhythm. Pulses: Normal pulses. Heart sounds: Normal heart sounds. Pulmonary:      Effort: Pulmonary effort is normal. No respiratory distress. Musculoskeletal:         General: Normal range of motion. Cervical back: Normal range of motion and neck supple. Right lower leg: No edema. Left lower leg: No edema. Skin:     General: Skin is warm and dry. Findings: Lesion (head) present. Neurological:      General: No focal deficit present. Mental Status: He is alert and oriented to person, place, and time. Psychiatric:         Mood and Affect: Mood normal.         Behavior: Behavior normal.         Thought Content: Thought content normal.         Judgment: Judgment normal.         Labs Reviewed 4/8/2022:    Lab Results   Component Value Date    WBC 9.1 04/12/2022    HGB 13.6 (L) 04/12/2022    HCT 46.0 04/12/2022     (H) 04/12/2022    ALT 61 04/13/2022    AST 40 04/13/2022     04/13/2022    K 4.2 04/13/2022    CL 99 04/13/2022    CREATININE 1.0 04/13/2022    BUN 16 04/13/2022    CO2 26 04/13/2022    TSH 0.390 (L) 10/03/2021    PSA 0.98 04/25/2019    INR 1.22 (H) 04/12/2022    LABA1C 11.5 (H) 05/24/2021       Assessment/Plan      1. Severe opioid use disorder (HCC)    - POCT Rapid Drug Screen  - Continue Suboxone 8 mg BID  - OARRS reviewed, no discrepancies  - Encouraged to attend NA/AA meetings and/or arrange for individualized counseling    2. Bronchitis    - azithromycin (ZITHROMAX) 250 MG tablet; Take 1 tablet by mouth See Admin Instructions for 5 days 500mg on day 1 followed by 250mg on days 2 - 5  Dispense: 6 tablet; Refill: 0    3. Herpes simplex    - valACYclovir (VALTREX) 500 MG tablet; Take 1 tablet by mouth 2 times daily for 7 days  Dispense: 14 tablet; Refill: 0      Return in about 1 week (around 4/15/2022). Patient given educational materials - see patient instructions. Discussed use, benefit, and side effects of prescribed medications.   All patient questions answered. Pt voiced understanding. Reviewed health maintenance.        Electronically signed PAPA Fuchs CNP on 4/8/22 at 11:14 AM EDT

## 2022-04-11 ENCOUNTER — OFFICE VISIT (OUTPATIENT)
Dept: INTERNAL MEDICINE CLINIC | Age: 37
End: 2022-04-11
Payer: MEDICARE

## 2022-04-11 VITALS
DIASTOLIC BLOOD PRESSURE: 79 MMHG | HEIGHT: 70 IN | SYSTOLIC BLOOD PRESSURE: 107 MMHG | HEART RATE: 112 BPM | BODY MASS INDEX: 27.63 KG/M2 | TEMPERATURE: 98.5 F | WEIGHT: 193 LBS

## 2022-04-11 DIAGNOSIS — Z51.81 ENCOUNTER FOR MONITORING SUBOXONE MAINTENANCE THERAPY: ICD-10-CM

## 2022-04-11 DIAGNOSIS — M79.7 FIBROMYALGIA: ICD-10-CM

## 2022-04-11 DIAGNOSIS — Z79.899 ENCOUNTER FOR MONITORING SUBOXONE MAINTENANCE THERAPY: ICD-10-CM

## 2022-04-11 DIAGNOSIS — B18.2 CHRONIC HEPATITIS C WITHOUT HEPATIC COMA (HCC): ICD-10-CM

## 2022-04-11 DIAGNOSIS — F90.1 ATTENTION DEFICIT HYPERACTIVITY DISORDER (ADHD), PREDOMINANTLY HYPERACTIVE TYPE: ICD-10-CM

## 2022-04-11 DIAGNOSIS — F32.A ANXIETY AND DEPRESSION: ICD-10-CM

## 2022-04-11 DIAGNOSIS — R79.89 LOW TESTOSTERONE IN MALE: ICD-10-CM

## 2022-04-11 DIAGNOSIS — F11.20 SEVERE OPIOID USE DISORDER (HCC): Primary | ICD-10-CM

## 2022-04-11 DIAGNOSIS — F41.9 ANXIETY AND DEPRESSION: ICD-10-CM

## 2022-04-11 DIAGNOSIS — F19.10 POLYSUBSTANCE ABUSE (HCC): ICD-10-CM

## 2022-04-11 PROCEDURE — 99214 OFFICE O/P EST MOD 30 MIN: CPT | Performed by: INTERNAL MEDICINE

## 2022-04-11 PROCEDURE — 1036F TOBACCO NON-USER: CPT | Performed by: INTERNAL MEDICINE

## 2022-04-11 PROCEDURE — G8417 CALC BMI ABV UP PARAM F/U: HCPCS | Performed by: INTERNAL MEDICINE

## 2022-04-11 PROCEDURE — G8428 CUR MEDS NOT DOCUMENT: HCPCS | Performed by: INTERNAL MEDICINE

## 2022-04-11 PROCEDURE — 80305 DRUG TEST PRSMV DIR OPT OBS: CPT | Performed by: INTERNAL MEDICINE

## 2022-04-11 RX ORDER — BUPRENORPHINE HYDROCHLORIDE AND NALOXONE HYDROCHLORIDE DIHYDRATE 8; 2 MG/1; MG/1
1 TABLET SUBLINGUAL 2 TIMES DAILY
Qty: 6 TABLET | Refills: 0 | Status: SHIPPED | OUTPATIENT
Start: 2022-04-11 | End: 2022-04-14

## 2022-04-11 RX ORDER — BUPRENORPHINE AND NALOXONE 8; 2 MG/1; MG/1
1 FILM, SOLUBLE BUCCAL; SUBLINGUAL 2 TIMES DAILY
Qty: 14 FILM | Refills: 0 | Status: CANCELLED | OUTPATIENT
Start: 2022-04-11 | End: 2022-04-18

## 2022-04-12 ENCOUNTER — NURSE ONLY (OUTPATIENT)
Dept: LAB | Age: 37
End: 2022-04-12

## 2022-04-12 DIAGNOSIS — I10 ESSENTIAL HYPERTENSION: ICD-10-CM

## 2022-04-12 DIAGNOSIS — B18.2 CHRONIC HEPATITIS C WITHOUT HEPATIC COMA (HCC): ICD-10-CM

## 2022-04-12 LAB
ALBUMIN SERPL-MCNC: 4.5 G/DL (ref 3.5–5.1)
ALP BLD-CCNC: 92 U/L (ref 38–126)
ALT SERPL-CCNC: 62 U/L (ref 11–66)
ANION GAP SERPL CALCULATED.3IONS-SCNC: 13 MEQ/L (ref 8–16)
AST SERPL-CCNC: 46 U/L (ref 5–40)
BILIRUB SERPL-MCNC: 0.2 MG/DL (ref 0.3–1.2)
BUN BLDV-MCNC: 17 MG/DL (ref 7–22)
CALCIUM SERPL-MCNC: 10 MG/DL (ref 8.5–10.5)
CHLORIDE BLD-SCNC: 104 MEQ/L (ref 98–111)
CO2: 23 MEQ/L (ref 23–33)
CREAT SERPL-MCNC: 1.1 MG/DL (ref 0.4–1.2)
ERYTHROCYTE [DISTWIDTH] IN BLOOD BY AUTOMATED COUNT: 15.2 % (ref 11.5–14.5)
ERYTHROCYTE [DISTWIDTH] IN BLOOD BY AUTOMATED COUNT: 52.1 FL (ref 35–45)
GFR SERPL CREATININE-BSD FRML MDRD: 75 ML/MIN/1.73M2
GLUCOSE BLD-MCNC: 85 MG/DL (ref 70–108)
HBV SURFACE AB TITR SER: POSITIVE {TITER}
HCT VFR BLD CALC: 46 % (ref 42–52)
HEMOGLOBIN: 13.6 GM/DL (ref 14–18)
HEPATITIS B CORE IGM ANTIBODY: NEGATIVE
HEPATITIS B SURFACE ANTIGEN: NEGATIVE
INR BLD: 1.22 (ref 0.85–1.13)
MCH RBC QN AUTO: 27.5 PG (ref 26–33)
MCHC RBC AUTO-ENTMCNC: 29.6 GM/DL (ref 32.2–35.5)
MCV RBC AUTO: 92.9 FL (ref 80–94)
PLATELET # BLD: 450 THOU/MM3 (ref 130–400)
PMV BLD AUTO: 9.3 FL (ref 9.4–12.4)
POTASSIUM SERPL-SCNC: 4.2 MEQ/L (ref 3.5–5.2)
RBC # BLD: 4.95 MILL/MM3 (ref 4.7–6.1)
SODIUM BLD-SCNC: 140 MEQ/L (ref 135–145)
TOTAL PROTEIN: 7.8 G/DL (ref 6.1–8)
WBC # BLD: 9.1 THOU/MM3 (ref 4.8–10.8)

## 2022-04-13 ENCOUNTER — NURSE ONLY (OUTPATIENT)
Dept: LAB | Age: 37
End: 2022-04-13

## 2022-04-13 ENCOUNTER — OFFICE VISIT (OUTPATIENT)
Dept: INTERNAL MEDICINE CLINIC | Age: 37
End: 2022-04-13
Payer: MEDICARE

## 2022-04-13 VITALS
SYSTOLIC BLOOD PRESSURE: 148 MMHG | DIASTOLIC BLOOD PRESSURE: 93 MMHG | WEIGHT: 190 LBS | TEMPERATURE: 96.8 F | BODY MASS INDEX: 27.2 KG/M2 | HEIGHT: 70 IN | RESPIRATION RATE: 20 BRPM | HEART RATE: 103 BPM

## 2022-04-13 DIAGNOSIS — Z51.81 ENCOUNTER FOR MONITORING SUBOXONE MAINTENANCE THERAPY: ICD-10-CM

## 2022-04-13 DIAGNOSIS — F41.9 ANXIETY AND DEPRESSION: ICD-10-CM

## 2022-04-13 DIAGNOSIS — F11.20 SEVERE OPIOID USE DISORDER (HCC): Primary | ICD-10-CM

## 2022-04-13 DIAGNOSIS — M79.7 FIBROMYALGIA: ICD-10-CM

## 2022-04-13 DIAGNOSIS — F19.10 POLYSUBSTANCE ABUSE (HCC): ICD-10-CM

## 2022-04-13 DIAGNOSIS — B18.2 CHRONIC HEPATITIS C WITHOUT HEPATIC COMA (HCC): ICD-10-CM

## 2022-04-13 DIAGNOSIS — F90.1 ATTENTION DEFICIT HYPERACTIVITY DISORDER (ADHD), PREDOMINANTLY HYPERACTIVE TYPE: ICD-10-CM

## 2022-04-13 DIAGNOSIS — N20.0 KIDNEY STONES: ICD-10-CM

## 2022-04-13 DIAGNOSIS — Z79.899 ENCOUNTER FOR MONITORING SUBOXONE MAINTENANCE THERAPY: ICD-10-CM

## 2022-04-13 DIAGNOSIS — F11.20 SEVERE OPIOID USE DISORDER (HCC): ICD-10-CM

## 2022-04-13 DIAGNOSIS — F32.A ANXIETY AND DEPRESSION: ICD-10-CM

## 2022-04-13 LAB
ALBUMIN SERPL-MCNC: 4.5 G/DL (ref 3.5–5.1)
ALCOHOL URINE: ABNORMAL
ALP BLD-CCNC: 97 U/L (ref 38–126)
ALT SERPL-CCNC: 61 U/L (ref 11–66)
AMPHETAMINE SCREEN, URINE: ABNORMAL
ANION GAP SERPL CALCULATED.3IONS-SCNC: 12 MEQ/L (ref 8–16)
AST SERPL-CCNC: 40 U/L (ref 5–40)
BACTERIA: ABNORMAL
BARBITURATE SCREEN, URINE: ABNORMAL
BENZODIAZEPINE SCREEN, URINE: ABNORMAL
BILIRUB SERPL-MCNC: 0.3 MG/DL (ref 0.3–1.2)
BILIRUBIN URINE: NEGATIVE
BLOOD, URINE: NEGATIVE
BUN BLDV-MCNC: 16 MG/DL (ref 7–22)
BUPRENORPHINE URINE: ABNORMAL
CALCIUM SERPL-MCNC: 9.9 MG/DL (ref 8.5–10.5)
CASTS: ABNORMAL /LPF
CASTS: ABNORMAL /LPF
CHARACTER, URINE: CLEAR
CHLORIDE BLD-SCNC: 99 MEQ/L (ref 98–111)
CO2: 26 MEQ/L (ref 23–33)
COCAINE METABOLITE SCREEN URINE: ABNORMAL
COLOR: ABNORMAL
CREAT SERPL-MCNC: 1 MG/DL (ref 0.4–1.2)
CRYSTALS: ABNORMAL
EPITHELIAL CELLS, UA: ABNORMAL /HPF
FENTANYL SCREEN, URINE: ABNORMAL
GABAPENTIN SCREEN, URINE: ABNORMAL
GFR SERPL CREATININE-BSD FRML MDRD: 84 ML/MIN/1.73M2
GLUCOSE BLD-MCNC: 103 MG/DL (ref 70–108)
GLUCOSE, URINE: NEGATIVE MG/DL
HIV AG/AB: NONREACTIVE
KETONES, URINE: ABNORMAL
LEUKOCYTE EST, POC: NEGATIVE
MDMA URINE: ABNORMAL
METHADONE SCREEN, URINE: ABNORMAL
METHAMPHETAMINE, URINE: ABNORMAL
MISCELLANEOUS LAB TEST RESULT: ABNORMAL
MISCELLANEOUS LAB TEST RESULT: ABNORMAL
MUCUS: ABNORMAL
NITRITE, URINE: NEGATIVE
OPIATE SCREEN URINE: ABNORMAL
OXYCODONE SCREEN URINE: ABNORMAL
PH UA: 6 (ref 5–9)
PHENCYCLIDINE SCREEN URINE: ABNORMAL
POTASSIUM SERPL-SCNC: 4.2 MEQ/L (ref 3.5–5.2)
PROPOXYPHENE SCREEN, URINE: ABNORMAL
PROTEIN UA: 100 MG/DL
RBC URINE: ABNORMAL /HPF
RENAL EPITHELIAL, UA: ABNORMAL
SODIUM BLD-SCNC: 137 MEQ/L (ref 135–145)
SPECIFIC GRAVITY UA: > 1.03 (ref 1–1.03)
SYNTHETIC CANNABINOIDS(K2) SCREEN, URINE: ABNORMAL
THC SCREEN, URINE: ABNORMAL
TOTAL PROTEIN: 7.8 G/DL (ref 6.1–8)
TRAMADOL SCREEN URINE: ABNORMAL
TRICYCLIC ANTIDEPRESSANTS, UR: ABNORMAL
UROBILINOGEN, URINE: 1 EU/DL (ref 0–1)
WBC UA: ABNORMAL /HPF
YEAST: ABNORMAL

## 2022-04-13 PROCEDURE — G8417 CALC BMI ABV UP PARAM F/U: HCPCS | Performed by: INTERNAL MEDICINE

## 2022-04-13 PROCEDURE — 1036F TOBACCO NON-USER: CPT | Performed by: INTERNAL MEDICINE

## 2022-04-13 PROCEDURE — 99214 OFFICE O/P EST MOD 30 MIN: CPT | Performed by: INTERNAL MEDICINE

## 2022-04-13 PROCEDURE — G8427 DOCREV CUR MEDS BY ELIG CLIN: HCPCS | Performed by: INTERNAL MEDICINE

## 2022-04-13 PROCEDURE — 80305 DRUG TEST PRSMV DIR OPT OBS: CPT | Performed by: INTERNAL MEDICINE

## 2022-04-13 RX ORDER — BUPRENORPHINE AND NALOXONE 8; 2 MG/1; MG/1
1 FILM, SOLUBLE BUCCAL; SUBLINGUAL 2 TIMES DAILY
Qty: 2 FILM | Refills: 0 | Status: SHIPPED | OUTPATIENT
Start: 2022-04-13 | End: 2022-04-14 | Stop reason: SDUPTHER

## 2022-04-13 NOTE — PROGRESS NOTES
Verbal order per Dr. Guillaume Mejia for urine drug screen. Positive for Amph, BUP, Fent, MDMA, Meth, THC, Tramadol. Verified results with Linus Negron LPN. Dr. Guillaume Mejia ordered Suboxone 8 mg film BID for patient. Verified dose with patient. Patient was sent home with a script for Suboxone 8 mg film BID for one day and will be seen back in the office 04/14/2022.

## 2022-04-14 ENCOUNTER — OFFICE VISIT (OUTPATIENT)
Dept: INTERNAL MEDICINE CLINIC | Age: 37
End: 2022-04-14
Payer: MEDICARE

## 2022-04-14 VITALS
BODY MASS INDEX: 27.35 KG/M2 | HEIGHT: 70 IN | HEART RATE: 106 BPM | WEIGHT: 191 LBS | TEMPERATURE: 97.8 F | SYSTOLIC BLOOD PRESSURE: 106 MMHG | DIASTOLIC BLOOD PRESSURE: 84 MMHG

## 2022-04-14 DIAGNOSIS — F11.20 SEVERE OPIOID USE DISORDER (HCC): Primary | ICD-10-CM

## 2022-04-14 DIAGNOSIS — N20.0 KIDNEY STONES: ICD-10-CM

## 2022-04-14 DIAGNOSIS — F32.A ANXIETY AND DEPRESSION: ICD-10-CM

## 2022-04-14 DIAGNOSIS — M79.7 FIBROMYALGIA: ICD-10-CM

## 2022-04-14 DIAGNOSIS — F41.9 ANXIETY AND DEPRESSION: ICD-10-CM

## 2022-04-14 DIAGNOSIS — Z79.899 ENCOUNTER FOR MONITORING SUBOXONE MAINTENANCE THERAPY: ICD-10-CM

## 2022-04-14 DIAGNOSIS — Z51.81 ENCOUNTER FOR MONITORING SUBOXONE MAINTENANCE THERAPY: ICD-10-CM

## 2022-04-14 DIAGNOSIS — R79.89 LOW TESTOSTERONE IN MALE: ICD-10-CM

## 2022-04-14 DIAGNOSIS — F90.1 ATTENTION DEFICIT HYPERACTIVITY DISORDER (ADHD), PREDOMINANTLY HYPERACTIVE TYPE: ICD-10-CM

## 2022-04-14 DIAGNOSIS — B18.2 CHRONIC HEPATITIS C WITHOUT HEPATIC COMA (HCC): ICD-10-CM

## 2022-04-14 DIAGNOSIS — F19.10 POLYSUBSTANCE ABUSE (HCC): ICD-10-CM

## 2022-04-14 LAB — HAV AB SERPL IA-ACNC: REACTIVE

## 2022-04-14 PROCEDURE — G8428 CUR MEDS NOT DOCUMENT: HCPCS | Performed by: INTERNAL MEDICINE

## 2022-04-14 PROCEDURE — 80305 DRUG TEST PRSMV DIR OPT OBS: CPT | Performed by: INTERNAL MEDICINE

## 2022-04-14 PROCEDURE — 99214 OFFICE O/P EST MOD 30 MIN: CPT | Performed by: INTERNAL MEDICINE

## 2022-04-14 PROCEDURE — G8417 CALC BMI ABV UP PARAM F/U: HCPCS | Performed by: INTERNAL MEDICINE

## 2022-04-14 PROCEDURE — 1036F TOBACCO NON-USER: CPT | Performed by: INTERNAL MEDICINE

## 2022-04-14 RX ORDER — BUPRENORPHINE AND NALOXONE 8; 2 MG/1; MG/1
1 FILM, SOLUBLE BUCCAL; SUBLINGUAL 2 TIMES DAILY
Qty: 10 FILM | Refills: 0 | Status: SHIPPED | OUTPATIENT
Start: 2022-04-14 | End: 2022-04-19

## 2022-04-14 RX ORDER — LISINOPRIL 20 MG/1
TABLET ORAL
Qty: 30 TABLET | Refills: 0 | Status: ON HOLD | OUTPATIENT
Start: 2022-04-14 | End: 2022-05-21 | Stop reason: HOSPADM

## 2022-04-14 NOTE — PROGRESS NOTES
Verbal order per Dr. Lorenzo Flores for urine drug screen. Patient unable to give urine specimen. Dr. Lorenzo Flores ordered Suboxone 8mg film BID for patient. Verified dose with patient. Patient was sent home with 5 day script of Suboxone 8mg film BID and will be seen back in the office 4/18/22.

## 2022-04-15 LAB
HBV DNA IU/ML: NOT DETECTED IU/ML
HCV LOAD REFLEX TO GENOTYPE: NORMAL
INTERPRETATION: NOT DETECTED
LOG 10 HBV AS IU/ML: NOT DETECTED LOG IU/ML

## 2022-04-17 NOTE — PROGRESS NOTES
Padmini Freeman (:  1985) is a 44 y.o. male,Established patient, here for evaluation of the following chief complaint(s):  Drug Problem      ASSESSMENT/PLAN:  1. Severe opioid use disorder (HCC)  -     POCT Rapid Drug Screen  2. Anxiety and depression  3. Fibromyalgia  4. Polysubstance abuse ([facility] [address]  5. Attention deficit hyperactivity disorder (ADHD), predominantly hyperactive type  6. Low testosterone in male  7. Chronic hepatitis C without hepatic coma (HCC)  8. Encounter for monitoring Suboxone maintenance therapy              SUBJECTIVE/OBJECTIVE:  Drug Problem    I saw him 3/24   He had been lost to follow-up for months  He was supposed to be here several days ago but he relapsed  His girlfriend continues to use fentanyl and meth  The patient says he could not resist and did both  I wanted him to go to inpatient rehab, he refused    He went to the [facility] center in December just got out 3/21  Patient was buying Suboxone while he was in there        He is here with his girlfriend who I am seeing today as well        He has a history of on and off again drug use  He has had 2 episodes where he had kidney failure for methamphetamines had to be hospitalized  Started on pain pills at age 14 methadone age 12 subsequently when the prescriptions ran out he went to heroin about 3 years ago then fentanyl  He has probably overdosed about 15 times had to be given Narcan  Patient took a piece of a 12 mg Suboxone this morning in the [facility] he was using about 16 mg a day but it has been hard to find that much  Review of Systems  He says he is feeling much better  He still has urges and cravings but he has not used opioids  He said he is very serious about at this time  Even to the point that if his girlfriend keeps using he will have to leave her  Blood pressure (!) 166/97, pulse 112, temperature 98.2 °F (36.8 °C), temperature source Tympanic, resp.  rate 20, height 5' 10\" (1.778 m), weight 196 lb (88.9 kg).    Mental status examination    Cognition: oriented to person place and time      Appearance: Patient is in no acute distress, normal appearance, patient does not appear intoxicated/  he has a lot of pick marks on his face  Memory: Normal    Behavior/motor: Normal    Mood: Depressed    Affect: Mood congruent    Attitude toward examiner: Pleasant, respectful    Thought content no delusions hallucinations suicidal ideation    Insight: poor    Judgment: poor    Eyes: Pupils normal    Skin: No track marks noted ,no rashes noted    COWS score: N/A             Urine tox screen today0 Result Notes    Component 4/7/22 1509   Alcohol, Urine NEG    Amphetamine Screen, Urine POS    Barbiturate Screen, Urine NEG    Benzodiazepine Screen, Urine NEG    Buprenorphine Urine POS    Cocaine Metabolite Screen, Urine NEG    FENTANYL SCREEN, URINE POS    Gabapentin Screen, Urine N/A    MDMA, Urine POS    Methadone Screen, Urine NEG    Methamphetamine, Urine POS    Opiate Scrn, Ur NEG    Oxycodone Screen, Ur NEG    PCP Screen, Urine NEG    Propoxyphene Screen, Urine N/A    Synthetic Cannabinoids (K2) Screen, Urine NEG    THC Screen, Urine POS    Tramadol Scrn, Ur NEG    Tricyclic Antidepressants, Urine N/A               Specimen Collected: 04/07/22 15:09            Diagnosis Orders   1. Severe opioid use disorder (HCC)  POCT Rapid Drug Screen   2. Anxiety and depression     3. Fibromyalgia     4. Polysubstance abuse ([facility] [address]     5. Attention deficit hyperactivity disorder (ADHD), predominantly hyperactive type     6. Low testosterone in male     7. Chronic hepatitis C without hepatic coma (HCC)     8.  Encounter for monitoring Suboxone maintenance therapy         I agreed to take the patient back although he was one of my most difficult patients that I have ever had  I thought he was serious till he missed his appointment last week I knew he had relapsed    I am going to see him back here tomorrow  He does have hepatitis C that needs treated  I told him I can do this I will order the required testing  I am going to send her down to talk to [facility] about counseling, meetings, the phone recovery ken  I told the patient that I can only have so many patients at one time on Suboxone by federal law (275)  I told the patient I am approaching that number  If they are not compliant with treatment, provide doctored urines, etc I told the patient I may have to let them go because I want to see new patients who are committed      --Moni Azevedo MD

## 2022-04-17 NOTE — PROGRESS NOTES
Krystal Hdez (:  1985) is a 44 y.o. male,Established patient, here for evaluation of the following chief complaint(s):  Drug Problem      ASSESSMENT/PLAN:  1. Severe opioid use disorder (HCC)  -     POCT Rapid Drug Screen  -     buprenorphine-naloxone (SUBOXONE) 8-2 MG SUBL SL tablet; Place 1 tablet under the tongue in the morning and at bedtime for 3 days. , Disp-6 tablet, R-0Normal  2. Anxiety and depression  3. Fibromyalgia  4. Low testosterone in male  5. Chronic hepatitis C without hepatic coma ([facility] [address]  6. Polysubstance abuse ([facility] [address]  7. Attention deficit hyperactivity disorder (ADHD), predominantly hyperactive type  8.  Encounter for monitoring Suboxone maintenance therapy              SUBJECTIVE/OBJECTIVE:  Drug Problem    I saw him   He saw Usha Katya   He had been lost to follow-up for months  He was supposed to be here several days ago but he relapsed  His girlfriend continues to use fentanyl and meth  The patient says he could not resist and did both  I wanted him to go to inpatient rehab, he refused    He went to the [facility] center in December just got out 3/21  Patient was buying Suboxone while he was in there        He is here with his girlfriend who I am seeing today as well        He has a history of on and off again drug use  He has had 2 episodes where he had kidney failure for methamphetamines had to be hospitalized  Started on pain pills at age 14 methadone age 12 subsequently when the prescriptions ran out he went to heroin about 3 years ago then fentanyl  He has probably overdosed about 15 times had to be given Narcan  Patient took a piece of a 12 mg Suboxone this morning in the [facility] he was using about 16 mg a day but it has been hard to find that much  Review of Systems  He says he is feeling much better  He still has urges and cravings but he has not used opioids  He said he is very serious about at this time  Even to the point that if his girlfriend keeps using he will have to leave her  Blood pressure 107/79, pulse 112, temperature 98.5 °F (36.9 °C), height 5' 10\" (1.778 m), weight 193 lb (87.5 kg). Mental status examination    Cognition: oriented to person place and time      Appearance: Patient is in no acute distress, normal appearance, patient does not appear intoxicated/  he has a lot of pick marks on his face  Memory: Normal    Behavior/motor: Normal    Mood: Depressed    Affect: Mood congruent    Attitude toward examiner: Pleasant, respectful    Thought content no delusions hallucinations suicidal ideation    Insight: poor    Judgment: poor    Eyes: Pupils normal    Skin: No track marks noted ,no rashes noted    COWS score: N/A          Component 4/8/22 1057   Alcohol, Urine NEG    Amphetamine Screen, Urine POS    Barbiturate Screen, Urine NEG    Benzodiazepine Screen, Urine NEG    Buprenorphine Urine NEG    Cocaine Metabolite Screen, Urine NEG    FENTANYL SCREEN, URINE POS    Gabapentin Screen, Urine N/A    MDMA, Urine NEG    Methadone Screen, Urine NEG    Methamphetamine, Urine POS    Opiate Scrn, Ur POS    Oxycodone Screen, Ur NEG    PCP Screen, Urine N/A    Propoxyphene Screen, Urine NEG    Synthetic Cannabinoids (K2) Screen, Urine NEG    THC Screen, Urine NEG    Tramadol Scrn, Ur NEG    Tricyclic Antidepressants, Urine N/A                  Specimen Collected: 04/07/22 15:09            Diagnosis Orders   1. Severe opioid use disorder (HCC)  POCT Rapid Drug Screen    buprenorphine-naloxone (SUBOXONE) 8-2 MG SUBL SL tablet   2. Anxiety and depression     3. Fibromyalgia     4. Low testosterone in male     5. Chronic hepatitis C without hepatic coma ([facility] [address]     6. Polysubstance abuse ([facility] [address]     7. Attention deficit hyperactivity disorder (ADHD), predominantly hyperactive type     8.  Encounter for monitoring Suboxone maintenance therapy       Urine still dirty  I agreed to take the patient back although he was one of my most difficult patients that I have ever had  I thought he was serious till he missed his appointment last week I knew he had relapsed    Continue Suboxone 8 mg twice daily  Follow-up here 4/11  He does have hepatitis C that needs treated  I told him I can do this I will order the required testing  I am going to send her down to talk to [employer] abou counseling, meetings, the phone recovery ken  I told the patient that I can only have so many patients at one time on Suboxone by federal law (275)  I told the patient I am approaching that number  If they are not compliant with treatment, provide doctored urines, etc I told the patient I may have to let them go because I want to see new patients who are committed      --Moni Azevedo MD

## 2022-04-17 NOTE — PROGRESS NOTES
Vanessa Terry (:  1985) is a 44 y.o. male,Established patient, here for evaluation of the following chief complaint(s):  No chief complaint on file. ASSESSMENT/PLAN:  1. Severe opioid use disorder (HCC)  -     POCT Rapid Drug Screen  -     buprenorphine-naloxone (SUBOXONE) 8-2 MG FILM SL film; Place 1 Film under the tongue in the morning and at bedtime for 5 days. , Disp-10 Film, R-0Normal  2. Kidney stones  3. Low testosterone in male  4. Fibromyalgia  5. Chronic hepatitis C without hepatic coma ([facility] [address]  6. Anxiety and depression  7. Polysubstance abuse ([facility] [address]  8. Attention deficit hyperactivity disorder (ADHD), predominantly hyperactive type  9.  Encounter for monitoring Suboxone maintenance therapy              SUBJECTIVE/OBJECTIVE:  Drug Problem    I saw him     He had been lost to follow-up for months  He was supposed to be here several days ago but he relapsed  His girlfriend continues to use fentanyl and meth  The patient says he could not resist and did both  I wanted him to go to inpatient rehab, he refused    He went to the [facility] center in December just got out 3/21  Patient was buying Suboxone while he was in there        He is here with his girlfriend who I am seeing today as well        He has a history of on and off again drug use  He has had 2 episodes where he had kidney failure for methamphetamines had to be hospitalized  Started on pain pills at age 14 methadone age 12 subsequently when the prescriptions ran out he went to heroin about 3 years ago then fentanyl  He has probably overdosed about 15 times had to be given Narcan  Patient took a piece of a 12 mg Suboxone this morning in the [facility] he was using about 16 mg a day but it has been hard to find that much  Review of Systems  He says he is feeling much better  He still has urges and cravings but he has not used opioids  He said he is very serious about at this time  Even to the point that if his girlfriend keeps using he phone recovery ken  I told the patient that I can only have so many patients at one time on Suboxone by federal law (275)  I told the patient I am approaching that number  If they are not compliant with treatment, provide doctored urines, etc I told the patient I may have to let them go because I want to see new patients who are committed      --Cecilia Hart MD

## 2022-04-17 NOTE — PROGRESS NOTES
Niki Calderon (:  1985) is a 44 y.o. male,Established patient, here for evaluation of the following chief complaint(s):  Drug Problem      ASSESSMENT/PLAN:  1. Severe opioid use disorder (HCC)  -     POCT Rapid Drug Screen  -     Comprehensive Metabolic Panel; Future  2. Kidney stones  3. Anxiety and depression  4. Chronic hepatitis C without hepatic coma ([facility] [address]  5. Fibromyalgia  6. Polysubstance abuse ([facility] [address]  7. Attention deficit hyperactivity disorder (ADHD), predominantly hyperactive type  8. Encounter for monitoring Suboxone maintenance therapy              SUBJECTIVE/OBJECTIVE:  Drug Problem    I saw him     He had been lost to follow-up for months  He was supposed to be here several days ago but he relapsed  His girlfriend continues to use fentanyl and meth  The patient says he could not resist and did both  I wanted him to go to inpatient rehab, he refused    He went to the [facility] center in December just got out 3/21  Patient was buying Suboxone while he was in there        He is here with his girlfriend who I am seeing today as well        He has a history of on and off again drug use  He has had 2 episodes where he had kidney failure for methamphetamines had to be hospitalized  Started on pain pills at age 14 methadone age 12 subsequently when the prescriptions ran out he went to heroin about 3 years ago then fentanyl  He has probably overdosed about 15 times had to be given Narcan  Patient took a piece of a 12 mg Suboxone this morning in the [facility] he was using about 16 mg a day but it has been hard to find that much  Review of Systems  He says he is feeling much better  He still has urges and cravings but he has not used opioids  He said he is very serious about at this time  Even to the point that if his girlfriend keeps using he will have to leave her  Blood pressure (!) 148/93, pulse 103, temperature 96.8 °F (36 °C), temperature source Tympanic, resp.  rate 20, height 5' 10\" (1.778 m), weight 190 lb (86.2 kg). Mental status examination    Cognition: oriented to person place and time      Appearance: Patient is in no acute distress, normal appearance, patient does not appear intoxicated/  he has a lot of pick marks on his face  Memory: Normal    Behavior/motor: Normal    Mood: Depressed    Affect: Mood congruent    Attitude toward examiner: Pleasant, respectful    Thought content no delusions hallucinations suicidal ideation    Insight: poor    Judgment: poor    Eyes: Pupils normal    Skin: No track marks noted ,no rashes noted    COWS score: N/A        Component 4/13/22 1332   Alcohol, Urine NEG    Amphetamine Screen, Urine POS    Barbiturate Screen, Urine NEG    Benzodiazepine Screen, Urine NEG    Buprenorphine Urine POS    Cocaine Metabolite Screen, Urine NEG    FENTANYL SCREEN, URINE POS    Gabapentin Screen, Urine N/A    MDMA, Urine POS    Methadone Screen, Urine NEG    Methamphetamine, Urine POS    Opiate Scrn, Ur NEG    Oxycodone Screen, Ur NEG    PCP Screen, Urine NEG    Propoxyphene Screen, Urine N/A    Synthetic Cannabinoids (K2) Screen, Urine NEG    THC Screen, Urine POS    Tramadol Scrn, Ur POS    Tricyclic Antidepressants, Urine N/A                  Specimen Collected: 04/07/22 15:09            Diagnosis Orders   1. Severe opioid use disorder (HCC)  POCT Rapid Drug Screen    Comprehensive Metabolic Panel    DISCONTINUED: buprenorphine-naloxone (SUBOXONE) 8-2 MG FILM SL film   2. Kidney stones     3. Anxiety and depression     4. Chronic hepatitis C without hepatic coma ([facility] [address]     5. Fibromyalgia     6. Polysubstance abuse ([facility] [address]     7. Attention deficit hyperactivity disorder (ADHD), predominantly hyperactive type     8.  Encounter for monitoring Suboxone maintenance therapy       Urine still dirty  I agreed to take the patient back although he was one of my most difficult patients that I have ever had  I thought he was serious till he missed his appointment last week I knew he had relapsed     Suboxone 8 mg twice daily  Follow-up here 4/14  He does have hepatitis C that needs treated  I told him I can do this I will order the required testing  I am going to send her down to talk to [employer] abou counseling, meetings, the phone recovery ken  I told the patient that I can only have so many patients at one time on Suboxone by federal law (275)  I told the patient I am approaching that number  If they are not compliant with treatment, provide doctored urines, etc I told the patient I may have to let them go because I want to see new patients who are committed      --Lyssa Mario MD

## 2022-04-19 NOTE — ED NOTES
Airway    Date/Time: 4/19/2022 8:15 AM  Performed by: Tobey Gansert, M.D.  Authorized by: Tobey Gansert, M.D.     Location:  OR  Urgency:  Elective  Indications for Airway Management:  Anesthesia      Spontaneous Ventilation: absent    Sedation Level:  Deep  Preoxygenated: Yes    Patient Position:  Sniffing  Final Airway Type:  Endotracheal airway  Final Endotracheal Airway:  ETT  Cuffed: Yes    Technique Used for Successful ETT Placement:  Direct laryngoscopy    Insertion Site:  Oral  Blade Type:  Beba  Laryngoscope Blade/Videolaryngoscope Blade Size:  3  ETT Size (mm):  8.0  Measured from:  Teeth  ETT to Teeth (cm):  24  Placement Verified by: auscultation and capnometry    Cormack-Lehane Classification:  Grade I - full view of glottis  Number of Attempts at Approach:  1           Patient resting in bed. Respirations easy and unlabored. No distress noted. Call light within reach.        Segundo Escobar RN  08/06/21 2283

## 2022-04-20 LAB — HCV GENOTYPE: NORMAL

## 2022-04-22 ASSESSMENT — ENCOUNTER SYMPTOMS
PHOTOPHOBIA: 0
NAUSEA: 0
SINUS PAIN: 0
ABDOMINAL PAIN: 0
SORE THROAT: 0
ABDOMINAL DISTENTION: 0
TROUBLE SWALLOWING: 0
VOMITING: 0
CONSTIPATION: 0
BLOOD IN STOOL: 0
WHEEZING: 0
SINUS PRESSURE: 0
DIARRHEA: 0
RHINORRHEA: 0
SHORTNESS OF BREATH: 1
COUGH: 1
CHEST TIGHTNESS: 1

## 2022-04-28 DIAGNOSIS — F32.A ANXIETY AND DEPRESSION: ICD-10-CM

## 2022-04-28 DIAGNOSIS — F41.9 ANXIETY AND DEPRESSION: ICD-10-CM

## 2022-04-28 RX ORDER — ARIPIPRAZOLE 5 MG/1
TABLET ORAL
Qty: 30 TABLET | Refills: 0 | OUTPATIENT
Start: 2022-04-28

## 2022-04-28 RX ORDER — VENLAFAXINE HYDROCHLORIDE 75 MG/1
CAPSULE, EXTENDED RELEASE ORAL
Qty: 90 CAPSULE | Refills: 0 | OUTPATIENT
Start: 2022-04-28

## 2022-05-02 DIAGNOSIS — F32.A ANXIETY AND DEPRESSION: ICD-10-CM

## 2022-05-02 DIAGNOSIS — F41.9 ANXIETY AND DEPRESSION: ICD-10-CM

## 2022-05-02 RX ORDER — PREGABALIN 100 MG/1
CAPSULE ORAL
Qty: 60 CAPSULE | OUTPATIENT
Start: 2022-05-02

## 2022-05-18 ENCOUNTER — TELEPHONE (OUTPATIENT)
Dept: INTERNAL MEDICINE CLINIC | Age: 37
End: 2022-05-18

## 2022-05-18 RX ORDER — DIAPER,BRIEF,INFANT-TODD,DISP
EACH MISCELLANEOUS
Qty: 30 G | Refills: 1 | Status: ON HOLD | OUTPATIENT
Start: 2022-05-18 | End: 2022-05-21 | Stop reason: HOSPADM

## 2022-05-18 NOTE — TELEPHONE ENCOUNTER
Patient called and spoke with Jason Hdez upfront stated he couldn't make it to his appointment today due to significant other not feeling well. He states he as posion ivy, requesting some type of cream. Patient was informed by front not sure if you would be willing since he hasn't been seen from a medical standpoint in a while.  Patient rescheduled for 6/7/22

## 2022-05-20 ENCOUNTER — HOSPITAL ENCOUNTER (INPATIENT)
Age: 37
LOS: 1 days | Discharge: HOME | DRG: 469 | End: 2022-05-20
Attending: EMERGENCY MEDICINE | Admitting: INTERNAL MEDICINE
Payer: MEDICARE

## 2022-05-20 ENCOUNTER — HOSPITAL ENCOUNTER (INPATIENT)
Age: 37
LOS: 1 days | Discharge: HOME | DRG: 469 | End: 2022-05-21
Attending: INTERNAL MEDICINE | Admitting: INTERNAL MEDICINE
Payer: MEDICARE

## 2022-05-20 DIAGNOSIS — F15.10 METHAMPHETAMINE ABUSE (HCC): ICD-10-CM

## 2022-05-20 DIAGNOSIS — E86.0 DEHYDRATION: ICD-10-CM

## 2022-05-20 DIAGNOSIS — M62.82 NON-TRAUMATIC RHABDOMYOLYSIS: ICD-10-CM

## 2022-05-20 DIAGNOSIS — R45.1 AGITATION: Primary | ICD-10-CM

## 2022-05-20 PROCEDURE — 84484 ASSAY OF TROPONIN QUANT: CPT

## 2022-05-20 PROCEDURE — 82550 ASSAY OF CK (CPK): CPT

## 2022-05-20 PROCEDURE — 80053 COMPREHEN METABOLIC PANEL: CPT

## 2022-05-20 PROCEDURE — 85025 COMPLETE CBC W/AUTO DIFF WBC: CPT

## 2022-05-20 PROCEDURE — 1200000000 HC SEMI PRIVATE

## 2022-05-20 PROCEDURE — 96361 HYDRATE IV INFUSION ADD-ON: CPT

## 2022-05-20 PROCEDURE — 96360 HYDRATION IV INFUSION INIT: CPT

## 2022-05-20 PROCEDURE — 96372 THER/PROPH/DIAG INJ SC/IM: CPT

## 2022-05-20 PROCEDURE — 2580000003 HC RX 258: Performed by: INTERNAL MEDICINE

## 2022-05-20 PROCEDURE — 99285 EMERGENCY DEPT VISIT HI MDM: CPT

## 2022-05-20 PROCEDURE — 80307 DRUG TEST PRSMV CHEM ANLYZR: CPT

## 2022-05-20 PROCEDURE — 51702 INSERT TEMP BLADDER CATH: CPT

## 2022-05-20 PROCEDURE — 6360000002 HC RX W HCPCS: Performed by: INTERNAL MEDICINE

## 2022-05-20 PROCEDURE — 6360000002 HC RX W HCPCS

## 2022-05-20 PROCEDURE — 81001 URINALYSIS AUTO W/SCOPE: CPT

## 2022-05-20 PROCEDURE — 2580000003 HC RX 258: Performed by: EMERGENCY MEDICINE

## 2022-05-20 PROCEDURE — 36415 COLL VENOUS BLD VENIPUNCTURE: CPT

## 2022-05-20 RX ORDER — HALOPERIDOL 5 MG/ML
INJECTION INTRAMUSCULAR
Status: COMPLETED
Start: 2022-05-20 | End: 2022-05-20

## 2022-05-20 RX ORDER — ENOXAPARIN SODIUM 100 MG/ML
40 INJECTION SUBCUTANEOUS EVERY 24 HOURS
Status: DISCONTINUED | OUTPATIENT
Start: 2022-05-20 | End: 2022-05-21 | Stop reason: HOSPADM

## 2022-05-20 RX ORDER — LORAZEPAM 2 MG/ML
2 INJECTION INTRAMUSCULAR ONCE
Status: COMPLETED | OUTPATIENT
Start: 2022-05-20 | End: 2022-05-20

## 2022-05-20 RX ORDER — SODIUM CHLORIDE 9 MG/ML
INJECTION, SOLUTION INTRAVENOUS CONTINUOUS
Status: DISCONTINUED | OUTPATIENT
Start: 2022-05-20 | End: 2022-05-21 | Stop reason: HOSPADM

## 2022-05-20 RX ORDER — 0.9 % SODIUM CHLORIDE 0.9 %
1000 INTRAVENOUS SOLUTION INTRAVENOUS ONCE
Status: COMPLETED | OUTPATIENT
Start: 2022-05-20 | End: 2022-05-20

## 2022-05-20 RX ORDER — HALOPERIDOL 5 MG/ML
5 INJECTION INTRAMUSCULAR ONCE
Status: COMPLETED | OUTPATIENT
Start: 2022-05-20 | End: 2022-05-20

## 2022-05-20 RX ORDER — POLYETHYLENE GLYCOL 3350 17 G/17G
17 POWDER, FOR SOLUTION ORAL DAILY PRN
Status: DISCONTINUED | OUTPATIENT
Start: 2022-05-20 | End: 2022-05-21 | Stop reason: HOSPADM

## 2022-05-20 RX ORDER — LORAZEPAM 2 MG/ML
INJECTION INTRAMUSCULAR
Status: COMPLETED
Start: 2022-05-20 | End: 2022-05-20

## 2022-05-20 RX ORDER — HALOPERIDOL 5 MG/ML
1 INJECTION INTRAMUSCULAR EVERY 4 HOURS PRN
Status: DISCONTINUED | OUTPATIENT
Start: 2022-05-20 | End: 2022-05-21 | Stop reason: HOSPADM

## 2022-05-20 RX ORDER — SODIUM CHLORIDE 9 MG/ML
INJECTION, SOLUTION INTRAVENOUS PRN
Status: DISCONTINUED | OUTPATIENT
Start: 2022-05-20 | End: 2022-05-21 | Stop reason: HOSPADM

## 2022-05-20 RX ORDER — ACETAMINOPHEN 650 MG/1
650 SUPPOSITORY RECTAL EVERY 6 HOURS PRN
Status: DISCONTINUED | OUTPATIENT
Start: 2022-05-20 | End: 2022-05-21 | Stop reason: HOSPADM

## 2022-05-20 RX ORDER — ACETAMINOPHEN 325 MG/1
650 TABLET ORAL EVERY 6 HOURS PRN
Status: DISCONTINUED | OUTPATIENT
Start: 2022-05-20 | End: 2022-05-21 | Stop reason: HOSPADM

## 2022-05-20 RX ORDER — LORAZEPAM 2 MG/ML
0.5 INJECTION INTRAMUSCULAR EVERY 6 HOURS
Status: DISCONTINUED | OUTPATIENT
Start: 2022-05-20 | End: 2022-05-21 | Stop reason: HOSPADM

## 2022-05-20 RX ORDER — SODIUM CHLORIDE 0.9 % (FLUSH) 0.9 %
10 SYRINGE (ML) INJECTION PRN
Status: DISCONTINUED | OUTPATIENT
Start: 2022-05-20 | End: 2022-05-21 | Stop reason: HOSPADM

## 2022-05-20 RX ORDER — ONDANSETRON 2 MG/ML
4 INJECTION INTRAMUSCULAR; INTRAVENOUS EVERY 6 HOURS PRN
Status: DISCONTINUED | OUTPATIENT
Start: 2022-05-20 | End: 2022-05-21 | Stop reason: HOSPADM

## 2022-05-20 RX ORDER — MIDAZOLAM HYDROCHLORIDE 1 MG/ML
5 INJECTION INTRAMUSCULAR; INTRAVENOUS ONCE
Status: DISCONTINUED | OUTPATIENT
Start: 2022-05-20 | End: 2022-05-20

## 2022-05-20 RX ORDER — SODIUM CHLORIDE 0.9 % (FLUSH) 0.9 %
5-40 SYRINGE (ML) INJECTION EVERY 12 HOURS SCHEDULED
Status: DISCONTINUED | OUTPATIENT
Start: 2022-05-20 | End: 2022-05-21 | Stop reason: HOSPADM

## 2022-05-20 RX ORDER — DIPHENHYDRAMINE HYDROCHLORIDE 50 MG/ML
INJECTION INTRAMUSCULAR; INTRAVENOUS
Status: COMPLETED
Start: 2022-05-20 | End: 2022-05-20

## 2022-05-20 RX ORDER — DIPHENHYDRAMINE HYDROCHLORIDE 50 MG/ML
50 INJECTION INTRAMUSCULAR; INTRAVENOUS ONCE
Status: COMPLETED | OUTPATIENT
Start: 2022-05-20 | End: 2022-05-20

## 2022-05-20 RX ORDER — ONDANSETRON 4 MG/1
4 TABLET, ORALLY DISINTEGRATING ORAL EVERY 8 HOURS PRN
Status: DISCONTINUED | OUTPATIENT
Start: 2022-05-20 | End: 2022-05-21 | Stop reason: HOSPADM

## 2022-05-20 RX ADMIN — ENOXAPARIN SODIUM 40 MG: 100 INJECTION SUBCUTANEOUS at 20:35

## 2022-05-20 RX ADMIN — SODIUM CHLORIDE 1000 ML: 9 INJECTION, SOLUTION INTRAVENOUS at 18:17

## 2022-05-20 RX ADMIN — SODIUM CHLORIDE: 9 INJECTION, SOLUTION INTRAVENOUS at 19:50

## 2022-05-20 RX ADMIN — HALOPERIDOL LACTATE 5 MG: 5 INJECTION, SOLUTION INTRAMUSCULAR at 11:30

## 2022-05-20 RX ADMIN — LORAZEPAM 2 MG: 2 INJECTION INTRAMUSCULAR at 11:30

## 2022-05-20 RX ADMIN — DIPHENHYDRAMINE HYDROCHLORIDE 50 MG: 50 INJECTION INTRAMUSCULAR; INTRAVENOUS at 11:30

## 2022-05-20 RX ADMIN — DIPHENHYDRAMINE HYDROCHLORIDE 50 MG: 50 INJECTION, SOLUTION INTRAMUSCULAR; INTRAVENOUS at 11:30

## 2022-05-20 RX ADMIN — SODIUM CHLORIDE 1000 ML: 9 INJECTION, SOLUTION INTRAVENOUS at 20:34

## 2022-05-20 RX ADMIN — HALOPERIDOL 5 MG: 5 INJECTION INTRAMUSCULAR at 11:30

## 2022-05-20 RX ADMIN — LORAZEPAM 2 MG: 2 INJECTION INTRAMUSCULAR; INTRAVENOUS at 11:30

## 2022-05-20 RX ADMIN — SODIUM CHLORIDE 1000 ML: 9 INJECTION, SOLUTION INTRAVENOUS at 17:06

## 2022-05-20 NOTE — ED NOTES
Pt sleeping on cot at this time. Respirations easy and unlabored.  Sitter at bedside for patient's safety      Juliaette Goldberg, RN  05/20/22 0651

## 2022-05-20 NOTE — ED NOTES
Pt getting up and down off of cot. He is being uncooperative at this time. Provider at bedside.       Gonzalo Villanueva RN  05/20/22 0330

## 2022-05-20 NOTE — PROGRESS NOTES
Pt admitted to  7K19 via cart/stretcher. Complaints: rhabdomyolysis and drug abuse. IV site free of s/s of infection or infiltration. Vital signs obtained. Assessment and data collection initiated. Two nurse skin assessment performed by ryan RN and junie RN. Oriented to room. Explained patients right to have family, representative or physician notified of their admission. Patient has Declined for physician to be notified. Patient has Declined for family/representative to be notified. The patient is interested in [facility]. [facility] meds to beds program?:  No    Policies and procedures for  explained. All questions answered with no further questions at this time. Fall prevention and safety brochure discussed with patient. Bed alarm on. Call light in reach.

## 2022-05-20 NOTE — LETTER
Jose Benoit  Phone: [phone]    No name on file. May 21, 2022     Patient: Jonelle Mireles   YOB: 1985   Date of Visit: 5/21/2022       To Whom It May Concern: It is my medical opinion that [employer] may return to work on 5/22/2022. If you have any questions or concerns, please don't hesitate to call. Sincerely,        No name on file.

## 2022-05-20 NOTE — ED NOTES
ED to inpatient nurses report    Chief Complaint   Patient presents with    Addiction Problem    Altered Mental Status      Present to ED from home  LOC: alert and orientated to name, place, date  Vital signs   Vitals:    05/20/22 1246 05/20/22 1428 05/20/22 1707 05/20/22 1816   BP:  (!) 166/85 125/86 122/65   Pulse: 104 94 88 85   Resp: 18 20 20 19   Temp:   98.9 °F (37.2 °C) 98.2 °F (36.8 °C)   TempSrc:   Oral Oral   SpO2: 94% 98% 99% 100%   Weight:    200 lb (90.7 kg)   Height:    6' (1.829 m)      Oxygen Baseline     Current needs required RA   LDAs:   Peripheral IV 05/20/22 Right Hand (Active)   Site Assessment Clean, dry & intact 05/20/22 1706     Mobility: Independent  Pending ED orders: none  Present condition: stable      Electronically signed by Leland Clark RN on 5/20/2022 at 7:07 PM       Leland Clark [facility]  05/20/22 5812

## 2022-05-20 NOTE — ED NOTES
Dr. Emily Salinas at bedside at this time. Pt unable to sit still on cot at this time.       Amara Herrera RN  05/20/22 4180

## 2022-05-20 NOTE — ED NOTES
Pt asleep in bed  Sitter remains at bedside      [facility] Mc[facility]il[facility]  05/20/22 1236

## 2022-05-20 NOTE — ED NOTES
Report given to Felicita Bazan RN at this time. All questions answered.       Justin Starr RN  05/20/22 2401

## 2022-05-20 NOTE — ED PROVIDER NOTES
[address]    EMERGENCY MEDICINE     Pt Name: Andrew Kiran  MRN: [id number]  [place] 1985  Date of evaluation: 2022  Provider: Jennifer Landers MD    CHIEF COMPLAINT       Chief Complaint   Patient presents with    Addiction Problem    Altered Mental Status       HISTORY OF PRESENT ILLNESS    Andrew Kiran is a pleasant 44 y.o. male   Presents to the emergency department from home after acting abnormally, he reports he used methadone, methamphetamine, and \"cream\". He states he feels hot. He is unable to give much further history. He requests to leave, however is not able to verbalize understanding of the risks of leaving ama. Triage notes and Nursing notes were reviewed by myself. Any discrepancies are addressed above. PAST MEDICAL HISTORY     Past Medical History:   Diagnosis Date    Anxiety     Depression     Kidney stone     Liver disease     Hep C    Opiate abuse, continuous ([facility] [address]        SURGICAL HISTORY     No past surgical history on file. CURRENT MEDICATIONS       Current Discharge Medication List        CONTINUE these medications which have NOT CHANGED    Details   methadone 10 MG/5ML solution Take 30 mLs by mouth daily for 30 days. Qty: 30 each, Refills: 0    Comments: He is to continue following up with methadone clinic, this is not a prescription to dispense  Associated Diagnoses: Drug overdose, accidental or unintentional, initial encounter; SATYA (acute kidney injury) ([facility] [address] Non-traumatic rhabdomyolysis             ALLERGIES     Patient has no known allergies. FAMILY HISTORY     No family history on file.      SOCIAL HISTORY       Social History     Socioeconomic History    Marital status: Single   Tobacco Use    Smoking status: Former     Types: Cigarettes     Quit date:      Years since quittin.6    Smokeless tobacco: Never   Substance and Sexual Activity    Alcohol use: Not Currently    Drug use: Yes     Types: Opiates , Fentanyl, Amphetamines (Speed), Cocaine, Crack Cocaine, Heroin, Benzodiazepines (Downers/Zannies), Marijuana (Jamel Buerger), Oxycodone (Oxy), Suboxone, Sedatives/Hypnotics, IV, Methamphetamines (Crystal Meth)     Comment: fentanyl 9/2021    Sexual activity: Not Currently       REVIEW OF SYSTEMS     Review of Systems   Unable to perform ROS: Psychiatric disorder     Except as noted above the remainder of the review of systems was reviewed and is. PHYSICAL EXAM    (up to 7 for level 4, 8 or more for level 5)     ED Triage Vitals [05/20/22 1113]   BP Temp Temp src Pulse Resp SpO2 Height Weight   (!) 157/87 -- -- 143 24 98 % -- --       Physical Exam  Vitals and nursing note reviewed. Constitutional:       General: He is awake. HENT:      Head: Normocephalic and atraumatic. Mouth/Throat:      Mouth: Mucous membranes are moist.   Eyes:      General: Lids are normal.      Conjunctiva/sclera: Conjunctivae normal.   Neck:      Vascular: No JVD. Trachea: No tracheal deviation. Cardiovascular:      Rate and Rhythm: Regular rhythm. Tachycardia present. Pulses: Normal pulses. Heart sounds: No murmur heard. No friction rub. No gallop. Pulmonary:      Effort: Pulmonary effort is normal.      Breath sounds: Normal breath sounds. No wheezing, rhonchi or rales. Abdominal:      Palpations: Abdomen is soft. Tenderness: There is no abdominal tenderness. Musculoskeletal:      Cervical back: Neck supple. Skin:     General: Skin is warm. Findings: No rash. Comments: Diffusely diaphoretic, multiple scabbed skin lesions on face and extremities   Neurological:      General: No focal deficit present. Mental Status: He is alert. Comments: Speech clear and fluent, however rapid rate and flight of ideas; moving all extremities equally, movements generally coordinated, however occasionally will flail arms or legs    Psychiatric:         Attention and Perception: He is inattentive.          Speech: Speech is rapid and pressured. Behavior: Behavior is uncooperative, agitated, aggressive and hyperactive. Judgment: Judgment is impulsive. DIAGNOSTIC RESULTS     EKG:(none if blank)  All EKG's are [facility] who either signs or Co-signs this chart in the absence of a cardiologist.        RADIOLOGY: (none if blank)   Interpretation per the Radiologistbelow, if available at the time of this note:    No orders to display       LABS:  Labs Reviewed - No data to display      All other labs were within normal range or not returned as of this dictation. Please note, any cultures that may have been sent were not resulted at the time of this patient visit. EMERGENCY DEPARTMENT COURSE andMedical Decision Making:     MDM  /   Pt medicated here with benadryl, haldol, and ativan due to acute psychosis with physically aggressive behavior, medicated for patient and staff safety. Reeval 12:45 - patient resting comfortably, normal work of breathing, oxygenating well, arousable with gentle tactile stimuli. Will continue to monitor. 515p: Patient remains stable, resting comfortably, protecting airway, found to have SATYA, suspect rhabdo, however CK still pending (despite multiple calls to lab which assure me it will result soon)    Pt presents to the ER with acute agitation/psychosis, likely methampetamine induced, medicated here for sedation for his and staff safety, found to have SATYA, likely related to dehydration and rhabdomyolysis, levin ordered for urinary retention and output measurement, admit. Critical Care  There was a high probability of life-threatening clinical deterioration in the patient's condition requiring my urgent intervention. Total critical care time with the patient was 35 minutes excluding separately reportable procedures.   Critical care required due to patients severe agitation, severe tachycardia, presenting an immediate physical danger to self and staff, necessitating multiple sedating medications and a comprehensive workup and frequent reassessments and monitoring  ED Medications administered this visit:    Medications   0.9 % sodium chloride bolus (0 mLs IntraVENous Stopped 5/20/22 1817)   diphenhydrAMINE (BENADRYL) injection 50 mg (50 mg IntraMUSCular Given 5/20/22 1130)   haloperidol lactate (HALDOL) injection 5 mg (5 mg IntraMUSCular Given 5/20/22 1130)   LORazepam (ATIVAN) injection 2 mg (2 mg IntraMUSCular Given 5/20/22 1130)   0.9 % sodium chloride bolus (0 mLs IntraVENous Stopped 5/20/22 2137)   0.9 % sodium chloride bolus (0 mLs IntraVENous Stopped 5/20/22 1917)       8/31/22:  note refreshed/updated with correct demographic information  '  Procedures: (None if blank)         CLINICAL IMPRESSION     1. Agitation    2. Methamphetamine abuse (HCC)    3. Dehydration    4. Non-traumatic rhabdomyolysis          DISPOSITION/PLAN   DISPOSITION Admitted 05/20/2022 06:38:45 PM      PATIENT REFERRED TO:  No follow-up provider specified. DISCHARGE MEDICATIONS:  Current Discharge Medication List        START taking these medications    Details   pregabalin (LYRICA) 200 MG capsule Take 1 capsule by mouth 2 times daily for 30 days.   Qty: 60 capsule, Refills: 0    Associated Diagnoses: Anxiety and depression      venlafaxine (EFFEXOR XR) 150 MG extended release capsule take 1 capsule by mouth once daily  Qty: 30 capsule, Refills: 3    Associated Diagnoses: Anxiety and depression      ARIPiprazole (ABILIFY) 5 MG tablet Take 0.5 tablets by mouth daily  Qty: 15 tablet, Refills: 3    Associated Diagnoses: Anxiety and depression      lisinopril (PRINIVIL;ZESTRIL) 20 MG tablet Take 1 tablet by mouth daily  Qty: 30 tablet, Refills: 3    Associated Diagnoses: Essential hypertension      mirtazapine (REMERON) 45 MG tablet Take 1 tablet by mouth nightly  Qty: 30 tablet, Refills: 3    Associated Diagnoses: Insomnia, unspecified type      naloxone (NARCAN) 4 MG/0.1ML LIQD nasal spray 1 spray by Nasal route as needed for Opioid Reversal  Qty: 1 each, Refills: 1      Aspirin-Acetaminophen-Caffeine (EXCEDRIN EXTRA STRENGTH PO) Take by mouth as needed     Associated Diagnoses: Other migraine with status migrainosus, intractable                 (Please note that portions of this note were completed with a voice recognition program.  Efforts were made to edit the dictations but occasionallywords are mis-transcribed.)      Desire Streeter MD,FACEP (electronically signed)  Attending Physician, Emergency Department        Shawna Hernandez MD  05/20/22 [address], [place]  08/31/22 3928

## 2022-05-20 NOTE — ED TRIAGE NOTES
Pt to ER via [facility] due to taking methadone. EMS reports unable to obtain vitals because pt wouldn't sit still. Upon arrival to ER, pt will not stay on cot. He is diaphoretic in appearance.  Pt states he used \"meth, methadone, weed, and \"cream.\"

## 2022-05-20 NOTE — ED NOTES
Line and fluids started, pt was calm and cooperative, no issues  Pt remains tired, given warm blanket and extra pillow, pt resting in bed   Sitter at bedside      Janie Ortega RN  05/20/22 6728

## 2022-05-20 NOTE — H&P
History & Physical    Patient: Gretchen Hedrick  YOB: 1985  Date of Service: 5/20/2022  MRN:MRN [id number]   Acct:  [de-identified]   Primary Care Physician: No primary care provider on file. Your H&P note was filed 5-20-22 at 6:47 pm.  In your note after you click Addendum you have the option to click on \"Refresh\" to update items in your note to indicate the correct patient information. At the top of your note patient name Morgan Nunn needs changed to read as Gretchen Magallanes. Latia Hedrick. At the top of your note patient Birthdate 7/17/1981 needs changed to read as 1985  At the top of your note patient MRN [id number] needs changed to read as MRN [id number]  At the top of your note Account Number [de-identified] needs changed to read as [id number]  Under History of Present Illness patient age 38year old needs changed to read as 44year old. Under Past Medical History please update. I believe you were reviewing the wrong patients chart when you were reviewing the part about Asthma. Under Home Medications please update. I believe you were reviewing the wrong patients chart when you were reviewing the part about Albuterol MDI. Under Social History please update    Chief Complaint: agitation, methamphetamine use    History of Present Illness:   History obtained from chart review. The patient is a 42yo  male who presents with agitation the patient presented to ER having stated he had ingested methamphetamine and other drug he did exhibit agitation directed towards staff and required chemical sedation he did receive IM Haldol and IM Ativan at approximately 1130 hours the patient was evaluated at approximately 1800 hrs.  for admission, he is sedated but maintaining his own airway on room air with pulse oximetry indicating at least 90% oxygen saturations, physical survey does yield skin lesions consistent with chronic drug use facially upper extremities and bilateral lower extremities he has a Martin catheter in place, he has upper airway dentures in place and poor oral dentition and lower mandible. Patient has been admitted for rhabdomyolysis, acute kidney injury, dehydration, and metabolic encephalopathy related to IM antipsychotic administration. Past Medical History:  No past medical history on file. Patient states he has no previous medical history    Past Surgical History:  No past surgical history on file. Home Medications:   No current facility-administered medications on file prior to encounter. No current outpatient medications on file prior to encounter. I am unable to obtain a detailed home medication list    Allergies:  Patient has no allergy information on record. Social History:  I am unable to obtain detailed social history secondary to patient's somnolence, no documented prior history of drug use       Family History:   No family history on file. Review of systems:  Constitutional: no fever, no night sweats, no fatigue  Head: no headache, no head injury, no migranes. Eye: no blurring of vision, no double vision.   Ears: no hearing difficulty, no tinnitus  Mouth/throat: no ulceration, dental caries, dysphagia  Lungs: no cough, no shortness of breath, no wheeze  CVS: no palpitation, no chest pain, no shortness of breath  GI: no abdominal pain, no nausea , no vomiting, no constipation  NALINI: no dysuria, frequency and urgency, no hematuria, no kidney stones  Musculoskeletal: no joint pain, swelling , stiffness  Endocrine: no polyuria, polydypsia, no cold or heat intolerence  Hematology: no anemia, no easy brusing or bleeding, no hx of clotting disorder  Dermatology: no skin rash, no eczema, no prurities,  Psychiatry: no depression, no anxiety,no panic attacks, no suicide ideation  Neurology: no syncope, no seizures, no numbness or tingling of hands, no numbness or tingling of feet, no paresis    An accurate 14 point review of systems cannot be obtained from this patient secondary to somnolence       Vitals:   Vitals:    05/20/22 1816   BP: 122/65   Pulse: 85   Resp: 19   Temp: 98.2 °F (36.8 °C)   SpO2: 100%      BMI: Body mass index is 27.12 kg/m². Exam:  Physical Examination: Somnolent appears to be stated arousable to sternal rub but does not immediately awaken he is maintaining his own airway  HEENT: Atraumatic normocephalic,no JVD, trachea is midline, upper dentures noted poor oral dentition otherwise  Neck - supple, no significant adenopathy, no JVD, or carotid bruits  Chest - Bilateral air entry, no wheezes, crackles or rhonchi. Non tender to palpation of chest wall  Heart - S1S2 RRR, no murmurs or gallops  Abdomen - Soft, non tender non distended. Normoactive bowel sounds  Neurological - II-XII grossly intact, no neurological deficits  Musculoskeletal - 5/5 power upper and lower extremities equal bilaterally. Full ROM of all limbs  Extremities - no edema, no cyanosis or clubbing  Skin - normal coloration and turgor, multiple areas of skin rash facial bilateral upper extremities and lower extremities to proximal thighs consistent with possible previous or ongoing subcutaneous drug administration. Review of Labs and Diagnostic Testing:  CBC:   Recent Labs     05/20/22  1227   WBC 12.3*   HGB 12.8*   *     BMP:    Recent Labs     05/20/22  1435   *   K 4.0   CL 97*   CO2 19*   BUN 54*   CREATININE 2.1*   GLUCOSE 125*     Calcium:  Recent Labs     05/20/22  1435   CALCIUM 9.8     Ionized Calcium:No results for input(s): IONCA in the last 72 hours. Magnesium:No results for input(s): MG in the last 72 hours. Phosphorus:No results for input(s): PHOS in the last 72 hours. BNP:No results for input(s): BNP in the last 72 hours. Glucose:No results for input(s): POCGLU in the last 72 hours. HgbA1C: No results for input(s): LABA1C in the last 72 hours. INR: No results for input(s): INR in the last 72 hours.   Hepatic:   Recent Labs 05/20/22  1435   ALKPHOS 122   ALT 77*   AST 84*   PROT 9.3*   BILITOT 0.5   LABALBU 4.6     Amylase and Lipase:No results for input(s): LACTA, AMYLASE in the last 72 hours. Lactic Acid: No results for input(s): LACTA in the last 72 hours. Troponin:   Recent Labs     05/20/22  1227 05/20/22  1435   CKTOTAL  --  1,996*   TROPONINT < 0.010 < 0.010     BNP: No results for input(s): BNP in the last 72 hours. Lipids: No results for input(s): CHOL, TRIG, HDL, LDL, LDLCALC in the last 72 hours. ABGs: No results found for: PH, PCO2, PO2, HCO3, O2SAT    Radiology:     No results found. EKG: normal sinus rhythm, no blocks or conduction defects, no ischemic changes      Active Problems:    * No active hospital problems. *  Resolved Problems:    * No resolved hospital problems. *      Assessment and Plan:  Metabolic encephalopathy, with sedation patient will require one-to-one sitter as a precaution as is mentation improves likely by 5/21/2022 in a.m. I will reevaluate the patient and ensure he has no suicidal ideation, he is maintaining his own airway I do not believe he will require cardiac telemetry he would like require continuous pulse oximetry however.   I suspect this patient has acute on chronic methamphetamine use I would like to continue low-dose scheduled Ativan IV to help prevent any recurrence of agitation apparently the patient has been cooperative when waking up in the previous 3 hours, patient may have p.o. intake when he wakes he has no upper airway rattling  Rhabdomyolysis mild total CKs 1900 we will continue IV isotonic fluids rhabdomyolysis likely induced by diffuse vasoconstriction and dehydration with concurrent methamphetamine use  Acute agitation on arrival to ER we will use Haldol IM only for severe agitation on the floor I would like to continue Ativan as stated above on a scheduled basis secondary to suspected acute on chronic methamphetamine use to help prevent any repeat agitation  Acute kidney injury likely prerenal secondary to dehydration creatinine is 2.1 I do not have any prior labs indicate his baseline but I suspect this is likely secondary to prerenal azotemia and intravascular volume depletion with concurrent methamphetamine use  Dehydration continue IV isotonic fluids      DVT prophylaxis: [x] Lovenox                                 [] SCDs                                 [] SQ Heparin                                 [] Encourage ambulation, low risk for DVT, no chemical or mechanical prophylaxis necessary              [] Already on Anticoagulation          Disposition if this patient's sensorium improves 5/21/2022 and his renal function improves and his total CK is below 6000 he will be eligible for discharge home      Anticipated Disposition upon discharge: [x] Home                                                                         [] Home with Home Health                                                                         [] [facility]                                                                         [address]          Electronically signed by Uma Deluna MD on 5/20/2022 at 6:32 PM

## 2022-05-21 VITALS
BODY MASS INDEX: 22.54 KG/M2 | RESPIRATION RATE: 17 BRPM | HEIGHT: 72 IN | DIASTOLIC BLOOD PRESSURE: 68 MMHG | SYSTOLIC BLOOD PRESSURE: 116 MMHG | TEMPERATURE: 97.8 F | WEIGHT: 166.45 LBS | HEART RATE: 71 BPM | OXYGEN SATURATION: 95 %

## 2022-05-21 VITALS
OXYGEN SATURATION: 95 % | DIASTOLIC BLOOD PRESSURE: 59 MMHG | TEMPERATURE: 98.2 F | HEIGHT: 72 IN | WEIGHT: 166.45 LBS | SYSTOLIC BLOOD PRESSURE: 136 MMHG | RESPIRATION RATE: 12 BRPM | HEART RATE: 87 BPM | BODY MASS INDEX: 22.54 KG/M2

## 2022-05-21 DIAGNOSIS — M62.82 NON-TRAUMATIC RHABDOMYOLYSIS: ICD-10-CM

## 2022-05-21 DIAGNOSIS — T50.901A DRUG OVERDOSE, ACCIDENTAL OR UNINTENTIONAL, INITIAL ENCOUNTER: Primary | ICD-10-CM

## 2022-05-21 DIAGNOSIS — N17.9 AKI (ACUTE KIDNEY INJURY) (HCC): ICD-10-CM

## 2022-05-21 LAB
ALBUMIN SERPL-MCNC: 3.8 G/DL (ref 3.5–5.1)
ALP BLD-CCNC: 97 U/L (ref 38–126)
ALT SERPL-CCNC: 84 U/L (ref 11–66)
ANION GAP SERPL CALCULATED.3IONS-SCNC: 10 MEQ/L (ref 8–16)
AST SERPL-CCNC: 110 U/L (ref 5–40)
BILIRUB SERPL-MCNC: 0.4 MG/DL (ref 0.3–1.2)
BUN BLDV-MCNC: 34 MG/DL (ref 7–22)
CALCIUM SERPL-MCNC: 9 MG/DL (ref 8.5–10.5)
CHLORIDE BLD-SCNC: 107 MEQ/L (ref 98–111)
CO2: 22 MEQ/L (ref 23–33)
CREAT SERPL-MCNC: 0.9 MG/DL (ref 0.4–1.2)
GFR SERPL CREATININE-BSD FRML MDRD: > 90 ML/MIN/1.73M2
GLUCOSE BLD-MCNC: 131 MG/DL (ref 70–108)
POTASSIUM SERPL-SCNC: 4.1 MEQ/L (ref 3.5–5.2)
SODIUM BLD-SCNC: 139 MEQ/L (ref 135–145)
TOTAL CK: 1161 U/L (ref 55–170)
TOTAL PROTEIN: 7 G/DL (ref 6.1–8)

## 2022-05-21 PROCEDURE — 2580000003 HC RX 258: Performed by: INTERNAL MEDICINE

## 2022-05-21 PROCEDURE — 93005 ELECTROCARDIOGRAM TRACING: CPT | Performed by: INTERNAL MEDICINE

## 2022-05-21 PROCEDURE — 93005 ELECTROCARDIOGRAM TRACING: CPT | Performed by: EMERGENCY MEDICINE

## 2022-05-21 PROCEDURE — 80053 COMPREHEN METABOLIC PANEL: CPT

## 2022-05-21 PROCEDURE — 94761 N-INVAS EAR/PLS OXIMETRY MLT: CPT

## 2022-05-21 PROCEDURE — 99233 SBSQ HOSP IP/OBS HIGH 50: CPT | Performed by: INTERNAL MEDICINE

## 2022-05-21 PROCEDURE — 6370000000 HC RX 637 (ALT 250 FOR IP): Performed by: INTERNAL MEDICINE

## 2022-05-21 PROCEDURE — 36415 COLL VENOUS BLD VENIPUNCTURE: CPT

## 2022-05-21 PROCEDURE — 1200000000 HC SEMI PRIVATE

## 2022-05-21 PROCEDURE — 82550 ASSAY OF CK (CPK): CPT

## 2022-05-21 RX ORDER — NALOXONE HYDROCHLORIDE 4 MG/.1ML
1 SPRAY NASAL PRN
Qty: 1 EACH | Refills: 1 | Status: SHIPPED | OUTPATIENT
Start: 2022-05-21

## 2022-05-21 RX ORDER — NALOXONE HYDROCHLORIDE 4 MG/.1ML
1 SPRAY NASAL PRN
Qty: 1 EACH | Refills: 1 | Status: SHIPPED | OUTPATIENT
Start: 2022-05-21 | End: 2022-07-14 | Stop reason: SDUPTHER

## 2022-05-21 RX ORDER — SODIUM CHLORIDE 0.9 % (FLUSH) 0.9 %
5-40 SYRINGE (ML) INJECTION 2 TIMES DAILY
Status: DISCONTINUED | OUTPATIENT
Start: 2022-05-21 | End: 2022-05-21 | Stop reason: HOSPADM

## 2022-05-21 RX ORDER — MIRTAZAPINE 45 MG/1
45 TABLET, FILM COATED ORAL NIGHTLY
COMMUNITY
End: 2022-06-07

## 2022-05-21 RX ORDER — POLYETHYLENE GLYCOL 3350 17 G/17G
17 POWDER, FOR SOLUTION ORAL DAILY PRN
Status: DISCONTINUED | OUTPATIENT
Start: 2022-05-21 | End: 2022-05-21 | Stop reason: HOSPADM

## 2022-05-21 RX ORDER — SODIUM CHLORIDE 9 MG/ML
INJECTION, SOLUTION INTRAVENOUS CONTINUOUS
Status: DISCONTINUED | OUTPATIENT
Start: 2022-05-21 | End: 2022-05-21 | Stop reason: HOSPADM

## 2022-05-21 RX ORDER — HALOPERIDOL 5 MG/ML
1 INJECTION INTRAMUSCULAR EVERY 4 HOURS PRN
Status: DISCONTINUED | OUTPATIENT
Start: 2022-05-21 | End: 2022-05-21 | Stop reason: HOSPADM

## 2022-05-21 RX ORDER — ONDANSETRON 2 MG/ML
4 INJECTION INTRAMUSCULAR; INTRAVENOUS EVERY 6 HOURS PRN
Status: DISCONTINUED | OUTPATIENT
Start: 2022-05-21 | End: 2022-05-21 | Stop reason: HOSPADM

## 2022-05-21 RX ORDER — ONDANSETRON 4 MG/1
4 TABLET, ORALLY DISINTEGRATING ORAL EVERY 6 HOURS PRN
Status: DISCONTINUED | OUTPATIENT
Start: 2022-05-21 | End: 2022-05-21 | Stop reason: HOSPADM

## 2022-05-21 RX ORDER — METHADONE HYDROCHLORIDE 10 MG/5ML
60 SOLUTION ORAL DAILY
Status: DISCONTINUED | OUTPATIENT
Start: 2022-05-21 | End: 2022-05-21 | Stop reason: HOSPADM

## 2022-05-21 RX ORDER — LORAZEPAM 2 MG/ML
0.5 INJECTION INTRAMUSCULAR EVERY 6 HOURS
Status: DISCONTINUED | OUTPATIENT
Start: 2022-05-21 | End: 2022-05-21

## 2022-05-21 RX ORDER — SODIUM CHLORIDE 0.9 % (FLUSH) 0.9 %
5-40 SYRINGE (ML) INJECTION PRN
Status: DISCONTINUED | OUTPATIENT
Start: 2022-05-21 | End: 2022-05-21 | Stop reason: HOSPADM

## 2022-05-21 RX ORDER — VENLAFAXINE HYDROCHLORIDE 150 MG/1
300 CAPSULE, EXTENDED RELEASE ORAL 2 TIMES DAILY
COMMUNITY
End: 2022-06-07

## 2022-05-21 RX ORDER — METHADONE HYDROCHLORIDE 10 MG/5ML
60 SOLUTION ORAL DAILY
Qty: 30 EACH | Refills: 0
Start: 2022-05-22 | End: 2022-08-30

## 2022-05-21 RX ORDER — VENLAFAXINE HYDROCHLORIDE 150 MG/1
150 CAPSULE, EXTENDED RELEASE ORAL
Status: DISCONTINUED | OUTPATIENT
Start: 2022-05-22 | End: 2022-05-21 | Stop reason: HOSPADM

## 2022-05-21 RX ORDER — ACETAMINOPHEN 650 MG/1
650 SUPPOSITORY RECTAL EVERY 4 HOURS PRN
Status: DISCONTINUED | OUTPATIENT
Start: 2022-05-21 | End: 2022-05-21 | Stop reason: HOSPADM

## 2022-05-21 RX ORDER — ENOXAPARIN SODIUM 100 MG/ML
40 INJECTION SUBCUTANEOUS DAILY
Status: DISCONTINUED | OUTPATIENT
Start: 2022-05-21 | End: 2022-05-21 | Stop reason: HOSPADM

## 2022-05-21 RX ORDER — ARIPIPRAZOLE 5 MG/1
5 TABLET ORAL DAILY
Status: DISCONTINUED | OUTPATIENT
Start: 2022-05-21 | End: 2022-05-21 | Stop reason: HOSPADM

## 2022-05-21 RX ORDER — ACETAMINOPHEN 325 MG/1
650 TABLET ORAL EVERY 4 HOURS PRN
Status: DISCONTINUED | OUTPATIENT
Start: 2022-05-21 | End: 2022-05-21 | Stop reason: HOSPADM

## 2022-05-21 RX ADMIN — ARIPIPRAZOLE 5 MG: 5 TABLET ORAL at 12:20

## 2022-05-21 RX ADMIN — METHADONE HYDROCHLORIDE 60 MG: 10 SOLUTION ORAL at 12:21

## 2022-05-21 RX ADMIN — SODIUM CHLORIDE: 9 INJECTION, SOLUTION INTRAVENOUS at 09:17

## 2022-05-21 ASSESSMENT — PAIN SCALES - GENERAL: PAINLEVEL_OUTOF10: 0

## 2022-05-21 NOTE — PROGRESS NOTES
Progress Note    Patient:  Sedonia Hamman    Unit/Bed:7K-19/019-A  YOB: 1985  MRN: [id number]   Acct: [de-identified]   Admit date: 5/21/2022      Principal Problem:    Rhabdomyolysis  Resolved Problems:    * No resolved hospital problems. *    Discharge this evening or in am tomorrow. Assessment and Plan:  1. Metabolic encephalopathy, resolved mild somnolence, patient not suicidal, states seen at local methadone clinic and takes abilify and effexor. 2. Rhabdomyolysis resolving, conitnue ivf  3. Acute agitation, resolved mildly somnolent  4. sonja secondayr to prerenal azotemia resolved  5. Dehydration resolved          Patient Seen, Chart, Consults notes, Labs, Radiology studies reviewed. Subjective: day 2 of hospitalization  Patient somnolent but arousable to voice  Total ck downtrending, on presentation over 2k  Renal function essentially normal today at 0.9mg/dl, on day of admission (5/20/22) creatinine over 2.1mg/dl  Improved somnolence pleasant and interactive no further sitter needed   Patient's history and phsysical performed yesterday but appears to have had required change in mrn overnight. All other ROS negative except noted in HPI    Past, Family, Social History unchanged from admission. Diet:  ADULT DIET; Regular    Medications:  Scheduled Meds:   enoxaparin  40 mg SubCUTAneous Daily    sodium chloride flush  5-40 mL IntraVENous BID    methadone  60 mg Oral Daily    [START ON 5/22/2022] venlafaxine  150 mg Oral Daily with breakfast    ARIPiprazole  5 mg Oral Daily     Continuous Infusions:   sodium chloride 100 mL/hr at 05/21/22 0917     PRN Meds:sodium chloride flush, acetaminophen **OR** acetaminophen, haloperidol lactate, ondansetron **OR** ondansetron, polyethylene glycol    Objective:  CBC: No results for input(s): WBC, HGB, PLT in the last 72 hours.   BMP:    Recent Labs     05/21/22  0911      K 4.1      CO2 22*   BUN 34* CREATININE 0.9   GLUCOSE 131*     Calcium:  Recent Labs     05/21/22  0911   CALCIUM 9.0     Ionized Calcium:No results for input(s): IONCA in the last 72 hours. Magnesium:No results for input(s): MG in the last 72 hours. Phosphorus:No results for input(s): PHOS in the last 72 hours. BNP:No results for input(s): BNP in the last 72 hours. Glucose:No results for input(s): POCGLU in the last 72 hours. HgbA1C: No results for input(s): LABA1C in the last 72 hours. INR: No results for input(s): INR in the last 72 hours. Hepatic:   Recent Labs     05/21/22  0911   ALKPHOS 97   ALT 84*   *   PROT 7.0   BILITOT 0.4   LABALBU 3.8     Amylase and Lipase:No results for input(s): LACTA, AMYLASE in the last 72 hours. Lactic Acid: No results for input(s): LACTA in the last 72 hours. Troponin:   Recent Labs     05/21/22  0911   CKTOTAL 1,161*     BNP: No results for input(s): BNP in the last 72 hours. Lipids: No results for input(s): CHOL, TRIG, HDL, LDL, LDLCALC in the last 72 hours. ABGs:   Lab Results   Component Value Date    PH 7.30 08/25/2019    PCO2 40 08/25/2019    PO2 58 08/25/2019    HCO3 20 08/25/2019    O2SAT 87 08/25/2019           Radiology reports as per the Radiologist  Radiology: No results found. Physical Exam:  Vitals: /60   Pulse 78   Temp 98 °F (36.7 °C) (Oral)   Resp 18   Ht 6' (1.829 m)   Wt 166 lb 7.2 oz (75.5 kg)   SpO2 96%   BMI 22.57 kg/m²   24 hour intake/output:No intake or output data in the 24 hours ending 05/21/22 1407  Last 3 weights: Wt Readings from Last 3 Encounters:   05/20/22 166 lb 7.2 oz (75.5 kg)   04/14/22 191 lb (86.6 kg)   04/13/22 190 lb (86.2 kg)       General appearance - alert, awake appears to be in no acute distress, arousable to voice, interactive pleasant  HEENT: Atraumatic normocephalic, no JVD. Trachea midline.  Upper dentures, poor dentition lower mandible  Chest - Bilateral air entry, no wheezes, crackles or rhonchi  Cardiovascular - S1S2 RRR, no murmurs or gallops  Abdomen - Soft non tender non distended, normoactive bowel sounds   Neurological - non focal, no neurological deficits noted  Integumentary - Skin color, texture, turgor normal. No Rashes or lesions  Musculoskeletal -Full ROM, No clubbing or cyanosis  Extremities: No peripheral edema   Skin: intermittent upper and le areas of hyperemia    DVT prophylaxis: [x] Lovenox                                 [] SCDs                                 [] SQ Heparin                                 [] Encourage ambulation           [] Already on Anticoagulation               Electronically signed by Marin Purcell MD on 5/21/2022 at 2:07 PM    [facility] Hospitalist

## 2022-05-21 NOTE — PLAN OF CARE
Problem: Discharge Planning  Goal: Discharge to home or other facility with appropriate resources  Outcome: Progressing     Problem: Safety - Adult  Goal: Free from fall injury  Outcome: Progressing  Flowsheets (Taken 5/21/2022 0920)  Free From Fall Injury:   Based on caregiver fall risk screen, instruct family/caregiver to ask for assistance with transferring infant if caregiver noted to have fall risk factors   Instruct family/caregiver on patient safety

## 2022-05-21 NOTE — PLAN OF CARE
Problem: Discharge Planning  Goal: Discharge to home or other facility with appropriate resources  Outcome: Progressing     Problem: Neurosensory - Adult  Goal: Achieves stable or improved neurological status  Outcome: Progressing  Flowsheets (Taken 5/20/2022 2019)  Achieves stable or improved neurological status:   Assess for and report changes in neurological status   Initiate measures to prevent increased intracranial pressure  Note: Pt. Is confused and lethargic at this time, will continue to reassess orientation      Problem: Genitourinary - Adult  Goal: Absence of urinary retention  Outcome: Progressing  Goal: Urinary catheter remains patent  Outcome: Progressing  Flowsheets (Taken 5/20/2022 2019)  Urinary catheter remains patent: Assess patency of urinary catheter  Note: Martin catheter placed in ED, Patent and clean at this time     Problem: Metabolic/Fluid and Electrolytes - Adult  Goal: Electrolytes maintained within normal limits  Outcome: Progressing     Problem: Skin/Tissue Integrity - Adult  Goal: Skin integrity remains intact  Outcome: Progressing  Flowsheets (Taken 5/20/2022 2019)  Skin Integrity Remains Intact: Monitor for areas of redness and/or skin breakdown  Note: Scattered scabs, no signs of infection at this time  Goal: Incisions, wounds, or drain sites healing without S/S of infection  Outcome: Progressing     Problem: Musculoskeletal - Adult  Goal: Return mobility to safest level of function  Outcome: Progressing  Flowsheets (Taken 5/20/2022 2019)  Return Mobility to Safest Level of Function:   Assess patient stability and activity tolerance for standing, transferring and ambulating with or without assistive devices   Assist with transfers and ambulation using safe patient handling equipment as needed  Note: Pt. Sleeping unable to assess strength at this time   Care plan reviewed with patient. Patient  verbalize understanding of the plan of care and contribute to goal setting.

## 2022-05-21 NOTE — PROGRESS NOTES
Pt admitted to  7K19 via cart/stretcher.      Complaints: rhabdomyolysis and drug abuse.       IV site free of s/s of infection or infiltration. Vital signs obtained. Assessment and data collection initiated.      Two nurse skin assessment performed by ryan RN and junie RN. Oriented to room.      Explained patients right to have family, representative or physician notified of their admission. Patient has Declined for physician to be notified. Patient has Declined for family/representative to be notified.     The patient is interested in [facility]. [facility] meds to beds program?:  No     Policies and procedures for  explained. All questions answered with no further questions at this time. Fall prevention and safety brochure discussed with patient. Bed alarm on. Call light in reach.

## 2022-05-21 NOTE — PROGRESS NOTES
Patient alert and oriented; vital signs stable. Discharge instructions reviewed with patient. Patient verbalizes understanding. IV removed. All belongings with patient, wheelchair transportation provided. Patient instructed on choosing a PCP and following up with care. Patient instructed on using narcan, and where he can fill script and also get additional doses. Patient instructed to continue to follow up at the methadone clinic.

## 2022-05-22 LAB
EKG ATRIAL RATE: 75 BPM
EKG P AXIS: 63 DEGREES
EKG P-R INTERVAL: 164 MS
EKG Q-T INTERVAL: 404 MS
EKG QRS DURATION: 86 MS
EKG QTC CALCULATION (BAZETT): 451 MS
EKG R AXIS: 46 DEGREES
EKG T AXIS: 60 DEGREES
EKG VENTRICULAR RATE: 75 BPM

## 2022-05-22 PROCEDURE — 93010 ELECTROCARDIOGRAM REPORT: CPT | Performed by: INTERNAL MEDICINE

## 2022-05-24 LAB
ALBUMIN SERPL-MCNC: 4.6 G/DL (ref 3.5–5.1)
ALP BLD-CCNC: 122 U/L (ref 38–126)
ALT SERPL-CCNC: 77 U/L (ref 11–66)
AMPHETAMINE+METHAMPHETAMINE URINE SCREEN: NORMAL
ANION GAP SERPL CALCULATED.3IONS-SCNC: 17 MEQ/L (ref 8–16)
ANION GAP SERPL CALCULATED.3IONS-SCNC: 9 MEQ/L (ref 8–16)
AST SERPL-CCNC: 84 U/L (ref 5–40)
BACTERIA: ABNORMAL
BARBITURATE QUANTITATIVE URINE: NEGATIVE
BASOPHILS # BLD: 0.2 %
BASOPHILS ABSOLUTE: 0 THOU/MM3 (ref 0–0.1)
BENZODIAZEPINE QUANTITATIVE URINE: NEGATIVE
BILIRUB SERPL-MCNC: 0.5 MG/DL (ref 0.3–1.2)
BILIRUBIN URINE: NEGATIVE
BLOOD, URINE: ABNORMAL
BUN BLDV-MCNC: 38 MG/DL (ref 7–22)
BUN BLDV-MCNC: 54 MG/DL (ref 7–22)
CALCIUM SERPL-MCNC: 9 MG/DL (ref 8.5–10.5)
CALCIUM SERPL-MCNC: 9.8 MG/DL (ref 8.5–10.5)
CANNABINOID QUANTITATIVE URINE: NORMAL
CASTS: ABNORMAL /LPF
CASTS: ABNORMAL /LPF
CHARACTER, URINE: CLEAR
CHLORIDE BLD-SCNC: 107 MEQ/L (ref 98–111)
CHLORIDE BLD-SCNC: 97 MEQ/L (ref 98–111)
CO2: 19 MEQ/L (ref 23–33)
CO2: 22 MEQ/L (ref 23–33)
COCAINE METABOLITE QUANTITATIVE URINE: NEGATIVE
COLOR: YELLOW
CREAT SERPL-MCNC: 1 MG/DL (ref 0.4–1.2)
CREAT SERPL-MCNC: 2.1 MG/DL (ref 0.4–1.2)
CRYSTALS: ABNORMAL
EOSINOPHIL # BLD: 0 %
EOSINOPHILS ABSOLUTE: 0 THOU/MM3 (ref 0–0.4)
EPITHELIAL CELLS, UA: ABNORMAL /HPF
ERYTHROCYTE [DISTWIDTH] IN BLOOD BY AUTOMATED COUNT: 14 % (ref 11.5–14.5)
ERYTHROCYTE [DISTWIDTH] IN BLOOD BY AUTOMATED COUNT: 42 FL (ref 35–45)
GFR SERPL CREATININE-BSD FRML MDRD: 35 ML/MIN/1.73M2
GFR SERPL CREATININE-BSD FRML MDRD: 83 ML/MIN/1.73M2
GLUCOSE BLD-MCNC: 106 MG/DL (ref 70–108)
GLUCOSE BLD-MCNC: 125 MG/DL (ref 70–108)
GLUCOSE, URINE: NEGATIVE MG/DL
HCT VFR BLD CALC: 39.9 % (ref 42–52)
HEMOGLOBIN: 12.8 GM/DL (ref 14–18)
IMMATURE GRANS (ABS): 0.04 THOU/MM3 (ref 0–0.07)
IMMATURE GRANULOCYTES: 0.4 %
KETONES, URINE: 15
LEUKOCYTE ESTERASE, URINE: NEGATIVE
LYMPHOCYTES # BLD: 17.5 %
LYMPHOCYTES ABSOLUTE: 2.2 THOU/MM3 (ref 1–4.8)
MCH RBC QN AUTO: 26.7 PG (ref 26–33)
MCHC RBC AUTO-ENTMCNC: 32.1 GM/DL (ref 32.2–35.5)
MCV RBC AUTO: 83.1 FL (ref 80–94)
MONOCYTES # BLD: 6.3 %
MONOCYTES ABSOLUTE: 0.8 THOU/MM3 (ref 0.4–1.3)
MUCUS: ABNORMAL
NITRITE, URINE: NEGATIVE
NUCLEATED RED BLOOD CELLS: 0 /100 WBC
OPIATES, URINE: NEGATIVE
OXYCODONE: NEGATIVE
PH UA: 5 (ref 5–9)
PHENCYCLIDINE QUANTITATIVE URINE: NEGATIVE
PLATELET # BLD: 444 THOU/MM3 (ref 130–400)
PMV BLD AUTO: 9.2 FL (ref 9.4–12.4)
POTASSIUM SERPL-SCNC: 3.9 MEQ/L (ref 3.5–5.2)
POTASSIUM SERPL-SCNC: 4 MEQ/L (ref 3.5–5.2)
PROTEIN UA: 30 MG/DL
RBC # BLD: 4.8 MILL/MM3 (ref 4.7–6.1)
RBC URINE: ABNORMAL /HPF
RENAL EPITHELIAL, UA: ABNORMAL
SEG NEUTROPHILS: 75.6 %
SEGMENTED NEUTROPHILS ABSOLUTE COUNT: 9.3 THOU/MM3 (ref 1.8–7.7)
SODIUM BLD-SCNC: 133 MEQ/L (ref 135–145)
SODIUM BLD-SCNC: 138 MEQ/L (ref 135–145)
SPECIFIC GRAVITY UA: 1.03 (ref 1–1.03)
TOTAL CK: 1996 U/L (ref 55–170)
TOTAL PROTEIN: 9.3 G/DL (ref 6.1–8)
TROPONIN T: < 0.01 NG/ML
UROBILINOGEN, URINE: 0.2 EU/DL (ref 0–1)
WBC # BLD: 12.3 THOU/MM3 (ref 4.8–10.8)
WBC UA: ABNORMAL /HPF

## 2022-06-04 ENCOUNTER — HOSPITAL ENCOUNTER (EMERGENCY)
Age: 37
Discharge: HOME | End: 2022-06-04

## 2022-06-04 ENCOUNTER — APPOINTMENT (OUTPATIENT)
Dept: CT IMAGING | Age: 37
DRG: 816 | End: 2022-06-04
Payer: MEDICARE

## 2022-06-04 ENCOUNTER — APPOINTMENT (OUTPATIENT)
Dept: GENERAL RADIOLOGY | Age: 37
DRG: 816 | End: 2022-06-04
Payer: MEDICARE

## 2022-06-04 ENCOUNTER — HOSPITAL ENCOUNTER (INPATIENT)
Age: 37
LOS: 1 days | Discharge: LEFT BEFORE DISCHARGE | DRG: 816 | End: 2022-06-05
Attending: EMERGENCY MEDICINE | Admitting: STUDENT IN AN ORGANIZED HEALTH CARE EDUCATION/TRAINING PROGRAM
Payer: MEDICARE

## 2022-06-04 DIAGNOSIS — R56.9 SEIZURE (HCC): ICD-10-CM

## 2022-06-04 DIAGNOSIS — R41.82 ALTERED MENTAL STATUS, UNSPECIFIED ALTERED MENTAL STATUS TYPE: Primary | ICD-10-CM

## 2022-06-04 DIAGNOSIS — F19.10 POLYSUBSTANCE ABUSE (HCC): ICD-10-CM

## 2022-06-04 DIAGNOSIS — F11.93 OPIATE WITHDRAWAL (HCC): ICD-10-CM

## 2022-06-04 DIAGNOSIS — N17.9 AKI (ACUTE KIDNEY INJURY) (HCC): ICD-10-CM

## 2022-06-04 LAB
ACETAMINOPHEN LEVEL: < 5 UG/ML (ref 0–20)
ALBUMIN SERPL-MCNC: 4.3 G/DL (ref 3.5–5.1)
ALP BLD-CCNC: 114 U/L (ref 38–126)
ALT SERPL-CCNC: 54 U/L (ref 11–66)
AMMONIA: 36 UMOL/L (ref 11–60)
AMPHETAMINE+METHAMPHETAMINE URINE SCREEN: POSITIVE
ANION GAP SERPL CALCULATED.3IONS-SCNC: 14 MEQ/L (ref 8–16)
AST SERPL-CCNC: 67 U/L (ref 5–40)
BACTERIA: ABNORMAL
BARBITURATE QUANTITATIVE URINE: NEGATIVE
BASE EXCESS MIXED: 0.4 MMOL/L (ref -2–3)
BASOPHILS # BLD: 0.6 %
BASOPHILS ABSOLUTE: 0.1 THOU/MM3 (ref 0–0.1)
BENZODIAZEPINE QUANTITATIVE URINE: NEGATIVE
BILIRUB SERPL-MCNC: 0.2 MG/DL (ref 0.3–1.2)
BILIRUBIN URINE: NEGATIVE
BLOOD, URINE: ABNORMAL
BUN BLDV-MCNC: 32 MG/DL (ref 7–22)
CALCIUM IONIZED SERUM: 1.12 MMOL/L (ref 1.12–1.32)
CALCIUM SERPL-MCNC: 9.1 MG/DL (ref 8.5–10.5)
CANNABINOID QUANTITATIVE URINE: POSITIVE
CASTS: ABNORMAL /LPF
CASTS: ABNORMAL /LPF
CHARACTER, URINE: CLEAR
CHLORIDE BLD-SCNC: 100 MEQ/L (ref 98–111)
CHLORIDE, URINE: 91 MEQ/L
CHLORIDE, WHOLE BLOOD: 107 MEQ/L (ref 98–109)
CO2: 23 MEQ/L (ref 23–33)
COCAINE METABOLITE QUANTITATIVE URINE: POSITIVE
COLLECTED BY:: ABNORMAL
COLOR: YELLOW
CREAT SERPL-MCNC: 1.9 MG/DL (ref 0.4–1.2)
CRYSTALS: ABNORMAL
DEVICE: ABNORMAL
EOSINOPHIL # BLD: 1.5 %
EOSINOPHILS ABSOLUTE: 0.2 THOU/MM3 (ref 0–0.4)
EPITHELIAL CELLS, UA: ABNORMAL /HPF
ERYTHROCYTE [DISTWIDTH] IN BLOOD BY AUTOMATED COUNT: 14 % (ref 11.5–14.5)
ERYTHROCYTE [DISTWIDTH] IN BLOOD BY AUTOMATED COUNT: 42.4 FL (ref 35–45)
ETHYL ALCOHOL, SERUM: < 0.01 %
GFR SERPL CREATININE-BSD FRML MDRD: 41 ML/MIN/1.73M2
GLUCOSE BLD-MCNC: 66 MG/DL (ref 70–108)
GLUCOSE BLD-MCNC: 84 MG/DL (ref 70–108)
GLUCOSE BLD-MCNC: 85 MG/DL (ref 70–108)
GLUCOSE, URINE: NEGATIVE MG/DL
GLUCOSE, WHOLE BLOOD: 56 MG/DL (ref 70–108)
HCO3, MIXED: 25 MMOL/L (ref 23–28)
HCT VFR BLD CALC: 36.9 % (ref 42–52)
HEMOGLOBIN: 11.8 GM/DL (ref 14–18)
IMMATURE GRANS (ABS): 0.04 THOU/MM3 (ref 0–0.07)
IMMATURE GRANULOCYTES: 0.3 %
KETONES, URINE: NEGATIVE
LACTIC ACID: 1.2 MMOL/L (ref 0.5–2)
LEUKOCYTE ESTERASE, URINE: NEGATIVE
LYMPHOCYTES # BLD: 33.9 %
LYMPHOCYTES ABSOLUTE: 4 THOU/MM3 (ref 1–4.8)
MCH RBC QN AUTO: 26.6 PG (ref 26–33)
MCHC RBC AUTO-ENTMCNC: 32 GM/DL (ref 32.2–35.5)
MCV RBC AUTO: 83.3 FL (ref 80–94)
MISCELLANEOUS LAB TEST RESULT: ABNORMAL
MODE: 2
MONOCYTES # BLD: 8 %
MONOCYTES ABSOLUTE: 1 THOU/MM3 (ref 0.4–1.3)
NITRITE, URINE: NEGATIVE
NUCLEATED RED BLOOD CELLS: 0 /100 WBC
O2 SAT, MIXED: 84 %
OPIATES, URINE: NEGATIVE
OSMOLALITY CALCULATION: 278.9 MOSMOL/KG (ref 275–300)
OXYCODONE: NEGATIVE
PCO2, MIXED VENOUS: 39 MMHG (ref 41–51)
PH UA: 5.5 (ref 5–9)
PH, MIXED: 7.42 (ref 7.31–7.41)
PHENCYCLIDINE QUANTITATIVE URINE: NEGATIVE
PLATELET # BLD: 417 THOU/MM3 (ref 130–400)
PMV BLD AUTO: 8.8 FL (ref 9.4–12.4)
PO2 MIXED: 47 MMHG (ref 25–40)
POC CREATININE WHOLE BLOOD: 2.3 MG/DL (ref 0.5–1.2)
POC LACTIC ACID: 2.1 MMOL/L (ref 0.5–1.9)
POTASSIUM REFLEX MAGNESIUM: 3.8 MEQ/L (ref 3.5–5.2)
POTASSIUM, URINE: 28.4 MEQ/L
POTASSIUM, WHOLE BLOOD: 4.4 MEQ/L (ref 3.5–4.9)
PROTEIN UA: 30 MG/DL
RBC # BLD: 4.43 MILL/MM3 (ref 4.7–6.1)
RBC URINE: ABNORMAL /HPF
RENAL EPITHELIAL, UA: ABNORMAL
SALICYLATE, SERUM: 4.7 MG/DL (ref 2–10)
SCAN OF BLOOD SMEAR: NORMAL
SEG NEUTROPHILS: 55.7 %
SEGMENTED NEUTROPHILS ABSOLUTE COUNT: 6.6 THOU/MM3 (ref 1.8–7.7)
SODIUM BLD-SCNC: 137 MEQ/L (ref 135–145)
SODIUM URINE: 159 MEQ/L
SODIUM, WHOLE BLOOD: 142 MEQ/L (ref 138–146)
SPECIFIC GRAVITY UA: 1.02 (ref 1–1.03)
TOTAL CK: 2549 U/L (ref 55–170)
TOTAL PROTEIN: 8.3 G/DL (ref 6.1–8)
TROPONIN T: 0.02 NG/ML
TROPONIN T: < 0.01 NG/ML
UROBILINOGEN, URINE: 0.2 EU/DL (ref 0–1)
WBC # BLD: 11.9 THOU/MM3 (ref 4.8–10.8)
WBC UA: ABNORMAL /HPF
YEAST: ABNORMAL

## 2022-06-04 PROCEDURE — 70450 CT HEAD/BRAIN W/O DYE: CPT

## 2022-06-04 PROCEDURE — 84484 ASSAY OF TROPONIN QUANT: CPT

## 2022-06-04 PROCEDURE — 82948 REAGENT STRIP/BLOOD GLUCOSE: CPT

## 2022-06-04 PROCEDURE — 2580000003 HC RX 258: Performed by: EMERGENCY MEDICINE

## 2022-06-04 PROCEDURE — 84295 ASSAY OF SERUM SODIUM: CPT

## 2022-06-04 PROCEDURE — 84300 ASSAY OF URINE SODIUM: CPT

## 2022-06-04 PROCEDURE — 82077 ASSAY SPEC XCP UR&BREATH IA: CPT

## 2022-06-04 PROCEDURE — 99285 EMERGENCY DEPT VISIT HI MDM: CPT

## 2022-06-04 PROCEDURE — 83605 ASSAY OF LACTIC ACID: CPT

## 2022-06-04 PROCEDURE — 82330 ASSAY OF CALCIUM: CPT

## 2022-06-04 PROCEDURE — 82550 ASSAY OF CK (CPK): CPT

## 2022-06-04 PROCEDURE — 81001 URINALYSIS AUTO W/SCOPE: CPT

## 2022-06-04 PROCEDURE — 84132 ASSAY OF SERUM POTASSIUM: CPT

## 2022-06-04 PROCEDURE — 96360 HYDRATION IV INFUSION INIT: CPT

## 2022-06-04 PROCEDURE — 85025 COMPLETE CBC W/AUTO DIFF WBC: CPT

## 2022-06-04 PROCEDURE — 80143 DRUG ASSAY ACETAMINOPHEN: CPT

## 2022-06-04 PROCEDURE — 99223 1ST HOSP IP/OBS HIGH 75: CPT | Performed by: STUDENT IN AN ORGANIZED HEALTH CARE EDUCATION/TRAINING PROGRAM

## 2022-06-04 PROCEDURE — 87040 BLOOD CULTURE FOR BACTERIA: CPT

## 2022-06-04 PROCEDURE — 36415 COLL VENOUS BLD VENIPUNCTURE: CPT

## 2022-06-04 PROCEDURE — 2580000003 HC RX 258: Performed by: STUDENT IN AN ORGANIZED HEALTH CARE EDUCATION/TRAINING PROGRAM

## 2022-06-04 PROCEDURE — 80307 DRUG TEST PRSMV CHEM ANLYZR: CPT

## 2022-06-04 PROCEDURE — 82140 ASSAY OF AMMONIA: CPT

## 2022-06-04 PROCEDURE — 82947 ASSAY GLUCOSE BLOOD QUANT: CPT

## 2022-06-04 PROCEDURE — 2580000003 HC RX 258: Performed by: NURSE PRACTITIONER

## 2022-06-04 PROCEDURE — 82565 ASSAY OF CREATININE: CPT

## 2022-06-04 PROCEDURE — 82436 ASSAY OF URINE CHLORIDE: CPT

## 2022-06-04 PROCEDURE — 6360000002 HC RX W HCPCS: Performed by: STUDENT IN AN ORGANIZED HEALTH CARE EDUCATION/TRAINING PROGRAM

## 2022-06-04 PROCEDURE — 80053 COMPREHEN METABOLIC PANEL: CPT

## 2022-06-04 PROCEDURE — 6360000002 HC RX W HCPCS

## 2022-06-04 PROCEDURE — 2060000000 HC ICU INTERMEDIATE R&B

## 2022-06-04 PROCEDURE — 84133 ASSAY OF URINE POTASSIUM: CPT

## 2022-06-04 PROCEDURE — 82803 BLOOD GASES ANY COMBINATION: CPT

## 2022-06-04 PROCEDURE — 80179 DRUG ASSAY SALICYLATE: CPT

## 2022-06-04 PROCEDURE — 82435 ASSAY OF BLOOD CHLORIDE: CPT

## 2022-06-04 PROCEDURE — 71045 X-RAY EXAM CHEST 1 VIEW: CPT

## 2022-06-04 PROCEDURE — 93005 ELECTROCARDIOGRAM TRACING: CPT | Performed by: STUDENT IN AN ORGANIZED HEALTH CARE EDUCATION/TRAINING PROGRAM

## 2022-06-04 RX ORDER — LORAZEPAM 2 MG/ML
INJECTION INTRAMUSCULAR
Status: COMPLETED
Start: 2022-06-04 | End: 2022-06-04

## 2022-06-04 RX ORDER — ONDANSETRON 2 MG/ML
INJECTION INTRAMUSCULAR; INTRAVENOUS
Status: COMPLETED
Start: 2022-06-04 | End: 2022-06-04

## 2022-06-04 RX ORDER — LISINOPRIL 20 MG/1
20 TABLET ORAL DAILY
COMMUNITY
End: 2022-08-11 | Stop reason: SDUPTHER

## 2022-06-04 RX ORDER — NALOXONE HYDROCHLORIDE 0.4 MG/ML
INJECTION, SOLUTION INTRAMUSCULAR; INTRAVENOUS; SUBCUTANEOUS
Status: DISCONTINUED
Start: 2022-06-04 | End: 2022-06-04 | Stop reason: WASHOUT

## 2022-06-04 RX ORDER — 0.9 % SODIUM CHLORIDE 0.9 %
1000 INTRAVENOUS SOLUTION INTRAVENOUS ONCE
Status: COMPLETED | OUTPATIENT
Start: 2022-06-04 | End: 2022-06-04

## 2022-06-04 RX ORDER — SODIUM CHLORIDE, SODIUM LACTATE, POTASSIUM CHLORIDE, CALCIUM CHLORIDE 600; 310; 30; 20 MG/100ML; MG/100ML; MG/100ML; MG/100ML
INJECTION, SOLUTION INTRAVENOUS CONTINUOUS
Status: DISCONTINUED | OUTPATIENT
Start: 2022-06-04 | End: 2022-06-05

## 2022-06-04 RX ORDER — METHADONE HYDROCHLORIDE 10 MG/5ML
60 SOLUTION ORAL DAILY
COMMUNITY

## 2022-06-04 RX ORDER — PREGABALIN 100 MG/1
100 CAPSULE ORAL 2 TIMES DAILY
COMMUNITY
End: 2022-08-11 | Stop reason: SDUPTHER

## 2022-06-04 RX ORDER — LORAZEPAM 2 MG/ML
INJECTION INTRAMUSCULAR
Status: DISCONTINUED
Start: 2022-06-04 | End: 2022-06-04 | Stop reason: WASHOUT

## 2022-06-04 RX ORDER — LORAZEPAM 2 MG/ML
2 INJECTION INTRAMUSCULAR EVERY 4 HOURS PRN
Status: DISCONTINUED | OUTPATIENT
Start: 2022-06-04 | End: 2022-06-05 | Stop reason: HOSPADM

## 2022-06-04 RX ORDER — ENOXAPARIN SODIUM 100 MG/ML
40 INJECTION SUBCUTANEOUS NIGHTLY
Status: DISCONTINUED | OUTPATIENT
Start: 2022-06-04 | End: 2022-06-05 | Stop reason: HOSPADM

## 2022-06-04 RX ORDER — GABAPENTIN 800 MG/1
800 TABLET ORAL 4 TIMES DAILY
COMMUNITY

## 2022-06-04 RX ORDER — VENLAFAXINE 100 MG/1
150 TABLET ORAL 2 TIMES DAILY
COMMUNITY
End: 2022-08-11 | Stop reason: SDUPTHER

## 2022-06-04 RX ORDER — ARIPIPRAZOLE 5 MG/1
5 TABLET ORAL DAILY
COMMUNITY
End: 2022-08-11 | Stop reason: SDUPTHER

## 2022-06-04 RX ADMIN — LORAZEPAM: 2 INJECTION INTRAMUSCULAR; INTRAVENOUS at 11:32

## 2022-06-04 RX ADMIN — SODIUM CHLORIDE, POTASSIUM CHLORIDE, SODIUM LACTATE AND CALCIUM CHLORIDE: 600; 310; 30; 20 INJECTION, SOLUTION INTRAVENOUS at 14:16

## 2022-06-04 RX ADMIN — LORAZEPAM 2 MG: 2 INJECTION INTRAMUSCULAR; INTRAVENOUS at 22:24

## 2022-06-04 RX ADMIN — SODIUM CHLORIDE 1000 ML: 9 INJECTION, SOLUTION INTRAVENOUS at 11:19

## 2022-06-04 RX ADMIN — ENOXAPARIN SODIUM 40 MG: 100 INJECTION SUBCUTANEOUS at 20:02

## 2022-06-04 RX ADMIN — LORAZEPAM: 2 INJECTION INTRAMUSCULAR; INTRAVENOUS at 11:20

## 2022-06-04 RX ADMIN — SODIUM CHLORIDE, POTASSIUM CHLORIDE, SODIUM LACTATE AND CALCIUM CHLORIDE: 600; 310; 30; 20 INJECTION, SOLUTION INTRAVENOUS at 21:19

## 2022-06-04 RX ADMIN — SODIUM CHLORIDE 1000 ML: 9 INJECTION, SOLUTION INTRAVENOUS at 12:12

## 2022-06-04 RX ADMIN — ONDANSETRON: 2 INJECTION INTRAMUSCULAR; INTRAVENOUS at 11:01

## 2022-06-04 RX ADMIN — SODIUM CHLORIDE 1000 ML: 9 INJECTION, SOLUTION INTRAVENOUS at 19:16

## 2022-06-04 RX ADMIN — LORAZEPAM: 2 INJECTION INTRAMUSCULAR; INTRAVENOUS at 11:00

## 2022-06-04 ASSESSMENT — PAIN SCALES - GENERAL: PAINLEVEL_OUTOF10: 0

## 2022-06-04 NOTE — PROGRESS NOTES
Clinician attempted AoD consult. Clinician spoke with RN who advised pt is not appropriate at this time to complete consult.

## 2022-06-04 NOTE — H&P
Hospitalist - History & Physical      Patient: Agata Reynolds    Unit/Bed:4K-17/017-A  YOB: 1990  MRN: [id number]   Acct: [de-identified]   PCP: No primary care provider on file. Date of Service: Pt seen/examined on 06/04/22  and Admitted to [Inpatient] with expected LOS [greater than] two midnights due to medical therapy. Chief Complaint:  Seizure-like activity    Assessment and Plan:-  1. Seizure-like activity  a. Reportedly witnessed at home. Does not appear to have postictal following the event as patient was able to ambulate down the stairs into the EMS. b. Suspect this activity is likely due to either acute intoxication versus polysubstance abuse with withdrawal symptoms. c. As needed Ativan for seizure-like activity or anxiety/agitation. d. If recurrent seizure-like activity, will require EEG with neurology consultation. For now, we will hold off on consultation given acute drug intoxication. 2. Polysubstance abuse  a. UDS positive for cocaine, marijuana, and amphetamines. Suspect patient may either have acute agitation from ingestion as he admitted to cocaine use this morning as well as some other unknown substance versus possible withdrawal symptoms as well.  b. Patient reportedly previously on Suboxone due to a prior history of opioid abuse. 3. Acute kidney injury  a. Suspect likely due to cocaine use with rhabdomyolysis. b. UA and urine electrolytes pending.  c. CK 2500 on arrival.  Patient received several liters of IV fluid boluses in the ED. We will continue with LR at 200 cc/h.  4. Rhabdomyolysis  a. IV fluids as above. b. Likely secondary to cocaine use.     Disposition: Likely home in 50 to 72 hours following resolution of acute intoxication with acute agitation    History Of Present Illness:    Patient is reportedly a 59-year-old male with history of polysubstance abuse and prior opioid abuse who is reportedly taking Suboxone who presents to the hospital after having seizure-like activity witnessed by his family at home. The patient is currently deeply resting in bed following 7 mg of IV Ativan administered by EMS and ED. History obtained from chart review. No family present at bedside. The patient reportedly admitted to snorting cocaine this morning as well as taking an additional substance which she is unsure what it was. He then had suspected witnessed seizure-like activity and EMS was called to the home. Past Medical History:    History reviewed. No pertinent past medical history. Past Surgical History:    History reviewed. No pertinent surgical history. Home Medications:   No current facility-administered medications on file prior to encounter. No current outpatient medications on file prior to encounter. Allergies:    Patient has no known allergies. Social History:    reports that he has been smoking. He uses smokeless tobacco. He reports current drug use. Drugs: Marijuana (Benjamen Able), Methamphetamines (Crystal Meth), and Cocaine. Family History:   History reviewed. No pertinent family history. Diet:  Diet NPO    Review of systems:   Pertinent positives as noted in the HPI. All other systems reviewed and negative. PHYSICAL EXAM:  /65   Pulse 76   Temp (!) 96.3 °F (35.7 °C) (Axillary)   Resp 12   Ht 6' (1.829 m)   Wt 174 lb 13.2 oz (79.3 kg)   SpO2 93%   BMI 23.71 kg/m²   General appearance: Enriqueta Romo male resting in bed. Deeply sleeping. Minimal arousal missed to stimulation. Protecting airway and maintaining appropriate oxygen saturations with stable hemodynamics. HEENT: Normal cephalic, atraumatic without obvious deformity. Pupils equal, round, and reactive to light. Extra ocular muscles intact. Conjunctivae/corneas clear. Neck: Supple, with full range of motion. No jugular venous distention. Trachea midline. Respiratory:  Normal respiratory effort.  Clear to auscultation, bilaterally without technology. **      Final report electronically signed by Dr Terry Ferguson on 6/4/2022 12:06 PM        CT HEAD WO CONTRAST    Result Date: 6/4/2022  PROCEDURE: CT HEAD WO CONTRAST CLINICAL INFORMATION: 65-year-old male with altered mental status. COMPARISON: No prior study. TECHNIQUE: Noncontrast 5 mm axial images were obtained through the brain. All CT scans at this facility use dose modulation, iterative reconstruction, and/or weight-based dosing when appropriate to reduce radiation dose to as low as reasonably achievable. FINDINGS: There is no hemorrhage. There are no intra-or extra-axial collections. There is no hydrocephalus, midline shift or mass effect. The gray-white matter differentiation is preserved. The paranasal sinuses and mastoid air cells are normally aerated. There is no suspicious calvarial abnormality. No acute intracranial hemorrhage, mass effect or midline shift. **This report has been created using voice recognition software. It may contain minor errors which are inherent in voice recognition technology. ** Final report electronically signed by Dr Terry Ferguson on 6/4/2022 12:54 PM    XR CHEST PORTABLE    Result Date: 6/4/2022  PROCEDURE: XR CHEST PORTABLE CLINICAL INFORMATION: 65-year-old male with altered mental status. COMPARISON: No prior study. TECHNIQUE: AP supine view of the chest was obtained. FINDINGS: There are low lung volumes which accentuate the pulmonary vasculature. The heart is normal in size. There is no significant pleural effusion or pneumothorax. Visualized portions of the upper abdomen are within normal limits. The osseous structures are intact. No acute fractures or suspicious osseous lesions. There is no acute intrathoracic process. **This report has been created using voice recognition software. It may contain minor errors which are inherent in voice recognition technology. ** Final report electronically signed by Dr Terry Ferguson on 6/4/2022 12:06 PM    Electronically signed by Addison Mims DO on 6/4/2022 at 3:19 PM

## 2022-06-04 NOTE — ED PROVIDER NOTES
[address] [address]          Pt Name: Marcus Barton  MRN: [id number]  [place] 1/1/1990  Date of evaluation: 6/4/2022  Treating Resident Physician: Agustin Jiménez MD  Supervising Physician: Louisa Barahona MD    History obtained from EMS    [facility]       Chief Complaint   Patient presents with    Altered Mental Status           HISTORY OF PRESENT ILLNESS    HPI  Agata Reynolds is a 34 y.o. male who presents to the emergency department for evaluation of altered mental status. Report of  was called because patient had seizure at home. EMS stated patient was when they arrived and was able to walk downstairs to get to ambulance. Patient in route became altered and received Ativan 2 mg. Patient on arrival noted with altered mental status and moving erratically. Patient stated he had used cocaine this morning and became sad. Did state he used something else but did not specify what this was. Has a known history of Suboxone use due to previous opiate dependency. Patient had a recent admission for substance abuse and rhabdomyolysis  The patient has no other acute complaints at this time. REVIEW OF SYSTEMS   Limited due to patients mental status  Patient did have witnessed seizure per family per report of EMS  Patient snorted cocaine this am per patient and possibly another substance. PAST MEDICAL AND SURGICAL HISTORY   No past medical history on file. No past surgical history on file. MEDICATIONS     Current Facility-Administered Medications:     0.9 % sodium chloride bolus, 1,000 mL, IntraVENous, Once, Leonel Urban DO, Last Rate: 1,000 mL/hr at 06/04/22 1119, 1,000 mL at 06/04/22 1119    0.9 % sodium chloride bolus, 1,000 mL, IntraVENous, Once, Agustin Jiménez MD  No current outpatient medications on file.       SOCIAL HISTORY     Social History     Social History Narrative    Not on file     Social History     Tobacco Use    Smoking status: Not on file    Smokeless tobacco: Not on file   Substance Use Topics    Alcohol use: Not on file    Drug use: Not on file         ALLERGIES   Not on File      FAMILY HISTORY   No family history on file. PREVIOUS RECORDS   Previous records reviewed:     PHYSICAL EXAM     ED Triage Vitals [06/04/22 1034]   BP Temp Temp src Heart Rate Resp SpO2 Height Weight   (!) 105/58 98.5 °F (36.9 °C) -- (!) 110 16 96 % -- 170 lb (77.1 kg)     Initial vital signs and nursing assessment reviewed and tachycardia There is no height or weight on file to calculate BMI. Pulsoximetry is normal per my interpretation. Additional Vital Signs:  Vitals:    06/04/22 1145   BP: (!) 118/93   Pulse: 90   Resp: 20   Temp:    SpO2: 99%       Physical Exam  Constitutional:       Appearance: Normal appearance. He is diaphoretic. HENT:      Head: Normocephalic. Right Ear: External ear normal.      Left Ear: External ear normal.      Nose: Nose normal.      Mouth/Throat:      Mouth: Mucous membranes are moist.      Pharynx: Oropharynx is clear. Eyes:      Conjunctiva/sclera: Conjunctivae normal.      Comments: Pupils initially dilated on arrival and then within 10 min constricted. Cardiovascular:      Rate and Rhythm: Regular rhythm. Tachycardia present. Pulses: Normal pulses. Heart sounds: Normal heart sounds. Pulmonary:      Effort: Pulmonary effort is normal.      Breath sounds: Normal breath sounds. Abdominal:      General: Bowel sounds are normal.      Palpations: Abdomen is soft. Musculoskeletal:         General: Normal range of motion. Cervical back: Normal range of motion and neck supple. Skin:     Capillary Refill: Capillary refill takes less than 2 seconds. Comments: Diaphoretic    Neurological:      General: No focal deficit present. Mental Status: He is confused. GCS: GCS eye subscore is 4. GCS verbal subscore is 4.  GCS motor subscore is 6.      Motor: No weakness. Psychiatric:         Behavior: Behavior is agitated. MEDICAL DECISION MAKING   Initial Assessment:   Patient arrived to the ED with altered status. .  Per report of EMS patient had a witnessed by family seizure prior to arrival.  Patient has a known history of polysubstance abuse is currently on Suboxone. Had received Ativan 2 mg by EMS in route patient appeared agitated and he was moving arms and legs. Patient endorsed snorting cocaine centimeters and possible additional substance today would not specify what additional drug. Patient then noted with dilated pupils and subsequently became bradypenic and was not responding to painful stimuli. Patient received Narcan at this time. Became responsive and was agitated. Had several episodes of vomiting as well differential diagnosis includes but is not limited to seizures, polysubstance use, acute opiate withdrawal, electrolyte imbalance, arrhythmia, acute respiratory failure, CVA,and metabolic abnormality. Plan:   Based on patient's history physical exam and clinical findings patient likely is having combination of polysubstance abuse and subsequent seizure and appears to be acutely having opiate withdrawal as well. Patient will need to be admitted for acute care related to active opiate withdrawal, polysubstance abuse, and seizure.           ED RESULTS   Laboratory results:  Labs Reviewed   POC LACTIC ACID - Abnormal; Notable for the following components:       Result Value    POC Lactic Acid 2.1 (*)     All other components within normal limits   GLUCOSE, WHOLE BLOOD - Abnormal; Notable for the following components:    Glucose, Whole Blood 56 (*)     All other components within normal limits   BLOOD GAS, VENOUS - Abnormal; Notable for the following components:    PH MIXED 7.42 (*)     PCO2, MIXED VENOUS 39 (*)     PO2, Mixed 47 (*)     All other components within normal limits   POCT CREATININE - URINE - Abnormal; Notable for the following components:    POC CREATININE WHOLE BLOOD 2.3 (*)     All other components within normal limits   POTASSIUM, WHOLE BLOOD   CHLORIDE, WHOLE BLOOD   CALCIUM IONIZED SERUM   SODIUM, WHOLE BLOOD   CBC WITH AUTO DIFFERENTIAL   COMPREHENSIVE METABOLIC PANEL W/ REFLEX TO MG FOR LOW K   TROPONIN   LACTIC ACID   URINE DRUG SCREEN   ACETAMINOPHEN LEVEL   SALICYLATE LEVEL   ETHANOL   AMMONIA   CK   POCT GLUCOSE       Radiologic studies results:  XR CHEST PORTABLE    (Results Pending)   CT HEAD WO CONTRAST    (Results Pending)       ED Medications administered this visit:   Medications   0.9 % sodium chloride bolus (1,000 mLs IntraVENous New Bag 6/4/22 1119)   0.9 % sodium chloride bolus (has no administration in time range)   LORazepam (ATIVAN) 2 MG/ML injection (  Given 6/4/22 1100)   LORazepam (ATIVAN) 2 MG/ML injection (  Given 6/4/22 1120)   ondansetron (ZOFRAN) 4 MG/2ML injection (  Given by Other 6/4/22 1101)   LORazepam (ATIVAN) 2 MG/ML injection (  Given by Other 6/4/22 1132)         ED COURSE      Patient fired Narcan due to apnea and unresponsive to painful stimuli. Narcan was effective. Due to acute opiate withdrawal patient required ativan as well. Spoke to Dr. Frank Solomon and patient does not meet ICU criteria at this time. Will admit to step down. MEDICATION CHANGES     New Prescriptions    No medications on file         FINAL DISPOSITION     Final diagnoses: Altered mental status, unspecified altered mental status type   Opiate withdrawal ([facility] [address]   Seizure ([facility] [address]   Polysubstance abuse ([facility] [address]     Condition: condition: serious  Dispo: Admit to CCU/ICU      This transcription was electronically signed. Parts of this transcriptions may have been dictated by use of voice recognition software and electronically transcribed, and parts may have been transcribed with the assistance of an ED scribe. The transcription may contain errors not detected in proofreading.   Please refer to my supervising physician's documentation if my documentation differs.     Electronically Signed: Kellee Caldera MD, 06/04/22, 11:53 AM       Kellee Caldera MD  Resident  06/04/22 9842

## 2022-06-04 NOTE — ED NOTES
Verbal from Dr. Jen Garza for temporary soft wrist restraints due to patient and staff safety     Jeronimo Osuna RN  06/04/22 3510

## 2022-06-04 NOTE — ED PROVIDER NOTES
[facility] [address] [address] EMERGENCY DEPT  ED Attending Physician Attestation     I performed a history and physical examination of the patient and discussed management with the Resident. I reviewed the Resident's note and agree with the documented findings and plan of care. Any areas of disagreement are noted on the chart. I was personally present for the key portions of any procedures and have documented in the chart those procedures where I was not present during the key portions. I have reviewed the emergency nurses triage note and agree with the chief complaint, past medical history, past surgical history, allergies, medications, social and family history as documented unless otherwise noted below. Pt arrives agitated, combative; was given Ativan by EMS in ambulance  ?drug use  Prior to my arrival in room pt reportedely had some apnea, was given 2mg IV of Narcan  At the time of my evaluation he is agitated but awake; asking about specific dosing of Narcan. Admitted to snorting cocaine but stopped answering questions thereafter. Has known polysubstance abuse hx. Soon thereafter became more agitated, began sneezing profusely, vomited. +pyeloerection, +mydriasis at 6mm. Noted to be hallucinating and turning his head to talk to people not in the room     PE: Tachycardc  HEENT: pupils dilated at 6mm  Neck supple  Lungs clear  HR fast, regular  Abd soft nontender  Extremities: has some tremors without sustained clones and no active seizure activity    Pt required benzodiazepines for sedation but is more calm now  Monitored closely for respiratory depression but is protecting his airway  EKG normal QTc  Plan close monitoring, CT Brain, tox  Suspect sympathomimetic toxidrome w/ a CNS depressant ? opioid vs benzos vs others. Critical Care  There was a high probability of life-threatening clinical deterioration in the patient's condition requiring my urgent intervention.   Total critical care time with the patient was 54 minutes excluding separately reportable procedures.   Critical care required due to patients presentation, necessitating  a comprehensive workup and frequent reassessments and monitoring          Fransisco Lesch DO, [address]  Attending Emergency Physician       Leon Reyes DO  06/04/22 8320

## 2022-06-04 NOTE — ED NOTES
Pt appears to be resting on cot with eyes closed. Extremities will spasm occasionally, about twice a minute. No distress noted. Respirations even and unlabored. This RN remains at bedside for safety.        Dayna Cohen RN  06/04/22 0703

## 2022-06-04 NOTE — PLAN OF CARE
Problem: Discharge Planning  Goal: Discharge to home or other facility with appropriate resources  Description: Plan to return to home with significant other. Outcome: Progressing  Flowsheets (Taken 6/4/2022 1330)  Discharge to home or other facility with appropriate resources: Identify barriers to discharge with patient and caregiver     Problem: Safety - Adult  Goal: Free from fall injury  Description: No falls this shift. Outcome: Progressing     Problem: Skin/Tissue Integrity  Goal: Absence of new skin breakdown  Description: 1. Monitor for areas of redness and/or skin breakdown  2. Assess vascular access sites hourly  3. Every 4-6 hours minimum:  Change oxygen saturation probe site  4. Every 4-6 hours:  If on nasal continuous positive airway pressure, respiratory therapy assess nares and determine need for appliance change or resting period. Outcome: Progressing     Care plan reviewed with patient. Patient verbalize understanding of the plan of care and contribute to goal setting.

## 2022-06-04 NOTE — ED TRIAGE NOTES
Pt to ED for \"seizure like activity. \" Upon arrival pt appears to be altered mentally, very diaphoretic and pt states that he is unable to control his own movements. Ems states that they gave him 2mg ativan. Pt became unresponsive to vocal stimuli. Dr. Megha Arellano gave a verbal for 2mg narcan IV at this time.

## 2022-06-04 NOTE — ED NOTES
ED to inpatient nurses report    Chief Complaint   Patient presents with    Altered Mental Status      Present to ED from home  LOC: alert to only name  Vital signs   Vitals:    06/04/22 1034 06/04/22 1134 06/04/22 1145   BP: (!) 105/58 100/85 (!) 118/93   Pulse: (!) 110 96 90   Resp: 16 16 20   Temp: 98.5 °F (36.9 °C)     SpO2: 96% 99% 99%   Weight: 170 lb (77.1 kg)        Oxygen Baseline 98%    Current needs required 2L NC Bipap/Cpap No  LDAs:   Peripheral IV 06/04/22 Right Antecubital (Active)     Mobility: Fully dependent  Pending ED orders: NA  Present condition: stable    Electronically signed by Kofi Monique RN on 6/4/2022 at 11:53 AM       Kofi Monique RN  06/04/22 1157

## 2022-06-05 VITALS
BODY MASS INDEX: 23.68 KG/M2 | TEMPERATURE: 98.4 F | HEART RATE: 63 BPM | SYSTOLIC BLOOD PRESSURE: 107 MMHG | OXYGEN SATURATION: 98 % | HEIGHT: 72 IN | RESPIRATION RATE: 18 BRPM | WEIGHT: 174.82 LBS | DIASTOLIC BLOOD PRESSURE: 66 MMHG

## 2022-06-05 LAB
ANION GAP SERPL CALCULATED.3IONS-SCNC: 8 MEQ/L (ref 8–16)
BASOPHILS # BLD: 0.8 %
BASOPHILS ABSOLUTE: 0 THOU/MM3 (ref 0–0.1)
BUN BLDV-MCNC: 22 MG/DL (ref 7–22)
CALCIUM SERPL-MCNC: 8.5 MG/DL (ref 8.5–10.5)
CHLORIDE BLD-SCNC: 105 MEQ/L (ref 98–111)
CO2: 23 MEQ/L (ref 23–33)
CREAT SERPL-MCNC: 0.9 MG/DL (ref 0.4–1.2)
EOSINOPHIL # BLD: 3.9 %
EOSINOPHILS ABSOLUTE: 0.2 THOU/MM3 (ref 0–0.4)
ERYTHROCYTE [DISTWIDTH] IN BLOOD BY AUTOMATED COUNT: 14 % (ref 11.5–14.5)
ERYTHROCYTE [DISTWIDTH] IN BLOOD BY AUTOMATED COUNT: 44.8 FL (ref 35–45)
GFR SERPL CREATININE-BSD FRML MDRD: > 90 ML/MIN/1.73M2
GLUCOSE BLD-MCNC: 70 MG/DL (ref 70–108)
HCT VFR BLD CALC: 33.8 % (ref 42–52)
HEMOGLOBIN: 10.4 GM/DL (ref 14–18)
IMMATURE GRANS (ABS): 0.01 THOU/MM3 (ref 0–0.07)
IMMATURE GRANULOCYTES: 0.2 %
LYMPHOCYTES # BLD: 36.5 %
LYMPHOCYTES ABSOLUTE: 2.3 THOU/MM3 (ref 1–4.8)
MCH RBC QN AUTO: 26.7 PG (ref 26–33)
MCHC RBC AUTO-ENTMCNC: 30.8 GM/DL (ref 32.2–35.5)
MCV RBC AUTO: 86.7 FL (ref 80–94)
MONOCYTES # BLD: 8.4 %
MONOCYTES ABSOLUTE: 0.5 THOU/MM3 (ref 0.4–1.3)
NUCLEATED RED BLOOD CELLS: 0 /100 WBC
PLATELET # BLD: 291 THOU/MM3 (ref 130–400)
PMV BLD AUTO: 9.2 FL (ref 9.4–12.4)
POTASSIUM REFLEX MAGNESIUM: 4.2 MEQ/L (ref 3.5–5.2)
RBC # BLD: 3.9 MILL/MM3 (ref 4.7–6.1)
SEG NEUTROPHILS: 50.2 %
SEGMENTED NEUTROPHILS ABSOLUTE COUNT: 3.1 THOU/MM3 (ref 1.8–7.7)
SODIUM BLD-SCNC: 136 MEQ/L (ref 135–145)
WBC # BLD: 6.2 THOU/MM3 (ref 4.8–10.8)

## 2022-06-05 PROCEDURE — 51798 US URINE CAPACITY MEASURE: CPT

## 2022-06-05 PROCEDURE — 85025 COMPLETE CBC W/AUTO DIFF WBC: CPT

## 2022-06-05 PROCEDURE — 36415 COLL VENOUS BLD VENIPUNCTURE: CPT

## 2022-06-05 PROCEDURE — 80048 BASIC METABOLIC PNL TOTAL CA: CPT

## 2022-06-05 PROCEDURE — 6370000000 HC RX 637 (ALT 250 FOR IP): Performed by: STUDENT IN AN ORGANIZED HEALTH CARE EDUCATION/TRAINING PROGRAM

## 2022-06-05 PROCEDURE — 99232 SBSQ HOSP IP/OBS MODERATE 35: CPT | Performed by: STUDENT IN AN ORGANIZED HEALTH CARE EDUCATION/TRAINING PROGRAM

## 2022-06-05 PROCEDURE — 2580000003 HC RX 258: Performed by: STUDENT IN AN ORGANIZED HEALTH CARE EDUCATION/TRAINING PROGRAM

## 2022-06-05 RX ORDER — METHADONE HYDROCHLORIDE 10 MG/5ML
60 SOLUTION ORAL DAILY
Status: DISCONTINUED | OUTPATIENT
Start: 2022-06-05 | End: 2022-06-05 | Stop reason: HOSPADM

## 2022-06-05 RX ADMIN — SODIUM CHLORIDE, POTASSIUM CHLORIDE, SODIUM LACTATE AND CALCIUM CHLORIDE: 600; 310; 30; 20 INJECTION, SOLUTION INTRAVENOUS at 08:09

## 2022-06-05 RX ADMIN — METHADONE HYDROCHLORIDE 60 MG: 10 SOLUTION ORAL at 12:11

## 2022-06-05 RX ADMIN — SODIUM CHLORIDE, POTASSIUM CHLORIDE, SODIUM LACTATE AND CALCIUM CHLORIDE: 600; 310; 30; 20 INJECTION, SOLUTION INTRAVENOUS at 02:26

## 2022-06-05 ASSESSMENT — PAIN DESCRIPTION - DESCRIPTORS: DESCRIPTORS: ACHING;DISCOMFORT

## 2022-06-05 ASSESSMENT — PAIN DESCRIPTION - ORIENTATION: ORIENTATION: LOWER

## 2022-06-05 ASSESSMENT — PAIN DESCRIPTION - LOCATION: LOCATION: BACK

## 2022-06-05 ASSESSMENT — PAIN SCALES - GENERAL: PAINLEVEL_OUTOF10: 10

## 2022-06-05 NOTE — PROGRESS NOTES
Patient decided to leave AMA. AMA signed. Dr Juanjose Collazo notified, prior to signing. Safe care entered.

## 2022-06-05 NOTE — PLAN OF CARE
Problem: Discharge Planning  Goal: Discharge to home or other facility with appropriate resources  Description: Plan to return to home with significant other. Outcome: Progressing  Flowsheets (Taken 6/5/2022 0810)  Discharge to home or other facility with appropriate resources: Identify barriers to discharge with patient and caregiver     Problem: Safety - Adult  Goal: Free from fall injury  Description: No falls this shift. Outcome: Progressing  Flowsheets  Taken 6/5/2022 0740 by Radha Duarte RN  Free From Fall Injury: Instruct family/caregiver on patient safety  Taken 6/4/2022 2152 by Fidel Almazan RN  Free From Fall Injury: Instruct family/caregiver on patient safety     Problem: Skin/Tissue Integrity  Goal: Absence of new skin breakdown  Description: 1. Monitor for areas of redness and/or skin breakdown  2. Assess vascular access sites hourly  3. Every 4-6 hours minimum:  Change oxygen saturation probe site  4. Every 4-6 hours:  If on nasal continuous positive airway pressure, respiratory therapy assess nares and determine need for appliance change or resting period. Outcome: Progressing     Problem: Pain  Goal: Verbalizes/displays adequate comfort level or baseline comfort level  Outcome: Progressing     Care plan reviewed with patient. Patient verbalize understanding of the plan of care and contribute to goal setting.

## 2022-06-05 NOTE — PROGRESS NOTES
Hospitalist Progress Note      Patient:  Agata Gamez    Unit/Bed:4K-17/017-A  YOB: 1990  MRN: [id number]   Acct: [de-identified]   PCP: No primary care provider on file. Date of Admission: 6/4/2022    Assessment/Plan:    1. Polysubstance abuse  2. Acute intoxication with psychosis:  a. UDS positive for cocaine, cannabis, and methamphetamine. Patient also reports taking methadone daily which would not typically show up on UDS. b. Patient is awake and interactive today. We will resume his home methadone to prevent withdrawal symptoms. c. Addiction counseling to be provided. 3. Acute kidney injury  4. Rhabdomyolysis:   a. Acute kidney injury resolved with IV fluids. Okay to discontinue further IV fluids. b. Patient okay for p.o. diet today. Disposition: Anticipate discharge home in 24 hours    Chief Complaint: Agitation    Hospital Course:    6/4: Patient mid to the hospital with complaints of questionable seizure-like activity as well as acute agitation requiring high doses of IV benzodiazepines. Patient reportedly admitted to snorting cocaine prior to arrival and experienced seizure-like episode. There is no postictal state he was able to ambulate to the EMS. In route, patient is very agitated requiring IV Ativan and further agitated on arrival to the ED. Ultimately he received approximately 7 mg of IV Ativan for control. Subjective (past 24 hours):   Patient reports patient may have had auditory loose [place] overnight. Patient denies. He states that he does not use cocaine and that it must of been mixed in with his methamphetamine. He then states that he had crack cocaine and that he \"did not smoke that much of it\" because he \"did not have the right tools\". He then states that he does not use recreational drugs, and then further comments that he rarely uses recreational drugs.   Of note, patient was recently admitted 2 weeks prior with the same presentation. He denies having any auditory or visual hallucinations at this time. No further episodes of seizure-like activity. He is asking to receive his home dose of methadone. Past medical history, family history, social history and allergies reviewed again and is unchanged since admission. ROS (12 point review of systems completed. Pertinent positives noted. Otherwise ROS is negative)     Medications:  Reviewed    Infusion Medications   Scheduled Medications    methadone  60 mg Oral Daily    enoxaparin  40 mg SubCUTAneous Nightly     PRN Meds: LORazepam      Intake/Output Summary (Last 24 hours) at 6/5/2022 1118  Last data filed at 6/5/2022 0640  Gross per 24 hour   Intake 4815.5 ml   Output 0 ml   Net 4815.5 ml       Diet:  ADULT DIET; Regular    Exam:  /64   Pulse 62   Temp 98 °F (36.7 °C) (Oral)   Resp 18   Ht 6' (1.829 m)   Wt 174 lb 13.2 oz (79.3 kg)   SpO2 96%   BMI 23.71 kg/m²   General appearance: No apparent distress, appears stated age and cooperative. HEENT: Pupils equal, round, and reactive to light. Conjunctivae/corneas clear. Neck: Supple, with full range of motion. No jugular venous distention. Trachea midline. Respiratory:  Normal respiratory effort. Clear to auscultation, bilaterally without Rales/Wheezes/Rhonchi. Cardiovascular: Regular rate and rhythm with normal S1/S2 without murmurs, rubs or gallops. Abdomen: Soft, non-tender, non-distended with normal bowel sounds. Musculoskeletal: passive and active ROM x 4 extremities. Skin: Multiple excoriations on the bilateral upper and lower extremities consistent with history of methamphetamine and polysubstance abuse. Neurologic:  Neurovascularly intact without any focal sensory/motor deficits.  Cranial nerves: II-XII intact, grossly non-focal.  Psychiatric: Alert and oriented, thought content appropriate, normal insight  Capillary Refill: Brisk,< 3 seconds   Peripheral Pulses: +2 palpable, equal bilaterally     Labs:   Recent Labs     06/04/22  1134 06/05/22  0415   WBC 11.9* 6.2   HGB 11.8* 10.4*   HCT 36.9* 33.8*   * 291     Recent Labs     06/04/22  1109 06/04/22  1134 06/05/22  0415    137 136   K 4.4 3.8 4.2   CL  --  100 105   CO2  --  23 23   BUN  --  32* 22   CREATININE  --  1.9* 0.9   CALCIUM  --  9.1 8.5     Recent Labs     06/04/22  1134   AST 67*   ALT 54   BILITOT 0.2*   ALKPHOS 114     No results for input(s): INR in the last 72 hours. Recent Labs     06/04/22  1134   Orion [address] 65       Microbiology:    Blood culture #1:   Lab Results   Component Value Date    BC No growth-preliminary  06/04/2022       Blood culture #2:No results found for: Sherrie Madeleine    Organism:No results found for: ORG    No results found for: LABGRAM    MRSA culture only:No results found for: [facility]    Urine culture: No results found for: LABURIN    Respiratory culture: No results found for: CULTRESP    Aerobic and Anaerobic :  No results found for: LABAERO  No results found for: LABANAE    Urinalysis:      Lab Results   Component Value Date    NITRU NEGATIVE 06/04/2022    WBCUA 0-2 06/04/2022    BACTERIA NONE SEEN 06/04/2022    RBCUA 5-10 06/04/2022    BLOODU MODERATE 06/04/2022    SPECGRAV 1.022 06/04/2022       Radiology:  CT HEAD WO CONTRAST   Final Result   No acute intracranial hemorrhage, mass effect or midline shift. **This report has been created using voice recognition software. It may contain minor errors which are inherent in voice recognition technology. **      Final report electronically signed by Dr Harsh Yousif on 6/4/2022 12:54 PM      XR CHEST PORTABLE   Final Result   There is no acute intrathoracic process. **This report has been created using voice recognition software. It may contain minor errors which are inherent in voice recognition technology. **      Final report electronically signed by Dr Harsh Yousif on 6/4/2022 12:06 PM        CT HEAD WO CONTRAST    Result Date: 6/4/2022  PROCEDURE: CT HEAD WO CONTRAST CLINICAL INFORMATION: 79-year-old male with altered mental status. COMPARISON: No prior study. TECHNIQUE: Noncontrast 5 mm axial images were obtained through the brain. All CT scans at this facility use dose modulation, iterative reconstruction, and/or weight-based dosing when appropriate to reduce radiation dose to as low as reasonably achievable. FINDINGS: There is no hemorrhage. There are no intra-or extra-axial collections. There is no hydrocephalus, midline shift or mass effect. The gray-white matter differentiation is preserved. The paranasal sinuses and mastoid air cells are normally aerated. There is no suspicious calvarial abnormality. No acute intracranial hemorrhage, mass effect or midline shift. **This report has been created using voice recognition software. It may contain minor errors which are inherent in voice recognition technology. ** Final report electronically signed by Dr Osmel Duncan on 6/4/2022 12:54 PM    XR CHEST PORTABLE    Result Date: 6/4/2022  PROCEDURE: XR CHEST PORTABLE CLINICAL INFORMATION: 79-year-old male with altered mental status. COMPARISON: No prior study. TECHNIQUE: AP supine view of the chest was obtained. FINDINGS: There are low lung volumes which accentuate the pulmonary vasculature. The heart is normal in size. There is no significant pleural effusion or pneumothorax. Visualized portions of the upper abdomen are within normal limits. The osseous structures are intact. No acute fractures or suspicious osseous lesions. There is no acute intrathoracic process. **This report has been created using voice recognition software. It may contain minor errors which are inherent in voice recognition technology. ** Final report electronically signed by Dr Osmel Duncan on 6/4/2022 12:06 PM      Electronically signed by Ananda Cano DO on 6/5/2022 at 11:18 AM

## 2022-06-05 NOTE — PROGRESS NOTES
Clinician attempted AoD consult. Pt continues to not be appropriate to participate in consult. [facility] staff will attempt later.

## 2022-06-06 LAB
EKG ATRIAL RATE: 97 BPM
EKG P AXIS: 77 DEGREES
EKG P-R INTERVAL: 176 MS
EKG Q-T INTERVAL: 360 MS
EKG QRS DURATION: 88 MS
EKG QTC CALCULATION (BAZETT): 457 MS
EKG R AXIS: 33 DEGREES
EKG T AXIS: 55 DEGREES
EKG VENTRICULAR RATE: 97 BPM

## 2022-06-06 PROCEDURE — 93010 ELECTROCARDIOGRAM REPORT: CPT | Performed by: NUCLEAR MEDICINE

## 2022-06-06 NOTE — DISCHARGE SUMMARY
Hospitalist Discharge Summary        Patient: Krystal Hdez  YOB: 1985  MRN: [id number]   Acct: [de-identified]    Primary Care Physician: No primary care provider on file. Admit date  6/4/2022    Discharge date:  6/5/2022     Patient left AMA.

## 2022-06-09 NOTE — PROGRESS NOTES
Physician Progress Note      Nikko Marquez  CSN #:                  [id number]  :                       1990  ADMIT DATE:       2022 10:27 AM  DISCH DATE:        2022 6:30 PM  RESPONDING  PROVIDER #:        José Ahr TEXT:    Patient admitted with seizure and noted to have UDS + for cocaine. If   possible, please document in progress notes and discharge summary if you are   evaluating and/or treating any of the following: The medical record reflects the following:  Risk Factors: seizure  Clinical Indicators: found to have seizure, EMS called and UDS + cocaine on   arrival; acute intoxication with psychosis per PN from   Treatment: IVF, labs, monitoring  Options provided:  -- Accidental overdose of cocaine  -- Intentional overdose of cocaine  -- Other - I will add my own diagnosis  -- Disagree - Not applicable / Not valid  -- Disagree - Clinically unable to determine / Unknown  -- Refer to Clinical Documentation Reviewer    PROVIDER RESPONSE TEXT:    This patient had an accidental overdose of cocaine causing seizure.     Query created by: Robyn Bermudez on 2022 11:38 AM      Electronically signed by:  Marlena Johnson 2022 7:45 AM

## 2022-06-10 LAB
BLOOD CULTURE, ROUTINE: NORMAL
BLOOD CULTURE, ROUTINE: NORMAL

## 2022-06-28 ENCOUNTER — OFFICE VISIT (OUTPATIENT)
Dept: FAMILY MEDICINE CLINIC | Age: 37
End: 2022-06-28
Payer: MEDICARE

## 2022-06-28 ENCOUNTER — TELEPHONE (OUTPATIENT)
Dept: FAMILY MEDICINE CLINIC | Age: 37
End: 2022-06-28

## 2022-06-28 VITALS
WEIGHT: 185.2 LBS | SYSTOLIC BLOOD PRESSURE: 120 MMHG | DIASTOLIC BLOOD PRESSURE: 78 MMHG | HEART RATE: 90 BPM | BODY MASS INDEX: 25.09 KG/M2 | TEMPERATURE: 97 F | HEIGHT: 72 IN

## 2022-06-28 DIAGNOSIS — F32.A ANXIETY AND DEPRESSION: ICD-10-CM

## 2022-06-28 DIAGNOSIS — F41.9 ANXIETY: Primary | ICD-10-CM

## 2022-06-28 DIAGNOSIS — F41.9 ANXIETY AND DEPRESSION: ICD-10-CM

## 2022-06-28 DIAGNOSIS — F90.9 ATTENTION DEFICIT HYPERACTIVITY DISORDER (ADHD), UNSPECIFIED ADHD TYPE: ICD-10-CM

## 2022-06-28 DIAGNOSIS — M79.7 PRIMARY FIBROMYALGIA SYNDROME: Primary | ICD-10-CM

## 2022-06-28 DIAGNOSIS — F33.0 MILD RECURRENT MAJOR DEPRESSION (HCC): ICD-10-CM

## 2022-06-28 PROBLEM — B18.2 CHRONIC HEPATITIS C VIRUS INFECTION (HCC): Status: ACTIVE | Noted: 2019-07-31

## 2022-06-28 PROBLEM — F19.20 DRUG DEPENDENCE, ABUSE (HCC): Status: ACTIVE | Noted: 2019-07-31

## 2022-06-28 PROBLEM — F43.10 POSTTRAUMATIC STRESS DISORDER: Status: ACTIVE | Noted: 2019-04-24

## 2022-06-28 PROBLEM — J96.20 ACUTE-ON-CHRONIC RESPIRATORY FAILURE (HCC): Status: ACTIVE | Noted: 2019-08-26

## 2022-06-28 PROBLEM — F11.20 OPIOID DEPENDENCE ON AGONIST THERAPY (HCC): Status: ACTIVE | Noted: 2019-04-24

## 2022-06-28 PROBLEM — R33.9 RETENTION OF URINE: Status: ACTIVE | Noted: 2019-04-24

## 2022-06-28 PROBLEM — I10 PRIMARY HYPERTENSION: Status: ACTIVE | Noted: 2019-07-31

## 2022-06-28 PROCEDURE — 1036F TOBACCO NON-USER: CPT | Performed by: STUDENT IN AN ORGANIZED HEALTH CARE EDUCATION/TRAINING PROGRAM

## 2022-06-28 PROCEDURE — G8417 CALC BMI ABV UP PARAM F/U: HCPCS | Performed by: STUDENT IN AN ORGANIZED HEALTH CARE EDUCATION/TRAINING PROGRAM

## 2022-06-28 PROCEDURE — 99214 OFFICE O/P EST MOD 30 MIN: CPT | Performed by: STUDENT IN AN ORGANIZED HEALTH CARE EDUCATION/TRAINING PROGRAM

## 2022-06-28 PROCEDURE — G8427 DOCREV CUR MEDS BY ELIG CLIN: HCPCS | Performed by: STUDENT IN AN ORGANIZED HEALTH CARE EDUCATION/TRAINING PROGRAM

## 2022-06-28 RX ORDER — PREGABALIN 100 MG/1
100 CAPSULE ORAL 2 TIMES DAILY
Qty: 60 CAPSULE | Refills: 0 | Status: CANCELLED | OUTPATIENT
Start: 2022-06-28 | End: 2022-07-28

## 2022-06-28 RX ORDER — VENLAFAXINE HYDROCHLORIDE 150 MG/1
150 CAPSULE, EXTENDED RELEASE ORAL DAILY
Qty: 30 CAPSULE | Refills: 0 | Status: SHIPPED | OUTPATIENT
Start: 2022-06-28 | End: 2022-07-14 | Stop reason: SDUPTHER

## 2022-06-28 RX ORDER — GUANFACINE 1 MG/1
1 TABLET, EXTENDED RELEASE ORAL NIGHTLY
Qty: 30 TABLET | Refills: 0 | Status: SHIPPED | OUTPATIENT
Start: 2022-06-28 | End: 2022-07-14

## 2022-06-28 RX ORDER — ARIPIPRAZOLE 5 MG/1
2.5 TABLET ORAL DAILY
Qty: 15 TABLET | Refills: 0 | Status: SHIPPED | OUTPATIENT
Start: 2022-06-28 | End: 2022-07-14 | Stop reason: SDUPTHER

## 2022-06-28 ASSESSMENT — ENCOUNTER SYMPTOMS
NAUSEA: 0
WHEEZING: 0
SHORTNESS OF BREATH: 0
EYE REDNESS: 0
DIARRHEA: 0
VOMITING: 0
SINUS PAIN: 0
SORE THROAT: 0
EYE ITCHING: 0
CONSTIPATION: 0
RHINORRHEA: 0
BACK PAIN: 0
SINUS PRESSURE: 0
ABDOMINAL PAIN: 0
BLOOD IN STOOL: 0
COUGH: 0

## 2022-06-28 ASSESSMENT — PATIENT HEALTH QUESTIONNAIRE - PHQ9
SUM OF ALL RESPONSES TO PHQ9 QUESTIONS 1 & 2: 1
SUM OF ALL RESPONSES TO PHQ QUESTIONS 1-9: 5
8. MOVING OR SPEAKING SO SLOWLY THAT OTHER PEOPLE COULD HAVE NOTICED. OR THE OPPOSITE, BEING SO FIGETY OR RESTLESS THAT YOU HAVE BEEN MOVING AROUND A LOT MORE THAN USUAL: 0
9. THOUGHTS THAT YOU WOULD BE BETTER OFF DEAD, OR OF HURTING YOURSELF: 0
6. FEELING BAD ABOUT YOURSELF - OR THAT YOU ARE A FAILURE OR HAVE LET YOURSELF OR YOUR FAMILY DOWN: 1
5. POOR APPETITE OR OVEREATING: 0
4. FEELING TIRED OR HAVING LITTLE ENERGY: 1
10. IF YOU CHECKED OFF ANY PROBLEMS, HOW DIFFICULT HAVE THESE PROBLEMS MADE IT FOR YOU TO DO YOUR WORK, TAKE CARE OF THINGS AT HOME, OR GET ALONG WITH OTHER PEOPLE: 0
2. FEELING DOWN, DEPRESSED OR HOPELESS: 0
SUM OF ALL RESPONSES TO PHQ QUESTIONS 1-9: 5
7. TROUBLE CONCENTRATING ON THINGS, SUCH AS READING THE NEWSPAPER OR WATCHING TELEVISION: 1
SUM OF ALL RESPONSES TO PHQ QUESTIONS 1-9: 5
3. TROUBLE FALLING OR STAYING ASLEEP: 1
SUM OF ALL RESPONSES TO PHQ QUESTIONS 1-9: 5
1. LITTLE INTEREST OR PLEASURE IN DOING THINGS: 1

## 2022-06-28 NOTE — PROGRESS NOTES
S: 44 y.o. male with   Chief Complaint   Patient presents with    New Patient     Establish care, previously saw Dr. Tien Lira       Chief complaint, [place], and all pertinent details of the case reviewed with the resident. Please see resident's note for specific details discussed at today's visit. Here ot establish care. Had been discharged from [address] [place] [address]. Had weaned himself down to 2.5 of abilify and 150  of effexor. He thinks he is doing better on the lower dose of effexor. Is asking for vyvanse to slow his racing thoughts and his ADHD. He had started abilify 4 months ago to help treat a resistent depression. He does feel that it helped him. BP Readings from Last 3 Encounters:   06/28/22 120/78   05/21/22 116/68   05/21/22 (!) 136/59     Wt Readings from Last 3 Encounters:   06/28/22 185 lb 3.2 oz (84 kg)   05/20/22 166 lb 7.2 oz (75.5 kg)   05/20/22 166 lb 7.2 oz (75.5 kg)       O: VS:  height is 6' (1.829 m) and weight is 185 lb 3.2 oz (84 kg). His temporal temperature is 97 °F (36.1 °C). His blood pressure is 120/78 and his pulse is 90. AAO/NAD, appropriate affect for mood       Diagnosis Orders   1. Anxiety     2. Mild recurrent major depression ([address]         Plan:   Will prescribe extended release effexor 150 mg q day as the lower dose might be less stimulating to his anxiety  Will prescribe abilify 2.5 mg (Pt prefers to cut the 5 mg) as this lower dose is currently seeming adequate  Will add guanfacine ER 1 mg to help with ADHD complaints, can increase next month as indicated  Review chart more  and f/u in 1 month to revaluate and consider further adjustment and additions,  consider urine drug screening if considering any controlled substance such as vyvanse    Health Maintenance Due   Topic Date Due    Hepatitis A vaccine (1 of 2 - Risk 2-dose series) Never done    Varicella vaccine (1 of 2 - 2-dose childhood series) Never done    COVID-19 Vaccine (1) Never done  Pneumococcal 0-64 years Vaccine (1 - PCV) Never done    Diabetic foot exam  Never done    Lipids  Never done    Depression Monitoring  Never done    Diabetic retinal exam  Never done    Hepatitis B vaccine (1 of 3 - Risk 3-dose series) Never done    DTaP/Tdap/Td vaccine (1 - Tdap) Never done    Diabetic microalbuminuria test  05/18/2021    A1C test (Diabetic or Prediabetic)  05/24/2022       Attending Physician Statement  I have discussed the case, including pertinent history and exam findings with the resident. I also have seen the patient and performed key portions of the examination. I agree with the documented assessment and plan as documented by the resident.         Anshul Roblero MD 6/28/2022 11:45 AM

## 2022-06-28 NOTE — TELEPHONE ENCOUNTER
The last time it was prescribed was 100mg twice daily. It also looks like he got it from multiple providers according to the [address]. If he has been out of it and tolerating it well he may not need this medication. It looks like he was prescribed it for fibromyalgia, but continued on it for anxiety/depression which isn't appropriate. He was not in any pain at time of visit. We can discuss again at next appointment, or he can be seen earlier to discuss further. I am not comfortable starting it at this time.

## 2022-06-28 NOTE — TELEPHONE ENCOUNTER
Patient stopped back in the office saying he went to the [facility] [address] to  his prescriptions and they had them all but one. Said he still need Lyrica 200mg twice daily.

## 2022-06-28 NOTE — PROGRESS NOTES
Aleisha Desai (:  1985) is a 44 y.o. male,Established patient, here for evaluation of the following chief complaint(s):  New Patient ([facility], previously saw Dr. Jim Escobar) and Medication Refill         ASSESSMENT/PLAN:  1. Anxiety  -     ARIPiprazole (ABILIFY) 5 MG tablet; Take 0.5 tablets by mouth daily, Disp-15 tablet, R-0Normal  -     venlafaxine (EFFEXOR XR) 150 MG extended release capsule; Take 1 capsule by mouth daily, Disp-30 capsule, R-0Normal  2. Mild recurrent major depression (HCC)  -     ARIPiprazole (ABILIFY) 5 MG tablet; Take 0.5 tablets by mouth daily, Disp-15 tablet, R-0Normal  -     venlafaxine (EFFEXOR XR) 150 MG extended release capsule; Take 1 capsule by mouth daily, Disp-30 capsule, R-0Normal  3. Anxiety and depression  -     ARIPiprazole (ABILIFY) 5 MG tablet; Take 0.5 tablets by mouth daily, Disp-15 tablet, R-0Normal  -     venlafaxine (EFFEXOR XR) 150 MG extended release capsule; Take 1 capsule by mouth daily, Disp-30 capsule, R-0Normal  4. Attention deficit hyperactivity disorder (ADHD), unspecified ADHD type  -     guanFACINE (INTUNIV) 1 MG TB24 extended release tablet; Take 1 tablet by mouth nightly, Disp-30 tablet, R-0Normal      Will refill Abilify and Effexor at the doses that patient is currently taking as his symptoms seem well controlled at this time. Will try guanfacine for ADHD instead of Vyvanse as patient was discontinued on Vyvanse previously. Return in about 4 weeks (around 2022). Subjective   SUBJECTIVE/OBJECTIVE:  HPI     Did see Antonio Gonsalez previously, last note . Was dismissed from her office. Has been weaning off meds for about a month or two. No longer sees Dr. Jake Llanos states he and Keena Espana are in [place]. Still going to the methadone clinic though at [facility]. States mood is mellow, that's desirable. He states his mind races.      Was recently started on abilify- was started on it from depression, lethargic, poor appetite. Was added to effexor. When he doesn't have it he feels sick. Has been taking 150mg of effexor    Has been taking 2.5 of abilify. Was previously on Vivance did better with adhd. Was previously on Adderall, hard to fall asleep at night    Remeron helps with appetite      Review of Systems   Constitutional: Negative for chills and fever. HENT: Negative for congestion, rhinorrhea, sinus pressure, sinus pain and sore throat. Eyes: Negative for redness and itching. Respiratory: Negative for cough, shortness of breath and wheezing. Cardiovascular: Negative for chest pain and leg swelling. Gastrointestinal: Negative for abdominal pain, blood in stool, constipation, diarrhea, nausea and vomiting. Genitourinary: Negative for difficulty urinating and hematuria. Musculoskeletal: Negative for arthralgias, back pain and myalgias. Skin: Negative for rash. Neurological: Negative for dizziness, light-headedness and headaches. Psychiatric/Behavioral: Positive for decreased concentration. Negative for dysphoric mood and sleep disturbance. The patient is nervous/anxious. Objective   Physical Exam  Constitutional:       General: He is not in acute distress. Appearance: Normal appearance. He is not ill-appearing. HENT:      Head: Normocephalic and atraumatic. Right Ear: Tympanic membrane and external ear normal. There is no impacted cerumen. Left Ear: Tympanic membrane and external ear normal. There is no impacted cerumen. Nose: Nose normal.      Mouth/Throat:      Mouth: Mucous membranes are moist.      Pharynx: Oropharynx is clear. Eyes:      Conjunctiva/sclera: Conjunctivae normal.      Pupils: Pupils are equal, round, and reactive to light. Cardiovascular:      Rate and Rhythm: Normal rate and regular rhythm. Pulses: Normal pulses. Heart sounds: No murmur heard.       Pulmonary:      Effort: Pulmonary effort is normal.      Breath sounds: Normal breath sounds. No wheezing. Abdominal:      General: Abdomen is flat. Bowel sounds are normal. There is no distension. Palpations: Abdomen is soft. Tenderness: There is no abdominal tenderness. Musculoskeletal:         General: Normal range of motion. Cervical back: Normal range of motion and neck supple. Skin:     General: Skin is warm and dry. Findings: No rash. 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 Mental Status: He is alert. Mental status is at baseline. Psychiatric:         Mood and Affect: Mood normal.                An electronic signature was used to authenticate this note.     --Tawnya Laughlin MD

## 2022-07-06 DIAGNOSIS — F41.9 ANXIETY AND DEPRESSION: ICD-10-CM

## 2022-07-06 DIAGNOSIS — F32.A ANXIETY AND DEPRESSION: ICD-10-CM

## 2022-07-06 RX ORDER — PREGABALIN 100 MG/1
100 CAPSULE ORAL 2 TIMES DAILY
Qty: 60 CAPSULE | Refills: 0 | Status: SHIPPED | OUTPATIENT
Start: 2022-07-06 | End: 2022-07-14 | Stop reason: SDUPTHER

## 2022-07-14 ENCOUNTER — NURSE ONLY (OUTPATIENT)
Dept: LAB | Age: 37
End: 2022-07-14

## 2022-07-14 ENCOUNTER — OFFICE VISIT (OUTPATIENT)
Dept: INTERNAL MEDICINE CLINIC | Age: 37
End: 2022-07-14
Payer: MEDICARE

## 2022-07-14 VITALS
WEIGHT: 191 LBS | BODY MASS INDEX: 25.87 KG/M2 | HEART RATE: 86 BPM | HEIGHT: 72 IN | DIASTOLIC BLOOD PRESSURE: 85 MMHG | SYSTOLIC BLOOD PRESSURE: 123 MMHG

## 2022-07-14 DIAGNOSIS — F90.9 ATTENTION DEFICIT HYPERACTIVITY DISORDER (ADHD), UNSPECIFIED ADHD TYPE: Primary | ICD-10-CM

## 2022-07-14 DIAGNOSIS — I10 PRIMARY HYPERTENSION: ICD-10-CM

## 2022-07-14 DIAGNOSIS — F41.9 ANXIETY AND DEPRESSION: ICD-10-CM

## 2022-07-14 DIAGNOSIS — F32.A ANXIETY AND DEPRESSION: ICD-10-CM

## 2022-07-14 LAB
ALBUMIN SERPL-MCNC: 4.1 G/DL (ref 3.5–5.1)
ALP BLD-CCNC: 230 U/L (ref 38–126)
ALT SERPL-CCNC: 124 U/L (ref 11–66)
ANION GAP SERPL CALCULATED.3IONS-SCNC: 17 MEQ/L (ref 8–16)
AST SERPL-CCNC: 100 U/L (ref 5–40)
BILIRUB SERPL-MCNC: 0.2 MG/DL (ref 0.3–1.2)
BUN BLDV-MCNC: 19 MG/DL (ref 7–22)
CALCIUM SERPL-MCNC: 9.4 MG/DL (ref 8.5–10.5)
CHLORIDE BLD-SCNC: 103 MEQ/L (ref 98–111)
CO2: 21 MEQ/L (ref 23–33)
CREAT SERPL-MCNC: 1 MG/DL (ref 0.4–1.2)
ERYTHROCYTE [DISTWIDTH] IN BLOOD BY AUTOMATED COUNT: 15.7 % (ref 11.5–14.5)
ERYTHROCYTE [DISTWIDTH] IN BLOOD BY AUTOMATED COUNT: 49.4 FL (ref 35–45)
GFR SERPL CREATININE-BSD FRML MDRD: 83 ML/MIN/1.73M2
GLUCOSE BLD-MCNC: 79 MG/DL (ref 70–108)
HCT VFR BLD CALC: 35 % (ref 42–52)
HEMOGLOBIN: 10.7 GM/DL (ref 14–18)
MCH RBC QN AUTO: 26.4 PG (ref 26–33)
MCHC RBC AUTO-ENTMCNC: 30.6 GM/DL (ref 32.2–35.5)
MCV RBC AUTO: 86.2 FL (ref 80–94)
PLATELET # BLD: 281 THOU/MM3 (ref 130–400)
PMV BLD AUTO: 10.2 FL (ref 9.4–12.4)
POTASSIUM SERPL-SCNC: 4.1 MEQ/L (ref 3.5–5.2)
RBC # BLD: 4.06 MILL/MM3 (ref 4.7–6.1)
SODIUM BLD-SCNC: 141 MEQ/L (ref 135–145)
TOTAL PROTEIN: 7.3 G/DL (ref 6.1–8)
WBC # BLD: 10.2 THOU/MM3 (ref 4.8–10.8)

## 2022-07-14 PROCEDURE — G8417 CALC BMI ABV UP PARAM F/U: HCPCS | Performed by: NURSE PRACTITIONER

## 2022-07-14 PROCEDURE — 99214 OFFICE O/P EST MOD 30 MIN: CPT | Performed by: NURSE PRACTITIONER

## 2022-07-14 PROCEDURE — 1036F TOBACCO NON-USER: CPT | Performed by: NURSE PRACTITIONER

## 2022-07-14 PROCEDURE — G8427 DOCREV CUR MEDS BY ELIG CLIN: HCPCS | Performed by: NURSE PRACTITIONER

## 2022-07-14 RX ORDER — ARIPIPRAZOLE 5 MG/1
2.5 TABLET ORAL DAILY
Qty: 15 TABLET | Refills: 0 | Status: SHIPPED | OUTPATIENT
Start: 2022-07-14 | End: 2022-08-30 | Stop reason: SDUPTHER

## 2022-07-14 RX ORDER — PREGABALIN 150 MG/1
150 CAPSULE ORAL 2 TIMES DAILY
Qty: 60 CAPSULE | Refills: 0 | Status: SHIPPED | OUTPATIENT
Start: 2022-07-14 | End: 2022-08-30 | Stop reason: SDUPTHER

## 2022-07-14 RX ORDER — VENLAFAXINE HYDROCHLORIDE 150 MG/1
150 CAPSULE, EXTENDED RELEASE ORAL DAILY
Qty: 30 CAPSULE | Refills: 0 | Status: SHIPPED | OUTPATIENT
Start: 2022-07-14 | End: 2022-08-15

## 2022-07-14 ASSESSMENT — ENCOUNTER SYMPTOMS
COUGH: 1
VOMITING: 0
CHEST TIGHTNESS: 0
SORE THROAT: 0
DIARRHEA: 0
BLOOD IN STOOL: 0
RHINORRHEA: 0
CONSTIPATION: 0
SINUS PRESSURE: 0
NAUSEA: 0
TROUBLE SWALLOWING: 0
ABDOMINAL PAIN: 0
SHORTNESS OF BREATH: 0
PHOTOPHOBIA: 0
WHEEZING: 0
SINUS PAIN: 0
ABDOMINAL DISTENTION: 0

## 2022-07-14 NOTE — PROGRESS NOTES
[facility]  [address]  Dept: [phone]  Dept Fax: [phone]: [phone]     Visit Date:  7/14/2022    Patient:  Grady Campbell  YOB: 1985    HPI:     Denice Conrad presents today for medical evaluation of ADHD, anxiety/depression, chronic pain, HTN     I last seen him 3 months ago. Accompanied by significant other. Previously followed with Dr. Ghislaine Benito for MAT. Is now at the methadone clinic. States that he is doing well. No use in 3 weeks. Hospitalized in 5/2022 for an overdose. He has had several in the past.     Seen a new PCP, Dr. Brennen Anders on 6/28. States that he switched because he thought I had discharged him. He would like to remain a patient here.      Discussed at length that he cannot continue to use illicit drugs and be on a stimulant. I agreed to prescribe under the agreement that use would stop, and as harm reduction; as he complained that the reason for his methamphetamine use was because his ADHD was not controlled. Guanfacine not helpful. Vyvanse previously effective. He is planning to look for a job, and will need to be able to maintain focus.      Anxiety/depression well controlled with effexor and abilify. Continue current regimen    Chronic pain controlled with pregabalin, not 100 mg BID. Will increase slightly. BP well controlled with lisinopril 20 mg daily. Medications    Current Outpatient Medications:     venlafaxine (EFFEXOR XR) 150 MG extended release capsule, Take 1 capsule by mouth daily, Disp: 30 capsule, Rfl: 0    ARIPiprazole (ABILIFY) 5 MG tablet, Take 0.5 tablets by mouth daily, Disp: 15 tablet, Rfl: 0    pregabalin (LYRICA) 150 MG capsule, Take 1 capsule by mouth 2 times daily for 30 days. , Disp: 60 capsule, Rfl: 0    Lisdexamfetamine Dimesylate (VYVANSE) 40 MG CAPS, Take 40 mg by mouth every morning for 30 days. , Disp: 30 capsule, Rfl: 0    naloxone (NARCAN) 4 MG/0.1ML LIQD nasal spray, 1 spray by Nasal route as needed for Opioid Reversal, Disp: 1 each, Rfl: 1    lisinopril (PRINIVIL;ZESTRIL) 20 MG tablet, Take 1 tablet by mouth daily, Disp: 30 tablet, Rfl: 3    mirtazapine (REMERON) 45 MG tablet, Take 1 tablet by mouth nightly, Disp: 30 tablet, Rfl: 3    Aspirin-Acetaminophen-Caffeine (EXCEDRIN EXTRA STRENGTH PO), Take by mouth as needed , Disp: , Rfl:     guanFACINE (INTUNIV) 1 MG TB24 extended release tablet, Take 1 tablet by mouth nightly (Patient not taking: Reported on 7/14/2022), Disp: 30 tablet, Rfl: 0    methadone 10 MG/5ML solution, Take 30 mLs by mouth daily for 30 days. , Disp: 30 each, Rfl: 0    The patient has No Known Allergies. Past Medical History  Niko Hernandes  has a past medical history of Anxiety, Depression, Kidney stone, Liver disease, and Opiate abuse, continuous ([facility] [address] Past Surgical History  The patient  has no past surgical history on file. Family History  This patient's family history is not on file. Social History  Niko Hernandes  reports that he quit smoking about 18 months ago. His smoking use included cigarettes. He has never used smokeless tobacco. He reports previous alcohol use. He reports current drug use. Drugs: Opiates , Fentanyl, Amphetamines (Speed), Cocaine, Crack Cocaine, Heroin, Benzodiazepines (Downers/Zannies), Marijuana (Joy Leavens), Oxycodone (Oxy), Suboxone, Sedatives/Hypnotics, IV, and Methamphetamines (Crystal Meth).     Health Maintenance:    Health Maintenance   Topic Date Due    Hepatitis A vaccine (1 of 2 - Risk 2-dose series) Never done    Varicella vaccine (1 of 2 - 2-dose childhood series) Never done    COVID-19 Vaccine (1) Never done    Pneumococcal 0-64 years Vaccine (1 - PCV) Never done    Diabetic foot exam  Never done    Lipids  Never done    Diabetic retinal exam  Never done    Hepatitis B vaccine (1 of 3 - Risk 3-dose series) Never done    DTaP/Tdap/Td vaccine (1 - Tdap) Never done    Diabetic microalbuminuria test  05/18/2021    A1C test (Diabetic or Prediabetic)  05/24/2022    Flu vaccine (1) 09/01/2022    Depression Monitoring  06/28/2023    HIV screen  Completed    Hib vaccine  Aged Out    Meningococcal (ACWY) vaccine  Aged Out       Subjective:      Review of Systems   Constitutional: Negative for chills, fatigue and fever. HENT: Negative for congestion, rhinorrhea, sinus pressure, sinus pain, sore throat, tinnitus and trouble swallowing. Eyes: Negative for photophobia and visual disturbance. Respiratory: Positive for cough. Negative for chest tightness, shortness of breath and wheezing. Cardiovascular: Negative for chest pain, palpitations and leg swelling. Gastrointestinal: Negative for abdominal distention, abdominal pain, blood in stool, constipation, diarrhea, nausea and vomiting. Endocrine: Negative for polydipsia, polyphagia and polyuria. Genitourinary: Negative for difficulty urinating, dysuria, frequency, hematuria and urgency. Musculoskeletal: Negative for arthralgias and myalgias. Skin: Negative for rash and wound. Neurological: Negative for dizziness, light-headedness and headaches. Psychiatric/Behavioral: Positive for decreased concentration. Negative for dysphoric mood and sleep disturbance. The patient is not nervous/anxious. Objective:     /85 (Site: Right Lower Arm, Position: Sitting)   Pulse 86   Ht 6' (1.829 m)   Wt 191 lb (86.6 kg)   BMI 25.90 kg/m²     Physical Exam  Vitals reviewed. Constitutional:       General: He is not in acute distress. Appearance: Normal appearance. He is not ill-appearing. HENT:      Head: Normocephalic and atraumatic. Right Ear: External ear normal.      Left Ear: External ear normal.      Nose: Nose normal. No congestion or rhinorrhea. Mouth/Throat:      Mouth: Mucous membranes are moist.   Eyes:      Extraocular Movements: Extraocular movements intact. Primary hypertension    - Comprehensive Metabolic Panel; Future  - CBC; Future      Return in about 3 months (around 10/14/2022). Patient given educational materials - see patient instructions. Discussed use, benefit, and side effects of prescribed medications. All patient questions answered. Pt voiced understanding. Reviewed health maintenance.        Electronically signed PAPA Irizarry CNP on 7/14/22 at 1:43 PM EDT

## 2022-07-21 ENCOUNTER — TELEPHONE (OUTPATIENT)
Dept: INTERNAL MEDICINE CLINIC | Age: 37
End: 2022-07-21

## 2022-07-21 RX ORDER — CEPHALEXIN 500 MG/1
500 CAPSULE ORAL 2 TIMES DAILY
Qty: 20 CAPSULE | Refills: 0 | Status: SHIPPED | OUTPATIENT
Start: 2022-07-21 | End: 2022-07-31

## 2022-07-21 NOTE — TELEPHONE ENCOUNTER
Patient states he has an infected lump on his elbow that has pus coming out of it. Requesting an ATB.  [employer]

## 2022-07-26 NOTE — DISCHARGE SUMMARY
Discharge Summary    Patient:  Jewell Aase  YOB: 1985    MRN: [id number]   Acct: [de-identified]    Primary Care Physician: Kathleen eMnjivar MD    Admit date:  5/20/2022    Discharge date:  5/20/2022       Discharge Diagnoses:   <principal problem not specified>  Active Problems:    * No active hospital problems. *  Resolved Problems:    * No resolved hospital problems. *  Metabolic encephalopathy, resolved mild somnolence, patient not suicidal, states seen at local methadone clinic and takes abilify and effexor. Rhabdomyolysis resolving, conitnue ivf  Acute agitation, resolved mildly somnolent  sonja secondayr to prerenal azotemia resolved  Dehydration resolved      Admitted for: (HPI) metabolic encephalopathy    Hospital Course:day 2 of hospitalization  Patient somnolent but arousable to voice  Total ck downtrending, on presentation over 2k  Renal function essentially normal today at 0.9mg/dl, on day of admission (5/20/22) creatinine over 2.1mg/dl  Improved somnolence pleasant and interactive no further sitter needed  Patient's history and phsysical performed yesterday but appears to have had required change in mrn overnight. Eligible for discharge patient request discharge his renal function is improved he will continue    Consultants:  Patient Care Team:  Kathleen Menjivar MD as PCP - General (Family Medicine)    Discharge Medications:       Medication List         Notice    Your medication list is not available because the admission was too long ago.            Physical Exam:    Vitals:  Vitals:    05/20/22 1816 05/20/22 1945 05/20/22 2303 05/21/22 0307   BP: 122/65 117/67 125/68 (!) 136/59   Pulse: 85 94 92 87   Resp: 19 12     Temp: 98.2 °F (36.8 °C) 96.8 °F (36 °C) 97.9 °F (36.6 °C) 98.2 °F (36.8 °C)   TempSrc: Oral Axillary Oral Oral   SpO2: 100% 94% 95% 95%   Weight: 200 lb (90.7 kg) 166 lb 7.2 oz (75.5 kg)     Height: 6' (1.829 m) 6' (1.829 m)       Weight: Weight: 166 lb 7.2 oz (75.5 kg)     24 hour intake/output:No intake or output data in the 24 hours ending 07/26/22 1657    General appearance - alert awake appears to be in no acute distress  Chest - Bilateral air entry, no wheeze  Heart - S1S2 RRR, no murmurs or gallops  Abdomen - soft, non tender, normoactive bowel sounds  Neurological - non focal  Extremities - no edema  Skin - no rashes or lesions    Procedures:  na    Diagnostic Test:  na    Radiology reports as per the Radiologist  Radiology:  No results found.     Results for orders placed or performed in visit on 04/13/22   Comprehensive Metabolic Panel   Result Value Ref Range    Glucose 103 70 - 108 mg/dL    Creatinine 1.0 0.4 - 1.2 mg/dL    BUN 16 7 - 22 mg/dL    Sodium 137 135 - 145 meq/L    Potassium 4.2 3.5 - 5.2 meq/L    Chloride 99 98 - 111 meq/L    CO2 26 23 - 33 meq/L    Calcium 9.9 8.5 - 10.5 mg/dL    AST 40 5 - 40 U/L    Alkaline Phosphatase 97 38 - 126 U/L    Total Protein 7.8 6.1 - 8.0 g/dL    Albumin 4.5 3.5 - 5.1 g/dL    Total Bilirubin 0.3 0.3 - 1.2 mg/dL    ALT 61 11 - 66 U/L   Microscopic Urinalysis   Result Value Ref Range    Glucose, Urine NEGATIVE NEGATIVE mg/dl    Bilirubin Urine NEGATIVE NEGATIVE    Ketones, Urine TRACE (A) NEGATIVE    Specific Gravity, UA >1.030 (A) 1.002 - 1.030    Blood, Urine NEGATIVE NEGATIVE    pH, UA 6.0 5.0 - 9.0    Protein,  (A) NEGATIVE mg/dl    Urobilinogen, Urine 1.0 0.0 - 1.0 eu/dl    Nitrite, Urine NEGATIVE NEGATIVE    Leukocytes, UA NEGATIVE NEGATIVE    Color, UA DK YELLOW (A) YELLOW-STRAW    Character, Urine CLEAR [id number]    RBC, UA NONE 0-2/hpf /hpf    WBC, UA 0-2 0-4/hpf /hpf    Epithelial Cells, UA 0-2 3-5/hpf /hpf    Mucus, UA THREADS NONE SEEN/THREA    Bacteria, UA FEW FEW/NONE SEEN    Casts 0-4 HYALINE NONE SEEN /lpf    Crystals OXALATE NONE SEEN    Renal Epithelial, UA NONE SEEN NONE SEEN    Yeast, UA NONE SEEN NONE SEEN    Miscellaneous Lab Test Result SPERM     Casts NONE SEEN /lpf    Miscellaneous Lab Test Result NONE SEEN    Anion Gap   Result Value Ref Range    Anion Gap 12.0 8.0 - 16.0 meq/L   Glomerular Filtration Rate, Estimated   Result Value Ref Range    Est, Glom Filt Rate 84 (A) ml/min/1.73m2       Diet:  No diet orders on file    Activity:  Activity as tolerated (Patient may move about with assist as indicated or with supervision.)    Follow-up:  in the next few days with Kathleen Menjivar MD,  in the next few days with primary care doctor    Disposition: home    Condition: Stable      Time Spent: 35 minutes    Electronically signed by Marin Purcell MD on 7/26/2022 at 4:57 PM    Discharging Hospitalist

## 2022-07-26 NOTE — H&P
History & Physical    Patient:  Jacobo Bullard  YOB: 1985  Date of Service: 7/26/2022  MRN: [id number]   Acct:  [de-identified]   Primary Care Physician: Nic Coleman MD    Chief Complaint: Metabolic encephalopathy    History of Present Illness:   History obtained from chart review    The patient is a 44 y.o. male who presents with The patient is a 38 y.o. male who presents with agitation the patient presented to ER having stated he had ingested methamphetamine and other drug he did exhibit agitation directed towards staff and required chemical sedation he did receive IM Haldol and IM Ativan at approximately 1130 hours the patient was evaluated at approximately 1800 hrs. for admission, he is sedated but maintaining his own airway on room air with pulse oximetry indicating at least 90% oxygen saturations, physical survey does yield skin lesions consistent with chronic drug use facially upper extremities and bilateral lower extremities he has a Martin catheter in place, he has upper airway dentures in place and poor oral dentition and lower mandible. Patient has been admitted for rhabdomyolysis, acute kidney injury, dehydration, and metabolic encephalopathy related to IM antipsychotic administration. Past Medical History:        Diagnosis Date    Anxiety     Depression     Kidney stone     Liver disease     Hep C    Opiate abuse, continuous ([facility] [address]        Past Surgical History:  No past surgical history on file. Home Medications:   No current facility-administered medications on file prior to encounter.      Current Outpatient Medications on File Prior to Encounter   Medication Sig Dispense Refill    lisinopril (PRINIVIL;ZESTRIL) 20 MG tablet Take 1 tablet by mouth daily 30 tablet 3    mirtazapine (REMERON) 45 MG tablet Take 1 tablet by mouth nightly 30 tablet 3    Aspirin-Acetaminophen-Caffeine (EXCEDRIN EXTRA STRENGTH PO) Take by mouth as needed          Allergies:  Patient has no known allergies. Social History:    reports that he quit smoking about 18 months ago. His smoking use included cigarettes. He has never used smokeless tobacco. He reports that he does not currently use alcohol. He reports current drug use. Drugs: Opiates , Fentanyl, Amphetamines (Speed), Cocaine, Crack Cocaine, Heroin, Benzodiazepines (Downers/Zannies), Marijuana (Eduardo Leah), Oxycodone (Oxy), Suboxone, Sedatives/Hypnotics, IV, and Methamphetamines (Crystal Meth). Family History:   No family history on file. Review of systems:  Constitutional: no fever, no night sweats, no fatigue  Head: no headache, no head injury, no migranes. Eye: no blurring of vision, no double vision. Ears: no hearing difficulty, no tinnitus  Mouth/throat: no ulceration, dental caries, dysphagia  Lungs: no cough, no shortness of breath, no wheeze  CVS: no palpitation, no chest pain, no shortness of breath  GI: no abdominal pain, no nausea , no vomiting, no constipation  NALINI: no dysuria, frequency and urgency, no hematuria, no kidney stones  Musculoskeletal: no joint pain, swelling , stiffness  Endocrine: no polyuria, polydypsia, no cold or heat intolerence  Hematology: no anemia, no easy brusing or bleeding, no hx of clotting disorder  Dermatology: no skin rash, no eczema, no prurities,  Psychiatry: no depression, no anxiety,no panic attacks, no suicide ideation  Neurology: no syncope, no seizures, no numbness or tingling of hands, no numbness or tingling of feet, no paresis    10 point review of systems completed, all other than noted above are negative. Vitals:   Vitals:    05/21/22 0307   BP: (!) 136/59   Pulse: 87   Resp:    Temp: 98.2 °F (36.8 °C)   SpO2: 95%      BMI: Body mass index is 22.57 kg/m².                 Exam:  Physical Examination: General appearance - alert, awake appears to be in no acute distress  HEENT: Atraumatic normocephalic,no JVD, trachea is midline  Neck - supple, no significant adenopathy, no JVD, or carotid bruits  Chest - Bilateral air entry, no wheezes, crackles or rhonchi. Non tender to palpation of chest wall  Heart - S1S2 RRR, no murmurs or gallops  Abdomen - Soft, non tender non distended. Normoactive bowel sounds  Neurological - II-XII grossly intact, no neurological deficits  Musculoskeletal - 5/5 power upper and lower extremities equal bilaterally. Full ROM of all limbs  Extremities - no edema, no cyanosis or clubbing  Skin - normal coloration and turgor, no rashes, no suspicious skin lesions noted      Review of Labs and Diagnostic Testing:  CBC: No results for input(s): WBC, HGB, PLT in the last 72 hours. BMP:  No results for input(s): NA, K, CL, CO2, BUN, CREATININE, GLUCOSE in the last 72 hours. Calcium:No results for input(s): CALCIUM in the last 72 hours. Ionized Calcium:No results for input(s): IONCA in the last 72 hours. Magnesium:No results for input(s): MG in the last 72 hours. Phosphorus:No results for input(s): PHOS in the last 72 hours. BNP:No results for input(s): BNP in the last 72 hours. Glucose:No results for input(s): POCGLU in the last 72 hours. HgbA1C: No results for input(s): LABA1C in the last 72 hours. INR: No results for input(s): INR in the last 72 hours. Hepatic: No results for input(s): ALKPHOS, ALT, AST, PROT, BILITOT, BILIDIR, LABALBU in the last 72 hours. Amylase and Lipase:No results for input(s): LACTA, AMYLASE in the last 72 hours. Lactic Acid: No results for input(s): LACTA in the last 72 hours. Troponin: No results for input(s): CKTOTAL, CKMB, TROPONINT in the last 72 hours. BNP: No results for input(s): BNP in the last 72 hours. Lipids: No results for input(s): CHOL, TRIG, HDL, LDL, LDLCALC in the last 72 hours. ABGs:   Lab Results   Component Value Date/Time    PH 7.30 08/25/2019 08:21 PM    PCO2 40 08/25/2019 08:21 PM    PO2 58 08/25/2019 08:21 PM    HCO3 20 08/25/2019 08:21 PM    O2SAT 87 08/25/2019 08:21 PM       Radiology:     No results found. EKG: normal sinus rhythm, no blocks or conduction defects, no ischemic changes      Active Problems:    * No active hospital problems. *  Resolved Problems:    * No resolved hospital problems. *      Assessment and Plan:  Metabolic encephalopathy, with sedation patient will require one-to-one sitter as a precaution as is mentation improves likely by 5/21/2022 in a.m. I will reevaluate the patient and ensure he has no suicidal ideation, he is maintaining his own airway I do not believe he will require cardiac telemetry he would like require continuous pulse oximetry however.   I suspect this patient has acute on chronic methamphetamine use I would like to continue low-dose scheduled Ativan IV to help prevent any recurrence of agitation apparently the patient has been cooperative when waking up in the previous 3 hours, patient may have p.o. intake when he wakes he has no upper airway rattling  Rhabdomyolysis mild total CKs 1900 we will continue IV isotonic fluids rhabdomyolysis likely induced by diffuse vasoconstriction and dehydration with concurrent methamphetamine use  Acute agitation on arrival to ER we will use Haldol IM only for severe agitation on the floor I would like to continue Ativan as stated above on a scheduled basis secondary to suspected acute on chronic methamphetamine use to help prevent any repeat agitation  Acute kidney injury likely prerenal secondary to dehydration creatinine is 2.1 I do not have any prior labs indicate his baseline but I suspect this is likely secondary to prerenal azotemia and intravascular volume depletion with concurrent methamphetamine use  Dehydration continue IV isotonic fluids      DVT prophylaxis: [x] Lovenox                                 [] SCDs                                 [] SQ Heparin                                 [] Encourage ambulation, low risk for DVT, no chemical or mechanical prophylaxis necessary              [] Already on

## 2022-07-26 NOTE — DISCHARGE SUMMARY
Discharge Summary    Patient:  Carrol Bean  YOB: 1985    MRN: [id number]   Acct: [de-identified]    Primary Care Physician: Jarett Woodall MD    Admit date:  5/20/2022    Discharge date:  5/21/2022       Discharge Diagnoses:   Rhabdomyolysis  Principal Problem:    Rhabdomyolysis  Resolved Problems:    * No resolved hospital problems. *  Metabolic encephalopathy, resolved mild somnolence, patient not suicidal, states seen at local methadone clinic and takes abilify and effexor. Rhabdomyolysis resolving, conitnue ivf  Acute agitation, resolved mildly somnolent  sonja secondayr to prerenal azotemia resolved  Dehydration resolved      Admitted for: (HPI)    Hospital Course: day 2 of hospitalization  Patient somnolent but arousable to voice  Total ck downtrending, on presentation over 2k  Renal function essentially normal today at 0.9mg/dl, on day of admission (5/20/22) creatinine over 2.1mg/dl  Improved somnolence pleasant and interactive no further sitter needed  Patient's history and phsysical performed yesterday but appears to have had required change in mrn overnight. Eligible for discharge patient request discharge his renal function is improved he will continue    Consultants:  Patient Care Team:  Jarett Woodall MD as PCP - General (Family Medicine)    Discharge Medications:       Medication List        START taking these medications      methadone 10 MG/5ML solution  Take 30 mLs by mouth daily for 30 days. naloxone 4 MG/0.1ML Liqd nasal spray  Commonly known as: Narcan  1 spray by Nasal route as needed for Opioid Reversal            CHANGE how you take these medications      lisinopril 20 MG tablet  Commonly known as: PRINIVIL;ZESTRIL  Take 1 tablet by mouth daily  What changed: Another medication with the same name was removed. Continue taking this medication, and follow the directions you see here.             CONTINUE taking these medications      EXCEDRIN EXTRA STRENGTH PO mirtazapine 45 MG tablet  Commonly known as: Remeron  Take 1 tablet by mouth nightly            STOP taking these medications      hydrocortisone 1 % cream  Commonly known as: Ala-Jarad     pregabalin 150 MG capsule  Commonly known as: LYRICA     pregabalin 75 MG capsule  Commonly known as: Lyrica     venlafaxine 100 MG tablet  Commonly known as: EFFEXOR     venlafaxine 75 MG extended release capsule  Commonly known as: EFFEXOR XR     Vyvanse 40 MG Caps  Generic drug: Lisdexamfetamine Dimesylate               Where to Get Your Medications        You can get these medications from any pharmacy    Bring a paper prescription for each of these medications  naloxone 4 MG/0.1ML Liqd nasal spray       Information about where to get these medications is not yet available    Ask your nurse or doctor about these medications  methadone 10 MG/5ML solution           Physical Exam:    Vitals:  Vitals:    05/21/22 1008 05/21/22 1215 05/21/22 1251 05/21/22 1604   BP:  111/60  116/68   Pulse:  78  71   Resp:  18 18 17   Temp:  98 °F (36.7 °C)  97.8 °F (36.6 °C)   TempSrc:  Oral  Oral   SpO2: 96% 96%  95%   Weight:       Height:         Weight: Weight: 166 lb 7.2 oz (75.5 kg)     24 hour intake/output:No intake or output data in the 24 hours ending 07/26/22 1720    General appearance - alert awake appears to be in no acute distress  Chest - Bilateral air entry, no wheeze  Heart - S1S2 RRR, no murmurs or gallops  Abdomen - soft, non tender, normoactive bowel sounds  Neurological - non focal  Extremities - no edema  Skin - no rashes or lesions    Procedures:  na    Diagnostic Test:  na    Radiology reports as per the Radiologist  Radiology:  No results found.     Results for orders placed or performed during the hospital encounter of 05/20/22   CK   Result Value Ref Range    Total CK 1,161 (H) 55 - 170 U/L   Comprehensive Metabolic Panel   Result Value Ref Range    Glucose 131 (H) 70 - 108 mg/dL    Creatinine 0.9 0.4 - 1.2 mg/dL    BUN 34 (H) 7 - 22 mg/dL    Sodium 139 135 - 145 meq/L    Potassium 4.1 3.5 - 5.2 meq/L    Chloride 107 98 - 111 meq/L    CO2 22 (L) 23 - 33 meq/L    Calcium 9.0 8.5 - 10.5 mg/dL     (H) 5 - 40 U/L    Alkaline Phosphatase 97 38 - 126 U/L    Total Protein 7.0 6.1 - 8.0 g/dL    Albumin 3.8 3.5 - 5.1 g/dL    Total Bilirubin 0.4 0.3 - 1.2 mg/dL    ALT 84 (H) 11 - 66 U/L   Anion Gap   Result Value Ref Range    Anion Gap 10.0 8.0 - 16.0 meq/L   Glomerular Filtration Rate, Estimated   Result Value Ref Range    Est, Glom Filt Rate >90 ml/min/1.73m2   CBC with Auto Differential   Result Value Ref Range    WBC 12.3 (H) 4.8 - 10.8 thou/mm3    RBC 4.80 4.70 - 6.10 mill/mm3    Hemoglobin 12.8 (L) 14.0 - 18.0 gm/dl    Hematocrit 39.9 (L) 42.0 - 52.0 %    MCV 83.1 80.0 - 94.0 fL    MCH 26.7 26.0 - 33.0 pg    MCHC 32.1 (L) 32.2 - 35.5 gm/dl    RDW-CV 14.0 11.5 - 14.5 %    RDW-SD 42.0 35.0 - 45.0 fL    Platelets 712 (H) 351 - 400 thou/mm3    MPV 9.2 (L) 9.4 - 12.4 fL    Seg Neutrophils 75.6 %    Lymphocytes 17.5 %    Monocytes 6.3 %    Eosinophils 0.0 %    Basophils 0.2 %    Immature Granulocytes 0.4 %    Segs Absolute 9.3 (H) 1.8 - 7.7 thou/mm3    Lymphocytes Absolute 2.2 1.0 - 4.8 thou/mm3    Monocytes Absolute 0.8 0.4 - 1.3 thou/mm3    Eosinophils Absolute 0.0 0.0 - 0.4 thou/mm3    Basophils Absolute 0.0 0.0 - 0.1 thou/mm3    Immature Grans (Abs) 0.04 0.00 - 0.07 thou/mm3    nRBC 0 /100 wbc   Urinalysis with Microscopic   Result Value Ref Range    Glucose, Urine NEGATIVE NEGATIVE mg/dl    Bilirubin Urine NEGATIVE NEGATIVE    Ketones, Urine 15 (A) NEGATIVE    Specific Gravity, UA 1.026 1.002 - 1.030    Blood, Urine SMALL (A) NEGATIVE    pH, UA 5.0 5.0 - 9.0    Protein, UA 30 (A) NEGATIVE mg/dl    Urobilinogen, Urine 0.2 0.0 - 1.0 eu/dl    Nitrite, Urine NEGATIVE NEGATIVE    Leukocyte Esterase, Urine NEGATIVE NEGATIVE    Color, UA YELLOW YELLOW-STRAW    Character, Urine CLEAR [id number]    RBC, UA 0-2 0-2/hpf /hpf    WBC, UA 0-2 0-4/hpf /hpf    Epithelial Cells, UA 3-5 3-5/hpf /hpf    Mucus, UA THREADS NONE SEEN/THREA    Bacteria, UA NONE FEW/NONE SEEN    Casts 0-4 C. GRAN NONE SEEN /lpf    Crystals NONE SEEN NONE SEEN    Renal Epithelial, UA 0-2 NONE SEEN    Casts 4-8 HYALINE /lpf   Troponin   Result Value Ref Range    Troponin T < 0.010 ng/ml   Comprehensive Metabolic Panel   Result Value Ref Range    Glucose 125 (H) 70 - 108 mg/dL    Creatinine 2.1 (H) 0.4 - 1.2 mg/dL    BUN 54 (H) 7 - 22 mg/dL    Sodium 133 (L) 135 - 145 meq/L    Potassium 4.0 3.5 - 5.2 meq/L    Chloride 97 (L) 98 - 111 meq/L    CO2 19 (L) 23 - 33 meq/L    Calcium 9.8 8.5 - 10.5 mg/dL    AST 84 (H) 5 - 40 U/L    Alkaline Phosphatase 122 38 - 126 U/L    Total Protein 9.3 (H) 6.1 - 8.0 g/dL    Albumin 4.6 3.5 - 5.1 g/dL    Total Bilirubin 0.5 0.3 - 1.2 mg/dL    ALT 77 (H) 11 - 66 U/L   CK   Result Value Ref Range    Total CK 1,996 (H) 55 - 170 U/L   Anion Gap   Result Value Ref Range    Anion Gap 17.0 (H) 8.0 - 16.0 meq/L   Glomerular Filtration Rate, Estimated   Result Value Ref Range    Est, Glom Filt Rate 35 (A) ml/min/1.73m2   Urine Drug Screen   Result Value Ref Range    AMPHETAMINE+METHAMPHETAMINE URINE SCREEN **POSITIVE** NEGATIVE    Barbiturate Quant, Ur negative NEGATIVE    Benzodiazepine Quant, Ur negative NEGATIVE    Cannabinoid Quant, Ur **POSITIVE** NEGATIVE    Cocaine Metab Quant, Ur negative NEGATIVE    Opiates, Urine negative NEGATIVE    Oxycodone negative NEGATIVE    PCP Quant, Ur negative NEGATIVE   Basic Metabolic Panel   Result Value Ref Range    Sodium 138 135 - 145 meq/L    Potassium 3.9 3.5 - 5.2 meq/L    Chloride 107 98 - 111 meq/L    CO2 22 (L) 23 - 33 meq/L    Glucose 106 70 - 108 mg/dL    BUN 38 (H) 7 - 22 mg/dL    Creatinine 1.0 0.4 - 1.2 mg/dL    Calcium 9.0 8.5 - 10.5 mg/dL   Anion Gap   Result Value Ref Range    Anion Gap 9.0 8.0 - 16.0 meq/L   Glomerular Filtration Rate, Estimated   Result Value Ref Range    Est, Glom Filt Rate 83 (A) ml/min/1.73m2   EKG 12 Lead   Result Value Ref Range    Ventricular Rate 75 BPM    Atrial Rate 75 BPM    P-R Interval 164 ms    QRS Duration 86 ms    Q-T Interval 404 ms    QTc Calculation (Bazett) 451 ms    P Axis 63 degrees    R Axis 46 degrees    T Axis 60 degrees       Diet:  No diet orders on file    Activity:  Activity as tolerated (Patient may move about with assist as indicated or with supervision.)    Follow-up:  in the next few days with Arthur Valdez MD,  in the next few days with primary care doctor    Disposition: home    Condition: Stable      Time Spent: 35 minutes    Electronically signed by Juan Peres MD on 7/26/2022 at 5:20 PM    Discharging Hospitalist

## 2022-08-11 DIAGNOSIS — F90.2 ATTENTION DEFICIT HYPERACTIVITY DISORDER (ADHD), COMBINED TYPE: ICD-10-CM

## 2022-08-11 DIAGNOSIS — G89.4 CHRONIC PAIN SYNDROME: Primary | ICD-10-CM

## 2022-08-11 RX ORDER — ARIPIPRAZOLE 5 MG/1
5 TABLET ORAL DAILY
Qty: 30 TABLET | Refills: 0 | Status: SHIPPED | OUTPATIENT
Start: 2022-08-11 | End: 2022-10-31 | Stop reason: SDUPTHER

## 2022-08-11 RX ORDER — PREGABALIN 100 MG/1
100 CAPSULE ORAL 2 TIMES DAILY
Qty: 60 CAPSULE | Refills: 0 | Status: SHIPPED | OUTPATIENT
Start: 2022-08-11 | End: 2022-09-10

## 2022-08-11 RX ORDER — VENLAFAXINE 100 MG/1
150 TABLET ORAL 2 TIMES DAILY
Qty: 90 TABLET | Refills: 0 | Status: SHIPPED | OUTPATIENT
Start: 2022-08-11 | End: 2022-10-31

## 2022-08-11 RX ORDER — LISINOPRIL 20 MG/1
20 TABLET ORAL DAILY
Qty: 30 TABLET | Refills: 0 | Status: SHIPPED | OUTPATIENT
Start: 2022-08-11 | End: 2022-10-31

## 2022-08-11 NOTE — TELEPHONE ENCOUNTER
Requested Prescriptions     Pending Prescriptions Disp Refills    ARIPiprazole (ABILIFY) 5 MG tablet 30 tablet 0     Sig: Take 1 tablet by mouth in the morning. lisinopril (PRINIVIL;ZESTRIL) 20 MG tablet 30 tablet 0     Sig: Take 1 tablet by mouth in the morning. pregabalin (LYRICA) 100 MG capsule 60 capsule 0     Sig: Take 1 capsule by mouth in the morning and 1 capsule before bedtime. Do all this for 30 days. venlafaxine (EFFEXOR) 100 MG tablet 90 tablet 0     Sig: Take 1.5 tablets by mouth in the morning and 1.5 tablets before bedtime.

## 2022-08-13 DIAGNOSIS — F41.9 ANXIETY AND DEPRESSION: ICD-10-CM

## 2022-08-13 DIAGNOSIS — F32.A ANXIETY AND DEPRESSION: ICD-10-CM

## 2022-08-15 RX ORDER — VENLAFAXINE HYDROCHLORIDE 150 MG/1
CAPSULE, EXTENDED RELEASE ORAL
Qty: 30 CAPSULE | Refills: 0 | Status: SHIPPED | OUTPATIENT
Start: 2022-08-15 | End: 2022-08-30 | Stop reason: SDUPTHER

## 2022-08-30 ENCOUNTER — OFFICE VISIT (OUTPATIENT)
Dept: INTERNAL MEDICINE CLINIC | Age: 37
End: 2022-08-30
Payer: MEDICARE

## 2022-08-30 DIAGNOSIS — G47.00 INSOMNIA, UNSPECIFIED TYPE: ICD-10-CM

## 2022-08-30 DIAGNOSIS — F32.A ANXIETY AND DEPRESSION: ICD-10-CM

## 2022-08-30 DIAGNOSIS — I10 ESSENTIAL HYPERTENSION: ICD-10-CM

## 2022-08-30 DIAGNOSIS — F90.9 ATTENTION DEFICIT HYPERACTIVITY DISORDER (ADHD), UNSPECIFIED ADHD TYPE: ICD-10-CM

## 2022-08-30 DIAGNOSIS — F11.20 SEVERE OPIOID USE DISORDER (HCC): Primary | ICD-10-CM

## 2022-08-30 DIAGNOSIS — F41.9 ANXIETY AND DEPRESSION: ICD-10-CM

## 2022-08-30 PROCEDURE — G8427 DOCREV CUR MEDS BY ELIG CLIN: HCPCS | Performed by: NURSE PRACTITIONER

## 2022-08-30 PROCEDURE — G8417 CALC BMI ABV UP PARAM F/U: HCPCS | Performed by: NURSE PRACTITIONER

## 2022-08-30 PROCEDURE — 80305 DRUG TEST PRSMV DIR OPT OBS: CPT | Performed by: NURSE PRACTITIONER

## 2022-08-30 PROCEDURE — 99214 OFFICE O/P EST MOD 30 MIN: CPT | Performed by: NURSE PRACTITIONER

## 2022-08-30 PROCEDURE — 1036F TOBACCO NON-USER: CPT | Performed by: NURSE PRACTITIONER

## 2022-08-30 RX ORDER — VENLAFAXINE HYDROCHLORIDE 150 MG/1
CAPSULE, EXTENDED RELEASE ORAL
Qty: 30 CAPSULE | Refills: 3 | Status: SHIPPED | OUTPATIENT
Start: 2022-08-30 | End: 2022-10-31 | Stop reason: SDUPTHER

## 2022-08-30 RX ORDER — LISDEXAMFETAMINE DIMESYLATE 40 MG/1
40 CAPSULE ORAL EVERY MORNING
Qty: 30 CAPSULE | Refills: 0 | Status: CANCELLED | OUTPATIENT
Start: 2022-08-30 | End: 2022-09-29

## 2022-08-30 RX ORDER — PREGABALIN 200 MG/1
200 CAPSULE ORAL 2 TIMES DAILY
Qty: 60 CAPSULE | Refills: 0 | Status: SHIPPED | OUTPATIENT
Start: 2022-08-30 | End: 2022-09-26 | Stop reason: SDUPTHER

## 2022-08-30 RX ORDER — ARIPIPRAZOLE 5 MG/1
2.5 TABLET ORAL DAILY
Qty: 15 TABLET | Refills: 3 | Status: SHIPPED | OUTPATIENT
Start: 2022-08-30 | End: 2022-10-31

## 2022-08-30 RX ORDER — LISINOPRIL 20 MG/1
20 TABLET ORAL DAILY
Qty: 30 TABLET | Refills: 3 | Status: SHIPPED | OUTPATIENT
Start: 2022-08-30 | End: 2022-10-31 | Stop reason: SDUPTHER

## 2022-08-30 RX ORDER — MIRTAZAPINE 45 MG/1
45 TABLET, FILM COATED ORAL NIGHTLY
Qty: 30 TABLET | Refills: 3 | Status: SHIPPED | OUTPATIENT
Start: 2022-08-30 | End: 2022-10-31 | Stop reason: SDUPTHER

## 2022-08-30 NOTE — ED NOTES
Pt states that he can not take toradol because it makes him angry. LuisC arlos RANDHAWA notified.      Mayi Bergeron RN  05/26/21 7349 Secondary Intention Text (Leave Blank If You Do Not Want): The defect will heal with secondary intention.

## 2022-09-13 ASSESSMENT — ENCOUNTER SYMPTOMS
ABDOMINAL DISTENTION: 0
ABDOMINAL PAIN: 0
RHINORRHEA: 0
DIARRHEA: 0
SINUS PRESSURE: 0
CONSTIPATION: 0
NAUSEA: 0
TROUBLE SWALLOWING: 0
SHORTNESS OF BREATH: 0
PHOTOPHOBIA: 0
BLOOD IN STOOL: 0
SORE THROAT: 0
COUGH: 0
SINUS PAIN: 0
WHEEZING: 0
VOMITING: 0
CHEST TIGHTNESS: 0

## 2022-09-23 ENCOUNTER — TELEPHONE (OUTPATIENT)
Dept: INTERNAL MEDICINE CLINIC | Age: 37
End: 2022-09-23

## 2022-09-23 DIAGNOSIS — F41.9 ANXIETY AND DEPRESSION: ICD-10-CM

## 2022-09-23 DIAGNOSIS — F32.A ANXIETY AND DEPRESSION: ICD-10-CM

## 2022-09-23 NOTE — TELEPHONE ENCOUNTER
Patient called and would like his Lyrica increased to TID instead of BID. Stated talked about increase at last visit. If ok with increase would like it sent to [id number] [facility] on [address].

## 2022-09-26 RX ORDER — PREGABALIN 200 MG/1
200 CAPSULE ORAL 3 TIMES DAILY
Qty: 90 CAPSULE | Refills: 0 | Status: SHIPPED | OUTPATIENT
Start: 2022-09-26 | End: 2022-10-24

## 2022-10-06 DIAGNOSIS — F90.9 ATTENTION DEFICIT HYPERACTIVITY DISORDER (ADHD), UNSPECIFIED ADHD TYPE: Primary | ICD-10-CM

## 2022-10-06 RX ORDER — LISDEXAMFETAMINE DIMESYLATE 60 MG/1
60 CAPSULE ORAL DAILY
Qty: 30 CAPSULE | Refills: 0 | Status: SHIPPED | OUTPATIENT
Start: 2022-10-06 | End: 2022-11-05

## 2022-10-22 DIAGNOSIS — F41.9 ANXIETY AND DEPRESSION: ICD-10-CM

## 2022-10-22 DIAGNOSIS — F32.A ANXIETY AND DEPRESSION: ICD-10-CM

## 2022-10-24 RX ORDER — PREGABALIN 200 MG/1
CAPSULE ORAL
Qty: 90 CAPSULE | Refills: 0 | Status: SHIPPED | OUTPATIENT
Start: 2022-10-24 | End: 2022-10-31 | Stop reason: SDUPTHER

## 2022-10-31 ENCOUNTER — OFFICE VISIT (OUTPATIENT)
Dept: INTERNAL MEDICINE CLINIC | Age: 37
End: 2022-10-31
Payer: MEDICARE

## 2022-10-31 VITALS
HEIGHT: 72 IN | BODY MASS INDEX: 28.85 KG/M2 | DIASTOLIC BLOOD PRESSURE: 92 MMHG | RESPIRATION RATE: 16 BRPM | HEART RATE: 110 BPM | WEIGHT: 213 LBS | TEMPERATURE: 97.8 F | SYSTOLIC BLOOD PRESSURE: 142 MMHG

## 2022-10-31 DIAGNOSIS — I10 ESSENTIAL HYPERTENSION: ICD-10-CM

## 2022-10-31 DIAGNOSIS — F32.A ANXIETY AND DEPRESSION: ICD-10-CM

## 2022-10-31 DIAGNOSIS — G47.00 INSOMNIA, UNSPECIFIED TYPE: ICD-10-CM

## 2022-10-31 DIAGNOSIS — F11.20 SEVERE OPIOID USE DISORDER (HCC): Primary | ICD-10-CM

## 2022-10-31 DIAGNOSIS — F41.9 ANXIETY AND DEPRESSION: ICD-10-CM

## 2022-10-31 DIAGNOSIS — F90.9 ATTENTION DEFICIT HYPERACTIVITY DISORDER (ADHD), UNSPECIFIED ADHD TYPE: ICD-10-CM

## 2022-10-31 PROCEDURE — G8417 CALC BMI ABV UP PARAM F/U: HCPCS | Performed by: NURSE PRACTITIONER

## 2022-10-31 PROCEDURE — G8484 FLU IMMUNIZE NO ADMIN: HCPCS | Performed by: NURSE PRACTITIONER

## 2022-10-31 PROCEDURE — 3078F DIAST BP <80 MM HG: CPT | Performed by: NURSE PRACTITIONER

## 2022-10-31 PROCEDURE — 99214 OFFICE O/P EST MOD 30 MIN: CPT | Performed by: NURSE PRACTITIONER

## 2022-10-31 PROCEDURE — 3074F SYST BP LT 130 MM HG: CPT | Performed by: NURSE PRACTITIONER

## 2022-10-31 PROCEDURE — 80305 DRUG TEST PRSMV DIR OPT OBS: CPT | Performed by: NURSE PRACTITIONER

## 2022-10-31 PROCEDURE — G8427 DOCREV CUR MEDS BY ELIG CLIN: HCPCS | Performed by: NURSE PRACTITIONER

## 2022-10-31 PROCEDURE — 4004F PT TOBACCO SCREEN RCVD TLK: CPT | Performed by: NURSE PRACTITIONER

## 2022-10-31 RX ORDER — LISINOPRIL 20 MG/1
20 TABLET ORAL DAILY
Qty: 30 TABLET | Refills: 3 | Status: SHIPPED | OUTPATIENT
Start: 2022-10-31

## 2022-10-31 RX ORDER — PREGABALIN 200 MG/1
CAPSULE ORAL
Qty: 90 CAPSULE | Refills: 0 | Status: SHIPPED | OUTPATIENT
Start: 2022-10-31 | End: 2022-12-01

## 2022-10-31 RX ORDER — MIRTAZAPINE 45 MG/1
45 TABLET, FILM COATED ORAL NIGHTLY
Qty: 30 TABLET | Refills: 3 | Status: SHIPPED | OUTPATIENT
Start: 2022-10-31

## 2022-10-31 RX ORDER — METHYLPHENIDATE HYDROCHLORIDE 36 MG/1
36 TABLET ORAL DAILY
Qty: 30 TABLET | Refills: 0 | Status: SHIPPED | OUTPATIENT
Start: 2022-10-31 | End: 2022-11-28 | Stop reason: SDUPTHER

## 2022-10-31 RX ORDER — ARIPIPRAZOLE 5 MG/1
5 TABLET ORAL DAILY
Qty: 30 TABLET | Refills: 0 | Status: SHIPPED | OUTPATIENT
Start: 2022-10-31 | End: 2022-11-28

## 2022-10-31 RX ORDER — VENLAFAXINE HYDROCHLORIDE 150 MG/1
CAPSULE, EXTENDED RELEASE ORAL
Qty: 30 CAPSULE | Refills: 3 | Status: SHIPPED | OUTPATIENT
Start: 2022-10-31

## 2022-10-31 ASSESSMENT — ENCOUNTER SYMPTOMS
SORE THROAT: 0
VOMITING: 0
ABDOMINAL DISTENTION: 0
SINUS PRESSURE: 0
NAUSEA: 0
CONSTIPATION: 0
RHINORRHEA: 0
WHEEZING: 0
SINUS PAIN: 0
DIARRHEA: 0
TROUBLE SWALLOWING: 0
PHOTOPHOBIA: 0
CHEST TIGHTNESS: 0
BLOOD IN STOOL: 0
COUGH: 0
ABDOMINAL PAIN: 0
SHORTNESS OF BREATH: 0

## 2022-10-31 NOTE — PROGRESS NOTES
[facility] INTERNAL MEDICINE AND MEDICATION MANAGEMENT  [address]  Dept: [phone]  Dept Fax: [phone]: [phone]     Visit Date:  10/31/2022    Patient:  Aaron Lee  YOB: 1985    HPI:     Chief Complaint   Patient presents with    Follow-up     2 Favian Mo presents today for medical evaluation of ADHD, anxiety/depression/PTSD, chronic pain, HTN     I last seen him 2 months ago. Previously followed with Dr. Ghassan Patel for MAT. Is now at the methadone clinic. States that he is doing well. Hospitalized in 5/2022 for an overdose. He has had several in the past.      Discussed at length that he cannot continue to use illicit drugs and be on a stimulant. I agreed to prescribe under the agreement that use would stop, and as harm reduction; as he complained that the reason for his methamphetamine use was because his ADHD was not controlled. Guanfacine not helpful. Vyvanse previously effective. Not helping any longer. He would like to try concerta     Anxiety/depression well controlled with effexor and abilify; and counseling. Continue current regimen. He does have severe PTSD. Unable to work. Chronic pain controlled with pregabalin, not 150 mg BID. Will increase slightly. BP well controlled with lisinopril 20 mg daily. Not take his BP meds today.      Medications    Current Outpatient Medications:     venlafaxine (EFFEXOR XR) 150 MG extended release capsule, take 1 capsule by mouth once daily, Disp: 30 capsule, Rfl: 3    pregabalin (LYRICA) 200 MG capsule, 1 tab three times daily, Disp: 90 capsule, Rfl: 0    mirtazapine (REMERON) 45 MG tablet, Take 1 tablet by mouth nightly, Disp: 30 tablet, Rfl: 3    ARIPiprazole (ABILIFY) 5 MG tablet, Take 1 tablet by mouth daily, Disp: 30 tablet, Rfl: 0    lisinopril (PRINIVIL;ZESTRIL) 20 MG tablet, Take 1 tablet by mouth daily, Disp: 30 tablet, Rfl: 3    methylphenidate (CONCERTA) 36 MG extended release tablet, Take 1 tablet by mouth daily for 30 days. , Disp: 30 tablet, Rfl: 0    Lisdexamfetamine Dimesylate (VYVANSE) 60 MG CAPS, Take 60 mg by mouth daily for 30 days. , Disp: 30 capsule, Rfl: 0    methadone 10 MG/5ML solution, Take 60 mg by mouth daily. , Disp: , Rfl:     gabapentin (NEURONTIN) 800 MG tablet, Take 800 mg by mouth 4 times daily. , Disp: , Rfl:     naloxone (NARCAN) 4 MG/0.1ML LIQD nasal spray, 1 spray by Nasal route as needed for Opioid Reversal, Disp: 1 each, Rfl: 1    Aspirin-Acetaminophen-Caffeine (EXCEDRIN EXTRA STRENGTH PO), Take by mouth as needed , Disp: , Rfl:     pregabalin (LYRICA) 100 MG capsule, Take 1 capsule by mouth in the morning and 1 capsule before bedtime. Do all this for 30 days. , Disp: 60 capsule, Rfl: 0    methadone 10 MG/5ML solution, Take 30 mLs by mouth daily for 30 days. , Disp: 30 each, Rfl: 0    The patient has No Known Allergies. Past Medical History  Mark Josue  has a past medical history of Anxiety, Depression, Kidney stone, Liver disease, and Opiate abuse, continuous ([facility] [address] Past Surgical History  The patient  has no past surgical history on file. Family History  This patient's family history is not on file. Social History  Mark Josue  reports that he quit smoking about 21 months ago. His smoking use included cigarettes. He uses smokeless tobacco. He reports that he does not currently use alcohol. He reports current drug use. Drugs: Opiates , Fentanyl, Amphetamines (Speed), Crack Cocaine, Heroin, Benzodiazepines (Downers/Zannies), Oxycodone (Oxy), Suboxone, Sedatives/Hypnotics, IV, Marijuana (Lacey Tasley), Methamphetamines (Crystal Meth), and Cocaine.     Health Maintenance:    Health Maintenance   Topic Date Due    COVID-19 Vaccine (1) Never done    Hepatitis A vaccine (1 of 2 - Risk 2-dose series) Never done    Varicella vaccine (1 of 2 - 2-dose childhood series) Never done    Pneumococcal 0-64 years Vaccine (1 - PCV) Never done    Diabetic foot exam  Never done    Lipids  Never done    Diabetic microalbuminuria test  Never done    Diabetic retinal exam  Never done    Hepatitis B vaccine (1 of 3 - Risk 3-dose series) Never done    DTaP/Tdap/Td vaccine (1 - Tdap) Never done    A1C test (Diabetic or Prediabetic)  05/24/2022    Flu vaccine (1) Never done    Depression Monitoring  06/28/2023    HIV screen  Completed    Hib vaccine  Aged Out    Meningococcal (ACWY) vaccine  Aged Out       Subjective:      Review of Systems   Constitutional:  Negative for chills, fatigue and fever. HENT:  Negative for congestion, rhinorrhea, sinus pressure, sinus pain, sore throat, tinnitus and trouble swallowing. Eyes:  Negative for photophobia and visual disturbance. Respiratory:  Negative for cough, chest tightness, shortness of breath and wheezing. Cardiovascular:  Negative for chest pain, palpitations and leg swelling. Gastrointestinal:  Negative for abdominal distention, abdominal pain, blood in stool, constipation, diarrhea, nausea and vomiting. Endocrine: Negative for polydipsia, polyphagia and polyuria. Genitourinary:  Negative for difficulty urinating, dysuria, frequency, hematuria and urgency. Musculoskeletal:  Negative for arthralgias and myalgias. Skin:  Negative for rash and wound. Neurological:  Negative for dizziness, light-headedness and headaches. Psychiatric/Behavioral:  Positive for decreased concentration. Negative for dysphoric mood and sleep disturbance. The patient is not nervous/anxious. Objective:     BP (!) 142/92 (Site: Right Lower Arm, Position: Sitting, Cuff Size: Medium Adult)   Pulse (!) 110   Temp 97.8 °F (36.6 °C) (Tympanic)   Resp 16   Ht 6' (1.829 m)   Wt 213 lb (96.6 kg)   BMI 28.89 kg/m²     Physical Exam  Vitals reviewed. Constitutional:       General: He is not in acute distress. Appearance: Normal appearance. He is not ill-appearing.    HENT:      Head: Normocephalic and atraumatic. Right Ear: External ear normal.      Left Ear: External ear normal.      Nose: Nose normal. No congestion or rhinorrhea. Mouth/Throat:      Mouth: Mucous membranes are moist.   Eyes:      Extraocular Movements: Extraocular movements intact. Conjunctiva/sclera: Conjunctivae normal.      Pupils: Pupils are equal, round, and reactive to light. Cardiovascular:      Rate and Rhythm: Normal rate and regular rhythm. Pulses: Normal pulses. Heart sounds: Normal heart sounds. Pulmonary:      Effort: Pulmonary effort is normal. No respiratory distress. Musculoskeletal:         General: Normal range of motion. Cervical back: Normal range of motion and neck supple. Right lower leg: No edema. Left lower leg: No edema. Skin:     General: Skin is warm and dry. Neurological:      General: No focal deficit present. Mental Status: He is alert and oriented to person, place, and time. Psychiatric:         Mood and Affect: Mood normal.         Behavior: Behavior normal.         Thought Content: Thought content normal.         Judgment: Judgment normal.       Labs Reviewed 10/31/2022:    Lab Results   Component Value Date    WBC 10.2 07/14/2022    HGB 10.7 (L) 07/14/2022    HCT 35.0 (L) 07/14/2022     07/14/2022     (H) 07/14/2022     (H) 07/14/2022     07/14/2022    K 4.1 07/14/2022     07/14/2022    CREATININE 1.0 07/14/2022    BUN 19 07/14/2022    CO2 21 (L) 07/14/2022    TSH 0.390 (L) 10/03/2021    PSA 0.98 04/25/2019    INR 1.22 (H) 04/12/2022    LABA1C 11.5 (H) 05/24/2021       Assessment/Plan      1. Anxiety and depression    - venlafaxine (EFFEXOR XR) 150 MG extended release capsule; take 1 capsule by mouth once daily  Dispense: 30 capsule; Refill: 3  - pregabalin (LYRICA) 200 MG capsule; 1 tab three times daily  Dispense: 90 capsule; Refill: 0    2. Insomnia, unspecified type    - mirtazapine (REMERON) 45 MG tablet;  Take 1 tablet by mouth nightly  Dispense: 30 tablet; Refill: 3    3. Essential hypertension    - ARIPiprazole (ABILIFY) 5 MG tablet; Take 1 tablet by mouth daily  Dispense: 30 tablet; Refill: 0  - lisinopril (PRINIVIL;ZESTRIL) 20 MG tablet; Take 1 tablet by mouth daily  Dispense: 30 tablet; Refill: 3    4. Severe opioid use disorder (HCC)    - POCT Rapid Drug Screen    5. Attention deficit hyperactivity disorder (ADHD), unspecified ADHD type    - methylphenidate (CONCERTA) 36 MG extended release tablet; Take 1 tablet by mouth daily for 30 days. Dispense: 30 tablet; Refill: 0      Return in about 3 months (around 1/31/2023). Patient given educational materials - see patient instructions. Discussed use, benefit, and side effects of prescribed medications. All patient questions answered. Pt voiced understanding. Reviewed health maintenance.        Electronically signed PAPA Villalobos CNP on 10/31/22 at 1:20 PM EDT

## 2022-10-31 NOTE — LETTER
[address] [facility] Internal Medicine and Medication Management  [address] [facility]  Phone: [phone]  Fax: [phone]  PAPA Montelongo CNP        October 31, 2022     Patient: Gifty Varela   YOB: 1985   Date of Visit: 10/31/2022       To Whom It May Concern: It is my medical opinion that Stephanie Landaverde will no longer be able to be employed due to his mental health issues     If you have any questions or concerns, please don't hesitate to call.     Sincerely,        PAPA Ni - NAI

## 2022-11-05 ENCOUNTER — HOSPITAL ENCOUNTER (EMERGENCY)
Age: 37
Discharge: HOME | End: 2022-11-05
Attending: FAMILY MEDICINE
Payer: MEDICARE

## 2022-11-05 VITALS
OXYGEN SATURATION: 99 % | TEMPERATURE: 97.4 F | RESPIRATION RATE: 16 BRPM | HEART RATE: 79 BPM | SYSTOLIC BLOOD PRESSURE: 94 MMHG | HEIGHT: 72 IN | BODY MASS INDEX: 28.89 KG/M2 | DIASTOLIC BLOOD PRESSURE: 75 MMHG

## 2022-11-05 DIAGNOSIS — M79.672 PAIN IN BOTH FEET: ICD-10-CM

## 2022-11-05 DIAGNOSIS — M79.671 PAIN IN BOTH FEET: ICD-10-CM

## 2022-11-05 DIAGNOSIS — F15.10 METHAMPHETAMINE ABUSE (HCC): Primary | ICD-10-CM

## 2022-11-05 LAB
INFLUENZA A: NOT DETECTED
INFLUENZA B: NOT DETECTED
SARS-COV-2 RNA, RT PCR: NOT DETECTED

## 2022-11-05 PROCEDURE — 2580000003 HC RX 258: Performed by: FAMILY MEDICINE

## 2022-11-05 PROCEDURE — 2500000003 HC RX 250 WO HCPCS: Performed by: FAMILY MEDICINE

## 2022-11-05 PROCEDURE — 99284 EMERGENCY DEPT VISIT MOD MDM: CPT

## 2022-11-05 PROCEDURE — 87636 SARSCOV2 & INF A&B AMP PRB: CPT

## 2022-11-05 PROCEDURE — 96372 THER/PROPH/DIAG INJ SC/IM: CPT

## 2022-11-05 PROCEDURE — 6360000002 HC RX W HCPCS: Performed by: FAMILY MEDICINE

## 2022-11-05 RX ORDER — PROMETHAZINE HYDROCHLORIDE 25 MG/ML
25 INJECTION, SOLUTION INTRAMUSCULAR; INTRAVENOUS ONCE
Status: COMPLETED | OUTPATIENT
Start: 2022-11-05 | End: 2022-11-05

## 2022-11-05 RX ADMIN — OLANZAPINE 10 MG: 10 INJECTION, POWDER, LYOPHILIZED, FOR SOLUTION INTRAMUSCULAR at 14:02

## 2022-11-05 RX ADMIN — PROMETHAZINE HYDROCHLORIDE 25 MG: 25 INJECTION INTRAMUSCULAR; INTRAVENOUS at 12:33

## 2022-11-05 ASSESSMENT — PAIN SCALES - GENERAL: PAINLEVEL_OUTOF10: 5

## 2022-11-05 ASSESSMENT — PAIN - FUNCTIONAL ASSESSMENT: PAIN_FUNCTIONAL_ASSESSMENT: 0-10

## 2022-11-05 ASSESSMENT — PAIN DESCRIPTION - DESCRIPTORS: DESCRIPTORS: ACHING

## 2022-11-05 ASSESSMENT — PAIN DESCRIPTION - ONSET: ONSET: ON-GOING

## 2022-11-05 ASSESSMENT — PAIN DESCRIPTION - LOCATION: LOCATION: LEG;BACK

## 2022-11-05 NOTE — ED NOTES
Pt medicated. Tolerated well. Placed back on cot. [place] provided. Carey lua. Call light within reach.       Jory Prabhakar RN  11/05/22 5722

## 2022-11-05 NOTE — ED NOTES
Pt continues to pace around in room, yelling, talking to himself. When re-oriented pt able to follow commands and answer questions appropriately. Pt provided w/space in room to rest and relax. Call light remains within reach. Frequent rounding continues.       Freddie Schroeder RN  11/05/22 [id number]

## 2022-11-05 NOTE — ED NOTES
Pt medicated per order. Pt tolerated w/some discomfort. Box lunch and gingerale provided.       Maryam Bojorquez RN  11/05/22 5856

## 2022-11-05 NOTE — ED TRIAGE NOTES
Pt to ED via private vehicle to room 5 w/rprts of nausea/vomiting, generalized malaise that started 12 hrs ago after doing some Meth. Concerns for covid. Pt jittery, flight of ideas, restless in bed. A&O x4. Breathing easy and unlabored on RA. Cooperative. Vitals updated. Covid swab obtained and sent to lab.

## 2022-11-05 NOTE — ED NOTES
Pt resting on R side. Respirations even and unlabored w/no visible signs of distress noted. Pt visible to nurse's staff. Door shut and lights dimmed to promote rest. Call light within reach.       Morelia Talavera RN  11/05/22 4937

## 2022-11-05 NOTE — ED NOTES
In to room. Pt is consciousn A&Ox4, resps easy and unlabored. Pt only request is ginger ale and ice. Pt provided with ginger ale and ice chips. Discharge instructions given to pt at this time. Informed pt that should he want help with addiction, we would be happy to provide pt with resources to do so. Voiced understanding.      Francisco Becerra RN  11/05/22 1939

## 2022-11-05 NOTE — ED NOTES
Pt shobha, screaming in room. When asked to tone down, pt apologetic but returns to scream and holler and pace in room. Ice chips provided. Rprts cramping to lower extremities.       Clint Santiago RN  11/05/22 7546

## 2022-11-05 NOTE — ED PROVIDER NOTES
EMERGENCY DEPARTMENT ENCOUNTER      CHIEF COMPLAINT    Chief Complaint   Patient presents with    Concern For COVID-19    Drug Problem     Meth; last used yesterday    Nausea & Vomiting       HPI    Soni Finder is a 44 y.o. male who presents with generalized myalgia, anxiety, non-specific dizziness and feet pain since the onset last week. The duration has been constant since the onset. The generalized symptoms are possibly associated with his long-covid syndrome (purported). No aggravating or alleviating factors. REVIEW OF SYSTEMS    Cardiac: No chest pain or palpitations  Respiratory: No shortness of breath or new cough  General: No fevers; +myalgia not feeling well  MSK: feet pain  See HPI for further details. All other systems reviewed and are negative. PAST MEDICAL OR SURGICAL HISTORY    Past Medical History:   Diagnosis Date    Anxiety     Depression     Kidney stone     Liver disease     Hep C    Opiate abuse, continuous ([facility] [address]      History reviewed. No pertinent surgical history. CURRENT MEDICATIONS    Current Outpatient Rx   Medication Sig Dispense Refill    venlafaxine (EFFEXOR XR) 150 MG extended release capsule take 1 capsule by mouth once daily 30 capsule 3    pregabalin (LYRICA) 200 MG capsule 1 tab three times daily 90 capsule 0    mirtazapine (REMERON) 45 MG tablet Take 1 tablet by mouth nightly 30 tablet 3    ARIPiprazole (ABILIFY) 5 MG tablet Take 1 tablet by mouth daily 30 tablet 0    lisinopril (PRINIVIL;ZESTRIL) 20 MG tablet Take 1 tablet by mouth daily 30 tablet 3    methylphenidate (CONCERTA) 36 MG extended release tablet Take 1 tablet by mouth daily for 30 days. 30 tablet 0    Lisdexamfetamine Dimesylate (VYVANSE) 60 MG CAPS Take 60 mg by mouth daily for 30 days. 30 capsule 0    pregabalin (LYRICA) 100 MG capsule Take 1 capsule by mouth in the morning and 1 capsule before bedtime. Do all this for 30 days. 60 capsule 0    methadone 10 MG/5ML solution Take 60 mg by mouth daily. gabapentin (NEURONTIN) 800 MG tablet Take 800 mg by mouth 4 times daily. methadone 10 MG/5ML solution Take 30 mLs by mouth daily for 30 days. 30 each 0    naloxone (NARCAN) 4 MG/0.1ML LIQD nasal spray 1 spray by Nasal route as needed for Opioid Reversal 1 each 1    Aspirin-Acetaminophen-Caffeine (EXCEDRIN EXTRA STRENGTH PO) Take by mouth as needed          ALLERGIES    No Known Allergies    FAMILY OR SOCIAL HISTORY    History reviewed. No pertinent family history.   Social History     Socioeconomic History    Marital status: Single     Spouse name: Not on file    Number of children: Not on file    Years of education: Not on file    Highest education level: Not on file   Occupational History    Not on file   Tobacco Use    Smoking status: Former     Types: Cigarettes     Quit date:      Years since quittin.8    Smokeless tobacco: Current   Vaping Use    Vaping Use: Not on file   Substance and Sexual Activity    Alcohol use: Not Currently    Drug use: Yes     Types: Opiates , Fentanyl, Amphetamines (Speed), Crack Cocaine, Heroin, Benzodiazepines (Downers/Zannies), Oxycodone (Oxy), Suboxone, Sedatives/Hypnotics, IV, Marijuana (Emir Jemma), Methamphetamines (Crystal Meth), Cocaine     Comment: fentanyl 2021    Sexual activity: Not Currently   Other Topics Concern    Not on file   Social History Narrative    ** Merged History Encounter **          Social Determinants of Health     Financial Resource Strain: Not on file   Food Insecurity: Not on file   Transportation Needs: Not on file   Physical Activity: Not on file   Stress: Not on file   Social Connections: Not on file   Intimate Partner Violence: Not on file   Housing Stability: Not on file       PHYSICAL EXAM    VITAL SIGNS: BP (!) 130/96   Pulse (!) 122   Temp 97.8 °F (36.6 °C) (Oral)   Resp 22   Ht 6' (1.829 m)   SpO2 100%   BMI 28.89 kg/m²   Constitutional:  Well developed, well nourished, no acute distress  Eyes:  Pupils equally round and reactive to light, sclera nonicteric  HENT:  atraumatic, moist mucous membranes  NECK: Normal range of motion, no JVD  Respiratory:  No respiratory distress, normal breath sounds, no wheezing   Cardiovascular:  mild tachycardic rate, normal rhythm, no murmurs   Integument:  Skin is warm and dry, no obvious rash  except some scabs on face and arms. Neurologic: awake, alert, oriented x3, handgrip is 5/5 bilaterally, grossly normal sensory function, normal speech  Psychiatric:  normal and cooperative affect, does not appear anxious    Labs Reviewed   COVID-19 & INFLUENZA COMBO         RADIOLOGY/PROCEDURES    No orders to display       ED COURSE & MEDICAL DECISION MAKING    Pertinent Labs & Imaging studies reviewed and interpreted. (See chart for details)  See chart for details of medications given during the ED stay. Vitals:    11/05/22 1135   BP: (!) 130/96   Pulse: (!) 122   Resp: 22   Temp: 97.8 °F (36.6 °C)   TempSrc: Oral   SpO2: 100%   Height: 6' (1.829 m)       Differential diagnosis: Dehydration, potentially/metabolic problem, meth use effect, covid-19, other    FINAL IMPRESSION    1. Methamphetamine abuse ([facility] [address]    2. Pain in both feet        PLAN  MDM - pt has a known meth use habit, from 3 days ago. Pt claims that he's been having bouts of foot pain and myalgia ever since he came down with COVID-19 (requiring admission) two years ago. Pt felt nauseous. Pt has no obvious signs of peritonitis or respiratory or cardiac complaints. His covid test returned negative. Pt will be given IM phenergan with brief observation and anticipated discharge. Re-assessment at 1320- pt doing ok while being observed. He was sent home in stable condition at approximately 1325.      Alexander Chatman MD  11/05/22 1324

## 2022-11-05 NOTE — ED PROVIDER NOTES
[address] [facility] [address] [address] EMERGENCY DEPT  Emergency Department  Faculty Sign-Out Addendum     Care of Trevor Shane was assumed from previous attending and is being seen for Concern For COVID-19, Drug Problem (Meth; last used yesterday), and Nausea & Vomiting  . The patient's initial evaluation and plan have been discussed with the prior provider who initially evaluated the patient. EMERGENCY DEPARTMENT COURSE / MEDICAL DECISION MAKING       MEDICATIONS GIVEN:  Orders Placed This Encounter   Medications    promethazine (PHENERGAN) injection 25 mg    OLANZapine (ZYPREXA) 10 mg in sterile water 2 mL injection       LABS / RADIOLOGY:     Labs Reviewed   COVID-19 & INFLUENZA COMBO       No results found. RECENT VITALS:     Temp: 97.8 °F (36.6 °C),  Heart Rate: (!) 122, Resp: 22, BP: (!) 130/96, SpO2: 100 %    This patient is a 44 y.o. Male with chief complaint of bilateral foot pain ongoing for several years following COVID-19. He was evaluated today for methamphetamine abuse. OUTSTANDING TASKS / RECOMMENDATIONS    Monitor and DC      FINAL DISPOSITION AND ED COURSE     On my examination, Mr. Yecenia Funez is well-appearing and in no acute distress. He is alert and oriented x3. His neurological examination is entirely unremarkable. He is requesting that he sleep a little while longer prior to discharge. Plan to let him sleep and then discharged later this evening. He denies suicidal ideation. HEENT: WNL  Lungs: Clear and equal bilaterally. No increased work of breathing or respiratory distress. Heart: Rate and rhythm regular, no murmurs clicks or gallops  Abdomen: Soft, nondistended, nontender   Lower extremities: no edema, negative Homans bilaterally  Neuro: Awake and alert, no lateralizing deficits, cranial nerves II through XII grossly intact bilaterally    ED COURSE          Clinical Impression      1.  Methamphetamine abuse ([facility] [address]    2. Pain in both feet          FINAL DISPOSITION    DISPOSITION Decision To Discharge 11/05/2022 01:22:58 PM      PATIENT REFERRED TO:  Edson Hernandez MD  [place] [address]  [phone]    Schedule an appointment as soon as possible for a visit in 3 days  As needed          (Please note that portions of this note were completed with a voice recognition program.  Efforts were made to edit the dictations but occasionally words are mis-transcribed. )      Unruly Min MD  Attending Emergency Physician  [facility]       Unruly Min MD  11/05/22 6790

## 2022-11-05 NOTE — PROGRESS NOTES
Patient is AxOx4. He states his legs and lower back is aching but it is better than before his nap. His resp is easy and unlabored. Vitals are stable. Will monitor. Tiajuana Bernheim, RSCSN.

## 2022-11-05 NOTE — ED NOTES
Assumed care of pt at this time. Pt resting on cot in position of comfort in no apparent distress. Pt visible to nurses staff. Door remains shut and lights dimmed for comfort. Dr Valero Linear states to let pt rest for another hour and then discharge is ok at that time. Will re-vital pt at that time.       Titus Klein RN  11/05/22 9744

## 2022-11-07 ENCOUNTER — TELEPHONE (OUTPATIENT)
Dept: FAMILY MEDICINE CLINIC | Age: 37
End: 2022-11-07

## 2022-11-07 NOTE — LETTER
[address] [facility]  [address], [address]  Phone: [phone]  Fax: [phone]    November 7, 2022    [facility] [address] [facility] [address]      Dear Kulwinder Magallanes,    This letter is regarding your Emergency Department (ED) visit at [address] [address] on 11/5/22. Dr. Kallie Carter wanted to make sure that you understand your discharge instructions and that you were able to fill any prescriptions that may have been ordered for you. Please contact the office at the above phone number if the ED advised you to follow up with Dr. Kallie Carter, or if you have any further questions or needs. Also did you know -   *Visiting the ED for a non-emergency could result in higher co-pays than you would normally be subject to paying? *You can call your doctor even after hours so they can direct you to the most appropriate care. [facility]) practices can often offer you an appointment on the same day that you call. *We have some [facility] offices that offer Walk-in appointments; check our website for availability in your community, www. [email or web address].      *Evisits are now available for patients for $36 through [employer] for certain conditions:  * Sinus, cold and or cough       * Diarrhea            * Headache  * Heartburn                                * Poison Lisa          * Back pain     * Urinary problems                         If you do not have [employer] and are interested, please contact the office and a staff member may assist you or go to [email or web address].     Sincerely,     Kallie Carter MD and your [facility]

## 2022-11-21 NOTE — TELEPHONE ENCOUNTER
Patient called and stated that he needed his Lyrica refilled. This medication is not due for a refill until 12/1/2022. Called patient to explain this is too soon for a refill at this time.

## 2022-11-25 DIAGNOSIS — I10 ESSENTIAL HYPERTENSION: ICD-10-CM

## 2022-11-28 DIAGNOSIS — F90.9 ATTENTION DEFICIT HYPERACTIVITY DISORDER (ADHD), UNSPECIFIED ADHD TYPE: ICD-10-CM

## 2022-11-28 RX ORDER — LISDEXAMFETAMINE DIMESYLATE 60 MG/1
60 CAPSULE ORAL DAILY
Qty: 30 CAPSULE | Refills: 0 | Status: SHIPPED | OUTPATIENT
Start: 2022-11-28 | End: 2022-12-28

## 2022-11-28 RX ORDER — METHYLPHENIDATE HYDROCHLORIDE 36 MG/1
36 TABLET ORAL DAILY
Qty: 30 TABLET | Refills: 0 | Status: SHIPPED | OUTPATIENT
Start: 2022-11-28 | End: 2022-12-28

## 2022-11-28 RX ORDER — ARIPIPRAZOLE 5 MG/1
TABLET ORAL
Qty: 30 TABLET | Refills: 0 | Status: SHIPPED | OUTPATIENT
Start: 2022-11-28

## 2022-11-28 NOTE — PROGRESS NOTES
Last visit- 10/31/2022  Next visit- 1/31/2023    Requested Prescriptions     Pending Prescriptions Disp Refills    methylphenidate (CONCERTA) 36 MG extended release tablet 30 tablet 0     Sig: Take 1 tablet by mouth daily for 30 days.

## 2022-11-28 NOTE — TELEPHONE ENCOUNTER
Last visit- 10/31/2022  Next visit- 1/31/2023    Requested Prescriptions     Pending Prescriptions Disp Refills    Lisdexamfetamine Dimesylate (VYVANSE) 60 MG CAPS 30 capsule 0     Sig: Take 60 mg by mouth daily for 30 days.

## 2022-12-05 DIAGNOSIS — J02.9 SORE THROAT: ICD-10-CM

## 2022-12-05 DIAGNOSIS — R05.9 COUGH, UNSPECIFIED TYPE: Primary | ICD-10-CM

## 2022-12-05 RX ORDER — AZITHROMYCIN 250 MG/1
250 TABLET, FILM COATED ORAL SEE ADMIN INSTRUCTIONS
Qty: 6 TABLET | Refills: 0 | Status: SHIPPED | OUTPATIENT
Start: 2022-12-05 | End: 2022-12-10

## 2022-12-05 NOTE — PROGRESS NOTES
Patient called and asked for a antibiotic due to green snot, cough and sore throat. Per Rosa Lubin CNP ok for Zpak to be called into pharmacy at this time. Script sent to patient preferred pharmacy.

## 2022-12-15 NOTE — ED TRIAGE NOTES
Pt comes in through lobby. He states he has had a kidney stone for 2 weeks and is supposed to have surgery to have it removed. He was admitted for surgery but signed out AMA because \"the nurses were mean\". Now he is having trouble urinating and when he is he is urinating blood.  He states flank pain is 10/10 [[id number]]

## 2022-12-19 DIAGNOSIS — F41.9 ANXIETY AND DEPRESSION: ICD-10-CM

## 2022-12-19 DIAGNOSIS — F32.A ANXIETY AND DEPRESSION: ICD-10-CM

## 2022-12-19 RX ORDER — PREGABALIN 200 MG/1
CAPSULE ORAL
Qty: 90 CAPSULE | Refills: 0 | Status: SHIPPED | OUTPATIENT
Start: 2022-12-19 | End: 2023-01-19

## 2022-12-30 DIAGNOSIS — I10 ESSENTIAL HYPERTENSION: ICD-10-CM

## 2023-01-03 RX ORDER — ARIPIPRAZOLE 5 MG/1
TABLET ORAL
Qty: 30 TABLET | Refills: 0 | Status: SHIPPED | OUTPATIENT
Start: 2023-01-03

## 2023-01-06 ENCOUNTER — HOSPITAL ENCOUNTER (EMERGENCY)
Age: 38
Discharge: LEFT BEFORE DISCHARGE | End: 2023-01-06
Payer: COMMERCIAL

## 2023-01-06 ENCOUNTER — APPOINTMENT (OUTPATIENT)
Dept: GENERAL RADIOLOGY | Age: 38
End: 2023-01-06
Payer: COMMERCIAL

## 2023-01-06 VITALS
TEMPERATURE: 97.8 F | OXYGEN SATURATION: 97 % | DIASTOLIC BLOOD PRESSURE: 77 MMHG | WEIGHT: 215 LBS | BODY MASS INDEX: 29.16 KG/M2 | HEART RATE: 126 BPM | RESPIRATION RATE: 20 BRPM | SYSTOLIC BLOOD PRESSURE: 126 MMHG

## 2023-01-06 DIAGNOSIS — F15.10 METHAMPHETAMINE ABUSE (HCC): Primary | ICD-10-CM

## 2023-01-06 LAB
AMPHETAMINE+METHAMPHETAMINE URINE SCREEN: POSITIVE
BACTERIA: ABNORMAL
BARBITURATE QUANTITATIVE URINE: NEGATIVE
BENZODIAZEPINE QUANTITATIVE URINE: NEGATIVE
BILIRUBIN URINE: NEGATIVE
BLOOD, URINE: NEGATIVE
CANNABINOID QUANTITATIVE URINE: NEGATIVE
CASTS: ABNORMAL /LPF
CASTS: ABNORMAL /LPF
CHARACTER, URINE: CLEAR
COCAINE METABOLITE QUANTITATIVE URINE: NEGATIVE
COLOR: YELLOW
CRYSTALS: ABNORMAL
EPITHELIAL CELLS, UA: ABNORMAL /HPF
FENTANYL: POSITIVE
GLUCOSE, URINE: NEGATIVE MG/DL
INFLUENZA A: NOT DETECTED
INFLUENZA B: NOT DETECTED
KETONES, URINE: 15
LEUKOCYTE ESTERASE, URINE: NEGATIVE
MISCELLANEOUS LAB TEST RESULT: ABNORMAL
NITRITE, URINE: NEGATIVE
OPIATES, URINE: NEGATIVE
OXYCODONE: NEGATIVE
PH UA: 5.5 (ref 5–9)
PHENCYCLIDINE QUANTITATIVE URINE: NEGATIVE
PROTEIN UA: ABNORMAL MG/DL
RBC URINE: ABNORMAL /HPF
REFLEX THROAT C + S: NORMAL
RENAL EPITHELIAL, UA: ABNORMAL
S PYO AG THROAT QL: NEGATIVE
SARS-COV-2 RNA, RT PCR: NOT DETECTED
SPECIFIC GRAVITY UA: 1.03 (ref 1–1.03)
UROBILINOGEN, URINE: 1 EU/DL (ref 0–1)
WBC UA: ABNORMAL /HPF
YEAST: ABNORMAL

## 2023-01-06 PROCEDURE — 87880 STREP A ASSAY W/OPTIC: CPT

## 2023-01-06 PROCEDURE — 87636 SARSCOV2 & INF A&B AMP PRB: CPT

## 2023-01-06 PROCEDURE — 87077 CULTURE AEROBIC IDENTIFY: CPT

## 2023-01-06 PROCEDURE — 81001 URINALYSIS AUTO W/SCOPE: CPT

## 2023-01-06 PROCEDURE — 71045 X-RAY EXAM CHEST 1 VIEW: CPT

## 2023-01-06 PROCEDURE — 80307 DRUG TEST PRSMV CHEM ANLYZR: CPT

## 2023-01-06 PROCEDURE — 6370000000 HC RX 637 (ALT 250 FOR IP): Performed by: NURSE PRACTITIONER

## 2023-01-06 PROCEDURE — 87070 CULTURE OTHR SPECIMN AEROBIC: CPT

## 2023-01-06 PROCEDURE — 99284 EMERGENCY DEPT VISIT MOD MDM: CPT

## 2023-01-06 RX ADMIN — Medication 5 ML: at 19:52

## 2023-01-06 ASSESSMENT — PAIN DESCRIPTION - DESCRIPTORS: DESCRIPTORS: ACHING

## 2023-01-06 ASSESSMENT — PAIN SCALES - GENERAL: PAINLEVEL_OUTOF10: 4

## 2023-01-06 ASSESSMENT — PAIN DESCRIPTION - FREQUENCY: FREQUENCY: CONTINUOUS

## 2023-01-06 ASSESSMENT — PAIN - FUNCTIONAL ASSESSMENT: PAIN_FUNCTIONAL_ASSESSMENT: 0-10

## 2023-01-06 ASSESSMENT — PAIN DESCRIPTION - LOCATION: LOCATION: GENERALIZED

## 2023-01-06 ASSESSMENT — PAIN DESCRIPTION - PAIN TYPE: TYPE: ACUTE PAIN

## 2023-01-07 NOTE — ED NOTES
Pt requesting to leave AMA. Mark Ogden NP notified. Pt verbalized understanding of risks of leaving.       Sid Valentin RN  01/06/23 0818

## 2023-01-07 NOTE — ED TRIAGE NOTES
Pt to ED via [facility] for c/o flu like symptoms. Pt reports having generalized body aches and sore throat x1 day. Pt reports injecting Methamphetamines approx 2 hours PTA. Pt reports after injection, pt began to feel like throat was \"swollen\". Pt reports taking Excederin and prescribed Methadone to help with symptoms with no relief. Swabbed for flu/covid and strep.

## 2023-01-07 NOTE — ED PROVIDER NOTES
[facility] Emergency Department    CHIEF COMPLAINT       Chief Complaint   Patient presents with    Generalized Body Aches       Nurses Notes reviewed and I agree except as noted in the HPI. HISTORY OF PRESENT ILLNESS    Yahaira Livingston is a 44 y.o. male who presents to the ED for evaluation of generalized body aches. Patient notes he relapsed on methamphetamines 1 week ago. He notes today he has developed generalized body aches and a sore throat. He notes shortness of breath associated with sore throat. He has increased anxiety. He denies fevers or chills, denies chest pain, denies nausea vomiting or diarrhea. He notes he last used methamphetamine right before coming to the ER. He notes palpitations and tachycardia. He notes slight headache. 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 He is concerned that he has renal failure again. He notes decreased urine output. He has he has not slept in the last 3 days. He denies any ill contacts. PAST MEDICAL HISTORY     Past Medical History:   Diagnosis Date    Anxiety     Depression     Kidney stone     Liver disease     Hep C    Opiate abuse, continuous ([facility] [address]        SURGICALHISTORY      has no past surgical history on file. CURRENT MEDICATIONS       Discharge Medication List as of 1/6/2023  9:04 PM        CONTINUE these medications which have NOT CHANGED    Details   ARIPiprazole (ABILIFY) 5 MG tablet take 1 tablet by mouth once daily, Disp-30 tablet, R-0Normal      pregabalin (LYRICA) 200 MG capsule 1 tab three times daily, Disp-90 capsule, R-0Normal      Lisdexamfetamine Dimesylate (VYVANSE) 60 MG CAPS Take 60 mg by mouth daily for 30 days. , Disp-30 capsule, R-0Normal      methylphenidate (CONCERTA) 36 MG extended release tablet Take 1 tablet by mouth daily for 30 days. , Disp-30 tablet, R-0Normal      venlafaxine (EFFEXOR XR) 150 MG extended release capsule take 1 capsule by mouth once daily, Disp-30 capsule, R-3Normal      mirtazapine (REMERON) 45 MG tablet Take 1 tablet by mouth nightly, Disp-30 tablet, R-3Normal      lisinopril (PRINIVIL;ZESTRIL) 20 MG tablet Take 1 tablet by mouth daily, Disp-30 tablet, R-3Normal      methadone 10 MG/5ML solution Take 60 mg by mouth daily. Historical Med      gabapentin (NEURONTIN) 800 MG tablet Take 800 mg by mouth 4 times daily. Historical Med      naloxone (NARCAN) 4 MG/0.1ML LIQD nasal spray 1 spray by Nasal route as needed for Opioid Reversal, Disp-1 each, R-1Print      Aspirin-Acetaminophen-Caffeine (EXCEDRIN EXTRA STRENGTH PO) Take by mouth as needed Historical Med             ALLERGIES     has No Known Allergies. FAMILY HISTORY     He indicated that his mother is . He indicated that his father is . family history is not on file.     SOCIAL HISTORY       Social History     Socioeconomic History    Marital status: Single     Spouse name: Not on file    Number of children: Not on file    Years of education: Not on file    Highest education level: Not on file   Occupational History    Not on file   Tobacco Use    Smoking status: Former     Types: Cigarettes     Quit date:      Years since quittin.0    Smokeless tobacco: Current   Vaping Use    Vaping Use: Not on file   Substance and Sexual Activity    Alcohol use: Not Currently    Drug use: Yes     Types: Opiates , Fentanyl, Amphetamines (Speed), Crack Cocaine, Heroin, Benzodiazepines (Downers/Zannies), Oxycodone (Oxy), Suboxone, Sedatives/Hypnotics, IV, Marijuana (Norrine Creed), Methamphetamines (Crystal Meth), Cocaine     Comment: fentanyl 2021    Sexual activity: Not Currently   Other Topics Concern    Not on file   Social History Narrative    ** Merged History Encounter **          Social Determinants of Health     Financial Resource Strain: Not on file   Food Insecurity: Not on file   Transportation Needs: Not on file   Physical Activity: Not on file   Stress: Not on file   Social Connections: Not on file   Intimate Partner Violence: Not on file   Housing Stability: Not on file       PHYSICAL EXAM     INITIAL VITALS:  weight is 215 lb (97.5 kg). His oral temperature is 97.8 °F (36.6 °C). His blood pressure is 126/77 and his pulse is 126 (abnormal). His respiration is 20 and oxygen saturation is 97%. Physical Exam  Vitals and nursing note reviewed. Constitutional:       General: He is not in acute distress. Appearance: Normal appearance. He is not ill-appearing, toxic-appearing or diaphoretic. HENT:      Head: Normocephalic. Nose: Nose normal.      Mouth/Throat:      Mouth: Mucous membranes are moist.      Pharynx: No oropharyngeal exudate or posterior oropharyngeal erythema. Cardiovascular:      Rate and Rhythm: Regular rhythm. Tachycardia present. Heart sounds: No murmur heard. Pulmonary:      Effort: Pulmonary effort is normal.      Breath sounds: Normal breath sounds. Abdominal:      General: Abdomen is flat. Palpations: Abdomen is soft. Tenderness: There is no abdominal tenderness. Musculoskeletal:         General: Normal range of motion. Cervical back: Normal range of motion. Skin:     General: Skin is warm. Capillary Refill: Capillary refill takes less than 2 seconds. Neurological:      General: No focal deficit present. Mental Status: He is alert. Cranial Nerves: No cranial nerve deficit. Sensory: No sensory deficit. Psychiatric:         Mood and Affect: Mood is anxious. Behavior: Behavior normal.       DIFFERENTIAL DIAGNOSIS:   Methamphetamine overdose, COVID-19, influenza, renal failure, electrolyte abnormality  DIAGNOSTIC RESULTS     RADIOLOGY: non-plainfilm images(s) such as CT, Ultrasound and MRI are read by the radiologist.  Plain radiographic images are visualized and preliminarily interpreted by the emergency physician unless otherwise stated below.   XR CHEST PORTABLE   Final Result   No acute cardiopulmonary disease. **This report has been created using voice recognition software. It may contain minor errors which are inherent in voice recognition technology. **      Final report electronically signed by Dr. Jaylon George on 1/6/2023 7:58 PM            LABS:   Labs Reviewed   URINALYSIS WITH MICROSCOPIC - Abnormal; Notable for the following components:       Result Value    Ketones, Urine 15 (*)     Protein, UA TRACE (*)     All other components within normal limits   COVID-19 & INFLUENZA COMBO   CULTURE, THROAT    Narrative:     Source: throat       Site:           Current Antibiotics: not stated   GROUP A STREP, REFLEX   URINE DRUG SCREEN       EMERGENCY DEPARTMENT COURSE:   Vitals:    Vitals:    01/06/23 1922   BP: 126/77   Pulse: (!) 126   Resp: 20   Temp: 97.8 °F (36.6 °C)   TempSrc: Oral   SpO2: 97%   Weight: 215 lb (97.5 kg)       MDM    Patient was seen and evaluated in the emergency department, patient appears to be in mild distress, vital signs reviewed. Tachycardia noted. Physical exam completed, no erythema or edema to the throat, no stridor noted. Lungs are clear to auscultation although there is some tachypnea on exam.  Patient appears to be anxious. He denies being homeless, but request some food while he is here. Labs and imaging are ordered. Chest x-ray reveals no significant findings. COVID flu and strep testing is negative. Phlebotomy attempted to obtain blood sample but were unable. Patient provided urine sample, positive for fentanyl and  methamphetamine. Patient notes it is getting late, and would like to leave [address] He was signed out by nursing staff. He is given instructions to return to the ER with any worsening symptoms. Or when he wants to complete work-up. Medications   magic (miracle) mouthwash (5 mLs Swish & Swallow Given 1/6/23 1952)       Patient was seenindependently by myself.  The patient's final impression and disposition and plan was determined by myself. CRITICAL CARE:   None    CONSULTS:  None    PROCEDURES:  None    FINAL IMPRESSION     1. Methamphetamine abuse [facility])          DISPOSITION/PLAN   Patient left AGAINST MEDICAL ADVICE  PATIENT REFERREDTO:  No follow-up provider specified. DISCHARGE MEDICATIONS:  Discharge Medication List as of 1/6/2023  9:04 PM          (Please note that portions of this note were completed with a voice recognition program.  Efforts were made to edit the dictations but occasionally words are mis-transcribed.)      Provider:  I personally performed the services described in the documentation,reviewed and edited the documentation which was dictated to the scribe in my presence, and it accurately records my words and actions.     Jeremiah Perez CNP 01/06/23 10:12 PM    Nora Perez, APRN - CNP         [employer], APRN - CNP  01/06/23 8825

## 2023-01-10 LAB
ORGANISM: ABNORMAL
THROAT/NOSE CULTURE: ABNORMAL

## 2023-01-11 NOTE — PROGRESS NOTES
Pharmacy Note  ED Culture Follow-up    Chandra Hall is a 44 y.o. male. Allergies: Patient has no known allergies. Labs:  Lab Results   Component Value Date    BUN 19 07/14/2022    CREATININE 1.0 07/14/2022    WBC 10.2 07/14/2022     CrCl cannot be calculated (Patient's most recent lab result is older than the maximum 30 days allowed. ). Current antimicrobials:   None. ASSESSMENT:  Micro results:   Throat culture: positive for streptococcus equisimilis     PLAN:  Need for intervention: No 2/2 Patient left AMA. Only moderate growth. Discussed with: DAYDAY Castro  Chosen treatment:    No treatment indicated    Patient response:   No need to contact patient    Called/sent in prescription to: Not applicable    Please call with any questions.  Deya Bryant, [facility], PharmD 3:53 PM 1/11/2023

## 2023-01-17 DIAGNOSIS — F41.9 ANXIETY AND DEPRESSION: ICD-10-CM

## 2023-01-17 DIAGNOSIS — F32.A ANXIETY AND DEPRESSION: ICD-10-CM

## 2023-01-17 DIAGNOSIS — F90.9 ATTENTION DEFICIT HYPERACTIVITY DISORDER (ADHD), UNSPECIFIED ADHD TYPE: ICD-10-CM

## 2023-01-17 RX ORDER — PREGABALIN 200 MG/1
CAPSULE ORAL
Qty: 90 CAPSULE | Refills: 0 | Status: SHIPPED | OUTPATIENT
Start: 2023-01-17 | End: 2023-02-17

## 2023-01-17 RX ORDER — METHYLPHENIDATE HYDROCHLORIDE 36 MG/1
36 TABLET ORAL DAILY
Qty: 30 TABLET | Refills: 0 | Status: SHIPPED | OUTPATIENT
Start: 2023-01-17 | End: 2023-02-16

## 2023-01-19 ENCOUNTER — APPOINTMENT (OUTPATIENT)
Dept: GENERAL RADIOLOGY | Age: 38
End: 2023-01-19
Payer: COMMERCIAL

## 2023-01-19 ENCOUNTER — APPOINTMENT (OUTPATIENT)
Dept: CT IMAGING | Age: 38
End: 2023-01-19
Payer: COMMERCIAL

## 2023-01-19 ENCOUNTER — HOSPITAL ENCOUNTER (EMERGENCY)
Age: 38
Discharge: HOME | End: 2023-01-19
Attending: FAMILY MEDICINE
Payer: COMMERCIAL

## 2023-01-19 VITALS
RESPIRATION RATE: 12 BRPM | OXYGEN SATURATION: 98 % | TEMPERATURE: 98.4 F | SYSTOLIC BLOOD PRESSURE: 110 MMHG | HEART RATE: 71 BPM | WEIGHT: 200 LBS | DIASTOLIC BLOOD PRESSURE: 67 MMHG | HEIGHT: 70 IN | BODY MASS INDEX: 28.63 KG/M2

## 2023-01-19 DIAGNOSIS — R06.02 SHORTNESS OF BREATH: Primary | ICD-10-CM

## 2023-01-19 DIAGNOSIS — F15.10 METHAMPHETAMINE ABUSE (HCC): ICD-10-CM

## 2023-01-19 DIAGNOSIS — D50.9 MICROCYTIC ANEMIA: ICD-10-CM

## 2023-01-19 LAB
ANION GAP SERPL CALCULATED.3IONS-SCNC: 15 MEQ/L (ref 8–16)
BASOPHILS # BLD: 0.3 %
BASOPHILS ABSOLUTE: 0 THOU/MM3 (ref 0–0.1)
BUN BLDV-MCNC: 21 MG/DL (ref 7–22)
CALCIUM SERPL-MCNC: 9.2 MG/DL (ref 8.5–10.5)
CHLORIDE BLD-SCNC: 102 MEQ/L (ref 98–111)
CO2: 22 MEQ/L (ref 23–33)
CREAT SERPL-MCNC: 0.9 MG/DL (ref 0.4–1.2)
D-DIMER QUANTITATIVE: 798 NG/ML FEU (ref 0–500)
EKG ATRIAL RATE: 100 BPM
EKG P AXIS: 53 DEGREES
EKG P-R INTERVAL: 190 MS
EKG Q-T INTERVAL: 366 MS
EKG QRS DURATION: 88 MS
EKG QTC CALCULATION (BAZETT): 472 MS
EKG R AXIS: 30 DEGREES
EKG T AXIS: 59 DEGREES
EKG VENTRICULAR RATE: 100 BPM
EOSINOPHIL # BLD: 1.2 %
EOSINOPHILS ABSOLUTE: 0.1 THOU/MM3 (ref 0–0.4)
ERYTHROCYTE [DISTWIDTH] IN BLOOD BY AUTOMATED COUNT: 15.2 % (ref 11.5–14.5)
ERYTHROCYTE [DISTWIDTH] IN BLOOD BY AUTOMATED COUNT: 44.6 FL (ref 35–45)
GFR SERPL CREATININE-BSD FRML MDRD: > 60 ML/MIN/1.73M2
GLUCOSE BLD-MCNC: 88 MG/DL (ref 70–108)
HCT VFR BLD CALC: 33.6 % (ref 42–52)
HEMOGLOBIN: 10.8 GM/DL (ref 14–18)
IMMATURE GRANS (ABS): 0.02 THOU/MM3 (ref 0–0.07)
IMMATURE GRANULOCYTES: 0.2 %
LACTIC ACID, SEPSIS: 0.7 MMOL/L (ref 0.5–1.9)
LACTIC ACID, SEPSIS: 0.9 MMOL/L (ref 0.5–1.9)
LYMPHOCYTES # BLD: 34.5 %
LYMPHOCYTES ABSOLUTE: 3.5 THOU/MM3 (ref 1–4.8)
MCH RBC QN AUTO: 26 PG (ref 26–33)
MCHC RBC AUTO-ENTMCNC: 32.1 GM/DL (ref 32.2–35.5)
MCV RBC AUTO: 80.8 FL (ref 80–94)
MONOCYTES # BLD: 10 %
MONOCYTES ABSOLUTE: 1 THOU/MM3 (ref 0.4–1.3)
NUCLEATED RED BLOOD CELLS: 0 /100 WBC
OSMOLALITY CALCULATION: 279.9 MOSMOL/KG (ref 275–300)
PLATELET # BLD: 358 THOU/MM3 (ref 130–400)
PMV BLD AUTO: 9.5 FL (ref 9.4–12.4)
POTASSIUM SERPL-SCNC: 4.7 MEQ/L (ref 3.5–5.2)
RBC # BLD: 4.16 MILL/MM3 (ref 4.7–6.1)
SEG NEUTROPHILS: 53.8 %
SEGMENTED NEUTROPHILS ABSOLUTE COUNT: 5.4 THOU/MM3 (ref 1.8–7.7)
SODIUM BLD-SCNC: 139 MEQ/L (ref 135–145)
TROPONIN T: < 0.01 NG/ML
WBC # BLD: 10 THOU/MM3 (ref 4.8–10.8)

## 2023-01-19 PROCEDURE — 87040 BLOOD CULTURE FOR BACTERIA: CPT

## 2023-01-19 PROCEDURE — 36415 COLL VENOUS BLD VENIPUNCTURE: CPT

## 2023-01-19 PROCEDURE — 83605 ASSAY OF LACTIC ACID: CPT

## 2023-01-19 PROCEDURE — 99285 EMERGENCY DEPT VISIT HI MDM: CPT

## 2023-01-19 PROCEDURE — 71045 X-RAY EXAM CHEST 1 VIEW: CPT

## 2023-01-19 PROCEDURE — 84484 ASSAY OF TROPONIN QUANT: CPT

## 2023-01-19 PROCEDURE — 71275 CT ANGIOGRAPHY CHEST: CPT

## 2023-01-19 PROCEDURE — 96361 HYDRATE IV INFUSION ADD-ON: CPT

## 2023-01-19 PROCEDURE — 80048 BASIC METABOLIC PNL TOTAL CA: CPT

## 2023-01-19 PROCEDURE — 2580000003 HC RX 258: Performed by: FAMILY MEDICINE

## 2023-01-19 PROCEDURE — 85025 COMPLETE CBC W/AUTO DIFF WBC: CPT

## 2023-01-19 PROCEDURE — 96372 THER/PROPH/DIAG INJ SC/IM: CPT

## 2023-01-19 PROCEDURE — 6360000004 HC RX CONTRAST MEDICATION: Performed by: FAMILY MEDICINE

## 2023-01-19 PROCEDURE — 93005 ELECTROCARDIOGRAM TRACING: CPT | Performed by: FAMILY MEDICINE

## 2023-01-19 PROCEDURE — 85379 FIBRIN DEGRADATION QUANT: CPT

## 2023-01-19 PROCEDURE — 6360000002 HC RX W HCPCS: Performed by: FAMILY MEDICINE

## 2023-01-19 PROCEDURE — 96374 THER/PROPH/DIAG INJ IV PUSH: CPT

## 2023-01-19 RX ORDER — 0.9 % SODIUM CHLORIDE 0.9 %
1000 INTRAVENOUS SOLUTION INTRAVENOUS ONCE
Status: COMPLETED | OUTPATIENT
Start: 2023-01-19 | End: 2023-01-19

## 2023-01-19 RX ORDER — LORAZEPAM 2 MG/ML
1 INJECTION INTRAMUSCULAR ONCE
Status: COMPLETED | OUTPATIENT
Start: 2023-01-19 | End: 2023-01-19

## 2023-01-19 RX ADMIN — IOPAMIDOL 80 ML: 755 INJECTION, SOLUTION INTRAVENOUS at 14:31

## 2023-01-19 RX ADMIN — SODIUM CHLORIDE 1000 ML: 9 INJECTION, SOLUTION INTRAVENOUS at 09:54

## 2023-01-19 RX ADMIN — LORAZEPAM 1 MG: 2 INJECTION INTRAMUSCULAR; INTRAVENOUS at 09:47

## 2023-01-19 RX ADMIN — ZIPRASIDONE MESYLATE 20 MG: 20 INJECTION, POWDER, LYOPHILIZED, FOR SOLUTION INTRAMUSCULAR at 10:23

## 2023-01-19 ASSESSMENT — PAIN - FUNCTIONAL ASSESSMENT: PAIN_FUNCTIONAL_ASSESSMENT: 0-10

## 2023-01-19 ASSESSMENT — PAIN SCALES - GENERAL: PAINLEVEL_OUTOF10: 6

## 2023-01-19 NOTE — ED NOTES
Released with discharge instructions- Given resources for help with addiction- no questions or concerns noted- Woke patient up- He was alert, skin warm and dry, respirations even and unlabored- Assisted to car per wheelchair- home with SO     Di Romero RN  01/19/23 4494

## 2023-01-19 NOTE — ED NOTES
Resting quietly with eyes closed, skin warm and dry, respirations even and unlabored     Lydia Denise RN  01/19/23 3231

## 2023-01-19 NOTE — ED NOTES
Pt continues resting with eyes closed- skin warm and dry-respirations even and unlabored     Meredith Dowd RN  01/19/23 0735

## 2023-01-19 NOTE — ED NOTES
Pt medicated as per order for anxiety, trouble sitting on cart, unable to sit still for EKG     Soo Cueva RN  01/19/23 4516

## 2023-01-19 NOTE — ED PROVIDER NOTES
EMERGENCY DEPARTMENT ENCOUNTER     CHIEF COMPLAINT   Chief Complaint   Patient presents with    Addiction Problem        HPI   Anthony Roberts is a 43 y.o. male with known drug addiction (IVDA meth, heroin, last use this morning), who presents with shortness of breath, dyspnea on exertion and feeling restless, onset was last week but intensifying today. The duration has been constant. Patient has no history of any CHF and hypertension or cardiac or endocrine abnormalities. He has known Hep C. REVIEW OF SYSTEMS   Cardiac: +dyspnea on exertion; neg Chest Pain, Denies syncope   Respiratory: +sob and dyspnea; Denies cough or hemoptysis   Psych: +drug abuse  Skin: no rash or petechiae  General: Denies Fever   All other review of systems are reviewed and are otherwise negative. PAST MEDICAL & SURGICAL HISTORY   Past Medical History:   Diagnosis Date    Anxiety     Depression     Kidney stone     Liver disease     Hep C    Opiate abuse, continuous ([address]       No past surgical history on file. CURRENT MEDICATIONS   Current Outpatient Rx   Medication Sig Dispense Refill    methylphenidate (CONCERTA) 36 MG extended release tablet Take 1 tablet by mouth daily for 30 days. 30 tablet 0    pregabalin (LYRICA) 200 MG capsule 1 tab three times daily 90 capsule 0    ARIPiprazole (ABILIFY) 5 MG tablet take 1 tablet by mouth once daily 30 tablet 0    Lisdexamfetamine Dimesylate (VYVANSE) 60 MG CAPS Take 60 mg by mouth daily for 30 days. 30 capsule 0    venlafaxine (EFFEXOR XR) 150 MG extended release capsule take 1 capsule by mouth once daily 30 capsule 3    mirtazapine (REMERON) 45 MG tablet Take 1 tablet by mouth nightly 30 tablet 3    lisinopril (PRINIVIL;ZESTRIL) 20 MG tablet Take 1 tablet by mouth daily 30 tablet 3    pregabalin (LYRICA) 100 MG capsule Take 1 capsule by mouth in the morning and 1 capsule before bedtime. Do all this for 30 days. 60 capsule 0    methadone 10 MG/5ML solution Take 60 mg by mouth daily. gabapentin (NEURONTIN) 800 MG tablet Take 800 mg by mouth 4 times daily. methadone 10 MG/5ML solution Take 30 mLs by mouth daily for 30 days. 30 each 0    naloxone (NARCAN) 4 MG/0.1ML LIQD nasal spray 1 spray by Nasal route as needed for Opioid Reversal 1 each 1    Aspirin-Acetaminophen-Caffeine (EXCEDRIN EXTRA STRENGTH PO) Take by mouth as needed           ALLERGIES   No Known Allergies     SOCIAL & FAMILY HISTORY   Social History     Socioeconomic History    Marital status: Single   Tobacco Use    Smoking status: Former     Types: Cigarettes     Quit date:      Years since quittin.0    Smokeless tobacco: Current   Substance and Sexual Activity    Alcohol use: Not Currently    Drug use: Yes     Types: Opiates , Fentanyl, Amphetamines (Speed), Crack Cocaine, Heroin, Benzodiazepines (Downers/Zannies), Oxycodone (Oxy), Suboxone, Sedatives/Hypnotics, IV, Marijuana (Gee Celia), Methamphetamines (Crystal Meth), Cocaine     Comment: fentanyl 2021    Sexual activity: Not Currently   Social History Narrative    ** Merged History Encounter **           No family history on file. I have reviewed and agreed with all nursing notes to this point. I also have reviewed patient's social, medical and surgical histories.     PHYSICAL EXAM   VITAL SIGNS: BP (!) 127/91   Pulse 81   Temp 98.4 °F (36.9 °C) (Oral)   Resp 12   Ht 5' 10\" (1.778 m)   Wt 200 lb (90.7 kg)   SpO2 96%   BMI 28.70 kg/m²    Constitutional: Well developed, well nourished, moderate acute distress   HENT: Atraumatic, moist mucus membranes   Neck: supple, no JVD   Respiratory: Lungs showed decreased bibasilar breath sounds, +tachypnea, no retractions   Cardiovascular: Normal rhythm, tachycardic rate, no murmurs noted in all borders  Musculoskeletal: No edema, no deformities   GI: some excoriations without fluctuance of the lower abdomen with overlying scabs  : external exam of scrotum and groin normal without erythema  Integument: scabs all over face, torso, extremities; Skin warm and dry, no petechiae   Neurologic: Alert & oriented, normal speech       EKG (interpreted by me) 10:42 on 1/19/23  Rhythm: Sinus   Rate: 100 BPM   Axis: normal   Conduction: normal   ST/T Segments: nonacute     RADIOLOGY/PROCEDURES   CTA CHEST W WO CONTRAST PE Eval   Final Result       1. No evidence of pulmonary embolus or acute intrathoracic abnormality. 2. Mild bibasilar atelectasis. **This report has been created using voice recognition software. It may contain minor errors which are inherent in voice recognition technology. **      Final report electronically signed by Dr. Surjit Pyle MD on 1/19/2023 3:12 PM      XR CHEST PORTABLE   Final Result   Low lung volumes with crowding of the bronchovascular markings. Patchy bilateral perihilar opacities could represent airspace infection/inflammation versus edema. Continued follow-up to ensure resolution is advised. **This report has been created using voice recognition software. It may contain minor errors which are inherent in voice recognition technology. **      Final report electronically signed by Dr Juany Villarreal on 1/19/2023 11:47 AM           LABS   Labs Reviewed   D-DIMER, QUANTITATIVE - Abnormal; Notable for the following components:       Result Value    D-Dimer, Quant 798.00 (*)     All other components within normal limits   CBC WITH AUTO DIFFERENTIAL - Abnormal; Notable for the following components:    RBC 4.16 (*)     Hemoglobin 10.8 (*)     Hematocrit 33.6 (*)     MCHC 32.1 (*)     RDW-CV 15.2 (*)     All other components within normal limits   BASIC METABOLIC PANEL - Abnormal; Notable for the following components:    CO2 22 (*)     All other components within normal limits   CULTURE, BLOOD 1   CULTURE, BLOOD 1   TROPONIN   LACTATE, SEPSIS   LACTATE, SEPSIS   ANION GAP   GLOMERULAR FILTRATION RATE, ESTIMATED   OSMOLALITY        INITIAL IMPRESSION:  Differential diagnoses: Tachycardic due to Meth intoxication, withdrawal from opiates, sepsis, endocarditis, PE, CHF, pneumothorax    There is concern for bacteremia as well. I ordered 1 mg ativan for pt's agitation, which was not particularly helpful. Subsequently I ordered 20 mg of IM geodon for additional comfort. FINAL IMPRESSION   1. Shortness of breath    2. Microcytic anemia    3. Methamphetamine abuse [facility])         ED COURSE & MEDICAL DECISION MAKING   This is a 43yo male with known IVDA habit who did meth and heroin daily, and coming in due primarily to agitation and feeling short of breath. He denies any hemoptysis, fever, chest pain but has many risk factors for endocarditis and other bacteremia/infectious issues. However, he has stable vitals and was afebrile, denying any neurologic symptoms or back pain. This is an acute problem with unknown prognosis. I ordered labs, including CXR (portable) and interpreted and reviewed the findings. Due to an elevated d-dimer, a CT chest was ordered to evaluate for PE or pneumonia. Pt also has a concurrent agitation issue that was greatly improved with a dose of IM geodon and time observed in the ED (4+ hours). Initially pt was triaged as an TONJA level 4 patient, but he was more appropriately a level 3 patient. In any event, pt did improve enough that he could be discharged home. Pt has no need for prescriptions. Pertinent Labs & Imaging studies reviewed and interpreted. (See chart for details)   See chart for details of medications given during the ED stay. FINAL DISPOSITION:  Discharge home.     Electronically signed by: Alexander Rhodes MD, 1/19/2023 3:41 PM   (This note was completed with a voice recognition program)        Bindu Saba MD  01/19/23 [phone]

## 2023-01-19 NOTE — ED NOTES
Resting quietly with eyes closed-skin warm and dry-respirations even and unlabored     Trecia Osgood, RN  01/19/23 7436

## 2023-01-19 NOTE — DISCHARGE INSTRUCTIONS
WE GAVE YOU A COUPLE OF MEDICATIONS THROUGH THE IV TO HELP CALM YOU FROM YOUR AGITATION FROM METH USE.    CUT BACK ON YOUR DRUG USE, WHICH COULD HELP PREVENT FUTURE INCIDENTS.

## 2023-01-19 NOTE — ED NOTES
Pt resting more calmly at this time-Resting on cart with eyes closed- skin warm and dry, respirations even and unlabored- girlfriend at bedside- EKG completed     Soo Cueva RN  01/19/23 2496

## 2023-01-19 NOTE — ED NOTES
Resting quietly with eyes closed-skin warm and dry-respirations even and unlabored     Moriah Valentin RN  01/19/23 1956

## 2023-01-19 NOTE — ED NOTES
Resting quietly with eyes closed-skin warm and dry-respirations even and unlabored     Ayde Vieira RN  01/19/23 3640

## 2023-01-19 NOTE — ED NOTES
Pt becoming increasingly agitated-unable to lay still in bed-c/o feeling hot-continuous movement noted-Dr Lorie Robb aware and in to reevaluate     Jung Serna RN  01/19/23 3261

## 2023-01-19 NOTE — PROGRESS NOTES
Pt paged as Level C. Attempted to discuss resources with pt, per Magalie Joyce RN pt was given some medications and may not wake easily. Resources left at bedside, Denzel Lundy to ensure pt significant other receives resources when she returns from the store.

## 2023-01-21 LAB
BACTERIA BLD AEROBE CULT: NORMAL
BACTERIA BLD AEROBE CULT: NORMAL

## 2023-02-14 ENCOUNTER — OFFICE VISIT (OUTPATIENT)
Dept: INTERNAL MEDICINE CLINIC | Age: 38
End: 2023-02-14
Payer: COMMERCIAL

## 2023-02-14 VITALS
HEART RATE: 89 BPM | DIASTOLIC BLOOD PRESSURE: 91 MMHG | WEIGHT: 215 LBS | SYSTOLIC BLOOD PRESSURE: 135 MMHG | HEIGHT: 70 IN | BODY MASS INDEX: 30.78 KG/M2

## 2023-02-14 DIAGNOSIS — F32.A ANXIETY AND DEPRESSION: ICD-10-CM

## 2023-02-14 DIAGNOSIS — G47.00 INSOMNIA, UNSPECIFIED TYPE: ICD-10-CM

## 2023-02-14 DIAGNOSIS — I10 ESSENTIAL HYPERTENSION: ICD-10-CM

## 2023-02-14 DIAGNOSIS — F90.9 ATTENTION DEFICIT HYPERACTIVITY DISORDER (ADHD), UNSPECIFIED ADHD TYPE: ICD-10-CM

## 2023-02-14 DIAGNOSIS — F41.9 ANXIETY AND DEPRESSION: ICD-10-CM

## 2023-02-14 PROCEDURE — G8417 CALC BMI ABV UP PARAM F/U: HCPCS | Performed by: NURSE PRACTITIONER

## 2023-02-14 PROCEDURE — 3078F DIAST BP <80 MM HG: CPT | Performed by: NURSE PRACTITIONER

## 2023-02-14 PROCEDURE — 3074F SYST BP LT 130 MM HG: CPT | Performed by: NURSE PRACTITIONER

## 2023-02-14 PROCEDURE — 99214 OFFICE O/P EST MOD 30 MIN: CPT | Performed by: NURSE PRACTITIONER

## 2023-02-14 PROCEDURE — 4004F PT TOBACCO SCREEN RCVD TLK: CPT | Performed by: NURSE PRACTITIONER

## 2023-02-14 PROCEDURE — G8484 FLU IMMUNIZE NO ADMIN: HCPCS | Performed by: NURSE PRACTITIONER

## 2023-02-14 PROCEDURE — G8427 DOCREV CUR MEDS BY ELIG CLIN: HCPCS | Performed by: NURSE PRACTITIONER

## 2023-02-14 RX ORDER — LISDEXAMFETAMINE DIMESYLATE 60 MG/1
60 CAPSULE ORAL DAILY
Qty: 30 CAPSULE | Refills: 0 | Status: CANCELLED | OUTPATIENT
Start: 2023-02-14 | End: 2023-03-16

## 2023-02-14 RX ORDER — MIRTAZAPINE 45 MG/1
45 TABLET, FILM COATED ORAL NIGHTLY
Qty: 30 TABLET | Refills: 3 | Status: SHIPPED | OUTPATIENT
Start: 2023-02-14

## 2023-02-14 RX ORDER — LISINOPRIL 20 MG/1
20 TABLET ORAL DAILY
Qty: 30 TABLET | Refills: 3 | Status: SHIPPED | OUTPATIENT
Start: 2023-02-14

## 2023-02-14 RX ORDER — PREGABALIN 200 MG/1
CAPSULE ORAL
Qty: 90 CAPSULE | Refills: 0 | Status: CANCELLED | OUTPATIENT
Start: 2023-02-14 | End: 2023-03-17

## 2023-02-14 RX ORDER — GABAPENTIN 800 MG/1
800 TABLET ORAL 4 TIMES DAILY
Qty: 120 TABLET | Refills: 0 | Status: SHIPPED | OUTPATIENT
Start: 2023-02-14 | End: 2023-02-14 | Stop reason: CLARIF

## 2023-02-14 RX ORDER — VENLAFAXINE HYDROCHLORIDE 150 MG/1
CAPSULE, EXTENDED RELEASE ORAL
Qty: 30 CAPSULE | Refills: 3 | Status: CANCELLED | OUTPATIENT
Start: 2023-02-14

## 2023-02-14 NOTE — PROGRESS NOTES
[facility] INTERNAL MEDICINE AND MEDICATION MANAGEMENT  [address]  Dept: [phone]  Dept Fax: [phone]: [phone]     Visit Date:  2/14/2023    Patient:  Sera Stovall  YOB: 1985    HPI:     Chief Complaint   Patient presents with    [address] presents today for medical evaluation of ADHD, anxiety/depression/PTSD, chronic pain, HTN     I last seen him 4 months ago. Previously followed with Dr. Lavern Chinchilla for MAT. Is now at the methadone clinic. States that he is doing well. Hospitalized in 5/2022 for an overdose. He has had several in the past.      Discussed at length that he cannot continue to use illicit drugs and be on a stimulant. I agreed to prescribe under the agreement that use would stop, and as harm reduction; as he complained that the reason for his methamphetamine use was because his ADHD was not controlled. Guanfacine not helpful. Vyvanse has been the most effective. This has also helped him not use methamphetamines. Anxiety/depression well controlled with effexor and abilify; and counseling. Continue current regimen. He does have severe PTSD. Unable to work. Chronic pain controlled with pregabalin     BP well controlled with lisinopril 20 mg daily. Not take his BP meds today. Medications    Current Outpatient Medications:     lisinopril (PRINIVIL;ZESTRIL) 20 MG tablet, Take 1 tablet by mouth daily, Disp: 30 tablet, Rfl: 3    mirtazapine (REMERON) 45 MG tablet, Take 1 tablet by mouth nightly, Disp: 30 tablet, Rfl: 3    ARIPiprazole (ABILIFY) 5 MG tablet, take 1 tablet by mouth once daily, Disp: 30 tablet, Rfl: 0    Lisdexamfetamine Dimesylate (VYVANSE) 60 MG CAPS, Take 60 mg by mouth daily for 30 days. , Disp: 30 capsule, Rfl: 0    methadone 10 MG/5ML solution, Take 60 mg by mouth daily. , Disp: , Rfl:     methadone 10 MG/5ML solution, Take 30 mLs by mouth daily for 30 days. (Patient taking differently: Take 80 mg by mouth daily.), Disp: 30 each, Rfl: 0    Aspirin-Acetaminophen-Caffeine (EXCEDRIN EXTRA STRENGTH PO), Take by mouth as needed , Disp: , Rfl:     lisdexamfetamine (VYVANSE) 70 MG capsule, Take 1 capsule by mouth every morning for 30 days. Max Daily Amount: 70 mg, Disp: 30 capsule, Rfl: 0    pregabalin (LYRICA) 300 MG capsule, Take 1 capsule by mouth 2 times daily for 30 days. Max Daily Amount: 600 mg, Disp: 60 capsule, Rfl: 0    venlafaxine (EFFEXOR XR) 150 MG extended release capsule, Take 1 capsule by mouth in the morning and at bedtime, Disp: 60 capsule, Rfl: 0    naloxone (NARCAN) 4 MG/0.1ML LIQD nasal spray, 1 spray by Nasal route as needed for Opioid Reversal (Patient not taking: Reported on 2/14/2023), Disp: 1 each, Rfl: 1    The patient has No Known Allergies. Past Medical History  Savannah Talamantes  has a past medical history of Anxiety, Depression, Kidney stone, Liver disease, and Opiate abuse, continuous ([facility] [address] Past Surgical History  The patient  has no past surgical history on file. Family History  This patient's family history is not on file. Social History  Savannah Talamantes  reports that he quit smoking about 2 years ago. His smoking use included cigarettes. He uses smokeless tobacco. He reports that he does not currently use alcohol. He reports current drug use. Drugs: Opiates , Fentanyl, Amphetamines (Speed), Crack Cocaine, Heroin, Benzodiazepines (Downers/Zannies), Oxycodone (Oxy), Suboxone, Sedatives/Hypnotics, IV, Marijuana (Susy Radha), Methamphetamines (Crystal Meth), and Cocaine.     Health Maintenance:    Health Maintenance   Topic Date Due    COVID-19 Vaccine (1) Never done    Hepatitis A vaccine (1 of 2 - Risk 2-dose series) Never done    Varicella vaccine (1 of 2 - 2-dose childhood series) Never done    Pneumococcal 0-64 years Vaccine (1 - PCV) Never done    Lipids  Never done    Hepatitis B vaccine (1 of 3 - Risk 3-dose series) Never done DTaP/Tdap/Td vaccine (1 - Tdap) Never done    A1C test (Diabetic or Prediabetic)  05/24/2022    Flu vaccine (1) Never done    Depression Monitoring  06/28/2023    HIV screen  Completed    Hib vaccine  Aged Out    Meningococcal (ACWY) vaccine  Aged Out       Subjective:      Review of Systems   Constitutional:  Negative for chills, fatigue and fever. HENT:  Negative for congestion, rhinorrhea, sinus pressure, sinus pain, sore throat, tinnitus and trouble swallowing. Eyes:  Negative for photophobia and visual disturbance. Respiratory:  Negative for cough, chest tightness, shortness of breath and wheezing. Cardiovascular:  Negative for chest pain, palpitations and leg swelling. Gastrointestinal:  Negative for abdominal distention, abdominal pain, blood in stool, constipation, diarrhea, nausea and vomiting. Endocrine: Negative for polydipsia, polyphagia and polyuria. Genitourinary:  Negative for difficulty urinating, dysuria, frequency, hematuria and urgency. Musculoskeletal:  Negative for arthralgias and myalgias. Skin:  Negative for rash and wound. Neurological:  Negative for dizziness, light-headedness and headaches. Psychiatric/Behavioral:  Positive for decreased concentration. Negative for dysphoric mood and sleep disturbance. The patient is not nervous/anxious. Objective:     BP (!) 135/91 (Site: Left Lower Arm, Position: Sitting, Cuff Size: Medium Adult)   Pulse 89   Ht 5' 10\" (1.778 m)   Wt 215 lb (97.5 kg)   BMI 30.85 kg/m²     Physical Exam  Vitals reviewed. Constitutional:       General: He is not in acute distress. Appearance: Normal appearance. He is not ill-appearing. HENT:      Head: Normocephalic and atraumatic. Right Ear: External ear normal.      Left Ear: External ear normal.      Nose: Nose normal. No congestion or rhinorrhea. Mouth/Throat:      Mouth: Mucous membranes are moist.   Eyes:      Extraocular Movements: Extraocular movements intact. Conjunctiva/sclera: Conjunctivae normal.      Pupils: Pupils are equal, round, and reactive to light. Cardiovascular:      Rate and Rhythm: Normal rate and regular rhythm. Pulses: Normal pulses. Heart sounds: Normal heart sounds. Pulmonary:      Effort: Pulmonary effort is normal. No respiratory distress. Musculoskeletal:         General: Normal range of motion. Cervical back: Normal range of motion and neck supple. Right lower leg: No edema. Left lower leg: No edema. Skin:     General: Skin is warm and dry. Neurological:      General: No focal deficit present. Mental Status: He is alert and oriented to person, place, and time. Psychiatric:         Mood and Affect: Mood normal.         Behavior: Behavior normal.         Thought Content: Thought content normal.         Judgment: Judgment normal.       Labs Reviewed 2/14/2023:    Lab Results   Component Value Date    WBC 10.0 01/19/2023    HGB 10.8 (L) 01/19/2023    HCT 33.6 (L) 01/19/2023     01/19/2023     (H) 07/14/2022     (H) 07/14/2022     01/19/2023    K 4.7 01/19/2023     01/19/2023    CREATININE 0.9 01/19/2023    BUN 21 01/19/2023    CO2 22 (L) 01/19/2023    TSH 0.390 (L) 10/03/2021    PSA 0.98 04/25/2019    INR 1.22 (H) 04/12/2022    LABA1C 11.5 (H) 05/24/2021       Assessment/Plan      1. Essential hypertension  *  - lisinopril (PRINIVIL;ZESTRIL) 20 MG tablet; Take 1 tablet by mouth daily  Dispense: 30 tablet; Refill: 3    2. Insomnia, unspecified type    - mirtazapine (REMERON) 45 MG tablet; Take 1 tablet by mouth nightly  Dispense: 30 tablet; Refill: 3  - Encouraged sleep hygiene measures    3. Attention deficit hyperactivity disorder (ADHD), unspecified ADHD type    - Continue Vyvanse     4. Anxiety and depression    - Continue effexor and abilify       No follow-ups on file. Patient given educational materials - see patient instructions.   Discussed use, benefit, and side effects of prescribed medications. All patient questions answered. Pt voiced understanding. Reviewed health maintenance.        Electronically signed PAPA Cruz CNP on 2/14/23 at 10:57 AM EST

## 2023-02-23 ASSESSMENT — ENCOUNTER SYMPTOMS
CONSTIPATION: 0
ABDOMINAL DISTENTION: 0
SHORTNESS OF BREATH: 0
SORE THROAT: 0
ABDOMINAL PAIN: 0
DIARRHEA: 0
BLOOD IN STOOL: 0
RHINORRHEA: 0
SINUS PRESSURE: 0
TROUBLE SWALLOWING: 0
NAUSEA: 0
WHEEZING: 0
VOMITING: 0
COUGH: 0
PHOTOPHOBIA: 0
SINUS PAIN: 0
CHEST TIGHTNESS: 0

## 2023-03-02 NOTE — PROGRESS NOTES
Utilize [facility] alcohol withdrawal scale (Based on [place] Modified Alcohol Withdrawal Scale). Tabulate score based on classifications including Tremor, Sweating, Hallucination, Orientation, and Agitation. Tremor: 1  Sweatin  Hallucinations: 2  Orientation: 1  Agitation: 1  Total Score: 6  Action perform as described below     Tremor:  No tremor is 0 points. Tremor on movement is 1 point. Tremor at rest is 2 points. Sweating: No Sweat 0 points. Moist is 1 point. Drenching sweats is 2 points. Hallucinations: No present 0 points. Dissuadable is 1 point. Not dissuadable is 2 points. Orientation: Oriented 0 points. Vague/detached 1 point. Disoriented/no contact 2 points. Agitation: Calm 0 points. Anxious 1 point. Panicky 2 points. Check scale every 2 hours. Discontinue scoring with 4 consecutive scorings of 0. Scale 0: No phenobarbital given. Re-assess every 60 minutes as needed. Scale 1-3: Phenobarbital 130 mg IV over 3 minutes. Re-assess every 60 minutes as needed. May administer every 60 minutes to a maximum dose of phenobarbital 1040 mg in 24 hours! Scale 4-8: Phenobarbital 260 mg IV over 5 minutes. Re-assess every 60 minutes as needed. May administer every 60 minutes to a maximum dose of phenobarbital 1040mg in 24 hours! Scale 9-10: Transfer to ICU (if not already in ICU). Administer 10mg/kg phenobarbital IV over 60 minutes. Maximum dose phenobarbital is 1040mg in 24 hours! Yes

## 2023-03-11 DIAGNOSIS — I10 ESSENTIAL HYPERTENSION: ICD-10-CM

## 2023-03-13 RX ORDER — ARIPIPRAZOLE 5 MG/1
TABLET ORAL
Qty: 30 TABLET | Refills: 0 | Status: SHIPPED | OUTPATIENT
Start: 2023-03-13

## 2023-03-14 DIAGNOSIS — F32.A ANXIETY AND DEPRESSION: ICD-10-CM

## 2023-03-14 DIAGNOSIS — F90.9 ATTENTION DEFICIT HYPERACTIVITY DISORDER (ADHD), UNSPECIFIED ADHD TYPE: ICD-10-CM

## 2023-03-14 DIAGNOSIS — F41.9 ANXIETY AND DEPRESSION: ICD-10-CM

## 2023-03-14 RX ORDER — PREGABALIN 300 MG/1
300 CAPSULE ORAL 2 TIMES DAILY
Qty: 60 CAPSULE | Refills: 0 | Status: SHIPPED | OUTPATIENT
Start: 2023-03-14 | End: 2023-04-13

## 2023-03-14 RX ORDER — VENLAFAXINE HYDROCHLORIDE 150 MG/1
150 CAPSULE, EXTENDED RELEASE ORAL 2 TIMES DAILY
Qty: 60 CAPSULE | Refills: 0 | Status: SHIPPED | OUTPATIENT
Start: 2023-03-14

## 2023-03-18 NOTE — TELEPHONE ENCOUNTER
Would you please contact Ynes Granda and see if we can get him set up for a cystoscopy with right ureteroscopy, possible laser lithotripsy, possible basket extraction of stone, right ureteral stent insertion? He walked out of ED AMA. unable to assess

## 2023-04-09 DIAGNOSIS — F32.A ANXIETY AND DEPRESSION: ICD-10-CM

## 2023-04-09 DIAGNOSIS — F41.9 ANXIETY AND DEPRESSION: ICD-10-CM

## 2023-04-10 RX ORDER — VENLAFAXINE HYDROCHLORIDE 150 MG/1
CAPSULE, EXTENDED RELEASE ORAL
Qty: 60 CAPSULE | Refills: 0 | Status: SHIPPED | OUTPATIENT
Start: 2023-04-10 | End: 2023-05-08 | Stop reason: SDUPTHER

## 2023-05-08 DIAGNOSIS — F41.9 ANXIETY AND DEPRESSION: ICD-10-CM

## 2023-05-08 DIAGNOSIS — F32.A ANXIETY AND DEPRESSION: ICD-10-CM

## 2023-05-08 DIAGNOSIS — I10 ESSENTIAL HYPERTENSION: ICD-10-CM

## 2023-05-08 RX ORDER — ARIPIPRAZOLE 5 MG/1
5 TABLET ORAL DAILY
Qty: 30 TABLET | Refills: 0 | Status: SHIPPED | OUTPATIENT
Start: 2023-05-08

## 2023-05-08 RX ORDER — VENLAFAXINE HYDROCHLORIDE 150 MG/1
CAPSULE, EXTENDED RELEASE ORAL
Qty: 60 CAPSULE | Refills: 0 | Status: SHIPPED | OUTPATIENT
Start: 2023-05-08

## 2023-05-15 DIAGNOSIS — F90.9 ATTENTION DEFICIT HYPERACTIVITY DISORDER (ADHD), UNSPECIFIED ADHD TYPE: ICD-10-CM

## 2023-05-15 DIAGNOSIS — F41.9 ANXIETY AND DEPRESSION: ICD-10-CM

## 2023-05-15 DIAGNOSIS — F32.A ANXIETY AND DEPRESSION: ICD-10-CM

## 2023-05-15 NOTE — TELEPHONE ENCOUNTER
Last visit- 2/14/2023  Next visit- 6/8/2023    Requested Prescriptions     Pending Prescriptions Disp Refills    lisdexamfetamine (VYVANSE) 70 MG capsule 30 capsule 0     Sig: Take 1 capsule by mouth every morning for 30 days. Max Daily Amount: 70 mg    pregabalin (LYRICA) 300 MG capsule 60 capsule 0     Sig: Take 1 capsule by mouth 2 times daily for 30 days.  Max Daily Amount: 600 mg Monitor will be mailed to patient.

## 2023-05-16 RX ORDER — PREGABALIN 300 MG/1
300 CAPSULE ORAL 2 TIMES DAILY
Qty: 60 CAPSULE | Refills: 0 | Status: SHIPPED | OUTPATIENT
Start: 2023-05-16 | End: 2023-06-15

## 2023-06-08 ENCOUNTER — OFFICE VISIT (OUTPATIENT)
Dept: INTERNAL MEDICINE CLINIC | Age: 38
End: 2023-06-08
Payer: COMMERCIAL

## 2023-06-08 VITALS
HEIGHT: 70 IN | BODY MASS INDEX: 31.64 KG/M2 | WEIGHT: 221 LBS | HEART RATE: 102 BPM | SYSTOLIC BLOOD PRESSURE: 131 MMHG | DIASTOLIC BLOOD PRESSURE: 76 MMHG

## 2023-06-08 DIAGNOSIS — F90.9 ATTENTION DEFICIT HYPERACTIVITY DISORDER (ADHD), UNSPECIFIED ADHD TYPE: ICD-10-CM

## 2023-06-08 DIAGNOSIS — G47.00 INSOMNIA, UNSPECIFIED TYPE: ICD-10-CM

## 2023-06-08 DIAGNOSIS — F11.20 SEVERE OPIOID USE DISORDER (HCC): Primary | ICD-10-CM

## 2023-06-08 DIAGNOSIS — F41.9 ANXIETY AND DEPRESSION: ICD-10-CM

## 2023-06-08 DIAGNOSIS — I10 ESSENTIAL HYPERTENSION: ICD-10-CM

## 2023-06-08 DIAGNOSIS — F32.A ANXIETY AND DEPRESSION: ICD-10-CM

## 2023-06-08 PROCEDURE — 3075F SYST BP GE 130 - 139MM HG: CPT | Performed by: NURSE PRACTITIONER

## 2023-06-08 PROCEDURE — 80305 DRUG TEST PRSMV DIR OPT OBS: CPT | Performed by: NURSE PRACTITIONER

## 2023-06-08 PROCEDURE — G8427 DOCREV CUR MEDS BY ELIG CLIN: HCPCS | Performed by: NURSE PRACTITIONER

## 2023-06-08 PROCEDURE — 99214 OFFICE O/P EST MOD 30 MIN: CPT | Performed by: NURSE PRACTITIONER

## 2023-06-08 PROCEDURE — 4004F PT TOBACCO SCREEN RCVD TLK: CPT | Performed by: NURSE PRACTITIONER

## 2023-06-08 PROCEDURE — 3078F DIAST BP <80 MM HG: CPT | Performed by: NURSE PRACTITIONER

## 2023-06-08 PROCEDURE — G8417 CALC BMI ABV UP PARAM F/U: HCPCS | Performed by: NURSE PRACTITIONER

## 2023-06-08 RX ORDER — ARIPIPRAZOLE 5 MG/1
5 TABLET ORAL DAILY
Qty: 30 TABLET | Refills: 0 | Status: SHIPPED | OUTPATIENT
Start: 2023-06-08

## 2023-06-08 RX ORDER — PREGABALIN 200 MG/1
200 CAPSULE ORAL 3 TIMES DAILY
Qty: 90 CAPSULE | Refills: 0 | Status: SHIPPED | OUTPATIENT
Start: 2023-06-08 | End: 2023-07-08

## 2023-06-08 RX ORDER — BUPRENORPHINE HYDROCHLORIDE AND NALOXONE HYDROCHLORIDE DIHYDRATE 8; 2 MG/1; MG/1
1 TABLET SUBLINGUAL 2 TIMES DAILY
Qty: 14 TABLET | Refills: 0 | Status: SHIPPED | OUTPATIENT
Start: 2023-06-08 | End: 2023-06-15

## 2023-06-08 RX ORDER — LISINOPRIL 20 MG/1
20 TABLET ORAL DAILY
Qty: 30 TABLET | Refills: 3 | Status: SHIPPED | OUTPATIENT
Start: 2023-06-08

## 2023-06-08 RX ORDER — MIRTAZAPINE 45 MG/1
45 TABLET, FILM COATED ORAL NIGHTLY
Qty: 30 TABLET | Refills: 3 | Status: SHIPPED | OUTPATIENT
Start: 2023-06-08

## 2023-06-08 RX ORDER — VENLAFAXINE 100 MG/1
100 TABLET ORAL 3 TIMES DAILY
Qty: 90 TABLET | Refills: 3 | Status: SHIPPED | OUTPATIENT
Start: 2023-06-08

## 2023-06-08 RX ORDER — PREGABALIN 200 MG/1
200 CAPSULE ORAL 3 TIMES DAILY
COMMUNITY
End: 2023-06-08 | Stop reason: SDUPTHER

## 2023-06-08 RX ORDER — VENLAFAXINE HYDROCHLORIDE 150 MG/1
CAPSULE, EXTENDED RELEASE ORAL
Qty: 60 CAPSULE | Refills: 0 | Status: CANCELLED | OUTPATIENT
Start: 2023-06-08

## 2023-06-08 RX ORDER — PREGABALIN 300 MG/1
300 CAPSULE ORAL 2 TIMES DAILY
Qty: 60 CAPSULE | Refills: 0 | Status: CANCELLED | OUTPATIENT
Start: 2023-06-08 | End: 2023-07-08

## 2023-06-08 ASSESSMENT — ENCOUNTER SYMPTOMS
VOMITING: 0
SINUS PAIN: 0
ABDOMINAL PAIN: 0
RHINORRHEA: 0
DIARRHEA: 0
BLOOD IN STOOL: 0
TROUBLE SWALLOWING: 0
SINUS PRESSURE: 0
SHORTNESS OF BREATH: 0
WHEEZING: 0
CONSTIPATION: 0
SORE THROAT: 0
COUGH: 0
CHEST TIGHTNESS: 0
PHOTOPHOBIA: 0
NAUSEA: 0
ABDOMINAL DISTENTION: 0

## 2023-06-08 NOTE — PROGRESS NOTES
[address] [facility] INTERNAL MEDICINE AND MEDICATION MANAGEMENT  [address] [facility]  [address] [facility] [address]  Dept: [phone]  Dept Fax: [phone]: [phone]     Visit Date:  6/8/2023    Patient:  Keri Lovell  YOB: 1985    HPI:     Chief Complaint   Patient presents with    Follow-up    [address] presents today for medical evaluation of ADHD, anxiety/depression/PTSD, chronic pain, HTN     I last seen him 4 months ago. Previously followed with Dr. Hoa Figueroa for MAT. Is now at the methadone clinic. Hospitalized in 5/2022 for an overdose. He has had several in the past. Struggling with transportation to and from methadone clinic. Has not been there in 2 weeks, therefore he has relapsed. Would like to get back on suboxone, plan sublocade. Urine positive for amphetamines and methamphetamines. Discussed at length that he cannot continue to use illicit drugs and be on a stimulant. I agreed to prescribe under the agreement that use would stop, and as harm reduction; as he complained that the reason for his methamphetamine use was because his ADHD was not controlled. Guanfacine not helpful. Vyvanse has been the most effective. This has also helped him not use methamphetamines. Anxiety/depression well controlled with effexor and abilify; and counseling. Continue current regimen. He does have severe PTSD. Unable to work. Chronic pain controlled with pregabalin     BP well controlled with lisinopril 20 mg daily. BP today in office 131/76. GOAL:  To enhance patient recovery through the use of medication assisted treatment to improve overall quality of life. OBJECTIVE:  Patient will abstain from the use of mood altering substances 7 out of 7 days per week. INTERVENTION:  Patient will be maintained on Sublocade medication and will be linked to counseling, psychiatry and 12 step meetings as applicable.

## 2023-07-10 DIAGNOSIS — F41.9 ANXIETY AND DEPRESSION: ICD-10-CM

## 2023-07-10 DIAGNOSIS — F32.A ANXIETY AND DEPRESSION: ICD-10-CM

## 2023-07-10 RX ORDER — VENLAFAXINE HYDROCHLORIDE 150 MG/1
CAPSULE, EXTENDED RELEASE ORAL
Qty: 60 CAPSULE | Refills: 0 | Status: SHIPPED | OUTPATIENT
Start: 2023-07-10 | End: 2023-07-11

## 2023-07-11 ENCOUNTER — OFFICE VISIT (OUTPATIENT)
Dept: INTERNAL MEDICINE CLINIC | Age: 38
End: 2023-07-11
Payer: COMMERCIAL

## 2023-07-11 VITALS
SYSTOLIC BLOOD PRESSURE: 102 MMHG | RESPIRATION RATE: 16 BRPM | HEIGHT: 70 IN | HEART RATE: 127 BPM | BODY MASS INDEX: 29.63 KG/M2 | DIASTOLIC BLOOD PRESSURE: 59 MMHG | WEIGHT: 207 LBS

## 2023-07-11 DIAGNOSIS — F41.9 ANXIETY AND DEPRESSION: ICD-10-CM

## 2023-07-11 DIAGNOSIS — F11.20 SEVERE OPIOID USE DISORDER (HCC): Primary | ICD-10-CM

## 2023-07-11 DIAGNOSIS — F90.9 ATTENTION DEFICIT HYPERACTIVITY DISORDER (ADHD), UNSPECIFIED ADHD TYPE: ICD-10-CM

## 2023-07-11 DIAGNOSIS — F32.A ANXIETY AND DEPRESSION: ICD-10-CM

## 2023-07-11 DIAGNOSIS — I10 ESSENTIAL HYPERTENSION: ICD-10-CM

## 2023-07-11 DIAGNOSIS — F11.93 OPIOID WITHDRAWAL (HCC): ICD-10-CM

## 2023-07-11 PROCEDURE — 99214 OFFICE O/P EST MOD 30 MIN: CPT | Performed by: NURSE PRACTITIONER

## 2023-07-11 PROCEDURE — G8427 DOCREV CUR MEDS BY ELIG CLIN: HCPCS | Performed by: NURSE PRACTITIONER

## 2023-07-11 PROCEDURE — 4004F PT TOBACCO SCREEN RCVD TLK: CPT | Performed by: NURSE PRACTITIONER

## 2023-07-11 PROCEDURE — 3074F SYST BP LT 130 MM HG: CPT | Performed by: NURSE PRACTITIONER

## 2023-07-11 PROCEDURE — 80305 DRUG TEST PRSMV DIR OPT OBS: CPT | Performed by: NURSE PRACTITIONER

## 2023-07-11 PROCEDURE — 3078F DIAST BP <80 MM HG: CPT | Performed by: NURSE PRACTITIONER

## 2023-07-11 PROCEDURE — G8417 CALC BMI ABV UP PARAM F/U: HCPCS | Performed by: NURSE PRACTITIONER

## 2023-07-11 RX ORDER — PREGABALIN 200 MG/1
200 CAPSULE ORAL 3 TIMES DAILY
Qty: 90 CAPSULE | Refills: 0 | Status: SHIPPED | OUTPATIENT
Start: 2023-07-11 | End: 2023-08-10

## 2023-07-11 RX ORDER — ARIPIPRAZOLE 5 MG/1
5 TABLET ORAL DAILY
Qty: 30 TABLET | Refills: 0 | Status: SHIPPED | OUTPATIENT
Start: 2023-07-11

## 2023-07-11 RX ORDER — BUPRENORPHINE HYDROCHLORIDE AND NALOXONE HYDROCHLORIDE DIHYDRATE 8; 2 MG/1; MG/1
1 TABLET SUBLINGUAL 2 TIMES DAILY
Qty: 4 TABLET | Refills: 0 | Status: SHIPPED | OUTPATIENT
Start: 2023-07-11 | End: 2023-07-13

## 2023-07-11 RX ORDER — NALOXONE HYDROCHLORIDE 4 MG/.1ML
1 SPRAY NASAL PRN
Qty: 1 EACH | Refills: 1 | Status: SHIPPED | OUTPATIENT
Start: 2023-07-11

## 2023-07-11 RX ORDER — ALPRAZOLAM 0.5 MG/1
0.5 TABLET ORAL ONCE
Qty: 1 TABLET | Refills: 0 | Status: SHIPPED | OUTPATIENT
Start: 2023-07-11 | End: 2023-07-11

## 2023-07-11 RX ORDER — VENLAFAXINE 100 MG/1
100 TABLET ORAL 3 TIMES DAILY
Qty: 90 TABLET | Refills: 3 | Status: SHIPPED | OUTPATIENT
Start: 2023-07-11

## 2023-07-17 ASSESSMENT — ENCOUNTER SYMPTOMS
COUGH: 0
CONSTIPATION: 0
TROUBLE SWALLOWING: 0
ABDOMINAL PAIN: 0
WHEEZING: 0
SHORTNESS OF BREATH: 0
ABDOMINAL DISTENTION: 0
SORE THROAT: 0
CHEST TIGHTNESS: 0
NAUSEA: 0
SINUS PRESSURE: 0
RHINORRHEA: 0
DIARRHEA: 0
VOMITING: 0
PHOTOPHOBIA: 0
SINUS PAIN: 0
BLOOD IN STOOL: 0

## 2023-08-06 ENCOUNTER — APPOINTMENT (OUTPATIENT)
Dept: CT IMAGING | Age: 38
DRG: 469 | End: 2023-08-06
Payer: COMMERCIAL

## 2023-08-06 ENCOUNTER — HOSPITAL ENCOUNTER (INPATIENT)
Age: 38
LOS: 3 days | Discharge: LEFT BEFORE DISCHARGE | DRG: 469 | End: 2023-08-09
Attending: EMERGENCY MEDICINE | Admitting: INTERNAL MEDICINE
Payer: COMMERCIAL

## 2023-08-06 DIAGNOSIS — F15.10 METHAMPHETAMINE ABUSE (HCC): ICD-10-CM

## 2023-08-06 DIAGNOSIS — M62.82 NON-TRAUMATIC RHABDOMYOLYSIS: Primary | ICD-10-CM

## 2023-08-06 LAB
ANION GAP SERPL CALC-SCNC: 30 MEQ/L (ref 8–16)
BUN SERPL-MCNC: 68 MG/DL (ref 7–22)
CALCIUM SERPL-MCNC: 9.8 MG/DL (ref 8.5–10.5)
CHLORIDE SERPL-SCNC: 93 MEQ/L (ref 98–111)
CO2 SERPL-SCNC: 14 MEQ/L (ref 23–33)
CREAT SERPL-MCNC: 7.5 MG/DL (ref 0.4–1.2)
GFR SERPL CREATININE-BSD FRML MDRD: 9 ML/MIN/1.73M2
GLUCOSE SERPL-MCNC: 99 MG/DL (ref 70–108)
LIPASE SERPL-CCNC: 16.9 U/L (ref 5.6–51.3)
OSMOLALITY SERPL CALC.SUM OF ELEC: 293.6 MOSMOL/KG (ref 275–300)
POTASSIUM SERPL-SCNC: 5.1 MEQ/L (ref 3.5–5.2)
SCAN OF BLOOD SMEAR: NORMAL
SODIUM SERPL-SCNC: 137 MEQ/L (ref 135–145)

## 2023-08-06 PROCEDURE — 74177 CT ABD & PELVIS W/CONTRAST: CPT

## 2023-08-06 PROCEDURE — 85025 COMPLETE CBC W/AUTO DIFF WBC: CPT

## 2023-08-06 PROCEDURE — 2000000000 HC ICU R&B

## 2023-08-06 PROCEDURE — 2580000003 HC RX 258: Performed by: EMERGENCY MEDICINE

## 2023-08-06 PROCEDURE — 80048 BASIC METABOLIC PNL TOTAL CA: CPT

## 2023-08-06 PROCEDURE — 83690 ASSAY OF LIPASE: CPT

## 2023-08-06 PROCEDURE — 96374 THER/PROPH/DIAG INJ IV PUSH: CPT

## 2023-08-06 PROCEDURE — 93005 ELECTROCARDIOGRAM TRACING: CPT | Performed by: EMERGENCY MEDICINE

## 2023-08-06 PROCEDURE — 96361 HYDRATE IV INFUSION ADD-ON: CPT

## 2023-08-06 PROCEDURE — 96376 TX/PRO/DX INJ SAME DRUG ADON: CPT

## 2023-08-06 PROCEDURE — 6360000002 HC RX W HCPCS: Performed by: EMERGENCY MEDICINE

## 2023-08-06 PROCEDURE — 36415 COLL VENOUS BLD VENIPUNCTURE: CPT

## 2023-08-06 PROCEDURE — 6360000004 HC RX CONTRAST MEDICATION: Performed by: EMERGENCY MEDICINE

## 2023-08-06 PROCEDURE — 82550 ASSAY OF CK (CPK): CPT

## 2023-08-06 PROCEDURE — 99285 EMERGENCY DEPT VISIT HI MDM: CPT

## 2023-08-06 RX ORDER — LORAZEPAM 2 MG/ML
1 INJECTION INTRAMUSCULAR ONCE
Status: COMPLETED | OUTPATIENT
Start: 2023-08-06 | End: 2023-08-06

## 2023-08-06 RX ORDER — 0.9 % SODIUM CHLORIDE 0.9 %
1000 INTRAVENOUS SOLUTION INTRAVENOUS ONCE
Status: COMPLETED | OUTPATIENT
Start: 2023-08-07 | End: 2023-08-07

## 2023-08-06 RX ORDER — 0.9 % SODIUM CHLORIDE 0.9 %
1000 INTRAVENOUS SOLUTION INTRAVENOUS ONCE
Status: COMPLETED | OUTPATIENT
Start: 2023-08-06 | End: 2023-08-06

## 2023-08-06 RX ORDER — LORAZEPAM 2 MG/ML
INJECTION INTRAMUSCULAR
Status: DISPENSED
Start: 2023-08-06 | End: 2023-08-07

## 2023-08-06 RX ORDER — LORAZEPAM 2 MG/ML
1 INJECTION INTRAMUSCULAR ONCE
Status: COMPLETED | OUTPATIENT
Start: 2023-08-07 | End: 2023-08-07

## 2023-08-06 RX ADMIN — LORAZEPAM 1 MG: 2 INJECTION INTRAMUSCULAR; INTRAVENOUS at 23:18

## 2023-08-06 RX ADMIN — SODIUM CHLORIDE 1000 ML: 9 INJECTION, SOLUTION INTRAVENOUS at 23:17

## 2023-08-06 RX ADMIN — SODIUM CHLORIDE 1000 ML: 9 INJECTION, SOLUTION INTRAVENOUS at 23:52

## 2023-08-06 RX ADMIN — IOPAMIDOL 80 ML: 755 INJECTION, SOLUTION INTRAVENOUS at 23:26

## 2023-08-06 ASSESSMENT — PAIN DESCRIPTION - LOCATION: LOCATION: ABDOMEN

## 2023-08-06 ASSESSMENT — PAIN DESCRIPTION - DESCRIPTORS: DESCRIPTORS: CRAMPING

## 2023-08-06 ASSESSMENT — PAIN - FUNCTIONAL ASSESSMENT: PAIN_FUNCTIONAL_ASSESSMENT: 0-10

## 2023-08-06 ASSESSMENT — PAIN SCALES - GENERAL: PAINLEVEL_OUTOF10: 9

## 2023-08-07 ENCOUNTER — APPOINTMENT (OUTPATIENT)
Dept: GENERAL RADIOLOGY | Age: 38
DRG: 469 | End: 2023-08-07
Payer: COMMERCIAL

## 2023-08-07 PROBLEM — N17.9 AKI (ACUTE KIDNEY INJURY) (HCC): Status: ACTIVE | Noted: 2023-08-07

## 2023-08-07 LAB
ALBUMIN SERPL BCG-MCNC: 3.1 G/DL (ref 3.5–5.1)
ALBUMIN SERPL BCG-MCNC: 3.3 G/DL (ref 3.5–5.1)
ALBUMIN SERPL BCG-MCNC: 3.6 G/DL (ref 3.5–5.1)
ALBUMIN SERPL BCG-MCNC: 3.6 G/DL (ref 3.5–5.1)
ALP SERPL-CCNC: 102 U/L (ref 38–126)
ALP SERPL-CCNC: 110 U/L (ref 38–126)
ALP SERPL-CCNC: 122 U/L (ref 38–126)
ALP SERPL-CCNC: 123 U/L (ref 38–126)
ALT SERPL W/O P-5'-P-CCNC: 246 U/L (ref 11–66)
ALT SERPL W/O P-5'-P-CCNC: 277 U/L (ref 11–66)
ALT SERPL W/O P-5'-P-CCNC: 314 U/L (ref 11–66)
ALT SERPL W/O P-5'-P-CCNC: 315 U/L (ref 11–66)
AMPHETAMINES UR QL SCN: POSITIVE
ANION GAP SERPL CALC-SCNC: 11 MEQ/L (ref 8–16)
ANION GAP SERPL CALC-SCNC: 12 MEQ/L (ref 8–16)
ANION GAP SERPL CALC-SCNC: 13 MEQ/L (ref 8–16)
ANION GAP SERPL CALC-SCNC: 14 MEQ/L (ref 8–16)
ANION GAP SERPL CALC-SCNC: 19 MEQ/L (ref 8–16)
AST SERPL-CCNC: 213 U/L (ref 5–40)
AST SERPL-CCNC: 258 U/L (ref 5–40)
AST SERPL-CCNC: 307 U/L (ref 5–40)
AST SERPL-CCNC: 341 U/L (ref 5–40)
BACTERIA: ABNORMAL
BARBITURATES UR QL SCN: NEGATIVE
BASOPHILS ABSOLUTE: 0 THOU/MM3 (ref 0–0.1)
BASOPHILS ABSOLUTE: 0 THOU/MM3 (ref 0–0.1)
BASOPHILS NFR BLD AUTO: 0.2 %
BASOPHILS NFR BLD AUTO: 0.3 %
BENZODIAZ UR QL SCN: NEGATIVE
BILIRUB CONJ SERPL-MCNC: < 0.2 MG/DL (ref 0–0.3)
BILIRUB SERPL-MCNC: 0.2 MG/DL (ref 0.3–1.2)
BILIRUB SERPL-MCNC: 0.3 MG/DL (ref 0.3–1.2)
BILIRUB UR QL STRIP: NEGATIVE
BUN SERPL-MCNC: 37 MG/DL (ref 7–22)
BUN SERPL-MCNC: 43 MG/DL (ref 7–22)
BUN SERPL-MCNC: 48 MG/DL (ref 7–22)
BUN SERPL-MCNC: 56 MG/DL (ref 7–22)
BUN SERPL-MCNC: 63 MG/DL (ref 7–22)
BZE UR QL SCN: POSITIVE
CA-I BLD ISE-SCNC: 1.03 MMOL/L (ref 1.12–1.32)
CA-I BLD ISE-SCNC: 1.09 MMOL/L (ref 1.12–1.32)
CA-I BLD ISE-SCNC: 1.1 MMOL/L (ref 1.12–1.32)
CA-I BLD ISE-SCNC: 1.11 MMOL/L (ref 1.12–1.32)
CALCIUM SERPL-MCNC: 8.1 MG/DL (ref 8.5–10.5)
CALCIUM SERPL-MCNC: 8.4 MG/DL (ref 8.5–10.5)
CALCIUM SERPL-MCNC: 8.5 MG/DL (ref 8.5–10.5)
CALCIUM SERPL-MCNC: 8.5 MG/DL (ref 8.5–10.5)
CALCIUM SERPL-MCNC: 8.6 MG/DL (ref 8.5–10.5)
CANNABINOIDS UR QL SCN: NEGATIVE
CASTS #/AREA URNS LPF: ABNORMAL /LPF
CASTS #/AREA URNS LPF: ABNORMAL /LPF
CHARACTER UR: CLEAR
CHARCOAL URNS QL MICRO: ABNORMAL
CHLORIDE SERPL-SCNC: 105 MEQ/L (ref 98–111)
CHLORIDE SERPL-SCNC: 106 MEQ/L (ref 98–111)
CHLORIDE SERPL-SCNC: 108 MEQ/L (ref 98–111)
CK SERPL-CCNC: 4390 U/L (ref 55–170)
CO2 SERPL-SCNC: 14 MEQ/L (ref 23–33)
CO2 SERPL-SCNC: 20 MEQ/L (ref 23–33)
CO2 SERPL-SCNC: 22 MEQ/L (ref 23–33)
CO2 SERPL-SCNC: 23 MEQ/L (ref 23–33)
CO2 SERPL-SCNC: 26 MEQ/L (ref 23–33)
COLOR UR: YELLOW
CREAT SERPL-MCNC: 2.2 MG/DL (ref 0.4–1.2)
CREAT SERPL-MCNC: 2.7 MG/DL (ref 0.4–1.2)
CREAT SERPL-MCNC: 3.3 MG/DL (ref 0.4–1.2)
CREAT SERPL-MCNC: 4.7 MG/DL (ref 0.4–1.2)
CREAT SERPL-MCNC: 6.6 MG/DL (ref 0.4–1.2)
CREAT UR-MCNC: 62.1 MG/DL
CRYSTALS URNS QL MICRO: ABNORMAL
DEPRECATED RDW RBC AUTO: 47.8 FL (ref 35–45)
DEPRECATED RDW RBC AUTO: 48.3 FL (ref 35–45)
DEPRECATED RDW RBC AUTO: 48.5 FL (ref 35–45)
EOSINOPHIL NFR BLD AUTO: 0.1 %
EOSINOPHIL NFR BLD AUTO: 0.2 %
EOSINOPHILS ABSOLUTE: 0 THOU/MM3 (ref 0–0.4)
EOSINOPHILS ABSOLUTE: 0 THOU/MM3 (ref 0–0.4)
EPITHELIAL CELLS, UA: ABNORMAL /HPF
ERYTHROCYTE [DISTWIDTH] IN BLOOD BY AUTOMATED COUNT: 16.5 % (ref 11.5–14.5)
ERYTHROCYTE [DISTWIDTH] IN BLOOD BY AUTOMATED COUNT: 16.6 % (ref 11.5–14.5)
ERYTHROCYTE [DISTWIDTH] IN BLOOD BY AUTOMATED COUNT: 16.6 % (ref 11.5–14.5)
FENTANYL: POSITIVE
GFR SERPL CREATININE-BSD FRML MDRD: 10 ML/MIN/1.73M2
GFR SERPL CREATININE-BSD FRML MDRD: 15 ML/MIN/1.73M2
GFR SERPL CREATININE-BSD FRML MDRD: 23 ML/MIN/1.73M2
GFR SERPL CREATININE-BSD FRML MDRD: 30 ML/MIN/1.73M2
GFR SERPL CREATININE-BSD FRML MDRD: 38 ML/MIN/1.73M2
GLUCOSE SERPL-MCNC: 113 MG/DL (ref 70–108)
GLUCOSE SERPL-MCNC: 128 MG/DL (ref 70–108)
GLUCOSE SERPL-MCNC: 149 MG/DL (ref 70–108)
GLUCOSE SERPL-MCNC: 154 MG/DL (ref 70–108)
GLUCOSE SERPL-MCNC: 91 MG/DL (ref 70–108)
GLUCOSE UR QL STRIP.AUTO: NEGATIVE MG/DL
HCT VFR BLD AUTO: 34.2 % (ref 42–52)
HCT VFR BLD AUTO: 34.9 % (ref 42–52)
HCT VFR BLD AUTO: 40.8 % (ref 42–52)
HGB BLD-MCNC: 10.7 GM/DL (ref 14–18)
HGB BLD-MCNC: 11 GM/DL (ref 14–18)
HGB BLD-MCNC: 12.8 GM/DL (ref 14–18)
HGB UR QL STRIP.AUTO: ABNORMAL
IMM GRANULOCYTES # BLD AUTO: 0.07 THOU/MM3 (ref 0–0.07)
IMM GRANULOCYTES # BLD AUTO: 0.09 THOU/MM3 (ref 0–0.07)
IMM GRANULOCYTES NFR BLD AUTO: 0.4 %
IMM GRANULOCYTES NFR BLD AUTO: 0.5 %
KETONES UR QL STRIP.AUTO: NEGATIVE
LACTIC ACID, SEPSIS: 0.7 MMOL/L (ref 0.5–1.9)
LACTIC ACID, SEPSIS: 0.7 MMOL/L (ref 0.5–1.9)
LEUKOCYTE ESTERASE UR QL STRIP.AUTO: NEGATIVE
LYMPHOCYTES ABSOLUTE: 1.9 THOU/MM3 (ref 1–4.8)
LYMPHOCYTES ABSOLUTE: 4.6 THOU/MM3 (ref 1–4.8)
LYMPHOCYTES NFR BLD AUTO: 14.1 %
LYMPHOCYTES NFR BLD AUTO: 21.3 %
MAGNESIUM SERPL-MCNC: 2.2 MG/DL (ref 1.6–2.4)
MAGNESIUM SERPL-MCNC: 2.2 MG/DL (ref 1.6–2.4)
MAGNESIUM SERPL-MCNC: 2.3 MG/DL (ref 1.6–2.4)
MAGNESIUM SERPL-MCNC: 2.5 MG/DL (ref 1.6–2.4)
MCH RBC QN AUTO: 25.2 PG (ref 26–33)
MCH RBC QN AUTO: 25.3 PG (ref 26–33)
MCH RBC QN AUTO: 25.4 PG (ref 26–33)
MCHC RBC AUTO-ENTMCNC: 31.3 GM/DL (ref 32.2–35.5)
MCHC RBC AUTO-ENTMCNC: 31.4 GM/DL (ref 32.2–35.5)
MCHC RBC AUTO-ENTMCNC: 31.5 GM/DL (ref 32.2–35.5)
MCV RBC AUTO: 80.2 FL (ref 80–94)
MCV RBC AUTO: 80.7 FL (ref 80–94)
MCV RBC AUTO: 81 FL (ref 80–94)
MONOCYTES ABSOLUTE: 1 THOU/MM3 (ref 0.4–1.3)
MONOCYTES ABSOLUTE: 2.1 THOU/MM3 (ref 0.4–1.3)
MONOCYTES NFR BLD AUTO: 7.6 %
MONOCYTES NFR BLD AUTO: 9.6 %
MRSA DNA SPEC QL NAA+PROBE: POSITIVE
NEUTROPHILS NFR BLD AUTO: 68.4 %
NEUTROPHILS NFR BLD AUTO: 77.3 %
NITRITE UR QL STRIP.AUTO: NEGATIVE
NRBC BLD AUTO-RTO: 0 /100 WBC
NRBC BLD AUTO-RTO: 0 /100 WBC
OPIATES UR QL SCN: NEGATIVE
OXYCODONE, OPI5M: NEGATIVE
PATHOLOGIST REVIEW: ABNORMAL
PCP UR QL SCN: NEGATIVE
PH UR STRIP.AUTO: 5.5 [PH] (ref 5–9)
PHOSPHATE SERPL-MCNC: 3 MG/DL (ref 2.4–4.7)
PHOSPHATE SERPL-MCNC: 3.5 MG/DL (ref 2.4–4.7)
PHOSPHATE SERPL-MCNC: 6 MG/DL (ref 2.4–4.7)
PHOSPHATE SERPL-MCNC: 6.9 MG/DL (ref 2.4–4.7)
PLATELET # BLD AUTO: 300 THOU/MM3 (ref 130–400)
PLATELET # BLD AUTO: 342 THOU/MM3 (ref 130–400)
PLATELET # BLD AUTO: 501 THOU/MM3 (ref 130–400)
PMV BLD AUTO: 10 FL (ref 9.4–12.4)
PMV BLD AUTO: 10.3 FL (ref 9.4–12.4)
PMV BLD AUTO: 9.9 FL (ref 9.4–12.4)
POTASSIUM SERPL-SCNC: 3 MEQ/L (ref 3.5–5.2)
POTASSIUM SERPL-SCNC: 3 MEQ/L (ref 3.5–5.2)
POTASSIUM SERPL-SCNC: 3.5 MEQ/L (ref 3.5–5.2)
POTASSIUM SERPL-SCNC: 4.1 MEQ/L (ref 3.5–5.2)
POTASSIUM SERPL-SCNC: 4.2 MEQ/L (ref 3.5–5.2)
PROCALCITONIN SERPL IA-MCNC: 19.7 NG/ML (ref 0.01–0.09)
PROT SERPL-MCNC: 6.3 G/DL (ref 6.1–8)
PROT SERPL-MCNC: 6.6 G/DL (ref 6.1–8)
PROT SERPL-MCNC: 6.9 G/DL (ref 6.1–8)
PROT SERPL-MCNC: 7.2 G/DL (ref 6.1–8)
PROT UR STRIP.AUTO-MCNC: 100 MG/DL
RBC # BLD AUTO: 4.24 MILL/MM3 (ref 4.7–6.1)
RBC # BLD AUTO: 4.35 MILL/MM3 (ref 4.7–6.1)
RBC # BLD AUTO: 5.04 MILL/MM3 (ref 4.7–6.1)
RBC #/AREA URNS HPF: ABNORMAL /HPF
REASON FOR REJECTION: NORMAL
REJECTED TEST: NORMAL
RENAL EPI CELLS #/AREA URNS HPF: ABNORMAL /[HPF]
SEGMENTED NEUTROPHILS ABSOLUTE COUNT: 10.5 THOU/MM3 (ref 1.8–7.7)
SEGMENTED NEUTROPHILS ABSOLUTE COUNT: 14.8 THOU/MM3 (ref 1.8–7.7)
SODIUM SERPL-SCNC: 138 MEQ/L (ref 135–145)
SODIUM SERPL-SCNC: 140 MEQ/L (ref 135–145)
SODIUM SERPL-SCNC: 142 MEQ/L (ref 135–145)
SODIUM SERPL-SCNC: 142 MEQ/L (ref 135–145)
SODIUM SERPL-SCNC: 143 MEQ/L (ref 135–145)
SODIUM UR-SCNC: 76 MEQ/L
SP GR UR REFRACT.AUTO: 1.02 (ref 1–1.03)
URATE SERPL-MCNC: 10.4 MG/DL (ref 3.7–7)
UROBILINOGEN UR QL STRIP.AUTO: 0.2 EU/DL (ref 0–1)
WBC # BLD AUTO: 13.6 THOU/MM3 (ref 4.8–10.8)
WBC # BLD AUTO: 21.7 THOU/MM3 (ref 4.8–10.8)
WBC # BLD AUTO: 9.4 THOU/MM3 (ref 4.8–10.8)
WBC #/AREA URNS HPF: ABNORMAL /HPF
YEAST LIKE FUNGI URNS QL MICRO: ABNORMAL

## 2023-08-07 PROCEDURE — 84550 ASSAY OF BLOOD/URIC ACID: CPT

## 2023-08-07 PROCEDURE — 96361 HYDRATE IV INFUSION ADD-ON: CPT

## 2023-08-07 PROCEDURE — 84300 ASSAY OF URINE SODIUM: CPT

## 2023-08-07 PROCEDURE — 2500000003 HC RX 250 WO HCPCS: Performed by: NURSE PRACTITIONER

## 2023-08-07 PROCEDURE — 93005 ELECTROCARDIOGRAM TRACING: CPT

## 2023-08-07 PROCEDURE — 82330 ASSAY OF CALCIUM: CPT

## 2023-08-07 PROCEDURE — 6370000000 HC RX 637 (ALT 250 FOR IP)

## 2023-08-07 PROCEDURE — 2580000003 HC RX 258

## 2023-08-07 PROCEDURE — 82248 BILIRUBIN DIRECT: CPT

## 2023-08-07 PROCEDURE — 6360000002 HC RX W HCPCS: Performed by: EMERGENCY MEDICINE

## 2023-08-07 PROCEDURE — 84145 PROCALCITONIN (PCT): CPT

## 2023-08-07 PROCEDURE — 85025 COMPLETE CBC W/AUTO DIFF WBC: CPT

## 2023-08-07 PROCEDURE — 93010 ELECTROCARDIOGRAM REPORT: CPT | Performed by: INTERNAL MEDICINE

## 2023-08-07 PROCEDURE — 2580000003 HC RX 258: Performed by: NURSE PRACTITIONER

## 2023-08-07 PROCEDURE — 2580000003 HC RX 258: Performed by: EMERGENCY MEDICINE

## 2023-08-07 PROCEDURE — 36415 COLL VENOUS BLD VENIPUNCTURE: CPT

## 2023-08-07 PROCEDURE — 99255 IP/OBS CONSLTJ NEW/EST HI 80: CPT

## 2023-08-07 PROCEDURE — 87070 CULTURE OTHR SPECIMN AEROBIC: CPT

## 2023-08-07 PROCEDURE — 83605 ASSAY OF LACTIC ACID: CPT

## 2023-08-07 PROCEDURE — 2500000003 HC RX 250 WO HCPCS

## 2023-08-07 PROCEDURE — 6360000002 HC RX W HCPCS: Performed by: STUDENT IN AN ORGANIZED HEALTH CARE EDUCATION/TRAINING PROGRAM

## 2023-08-07 PROCEDURE — 83735 ASSAY OF MAGNESIUM: CPT

## 2023-08-07 PROCEDURE — 80307 DRUG TEST PRSMV CHEM ANLYZR: CPT

## 2023-08-07 PROCEDURE — 6360000002 HC RX W HCPCS

## 2023-08-07 PROCEDURE — 71045 X-RAY EXAM CHEST 1 VIEW: CPT

## 2023-08-07 PROCEDURE — 96372 THER/PROPH/DIAG INJ SC/IM: CPT

## 2023-08-07 PROCEDURE — 87086 URINE CULTURE/COLONY COUNT: CPT

## 2023-08-07 PROCEDURE — 2100000000 HC CCU R&B

## 2023-08-07 PROCEDURE — 2500000003 HC RX 250 WO HCPCS: Performed by: STUDENT IN AN ORGANIZED HEALTH CARE EDUCATION/TRAINING PROGRAM

## 2023-08-07 PROCEDURE — 02HV33Z INSERTION OF INFUSION DEVICE INTO SUPERIOR VENA CAVA, PERCUTANEOUS APPROACH: ICD-10-PCS

## 2023-08-07 PROCEDURE — 87641 MR-STAPH DNA AMP PROBE: CPT

## 2023-08-07 PROCEDURE — 2580000003 HC RX 258: Performed by: INTERNAL MEDICINE

## 2023-08-07 PROCEDURE — 85027 COMPLETE CBC AUTOMATED: CPT

## 2023-08-07 PROCEDURE — 6360000002 HC RX W HCPCS: Performed by: INTERNAL MEDICINE

## 2023-08-07 PROCEDURE — 81001 URINALYSIS AUTO W/SCOPE: CPT

## 2023-08-07 PROCEDURE — 80053 COMPREHEN METABOLIC PANEL: CPT

## 2023-08-07 PROCEDURE — 87040 BLOOD CULTURE FOR BACTERIA: CPT

## 2023-08-07 PROCEDURE — 84100 ASSAY OF PHOSPHORUS: CPT

## 2023-08-07 PROCEDURE — 82570 ASSAY OF URINE CREATININE: CPT

## 2023-08-07 RX ORDER — POLYETHYLENE GLYCOL 3350 17 G/17G
17 POWDER, FOR SOLUTION ORAL DAILY PRN
Status: DISCONTINUED | OUTPATIENT
Start: 2023-08-07 | End: 2023-08-09 | Stop reason: HOSPADM

## 2023-08-07 RX ORDER — LORAZEPAM 2 MG/ML
INJECTION INTRAMUSCULAR
Status: DISPENSED
Start: 2023-08-07 | End: 2023-08-07

## 2023-08-07 RX ORDER — LORAZEPAM 2 MG/ML
1 INJECTION INTRAMUSCULAR ONCE
Status: COMPLETED | OUTPATIENT
Start: 2023-08-07 | End: 2023-08-07

## 2023-08-07 RX ORDER — NOREPINEPHRINE BITARTRATE 0.06 MG/ML
1-100 INJECTION, SOLUTION INTRAVENOUS CONTINUOUS
Status: DISCONTINUED | OUTPATIENT
Start: 2023-08-07 | End: 2023-08-09

## 2023-08-07 RX ORDER — ONDANSETRON 2 MG/ML
4 INJECTION INTRAMUSCULAR; INTRAVENOUS EVERY 6 HOURS PRN
Status: DISCONTINUED | OUTPATIENT
Start: 2023-08-07 | End: 2023-08-09 | Stop reason: HOSPADM

## 2023-08-07 RX ORDER — POTASSIUM CHLORIDE 7.45 MG/ML
10 INJECTION INTRAVENOUS PRN
Status: DISCONTINUED | OUTPATIENT
Start: 2023-08-07 | End: 2023-08-09 | Stop reason: HOSPADM

## 2023-08-07 RX ORDER — 0.9 % SODIUM CHLORIDE 0.9 %
1000 INTRAVENOUS SOLUTION INTRAVENOUS ONCE
Status: COMPLETED | OUTPATIENT
Start: 2023-08-07 | End: 2023-08-07

## 2023-08-07 RX ORDER — HALOPERIDOL 5 MG/ML
2.5 INJECTION INTRAMUSCULAR ONCE
Status: COMPLETED | OUTPATIENT
Start: 2023-08-07 | End: 2023-08-07

## 2023-08-07 RX ORDER — ACETAMINOPHEN 325 MG/1
650 TABLET ORAL EVERY 6 HOURS PRN
Status: DISCONTINUED | OUTPATIENT
Start: 2023-08-07 | End: 2023-08-09 | Stop reason: HOSPADM

## 2023-08-07 RX ORDER — LORAZEPAM 2 MG/ML
2 INJECTION INTRAMUSCULAR ONCE
Status: COMPLETED | OUTPATIENT
Start: 2023-08-07 | End: 2023-08-07

## 2023-08-07 RX ORDER — DIAZEPAM 5 MG/ML
10 INJECTION, SOLUTION INTRAMUSCULAR; INTRAVENOUS ONCE
Status: COMPLETED | OUTPATIENT
Start: 2023-08-07 | End: 2023-08-07

## 2023-08-07 RX ORDER — NOREPINEPHRINE BITARTRATE 0.06 MG/ML
INJECTION, SOLUTION INTRAVENOUS
Status: COMPLETED
Start: 2023-08-07 | End: 2023-08-07

## 2023-08-07 RX ORDER — ONDANSETRON 4 MG/1
4 TABLET, ORALLY DISINTEGRATING ORAL EVERY 8 HOURS PRN
Status: DISCONTINUED | OUTPATIENT
Start: 2023-08-07 | End: 2023-08-09 | Stop reason: HOSPADM

## 2023-08-07 RX ORDER — LORAZEPAM 2 MG/ML
2 INJECTION INTRAMUSCULAR ONCE
Status: DISCONTINUED | OUTPATIENT
Start: 2023-08-07 | End: 2023-08-09

## 2023-08-07 RX ORDER — POTASSIUM CHLORIDE 20 MEQ/1
40 TABLET, EXTENDED RELEASE ORAL PRN
Status: DISCONTINUED | OUTPATIENT
Start: 2023-08-07 | End: 2023-08-09 | Stop reason: HOSPADM

## 2023-08-07 RX ORDER — SODIUM CHLORIDE 9 MG/ML
INJECTION, SOLUTION INTRAVENOUS CONTINUOUS
Status: ACTIVE | OUTPATIENT
Start: 2023-08-07 | End: 2023-08-07

## 2023-08-07 RX ORDER — 0.9 % SODIUM CHLORIDE 0.9 %
2000 INTRAVENOUS SOLUTION INTRAVENOUS ONCE
Status: DISCONTINUED | OUTPATIENT
Start: 2023-08-07 | End: 2023-08-07

## 2023-08-07 RX ORDER — LISINOPRIL 20 MG/1
20 TABLET ORAL DAILY
Status: DISCONTINUED | OUTPATIENT
Start: 2023-08-07 | End: 2023-08-09 | Stop reason: HOSPADM

## 2023-08-07 RX ORDER — SODIUM CHLORIDE 0.9 % (FLUSH) 0.9 %
5-40 SYRINGE (ML) INJECTION PRN
Status: DISCONTINUED | OUTPATIENT
Start: 2023-08-07 | End: 2023-08-09 | Stop reason: HOSPADM

## 2023-08-07 RX ORDER — ATROPINE SULFATE 0.1 MG/ML
1 INJECTION INTRAVENOUS ONCE
Status: COMPLETED | OUTPATIENT
Start: 2023-08-07 | End: 2023-08-07

## 2023-08-07 RX ORDER — SODIUM CHLORIDE 9 MG/ML
INJECTION, SOLUTION INTRAVENOUS PRN
Status: DISCONTINUED | OUTPATIENT
Start: 2023-08-07 | End: 2023-08-09 | Stop reason: HOSPADM

## 2023-08-07 RX ORDER — SODIUM CHLORIDE 0.9 % (FLUSH) 0.9 %
5-40 SYRINGE (ML) INJECTION EVERY 12 HOURS SCHEDULED
Status: DISCONTINUED | OUTPATIENT
Start: 2023-08-07 | End: 2023-08-09 | Stop reason: HOSPADM

## 2023-08-07 RX ORDER — DEXTROSE MONOHYDRATE 100 MG/ML
INJECTION, SOLUTION INTRAVENOUS CONTINUOUS PRN
Status: DISCONTINUED | OUTPATIENT
Start: 2023-08-07 | End: 2023-08-09 | Stop reason: HOSPADM

## 2023-08-07 RX ORDER — ACETAMINOPHEN 650 MG/1
650 SUPPOSITORY RECTAL EVERY 6 HOURS PRN
Status: DISCONTINUED | OUTPATIENT
Start: 2023-08-07 | End: 2023-08-09 | Stop reason: HOSPADM

## 2023-08-07 RX ORDER — VENLAFAXINE 50 MG/1
50 TABLET ORAL 3 TIMES DAILY
Status: DISCONTINUED | OUTPATIENT
Start: 2023-08-07 | End: 2023-08-09 | Stop reason: HOSPADM

## 2023-08-07 RX ORDER — ARIPIPRAZOLE 5 MG/1
5 TABLET ORAL DAILY
Status: DISCONTINUED | OUTPATIENT
Start: 2023-08-07 | End: 2023-08-09 | Stop reason: HOSPADM

## 2023-08-07 RX ORDER — DIPHENHYDRAMINE HYDROCHLORIDE 50 MG/ML
50 INJECTION INTRAMUSCULAR; INTRAVENOUS ONCE
Status: COMPLETED | OUTPATIENT
Start: 2023-08-07 | End: 2023-08-07

## 2023-08-07 RX ORDER — POTASSIUM CHLORIDE 29.8 MG/ML
20 INJECTION INTRAVENOUS PRN
Status: DISCONTINUED | OUTPATIENT
Start: 2023-08-07 | End: 2023-08-09 | Stop reason: HOSPADM

## 2023-08-07 RX ORDER — HEPARIN SODIUM 5000 [USP'U]/ML
5000 INJECTION, SOLUTION INTRAVENOUS; SUBCUTANEOUS EVERY 8 HOURS SCHEDULED
Status: DISCONTINUED | OUTPATIENT
Start: 2023-08-07 | End: 2023-08-09 | Stop reason: HOSPADM

## 2023-08-07 RX ADMIN — VANCOMYCIN HYDROCHLORIDE 2250 MG: 1 INJECTION, POWDER, LYOPHILIZED, FOR SOLUTION INTRAVENOUS at 21:23

## 2023-08-07 RX ADMIN — LORAZEPAM 1 MG: 2 INJECTION INTRAMUSCULAR; INTRAVENOUS at 00:55

## 2023-08-07 RX ADMIN — SODIUM CHLORIDE, PRESERVATIVE FREE 10 ML: 5 INJECTION INTRAVENOUS at 20:11

## 2023-08-07 RX ADMIN — LORAZEPAM 1 MG: 2 INJECTION INTRAMUSCULAR; INTRAVENOUS at 01:36

## 2023-08-07 RX ADMIN — SODIUM CHLORIDE: 9 INJECTION, SOLUTION INTRAVENOUS at 02:16

## 2023-08-07 RX ADMIN — NOREPINEPHRINE BITARTRATE 7 MCG/MIN: 0.06 INJECTION, SOLUTION INTRAVENOUS at 02:56

## 2023-08-07 RX ADMIN — HALOPERIDOL LACTATE 2.5 MG: 5 INJECTION, SOLUTION INTRAMUSCULAR at 02:33

## 2023-08-07 RX ADMIN — VENLAFAXINE HYDROCHLORIDE 50 MG: 50 TABLET ORAL at 10:09

## 2023-08-07 RX ADMIN — HEPARIN SODIUM 5000 UNITS: 5000 INJECTION INTRAVENOUS; SUBCUTANEOUS at 22:12

## 2023-08-07 RX ADMIN — SODIUM BICARBONATE: 84 INJECTION, SOLUTION INTRAVENOUS at 12:59

## 2023-08-07 RX ADMIN — SODIUM BICARBONATE: 84 INJECTION, SOLUTION INTRAVENOUS at 05:17

## 2023-08-07 RX ADMIN — ARIPIPRAZOLE 5 MG: 5 TABLET ORAL at 10:09

## 2023-08-07 RX ADMIN — ATROPINE SULFATE 1 MG: 0.1 INJECTION INTRAVENOUS at 03:49

## 2023-08-07 RX ADMIN — DIPHENHYDRAMINE HYDROCHLORIDE 50 MG: 50 INJECTION, SOLUTION INTRAMUSCULAR; INTRAVENOUS at 02:33

## 2023-08-07 RX ADMIN — VENLAFAXINE HYDROCHLORIDE 50 MG: 50 TABLET ORAL at 13:12

## 2023-08-07 RX ADMIN — LORAZEPAM 1 MG: 2 INJECTION INTRAMUSCULAR; INTRAVENOUS at 00:00

## 2023-08-07 RX ADMIN — SODIUM BICARBONATE 50 MEQ: 84 INJECTION, SOLUTION INTRAVENOUS at 03:51

## 2023-08-07 RX ADMIN — SODIUM BICARBONATE: 84 INJECTION, SOLUTION INTRAVENOUS at 22:14

## 2023-08-07 RX ADMIN — SODIUM CHLORIDE 1000 ML: 9 INJECTION, SOLUTION INTRAVENOUS at 01:43

## 2023-08-07 RX ADMIN — DIAZEPAM 10 MG: 5 INJECTION, SOLUTION INTRAMUSCULAR; INTRAVENOUS at 02:40

## 2023-08-07 RX ADMIN — POTASSIUM CHLORIDE 20 MEQ: 29.8 INJECTION, SOLUTION INTRAVENOUS at 18:55

## 2023-08-07 RX ADMIN — POTASSIUM CHLORIDE 20 MEQ: 29.8 INJECTION, SOLUTION INTRAVENOUS at 19:59

## 2023-08-07 RX ADMIN — MIRTAZAPINE 45 MG: 30 TABLET, FILM COATED ORAL at 20:11

## 2023-08-07 RX ADMIN — LORAZEPAM 2 MG: 2 INJECTION INTRAMUSCULAR; INTRAVENOUS at 02:00

## 2023-08-07 RX ADMIN — LORAZEPAM 1 MG: 2 INJECTION INTRAMUSCULAR; INTRAVENOUS at 01:51

## 2023-08-07 RX ADMIN — PIPERACILLIN AND TAZOBACTAM 4500 MG: 4; .5 INJECTION, POWDER, LYOPHILIZED, FOR SOLUTION INTRAVENOUS at 20:10

## 2023-08-07 RX ADMIN — HEPARIN SODIUM 5000 UNITS: 5000 INJECTION INTRAVENOUS; SUBCUTANEOUS at 13:12

## 2023-08-07 RX ADMIN — SODIUM CHLORIDE, PRESERVATIVE FREE 10 ML: 5 INJECTION INTRAVENOUS at 07:48

## 2023-08-07 RX ADMIN — Medication 7 MCG/MIN: at 02:56

## 2023-08-07 RX ADMIN — VENLAFAXINE HYDROCHLORIDE 50 MG: 50 TABLET ORAL at 20:11

## 2023-08-07 RX ADMIN — POTASSIUM CHLORIDE 20 MEQ: 29.8 INJECTION, SOLUTION INTRAVENOUS at 21:10

## 2023-08-07 ASSESSMENT — PAIN SCALES - GENERAL
PAINLEVEL_OUTOF10: 0

## 2023-08-07 NOTE — ED NOTES
Patient now resting quietly in bed, respirations are even and unlabored but diaphoretic. Hospitalist at bedside for evaluation.       Guillermo Banks, RN  08/07/23 3859

## 2023-08-07 NOTE — CARE COORDINATION
Case Management Assessment  Initial Evaluation    Date/Time of Evaluation: 8/7/2023 2:25 PM  Assessment Completed by: Phillip Powers RN    If patient is discharged prior to next notation, then this note serves as note for discharge by case management. Patient Name: Ad Cox                   YOB: 1985  Diagnosis: Methamphetamine abuse ([address] [facility]) [F15.10]  SATYA (acute kidney injury) ([address] [facility]) [N17.9]  Acute kidney injury [facility]) [N17.9]  Non-traumatic rhabdomyolysis [M62.82]                   Date / Time: 8/6/2023 10:44 PM  Location: [address] [place] [address]     Patient Admission Status: Inpatient   Readmission Risk Low 0-14, Mod 15-19), High > 20: Readmission Risk Score: 13.3    Current PCP: PAPA Gillespie CNP  PCP verified by CM? Yes    Chart Reviewed: Yes      History Provided by: Other (see comment), Medical Record (Pt confused; numbers for emergency contacts on chart are not good numbers.)  Patient Orientation: Alert and Oriented, Person, Place    Patient Cognition: Alert (but confused)    Hospitalization in the last 30 days (Readmission):  No    If yes, Readmission Assessment in CM Navigator will be completed. Advance Directives:      Code Status: Full Code   Patient's Primary Decision Maker is: Legal Next of Kin      Discharge Planning:    Patient lives with: Other (Comment) (GEORGE; pt confused, no family present) Type of Home: Other (Comment) (GEORGE; pt confused, no family present)  Primary Care Giver: Other (Comment) (GEORGE; pt confused, no family present)  Patient Support Systems include: Family Members, Spouse/Significant Other   Current Financial resources: Medicaid  Current community resources: Other (Comment) (GEORGE; pt confused, no family present)  Current services prior to admission: Other (Comment) (GEORGE; pt confused, no family present)            Current DME:              Type of Home Care services:  None    ADLS  Prior functional level:  Other (see comment) (GEORGE; pt confused, no family

## 2023-08-07 NOTE — ED NOTES
Patient still restless and moving all over the bed pulling at cord. Hospitalist at bedside.       Rosy Mayo RN  08/07/23 9833

## 2023-08-07 NOTE — ED NOTES
Patient restless and moving all around the bed. Medicated as ordered. Sitter at bedside for safety.       Luanne Mensahr, RN  08/07/23 9650

## 2023-08-07 NOTE — ED NOTES
Hospitalist at bedside and notified of patient's blood pressure. Received order for levophed.       Mona Barajas RN  08/07/23 6847

## 2023-08-07 NOTE — ED PROVIDER NOTES
Central Line    Date/Time: 8/7/2023 4:45 AM  Performed by: Chel Guzmán MD  Authorized by: Shaniqua Kyle MD     Consent:     Consent obtained:  Emergent situation  Pre-procedure details:     Indication(s): central venous access      Hand hygiene: Hand hygiene performed prior to insertion      Sterile barrier technique:  All elements of maximal sterile technique followed      Skin preparation:  Chlorhexidine    Skin preparation agent: Skin preparation agent completely dried prior to procedure    Sedation:     Sedation type:  None  Anesthesia:     Anesthesia method:  None  Procedure details:     Location:  R femoral    Patient position:  Supine    Procedural supplies:  Triple lumen    Catheter size:  7 Fr    Landmarks identified: yes      Ultrasound guidance: yes      Ultrasound guidance timing: prior to insertion and real time      Sterile ultrasound techniques: Sterile gel and sterile probe covers were used      Number of attempts:  1    Successful placement: yes    Post-procedure details:     Post-procedure:  Dressing applied and line sutured    Assessment:  Blood return through all ports    Procedure completion:  Hermilo Goodson MD  Resident  08/07/23 2208       Chel Guzmán MD  Resident  08/07/23 5795

## 2023-08-07 NOTE — ED PROVIDER NOTES
[facility] EMERGENCY DEPT    EMERGENCY MEDICINE      Pt Name: Adela Granda  MRN: [id number]  [address] [facility] [place] 1985  Date of evaluation: 8/6/2023  Treating Physician: April Joyce MD    CHIEF COMPLAINT       Chief Complaint   Patient presents with    Abdominal Pain     History obtained from chart review and the patient. HISTORY OF PRESENT ILLNESS    HPI    Adela Granda is a 43 y.o. male with PMH of polysubstance abuse, seizures, rhabdo, anxiety presents to the emergency department for evaluation of abdominal pain for the past 2 days. Patient stated that he last used meth 36 hours ago but patient could not sit still and was constantly twitching and moving. He stated that he began having abdominal pain yesterday morning after having an episode of emesis. He stated that he was trying to avoid coming in but the pain is too severe and he cannot sleep so he came to the ED. Patient is denying any fever, diarrhea, cough, shortness of breath, chest pain. The patient has no other acute complaints at this time. PAST MEDICAL AND SURGICAL HISTORY     Past Medical History:   Diagnosis Date    Anxiety     Depression     Kidney stone     Liver disease     Hep C    Opiate abuse, continuous ([address])        No past surgical history on file. CURRENT MEDICATIONS     Previous Medications    ARIPIPRAZOLE (ABILIFY) 5 MG TABLET    Take 1 tablet by mouth daily    ASPIRIN-ACETAMINOPHEN-CAFFEINE (EXCEDRIN EXTRA STRENGTH PO)    Take by mouth as needed     LISDEXAMFETAMINE (VYVANSE) 70 MG CAPSULE    Take 1 capsule by mouth every morning for 30 days.  Max Daily Amount: 70 mg    LISINOPRIL (PRINIVIL;ZESTRIL) 20 MG TABLET    Take 1 tablet by mouth daily    MIRTAZAPINE (REMERON) 45 MG TABLET    Take 1 tablet by mouth nightly    NALOXONE (NARCAN) 4 MG/0.1ML LIQD NASAL SPRAY    1 spray by Nasal route as needed for Opioid Reversal    PREGABALIN (LYRICA) 200 MG CAPSULE    Take 1 capsule by mouth in the morning, at noon, and at bedtime

## 2023-08-07 NOTE — ED NOTES
Patient still restless and moving all around the bed. Medicated as ordered. Hospitalist at bedside.       Neal Flores RN  08/07/23 0762

## 2023-08-07 NOTE — H&P
Hospitalist History and Physical          Patient Info:   Name: Mason Burgos, : 1985, PCP: PAPA Hobson - NAI  Unit/Bed:      Admitted on: 2023  Date of Service: Pt seen/examined on 23 and Admitted to Inpatient with expected LOS greater than two midnights due to medical therapy. Assessment and Plan:  Rhabdomyolysis: CK 4390. Patient received 3 L NS in ED. Aggressive fluids  Strict I/O  Daily weights    Methamphetamine abuse: Patient noted to be moving about in bed and was not able to be controlled even with of multiple doses of Benzos given. Consider using Zyprexa for additional agitation    SATYA: Creatinine noted to be 7.5 upon admission. Likely 2/2 to [facility] and Meth usage  Consider dialysis pending renal function  Strict I/O  Daily weights    Metabolic acidosis in setting of acute renal failure 2/2 Rhabdo: serum bicarb 14  IVF  May need to have dialysis pending clinical course and response to fluids    Leukocytosis: WBC 21.7. No obvious signs of infection at this time, except for multiple open wounds on patient's skin and scabs. Likely reactive from rhabdo/drug use  Trend CBC daily    Depression with anxiety: Resume home medications      Data reviewed  EKG:  I have reviewed the EKG with the following interpretation: Sinus tachycardia  Imaging: I have reviewed CT abdomen with the following interpretation: No acute processes      ===================================================================      Chief Complaint:  abdominal pain    History Of Present Illness:  Mason Burgos is a 43 y.o. male with PMHx of meth abuse, opiate abuse, liver disease, depression and anxiety who presents to [address] with abdominal pain. Patient states that he has been having diffuse abdominal pain since yesterday. He states that the pain is more severe now and that is why he came to the hospital. States he vomited once as well and denied any blood present.         ED

## 2023-08-07 NOTE — ED TRIAGE NOTES
Patient presents to ED with c/o abdominal pain that started earlier this morning. States that he did meth about 36 hours ago. Also states that is he is set up to go to a rehab facility but decided to do meth since \"he has been a good boy. \" EKG completed in triage. Call light in reach.

## 2023-08-08 LAB
ALBUMIN SERPL BCG-MCNC: 2.9 G/DL (ref 3.5–5.1)
ALBUMIN SERPL BCG-MCNC: 2.9 G/DL (ref 3.5–5.1)
ALBUMIN SERPL BCG-MCNC: 3 G/DL (ref 3.5–5.1)
ALP SERPL-CCNC: 91 U/L (ref 38–126)
ALP SERPL-CCNC: 96 U/L (ref 38–126)
ALP SERPL-CCNC: 96 U/L (ref 38–126)
ALT SERPL W/O P-5'-P-CCNC: 182 U/L (ref 11–66)
ALT SERPL W/O P-5'-P-CCNC: 208 U/L (ref 11–66)
ALT SERPL W/O P-5'-P-CCNC: 218 U/L (ref 11–66)
ANION GAP SERPL CALC-SCNC: 7 MEQ/L (ref 8–16)
ANION GAP SERPL CALC-SCNC: 8 MEQ/L (ref 8–16)
ANION GAP SERPL CALC-SCNC: 9 MEQ/L (ref 8–16)
AST SERPL-CCNC: 110 U/L (ref 5–40)
AST SERPL-CCNC: 144 U/L (ref 5–40)
AST SERPL-CCNC: 160 U/L (ref 5–40)
BASOPHILS ABSOLUTE: 0 THOU/MM3 (ref 0–0.1)
BASOPHILS NFR BLD AUTO: 0.4 %
BILIRUB SERPL-MCNC: 0.2 MG/DL (ref 0.3–1.2)
BUN SERPL-MCNC: 24 MG/DL (ref 7–22)
BUN SERPL-MCNC: 27 MG/DL (ref 7–22)
BUN SERPL-MCNC: 32 MG/DL (ref 7–22)
CA-I BLD ISE-SCNC: 1.09 MMOL/L (ref 1.12–1.32)
CA-I BLD ISE-SCNC: 1.17 MMOL/L (ref 1.12–1.32)
CA-I BLD ISE-SCNC: 1.18 MMOL/L (ref 1.12–1.32)
CALCIUM SERPL-MCNC: 8.6 MG/DL (ref 8.5–10.5)
CALCIUM SERPL-MCNC: 8.9 MG/DL (ref 8.5–10.5)
CALCIUM SERPL-MCNC: 8.9 MG/DL (ref 8.5–10.5)
CHLORIDE SERPL-SCNC: 104 MEQ/L (ref 98–111)
CHLORIDE SERPL-SCNC: 104 MEQ/L (ref 98–111)
CHLORIDE SERPL-SCNC: 106 MEQ/L (ref 98–111)
CK SERPL-CCNC: 1207 U/L (ref 55–170)
CO2 SERPL-SCNC: 28 MEQ/L (ref 23–33)
CO2 SERPL-SCNC: 30 MEQ/L (ref 23–33)
CO2 SERPL-SCNC: 30 MEQ/L (ref 23–33)
CREAT SERPL-MCNC: 1 MG/DL (ref 0.4–1.2)
CREAT SERPL-MCNC: 1 MG/DL (ref 0.4–1.2)
CREAT SERPL-MCNC: 1.6 MG/DL (ref 0.4–1.2)
DEPRECATED RDW RBC AUTO: 49 FL (ref 35–45)
EOSINOPHIL NFR BLD AUTO: 3.1 %
EOSINOPHILS ABSOLUTE: 0.3 THOU/MM3 (ref 0–0.4)
ERYTHROCYTE [DISTWIDTH] IN BLOOD BY AUTOMATED COUNT: 16.8 % (ref 11.5–14.5)
GFR SERPL CREATININE-BSD FRML MDRD: 56 ML/MIN/1.73M2
GFR SERPL CREATININE-BSD FRML MDRD: > 60 ML/MIN/1.73M2
GFR SERPL CREATININE-BSD FRML MDRD: > 60 ML/MIN/1.73M2
GLUCOSE SERPL-MCNC: 113 MG/DL (ref 70–108)
GLUCOSE SERPL-MCNC: 114 MG/DL (ref 70–108)
GLUCOSE SERPL-MCNC: 129 MG/DL (ref 70–108)
HCT VFR BLD AUTO: 31.4 % (ref 42–52)
HGB BLD-MCNC: 10 GM/DL (ref 14–18)
IMM GRANULOCYTES # BLD AUTO: 0.02 THOU/MM3 (ref 0–0.07)
IMM GRANULOCYTES NFR BLD AUTO: 0.2 %
LV EF: 63 %
LVEF MODALITY: NORMAL
LYMPHOCYTES ABSOLUTE: 2.3 THOU/MM3 (ref 1–4.8)
LYMPHOCYTES NFR BLD AUTO: 28.9 %
MAGNESIUM SERPL-MCNC: 1.8 MG/DL (ref 1.6–2.4)
MAGNESIUM SERPL-MCNC: 1.9 MG/DL (ref 1.6–2.4)
MAGNESIUM SERPL-MCNC: 1.9 MG/DL (ref 1.6–2.4)
MCH RBC QN AUTO: 25.4 PG (ref 26–33)
MCHC RBC AUTO-ENTMCNC: 31.8 GM/DL (ref 32.2–35.5)
MCV RBC AUTO: 79.7 FL (ref 80–94)
MONOCYTES ABSOLUTE: 0.7 THOU/MM3 (ref 0.4–1.3)
MONOCYTES NFR BLD AUTO: 9 %
NEUTROPHILS NFR BLD AUTO: 58.4 %
NRBC BLD AUTO-RTO: 0 /100 WBC
PHOSPHATE SERPL-MCNC: 1.7 MG/DL (ref 2.4–4.7)
PHOSPHATE SERPL-MCNC: 1.8 MG/DL (ref 2.4–4.7)
PHOSPHATE SERPL-MCNC: 2.3 MG/DL (ref 2.4–4.7)
PLATELET # BLD AUTO: 285 THOU/MM3 (ref 130–400)
PMV BLD AUTO: 9.7 FL (ref 9.4–12.4)
POTASSIUM SERPL-SCNC: 3.3 MEQ/L (ref 3.5–5.2)
POTASSIUM SERPL-SCNC: 3.7 MEQ/L (ref 3.5–5.2)
POTASSIUM SERPL-SCNC: 4.4 MEQ/L (ref 3.5–5.2)
PROT SERPL-MCNC: 6.1 G/DL (ref 6.1–8)
PROT SERPL-MCNC: 6.2 G/DL (ref 6.1–8)
PROT SERPL-MCNC: 6.3 G/DL (ref 6.1–8)
RBC # BLD AUTO: 3.94 MILL/MM3 (ref 4.7–6.1)
SEGMENTED NEUTROPHILS ABSOLUTE COUNT: 4.7 THOU/MM3 (ref 1.8–7.7)
SODIUM SERPL-SCNC: 141 MEQ/L (ref 135–145)
SODIUM SERPL-SCNC: 142 MEQ/L (ref 135–145)
SODIUM SERPL-SCNC: 143 MEQ/L (ref 135–145)
VANCOMYCIN SERPL-MCNC: 11.9 UG/ML (ref 0.1–39.9)
WBC # BLD AUTO: 8.1 THOU/MM3 (ref 4.8–10.8)

## 2023-08-08 PROCEDURE — 36415 COLL VENOUS BLD VENIPUNCTURE: CPT

## 2023-08-08 PROCEDURE — 2060000000 HC ICU INTERMEDIATE R&B

## 2023-08-08 PROCEDURE — 82550 ASSAY OF CK (CPK): CPT

## 2023-08-08 PROCEDURE — 83735 ASSAY OF MAGNESIUM: CPT

## 2023-08-08 PROCEDURE — 51702 INSERT TEMP BLADDER CATH: CPT

## 2023-08-08 PROCEDURE — 6370000000 HC RX 637 (ALT 250 FOR IP)

## 2023-08-08 PROCEDURE — 2580000003 HC RX 258

## 2023-08-08 PROCEDURE — 80053 COMPREHEN METABOLIC PANEL: CPT

## 2023-08-08 PROCEDURE — 6360000002 HC RX W HCPCS

## 2023-08-08 PROCEDURE — 2500000003 HC RX 250 WO HCPCS

## 2023-08-08 PROCEDURE — 93306 TTE W/DOPPLER COMPLETE: CPT

## 2023-08-08 PROCEDURE — 84100 ASSAY OF PHOSPHORUS: CPT

## 2023-08-08 PROCEDURE — 6370000000 HC RX 637 (ALT 250 FOR IP): Performed by: INTERNAL MEDICINE

## 2023-08-08 PROCEDURE — 82330 ASSAY OF CALCIUM: CPT

## 2023-08-08 PROCEDURE — 80202 ASSAY OF VANCOMYCIN: CPT

## 2023-08-08 PROCEDURE — 85025 COMPLETE CBC W/AUTO DIFF WBC: CPT

## 2023-08-08 RX ORDER — LORAZEPAM 2 MG/ML
3 INJECTION INTRAMUSCULAR
Status: DISCONTINUED | OUTPATIENT
Start: 2023-08-08 | End: 2023-08-09

## 2023-08-08 RX ORDER — LORAZEPAM 2 MG/ML
4 INJECTION INTRAMUSCULAR
Status: DISCONTINUED | OUTPATIENT
Start: 2023-08-08 | End: 2023-08-09

## 2023-08-08 RX ORDER — LORAZEPAM 1 MG/1
1 TABLET ORAL
Status: DISCONTINUED | OUTPATIENT
Start: 2023-08-08 | End: 2023-08-09

## 2023-08-08 RX ORDER — LORAZEPAM 2 MG/ML
2 INJECTION INTRAMUSCULAR
Status: DISCONTINUED | OUTPATIENT
Start: 2023-08-08 | End: 2023-08-09

## 2023-08-08 RX ORDER — LORAZEPAM 1 MG/1
2 TABLET ORAL
Status: DISCONTINUED | OUTPATIENT
Start: 2023-08-08 | End: 2023-08-09

## 2023-08-08 RX ORDER — SODIUM CHLORIDE, SODIUM LACTATE, POTASSIUM CHLORIDE, CALCIUM CHLORIDE 600; 310; 30; 20 MG/100ML; MG/100ML; MG/100ML; MG/100ML
INJECTION, SOLUTION INTRAVENOUS CONTINUOUS
Status: ACTIVE | OUTPATIENT
Start: 2023-08-08 | End: 2023-08-09

## 2023-08-08 RX ORDER — LORAZEPAM 1 MG/1
3 TABLET ORAL
Status: DISCONTINUED | OUTPATIENT
Start: 2023-08-08 | End: 2023-08-09

## 2023-08-08 RX ORDER — LORAZEPAM 1 MG/1
4 TABLET ORAL
Status: DISCONTINUED | OUTPATIENT
Start: 2023-08-08 | End: 2023-08-09

## 2023-08-08 RX ORDER — LORAZEPAM 2 MG/ML
1 INJECTION INTRAMUSCULAR
Status: DISCONTINUED | OUTPATIENT
Start: 2023-08-08 | End: 2023-08-09

## 2023-08-08 RX ADMIN — ARIPIPRAZOLE 5 MG: 5 TABLET ORAL at 09:10

## 2023-08-08 RX ADMIN — HEPARIN SODIUM 5000 UNITS: 5000 INJECTION INTRAVENOUS; SUBCUTANEOUS at 13:51

## 2023-08-08 RX ADMIN — LORAZEPAM 1 MG: 1 TABLET ORAL at 23:22

## 2023-08-08 RX ADMIN — MIRTAZAPINE 45 MG: 30 TABLET, FILM COATED ORAL at 21:59

## 2023-08-08 RX ADMIN — OLANZAPINE 10 MG: 10 INJECTION, POWDER, FOR SOLUTION INTRAMUSCULAR at 15:58

## 2023-08-08 RX ADMIN — HEPARIN SODIUM 5000 UNITS: 5000 INJECTION INTRAVENOUS; SUBCUTANEOUS at 06:08

## 2023-08-08 RX ADMIN — LORAZEPAM 1 MG: 1 TABLET ORAL at 21:59

## 2023-08-08 RX ADMIN — SODIUM CHLORIDE, POTASSIUM CHLORIDE, SODIUM LACTATE AND CALCIUM CHLORIDE: 600; 310; 30; 20 INJECTION, SOLUTION INTRAVENOUS at 14:48

## 2023-08-08 RX ADMIN — HEPARIN SODIUM 5000 UNITS: 5000 INJECTION INTRAVENOUS; SUBCUTANEOUS at 22:00

## 2023-08-08 RX ADMIN — POTASSIUM CHLORIDE 40 MEQ: 1500 TABLET, EXTENDED RELEASE ORAL at 06:08

## 2023-08-08 RX ADMIN — ACETAMINOPHEN 650 MG: 325 TABLET ORAL at 22:00

## 2023-08-08 RX ADMIN — LORAZEPAM 1 MG: 2 INJECTION INTRAMUSCULAR; INTRAVENOUS at 14:43

## 2023-08-08 RX ADMIN — Medication 1250 MG: at 17:00

## 2023-08-08 RX ADMIN — VENLAFAXINE HYDROCHLORIDE 50 MG: 50 TABLET ORAL at 22:00

## 2023-08-08 RX ADMIN — ONDANSETRON 4 MG: 2 INJECTION INTRAMUSCULAR; INTRAVENOUS at 22:00

## 2023-08-08 RX ADMIN — SODIUM CHLORIDE, PRESERVATIVE FREE 10 ML: 5 INJECTION INTRAVENOUS at 09:10

## 2023-08-08 RX ADMIN — VENLAFAXINE HYDROCHLORIDE 50 MG: 50 TABLET ORAL at 13:51

## 2023-08-08 RX ADMIN — VENLAFAXINE HYDROCHLORIDE 50 MG: 50 TABLET ORAL at 09:10

## 2023-08-08 ASSESSMENT — PAIN DESCRIPTION - ONSET: ONSET: ON-GOING

## 2023-08-08 ASSESSMENT — PAIN DESCRIPTION - PAIN TYPE: TYPE: ACUTE PAIN

## 2023-08-08 ASSESSMENT — PAIN - FUNCTIONAL ASSESSMENT: PAIN_FUNCTIONAL_ASSESSMENT: ACTIVITIES ARE NOT PREVENTED

## 2023-08-08 ASSESSMENT — PAIN SCALES - GENERAL
PAINLEVEL_OUTOF10: 2
PAINLEVEL_OUTOF10: 0
PAINLEVEL_OUTOF10: 2

## 2023-08-08 ASSESSMENT — PAIN DESCRIPTION - ORIENTATION: ORIENTATION: MID

## 2023-08-08 ASSESSMENT — PAIN DESCRIPTION - LOCATION: LOCATION: HEAD

## 2023-08-08 ASSESSMENT — PAIN DESCRIPTION - DESCRIPTORS: DESCRIPTORS: ACHING

## 2023-08-08 ASSESSMENT — PAIN DESCRIPTION - FREQUENCY: FREQUENCY: CONTINUOUS

## 2023-08-08 NOTE — CARE COORDINATION
8/8/23, 11:36 AM EDT    DISCHARGE ON GOING [address] day: 1  Location: 3A-07/007-A Reason for admit: Methamphetamine abuse ([facility]) [F15.10]  SATYA (acute kidney injury) ([facility]) [N17.9]  Acute kidney injury ([address] [facility]) [N17.9]  Non-traumatic rhabdomyolysis [M62.82]   Procedure:   8/7 CVC Right femoral  8/7 CT Abd/pelvis: No acute abnormality in the abdomen or pelvis; Punctate nonobstructing right renal stone; Mild hepatic steatosis. Barriers to Discharge: Remains on 3A. Intensivist following. MRSA positive in Nares. Levo gtt stopped. Heparin subq q8hr. Zosyn iv q8hr. Vanc iv x1. BUN 27, creatinine 1.0. Plan to remove CVC today. Sitter at bedside. PCP: Derrel Lanes, APRN - CNP  Readmission Risk Score: 13.4%  Patient Goals/Plan/Treatment Preferences: Spoke with pt, he lives at home with his S.O. States he walks to all appts and states he \"will probably walk home\", whenever he is discharged. Monitor for needs.

## 2023-08-08 NOTE — PROGRESS NOTES
Report called to [address] [place] [address]. Attempted to call sister and update on transfer but no answer. Transferred to [address] [place] [address] 18 per transport via bed. Patient was transferred with all belongings. No distress noted.

## 2023-08-08 NOTE — PROGRESS NOTES
Virtual nurse attempted to complete admission with patient. Patient declined virtual nursing services. Patient status changed to Do Not Disturb/Declined as requested.

## 2023-08-08 NOTE — PROGRESS NOTES
CRITICAL CARE PROGRESS NOTE      Patient:  Mason Wells    Unit/Bed:3A-07/007-A  YOB: 1985  MRN: [id number]   PCP: PAPA Yepez - CNP  Date of Admission: 8/6/2023  Chief Complaint:- Abdominal Pain    Assessment and Plan:    Rhabdomyolysis improved: CK 4390. Patient received 3 L NS in ED.  -Aggressive fluids  -Strict I/O  -Daily weights    Methamphetamine abuse: Patient noted to be moving about in bed and was not able to be controlled even with of multiple doses of Benzos given. -Consider using Zyprexa for additional agitation    Leukocytosis-improved: WBC 8.8 WBC 8.1 was WBC 21.7 on admission. No obvious signs of infection at this time, except for multiple open wounds on patient's skin and scabs. Possible reactive from rhabdo/drug use. However Pro-Rolando is elevated at 19.7. Currently treating for infection empirically and awaiting culture results/echo result. -Trend CBC daily  Concern for Infection: WBC 8.1 on last check Was 21.7 on admit Procal 19.7 MRSA screen positive. IV drug use hx will order Echo, BCx2, Pneumonia PCR and sputum cx. Urine culture ordered. Urine chemistry appears negative for infxn. Echo  -Continue vanc/Zosyn awaiting results from pneumonia panel, blood cultures, PCR, echo      Depression with anxiety: Resume home medications    Report of seizure disorder?:  Per sister patient was taking Depakote. There is no record of this in our system or in pharmacy system. Asking sister for further information on prescriber/pill bottle/anything we can use to verify this. Metabolic acidosis in setting of acute renal failure 2/2 Rhabdo-resolved: CO2 22 serum bicarb 14 on arrival  -Discontinued bicarb drip  SATYA-resolved: 8/8 creatinine 1.0 approximately his baseline creatinine noted to be 7.5 upon admission.  Likely 2/2 to Rhabdo and Meth usage  -Consider dialysis pending renal function  -Strict I/O  -Daily weights    INITIAL H AND P AND ICU COURSE:  Patient is a 50-year-old

## 2023-08-09 VITALS
OXYGEN SATURATION: 96 % | TEMPERATURE: 98.6 F | RESPIRATION RATE: 18 BRPM | SYSTOLIC BLOOD PRESSURE: 109 MMHG | BODY MASS INDEX: 30.06 KG/M2 | HEIGHT: 70 IN | HEART RATE: 46 BPM | DIASTOLIC BLOOD PRESSURE: 69 MMHG | WEIGHT: 210 LBS

## 2023-08-09 LAB
ALBUMIN SERPL BCG-MCNC: 3 G/DL (ref 3.5–5.1)
ALP SERPL-CCNC: 89 U/L (ref 38–126)
ALT SERPL W/O P-5'-P-CCNC: 142 U/L (ref 11–66)
ANION GAP SERPL CALC-SCNC: 8 MEQ/L (ref 8–16)
AST SERPL-CCNC: 73 U/L (ref 5–40)
BACTERIA SPEC AEROBE CULT: NORMAL
BACTERIA UR CULT: NORMAL
BASOPHILS ABSOLUTE: 0 THOU/MM3 (ref 0–0.1)
BASOPHILS NFR BLD AUTO: 0.4 %
BILIRUB SERPL-MCNC: 0.3 MG/DL (ref 0.3–1.2)
BUN SERPL-MCNC: 18 MG/DL (ref 7–22)
CA-I BLD ISE-SCNC: 1.21 MMOL/L (ref 1.12–1.32)
CALCIUM SERPL-MCNC: 9.3 MG/DL (ref 8.5–10.5)
CHLORIDE SERPL-SCNC: 107 MEQ/L (ref 98–111)
CO2 SERPL-SCNC: 27 MEQ/L (ref 23–33)
CREAT SERPL-MCNC: 0.9 MG/DL (ref 0.4–1.2)
DEPRECATED RDW RBC AUTO: 51 FL (ref 35–45)
EOSINOPHIL NFR BLD AUTO: 3.4 %
EOSINOPHILS ABSOLUTE: 0.2 THOU/MM3 (ref 0–0.4)
ERYTHROCYTE [DISTWIDTH] IN BLOOD BY AUTOMATED COUNT: 17.2 % (ref 11.5–14.5)
GFR SERPL CREATININE-BSD FRML MDRD: > 60 ML/MIN/1.73M2
GLUCOSE SERPL-MCNC: 97 MG/DL (ref 70–108)
HCT VFR BLD AUTO: 30.3 % (ref 42–52)
HGB BLD-MCNC: 9.5 GM/DL (ref 14–18)
IMM GRANULOCYTES # BLD AUTO: 0.03 THOU/MM3 (ref 0–0.07)
IMM GRANULOCYTES NFR BLD AUTO: 0.6 %
LYMPHOCYTES ABSOLUTE: 1.9 THOU/MM3 (ref 1–4.8)
LYMPHOCYTES NFR BLD AUTO: 35.4 %
MAGNESIUM SERPL-MCNC: 1.7 MG/DL (ref 1.6–2.4)
MCH RBC QN AUTO: 25.7 PG (ref 26–33)
MCHC RBC AUTO-ENTMCNC: 31.4 GM/DL (ref 32.2–35.5)
MCV RBC AUTO: 82.1 FL (ref 80–94)
MONOCYTES ABSOLUTE: 0.5 THOU/MM3 (ref 0.4–1.3)
MONOCYTES NFR BLD AUTO: 9.1 %
NEUTROPHILS NFR BLD AUTO: 51.1 %
NRBC BLD AUTO-RTO: 0 /100 WBC
PHOSPHATE SERPL-MCNC: 1.5 MG/DL (ref 2.4–4.7)
PLATELET # BLD AUTO: 261 THOU/MM3 (ref 130–400)
PMV BLD AUTO: 10 FL (ref 9.4–12.4)
POTASSIUM SERPL-SCNC: 4.3 MEQ/L (ref 3.5–5.2)
PROT SERPL-MCNC: 6.3 G/DL (ref 6.1–8)
RBC # BLD AUTO: 3.69 MILL/MM3 (ref 4.7–6.1)
SEGMENTED NEUTROPHILS ABSOLUTE COUNT: 2.8 THOU/MM3 (ref 1.8–7.7)
SODIUM SERPL-SCNC: 142 MEQ/L (ref 135–145)
T4 FREE SERPL-MCNC: 1.05 NG/DL (ref 0.93–1.76)
TSH SERPL DL<=0.005 MIU/L-ACNC: 0.1 UIU/ML (ref 0.4–4.2)
WBC # BLD AUTO: 5.4 THOU/MM3 (ref 4.8–10.8)

## 2023-08-09 PROCEDURE — 6370000000 HC RX 637 (ALT 250 FOR IP)

## 2023-08-09 PROCEDURE — 84100 ASSAY OF PHOSPHORUS: CPT

## 2023-08-09 PROCEDURE — 83735 ASSAY OF MAGNESIUM: CPT

## 2023-08-09 PROCEDURE — 6360000002 HC RX W HCPCS: Performed by: INTERNAL MEDICINE

## 2023-08-09 PROCEDURE — 36415 COLL VENOUS BLD VENIPUNCTURE: CPT

## 2023-08-09 PROCEDURE — 93005 ELECTROCARDIOGRAM TRACING: CPT

## 2023-08-09 PROCEDURE — 6360000002 HC RX W HCPCS

## 2023-08-09 PROCEDURE — 2500000003 HC RX 250 WO HCPCS: Performed by: STUDENT IN AN ORGANIZED HEALTH CARE EDUCATION/TRAINING PROGRAM

## 2023-08-09 PROCEDURE — 85025 COMPLETE CBC W/AUTO DIFF WBC: CPT

## 2023-08-09 PROCEDURE — 2580000003 HC RX 258

## 2023-08-09 PROCEDURE — 2580000003 HC RX 258: Performed by: STUDENT IN AN ORGANIZED HEALTH CARE EDUCATION/TRAINING PROGRAM

## 2023-08-09 PROCEDURE — 80053 COMPREHEN METABOLIC PANEL: CPT

## 2023-08-09 PROCEDURE — 82330 ASSAY OF CALCIUM: CPT

## 2023-08-09 PROCEDURE — 84439 ASSAY OF FREE THYROXINE: CPT

## 2023-08-09 PROCEDURE — 84443 ASSAY THYROID STIM HORMONE: CPT

## 2023-08-09 RX ADMIN — Medication 1250 MG: at 05:47

## 2023-08-09 RX ADMIN — PIPERACILLIN SODIUM AND TAZOBACTAM SODIUM 3375 MG: 3; .375 INJECTION, SOLUTION INTRAVENOUS at 10:38

## 2023-08-09 RX ADMIN — LORAZEPAM 1 MG: 1 TABLET ORAL at 01:53

## 2023-08-09 RX ADMIN — PIPERACILLIN SODIUM AND TAZOBACTAM SODIUM 3375 MG: 3; .375 INJECTION, SOLUTION INTRAVENOUS at 01:58

## 2023-08-09 RX ADMIN — VENLAFAXINE HYDROCHLORIDE 50 MG: 50 TABLET ORAL at 10:35

## 2023-08-09 RX ADMIN — SODIUM CHLORIDE: 9 INJECTION, SOLUTION INTRAVENOUS at 01:58

## 2023-08-09 RX ADMIN — SODIUM CHLORIDE: 9 INJECTION, SOLUTION INTRAVENOUS at 10:31

## 2023-08-09 RX ADMIN — SODIUM PHOSPHATE, MONOBASIC, MONOHYDRATE AND SODIUM PHOSPHATE, DIBASIC, ANHYDROUS 15 MMOL: 276; 142 INJECTION, SOLUTION INTRAVENOUS at 10:34

## 2023-08-09 RX ADMIN — ARIPIPRAZOLE 5 MG: 5 TABLET ORAL at 10:35

## 2023-08-09 NOTE — PROGRESS NOTES
Physician Progress Note      PATIENT:               Natasha Brown  CSN #:                  [id number]  :                       1985  ADMIT DATE:       2023 10:44 PM  DISCH DATE:  RESPONDING  PROVIDER #: Evgeny Lombardo          QUERY TEXT:    Pt admitted with rhabdomyolysis and meth abuse. Pt noted to have hypotension   requiring levophed gtt. If possible, please document in the progress notes and   discharge summary if you are evaluating and/or treating any of the following: The medical record reflects the following:    Risk Factors: emesis, SATYA, dehydration, meth abuse  Clinical Indicators: BP's 63/32, 54/31, 59/30, 62/30, 63/31, 70/31, 69/33,   72/31, MAP <50, pt with   Treatment: Levophed gtt, continuous IVF    Thank you! Vernell Cruz CRCR  RN Clinical   Options provided:  -- Hypovolemic Shock that is now resolved  -- Hypotension without Shock  -- Other - I will add my own diagnosis  -- Disagree - Not applicable / Not valid  -- Disagree - Clinically unable to determine / Unknown  -- Refer to Clinical Documentation Reviewer    PROVIDER RESPONSE TEXT:    This patient has hypotension without shock.     Query created by: Vandana Piper on 2023 3:34 PM      Electronically signed by:  Evgeny Lombardo 2023 1:54 PM

## 2023-08-09 NOTE — PROCEDURES
12 lead EKG completed. Results handed to [facility].  Kristen Candelario CET

## 2023-08-09 NOTE — CARE COORDINATION
Collaborative Discharge Planning    Jesus Choi  :  1985  MRN:  [id number]    ADMIT DATE:  2023      Discharge Planning Discharge Planning  Type of Residence: House  Living Arrangements: Spouse/Significant Other  Support Systems: Family Members, Parent, Spouse/Significant Other, Other (Comment) (Used to go to [address] [place] [address] 7 Prior To Admission: None  Potential Assistance Needed: N/A  DME Ordered?: No  Potential Assistance Purchasing Medications: No  Type of Home Care Services: None  Patient expects to be discharged to[de-identified] [facility] Notes /Social Work Whiteboard Notes  /Social Work Whiteboard:  CM: Plans home with S.O. Will need a ride vs walking home;  Addiction Services following    Discharge Plan Home with family  Plans home w ARNALDO Almazan, has sister Artur Lundy following; monitor AB plans  Discharge Milestones and Delays: Clinical status  From 3A (pressors there)  SATYA/Rhabodmyolysis  History: Liver Disease, Cocaine/Marijuana Use, Suboxone Use    (+) UDS: Cocaine, Fentanyl, Amphetamines    IV Zosyn, IV Vancomycin        SIGNED:  Kai Rudd RN   2023, 8:39 AM

## 2023-08-09 NOTE — PROGRESS NOTES
Patient is wanting to leave Against Medical Advice [facility]). Dr. Alin Mayo notified at this time. Pt states he knows the risks of leaving AMA and wishes to proceed. PIV removed at this time without difficulty. Explained risks of leaving AMA to patient along with importance of staying to receive care. Pt verbalizes understanding. AMA paperwork signed and pt left floor with no s/sx of distress noted.

## 2023-08-09 NOTE — PLAN OF CARE
Handoff received from Dr. Laura Sacks, MD, ICU    Briefly, pt Harley Hernández is a 43 y.o. male w/ significant history of polysubstance use disorder who presented in renal failure due to rhabdomyolysis from amphetamine and cocaine use (injects in fingers and arms). Pt was initially admitted to the ICU 8/6/2023 as he required levophed due to hypotension. He was given fluid resuscitation after which his renal function improved significantly and is near baseline. Plan:  -F/u TTE results; pt has significant risk factors for endocarditis however there are no exam findings to suggest this.   -F/u BCx x2  -PT/OT  -IVLR @ 75 cc/hr x 12 hours  -Repeat CK; if still elevated, will increase IV fluid rate  -Still exhibiting some s/s of cocaine/methamphetamine withdrawal; initiate RASS protocol, dose ativan accordingly    Electronically signed by Candance Monica, MD on 8/8/2023 at 2:16 PM
Problem: Safety - Medical Restraint  Goal: Remains free of injury from restraints (Restraint for Interference with Medical Device)  Description: INTERVENTIONS:  1. Determine that other, less restrictive measures have been tried or would not be effective before applying the restraint  2. Evaluate the patient's condition at the time of restraint application  3. Inform patient/family regarding the reason for restraint  4.  Q2H: Monitor safety, psychosocial status, comfort, nutrition and hydration  8/8/2023 1110 by Flaquita Sun RN  Outcome: Progressing  Flowsheets (Taken 8/8/2023 1110)  Remains free of injury from restraints (restraint for interference with medical device):   Determine that other, less restrictive measures have been tried or would not be effective before applying the restraint   Evaluate the patient's condition at the time of restraint application   Every 2 hours: Monitor safety, psychosocial status, comfort, nutrition and hydration     Problem: Discharge Planning  Goal: Discharge to home or other facility with appropriate resources  8/8/2023 1110 by Flaquita Sun RN  Outcome: Progressing  Flowsheets (Taken 8/8/2023 1110)  Discharge to home or other facility with appropriate resources:   Identify barriers to discharge with patient and caregiver   Identify discharge learning needs (meds, wound care, etc)     Problem: Pain  Goal: Verbalizes/displays adequate comfort level or baseline comfort level  8/8/2023 1110 by Flaquita Sun RN  Outcome: Progressing  Flowsheets (Taken 8/8/2023 1110)  Verbalizes/displays adequate comfort level or baseline comfort level:   Encourage patient to monitor pain and request assistance   Assess pain using appropriate pain scale     Problem: Safety - Adult  Goal: Free from fall injury  8/8/2023 1110 by Flaquita Sun RN  Outcome: Progressing  Flowsheets (Taken 8/8/2023 1110)  Free From Fall Injury: Instruct family/caregiver on patient safety     Problem: Cardiovascular -
Problem: Safety - Medical Restraint  Goal: Remains free of injury from restraints (Restraint for Interference with Medical Device)  Description: INTERVENTIONS:  1. Determine that other, less restrictive measures have been tried or would not be effective before applying the restraint  2. Evaluate the patient's condition at the time of restraint application  3. Inform patient/family regarding the reason for restraint  4.  Q2H: Monitor safety, psychosocial status, comfort, nutrition and hydration  8/9/2023 1444 by Vincent Freire RN  Outcome: Completed  8/9/2023 0212 by Johann Alvarenga RN  Outcome: Progressing     Problem: Discharge Planning  Goal: Discharge to home or other facility with appropriate resources  8/9/2023 1444 by Vincent Freire RN  Outcome: Completed  8/9/2023 0212 by Johann Alvarenga RN  Outcome: Progressing  [address] (Taken 8/8/2023 2155)  Discharge to home or other facility with appropriate resources: Identify barriers to discharge with patient and caregiver     Problem: Pain  Goal: Verbalizes/displays adequate comfort level or baseline comfort level  8/9/2023 1444 by Vincent Freire RN  Outcome: Completed  8/9/2023 0212 by Johann Alvarenga RN  Outcome: Progressing     Problem: Safety - Adult  Goal: Free from fall injury  8/9/2023 1444 by Vincent Freire RN  Outcome: Completed  8/9/2023 0212 by Johann Alvarenga RN  Outcome: Progressing     Problem: Cardiovascular - Adult  Goal: Maintains optimal cardiac output and hemodynamic stability  8/9/2023 1444 by Vincent Freire RN  Outcome: Completed  8/9/2023 0212 by Johann Alvarenga RN  Outcome: Progressing  Flowsheets (Taken 8/8/2023 2155)  Maintains optimal cardiac output and hemodynamic stability: Monitor blood pressure and heart rate  Goal: Absence of cardiac dysrhythmias or at baseline  8/9/2023 1444 by Vincent Freire RN  Outcome: Completed  8/9/2023 0212 by Johann Alvarenga RN  Outcome: Progressing  Flowsheets (Taken 8/8/2023 2155)  Absence of cardiac dysrhythmias or at
Problem: Safety - Medical Restraint  Goal: Remains free of injury from restraints (Restraint for Interference with Medical Device)  Description: INTERVENTIONS:  1. Determine that other, less restrictive measures have been tried or would not be effective before applying the restraint  2. Evaluate the patient's condition at the time of restraint application  3. Inform patient/family regarding the reason for restraint  4.  Q2H: Monitor safety, psychosocial status, comfort, nutrition and hydration  Outcome: Progressing  Flowsheets  Taken 8/7/2023 0800 by Eulalio Severance, RN  Remains free of injury from restraints (restraint for interference with medical device):   Determine that other, less restrictive measures have been tried or would not be effective before applying the restraint   Evaluate the patient's condition at the time of restraint application   Inform patient/family regarding the reason for restraint   Every 2 hours: Monitor safety, psychosocial status, comfort, nutrition and hydration  Problem: Pain  Goal: Verbalizes/displays adequate comfort level or baseline comfort level  Outcome: Progressing  Flowsheets (Taken 8/7/2023 0745)  Verbalizes/displays adequate comfort level or baseline comfort level:   Encourage patient to monitor pain and request assistance   Assess pain using appropriate pain scale   Administer analgesics based on type and severity of pain and evaluate response     Problem: Safety - Adult  Goal: Free from fall injury  Outcome: Progressing
Problem: Safety - Medical Restraint  Goal: Remains free of injury from restraints (Restraint for Interference with Medical Device)  Description: INTERVENTIONS:  1. Determine that other, less restrictive measures have been tried or would not be effective before applying the restraint  2. Evaluate the patient's condition at the time of restraint application  3. Inform patient/family regarding the reason for restraint  4. Q2H: Monitor safety, psychosocial status, comfort, nutrition and hydration  Outcome: Progressing     Problem: Discharge Planning  Goal: Discharge to home or other facility with appropriate resources  Outcome: Progressing  Flowsheets (Taken 8/8/2023 2155)  Discharge to home or other facility with appropriate resources: Identify barriers to discharge with patient and caregiver     Problem: Pain  Goal: Verbalizes/displays adequate comfort level or baseline comfort level  Outcome: Progressing     Problem: Safety - Adult  Goal: Free from fall injury  Outcome: Progressing     Problem: Cardiovascular - Adult  Goal: Maintains optimal cardiac output and hemodynamic stability  Outcome: Progressing  Flowsheets (Taken 8/8/2023 2155)  Maintains optimal cardiac output and hemodynamic stability: Monitor blood pressure and heart rate  Goal: Absence of cardiac dysrhythmias or at baseline  Outcome: Progressing  Flowsheets (Taken 8/8/2023 2155)  Absence of cardiac dysrhythmias or at baseline: Monitor cardiac rate and rhythm   Care plan reviewed with patient. Patient verbalize understanding of the plan of care and contribute to goal setting.
patient's condition at the time of restraint application   Every 2 hours: Monitor safety, psychosocial status, comfort, nutrition and hydration  Taken 8/7/2023 2000  Remains free of injury from restraints (restraint for interference with medical device):   Determine that other, less restrictive measures have been tried or would not be effective before applying the restraint   Evaluate the patient's condition at the time of restraint application   Every 2 hours: Monitor safety, psychosocial status, comfort, nutrition and hydration     Problem: Discharge Planning  Goal: Discharge to home or other facility with appropriate resources  Outcome: Progressing  Flowsheets (Taken 8/7/2023 2000)  Discharge to home or other facility with appropriate resources: Identify barriers to discharge with patient and caregiver     Problem: Pain  Goal: Verbalizes/displays adequate comfort level or baseline comfort level  Outcome: Progressing  Flowsheets  Taken 8/8/2023 0400  Verbalizes/displays adequate comfort level or baseline comfort level:   Encourage patient to monitor pain and request assistance   Assess pain using appropriate pain scale   Administer analgesics based on type and severity of pain and evaluate response  Taken 8/8/2023 0000  Verbalizes/displays adequate comfort level or baseline comfort level:   Encourage patient to monitor pain and request assistance   Assess pain using appropriate pain scale  Taken 8/7/2023 2000  Verbalizes/displays adequate comfort level or baseline comfort level:   Encourage patient to monitor pain and request assistance   Assess pain using appropriate pain scale     Problem: Safety - Adult  Goal: Free from fall injury  Outcome: Progressing
murmur/rubs/gallop  Abdomen: soft. Nontender. NBS  Extremities:  No clubbing, cyanosis, or edema x 4. Vasculature: capillary refill < 3 seconds. Palpable dorsalis pedis pulses. Skin:  warm and dry. Patient has multiple excoriations down both arms. Also has approximately 1 inch abrasion on back of neck at hairline. Some scar tissue which appears to be sequela of popping over his sternum. No sign of cutaneous infection anywhere. All wounds seem to be in the process of healing. Psych:  Alert and oriented x3. Affect appropriate  Lymph:  No supraclavicular adenopathy. Neurologic:  No focal deficit. No seizures. Data: (All radiographs, tracings, PFTs, and imaging are personally viewed and interpreted unless otherwise noted). Na 142 K 3.0 Cl 106 CO2 23 BUN 48 Creat 3.3 AG 13 Ca ion 1.11 eGFR 23 Mg 2.2 Glucose 8.6 Phos 3.5 Protein 6.6 Uric Acid 10.4 Procal 19.70  WBC 13.6 Hgb 10.7 Hct 80.7 MPV 9.9 Plt 300  Telemetry shows Sinus Tach        Seen with multidisciplinary ICU team Yes. Meets Continued ICU Level Care Criteria:    [x] Yes   [] No - Transfer Planned to listed location:  [] HOSPITALIST CONTACTED-      Case and plan discussed with Dr. Carol Mcclain.         Electronically signed by Edy Bryson DO  [address] [facility]

## 2023-08-10 DIAGNOSIS — F90.9 ATTENTION DEFICIT HYPERACTIVITY DISORDER (ADHD), UNSPECIFIED ADHD TYPE: ICD-10-CM

## 2023-08-10 DIAGNOSIS — I10 ESSENTIAL HYPERTENSION: ICD-10-CM

## 2023-08-10 DIAGNOSIS — F41.9 ANXIETY AND DEPRESSION: ICD-10-CM

## 2023-08-10 DIAGNOSIS — F32.A ANXIETY AND DEPRESSION: ICD-10-CM

## 2023-08-10 PROCEDURE — 93010 ELECTROCARDIOGRAM REPORT: CPT | Performed by: INTERNAL MEDICINE

## 2023-08-10 RX ORDER — PREGABALIN 200 MG/1
200 CAPSULE ORAL 3 TIMES DAILY
Qty: 90 CAPSULE | Refills: 0 | Status: SHIPPED | OUTPATIENT
Start: 2023-08-10 | End: 2023-09-09

## 2023-08-10 RX ORDER — ARIPIPRAZOLE 5 MG/1
5 TABLET ORAL DAILY
Qty: 30 TABLET | Refills: 0 | Status: SHIPPED | OUTPATIENT
Start: 2023-08-10

## 2023-08-11 LAB
EKG ATRIAL RATE: 104 BPM
EKG ATRIAL RATE: 113 BPM
EKG ATRIAL RATE: 73 BPM
EKG P AXIS: 72 DEGREES
EKG P AXIS: 72 DEGREES
EKG P AXIS: 74 DEGREES
EKG P-R INTERVAL: 156 MS
EKG P-R INTERVAL: 178 MS
EKG P-R INTERVAL: 194 MS
EKG Q-T INTERVAL: 342 MS
EKG Q-T INTERVAL: 368 MS
EKG Q-T INTERVAL: 402 MS
EKG QRS DURATION: 78 MS
EKG QRS DURATION: 92 MS
EKG QRS DURATION: 96 MS
EKG QTC CALCULATION (BAZETT): 442 MS
EKG QTC CALCULATION (BAZETT): 469 MS
EKG QTC CALCULATION (BAZETT): 483 MS
EKG R AXIS: 32 DEGREES
EKG R AXIS: 41 DEGREES
EKG R AXIS: 49 DEGREES
EKG T AXIS: 55 DEGREES
EKG T AXIS: 59 DEGREES
EKG T AXIS: 69 DEGREES
EKG VENTRICULAR RATE: 104 BPM
EKG VENTRICULAR RATE: 113 BPM
EKG VENTRICULAR RATE: 73 BPM

## 2023-08-12 LAB — BACTERIA BLD AEROBE CULT: NORMAL

## 2023-08-13 LAB — BACTERIA BLD AEROBE CULT: NORMAL

## 2023-08-15 ENCOUNTER — HOSPITAL ENCOUNTER (INPATIENT)
Age: 38
LOS: 2 days | Discharge: HOME | DRG: 201 | End: 2023-08-17
Attending: EMERGENCY MEDICINE | Admitting: INTERNAL MEDICINE
Payer: COMMERCIAL

## 2023-08-15 DIAGNOSIS — R41.82 ALTERED MENTAL STATUS, UNSPECIFIED ALTERED MENTAL STATUS TYPE: Primary | ICD-10-CM

## 2023-08-15 DIAGNOSIS — R45.1 AGITATION: ICD-10-CM

## 2023-08-15 DIAGNOSIS — F19.10 POLYSUBSTANCE ABUSE (HCC): ICD-10-CM

## 2023-08-15 PROBLEM — F19.20 POLYSUBSTANCE DEPENDENCE (HCC): Status: ACTIVE | Noted: 2023-08-15

## 2023-08-15 PROBLEM — I95.9 HYPOTENSION: Status: ACTIVE | Noted: 2023-08-15

## 2023-08-15 LAB
ALBUMIN SERPL BCG-MCNC: 3.2 G/DL (ref 3.5–5.1)
ALP SERPL-CCNC: 104 U/L (ref 38–126)
ALT SERPL W/O P-5'-P-CCNC: 55 U/L (ref 11–66)
ANION GAP SERPL CALC-SCNC: 10 MEQ/L (ref 8–16)
AST SERPL-CCNC: 40 U/L (ref 5–40)
BASOPHILS ABSOLUTE: 0 THOU/MM3 (ref 0–0.1)
BASOPHILS ABSOLUTE: 0.1 THOU/MM3 (ref 0–0.1)
BASOPHILS NFR BLD AUTO: 0.4 %
BASOPHILS NFR BLD AUTO: 0.5 %
BILIRUB SERPL-MCNC: 0.2 MG/DL (ref 0.3–1.2)
BUN SERPL-MCNC: 19 MG/DL (ref 7–22)
CALCIUM SERPL-MCNC: 8.7 MG/DL (ref 8.5–10.5)
CHLORIDE SERPL-SCNC: 110 MEQ/L (ref 98–111)
CHP ED QC CHECK: YES
CK SERPL-CCNC: 189 U/L (ref 55–170)
CO2 SERPL-SCNC: 22 MEQ/L (ref 23–33)
COMPREHENSIVE DRUG PROMPT: NORMAL
CREAT SERPL-MCNC: 1.3 MG/DL (ref 0.4–1.2)
DEPRECATED RDW RBC AUTO: 50.4 FL (ref 35–45)
DEPRECATED RDW RBC AUTO: 51.5 FL (ref 35–45)
EOSINOPHIL NFR BLD AUTO: 1.7 %
EOSINOPHIL NFR BLD AUTO: 1.9 %
EOSINOPHILS ABSOLUTE: 0.2 THOU/MM3 (ref 0–0.4)
EOSINOPHILS ABSOLUTE: 0.2 THOU/MM3 (ref 0–0.4)
ERYTHROCYTE [DISTWIDTH] IN BLOOD BY AUTOMATED COUNT: 16.5 % (ref 11.5–14.5)
ERYTHROCYTE [DISTWIDTH] IN BLOOD BY AUTOMATED COUNT: 16.9 % (ref 11.5–14.5)
ETHANOL SERPL-MCNC: < 0.01 %
GFR SERPL CREATININE-BSD FRML MDRD: > 60 ML/MIN/1.73M2
GLUCOSE BLD STRIP.AUTO-MCNC: 94 MG/DL (ref 70–108)
GLUCOSE BLD-MCNC: 94 MG/DL
GLUCOSE SERPL-MCNC: 111 MG/DL (ref 70–108)
HCT VFR BLD AUTO: 31.1 % (ref 42–52)
HCT VFR BLD AUTO: 38.9 % (ref 42–52)
HGB BLD-MCNC: 11.4 GM/DL (ref 14–18)
HGB BLD-MCNC: 9.5 GM/DL (ref 14–18)
IMM GRANULOCYTES # BLD AUTO: 0.03 THOU/MM3 (ref 0–0.07)
IMM GRANULOCYTES # BLD AUTO: 0.04 THOU/MM3 (ref 0–0.07)
IMM GRANULOCYTES NFR BLD AUTO: 0.3 %
IMM GRANULOCYTES NFR BLD AUTO: 0.4 %
LACTATE SERPL-SCNC: 1.3 MMOL/L (ref 0.5–2)
LACTIC ACID, SEPSIS: 2.3 MMOL/L (ref 0.5–1.9)
LIPASE SERPL-CCNC: 9.3 U/L (ref 5.6–51.3)
LYMPHOCYTES ABSOLUTE: 1.5 THOU/MM3 (ref 1–4.8)
LYMPHOCYTES ABSOLUTE: 2.8 THOU/MM3 (ref 1–4.8)
LYMPHOCYTES NFR BLD AUTO: 16.5 %
LYMPHOCYTES NFR BLD AUTO: 26 %
MAGNESIUM SERPL-MCNC: 2.2 MG/DL (ref 1.6–2.4)
MCH RBC QN AUTO: 24.9 PG (ref 26–33)
MCH RBC QN AUTO: 25.7 PG (ref 26–33)
MCHC RBC AUTO-ENTMCNC: 29.3 GM/DL (ref 32.2–35.5)
MCHC RBC AUTO-ENTMCNC: 30.5 GM/DL (ref 32.2–35.5)
MCV RBC AUTO: 84.3 FL (ref 80–94)
MCV RBC AUTO: 84.9 FL (ref 80–94)
MONOCYTES ABSOLUTE: 0.8 THOU/MM3 (ref 0.4–1.3)
MONOCYTES ABSOLUTE: 1.1 THOU/MM3 (ref 0.4–1.3)
MONOCYTES NFR BLD AUTO: 10.6 %
MONOCYTES NFR BLD AUTO: 8.9 %
NEUTROPHILS NFR BLD AUTO: 60.9 %
NEUTROPHILS NFR BLD AUTO: 71.9 %
NRBC BLD AUTO-RTO: 0 /100 WBC
NRBC BLD AUTO-RTO: 0 /100 WBC
OSMOLALITY SERPL CALC.SUM OF ELEC: 286.1 MOSMOL/KG (ref 275–300)
PLATELET # BLD AUTO: 351 THOU/MM3 (ref 130–400)
PLATELET # BLD AUTO: 415 THOU/MM3 (ref 130–400)
PMV BLD AUTO: 9.2 FL (ref 9.4–12.4)
PMV BLD AUTO: 9.2 FL (ref 9.4–12.4)
POTASSIUM SERPL-SCNC: 5.1 MEQ/L (ref 3.5–5.2)
PROT SERPL-MCNC: 6.6 G/DL (ref 6.1–8)
RBC # BLD AUTO: 3.69 MILL/MM3 (ref 4.7–6.1)
RBC # BLD AUTO: 4.58 MILL/MM3 (ref 4.7–6.1)
SEGMENTED NEUTROPHILS ABSOLUTE COUNT: 6.5 THOU/MM3 (ref 1.8–7.7)
SEGMENTED NEUTROPHILS ABSOLUTE COUNT: 6.6 THOU/MM3 (ref 1.8–7.7)
SODIUM SERPL-SCNC: 142 MEQ/L (ref 135–145)
TROPONIN, HIGH SENSITIVITY: 24 NG/L (ref 0–12)
VALPROATE SERPL-MCNC: < 2.8 UG/ML (ref 50–100)
WBC # BLD AUTO: 10.6 THOU/MM3 (ref 4.8–10.8)
WBC # BLD AUTO: 9.2 THOU/MM3 (ref 4.8–10.8)

## 2023-08-15 PROCEDURE — 2500000003 HC RX 250 WO HCPCS: Performed by: EMERGENCY MEDICINE

## 2023-08-15 PROCEDURE — 87040 BLOOD CULTURE FOR BACTERIA: CPT

## 2023-08-15 PROCEDURE — 83690 ASSAY OF LIPASE: CPT

## 2023-08-15 PROCEDURE — 81003 URINALYSIS AUTO W/O SCOPE: CPT

## 2023-08-15 PROCEDURE — 85025 COMPLETE CBC W/AUTO DIFF WBC: CPT

## 2023-08-15 PROCEDURE — 96375 TX/PRO/DX INJ NEW DRUG ADDON: CPT

## 2023-08-15 PROCEDURE — 80307 DRUG TEST PRSMV CHEM ANLYZR: CPT

## 2023-08-15 PROCEDURE — 80053 COMPREHEN METABOLIC PANEL: CPT

## 2023-08-15 PROCEDURE — 96374 THER/PROPH/DIAG INJ IV PUSH: CPT

## 2023-08-15 PROCEDURE — 96372 THER/PROPH/DIAG INJ SC/IM: CPT

## 2023-08-15 PROCEDURE — 83605 ASSAY OF LACTIC ACID: CPT

## 2023-08-15 PROCEDURE — 36415 COLL VENOUS BLD VENIPUNCTURE: CPT

## 2023-08-15 PROCEDURE — 93005 ELECTROCARDIOGRAM TRACING: CPT | Performed by: INTERNAL MEDICINE

## 2023-08-15 PROCEDURE — 84100 ASSAY OF PHOSPHORUS: CPT

## 2023-08-15 PROCEDURE — 84484 ASSAY OF TROPONIN QUANT: CPT

## 2023-08-15 PROCEDURE — 6360000002 HC RX W HCPCS

## 2023-08-15 PROCEDURE — 80164 ASSAY DIPROPYLACETIC ACD TOT: CPT

## 2023-08-15 PROCEDURE — 99285 EMERGENCY DEPT VISIT HI MDM: CPT

## 2023-08-15 PROCEDURE — 2100000000 HC CCU R&B

## 2023-08-15 PROCEDURE — 82077 ASSAY SPEC XCP UR&BREATH IA: CPT

## 2023-08-15 PROCEDURE — 93005 ELECTROCARDIOGRAM TRACING: CPT | Performed by: EMERGENCY MEDICINE

## 2023-08-15 PROCEDURE — 6360000002 HC RX W HCPCS: Performed by: EMERGENCY MEDICINE

## 2023-08-15 PROCEDURE — 2580000003 HC RX 258: Performed by: NURSE PRACTITIONER

## 2023-08-15 PROCEDURE — 82550 ASSAY OF CK (CPK): CPT

## 2023-08-15 PROCEDURE — 2580000003 HC RX 258: Performed by: EMERGENCY MEDICINE

## 2023-08-15 PROCEDURE — 82948 REAGENT STRIP/BLOOD GLUCOSE: CPT

## 2023-08-15 PROCEDURE — 83735 ASSAY OF MAGNESIUM: CPT

## 2023-08-15 RX ORDER — NOREPINEPHRINE BITARTRATE 0.06 MG/ML
1-100 INJECTION, SOLUTION INTRAVENOUS CONTINUOUS
Status: DISCONTINUED | OUTPATIENT
Start: 2023-08-15 | End: 2023-08-16

## 2023-08-15 RX ORDER — HALOPERIDOL 5 MG/ML
5 INJECTION INTRAMUSCULAR ONCE
Status: COMPLETED | OUTPATIENT
Start: 2023-08-15 | End: 2023-08-15

## 2023-08-15 RX ORDER — ACETAMINOPHEN 650 MG/1
650 SUPPOSITORY RECTAL EVERY 6 HOURS PRN
Status: DISCONTINUED | OUTPATIENT
Start: 2023-08-15 | End: 2023-08-17 | Stop reason: HOSPADM

## 2023-08-15 RX ORDER — LORAZEPAM 2 MG/ML
2 INJECTION INTRAMUSCULAR ONCE
Status: DISCONTINUED | OUTPATIENT
Start: 2023-08-15 | End: 2023-08-17

## 2023-08-15 RX ORDER — SODIUM CHLORIDE 0.9 % (FLUSH) 0.9 %
5-40 SYRINGE (ML) INJECTION EVERY 12 HOURS SCHEDULED
Status: DISCONTINUED | OUTPATIENT
Start: 2023-08-15 | End: 2023-08-17 | Stop reason: HOSPADM

## 2023-08-15 RX ORDER — NOREPINEPHRINE BITARTRATE 0.06 MG/ML
.01-3.3 INJECTION, SOLUTION INTRAVENOUS CONTINUOUS
Status: DISCONTINUED | OUTPATIENT
Start: 2023-08-15 | End: 2023-08-15

## 2023-08-15 RX ORDER — ONDANSETRON 4 MG/1
4 TABLET, ORALLY DISINTEGRATING ORAL EVERY 8 HOURS PRN
Status: DISCONTINUED | OUTPATIENT
Start: 2023-08-15 | End: 2023-08-17 | Stop reason: HOSPADM

## 2023-08-15 RX ORDER — ACETAMINOPHEN 325 MG/1
650 TABLET ORAL EVERY 6 HOURS PRN
Status: DISCONTINUED | OUTPATIENT
Start: 2023-08-15 | End: 2023-08-17 | Stop reason: HOSPADM

## 2023-08-15 RX ORDER — LORAZEPAM 2 MG/ML
2 INJECTION INTRAMUSCULAR EVERY 6 HOURS PRN
Status: DISCONTINUED | OUTPATIENT
Start: 2023-08-15 | End: 2023-08-16

## 2023-08-15 RX ORDER — SODIUM CHLORIDE 0.9 % (FLUSH) 0.9 %
5-40 SYRINGE (ML) INJECTION PRN
Status: DISCONTINUED | OUTPATIENT
Start: 2023-08-15 | End: 2023-08-17 | Stop reason: HOSPADM

## 2023-08-15 RX ORDER — LORAZEPAM 2 MG/ML
2 INJECTION INTRAMUSCULAR ONCE
Status: COMPLETED | OUTPATIENT
Start: 2023-08-15 | End: 2023-08-15

## 2023-08-15 RX ORDER — SODIUM CHLORIDE 9 MG/ML
INJECTION, SOLUTION INTRAVENOUS CONTINUOUS
Status: DISCONTINUED | OUTPATIENT
Start: 2023-08-15 | End: 2023-08-16

## 2023-08-15 RX ORDER — HALOPERIDOL 5 MG/ML
INJECTION INTRAMUSCULAR
Status: COMPLETED
Start: 2023-08-15 | End: 2023-08-15

## 2023-08-15 RX ORDER — POLYETHYLENE GLYCOL 3350 17 G/17G
17 POWDER, FOR SOLUTION ORAL DAILY PRN
Status: DISCONTINUED | OUTPATIENT
Start: 2023-08-15 | End: 2023-08-17 | Stop reason: HOSPADM

## 2023-08-15 RX ORDER — LORAZEPAM 2 MG/ML
INJECTION INTRAMUSCULAR
Status: COMPLETED
Start: 2023-08-15 | End: 2023-08-15

## 2023-08-15 RX ORDER — ENOXAPARIN SODIUM 100 MG/ML
40 INJECTION SUBCUTANEOUS DAILY
Status: DISCONTINUED | OUTPATIENT
Start: 2023-08-16 | End: 2023-08-17 | Stop reason: HOSPADM

## 2023-08-15 RX ORDER — SODIUM CHLORIDE 9 MG/ML
INJECTION, SOLUTION INTRAVENOUS PRN
Status: DISCONTINUED | OUTPATIENT
Start: 2023-08-15 | End: 2023-08-17 | Stop reason: HOSPADM

## 2023-08-15 RX ORDER — 0.9 % SODIUM CHLORIDE 0.9 %
2000 INTRAVENOUS SOLUTION INTRAVENOUS ONCE
Status: COMPLETED | OUTPATIENT
Start: 2023-08-15 | End: 2023-08-15

## 2023-08-15 RX ORDER — PHENOBARBITAL SODIUM 65 MG/ML
65 INJECTION INTRAMUSCULAR ONCE
Status: COMPLETED | OUTPATIENT
Start: 2023-08-15 | End: 2023-08-15

## 2023-08-15 RX ORDER — PHENOBARBITAL SODIUM 65 MG/ML
INJECTION INTRAMUSCULAR
Status: COMPLETED
Start: 2023-08-15 | End: 2023-08-15

## 2023-08-15 RX ORDER — ONDANSETRON 2 MG/ML
4 INJECTION INTRAMUSCULAR; INTRAVENOUS EVERY 6 HOURS PRN
Status: DISCONTINUED | OUTPATIENT
Start: 2023-08-15 | End: 2023-08-17 | Stop reason: HOSPADM

## 2023-08-15 RX ADMIN — LORAZEPAM 2 MG: 2 INJECTION INTRAMUSCULAR; INTRAVENOUS at 17:44

## 2023-08-15 RX ADMIN — PHENOBARBITAL SODIUM 65 MG: 65 INJECTION INTRAMUSCULAR at 17:54

## 2023-08-15 RX ADMIN — Medication 0.05 MCG/KG/MIN: at 18:43

## 2023-08-15 RX ADMIN — SODIUM CHLORIDE: 9 INJECTION, SOLUTION INTRAVENOUS at 23:04

## 2023-08-15 RX ADMIN — HALOPERIDOL LACTATE 5 MG: 5 INJECTION INTRAMUSCULAR at 17:45

## 2023-08-15 RX ADMIN — HALOPERIDOL 5 MG: 5 INJECTION INTRAMUSCULAR at 17:43

## 2023-08-15 RX ADMIN — LORAZEPAM 2 MG: 2 INJECTION INTRAMUSCULAR; INTRAVENOUS at 17:43

## 2023-08-15 RX ADMIN — LORAZEPAM 2 MG: 2 INJECTION INTRAMUSCULAR at 17:43

## 2023-08-15 RX ADMIN — SODIUM CHLORIDE, PRESERVATIVE FREE 10 ML: 5 INJECTION INTRAVENOUS at 23:04

## 2023-08-15 RX ADMIN — SODIUM CHLORIDE 2000 ML: 9 INJECTION, SOLUTION INTRAVENOUS at 17:45

## 2023-08-15 RX ADMIN — HALOPERIDOL LACTATE 5 MG: 5 INJECTION INTRAMUSCULAR at 17:43

## 2023-08-15 ASSESSMENT — PAIN - FUNCTIONAL ASSESSMENT: PAIN_FUNCTIONAL_ASSESSMENT: 0-10

## 2023-08-15 ASSESSMENT — PAIN DESCRIPTION - LOCATION: LOCATION: BACK;NECK

## 2023-08-15 ASSESSMENT — PAIN SCALES - GENERAL: PAINLEVEL_OUTOF10: 5

## 2023-08-15 NOTE — ED NOTES
Pt resting on the cot, calm at this time, oxygen re applied. Ekg completed.       Ozzy Bourgeois RN  08/15/23 2265

## 2023-08-15 NOTE — ED TRIAGE NOTES
Pt presents to the ed room with triage nurses. Nurses were called to waiting room, patient lowered himself to the floor and stated \" he was having a seizure and in kidney failure\". Pt admits to using meth this morning. C-collar placed on patient per precautions. Pt failing around on the cot. States he is having neck and back pain. Dr. Chiki Cat at bedside. Tele placed. Attempting ekg.

## 2023-08-15 NOTE — ED NOTES
Pt resting on the cot, snoring. Informed provider of bradycardia.       Brannon Guerrero RN  08/15/23 1913

## 2023-08-15 NOTE — ED NOTES
Patient removed IV fluids, attempting to jump out of bed.       Guy Morrison RN  08/15/23 900 [facility], RN  08/15/23 4133

## 2023-08-15 NOTE — ED NOTES
C-collar removed per Dr. Cassidy Contreras verbal order due to patient not able to comply     Jacque Villeda RN  08/15/23 9461

## 2023-08-16 ENCOUNTER — APPOINTMENT (OUTPATIENT)
Dept: CT IMAGING | Age: 38
DRG: 201 | End: 2023-08-16
Payer: COMMERCIAL

## 2023-08-16 ENCOUNTER — APPOINTMENT (OUTPATIENT)
Dept: GENERAL RADIOLOGY | Age: 38
DRG: 201 | End: 2023-08-16
Payer: COMMERCIAL

## 2023-08-16 LAB
ALBUMIN SERPL BCG-MCNC: 3.5 G/DL (ref 3.5–5.1)
ALP SERPL-CCNC: 120 U/L (ref 38–126)
ALT SERPL W/O P-5'-P-CCNC: 60 U/L (ref 11–66)
AMMONIA PLAS-MCNC: 45 UMOL/L (ref 11–60)
AMPHETAMINES UR QL SCN: POSITIVE
ANION GAP SERPL CALC-SCNC: 11 MEQ/L (ref 8–16)
ANION GAP SERPL CALC-SCNC: 14 MEQ/L (ref 8–16)
ANION GAP SERPL CALC-SCNC: 7 MEQ/L (ref 8–16)
AST SERPL-CCNC: 49 U/L (ref 5–40)
BARBITURATES UR QL SCN: POSITIVE
BASOPHILS ABSOLUTE: 0 THOU/MM3 (ref 0–0.1)
BASOPHILS NFR BLD AUTO: 0.6 %
BENZODIAZ UR QL SCN: NEGATIVE
BILIRUB SERPL-MCNC: 0.2 MG/DL (ref 0.3–1.2)
BILIRUB UR QL STRIP.AUTO: NEGATIVE
BUN SERPL-MCNC: 18 MG/DL (ref 7–22)
BUN SERPL-MCNC: 19 MG/DL (ref 7–22)
BUN SERPL-MCNC: 19 MG/DL (ref 7–22)
BZE UR QL SCN: POSITIVE
CALCIUM SERPL-MCNC: 8.8 MG/DL (ref 8.5–10.5)
CALCIUM SERPL-MCNC: 9 MG/DL (ref 8.5–10.5)
CALCIUM SERPL-MCNC: 9.1 MG/DL (ref 8.5–10.5)
CANNABINOIDS UR QL SCN: POSITIVE
CHARACTER UR: CLEAR
CHLORIDE SERPL-SCNC: 106 MEQ/L (ref 98–111)
CHLORIDE SERPL-SCNC: 108 MEQ/L (ref 98–111)
CHLORIDE SERPL-SCNC: 108 MEQ/L (ref 98–111)
CO2 SERPL-SCNC: 18 MEQ/L (ref 23–33)
CO2 SERPL-SCNC: 21 MEQ/L (ref 23–33)
CO2 SERPL-SCNC: 24 MEQ/L (ref 23–33)
COLOR: YELLOW
CORTIS SERPL-MCNC: 13.16 UG/DL
CORTIS SERPL-MCNC: 20.09 UG/DL
CORTIS SERPL-MCNC: 22.42 UG/DL
CORTIS SERPL-MCNC: 9.45 UG/DL
CORTISOL COLLECTION INFO: NORMAL
CREAT SERPL-MCNC: 0.8 MG/DL (ref 0.4–1.2)
CREAT SERPL-MCNC: 0.8 MG/DL (ref 0.4–1.2)
CREAT SERPL-MCNC: 1 MG/DL (ref 0.4–1.2)
DEPRECATED RDW RBC AUTO: 51.6 FL (ref 35–45)
EKG ATRIAL RATE: 73 BPM
EKG ATRIAL RATE: 92 BPM
EKG P AXIS: 53 DEGREES
EKG P AXIS: 60 DEGREES
EKG P-R INTERVAL: 170 MS
EKG P-R INTERVAL: 190 MS
EKG Q-T INTERVAL: 360 MS
EKG Q-T INTERVAL: 400 MS
EKG QRS DURATION: 86 MS
EKG QRS DURATION: 88 MS
EKG QTC CALCULATION (BAZETT): 440 MS
EKG QTC CALCULATION (BAZETT): 445 MS
EKG R AXIS: 28 DEGREES
EKG R AXIS: 42 DEGREES
EKG T AXIS: 38 DEGREES
EKG T AXIS: 56 DEGREES
EKG VENTRICULAR RATE: 73 BPM
EKG VENTRICULAR RATE: 92 BPM
EOSINOPHIL NFR BLD AUTO: 3.1 %
EOSINOPHILS ABSOLUTE: 0.2 THOU/MM3 (ref 0–0.4)
ERYTHROCYTE [DISTWIDTH] IN BLOOD BY AUTOMATED COUNT: 16.9 % (ref 11.5–14.5)
FENTANYL: POSITIVE
GFR SERPL CREATININE-BSD FRML MDRD: > 60 ML/MIN/1.73M2
GLUCOSE BLD STRIP.AUTO-MCNC: 113 MG/DL (ref 70–108)
GLUCOSE BLD STRIP.AUTO-MCNC: 180 MG/DL (ref 70–108)
GLUCOSE BLD STRIP.AUTO-MCNC: 81 MG/DL (ref 70–108)
GLUCOSE BLD STRIP.AUTO-MCNC: 91 MG/DL (ref 70–108)
GLUCOSE SERPL-MCNC: 81 MG/DL (ref 70–108)
GLUCOSE SERPL-MCNC: 82 MG/DL (ref 70–108)
GLUCOSE SERPL-MCNC: 88 MG/DL (ref 70–108)
GLUCOSE UR QL STRIP.AUTO: NEGATIVE MG/DL
HCT VFR BLD AUTO: 35.7 % (ref 42–52)
HGB BLD-MCNC: 10.8 GM/DL (ref 14–18)
HGB UR QL STRIP.AUTO: NEGATIVE
IMM GRANULOCYTES # BLD AUTO: 0.02 THOU/MM3 (ref 0–0.07)
IMM GRANULOCYTES NFR BLD AUTO: 0.3 %
KETONES UR QL STRIP.AUTO: NEGATIVE
LACTATE SERPL-SCNC: 0.7 MMOL/L (ref 0.5–2)
LYMPHOCYTES ABSOLUTE: 1.6 THOU/MM3 (ref 1–4.8)
LYMPHOCYTES NFR BLD AUTO: 22.3 %
MAGNESIUM SERPL-MCNC: 2.6 MG/DL (ref 1.6–2.4)
MCH RBC QN AUTO: 25.5 PG (ref 26–33)
MCHC RBC AUTO-ENTMCNC: 30.3 GM/DL (ref 32.2–35.5)
MCV RBC AUTO: 84.4 FL (ref 80–94)
MONOCYTES ABSOLUTE: 0.8 THOU/MM3 (ref 0.4–1.3)
MONOCYTES NFR BLD AUTO: 11.7 %
NEUTROPHILS NFR BLD AUTO: 62 %
NITRITE UR QL STRIP: NEGATIVE
NRBC BLD AUTO-RTO: 0 /100 WBC
OPIATES UR QL SCN: NEGATIVE
OXYCODONE: NEGATIVE
PCP UR QL SCN: NEGATIVE
PH UR STRIP.AUTO: 6 [PH] (ref 5–9)
PHOSPHATE SERPL-MCNC: 3.9 MG/DL (ref 2.4–4.7)
PHOSPHATE SERPL-MCNC: 5.1 MG/DL (ref 2.4–4.7)
PLATELET # BLD AUTO: 364 THOU/MM3 (ref 130–400)
PMV BLD AUTO: 9.2 FL (ref 9.4–12.4)
POTASSIUM SERPL-SCNC: 4.7 MEQ/L (ref 3.5–5.2)
POTASSIUM SERPL-SCNC: 4.7 MEQ/L (ref 3.5–5.2)
POTASSIUM SERPL-SCNC: 5.4 MEQ/L (ref 3.5–5.2)
PROT SERPL-MCNC: 7.1 G/DL (ref 6.1–8)
PROT UR STRIP.AUTO-MCNC: NEGATIVE MG/DL
RBC # BLD AUTO: 4.23 MILL/MM3 (ref 4.7–6.1)
SEGMENTED NEUTROPHILS ABSOLUTE COUNT: 4.4 THOU/MM3 (ref 1.8–7.7)
SODIUM SERPL-SCNC: 138 MEQ/L (ref 135–145)
SODIUM SERPL-SCNC: 139 MEQ/L (ref 135–145)
SODIUM SERPL-SCNC: 140 MEQ/L (ref 135–145)
SP GR UR REFRACT.AUTO: 1.02 (ref 1–1.03)
TROPONIN, HIGH SENSITIVITY: 20 NG/L (ref 0–12)
TSH SERPL DL<=0.005 MIU/L-ACNC: 0.74 UIU/ML (ref 0.4–4.2)
UROBILINOGEN, URINE: 0.2 EU/DL (ref 0–1)
WBC # BLD AUTO: 7.1 THOU/MM3 (ref 4.8–10.8)
WBC #/AREA URNS HPF: NEGATIVE /[HPF]

## 2023-08-16 PROCEDURE — 72125 CT NECK SPINE W/O DYE: CPT

## 2023-08-16 PROCEDURE — 6370000000 HC RX 637 (ALT 250 FOR IP): Performed by: NURSE PRACTITIONER

## 2023-08-16 PROCEDURE — 83605 ASSAY OF LACTIC ACID: CPT

## 2023-08-16 PROCEDURE — 70450 CT HEAD/BRAIN W/O DYE: CPT

## 2023-08-16 PROCEDURE — 93005 ELECTROCARDIOGRAM TRACING: CPT

## 2023-08-16 PROCEDURE — 82948 REAGENT STRIP/BLOOD GLUCOSE: CPT

## 2023-08-16 PROCEDURE — 80048 BASIC METABOLIC PNL TOTAL CA: CPT

## 2023-08-16 PROCEDURE — 2580000003 HC RX 258: Performed by: NURSE PRACTITIONER

## 2023-08-16 PROCEDURE — 2500000003 HC RX 250 WO HCPCS: Performed by: NURSE PRACTITIONER

## 2023-08-16 PROCEDURE — 6360000002 HC RX W HCPCS: Performed by: NURSE PRACTITIONER

## 2023-08-16 PROCEDURE — 99291 CRITICAL CARE FIRST HOUR: CPT | Performed by: NURSE PRACTITIONER

## 2023-08-16 PROCEDURE — 93010 ELECTROCARDIOGRAM REPORT: CPT | Performed by: INTERNAL MEDICINE

## 2023-08-16 PROCEDURE — 82140 ASSAY OF AMMONIA: CPT

## 2023-08-16 PROCEDURE — 71045 X-RAY EXAM CHEST 1 VIEW: CPT

## 2023-08-16 PROCEDURE — 2140000000 HC CCU INTERMEDIATE R&B

## 2023-08-16 PROCEDURE — 84443 ASSAY THYROID STIM HORMONE: CPT

## 2023-08-16 PROCEDURE — 84100 ASSAY OF PHOSPHORUS: CPT

## 2023-08-16 PROCEDURE — 82533 TOTAL CORTISOL: CPT

## 2023-08-16 PROCEDURE — 36415 COLL VENOUS BLD VENIPUNCTURE: CPT

## 2023-08-16 PROCEDURE — 85025 COMPLETE CBC W/AUTO DIFF WBC: CPT

## 2023-08-16 RX ORDER — MIDODRINE HYDROCHLORIDE 2.5 MG/1
2.5 TABLET ORAL
Status: DISCONTINUED | OUTPATIENT
Start: 2023-08-16 | End: 2023-08-17 | Stop reason: HOSPADM

## 2023-08-16 RX ORDER — DEXTROSE MONOHYDRATE 100 MG/ML
INJECTION, SOLUTION INTRAVENOUS CONTINUOUS PRN
Status: DISCONTINUED | OUTPATIENT
Start: 2023-08-16 | End: 2023-08-17 | Stop reason: HOSPADM

## 2023-08-16 RX ORDER — ATROPINE SULFATE 0.1 MG/ML
0.5 INJECTION INTRAVENOUS EVERY 5 MIN PRN
Status: DISCONTINUED | OUTPATIENT
Start: 2023-08-16 | End: 2023-08-17 | Stop reason: HOSPADM

## 2023-08-16 RX ORDER — SODIUM CHLORIDE 9 MG/ML
INJECTION, SOLUTION INTRAVENOUS CONTINUOUS
Status: DISCONTINUED | OUTPATIENT
Start: 2023-08-16 | End: 2023-08-17 | Stop reason: HOSPADM

## 2023-08-16 RX ORDER — LORAZEPAM 2 MG/ML
1 INJECTION INTRAMUSCULAR EVERY 6 HOURS PRN
Status: DISCONTINUED | OUTPATIENT
Start: 2023-08-16 | End: 2023-08-17 | Stop reason: HOSPADM

## 2023-08-16 RX ORDER — COSYNTROPIN 0.25 MG/ML
250 INJECTION, POWDER, FOR SOLUTION INTRAMUSCULAR; INTRAVENOUS ONCE
Status: COMPLETED | OUTPATIENT
Start: 2023-08-16 | End: 2023-08-16

## 2023-08-16 RX ORDER — ATROPINE SULFATE 0.1 MG/ML
1 INJECTION INTRAVENOUS ONCE
Status: COMPLETED | OUTPATIENT
Start: 2023-08-15 | End: 2023-08-16

## 2023-08-16 RX ADMIN — ATROPINE SULFATE INJECTION 0.5 MG: 0.1 INJECTION, SOLUTION INTRAVENOUS at 07:19

## 2023-08-16 RX ADMIN — DEXTROSE MONOHYDRATE 250 ML: 100 INJECTION, SOLUTION INTRAVENOUS at 03:34

## 2023-08-16 RX ADMIN — COSYNTROPIN 250 MCG: 0.25 INJECTION, POWDER, LYOPHILIZED, FOR SOLUTION INTRAMUSCULAR; INTRAVENOUS at 12:16

## 2023-08-16 RX ADMIN — SODIUM CHLORIDE: 9 INJECTION, SOLUTION INTRAVENOUS at 18:32

## 2023-08-16 RX ADMIN — ENOXAPARIN SODIUM 40 MG: 100 INJECTION SUBCUTANEOUS at 08:41

## 2023-08-16 RX ADMIN — ONDANSETRON 4 MG: 4 TABLET, ORALLY DISINTEGRATING ORAL at 12:21

## 2023-08-16 RX ADMIN — SODIUM CHLORIDE: 9 INJECTION, SOLUTION INTRAVENOUS at 10:25

## 2023-08-16 RX ADMIN — MIDODRINE HYDROCHLORIDE 2.5 MG: 2.5 TABLET ORAL at 12:17

## 2023-08-16 RX ADMIN — INSULIN HUMAN 10 UNITS: 100 INJECTION, SOLUTION PARENTERAL at 04:03

## 2023-08-16 RX ADMIN — Medication 1 MCG/MIN: at 08:18

## 2023-08-16 RX ADMIN — SODIUM CHLORIDE, PRESERVATIVE FREE 10 ML: 5 INJECTION INTRAVENOUS at 08:41

## 2023-08-16 ASSESSMENT — ENCOUNTER SYMPTOMS
BACK PAIN: 0
SHORTNESS OF BREATH: 0
ABDOMINAL DISTENTION: 0
ABDOMINAL PAIN: 0
COUGH: 0

## 2023-08-16 ASSESSMENT — PAIN SCALES - GENERAL: PAINLEVEL_OUTOF10: 0

## 2023-08-16 NOTE — PROGRESS NOTES
Patient:  Mamadou Lakhani    Unit/Bed:3A-09/009-A  MRN: [id number]   PCP: PAPA Felipe CNP  Date of Admission: 8/15/2023    Assessment and Plan(All pulmonary edema, renal failure, PE, and respiratory failure diagnoses are acute in nature unless otherwise specified):        Acute encephalopathy: secondary to the influence of illicit substances. UDS pending. ETOH negative on admission. Patient had apparently admitted to methamphetamine use when he reported to the ED. Positive history of abuse of amphetamines, benzodiazepines, cocaine, fentanyl, heroin, marijuana, oxycodone, suboxone. S/p multiple doses of haldol, ativan, and phenobarbital overnight. Patient now lethargic and sleeping. Will also exclude infection, although afebrile and WBC WNL. Blood cultures, UA/urine culture are pending. Slight AST elevation (49); will send ammonia level and repeat LFT in AM.   Polysubstance abuse: Addiction services to be consulted once more stable, appropriate. Hypotension: iatrogenic from multiple sedative medications overnight. On low-dose levophed. Titrate to MAP >65. Recent EF on echo was normal. Warm extremities. Undergoing IVF. Sinus bradycardia: mostly with HR 50's, brief but frequent drops in 40's. Lethargic, but otherwise asymptomatic. Electrolytes WNL this morning. Hyperkalemia resolved. Troponin insignificant and down-trended (24 --> 20). Recent TTE 8/8/23 with normal LV size and systolic function, EF 35-92%, with no significant valvular abnormalities. Hold home Lisdexamfetamine due to arrhythmia risk. Also received phenobarbital overnight, which can cause hypotension and bradycardia. Atropine as needed for sustained HR <40. Chronic normocytic anemia:  Hgb stable at baseline. Will monitor. HTN, history of: hold home lisinopril due to hypotension. Depression, history of: currently holding home mirtazapine, lyrica, effexor and abilify. SATYA, non-oliguric: resolved. Acute hyperkalemia: resolved. note made by me. CC time 35 minutes. Time was discontiguous. Time does not include procedure. Time does include my direct assessment of the patient and coordination of care. Time represents more than 50% of the time involved with patient care by the [address] [place] [address] team.  Electronically signed by Marilynn Grewal.  Krista Galvin MD. Imaging Studies

## 2023-08-16 NOTE — PLAN OF CARE
Problem: Discharge Planning  Goal: Discharge to home or other facility with appropriate resources  Outcome: Progressing  Flowsheets (Taken 8/16/2023 1350)  Discharge to home or other facility with appropriate resources:   Identify barriers to discharge with patient and caregiver   Arrange for needed discharge resources and transportation as appropriate   Identify discharge learning needs (meds, wound care, etc)     Problem: Neurosensory - Adult  Goal: Achieves stable or improved neurological status  Outcome: Progressing  Flowsheets (Taken 8/16/2023 1350)  Achieves stable or improved neurological status: Assess for and report changes in neurological status     Problem: Respiratory - Adult  Goal: Achieves optimal ventilation and oxygenation  Outcome: Progressing  Flowsheets (Taken 8/16/2023 1350)  Achieves optimal ventilation and oxygenation:   Assess for changes in respiratory status   Assess for changes in mentation and behavior     Problem: Cardiovascular - Adult  Goal: Maintains optimal cardiac output and hemodynamic stability  Outcome: Progressing  Flowsheets (Taken 8/16/2023 1350)  Maintains optimal cardiac output and hemodynamic stability:   Monitor blood pressure and heart rate   Assess for signs of decreased cardiac output     Problem: Cardiovascular - Adult  Goal: Absence of cardiac dysrhythmias or at baseline  Outcome: Progressing  Flowsheets (Taken 8/16/2023 1350)  Absence of cardiac dysrhythmias or at baseline:   Monitor cardiac rate and rhythm   Assess for signs of decreased cardiac output   Administer antiarrhythmia medication and electrolyte replacement as ordered     Problem: Skin/Tissue Integrity - Adult  Goal: Skin integrity remains intact  Outcome: Progressing  Flowsheets (Taken 8/16/2023 1350)  Skin Integrity Remains Intact: Monitor for areas of redness and/or skin breakdown     Problem: Metabolic/Fluid and Electrolytes - Adult  Goal: Electrolytes maintained within normal limits  Outcome:

## 2023-08-16 NOTE — SIGNIFICANT EVENT
Patient remains stable off of levophed support for multiple hours. Transfer order placed to step-down care. Handoff report given to Dr. Colton Potts of hospitalist group. Discussed with Dr. Greta Leos.     Electronically signed by PAPA Amos CNP on 8/16/2023 at 2:38 PM

## 2023-08-16 NOTE — H&P
-Martin catheter is not present. LYMPH:  -No palpable cervical lymphadenopathy and no hepatosplenomegaly. No petechiae or ecchymoses. MS:  -B/L extremities strong muscles strength. Full movements. No gross joint deformities. No swelling, intact sensation symmetrical.   SKIN:  -Normal coloration, warm, dry. No open wounds or ulcers. NEURO:  -CN 2-12 appear grossly intact, normal speech, no lateralizing weakness. No focal deficit. No seizures. PSYC:  -Asleep but easy to arouse alert, oriented. Agitation at times. Data: (All radiographs, tracings, PFTs, and imaging are personally viewed and interpreted unless otherwise noted). BMP (08/15/23):  Na 138, K 5.4, Cl 106, CO2 21, BUN 18, Crea 1.0, Gluc 82, A/G 11, Ca 9.0, Mg 2.6  Cardia-Lipid Profile Group: , troponin 24--> 20  CBC (08/15/23): WBC 10.6, Hgb 11.4, Plt 414  GI-Liver Profile (08/15/23): Albumin 3.5, AST 49  Other drugs: Valproic acid level: < 2.8  ECG (08/15/23): shows Normal sinus rhythm, rate 73. Normal ECG. When compared with ECG of 15-AUG-2023 18:13. No significant change was found. DIET: ADULT DIET; Regular   DVT prophylaxis: on Lovenox  GI prophylaxis: On diet eating  CODE STATUS: Full Code     Seen with multidisciplinary ICU team.  Meets Continued ICU Level Care Criteria:    [x] Yes   [] No - Transfer Planned to listed location:  [] HOSPITALIST CONTACTED-        Case and plan discussed with Dr. Feliz Lombard.     Electronically signed by PAPA Tanner - [facility]  CRITICAL CARE SPECIALIST

## 2023-08-16 NOTE — ED PROVIDER NOTES
[facility] [address]  [address] [address]  Phone: [phone]      CHIEF COMPLAINT       Chief Complaint   Patient presents with    Addiction Problem       Nurses Notes reviewed and I agree except as noted in the HPI. HISTORY OF PRESENT ILLNESS    Harley Hernández is a 43 y.o. male. We were called out to the lobby for this patient. He was dropped off by a friend. He was found to be rolling around on the floor, agitated with profuse diaphoresis. The friend reports that he has a history of polysubstance abuse including cocaine, methamphetamines and IV opiates. The patient seemed to think that he had received Narcan multiple times on this day. He said he had received possibly 3 times. He seems somewhat disoriented. He seems to be having involuntary muscle spasms in various parts of his body. Very poor historian. REVIEW OF SYSTEMS         Review of systems is not obtainable from this patient due to mental status        PAST MEDICAL HISTORY    has a past medical history of Anxiety, Depression, Kidney stone, Liver disease, and Opiate abuse, continuous ([address]). SURGICAL HISTORY      has no past surgical history on file. CURRENT MEDICATIONS       Current Discharge Medication List        CONTINUE these medications which have NOT CHANGED    Details   pregabalin (LYRICA) 200 MG capsule Take 1 capsule by mouth in the morning, at noon, and at bedtime for 30 days. Max Daily Amount: 600 mg  Qty: 90 capsule, Refills: 0    Associated Diagnoses: Anxiety and depression      lisdexamfetamine (VYVANSE) 70 MG capsule Take 1 capsule by mouth every morning for 30 days.  Max Daily Amount: 70 mg  Qty: 30 capsule, Refills: 0    Associated Diagnoses: Attention deficit hyperactivity disorder (ADHD), unspecified ADHD type      ARIPiprazole (ABILIFY) 5 MG tablet Take 1 tablet by mouth daily  Qty: 30 tablet, Refills: 0    Associated Diagnoses: Essential hypertension      venlafaxine (EFFEXOR) 100 MG tablet Take 1 tablet

## 2023-08-16 NOTE — PROGRESS NOTES
Patient andres to mid 20's, provider notified on unit. Patient drowsy, difficult to arouse. 1mg atropine given. Heart block seen on monitor, stat EKG ordered, provider notified and at bedside. Patient now sinus rhythm and easier to arouse at this time.  Patient admits to using both meth and fentanyl prior to being brought to the hospital.

## 2023-08-16 NOTE — CARE COORDINATION
08/16/23 1017   Readmission Assessment   Number of Days since last admission? 1-7 days   Previous Disposition Other (comment)  (home with SO)   Who is being Macario Easley   (sister jeremi)   What was the patient's/caregiver's perception as to why they think they needed to return back to the hospital? Other (Comment)  (pt unable to answer at this time. sleeping on medication, disoriented when awake. Interviewing sister Tyler via phone.)   Did you visit your Primary Care Physician after you left the hospital, before you returned this time? No   Why weren't you able to visit your PCP? Other (Comment)  (no encounters listed. pt unable to answer why at this time. sleeping on medication, disoriented when awake. Interviewing sister Tyler via phone)   Did you see a specialist, such as Cardiac, Pulmonary, Orthopedic Physician, etc. after you left the hospital? No   Who advised the patient to return to the hospital? Other (Comment)  (friend dropped off)   Does the patient report anything that got in the way of taking their medications?   (pt unable to answer)   In our efforts to provide the best possible care to you and others like you, can you think of anything that we could have done to help you after you left the hospital the first time, so that you might not have needed to return so soon? Other (Comment)  (pt unable to answer at this time. sleeping on medication, disoriented when awake.  Interviewing sister Tyler via phone.)

## 2023-08-16 NOTE — CARE COORDINATION
Case Management Assessment  Initial Evaluation    Date/Time of Evaluation: 8/16/2023 10:24 AM  Assessment Completed by: Angela Sheppard RN    If patient is discharged prior to next notation, then this note serves as note for discharge by case management. Patient Name: Mason Burgos                   YOB: 1985  Diagnosis: Agitation [R45.1]  Hypotension [I95.9]  Polysubstance abuse ([address]) [F19.10]  Altered mental status, unspecified altered mental status type [R41.82]  Polysubstance dependence ([address]) [F19.20]                   Date / Time: 8/15/2023  5:37 PM  Location: [address] [place] [address]     Patient Admission Status: Inpatient   Readmission Risk Low 0-14, Mod 15-19), High > 20: Readmission Risk Score: 13.5    Current PCP: PAPA Hobson CNP  PCP verified by CM? Yes    Chart Reviewed: Yes      History Provided by: Other (see comment) (sister Tyler via phone)  Patient Orientation: Other (see comment) (pt lethargic and sleeping due to medication)    Patient Cognition: Short Term Memory Deficit (intermittently confused)    Hospitalization in the last 30 days (Readmission):  Yes    If yes, Readmission Assessment in CM Navigator will be completed. Advance Directives:      Code Status: Full Code   Patient's Primary Decision Maker is: Legal Next of Kin (sister Tyler; number in chart)      Discharge Planning:    Patient lives with: Spouse/Significant Other ([address] [place] [address] Dr Thornton girlfriensteve) Type of Home: House  Primary Care Giver: Self  Patient Support Systems include: Family Members, Spouse/Significant Other (beau not currently a pt there)   Current Financial resources: Medicaid  Current community resources: None  Current services prior to admission: Other (Comment) (pt unable to answer at this time. sleeping on medication, disoriented when awake. Interviewing sister Tyler via phone)            Current DME:              Type of Home Care services:  None    ADLS  Prior functional level:  Independent in goals of Agitation [R45.1]  Hypotension [I95.9]  Polysubstance abuse (HCC) [F19.10]  Altered mental status, unspecified altered mental status type [R41.82]  Polysubstance dependence ([address]) [F19.20]    Patient Goals/Plan/Treatment Preferences: Patient experiencing mild confusion intermittently while awake. Currently sleeping due to medications. Spoke with sister Hosea Hair, who was very helpful. Pt from home with girlfriend Shi. Hx of [facility] services but not current there. Suboxone clinic, although Hosea Hair believes pt is current with heroin use. Addiction services consulted. Pt does have PTSD from hx of imprisonment, and mother using drugs as well. Would benefit from psychiatry outpatient. Do not anticipate discharge DME or HH. Will follow clinical course and coordinate with family and patient. Transportation/Food Security/Housekeeping Addressed: No issues identified.      Reuben Dubose RN  Case Management Department

## 2023-08-17 VITALS
SYSTOLIC BLOOD PRESSURE: 121 MMHG | BODY MASS INDEX: 29.7 KG/M2 | HEART RATE: 61 BPM | WEIGHT: 207.45 LBS | TEMPERATURE: 97.4 F | HEIGHT: 70 IN | RESPIRATION RATE: 16 BRPM | OXYGEN SATURATION: 96 % | DIASTOLIC BLOOD PRESSURE: 74 MMHG

## 2023-08-17 LAB
EKG ATRIAL RATE: 52 BPM
EKG P AXIS: 56 DEGREES
EKG P-R INTERVAL: 192 MS
EKG Q-T INTERVAL: 448 MS
EKG QRS DURATION: 86 MS
EKG QTC CALCULATION (BAZETT): 416 MS
EKG R AXIS: 27 DEGREES
EKG T AXIS: 35 DEGREES
EKG VENTRICULAR RATE: 52 BPM

## 2023-08-17 PROCEDURE — 93010 ELECTROCARDIOGRAM REPORT: CPT | Performed by: INTERNAL MEDICINE

## 2023-08-17 PROCEDURE — 99233 SBSQ HOSP IP/OBS HIGH 50: CPT | Performed by: FAMILY MEDICINE

## 2023-08-17 PROCEDURE — 2580000003 HC RX 258: Performed by: NURSE PRACTITIONER

## 2023-08-17 RX ORDER — LOPERAMIDE HYDROCHLORIDE 2 MG/1
2 CAPSULE ORAL 4 TIMES DAILY PRN
Status: DISCONTINUED | OUTPATIENT
Start: 2023-08-17 | End: 2023-08-17 | Stop reason: HOSPADM

## 2023-08-17 RX ADMIN — SODIUM CHLORIDE: 9 INJECTION, SOLUTION INTRAVENOUS at 02:39

## 2023-08-17 NOTE — PLAN OF CARE
Problem: Discharge Planning  Goal: Discharge to home or other facility with appropriate resources  8/17/2023 0301 by Yaima Scott RN  Outcome: Progressing  Flowsheets (Taken 8/17/2023 0301)  Discharge to home or other facility with appropriate resources:   Identify barriers to discharge with patient and caregiver   Identify discharge learning needs (meds, wound care, etc)     Problem: Neurosensory - Adult  Goal: Achieves stable or improved neurological status  8/17/2023 0301 by Yaima Scott RN  Outcome: Progressing  Flowsheets (Taken 8/17/2023 0301)  Achieves stable or improved neurological status:   Assess for and report changes in neurological status   Maintain blood pressure and fluid volume within ordered parameters to optimize cerebral perfusion and minimize risk of hemorrhage   Monitor temperature, glucose, and sodium.  Initiate appropriate interventions as ordered     Problem: Respiratory - Adult  Goal: Achieves optimal ventilation and oxygenation  8/17/2023 0301 by Yaima Scott RN  Outcome: Progressing  Flowsheets (Taken 8/17/2023 0301)  Achieves optimal ventilation and oxygenation:   Assess for changes in respiratory status   Assess for changes in mentation and behavior   Position to facilitate oxygenation and minimize respiratory effort   Oxygen supplementation based on oxygen saturation or arterial blood gases     Problem: Cardiovascular - Adult  Goal: Maintains optimal cardiac output and hemodynamic stability  8/17/2023 0301 by Yaima Scott RN  Outcome: Progressing  Flowsheets (Taken 8/17/2023 0301)  Maintains optimal cardiac output and hemodynamic stability:   Monitor blood pressure and heart rate   Monitor urine output and notify Licensed Independent Practitioner for values outside of normal range   Assess for signs of decreased cardiac output     Problem: Cardiovascular - Adult  Goal: Absence of cardiac dysrhythmias or at baseline  8/17/2023 0301 by Yaima Scott RN  Outcome: Progressing  Flowsheets (Taken 8/17/2023 0301)  Absence of cardiac dysrhythmias or at baseline:   Monitor cardiac rate and rhythm   Assess for signs of decreased cardiac output     Problem: Skin/Tissue Integrity - Adult  Goal: Skin integrity remains intact  8/17/2023 0301 by Karolyn Dominguez RN  Outcome: Progressing  Flowsheets (Taken 8/17/2023 0301)  Skin Integrity Remains Intact: Monitor for areas of redness and/or skin breakdown     Problem: Metabolic/Fluid and Electrolytes - Adult  Goal: Electrolytes maintained within normal limits  8/17/2023 0301 by Karolyn Dominguez RN  Outcome: Progressing  Flowsheets (Taken 8/17/2023 0301)  Electrolytes maintained within normal limits: Monitor labs and assess patient for signs and symptoms of electrolyte imbalances     Problem: Pain  Goal: Verbalizes/displays adequate comfort level or baseline comfort level  8/17/2023 0301 by Karolyn Dominguez RN  Outcome: Progressing  Flowsheets (Taken 8/17/2023 0301)  Verbalizes/displays adequate comfort level or baseline comfort level:   Encourage patient to monitor pain and request assistance   Assess pain using appropriate pain scale     Problem: Chronic Conditions and Co-morbidities  Goal: Patient's chronic conditions and co-morbidity symptoms are monitored and maintained or improved  8/17/2023 0301 by Karolyn Dominguez RN  Outcome: Progressing  Flowsheets (Taken 8/17/2023 0301)  Care Plan - Patient's Chronic Conditions and Co-Morbidity Symptoms are Monitored and Maintained or Improved:   Monitor and assess patient's chronic conditions and comorbid symptoms for stability, deterioration, or improvement   Collaborate with multidisciplinary team to address chronic and comorbid conditions and prevent exacerbation or deterioration   Update acute care plan with appropriate goals if chronic or comorbid symptoms are exacerbated and prevent overall improvement and discharge   Care plan reviewed with patient.   Patient verbalizes understanding of the care plan

## 2023-08-17 NOTE — PROGRESS NOTES
past 24 hours:  Bradycardia, sinus arrhythmia    DVT prophylaxis: [] Lovenox                                 [] SCDs                                 [] SQ Heparin                                 [x] Encourage ambulation           [] Already on Anticoagulation     Disposition:    [x] Home       [] TCU       [] Rehab       [] Psych       [] SNF       [] [address]       [] Other-    Code Status: Full Code    PT/OT Eval Status: Not consulted      An electronic signature was used to authenticate this note  - Camelia Alatorre DO PGY-2 on 8/17/2023 at 7:34 AM

## 2023-08-17 NOTE — CARE COORDINATION
Patient want to leave AMA, highlly encouraged to stay based on heart rate dropping, still wants to leave, MD made aware

## 2023-08-17 NOTE — PLAN OF CARE
Sign out received, Hospitalist will be primary. Discussed with patient regarding goals of care and abstinence from Drug abuse. Patient promised that he will be complaint. Bradycardia, if persistent overnight will need cardiology consult tomorrow, as I believe bradycardia is from drug abuse. But will send lyme and syphilis serologies, needs Holter at discharge. Avoid AV graham blocking agent, check cortisol.

## 2023-08-17 NOTE — FLOWSHEET NOTE
Patient refused his lab draws, vital signs and reassessments this morning. Explained to him the importance of these and he still refused. Informed VALERIE Haney regarding this.

## 2023-08-17 NOTE — FLOWSHEET NOTE
Patient refused all his blood works tonight as per phlebotomy. Informed patient about the importance of these blood works and he still refused. Tried to convince him if he wants them to be drawn in the morning with his daily labs and patient said he will think about it. Informed Hospitalist VALERIE Tipton.

## 2023-08-17 NOTE — CARE COORDINATION
8/17/23, 12:09 PM EDT    Patient goals/plan/ treatment preferences discussed by  and . Patient goals/plan/ treatment preferences reviewed with patient/ family. Patient/ family verbalize understanding of discharge plan and are in agreement with goal/plan/treatment preferences. Understanding was demonstrated using the teach back method. AVS provided by RN at time of discharge, which includes all necessary medical information pertaining to the patients current course of illness, treatment, post-discharge goals of care, and treatment preferences. Services At/After Discharge: None              Left AMA.      Electronically signed by Elisabeth Hollis RN on 8/17/2023 at 12:09 PM

## 2023-08-20 LAB — BACTERIA BLD AEROBE CULT: NORMAL

## 2023-08-20 NOTE — PROGRESS NOTES
Physician Progress Note      PATIENT:               Juan Jeronimo  CSN #:                  [id number]  :                       1985  ADMIT DATE:       8/15/2023 5:37 PM  [address] DATE:        2023 11:36 AM  RESPONDING  PROVIDER #:        Ryan Fontanez MD          QUERY TEXT:    Pt admitted with AMS. Pt noted to have polysubstance abuse. If possible,   please document in the progress notes and discharge summary if you are   evaluating and / or treating any of the following: The medical record reflects the following:  Risk Factors: Drug abuse  Clinical Indicators: documented in H & P-Altered mental status: secondary to   polysubstance abuse, unknown drugs. Treatment: IVF, lab monitoring, neuro checks    Thank You! Maria A Gusman RN, CRCR  RN Clinical Documentation Integrity  Options provided:  -- Metabolic encephalopathy  -- Toxic encephalopathy  -- Toxic metabolic encephalopathy  -- Other - I will add my own diagnosis  -- Disagree - Not applicable / Not valid  -- Disagree - Clinically unable to determine / Unknown  -- Refer to Clinical Documentation Reviewer    PROVIDER RESPONSE TEXT:    This patient has toxic encephalopathy. Query created by: Maria A Gusman on 2023 8:59 AM      Electronically signed by:   Ryan Fontanez MD 2023 9:34 AM

## 2023-08-22 ENCOUNTER — TELEPHONE (OUTPATIENT)
Dept: INTERNAL MEDICINE CLINIC | Age: 38
End: 2023-08-22

## 2023-08-22 LAB
6-MONOACETYLMORPHINE (6-MAM) [PRESENCE] IN SERUM OR PLASMA: NOT DETECTED NG/ML
7-AMINOCLONAZEPAM [PRESENCE] IN SERUM OR PLASMA: NOT DETECTED NG/ML
ALPHA HYDROXYALPRAZOLAM [PRESENCE] IN SERUM OR PLASMA: NOT DETECTED NG/ML
ALPRAZOLAM [PRESENCE] IN SERUM OR PLASMA: NOT DETECTED NG/ML
AMITRIPTYLINE [PRESENCE] IN SERUM OR PLASMA: NOT DETECTED NG/ML
ANTICOAGULANTS, SERUM/PLASMA: NOT DETECTED
ANTICONVULSANTS, SERUM/PLASMA: PRESENT
ANTIDEPRESSANTS, SERUM/PLASMA: PRESENT
ANTIDIABETICS, SERUM/PLASMA: NOT DETECTED
ANTIHISTAMINES, SERUM/PLASMA: NOT DETECTED
ANTIPSYCHOTICS, SERUM/PLASMA: PRESENT
APAP SERPL QL: PRESENT
APAP SERPL QL: PRESENT NG/ML
BENZODIAZEPINES, SERUM/PLASMA: PRESENT
BZE SERPL QL: PRESENT NG/ML
CARBAMAZEPINE [PRESENCE] IN SERUM OR PLASMA: NOT DETECTED NG/ML
CARDIAC, SERUM/PLASMA: NOT DETECTED
CHLORDIAZEPOXIDE [PRESENCE] IN SERUM OR PLASMA: NOT DETECTED NG/ML
CLOMIPRAMINE [PRESENCE] IN SERUM OR PLASMA: NOT DETECTED NG/ML
CLONAZEPAM [PRESENCE] IN SERUM OR PLASMA: NOT DETECTED NG/ML
CLOZAPINE [PRESENCE] IN SERUM OR PLASMA: NOT DETECTED NG/ML
COCAETHYLENE [PRESENCE] IN SERUM OR PLASMA: NOT DETECTED NG/ML
COCAINE SERPL QL: NOT DETECTED NG/ML
COMPREHENSIVE DRUG PROMPT: NORMAL
COTININE SERPL QL: PRESENT
COTININE SERPL QL: PRESENT NG/ML
D-METHORPHAN SERPL QL: NOT DETECTED
DESIPRAMINE [PRESENCE] IN SERUM OR PLASMA: NOT DETECTED NG/ML
DIAZEPAM [PRESENCE] IN SERUM OR PLASMA: NOT DETECTED NG/ML
EDDP SERPL QL: NOT DETECTED NG/ML
EPHEDRINE [PRESENCE] IN SERUM OR PLASMA: NOT DETECTED NG/ML
FENTANYL [PRESENCE] IN SERUM OR PLASMA: PRESENT NG/ML
HALOPERIDOL [PRESENCE] IN SERUM OR PLASMA: PRESENT NG/ML
HYDROCODONE [PRESENCE] IN SERUM OR PLASMA: NOT DETECTED NG/ML
HYDROMORPHONE [PRESENCE] IN SERUM OR PLASMA: NOT DETECTED NG/ML
LAMOTRIGINE [PRESENCE] IN SERUM OR PLASMA: NOT DETECTED NG/ML
LORAZEPAM [PRESENCE] IN SERUM OR PLASMA: PRESENT NG/ML
LOXAPINE [PRESENCE] IN SERUM OR PLASMA: NOT DETECTED NG/ML
Lab: NOT DETECTED NG/ML
Lab: PRESENT NG/ML
MEPERIDINE [PRESENCE] IN SERUM OR PLASMA: NOT DETECTED NG/ML
METHADONE SERPL QL: NOT DETECTED NG/ML
METHAMPHET SERPL QL: PRESENT NG/ML
METHYLENEDIOXYMETHAMPHETAMINE [PRESENCE] IN SERUM OR PLASMA: NOT DETECTED NG/ML
METHYLPHENIDATE [PRESENCE] IN SERUM OR PLASMA: NOT DETECTED NG/ML
MIRTAZAPINE [PRESENCE] IN SERUM OR PLASMA: PRESENT NG/ML
MORPHINE [PRESENCE] IN SERUM OR PLASMA: NOT DETECTED NG/ML
MUSCLE RELAXANTS, SERUM/PLASMA: NOT DETECTED
NORCLOZAPINE [PRESENCE] IN SERUM OR PLASMA: NOT DETECTED NG/ML
NORDIAZEPAM [PRESENCE] IN SERUM OR PLASMA: NOT DETECTED NG/ML
NORFENTANYL [PRESENCE] IN SERUM OR PLASMA: NOT DETECTED NG/ML
NORFLUOXETINE [PRESENCE] IN SERUM OR PLASMA: NOT DETECTED NG/ML
NORMEPERIDINE [PRESENCE] IN SERUM OR PLASMA: NOT DETECTED NG/ML
NORTRIMIPRAMINE [PRESENCE] IN SERUM OR PLASMA: NOT DETECTED NG/ML
NORTRIPTYLINE [PRESENCE] IN SERUM OR PLASMA: NOT DETECTED NG/ML
OPIOIDS, SERUM/PLASMA: PRESENT
OXAZEPAM [PRESENCE] IN SERUM OR PLASMA: NOT DETECTED NG/ML
OXYCODONE SERPL QL: NOT DETECTED NG/ML
OXYMORPHONE [PRESENCE] IN SERUM OR PLASMA: NOT DETECTED NG/ML
PAROXETINE [PRESENCE] IN SERUM OR PLASMA: NOT DETECTED NG/ML
PCP SERPL QL: NOT DETECTED NG/ML
PROTRIPTYLINE [PRESENCE] IN SERUM OR PLASMA: NOT DETECTED NG/ML
PSEUDOEPHEDRINE [PRESENCE] IN SERUM OR PLASMA: NOT DETECTED NG/ML
RISPERIDONE [PRESENCE] IN SERUM OR PLASMA: NOT DETECTED NG/ML
SEDATIVE-HYPNOTICS, SERUM/PLASMA: NOT DETECTED
SERTRALINE [PRESENCE] IN SERUM OR PLASMA: NOT DETECTED NG/ML
STIMULANTS, SERUM/PLASMA: PRESENT
TEMAZEPAM [PRESENCE] IN SERUM OR PLASMA: NOT DETECTED NG/ML
TRAZODONE [PRESENCE] IN SERUM OR PLASMA: NOT DETECTED NG/ML
TRIMIPRAMINE [PRESENCE] IN SERUM OR PLASMA: NOT DETECTED NG/ML
VENLAFAXINE [PRESENCE] IN SERUM OR PLASMA: PRESENT NG/ML

## 2023-08-22 NOTE — TELEPHONE ENCOUNTER
This Patient called and stated that him and Shi Zuluaga) have not been able to get their Vyvanse 70mg the pharmacy is out and not sure when the pharmacy will get their medications in. This RN called the pharmacy and they stated they have no clue when it will come in and this RN then called other pharmacies that said the same thing. This RN then called [facility] again and they said they have 40mg and 30mg if you would want to do that for them?

## 2023-08-23 ENCOUNTER — APPOINTMENT (OUTPATIENT)
Dept: GENERAL RADIOLOGY | Age: 38
DRG: 812 | End: 2023-08-23
Payer: COMMERCIAL

## 2023-08-23 ENCOUNTER — HOSPITAL ENCOUNTER (INPATIENT)
Age: 38
LOS: 1 days | Discharge: HOME | DRG: 812 | End: 2023-08-24
Attending: EMERGENCY MEDICINE | Admitting: INTERNAL MEDICINE
Payer: COMMERCIAL

## 2023-08-23 DIAGNOSIS — T50.904A DRUG OVERDOSE OF UNDETERMINED INTENT, INITIAL ENCOUNTER: ICD-10-CM

## 2023-08-23 DIAGNOSIS — R34 ANURIA: ICD-10-CM

## 2023-08-23 DIAGNOSIS — R50.9 HYPERTHERMIA: Primary | ICD-10-CM

## 2023-08-23 DIAGNOSIS — I95.9 HYPOTENSION, UNSPECIFIED HYPOTENSION TYPE: ICD-10-CM

## 2023-08-23 DIAGNOSIS — B18.2 CHRONIC HEPATITIS C WITHOUT HEPATIC COMA (HCC): ICD-10-CM

## 2023-08-23 DIAGNOSIS — E87.5 HYPERKALEMIA: ICD-10-CM

## 2023-08-23 DIAGNOSIS — N17.9 AKI (ACUTE KIDNEY INJURY) (HCC): ICD-10-CM

## 2023-08-23 LAB
ALBUMIN SERPL BCG-MCNC: 3.9 G/DL (ref 3.5–5.1)
ALBUMIN SERPL BCG-MCNC: 5.6 G/DL (ref 3.5–5.1)
ALP SERPL-CCNC: 120 U/L (ref 38–126)
ALP SERPL-CCNC: 183 U/L (ref 38–126)
ALT SERPL W/O P-5'-P-CCNC: 39 U/L (ref 11–66)
ALT SERPL W/O P-5'-P-CCNC: 55 U/L (ref 11–66)
AMPHETAMINES UR QL SCN: NORMAL
ANION GAP SERPL CALC-SCNC: 10 MEQ/L (ref 8–16)
ANION GAP SERPL CALC-SCNC: 20 MEQ/L (ref 8–16)
ANION GAP SERPL CALC-SCNC: 27 MEQ/L (ref 8–16)
AST SERPL-CCNC: 60 U/L (ref 5–40)
AST SERPL-CCNC: 62 U/L (ref 5–40)
BARBITURATES UR QL SCN: NORMAL
BASOPHILS ABSOLUTE: 0 THOU/MM3 (ref 0–0.1)
BASOPHILS ABSOLUTE: 0.1 THOU/MM3 (ref 0–0.1)
BASOPHILS NFR BLD AUTO: 0.2 %
BASOPHILS NFR BLD AUTO: 0.5 %
BENZODIAZ UR QL SCN: NORMAL
BILIRUB CONJ SERPL-MCNC: < 0.2 MG/DL (ref 0–0.3)
BILIRUB SERPL-MCNC: 0.5 MG/DL (ref 0.3–1.2)
BILIRUB SERPL-MCNC: 0.7 MG/DL (ref 0.3–1.2)
BUN SERPL-MCNC: 34 MG/DL (ref 7–22)
BUN SERPL-MCNC: 36 MG/DL (ref 7–22)
BUN SERPL-MCNC: 40 MG/DL (ref 7–22)
BZE UR QL SCN: NORMAL
CA-I BLD ISE-SCNC: 1.06 MMOL/L (ref 1.12–1.32)
CALCIUM SERPL-MCNC: 11.3 MG/DL (ref 8.5–10.5)
CALCIUM SERPL-MCNC: 8.9 MG/DL (ref 8.5–10.5)
CALCIUM SERPL-MCNC: 8.9 MG/DL (ref 8.5–10.5)
CANNABINOIDS UR QL SCN: NEGATIVE
CHLORIDE SERPL-SCNC: 105 MEQ/L (ref 98–111)
CHLORIDE SERPL-SCNC: 109 MEQ/L (ref 98–111)
CHLORIDE SERPL-SCNC: 91 MEQ/L (ref 98–111)
CK SERPL-CCNC: 529 U/L (ref 55–170)
CK SERPL-CCNC: 617 U/L (ref 55–170)
CO2 SERPL-SCNC: 15 MEQ/L (ref 23–33)
CO2 SERPL-SCNC: 18 MEQ/L (ref 23–33)
CO2 SERPL-SCNC: 19 MEQ/L (ref 23–33)
CREAT SERPL-MCNC: 1.5 MG/DL (ref 0.4–1.2)
CREAT SERPL-MCNC: 3.9 MG/DL (ref 0.4–1.2)
CREAT SERPL-MCNC: 4.4 MG/DL (ref 0.4–1.2)
DEPRECATED RDW RBC AUTO: 45.6 FL (ref 35–45)
DEPRECATED RDW RBC AUTO: 47.2 FL (ref 35–45)
EOSINOPHIL NFR BLD AUTO: 0.1 %
EOSINOPHIL NFR BLD AUTO: 0.8 %
EOSINOPHILS ABSOLUTE: 0 THOU/MM3 (ref 0–0.4)
EOSINOPHILS ABSOLUTE: 0.2 THOU/MM3 (ref 0–0.4)
ERYTHROCYTE [DISTWIDTH] IN BLOOD BY AUTOMATED COUNT: 16.6 % (ref 11.5–14.5)
ERYTHROCYTE [DISTWIDTH] IN BLOOD BY AUTOMATED COUNT: 17.2 % (ref 11.5–14.5)
ETHANOL SERPL-MCNC: < 0.01 %
FENTANYL: POSITIVE
GFR SERPL CREATININE-BSD FRML MDRD: 17 ML/MIN/1.73M2
GFR SERPL CREATININE-BSD FRML MDRD: 19 ML/MIN/1.73M2
GFR SERPL CREATININE-BSD FRML MDRD: 60 ML/MIN/1.73M2
GLUCOSE BLD STRIP.AUTO-MCNC: 111 MG/DL (ref 70–108)
GLUCOSE BLD STRIP.AUTO-MCNC: 113 MG/DL (ref 70–108)
GLUCOSE BLD STRIP.AUTO-MCNC: 75 MG/DL (ref 70–108)
GLUCOSE SERPL-MCNC: 105 MG/DL (ref 70–108)
GLUCOSE SERPL-MCNC: 107 MG/DL (ref 70–108)
GLUCOSE SERPL-MCNC: 93 MG/DL (ref 70–108)
HCT VFR BLD AUTO: 33.4 % (ref 42–52)
HCT VFR BLD AUTO: 47 % (ref 42–52)
HGB BLD-MCNC: 10.5 GM/DL (ref 14–18)
HGB BLD-MCNC: 15.1 GM/DL (ref 14–18)
IMM GRANULOCYTES # BLD AUTO: 0.11 THOU/MM3 (ref 0–0.07)
IMM GRANULOCYTES # BLD AUTO: 0.18 THOU/MM3 (ref 0–0.07)
IMM GRANULOCYTES NFR BLD AUTO: 0.6 %
IMM GRANULOCYTES NFR BLD AUTO: 0.9 %
LACTIC ACID, SEPSIS: 1 MMOL/L (ref 0.5–1.9)
LACTIC ACID, SEPSIS: 3.9 MMOL/L (ref 0.5–1.9)
LIPASE SERPL-CCNC: 17.6 U/L (ref 5.6–51.3)
LYMPHOCYTES ABSOLUTE: 1.1 THOU/MM3 (ref 1–4.8)
LYMPHOCYTES ABSOLUTE: 2.8 THOU/MM3 (ref 1–4.8)
LYMPHOCYTES NFR BLD AUTO: 14.2 %
LYMPHOCYTES NFR BLD AUTO: 5.9 %
MAGNESIUM SERPL-MCNC: 2.2 MG/DL (ref 1.6–2.4)
MAGNESIUM SERPL-MCNC: 2.5 MG/DL (ref 1.6–2.4)
MCH RBC QN AUTO: 25 PG (ref 26–33)
MCH RBC QN AUTO: 25.4 PG (ref 26–33)
MCHC RBC AUTO-ENTMCNC: 31.4 GM/DL (ref 32.2–35.5)
MCHC RBC AUTO-ENTMCNC: 32.1 GM/DL (ref 32.2–35.5)
MCV RBC AUTO: 79.1 FL (ref 80–94)
MCV RBC AUTO: 79.5 FL (ref 80–94)
MONOCYTES ABSOLUTE: 1.3 THOU/MM3 (ref 0.4–1.3)
MONOCYTES ABSOLUTE: 1.4 THOU/MM3 (ref 0.4–1.3)
MONOCYTES NFR BLD AUTO: 6.4 %
MONOCYTES NFR BLD AUTO: 7.2 %
MRSA DNA SPEC QL NAA+PROBE: POSITIVE
NEUTROPHILS NFR BLD AUTO: 77.5 %
NEUTROPHILS NFR BLD AUTO: 85.7 %
NRBC BLD AUTO-RTO: 0 /100 WBC
NRBC BLD AUTO-RTO: 0 /100 WBC
OPIATES UR QL SCN: NEGATIVE
OSMOLALITY SERPL CALC.SUM OF ELEC: 279.3 MOSMOL/KG (ref 275–300)
OXYCODONE: NEGATIVE
PHENCYCLIDINE QUANTITATIVE URINE: NEGATIVE
PHOSPHATE SERPL-MCNC: 6.1 MG/DL (ref 2.4–4.7)
PLATELET # BLD AUTO: 352 THOU/MM3 (ref 130–400)
PLATELET # BLD AUTO: 605 THOU/MM3 (ref 130–400)
PMV BLD AUTO: 9.2 FL (ref 9.4–12.4)
PMV BLD AUTO: 9.4 FL (ref 9.4–12.4)
POTASSIUM SERPL-SCNC: 4.4 MEQ/L (ref 3.5–5.2)
POTASSIUM SERPL-SCNC: 4.8 MEQ/L (ref 3.5–5.2)
POTASSIUM SERPL-SCNC: 7 MEQ/L (ref 3.5–5.2)
PROT SERPL-MCNC: 11.2 G/DL (ref 6.1–8)
PROT SERPL-MCNC: 7.7 G/DL (ref 6.1–8)
RBC # BLD AUTO: 4.2 MILL/MM3 (ref 4.7–6.1)
RBC # BLD AUTO: 5.94 MILL/MM3 (ref 4.7–6.1)
SEGMENTED NEUTROPHILS ABSOLUTE COUNT: 15.5 THOU/MM3 (ref 1.8–7.7)
SEGMENTED NEUTROPHILS ABSOLUTE COUNT: 16.5 THOU/MM3 (ref 1.8–7.7)
SODIUM SERPL-SCNC: 136 MEQ/L (ref 135–145)
SODIUM SERPL-SCNC: 138 MEQ/L (ref 135–145)
SODIUM SERPL-SCNC: 140 MEQ/L (ref 135–145)
TROPONIN, HIGH SENSITIVITY: 121 NG/L (ref 0–12)
TROPONIN, HIGH SENSITIVITY: 47 NG/L (ref 0–12)
TROPONIN, HIGH SENSITIVITY: 55 NG/L (ref 0–12)
TROPONIN, HIGH SENSITIVITY: 82 NG/L (ref 0–12)
WBC # BLD AUTO: 19.3 THOU/MM3 (ref 4.8–10.8)
WBC # BLD AUTO: 20 THOU/MM3 (ref 4.8–10.8)

## 2023-08-23 PROCEDURE — 93010 ELECTROCARDIOGRAM REPORT: CPT | Performed by: INTERNAL MEDICINE

## 2023-08-23 PROCEDURE — 2500000003 HC RX 250 WO HCPCS

## 2023-08-23 PROCEDURE — 2500000003 HC RX 250 WO HCPCS: Performed by: STUDENT IN AN ORGANIZED HEALTH CARE EDUCATION/TRAINING PROGRAM

## 2023-08-23 PROCEDURE — 6360000002 HC RX W HCPCS

## 2023-08-23 PROCEDURE — 82248 BILIRUBIN DIRECT: CPT

## 2023-08-23 PROCEDURE — 84100 ASSAY OF PHOSPHORUS: CPT

## 2023-08-23 PROCEDURE — 80053 COMPREHEN METABOLIC PANEL: CPT

## 2023-08-23 PROCEDURE — 31500 INSERT EMERGENCY AIRWAY: CPT

## 2023-08-23 PROCEDURE — 05HM33Z INSERTION OF INFUSION DEVICE INTO RIGHT INTERNAL JUGULAR VEIN, PERCUTANEOUS APPROACH: ICD-10-PCS | Performed by: EMERGENCY MEDICINE

## 2023-08-23 PROCEDURE — 83605 ASSAY OF LACTIC ACID: CPT

## 2023-08-23 PROCEDURE — 2580000003 HC RX 258: Performed by: EMERGENCY MEDICINE

## 2023-08-23 PROCEDURE — 51702 INSERT TEMP BLADDER CATH: CPT

## 2023-08-23 PROCEDURE — 2700000000 HC OXYGEN THERAPY PER DAY

## 2023-08-23 PROCEDURE — A4216 STERILE WATER/SALINE, 10 ML: HCPCS | Performed by: NURSE PRACTITIONER

## 2023-08-23 PROCEDURE — 6360000002 HC RX W HCPCS: Performed by: NURSE PRACTITIONER

## 2023-08-23 PROCEDURE — 94640 AIRWAY INHALATION TREATMENT: CPT

## 2023-08-23 PROCEDURE — 99285 EMERGENCY DEPT VISIT HI MDM: CPT

## 2023-08-23 PROCEDURE — 87641 MR-STAPH DNA AMP PROBE: CPT

## 2023-08-23 PROCEDURE — 80305 DRUG TEST PRSMV DIR OPT OBS: CPT

## 2023-08-23 PROCEDURE — 82330 ASSAY OF CALCIUM: CPT

## 2023-08-23 PROCEDURE — 2500000003 HC RX 250 WO HCPCS: Performed by: NURSE PRACTITIONER

## 2023-08-23 PROCEDURE — 2580000003 HC RX 258: Performed by: STUDENT IN AN ORGANIZED HEALTH CARE EDUCATION/TRAINING PROGRAM

## 2023-08-23 PROCEDURE — 85025 COMPLETE CBC W/AUTO DIFF WBC: CPT

## 2023-08-23 PROCEDURE — 36556 INSERT NON-TUNNEL CV CATH: CPT

## 2023-08-23 PROCEDURE — 96375 TX/PRO/DX INJ NEW DRUG ADDON: CPT

## 2023-08-23 PROCEDURE — 87086 URINE CULTURE/COLONY COUNT: CPT

## 2023-08-23 PROCEDURE — 6360000002 HC RX W HCPCS: Performed by: STUDENT IN AN ORGANIZED HEALTH CARE EDUCATION/TRAINING PROGRAM

## 2023-08-23 PROCEDURE — 96374 THER/PROPH/DIAG INJ IV PUSH: CPT

## 2023-08-23 PROCEDURE — G0480 DRUG TEST DEF 1-7 CLASSES: HCPCS

## 2023-08-23 PROCEDURE — 82550 ASSAY OF CK (CPK): CPT

## 2023-08-23 PROCEDURE — 99291 CRITICAL CARE FIRST HOUR: CPT | Performed by: INTERNAL MEDICINE

## 2023-08-23 PROCEDURE — 0BH17EZ INSERTION OF ENDOTRACHEAL AIRWAY INTO TRACHEA, VIA NATURAL OR ARTIFICIAL OPENING: ICD-10-PCS | Performed by: EMERGENCY MEDICINE

## 2023-08-23 PROCEDURE — 2580000003 HC RX 258: Performed by: NURSE PRACTITIONER

## 2023-08-23 PROCEDURE — 2500000003 HC RX 250 WO HCPCS: Performed by: EMERGENCY MEDICINE

## 2023-08-23 PROCEDURE — 5A1945Z RESPIRATORY VENTILATION, 24-96 CONSECUTIVE HOURS: ICD-10-PCS | Performed by: EMERGENCY MEDICINE

## 2023-08-23 PROCEDURE — 82948 REAGENT STRIP/BLOOD GLUCOSE: CPT

## 2023-08-23 PROCEDURE — 2000000000 HC ICU R&B

## 2023-08-23 PROCEDURE — 87040 BLOOD CULTURE FOR BACTERIA: CPT

## 2023-08-23 PROCEDURE — 94002 VENT MGMT INPAT INIT DAY: CPT

## 2023-08-23 PROCEDURE — 84484 ASSAY OF TROPONIN QUANT: CPT

## 2023-08-23 PROCEDURE — 87205 SMEAR GRAM STAIN: CPT

## 2023-08-23 PROCEDURE — 96361 HYDRATE IV INFUSION ADD-ON: CPT

## 2023-08-23 PROCEDURE — 82077 ASSAY SPEC XCP UR&BREATH IA: CPT

## 2023-08-23 PROCEDURE — 71045 X-RAY EXAM CHEST 1 VIEW: CPT

## 2023-08-23 PROCEDURE — 96365 THER/PROPH/DIAG IV INF INIT: CPT

## 2023-08-23 PROCEDURE — 93005 ELECTROCARDIOGRAM TRACING: CPT | Performed by: INTERNAL MEDICINE

## 2023-08-23 PROCEDURE — 36415 COLL VENOUS BLD VENIPUNCTURE: CPT

## 2023-08-23 PROCEDURE — 93005 ELECTROCARDIOGRAM TRACING: CPT | Performed by: STUDENT IN AN ORGANIZED HEALTH CARE EDUCATION/TRAINING PROGRAM

## 2023-08-23 PROCEDURE — 6370000000 HC RX 637 (ALT 250 FOR IP): Performed by: STUDENT IN AN ORGANIZED HEALTH CARE EDUCATION/TRAINING PROGRAM

## 2023-08-23 PROCEDURE — 83735 ASSAY OF MAGNESIUM: CPT

## 2023-08-23 PROCEDURE — 94761 N-INVAS EAR/PLS OXIMETRY MLT: CPT

## 2023-08-23 PROCEDURE — 83690 ASSAY OF LIPASE: CPT

## 2023-08-23 PROCEDURE — 87070 CULTURE OTHR SPECIMN AEROBIC: CPT

## 2023-08-23 PROCEDURE — 6360000002 HC RX W HCPCS: Performed by: EMERGENCY MEDICINE

## 2023-08-23 RX ORDER — POLYETHYLENE GLYCOL 3350 17 G/17G
17 POWDER, FOR SOLUTION ORAL DAILY PRN
Status: DISCONTINUED | OUTPATIENT
Start: 2023-08-23 | End: 2023-08-24 | Stop reason: HOSPADM

## 2023-08-23 RX ORDER — HEPARIN SODIUM 5000 [USP'U]/ML
5000 INJECTION, SOLUTION INTRAVENOUS; SUBCUTANEOUS EVERY 8 HOURS SCHEDULED
Status: DISCONTINUED | OUTPATIENT
Start: 2023-08-23 | End: 2023-08-24 | Stop reason: HOSPADM

## 2023-08-23 RX ORDER — MIDAZOLAM HYDROCHLORIDE 1 MG/ML
2 INJECTION INTRAMUSCULAR; INTRAVENOUS
Status: DISCONTINUED | OUTPATIENT
Start: 2023-08-23 | End: 2023-08-24 | Stop reason: HOSPADM

## 2023-08-23 RX ORDER — FENTANYL CITRATE 50 UG/ML
50 INJECTION, SOLUTION INTRAMUSCULAR; INTRAVENOUS ONCE
Status: COMPLETED | OUTPATIENT
Start: 2023-08-23 | End: 2023-08-23

## 2023-08-23 RX ORDER — ETOMIDATE 2 MG/ML
INJECTION INTRAVENOUS DAILY PRN
Status: COMPLETED | OUTPATIENT
Start: 2023-08-23 | End: 2023-08-23

## 2023-08-23 RX ORDER — MIDAZOLAM HYDROCHLORIDE 1 MG/ML
1-10 INJECTION, SOLUTION INTRAVENOUS CONTINUOUS
Status: DISCONTINUED | OUTPATIENT
Start: 2023-08-23 | End: 2023-08-24 | Stop reason: HOSPADM

## 2023-08-23 RX ORDER — FENTANYL CITRATE 50 UG/ML
INJECTION, SOLUTION INTRAMUSCULAR; INTRAVENOUS
Status: COMPLETED
Start: 2023-08-23 | End: 2023-08-23

## 2023-08-23 RX ORDER — SODIUM CHLORIDE 0.9 % (FLUSH) 0.9 %
5-40 SYRINGE (ML) INJECTION EVERY 12 HOURS SCHEDULED
Status: DISCONTINUED | OUTPATIENT
Start: 2023-08-23 | End: 2023-08-24 | Stop reason: HOSPADM

## 2023-08-23 RX ORDER — 0.9 % SODIUM CHLORIDE 0.9 %
1000 INTRAVENOUS SOLUTION INTRAVENOUS ONCE
Status: COMPLETED | OUTPATIENT
Start: 2023-08-23 | End: 2023-08-23

## 2023-08-23 RX ORDER — ACETAMINOPHEN 325 MG/1
650 TABLET ORAL EVERY 6 HOURS PRN
Status: DISCONTINUED | OUTPATIENT
Start: 2023-08-23 | End: 2023-08-24 | Stop reason: HOSPADM

## 2023-08-23 RX ORDER — NOREPINEPHRINE BITARTRATE 0.06 MG/ML
1-100 INJECTION, SOLUTION INTRAVENOUS CONTINUOUS
Status: DISCONTINUED | OUTPATIENT
Start: 2023-08-23 | End: 2023-08-24 | Stop reason: HOSPADM

## 2023-08-23 RX ORDER — SODIUM CHLORIDE 9 MG/ML
INJECTION, SOLUTION INTRAVENOUS PRN
Status: DISCONTINUED | OUTPATIENT
Start: 2023-08-23 | End: 2023-08-24 | Stop reason: HOSPADM

## 2023-08-23 RX ORDER — ROCURONIUM BROMIDE 10 MG/ML
INJECTION, SOLUTION INTRAVENOUS DAILY PRN
Status: COMPLETED | OUTPATIENT
Start: 2023-08-23 | End: 2023-08-23

## 2023-08-23 RX ORDER — ALBUTEROL SULFATE 2.5 MG/3ML
5 SOLUTION RESPIRATORY (INHALATION) ONCE
Status: COMPLETED | OUTPATIENT
Start: 2023-08-23 | End: 2023-08-23

## 2023-08-23 RX ORDER — MIDAZOLAM HYDROCHLORIDE 1 MG/ML
2 INJECTION INTRAMUSCULAR; INTRAVENOUS
Status: DISPENSED | OUTPATIENT
Start: 2023-08-23 | End: 2023-08-24

## 2023-08-23 RX ORDER — VECURONIUM BROMIDE 1 MG/ML
10 INJECTION, POWDER, LYOPHILIZED, FOR SOLUTION INTRAVENOUS ONCE
Status: COMPLETED | OUTPATIENT
Start: 2023-08-23 | End: 2023-08-23

## 2023-08-23 RX ORDER — DEXMEDETOMIDINE HYDROCHLORIDE 4 UG/ML
.1-1.5 INJECTION, SOLUTION INTRAVENOUS CONTINUOUS
Status: DISCONTINUED | OUTPATIENT
Start: 2023-08-23 | End: 2023-08-24 | Stop reason: HOSPADM

## 2023-08-23 RX ORDER — ONDANSETRON 4 MG/1
4 TABLET, ORALLY DISINTEGRATING ORAL EVERY 8 HOURS PRN
Status: DISCONTINUED | OUTPATIENT
Start: 2023-08-23 | End: 2023-08-24 | Stop reason: HOSPADM

## 2023-08-23 RX ORDER — SODIUM CHLORIDE 0.9 % (FLUSH) 0.9 %
5-40 SYRINGE (ML) INJECTION PRN
Status: DISCONTINUED | OUTPATIENT
Start: 2023-08-23 | End: 2023-08-24 | Stop reason: HOSPADM

## 2023-08-23 RX ORDER — SODIUM CHLORIDE 9 MG/ML
INJECTION, SOLUTION INTRAVENOUS CONTINUOUS
Status: DISCONTINUED | OUTPATIENT
Start: 2023-08-23 | End: 2023-08-24 | Stop reason: HOSPADM

## 2023-08-23 RX ORDER — FENTANYL CITRATE 50 UG/ML
100 INJECTION, SOLUTION INTRAMUSCULAR; INTRAVENOUS ONCE
Status: COMPLETED | OUTPATIENT
Start: 2023-08-23 | End: 2023-08-23

## 2023-08-23 RX ORDER — CALCIUM GLUCONATE 10 MG/ML
1000 INJECTION, SOLUTION INTRAVENOUS ONCE
Status: COMPLETED | OUTPATIENT
Start: 2023-08-23 | End: 2023-08-23

## 2023-08-23 RX ORDER — NOREPINEPHRINE BITARTRATE 0.06 MG/ML
INJECTION, SOLUTION INTRAVENOUS
Status: COMPLETED
Start: 2023-08-23 | End: 2023-08-23

## 2023-08-23 RX ORDER — ONDANSETRON 2 MG/ML
4 INJECTION INTRAMUSCULAR; INTRAVENOUS EVERY 6 HOURS PRN
Status: DISCONTINUED | OUTPATIENT
Start: 2023-08-23 | End: 2023-08-24 | Stop reason: HOSPADM

## 2023-08-23 RX ORDER — ACETAMINOPHEN 650 MG/1
650 SUPPOSITORY RECTAL EVERY 6 HOURS PRN
Status: DISCONTINUED | OUTPATIENT
Start: 2023-08-23 | End: 2023-08-24 | Stop reason: HOSPADM

## 2023-08-23 RX ORDER — DEXTROSE MONOHYDRATE 100 MG/ML
INJECTION, SOLUTION INTRAVENOUS CONTINUOUS PRN
Status: DISCONTINUED | OUTPATIENT
Start: 2023-08-23 | End: 2023-08-24 | Stop reason: HOSPADM

## 2023-08-23 RX ADMIN — Medication 2 MG/HR: at 03:12

## 2023-08-23 RX ADMIN — MIDAZOLAM 2 MG: 1 INJECTION INTRAMUSCULAR; INTRAVENOUS at 23:09

## 2023-08-23 RX ADMIN — FAMOTIDINE 20 MG: 10 INJECTION INTRAVENOUS at 08:27

## 2023-08-23 RX ADMIN — SODIUM CHLORIDE, PRESERVATIVE FREE 10 ML: 5 INJECTION INTRAVENOUS at 08:28

## 2023-08-23 RX ADMIN — Medication 1.4 MCG/KG/HR: at 19:15

## 2023-08-23 RX ADMIN — SODIUM CHLORIDE 1000 ML: 9 INJECTION, SOLUTION INTRAVENOUS at 03:23

## 2023-08-23 RX ADMIN — Medication 1 MCG/KG/HR: at 11:35

## 2023-08-23 RX ADMIN — FENTANYL CITRATE 50 MCG: 50 INJECTION, SOLUTION INTRAMUSCULAR; INTRAVENOUS at 03:18

## 2023-08-23 RX ADMIN — ROCURONIUM BROMIDE 70 MG: 10 INJECTION INTRAVENOUS at 02:23

## 2023-08-23 RX ADMIN — SODIUM CHLORIDE 1000 ML: 9 INJECTION, SOLUTION INTRAVENOUS at 01:10

## 2023-08-23 RX ADMIN — SODIUM CHLORIDE: 9 INJECTION, SOLUTION INTRAVENOUS at 05:45

## 2023-08-23 RX ADMIN — Medication 1.2 MCG/KG/HR: at 15:30

## 2023-08-23 RX ADMIN — DEXTROSE MONOHYDRATE 470.5 ML: 100 INJECTION, SOLUTION INTRAVENOUS at 02:09

## 2023-08-23 RX ADMIN — HEPARIN SODIUM 5000 UNITS: 5000 INJECTION INTRAVENOUS; SUBCUTANEOUS at 13:43

## 2023-08-23 RX ADMIN — HEPARIN SODIUM 5000 UNITS: 5000 INJECTION INTRAVENOUS; SUBCUTANEOUS at 22:03

## 2023-08-23 RX ADMIN — FENTANYL CITRATE 100 MCG: 50 INJECTION, SOLUTION INTRAMUSCULAR; INTRAVENOUS at 03:57

## 2023-08-23 RX ADMIN — Medication 5 MCG/MIN: at 02:52

## 2023-08-23 RX ADMIN — VECURONIUM BROMIDE 10 MG: 10 INJECTION, POWDER, LYOPHILIZED, FOR SOLUTION INTRAVENOUS at 03:55

## 2023-08-23 RX ADMIN — Medication 10 UNITS: at 02:08

## 2023-08-23 RX ADMIN — HEPARIN SODIUM 5000 UNITS: 5000 INJECTION INTRAVENOUS; SUBCUTANEOUS at 05:46

## 2023-08-23 RX ADMIN — Medication 0.2 MCG/KG/HR: at 05:02

## 2023-08-23 RX ADMIN — Medication 1.4 MCG/KG/HR: at 22:53

## 2023-08-23 RX ADMIN — ALBUTEROL SULFATE 5 MG: 2.5 SOLUTION RESPIRATORY (INHALATION) at 02:37

## 2023-08-23 RX ADMIN — SODIUM CHLORIDE: 9 INJECTION, SOLUTION INTRAVENOUS at 13:43

## 2023-08-23 RX ADMIN — SODIUM CHLORIDE, PRESERVATIVE FREE 10 ML: 5 INJECTION INTRAVENOUS at 21:51

## 2023-08-23 RX ADMIN — CALCIUM GLUCONATE 1000 MG: 10 INJECTION, SOLUTION INTRAVENOUS at 02:33

## 2023-08-23 RX ADMIN — ETOMIDATE 20 MG: 2 INJECTION INTRAVENOUS at 02:20

## 2023-08-23 RX ADMIN — NOREPINEPHRINE BITARTRATE 5 MCG/MIN: 0.06 INJECTION, SOLUTION INTRAVENOUS at 02:52

## 2023-08-23 RX ADMIN — SODIUM CHLORIDE 1000 ML: 9 INJECTION, SOLUTION INTRAVENOUS at 03:22

## 2023-08-23 RX ADMIN — SODIUM CHLORIDE: 9 INJECTION, SOLUTION INTRAVENOUS at 21:50

## 2023-08-23 ASSESSMENT — PAIN SCALES - GENERAL
PAINLEVEL_OUTOF10: 0
PAINLEVEL_OUTOF10: 0

## 2023-08-23 ASSESSMENT — PULMONARY FUNCTION TESTS
PIF_VALUE: 12
PIF_VALUE: 15
PIF_VALUE: 12
PIF_VALUE: 14
PIF_VALUE: 15
PIF_VALUE: 14

## 2023-08-23 ASSESSMENT — PAIN - FUNCTIONAL ASSESSMENT: PAIN_FUNCTIONAL_ASSESSMENT: NONE - DENIES PAIN

## 2023-08-23 NOTE — ED PROVIDER NOTES
[facility] ENCOUNTER          Pt Name: Mason Burgos  MRN: [id number]  [address] [facility] 1985  Date of evaluation: 8/23/2023  Emergency Physician: Florrie Canavan, MD    CHIEF COMPLAINT       Chief Complaint   Patient presents with    Drug Overdose     History obtained from the patient. HISTORY OF PRESENT ILLNESS    HPI  Mason Burgos is a 43 y.o. male with past medical history of fibromyalgia, MDD, polysubstance abuse, methamphetamine abuse, intentional drug overdose, chronic hepatitis C who presents to the emergency department for evaluation of agitation. Patient brought in by EMS who noted that the patient was agitated in his room and flailing about. He was quite agitated and has a known extensive methamphetamine abuse history. He is not answering questions or obeying commands. Due to agitation, EMS gave 10 mg of Versed IM. They do not note any empty medication bottles or paraphernalia in the home but did note that it was significantly disheveled. The patient has no other acute complaints at this time. REVIEW OF SYSTEMS   Review of Systems   Unable to perform ROS: Mental status change     See HPI. 12 point ROS performed, pertinent positives listed above otherwise negative  PAST MEDICAL AND SURGICAL HISTORY     Past Medical History:   Diagnosis Date    Anxiety     Depression     Kidney stone     Liver disease     Hep C    Opiate abuse, continuous ([address])      History reviewed. No pertinent surgical history.       MEDICATIONS     Current Facility-Administered Medications:     midazolam (VERSED) injection 2 mg, 2 mg, IntraVENous, Once PRN, Florrie Canavan, MD    calcium gluconate 1000 mg in sodium chloride 100 mL, 1,000 mg, IntraVENous, Once, Florrie Canavan, MD, Last Rate: 100 mL/hr at 08/23/23 0233, 1,000 mg at 08/23/23 0233    midazolam (VERSED) 100mg/100mL in NS infusion, 1-10 mg/hr, IntraVENous, Continuous, Read Kappa, DO, Last mobility: normal      Pharmacologic strategy: RSI      Induction agents:  Etomidate    Paralytics:  Rocuronium  Procedure details:     Preoxygenation:  Bag valve mask    CPR in progress: no      Intubation method:  Oral    Intubation technique: direct      Laryngoscope blade: Mac 4    Bougie used: no      Grade view: IV      Tube size (mm):  8.0    Tube type:  Cuffed    Number of attempts:  1    Tube visualized through cords: yes    Placement assessment:     ETT at teeth/gumline (cm):  23    Tube secured with:  ETT jacobson    Breath sounds:  Equal    Placement verification: chest rise, colorimetric ETCO2, CXR verification and direct visualization      CXR findings:  Appropriate position  Post-procedure details:     Procedure completion:  Tolerated  Central Line    Date/Time: 8/23/2023 3:19 AM  Performed by: Ramiro Burns MD  Authorized by: Denys Nguyen DO     Consent:     Consent obtained:  Emergent situation  Universal protocol:     Patient identity confirmed:  Arm band  Pre-procedure details:     Indication(s): central venous access      Hand hygiene: Hand hygiene performed prior to insertion      Sterile barrier technique:  All elements of maximal sterile technique followed      Skin preparation:  Chlorhexidine    Skin preparation agent: Skin preparation agent completely dried prior to procedure    Sedation:     Sedation type:  Deep  Anesthesia:     Anesthesia method:  None  Procedure details:     Location:  R internal jugular    Patient position:  Supine    Procedural supplies:  Triple lumen    Catheter size:  7 Fr    Landmarks identified: yes      Ultrasound guidance: yes      Ultrasound guidance timing: prior to insertion and real time      Sterile ultrasound techniques: Sterile gel and sterile probe covers were used      Number of attempts:  1    Successful placement: yes    Post-procedure details:     Post-procedure:  Dressing applied and line sutured    Assessment:  Blood return through all ports,

## 2023-08-23 NOTE — H&P
CRITICAL CARE H&P NOTE      Patient:  Nadja Albert    Unit/Bed:4D-08/008-A  YOB: 1985  MRN: [id number]   PCP: PAPA Hilton - CNP  Date of Admission: 8/23/2023  Chief Complaint:- Drug use / AAgitated    Assessment and Plan:      Polysubstance use disorder: Patient found thrashing in bed. Apparently was uncooperative and agitated with EMS. Was given Versed in route. Drug screen positive for methamphetamine, barbiturates, benzodiazepines, and cocaine.    -Consider using Zyprexa for additional agitation.  -Substance abuse referral s/p extubation  SATYA: creatinine 3.9 on admit. Baseline approximately 1.0. Likely 2/2 to Meth usage and hypoperfusion.  -Strict I/O  -Daily weights  -Avoid nephrotoxic meds  Leukocytosis: WBC 19.3. Likely reactive 2/2 methamphetamine use. No obvious signs of infection at this time, except for multiple open wounds on patient's skin and scabs. -Trend CBC daily  -Monitor for signs of infection  HAGMA: Lactate 1.0, BUN not elevated. Unclear etiology. Likely related to starvation ketosis/EtOH. Possibly something in his drug cocktail. Pt is intubated and sedated. Unable to obtain good history. Will repeat BMP. If still present will order further lab work. Depression with anxiety: We will likely continue Abilify 5, Remeron 45, Effexor 398 tid   Metabolic acidosis in setting of SATYA 2/2 Rhabdo-resolved: serum bicarb 15 on arrival  Elevated CK: 2/2 methamphetamine use . Patient received 4 L NS in ED. Elevated troponin: 2/2 demand ischemia 2/2 methamphetamine use. No concerning signs on EKG  Hyperkalemia-resolved: Likely 2/2 acidotic state. 7.0 on admit. Last 4.8      INITIAL H AND P AND ICU COURSE:  Patient is a 59-year-old male well-known to this hospital, with PMHx of meth use, opiate abuse, liver disease-chronic hep C, depression and anxiety who presented to [facility] ED on 8/23/13 via EMS for evaluation of agitation.  Per EMS patient was found flailing in

## 2023-08-23 NOTE — CARE COORDINATION
08/23/23 1427   Readmission Assessment   Number of Days since last admission? 8-30 days   Previous Disposition Other (comment)  (Home with S.O.)   Who is being Interviewed Caregiver  (Info from chart; pt on vent, no family present)   What was the patient's/caregiver's perception as to why they think they needed to return back to the hospital? [address] discharge on prior admission   Did you visit your Primary Care Physician after you left the hospital, before you returned this time? No   Why weren't you able to visit your PCP? Did not have an appointment   Did you see a specialist, such as Cardiac, Pulmonary, Orthopedic Physician, etc. after you left the hospital? No   Who advised the patient to return to the hospital? Other (Comment)  (S.O.)   Does the patient report anything that got in the way of taking their medications? No   In our efforts to provide the best possible care to you and others like you, can you think of anything that we could have done to help you after you left the hospital the first time, so that you might not have needed to return so soon?  Other (Comment)  (Left AMA)

## 2023-08-23 NOTE — ED TRIAGE NOTES
Pt to the Ed via [employer] for possible medication reaction. Pt was found \"flailing\" in his room, covered in feces when EMS arrivaed. States the pt was able to answer questions but was not obeying commands. Pt was given 10mg IM versed. EMS states the pt has calmed down significantly. Pt has a extensive meth/ substance abuse history. Upon arrival the pt is spontaneously alert. Pt is not answering questions or obeying commands. EKG completed.

## 2023-08-23 NOTE — PLAN OF CARE
Problem: Respiratory - Adult  Goal: Achieves optimal ventilation and oxygenation  8/23/2023 1735 by Charles Flores RCP  Flowsheets (Taken 8/23/2023 1600 by Aleshia Vizcaino RN)  Achieves optimal ventilation and oxygenation:   Assess for changes in respiratory status   Assess for changes in mentation and behavior   Position to facilitate oxygenation and minimize respiratory effort   Oxygen supplementation based on oxygen saturation or arterial blood gases   Assess the need for suctioning and aspirate as needed   Respiratory therapy support as indicated  8/23/2023 1329 by Charles Flores RCP  Flowsheets (Taken 8/23/2023 1329)  Achieves optimal ventilation and oxygenation:   Assess for changes in respiratory status   Position to facilitate oxygenation and minimize respiratory effort   Assess for changes in mentation and behavior   Assess the need for suctioning and aspirate as needed  8/23/2023 4859 by Charles Flores RCP  Flowsheets (Taken 8/23/2023 5863)  Achieves optimal ventilation and oxygenation:   Assess for changes in respiratory status   Position to facilitate oxygenation and minimize respiratory effort   Assess and instruct to report shortness of breath or any respiratory difficulty

## 2023-08-23 NOTE — PLAN OF CARE
Problem: Discharge Planning  Goal: Discharge to home or other facility with appropriate resources  Outcome: Progressing     Problem: Pain  Goal: Verbalizes/displays adequate comfort level or baseline comfort level  Outcome: Progressing     Problem: Skin/Tissue Integrity  Goal: Absence of new skin breakdown  Description: 1. Monitor for areas of redness and/or skin breakdown  2. Assess vascular access sites hourly  3. Every 4-6 hours minimum:  Change oxygen saturation probe site  4. Every 4-6 hours:  If on nasal continuous positive airway pressure, respiratory therapy assess nares and determine need for appliance change or resting period. Outcome: Progressing     Problem: Safety - Adult  Goal: Free from fall injury  Outcome: Progressing     Care plan reviewed with patient. Patient unable to verbalize understanding of the plan of care and contribute to goal setting.

## 2023-08-23 NOTE — FLOWSHEET NOTE
Patient arrived to unit from ER via stretcher. Patient transferred to ICU bed and placed on continuous ICU bedside monitor. Patient admitted for Hyperkalemia [E87.5]  Hyperthermia [R50.9]  Anuria [R34]  SATYA (acute kidney injury) ([address]) [N17.9]  Hypotension, unspecified hypotension type [I95.9]  Drug overdose of undetermined intent, initial encounter [T50.904A]. Vitals obtained. See flowsheets. Patient's IV access includes R IJ CVC., L forearm 22g, R AC 18g. Current infusions and rates of infusion include Levo @ 7mcg, versed @ 3mg/hr, NS @ 999ml/hr. Assessment completed by [facility]. Two nurse skin assessment completed by [facility] and Sarah Anderson RN. See flowsheets for assessment details. Policies and procedures of ICU unable to be explained to patient at this time. Family member(s)/representative(s) present at time of admission include none. Patient rights explained to family member(s)/representatives and patient, as able. Patient/patient's family member(s)/representative(s) N/A to have physician notified of their admission. All questions posed by patient's family member(s)/representative(s) and patient answered at this time.

## 2023-08-23 NOTE — PLAN OF CARE
Problem: Safety - Medical Restraint  Goal: Remains free of injury from restraints (Restraint for Interference with Medical Device)  Description: INTERVENTIONS:  1. Determine that other, less restrictive measures have been tried or would not be effective before applying the restraint  2. Evaluate the patient's condition at the time of restraint application  3. Inform patient/family regarding the reason for restraint  4. Q2H: Monitor safety, psychosocial status, comfort, nutrition and hydration  Outcome: Progressing     Care plan reviewed with patient. Patient unable to verbalize understanding of the plan of care and contribute to goal setting.

## 2023-08-23 NOTE — PROGRESS NOTES
08/23/23 0815   Encounter Summary   Encounter Overview/Reason  Spiritual/Emotional Needs   Service Provided For: Patient   Referral/Consult From: Rounding   Support System Significant other;Family members   Last Encounter  08/23/23   Complexity of Encounter Low   Begin Time 0810   End Time  0815   Total Time Calculated 5 min   Spiritual/Emotional needs   Type Spiritual Support   Assessment/Intervention/Outcome   Assessment Unable to assess   Intervention Prayer (assurance of)/[place];Sustaining Presence/Ministry of presence     In my encounter with the 43  yr old patient, I attempted to see the patient in ICU, but the patient was unresponsive at this time. No family was present in the room. I offered a prayer at the pt's side. A  will attempt to see the patient at a later time as a follow up. The pt was admitted due to drug overdose.

## 2023-08-23 NOTE — PLAN OF CARE
Problem: Respiratory - Adult  Goal: Achieves optimal ventilation and oxygenation  8/23/2023 1329 by Edison Paget, RCP  Flowsheets (Taken 8/23/2023 1329)  Achieves optimal ventilation and oxygenation:   Assess for changes in respiratory status   Position to facilitate oxygenation and minimize respiratory effort   Assess for changes in mentation and behavior   Assess the need for suctioning and aspirate as needed

## 2023-08-23 NOTE — PLAN OF CARE
Problem: Respiratory - Adult  Goal: Achieves optimal ventilation and oxygenation  Flowsheets (Taken 8/23/2023 0927)  Achieves optimal ventilation and oxygenation:   Assess for changes in respiratory status   Position to facilitate oxygenation and minimize respiratory effort   Assess and instruct to report shortness of breath or any respiratory difficulty

## 2023-08-23 NOTE — PLAN OF CARE
Problem: Discharge Planning  Goal: Discharge to home or other facility with appropriate resources  8/23/2023 0915 by Derick Marin  Outcome: Progressing     Problem: Pain  Goal: Verbalizes/displays adequate comfort level or baseline comfort level  8/23/2023 0915 by Derick Marin  Outcome: Progressing     Problem: Skin/Tissue Integrity  Goal: Absence of new skin breakdown  Description: 1. Monitor for areas of redness and/or skin breakdown  2. Assess vascular access sites hourly  3. Every 4-6 hours minimum:  Change oxygen saturation probe site  4. Every 4-6 hours:  If on nasal continuous positive airway pressure, respiratory therapy assess nares and determine need for appliance change or resting period. 8/23/2023 0915 by Derick Marin  Outcome: Progressing     Problem: Safety - Adult  Goal: Free from fall injury  8/23/2023 0915 by Derick Marin  Outcome: Progressing     Problem: Risk for Elopement  Goal: Patient will not exit the unit/facility without proper excort  Outcome: Progressing  Flowsheets (Taken 8/23/2023 0600)  Nursing Interventions for Elopement Risk:   Collaborate with treatment team for drug withdrawal symptoms treatment   Collaborate with treatment team for nicotine replacement   Communicate/escalate to charge nurse the risk of elopement   Communicate to physician the risk for elopement     Problem: ABCDS Injury Assessment  Goal: Absence of physical injury  Outcome: Progressing    Care plan reviewed with patient. Patient unable to verbalize understanding of the plan of care and contribute to goal setting.

## 2023-08-23 NOTE — PROGRESS NOTES
Comprehensive Nutrition Assessment    Type and Reason for Visit:  Initial, Consult (TF ordering and management)    Nutrition Recommendations/Plan:   If u/a to extubate - recommend initiate TF - Nepro at 10 ml/hr. Consider increase by 10 ml every 6 hours, as tolerated, to goal rate of 45 ml/hr. Additional free water flush per provider. Malnutrition Assessment:  Malnutrition Status:  Insufficient data (08/23/23 1058)    Context:  Acute Illness     Findings of the 6 clinical characteristics of malnutrition:  Energy Intake:  Unable to assess  Weight Loss:  Unable to assess     Body Fat Loss:  No significant body fat loss (appears well nourished)     Muscle Mass Loss:  No significant muscle mass loss    Fluid Accumulation:  Unable to assess     Strength:  Not Performed    Nutrition Assessment:     Pt. nutritionally compromised AEB NPO status d/t intubation (8/23). At risk for further nutrition compromise r/t admit s/p drug overdose of undermined intent, hyperkalemia and underlying medical condition (hx depression, hepatitis C, polysubstance abuse). Nutrition Related Findings:      Wound Type: None     Pt. Report/Treatments/Miscellaneous: pt. Seen - remains on vent; +OGT; per RN plan to extubate soon; suspected meth overdose, rhabdomyolosis, SATYA  GI Status: BM today; belly soft per RN  Pertinent Labs: 8/23: Potassium 4.8, BUN 40, Cr 3.9, Phosphorus 6.1  Pertinent Meds: Levophed, Versed, Glycolax, Zofran      Current Nutrition Intake & Therapies:          Diet NPO    Anthropometric Measures:  Height: 5' 10\" (177.8 cm)  Ideal Body Weight (IBW): 166 lbs (75 kg)    Admission Body Weight: 190 lb 0.6 oz (86.2 kg) (8/23 no edema)  Current Body Weight: 190 lb 6 oz (86.4 kg) (8/23 no edema),    Current BMI (kg/m2): 27.3  Usual Body Weight:  (per EMR: 7/15/21: 193# 10 oz, 6/28/22: 185# 3 oz, 8/16/23: 207# 7 oz)                       BMI Categories: Overweight (BMI 25.0-29. 9)    Estimated Daily Nutrient

## 2023-08-23 NOTE — SEDATION DOCUMENTATION
Pt uncooperative at this time. Medication infusions not able to run. Preparing to intubate per Dr. Jodie Anderson and Dr. Sultana Lieu. RT at bedside.

## 2023-08-23 NOTE — CARE COORDINATION
Case Management Assessment  Initial Evaluation    Date/Time of Evaluation: 8/23/2023 2:50 PM  Assessment Completed by: Jadyn Smith RN    If patient is discharged prior to next notation, then this note serves as note for discharge by case management. Patient Name: Anupam Noriega                   YOB: 1985  Diagnosis: Hyperkalemia [E87.5]  Hyperthermia [R50.9]  Anuria [R34]  SATYA (acute kidney injury) ([address]) [N17.9]  Hypotension, unspecified hypotension type [I95.9]  Drug overdose of undetermined intent, initial encounter Gurwinder Ohara                   Date / Time: 8/23/2023 12:35 AM  Location: [facility]08/008     Patient Admission Status: Inpatient   Readmission Risk Low 0-14, Mod 15-19), High > 20: Readmission Risk Score: 20.7    Current PCP: PAPA Avalos CNP  PCP verified by CM? Yes    Chart Reviewed: Yes      History Provided by: Child/Family Dread Luke)  Patient Orientation: Sedated (on vent)    Patient Cognition: Other (see comment) (Sedated on vent)    Hospitalization in the last 30 days (Readmission):  Yes    If yes, Readmission Assessment in CM Navigator will be completed. Advance Directives:      Code Status: Full Code   Patient's Primary Decision Maker is: Legal Next of Kin      Discharge Planning:    Patient lives with: Spouse/Significant Other Type of Home: House  Primary Care Giver: Self  Patient Support Systems include: Family Members, Spouse/Significant Other   Current Financial resources: Medicaid  Current community resources: Other (Comment) (Suboxone clinic)  Current services prior to admission: None            Current DME:              Type of Home Care services:  None    ADLS  Prior functional level: Independent in ADLs/IADLs  Current functional level: Other (see comment) (GEORGE; sedated on vent)    Family can provide assistance at DC:  Yes  Would you like Case Management to discuss the discharge plan with any other family members/significant others, and if so, who?

## 2023-08-23 NOTE — ED NOTES
Pt resting calmly in bed. VSS. No acute distress noted. Respires even and unlabored.       Nubia Jimenez RN  08/23/23 1909

## 2023-08-23 NOTE — ED NOTES
This RN at bedside to check blood glucose. Pt suddenly alert and agitated. Dr. Zahira Lee and Dr. Vinicius Patel at bedside.       Raven Chung RN  08/23/23 2076

## 2023-08-23 NOTE — PROGRESS NOTES
Patient Weaning Progress    The patient's vent settings was able to be weaned this shift. Ventilator settings that were weaned              [x] Mode   [x] Pressure support weaned   [] Fio2 weaned   [x] Peep weaned      Spontaneous weaning trial  was attempted. due to defined parameters for SBT (spontaneous breathing trial) not being met. The patient was able to tolerate SBT. RSBI  was 18 with EtCO2 of  and SpO2 of 100   on 30% FiO2. Spontanteous VT was 728 and RR 13 breaths/min. The patient was on SBT for 20 minutes     Evac tube was  hooked up with continuous low suction(20-30mmHg)      Cuff was  deflated to determine cuff leak. A leak  was detected. Unable to get agreement for goals because no family is present and patient cannot respond.

## 2023-08-24 VITALS
DIASTOLIC BLOOD PRESSURE: 80 MMHG | HEART RATE: 75 BPM | SYSTOLIC BLOOD PRESSURE: 121 MMHG | RESPIRATION RATE: 13 BRPM | TEMPERATURE: 97.5 F | BODY MASS INDEX: 27.21 KG/M2 | OXYGEN SATURATION: 99 % | HEIGHT: 70 IN | WEIGHT: 190.04 LBS

## 2023-08-24 PROCEDURE — 94003 VENT MGMT INPAT SUBQ DAY: CPT

## 2023-08-24 PROCEDURE — 94761 N-INVAS EAR/PLS OXIMETRY MLT: CPT

## 2023-08-24 PROCEDURE — 6360000002 HC RX W HCPCS: Performed by: NURSE PRACTITIONER

## 2023-08-24 PROCEDURE — 2580000003 HC RX 258: Performed by: NURSE PRACTITIONER

## 2023-08-24 PROCEDURE — 2700000000 HC OXYGEN THERAPY PER DAY

## 2023-08-24 PROCEDURE — 2500000003 HC RX 250 WO HCPCS: Performed by: NURSE PRACTITIONER

## 2023-08-24 PROCEDURE — 6370000000 HC RX 637 (ALT 250 FOR IP): Performed by: STUDENT IN AN ORGANIZED HEALTH CARE EDUCATION/TRAINING PROGRAM

## 2023-08-24 PROCEDURE — 99239 HOSP IP/OBS DSCHRG MGMT >30: CPT | Performed by: INTERNAL MEDICINE

## 2023-08-24 RX ORDER — LOPERAMIDE HYDROCHLORIDE 2 MG/1
4 CAPSULE ORAL ONCE
Status: COMPLETED | OUTPATIENT
Start: 2023-08-24 | End: 2023-08-24

## 2023-08-24 RX ADMIN — SODIUM CHLORIDE, PRESERVATIVE FREE 10 ML: 5 INJECTION INTRAVENOUS at 08:52

## 2023-08-24 RX ADMIN — Medication 1.4 MCG/KG/HR: at 01:59

## 2023-08-24 RX ADMIN — LOPERAMIDE HYDROCHLORIDE 4 MG: 2 CAPSULE ORAL at 07:35

## 2023-08-24 RX ADMIN — ONDANSETRON 4 MG: 2 INJECTION INTRAMUSCULAR; INTRAVENOUS at 04:36

## 2023-08-24 ASSESSMENT — PAIN SCALES - GENERAL
PAINLEVEL_OUTOF10: 0
PAINLEVEL_OUTOF10: 0

## 2023-08-24 ASSESSMENT — PULMONARY FUNCTION TESTS: PIF_VALUE: 11

## 2023-08-24 ASSESSMENT — LIFESTYLE VARIABLES
HOW OFTEN DO YOU HAVE A DRINK CONTAINING ALCOHOL: NEVER
HOW MANY STANDARD DRINKS CONTAINING ALCOHOL DO YOU HAVE ON A TYPICAL DAY: PATIENT DOES NOT DRINK

## 2023-08-24 NOTE — PLAN OF CARE
Problem: Risk for Elopement  Goal: Patient will not exit the unit/facility without proper excort  Outcome: Not Progressing  Flowsheets  Taken 8/24/2023 0000  Nursing Interventions for Elopement Risk: Communicate to physician the risk for elopement  Taken 8/23/2023 2000  Nursing Interventions for Elopement Risk: Communicate to physician the risk for elopement     Problem: Pain  Goal: Verbalizes/displays adequate comfort level or baseline comfort level  Outcome: Progressing     Problem: Skin/Tissue Integrity  Goal: Absence of new skin breakdown  Description: 1. Monitor for areas of redness and/or skin breakdown  2. Assess vascular access sites hourly  3. Every 4-6 hours minimum:  Change oxygen saturation probe site  4. Every 4-6 hours:  If on nasal continuous positive airway pressure, respiratory therapy assess nares and determine need for appliance change or resting period. Outcome: Progressing     Problem: Safety - Adult  Goal: Free from fall injury  Outcome: Progressing     Problem: Safety - Medical Restraint  Goal: Remains free of injury from restraints (Restraint for Interference with Medical Device)  Description: INTERVENTIONS:  1. Determine that other, less restrictive measures have been tried or would not be effective before applying the restraint  2. Evaluate the patient's condition at the time of restraint application  3. Inform patient/family regarding the reason for restraint  4.  Q2H: Monitor safety, psychosocial status, comfort, nutrition and hydration  Outcome: Progressing     Problem: Risk for Elopement  Goal: Patient will not exit the unit/facility without proper excort  Outcome: Not Progressing  Flowsheets  Taken 8/24/2023 0000  Nursing Interventions for Elopement Risk: Communicate to physician the risk for elopement  Taken 8/23/2023 2000  Nursing Interventions for Elopement Risk: Communicate to physician the risk for elopement

## 2023-08-24 NOTE — PROGRESS NOTES
Patient self extubated,  Restraints in place. Room air 100% on the monitor. Sedation stopped. Patient alert and oriented X4. RIJ removed at this time. Bedside swallow passed. Patient resting comfortably.

## 2023-08-24 NOTE — SIGNIFICANT EVENT
midazolam Stopped (08/24/23 0244)    norepinephrine Stopped (08/23/23 2146)    dexmedetomidine Stopped (08/24/23 0244)    sodium chloride Stopped (08/24/23 0257)    sodium chloride      dextrose        sodium chloride flush  5-40 mL IntraVENous 2 times per day    famotidine (PEPCID) injection  20 mg IntraVENous Daily    heparin (porcine)  5,000 Units SubCUTAneous 3 times per day    Patient seen by me including key components of medical care. Case discussed with resident physician. Patient on room air. Renal function much better post hydration. Arrange outpatient substance abuse clinic. Discharge home. Italicized font, if present,  represents changes to the note made by me. CC time 35 minutes. Time was discontiguous. Time does not include procedure. Time does include my direct assessment of the patient and coordination of care. Time represents more than 50% of the time involved with patient care by the [address] [place] [address] team.  Electronically signed by Hayley Alfonso.  Kurtis Barton MD.

## 2023-08-24 NOTE — PROGRESS NOTES
Patient is intubated and NR at this time. Bedside is his SO Stephen. She is understandably anxious, frequently shifter her position in the chair and speaking quite quickly. She recounted the circumstances that led to patient's admission. She is hopeful that he will be able to be awakened soon. She appreciated the information about support from chaplains.

## 2023-08-24 NOTE — DISCHARGE SUMMARY
Patient:  Nikunj Basilio    Unit/Bed:4D-08/008-A  YOB: 1985  MRN: [id number]   PCP: PAPA Dorantes CNP  Date of Admission: 8/23/2023  Chief Complaint:-Drug overdose    Assessment and Plan:    Polysubstance use disorder: Patient found thrashing in bed. Apparently was uncooperative and agitated with EMS. Was given Versed in route. Drug screen positive for methamphetamine, barbiturates, benzodiazepines, and cocaine. 8/24: Discussed patient's organ damage caused in the setting of drug overdose with him this morning. Ask him if we could get him some help with abstinence. Patient did not seem overly interested in cessation; however, he did acknowledge that he needed to quit and was agreeable to speaking with addiction services.  -Addiction services to see today prior to discharge. SATYA-improved: creatinine 3.9 on admit. 8/24: Creat this morning 1.5. Baseline approximately 1.0. Likely 2/2 to Meth usage and hypoperfusion.  -Strict I/O  -Daily weights  -Avoid nephrotoxic meds  Leukocytosis: WBC 19.3. Likely reactive 2/2 methamphetamine use. No obvious signs of infection at this time, except for multiple open wounds on patient's skin and scabs. -Trend CBC daily  -Monitor for signs of infection  Hep C positive: LFTs grossly WNL with elevated AST of 60. CTAP 8/6/2023 showed mild hepatic steatosis. patient denies any treatment for this. Placed outpatient referral to GI. Depression with anxiety: We will likely continue Abilify 5, Remeron 45, Effexor 013 tid   Metabolic acidosis in setting of SATYA 2/2 Rhabdo-resolved: serum bicarb 15 on arrival  Elevated CK: 2/2 methamphetamine use . Patient received 4 L NS in ED. Elevated troponin: 2/2 demand ischemia 2/2 methamphetamine use. No concerning signs on EKG  Hyperkalemia-resolved: Likely 2/2 acidotic state. 7.0 on admit. Last 4.8  Acute respiratory failure-resolved: Patient intubated in ED to protect airway.   Was on minimum vent settings until he self extubated 0255 this morning. Patient is breathing well on room air. Sedation was stopped. HAGMA-resolved: Likely starvation ketosis lactate 1.0, AG at 2115 10. Patient endorses not eating for a number of days. INITIAL H AND P AND ICU COURSE:  Patient is a 42-year-old male well-known to this hospital, with PMHx of meth use, opiate abuse, liver disease-chronic hep C, depression and anxiety who presented to [facility] ED on 8/23/13 via EMS for evaluation of agitation. Per EMS patient was found flailing in his room and covered in feces when EMS arrived. No active medication bottles or paraphernalia was in the home. Due to agitation EMS gave 10 mg Versed IM. Patient arrived to the ED alert but not answering questions or obeying commands  Patient endorses diffuse abdominal pain since 8/5/2023. He endorsed more severe pain which is why he presented to hospital.  Endorses 1 episode of vomiting and denied blood. Per nursing notes patient was uncooperative and medications were not able to run. Therefore patient was intubated. Patient was transferred to ICU for further management. Patient's drug screen was positive for amphetamines, barbiturates, benzodiazepines, and cocaine. Was also positive for fentanyl. 8/23: Patient seen at bedside in ICU. Patient is intubated and sedated. He is currently on propofol. 8/24: Patient is seen at bedside today. He self extubated overnight. Sedation was stopped. Patient is breathing normally on room air. Satting well. Doing well. In speaking with patient today he endorsed interest in quitting drugs. Addiction services was consulted. They gave patient follow-up information for outpatient services. Per guidelines at these facilities, patient must call themselves to show that they are actually interested in cessation. We will discharge patient today.   He is stable and is safe to return home  Past Medical History: Anxiety, depression, kidney stones,

## 2023-08-24 NOTE — PROGRESS NOTES
Rn called and stated that patient self extubated. Stated that patient was awake and alert and able to maintain own airway. Pt is 98% on room air at this time.

## 2023-08-24 NOTE — CARE COORDINATION
8/24/23, 1:56 PM EDT    Patient goals/plan/ treatment preferences discussed by  and . Patient goals/plan/ treatment preferences reviewed with patient/ family. Patient/ family verbalize understanding of discharge plan and are in agreement with goal/plan/treatment preferences. Understanding was demonstrated using the teach back method. AVS provided by RN at time of discharge, which includes all necessary medical information pertaining to the patients current course of illness, treatment, post-discharge goals of care, and treatment preferences. Services At/After Discharge: None  Pt was given resources for OP substance abuse clinic/rehab. Pt will need to make call himself.

## 2023-08-24 NOTE — DISCHARGE INSTRUCTIONS
Please follow-up with [facility] regarding your hep C. There is now treatment for this. Untreated hep C can lead to liver cancer. It also can lead to liver cirrhosis and liver failure. These can be avoided with treatment. Please follow-up with your PCP in the next week. Please follow-up with your desired outpatient drug treatment facility. We would like to see you not have any further damage to your body and avoid some of the long-term consequences of continued drug use.

## 2023-08-25 PROBLEM — R00.1 SINUS BRADYCARDIA: Status: ACTIVE | Noted: 2023-08-25

## 2023-08-25 PROBLEM — F32.A DEPRESSION: Status: ACTIVE | Noted: 2023-08-25

## 2023-08-25 PROBLEM — E87.5 HYPERKALEMIA: Status: ACTIVE | Noted: 2023-08-25

## 2023-08-25 PROBLEM — Z86.79 HISTORY OF HYPERTENSION: Status: ACTIVE | Noted: 2023-08-25

## 2023-08-25 PROBLEM — G92.9 TOXIC ENCEPHALOPATHY: Status: ACTIVE | Noted: 2020-08-28

## 2023-08-25 LAB
BACTERIA BLD AEROBE CULT: NORMAL
BACTERIA BLD AEROBE CULT: NORMAL
BACTERIA SPEC RESP CULT: NORMAL
BACTERIA UR CULT: NORMAL
GRAM STN SPEC: NORMAL

## 2023-08-26 LAB
EKG ATRIAL RATE: 112 BPM
EKG ATRIAL RATE: 147 BPM
EKG ATRIAL RATE: 52 BPM
EKG ATRIAL RATE: 73 BPM
EKG ATRIAL RATE: 89 BPM
EKG ATRIAL RATE: 92 BPM
EKG P AXIS: 53 DEGREES
EKG P AXIS: 56 DEGREES
EKG P AXIS: 60 DEGREES
EKG P AXIS: 61 DEGREES
EKG P AXIS: 63 DEGREES
EKG P AXIS: 68 DEGREES
EKG P-R INTERVAL: 132 MS
EKG P-R INTERVAL: 160 MS
EKG P-R INTERVAL: 168 MS
EKG P-R INTERVAL: 170 MS
EKG P-R INTERVAL: 190 MS
EKG P-R INTERVAL: 192 MS
EKG Q-T INTERVAL: 264 MS
EKG Q-T INTERVAL: 326 MS
EKG Q-T INTERVAL: 360 MS
EKG Q-T INTERVAL: 396 MS
EKG Q-T INTERVAL: 400 MS
EKG Q-T INTERVAL: 448 MS
EKG QRS DURATION: 70 MS
EKG QRS DURATION: 76 MS
EKG QRS DURATION: 82 MS
EKG QRS DURATION: 86 MS
EKG QRS DURATION: 86 MS
EKG QRS DURATION: 88 MS
EKG QTC CALCULATION (BAZETT): 413 MS
EKG QTC CALCULATION (BAZETT): 416 MS
EKG QTC CALCULATION (BAZETT): 440 MS
EKG QTC CALCULATION (BAZETT): 444 MS
EKG QTC CALCULATION (BAZETT): 445 MS
EKG QTC CALCULATION (BAZETT): 481 MS
EKG R AXIS: 27 DEGREES
EKG R AXIS: 28 DEGREES
EKG R AXIS: 42 DEGREES
EKG R AXIS: 46 DEGREES
EKG R AXIS: 53 DEGREES
EKG R AXIS: 60 DEGREES
EKG T AXIS: 35 DEGREES
EKG T AXIS: 38 DEGREES
EKG T AXIS: 56 DEGREES
EKG T AXIS: 62 DEGREES
EKG T AXIS: 62 DEGREES
EKG T AXIS: 71 DEGREES
EKG VENTRICULAR RATE: 112 BPM
EKG VENTRICULAR RATE: 147 BPM
EKG VENTRICULAR RATE: 52 BPM
EKG VENTRICULAR RATE: 73 BPM
EKG VENTRICULAR RATE: 89 BPM
EKG VENTRICULAR RATE: 92 BPM

## 2023-08-27 LAB — BACTERIA SPEC AEROBE CULT: NORMAL

## 2023-08-28 LAB
BACTERIA BLD AEROBE CULT: NORMAL
BACTERIA BLD AEROBE CULT: NORMAL

## 2023-08-29 ENCOUNTER — APPOINTMENT (OUTPATIENT)
Dept: CT IMAGING | Age: 38
DRG: 351 | End: 2023-08-29
Payer: COMMERCIAL

## 2023-08-29 ENCOUNTER — HOSPITAL ENCOUNTER (EMERGENCY)
Age: 38
Discharge: LEFT BEFORE DISCHARGE | DRG: 351 | End: 2023-08-29
Attending: EMERGENCY MEDICINE
Payer: COMMERCIAL

## 2023-08-29 VITALS
OXYGEN SATURATION: 98 % | HEART RATE: 79 BPM | TEMPERATURE: 97.2 F | HEIGHT: 70 IN | BODY MASS INDEX: 27.2 KG/M2 | RESPIRATION RATE: 16 BRPM | SYSTOLIC BLOOD PRESSURE: 93 MMHG | WEIGHT: 190 LBS | DIASTOLIC BLOOD PRESSURE: 77 MMHG

## 2023-08-29 DIAGNOSIS — F19.10 POLYSUBSTANCE ABUSE (HCC): ICD-10-CM

## 2023-08-29 DIAGNOSIS — R46.89 ABNORMAL BEHAVIOR: ICD-10-CM

## 2023-08-29 DIAGNOSIS — T50.904A OVERDOSE OF UNDETERMINED INTENT, INITIAL ENCOUNTER: Primary | ICD-10-CM

## 2023-08-29 LAB
ALBUMIN SERPL BCG-MCNC: 4.4 G/DL (ref 3.5–5.1)
ALP SERPL-CCNC: 114 U/L (ref 38–126)
ALT SERPL W/O P-5'-P-CCNC: 30 U/L (ref 11–66)
ANION GAP SERPL CALC-SCNC: 18 MEQ/L (ref 8–16)
APAP SERPL-MCNC: < 5 UG/ML (ref 0–20)
AST SERPL-CCNC: 30 U/L (ref 5–40)
BASOPHILS ABSOLUTE: 0 THOU/MM3 (ref 0–0.1)
BASOPHILS ABSOLUTE: 0.1 THOU/MM3 (ref 0–0.1)
BASOPHILS NFR BLD AUTO: 0.2 %
BASOPHILS NFR BLD AUTO: 0.4 %
BILIRUB CONJ SERPL-MCNC: < 0.2 MG/DL (ref 0–0.3)
BILIRUB SERPL-MCNC: 0.3 MG/DL (ref 0.3–1.2)
BUN SERPL-MCNC: 26 MG/DL (ref 7–22)
CALCIUM SERPL-MCNC: 10.6 MG/DL (ref 8.5–10.5)
CHLORIDE SERPL-SCNC: 104 MEQ/L (ref 98–111)
CK SERPL-CCNC: 500 U/L (ref 55–170)
CO2 SERPL-SCNC: 21 MEQ/L (ref 23–33)
CREAT SERPL-MCNC: 1.7 MG/DL (ref 0.4–1.2)
DEPRECATED RDW RBC AUTO: 46.5 FL (ref 35–45)
DEPRECATED RDW RBC AUTO: 47.7 FL (ref 35–45)
EOSINOPHIL NFR BLD AUTO: 0.6 %
EOSINOPHIL NFR BLD AUTO: 2.2 %
EOSINOPHILS ABSOLUTE: 0.1 THOU/MM3 (ref 0–0.4)
EOSINOPHILS ABSOLUTE: 0.4 THOU/MM3 (ref 0–0.4)
ERYTHROCYTE [DISTWIDTH] IN BLOOD BY AUTOMATED COUNT: 15.8 % (ref 11.5–14.5)
ERYTHROCYTE [DISTWIDTH] IN BLOOD BY AUTOMATED COUNT: 16 % (ref 11.5–14.5)
ETHANOL SERPL-MCNC: < 0.01 %
FLUAV RNA RESP QL NAA+PROBE: NOT DETECTED
FLUBV RNA RESP QL NAA+PROBE: NOT DETECTED
GFR SERPL CREATININE-BSD FRML MDRD: 52 ML/MIN/1.73M2
GLUCOSE SERPL-MCNC: 70 MG/DL (ref 70–108)
HCT VFR BLD AUTO: 34.6 % (ref 42–52)
HCT VFR BLD AUTO: 35.9 % (ref 42–52)
HGB BLD-MCNC: 10.4 GM/DL (ref 14–18)
HGB BLD-MCNC: 11.2 GM/DL (ref 14–18)
IMM GRANULOCYTES # BLD AUTO: 0.05 THOU/MM3 (ref 0–0.07)
IMM GRANULOCYTES # BLD AUTO: 0.15 THOU/MM3 (ref 0–0.07)
IMM GRANULOCYTES NFR BLD AUTO: 0.4 %
IMM GRANULOCYTES NFR BLD AUTO: 0.9 %
LACTIC ACID, SEPSIS: 0.7 MMOL/L (ref 0.5–1.9)
LACTIC ACID, SEPSIS: 1 MMOL/L (ref 0.5–1.9)
LACTIC ACID, SEPSIS: 4.2 MMOL/L (ref 0.5–1.9)
LYMPHOCYTES ABSOLUTE: 2.6 THOU/MM3 (ref 1–4.8)
LYMPHOCYTES ABSOLUTE: 3.6 THOU/MM3 (ref 1–4.8)
LYMPHOCYTES NFR BLD AUTO: 20.8 %
LYMPHOCYTES NFR BLD AUTO: 21.6 %
MCH RBC QN AUTO: 25.3 PG (ref 26–33)
MCH RBC QN AUTO: 25.7 PG (ref 26–33)
MCHC RBC AUTO-ENTMCNC: 30.1 GM/DL (ref 32.2–35.5)
MCHC RBC AUTO-ENTMCNC: 31.2 GM/DL (ref 32.2–35.5)
MCV RBC AUTO: 82.3 FL (ref 80–94)
MCV RBC AUTO: 84.2 FL (ref 80–94)
MONOCYTES ABSOLUTE: 1.2 THOU/MM3 (ref 0.4–1.3)
MONOCYTES ABSOLUTE: 1.5 THOU/MM3 (ref 0.4–1.3)
MONOCYTES NFR BLD AUTO: 8.3 %
MONOCYTES NFR BLD AUTO: 9.8 %
NEUTROPHILS NFR BLD AUTO: 67.4 %
NEUTROPHILS NFR BLD AUTO: 67.4 %
NRBC BLD AUTO-RTO: 0 /100 WBC
NRBC BLD AUTO-RTO: 0 /100 WBC
OSMOLALITY SERPL CALC.SUM OF ELEC: 288.2 MOSMOL/KG (ref 275–300)
PLATELET # BLD AUTO: 392 THOU/MM3 (ref 130–400)
PLATELET # BLD AUTO: 529 THOU/MM3 (ref 130–400)
PMV BLD AUTO: 9.3 FL (ref 9.4–12.4)
PMV BLD AUTO: 9.7 FL (ref 9.4–12.4)
POTASSIUM SERPL-SCNC: 3.8 MEQ/L (ref 3.5–5.2)
PROCALCITONIN SERPL IA-MCNC: 4.11 NG/ML (ref 0.01–0.09)
PROT SERPL-MCNC: 8.5 G/DL (ref 6.1–8)
RBC # BLD AUTO: 4.11 MILL/MM3 (ref 4.7–6.1)
RBC # BLD AUTO: 4.36 MILL/MM3 (ref 4.7–6.1)
SALICYLATES SERPL-MCNC: 1.9 MG/DL (ref 2–10)
SARS-COV-2 RNA RESP QL NAA+PROBE: NOT DETECTED
SEGMENTED NEUTROPHILS ABSOLUTE COUNT: 11.8 THOU/MM3 (ref 1.8–7.7)
SEGMENTED NEUTROPHILS ABSOLUTE COUNT: 8.1 THOU/MM3 (ref 1.8–7.7)
SODIUM SERPL-SCNC: 143 MEQ/L (ref 135–145)
WBC # BLD AUTO: 12 THOU/MM3 (ref 4.8–10.8)
WBC # BLD AUTO: 17.5 THOU/MM3 (ref 4.8–10.8)

## 2023-08-29 PROCEDURE — 6360000002 HC RX W HCPCS: Performed by: STUDENT IN AN ORGANIZED HEALTH CARE EDUCATION/TRAINING PROGRAM

## 2023-08-29 PROCEDURE — 2580000003 HC RX 258: Performed by: EMERGENCY MEDICINE

## 2023-08-29 PROCEDURE — 99285 EMERGENCY DEPT VISIT HI MDM: CPT

## 2023-08-29 PROCEDURE — 82550 ASSAY OF CK (CPK): CPT

## 2023-08-29 PROCEDURE — 80053 COMPREHEN METABOLIC PANEL: CPT

## 2023-08-29 PROCEDURE — 93010 ELECTROCARDIOGRAM REPORT: CPT | Performed by: NUCLEAR MEDICINE

## 2023-08-29 PROCEDURE — 82248 BILIRUBIN DIRECT: CPT

## 2023-08-29 PROCEDURE — 87636 SARSCOV2 & INF A&B AMP PRB: CPT

## 2023-08-29 PROCEDURE — 71275 CT ANGIOGRAPHY CHEST: CPT

## 2023-08-29 PROCEDURE — 85025 COMPLETE CBC W/AUTO DIFF WBC: CPT

## 2023-08-29 PROCEDURE — 82077 ASSAY SPEC XCP UR&BREATH IA: CPT

## 2023-08-29 PROCEDURE — 36415 COLL VENOUS BLD VENIPUNCTURE: CPT

## 2023-08-29 PROCEDURE — 96374 THER/PROPH/DIAG INJ IV PUSH: CPT

## 2023-08-29 PROCEDURE — 83605 ASSAY OF LACTIC ACID: CPT

## 2023-08-29 PROCEDURE — 84145 PROCALCITONIN (PCT): CPT

## 2023-08-29 PROCEDURE — 96376 TX/PRO/DX INJ SAME DRUG ADON: CPT

## 2023-08-29 PROCEDURE — 87040 BLOOD CULTURE FOR BACTERIA: CPT

## 2023-08-29 PROCEDURE — 6360000004 HC RX CONTRAST MEDICATION

## 2023-08-29 PROCEDURE — 96375 TX/PRO/DX INJ NEW DRUG ADDON: CPT

## 2023-08-29 PROCEDURE — 93005 ELECTROCARDIOGRAM TRACING: CPT | Performed by: EMERGENCY MEDICINE

## 2023-08-29 PROCEDURE — 80179 DRUG ASSAY SALICYLATE: CPT

## 2023-08-29 PROCEDURE — 6360000002 HC RX W HCPCS: Performed by: EMERGENCY MEDICINE

## 2023-08-29 PROCEDURE — 96361 HYDRATE IV INFUSION ADD-ON: CPT

## 2023-08-29 PROCEDURE — 80143 DRUG ASSAY ACETAMINOPHEN: CPT

## 2023-08-29 PROCEDURE — 74174 CTA ABD&PLVS W/CONTRAST: CPT

## 2023-08-29 PROCEDURE — 6360000002 HC RX W HCPCS

## 2023-08-29 RX ORDER — LORAZEPAM 2 MG/ML
INJECTION INTRAMUSCULAR
Status: DISPENSED
Start: 2023-08-29 | End: 2023-08-29

## 2023-08-29 RX ORDER — LORAZEPAM 2 MG/ML
INJECTION INTRAMUSCULAR
Status: COMPLETED
Start: 2023-08-29 | End: 2023-08-29

## 2023-08-29 RX ORDER — 0.9 % SODIUM CHLORIDE 0.9 %
1000 INTRAVENOUS SOLUTION INTRAVENOUS ONCE
Status: COMPLETED | OUTPATIENT
Start: 2023-08-29 | End: 2023-08-29

## 2023-08-29 RX ORDER — MORPHINE SULFATE 4 MG/ML
4 INJECTION, SOLUTION INTRAMUSCULAR; INTRAVENOUS ONCE
Status: COMPLETED | OUTPATIENT
Start: 2023-08-29 | End: 2023-08-29

## 2023-08-29 RX ORDER — LORAZEPAM 2 MG/ML
2 INJECTION INTRAMUSCULAR ONCE
Status: COMPLETED | OUTPATIENT
Start: 2023-08-29 | End: 2023-08-29

## 2023-08-29 RX ORDER — DROPERIDOL 2.5 MG/ML
2.5 INJECTION, SOLUTION INTRAMUSCULAR; INTRAVENOUS ONCE
Status: COMPLETED | OUTPATIENT
Start: 2023-08-29 | End: 2023-08-29

## 2023-08-29 RX ADMIN — LORAZEPAM 4 MG: 2 INJECTION INTRAMUSCULAR; INTRAVENOUS at 08:05

## 2023-08-29 RX ADMIN — DROPERIDOL 5 MG: 2.5 INJECTION, SOLUTION INTRAMUSCULAR; INTRAVENOUS at 08:06

## 2023-08-29 RX ADMIN — MORPHINE SULFATE 4 MG: 4 INJECTION, SOLUTION INTRAMUSCULAR; INTRAVENOUS at 07:44

## 2023-08-29 RX ADMIN — LORAZEPAM 2 MG: 2 INJECTION INTRAMUSCULAR; INTRAVENOUS at 07:26

## 2023-08-29 RX ADMIN — IOPAMIDOL 80 ML: 755 INJECTION, SOLUTION INTRAVENOUS at 08:36

## 2023-08-29 RX ADMIN — SODIUM CHLORIDE 1000 ML: 9 INJECTION, SOLUTION INTRAVENOUS at 07:29

## 2023-08-29 ASSESSMENT — PAIN - FUNCTIONAL ASSESSMENT: PAIN_FUNCTIONAL_ASSESSMENT: NONE - DENIES PAIN

## 2023-08-29 ASSESSMENT — PAIN SCALES - WONG BAKER: WONGBAKER_NUMERICALRESPONSE: 0

## 2023-08-29 NOTE — ED PROVIDER NOTES
The patient has decided to leave against medical advice. He has normal mental status and adequate capacity to make medical decisions. The patient refuses hospital admission and wants to be discharged. The risks have been explained to the patient, including worsening illness, chronic pain, permanent disability, and death. The benefits of admission have also been explained, including the availability and proximity of nurses, physicians, monitoring, diagnostic testing, and treatment. The patient was able to understand and state the risks and benefits of hospital admission. This was witnessed by patient's nurse and me. He had the opportunity to ask questions about his medical condition. The patient was treated to the extent that he would allow, and knows that he may return for care at any time.         Francisco Wood MD  08/29/23 4499

## 2023-08-29 NOTE — ED NOTES
Patient resting in bed with eyes closed, respirations easy and unlabored. Lights dimmed for patient comfort. Call light in reach. Will continue to monitor.         Silvina Pierce RN  08/29/23 0388

## 2023-08-29 NOTE — ED NOTES
Pt back to room 4 in stable condition. Continues to rest comfortably with respirations easy and unlabored.      Vinod He [facility]) TALIB Pierce RN  08/29/23 1205

## 2023-08-29 NOTE — ED PROVIDER NOTES
[facility] DEPT  EMERGENCY DEPARTMENT ENCOUNTER      Pt Name: Aureliano Magallanes  MRN: [id number]  [address] [facility], operated by [facility] 1985  Date of evaluation: 8/29/2023  Resident Physician: Derrell Isabel DO  Supervising Physician: Dr. Saranya Calderón MD    CHIEF COMPLAINT       Chief Complaint   Patient presents with    Drug Overdose       HISTORY OF PRESENT ILLNESS    HPI  Aureliano Magallanes is a 43 y.o. male with past medical history of opioid abuse, depression, anxiety, polysubstance use, hepatitis C who presents to the emergency department for evaluation of tachycardia and agitation. He arrived via EMS and received 1 dose of Versed on route. Patient reports he has used methamphetamines today, he cannot hold still, reports his back hurts. Denied any chest pain, shortness of breath, abdominal pain, patient still able to make urine. Denies any chest pain, shortness of breath, patient is able to make urine. He was seen on 8/23 at this ER and was admitted to ICU for polysubstance use and overdose. The patient has no other acute complaints at this time. PAST MEDICAL AND SURGICAL HISTORY     Past Medical History:   Diagnosis Date    Anxiety     Depression     Kidney stone     Liver disease     Hep C    Opiate abuse, continuous ([address])      No past surgical history on file. MEDICATIONS   No current facility-administered medications for this encounter. Current Outpatient Medications:     pregabalin (LYRICA) 200 MG capsule, Take 1 capsule by mouth in the morning, at noon, and at bedtime for 30 days. Max Daily Amount: 600 mg, Disp: 90 capsule, Rfl: 0    lisdexamfetamine (VYVANSE) 70 MG capsule, Take 1 capsule by mouth every morning for 30 days.  Max Daily Amount: 70 mg, Disp: 30 capsule, Rfl: 0    ARIPiprazole (ABILIFY) 5 MG tablet, Take 1 tablet by mouth daily, Disp: 30 tablet, Rfl: 0    venlafaxine (EFFEXOR) 100 MG tablet, Take 1 tablet by mouth 3 times daily, Disp: 90 tablet, Rfl: 3    naloxone changes. Patient had received 1 dose of Versed prior to arrival via EMS, he received 6 mg of Ativan and 5 mg of droperidol due to severe agitation. Patient reevaluated periodically during his ED course. His wbc, lactate, and vitals improved. His initial elevation was most likely metabolic related as the medication and fluids had downward trend on his work up. Due to shift change, the care of this patient was transferred to Dr. Babb Scales         Shared Decision-Making was performed, disposition discussed with the patient/family and questions answered. Outpatient follow up (If applicable):  Leora Guzmán, APRN - CNP  [address] [place] [address] [address] [place] [address] [phone]    Call in 2 days  As needed, If symptoms worsen    [facility] EMERGENCY DEPT  [address] [facility] [address]  [address] [facility] [address]  Go to   As needed, If symptoms worsen    Not applicable          FINAL DIAGNOSES:  Final diagnoses:   Overdose of undetermined intent, initial encounter   Polysubstance abuse ([address] )   Abnormal behavior       Condition: condition: fair  Dispo: Due to shift change, the care of this patient was transferred to Dr. Bernadine Moss 08/29/2023 06:45:42 PM      This transcription was electronically signed. It was dictated by use of voice recognition software and electronically transcribed. The transcription may contain errors not detected in proofreading.        Krish Carias, DO Huang  08/29/23 5039

## 2023-08-29 NOTE — ED NOTES
Pt to CT scan at this time via Ct tech in stable condition.      Ela Arzola) TALIB Pierce RN  08/29/23 3925

## 2023-08-29 NOTE — ED NOTES
Pt resting in bed with eyes closed, respirations easy and unlabored. Call light in reach.      Silvina Pierce RN  08/29/23 9038

## 2023-08-29 NOTE — ED NOTES
Pt provided ginger ale at this time per request. Pt continues to roll around in bed, unable to hold still. Sitting up and laying back unable to follow simple tasks. Pt able to hold conversation with RN, but repeats himself often.      Little Leventhal Darrall Sages) M Billing, RN  08/29/23 0781

## 2023-08-29 NOTE — ED NOTES
Pt now resting in bed with eyes closed, respirations easy and unlabored. CT called and notified of pt being ready for CT.      Jasmine Pierce RN  08/29/23 6840

## 2023-08-29 NOTE — ED PROVIDER NOTES
[address] [facility]  EMERGENCY MEDICINE ATTENDING ATTESTATION      Evaluation of Aureliano Magallanes. Case discussed and care plan developed with resident physician. I agree with the resident physician documentation and plan as documented by him, except if my documentation differs. Patient seen, interviewed and examined by me. I reviewed the medical, surgical, family and social history, medications and allergies. I have reviewed and interpreted all available lab, radiology and ekg results available at the moment. I have reviewed the nursing documentation. Brief H&P   Patient brought in by EMS for possible drug overdose. Patient is known to our department and has been admitted multiple times after drug overdose, sometimes needing intubation. Today he states he would like to get off drugs but he used meth earlier with something else that he is not sure what it was. Patient also complaining of back pain. Physical exam is notable for well appearing, diaphoretic, restless but not aggressive. Multiple bruises from injection sites. Unclear if this is from drugs or from recent previous admissions. Medical Decision Making   MDM:   Drug overdose, possible meth  Possible anticholinergic toxidrome  Rule out aortic dissection  Unclear source of fever between possible infection, incomplete anticholinergic toxidrome or hyperthermia from increased muscle activity  Plan:   IV line, labs  Supportive care with fluids and benzodiazepines  EKG  Cardiac monitor  Stat CT scan  Observation in the ED while awaiting results  Patient will need to be admitted    Please see the resident physician completed note for final disposition except as documented on this attestation. I have reviewed and interpreted all available lab, radiology and ekg results available at the moment. Diagnosis, treatment and disposition plans were discussed and agreed upon by patient.    This transcription was

## 2023-08-29 NOTE — ED NOTES
Patient resting in bed with eyes closed, respirations easy and unlabored. Lights dimmed for patient comfort. Call light in reach. Will continue to monitor.       Mayo Pierce RN  08/29/23 4384

## 2023-08-29 NOTE — ED NOTES
Patient resting in bed. Respirations easy and unlabored. No distress noted. Call light within reach.      Silvina Pierce RN  08/29/23 7288

## 2023-08-29 NOTE — ED TRIAGE NOTES
Patient presents to ED with chief complaint of possible drug overdose. Patient states that he used methamphetamines today, and states that he cannot sit still. Upon arrival, patient tachycardic at a rate of 144. Patient cooperative, but is having difficulty staying still. Aox4. Patient also states he is having some back pain. Patient resting in bed. Call light within reach.

## 2023-08-29 NOTE — ED PROVIDER NOTES
CONTRAST   Final Result   1. The thoracic and the abdominal aorta are normal course and caliber. No aortic aneurysm or dissection is present. No atherosclerotic plaque or stenosis is observed. 2. Dependent atelectasis and patchy bilateral lower lobe groundglass opacities are nonspecific possibly pulmonary edema versus airspace infection/inflammation. Mildly prominent bilateral axillary lymph nodes are nonspecific, possibly reactive. Follow-up    to ensure resolution is advised. 3. Stable hepatomegaly and hepatic steatosis. Correlate with liver function tests. Chronic findings are discussed. **This report has been created using voice recognition software. It may contain minor errors which are inherent in voice recognition technology. **      Final report electronically signed by Dr Mayi Franks on 8/29/2023 9:03 AM      [facility] [address]   Final Result   1. The thoracic and the abdominal aorta are normal course and caliber. No aortic aneurysm or dissection is present. No atherosclerotic plaque or stenosis is observed. 2. Dependent atelectasis and patchy bilateral lower lobe groundglass opacities are nonspecific possibly pulmonary edema versus airspace infection/inflammation. Mildly prominent bilateral axillary lymph nodes are nonspecific, possibly reactive. Follow-up    to ensure resolution is advised. 3. Stable hepatomegaly and hepatic steatosis. Correlate with liver function tests. Chronic findings are discussed. **This report has been created using voice recognition software. It may contain minor errors which are inherent in voice recognition technology. **      Final report electronically signed by Dr Mayi Franks on 8/29/2023 9:03 AM            ED Course as of 08/29/23 2324   Tue Aug 29, 2023   0821 Lactic Acid, Sepsis(!): 4.2 [SJ]   0821 WBC(!): 17.5 [SJ]   0856 No visible aortic dissection my wet read of this patient CT scans.   Further radiology understanding and agreed to leave AMA. Patient given resources to contact when he is ready. Patient also encouraged to return if symptoms worsen. Final diagnoses:   Overdose of undetermined intent, initial encounter   Polysubstance abuse ([address])   Abnormal behavior     Discharge Medication List as of 8/29/2023  5:35 PM            Condition: condition: good  Dispo: Discharge to home    This transcription was electronically signed. It was dictated by use of voice recognition software and electronically transcribed. The transcription may contain errors not detected in proofreading.        Karla Sinclair MD  Resident  08/29/23 4751       Karla Sinclair MD  Resident  08/29/23 5696

## 2023-08-29 NOTE — ED NOTES
Pt found by RN in room standing in bed with urine on clothes and on floor. Pt states \"I'm sorry, I had to pee. \" Pt cleansed and new clothes given to pt. Pt now back in bed, resting comfortably, provided turkey sandwich box at this time and denies any further needs or concerns.      Silvina Zuluaga) TALIB Pierce RN  08/29/23 2881

## 2023-08-29 NOTE — ED NOTES
Pt becoming more agitated requesting to leave AMA. Refusing to stay in bed and running around room while still hooked up to fluids and wires. Providers at bedside discussing plan of care and educating pt. Pt agreeable to stay with more medicine. Dr. Gil Kirby giving verbal orders.      Micheline Amaya [facility] Warrior[facility]ane Pierce RN  08/29/23 6805

## 2023-08-29 NOTE — ED NOTES
Verbal order for 4mg ativan given by Dr. Nikki Lay at this time. 4mg ativan given by this RN.      Silvina Pierce RN  08/29/23 0152

## 2023-08-29 NOTE — CARE COORDINATION
Spoke with patient who does not want to go to inpatient detox/rehab. Might go to outpatient rehab later. Talked about given outpatient rehab resources when here for overdosing within the last 30 days, patient acknowledged receiving. Patient not ready for detox/rehab at this time per self.

## 2023-08-29 NOTE — ED NOTES
Pt continues to rest in bed, no distress noted. Will continue to monitor.      Silvina Pierce RN  08/29/23 6732

## 2023-08-29 NOTE — DISCHARGE INSTRUCTIONS
Stop illicit drug use    Call [facility] [phone] or go to [facility] at 7am Monday - Friday to be evaluated (this is first come first serve). PLEASE RETURN TO THE EMERGENCY DEPARTMENT IMMEDIATELY for worsening symptoms, or if you develop any concerning symptoms such as: high fever not relieved by acetaminophen (Tylenol) and/or ibuprofen (Motrin), chills, shortness of breath, chest pain, persistent nausea and/or vomiting, vomiting any blood, blood in your stool, numbness, weakness or tingling in the arms or legs or change in color of the extremities, changes in mental status, persistent headache, blurry vision.

## 2023-08-29 NOTE — ED NOTES
Pt resting quietly in bed at this time. Pt denies any new needs or concerns. Call light in reach.       Ayanna Ma RN  08/29/23 7043

## 2023-08-29 NOTE — ED NOTES
3rd bolus started at this time.      Jasmine [facility] Pierce RN  08/29/23 5258

## 2023-08-29 NOTE — ED NOTES
Pt resting in bed with eyes closed, respirations easy and unlabored. Call light in reach.      Silvina Pierce RN  08/29/23 6415

## 2023-08-30 LAB
EKG ATRIAL RATE: 146 BPM
EKG P AXIS: 62 DEGREES
EKG P-R INTERVAL: 138 MS
EKG Q-T INTERVAL: 262 MS
EKG QRS DURATION: 84 MS
EKG QTC CALCULATION (BAZETT): 408 MS
EKG R AXIS: 43 DEGREES
EKG T AXIS: 49 DEGREES
EKG VENTRICULAR RATE: 146 BPM

## 2023-08-31 ENCOUNTER — APPOINTMENT (OUTPATIENT)
Dept: GENERAL RADIOLOGY | Age: 38
DRG: 351 | End: 2023-08-31
Payer: COMMERCIAL

## 2023-08-31 ENCOUNTER — HOSPITAL ENCOUNTER (INPATIENT)
Age: 38
LOS: 1 days | Discharge: LEFT BEFORE DISCHARGE | DRG: 351 | End: 2023-08-31
Attending: EMERGENCY MEDICINE | Admitting: STUDENT IN AN ORGANIZED HEALTH CARE EDUCATION/TRAINING PROGRAM
Payer: COMMERCIAL

## 2023-08-31 VITALS
TEMPERATURE: 100.7 F | SYSTOLIC BLOOD PRESSURE: 139 MMHG | BODY MASS INDEX: 27.2 KG/M2 | OXYGEN SATURATION: 100 % | HEART RATE: 90 BPM | WEIGHT: 190 LBS | RESPIRATION RATE: 27 BRPM | DIASTOLIC BLOOD PRESSURE: 83 MMHG | HEIGHT: 70 IN

## 2023-08-31 DIAGNOSIS — R74.8 ELEVATED CPK: ICD-10-CM

## 2023-08-31 DIAGNOSIS — R65.10 SIRS (SYSTEMIC INFLAMMATORY RESPONSE SYNDROME) (HCC): ICD-10-CM

## 2023-08-31 DIAGNOSIS — N18.30 STAGE 3 CHRONIC KIDNEY DISEASE, UNSPECIFIED WHETHER STAGE 3A OR 3B CKD (HCC): ICD-10-CM

## 2023-08-31 DIAGNOSIS — R41.82 ALTERED MENTAL STATUS, UNSPECIFIED ALTERED MENTAL STATUS TYPE: ICD-10-CM

## 2023-08-31 DIAGNOSIS — F15.10 METHAMPHETAMINE ABUSE (HCC): Primary | ICD-10-CM

## 2023-08-31 LAB
ALBUMIN SERPL BCG-MCNC: 4.6 G/DL (ref 3.5–5.1)
ALP SERPL-CCNC: 112 U/L (ref 38–126)
ALT SERPL W/O P-5'-P-CCNC: 44 U/L (ref 11–66)
ANION GAP SERPL CALC-SCNC: 14 MEQ/L (ref 8–16)
APAP SERPL-MCNC: < 5 UG/ML (ref 0–20)
AST SERPL-CCNC: 69 U/L (ref 5–40)
BASE EXCESS BLDA CALC-SCNC: -5.8 MMOL/L (ref -2–3)
BASOPHILS ABSOLUTE: 0.1 THOU/MM3 (ref 0–0.1)
BASOPHILS NFR BLD AUTO: 0.5 %
BILIRUB SERPL-MCNC: 0.3 MG/DL (ref 0.3–1.2)
BUN SERPL-MCNC: 20 MG/DL (ref 7–22)
CALCIUM SERPL-MCNC: 10.1 MG/DL (ref 8.5–10.5)
CHLORIDE SERPL-SCNC: 108 MEQ/L (ref 98–111)
CK SERPL-CCNC: 1773 U/L (ref 55–170)
CO2 SERPL-SCNC: 22 MEQ/L (ref 23–33)
COLLECTED BY:: ABNORMAL
CREAT SERPL-MCNC: 1.3 MG/DL (ref 0.4–1.2)
DEPRECATED RDW RBC AUTO: 46.6 FL (ref 35–45)
EOSINOPHIL NFR BLD AUTO: 1.3 %
EOSINOPHILS ABSOLUTE: 0.2 THOU/MM3 (ref 0–0.4)
ERYTHROCYTE [DISTWIDTH] IN BLOOD BY AUTOMATED COUNT: 16.2 % (ref 11.5–14.5)
ETHANOL SERPL-MCNC: < 0.01 %
GFR SERPL CREATININE-BSD FRML MDRD: > 60 ML/MIN/1.73M2
GLUCOSE SERPL-MCNC: 114 MG/DL (ref 70–108)
HCO3 BLDA-SCNC: 20 MMOL/L (ref 23–28)
HCT VFR BLD AUTO: 33.4 % (ref 42–52)
HGB BLD-MCNC: 10.5 GM/DL (ref 14–18)
IMM GRANULOCYTES # BLD AUTO: 0.04 THOU/MM3 (ref 0–0.07)
IMM GRANULOCYTES NFR BLD AUTO: 0.3 %
LYMPHOCYTES ABSOLUTE: 4 THOU/MM3 (ref 1–4.8)
LYMPHOCYTES NFR BLD AUTO: 33 %
MCH RBC QN AUTO: 25.2 PG (ref 26–33)
MCHC RBC AUTO-ENTMCNC: 31.4 GM/DL (ref 32.2–35.5)
MCV RBC AUTO: 80.1 FL (ref 80–94)
MONOCYTES ABSOLUTE: 1.2 THOU/MM3 (ref 0.4–1.3)
MONOCYTES NFR BLD AUTO: 10.4 %
NEUTROPHILS NFR BLD AUTO: 54.5 %
NRBC BLD AUTO-RTO: 0 /100 WBC
OSMOLALITY SERPL CALC.SUM OF ELEC: 290.3 MOSMOL/KG (ref 275–300)
PCO2 TEMP ADJ BLDMV: 40 MMHG (ref 41–51)
PH BLDMV: 7.31 [PH] (ref 7.31–7.41)
PLATELET # BLD AUTO: 579 THOU/MM3 (ref 130–400)
PMV BLD AUTO: 9.3 FL (ref 9.4–12.4)
PO2 BLDMV: 65 MMHG (ref 25–40)
POTASSIUM SERPL-SCNC: 4 MEQ/L (ref 3.5–5.2)
PROT SERPL-MCNC: 8.1 G/DL (ref 6.1–8)
RBC # BLD AUTO: 4.17 MILL/MM3 (ref 4.7–6.1)
SALICYLATES SERPL-MCNC: 2.2 MG/DL (ref 2–10)
SAO2 % BLDMV: 90 %
SEGMENTED NEUTROPHILS ABSOLUTE COUNT: 6.5 THOU/MM3 (ref 1.8–7.7)
SODIUM SERPL-SCNC: 144 MEQ/L (ref 135–145)
TROPONIN, HIGH SENSITIVITY: 19 NG/L (ref 0–12)
WBC # BLD AUTO: 12 THOU/MM3 (ref 4.8–10.8)

## 2023-08-31 PROCEDURE — 6360000002 HC RX W HCPCS: Performed by: EMERGENCY MEDICINE

## 2023-08-31 PROCEDURE — 93005 ELECTROCARDIOGRAM TRACING: CPT | Performed by: EMERGENCY MEDICINE

## 2023-08-31 PROCEDURE — 84484 ASSAY OF TROPONIN QUANT: CPT

## 2023-08-31 PROCEDURE — 36415 COLL VENOUS BLD VENIPUNCTURE: CPT

## 2023-08-31 PROCEDURE — 80053 COMPREHEN METABOLIC PANEL: CPT

## 2023-08-31 PROCEDURE — 6360000002 HC RX W HCPCS

## 2023-08-31 PROCEDURE — 82077 ASSAY SPEC XCP UR&BREATH IA: CPT

## 2023-08-31 PROCEDURE — 85025 COMPLETE CBC W/AUTO DIFF WBC: CPT

## 2023-08-31 PROCEDURE — 96374 THER/PROPH/DIAG INJ IV PUSH: CPT

## 2023-08-31 PROCEDURE — 80179 DRUG ASSAY SALICYLATE: CPT

## 2023-08-31 PROCEDURE — 99285 EMERGENCY DEPT VISIT HI MDM: CPT

## 2023-08-31 PROCEDURE — 2580000003 HC RX 258: Performed by: EMERGENCY MEDICINE

## 2023-08-31 PROCEDURE — 71045 X-RAY EXAM CHEST 1 VIEW: CPT

## 2023-08-31 PROCEDURE — 1200000000 HC SEMI PRIVATE

## 2023-08-31 PROCEDURE — 96376 TX/PRO/DX INJ SAME DRUG ADON: CPT

## 2023-08-31 PROCEDURE — 82803 BLOOD GASES ANY COMBINATION: CPT

## 2023-08-31 PROCEDURE — 83874 ASSAY OF MYOGLOBIN: CPT

## 2023-08-31 PROCEDURE — 80143 DRUG ASSAY ACETAMINOPHEN: CPT

## 2023-08-31 PROCEDURE — 82550 ASSAY OF CK (CPK): CPT

## 2023-08-31 RX ORDER — ONDANSETRON 2 MG/ML
4 INJECTION INTRAMUSCULAR; INTRAVENOUS EVERY 6 HOURS PRN
Status: CANCELLED | OUTPATIENT
Start: 2023-08-31

## 2023-08-31 RX ORDER — LORAZEPAM 2 MG/ML
2 INJECTION INTRAMUSCULAR ONCE
Status: COMPLETED | OUTPATIENT
Start: 2023-08-31 | End: 2023-08-31

## 2023-08-31 RX ORDER — SODIUM CHLORIDE 0.9 % (FLUSH) 0.9 %
5-40 SYRINGE (ML) INJECTION EVERY 12 HOURS SCHEDULED
Status: CANCELLED | OUTPATIENT
Start: 2023-08-31

## 2023-08-31 RX ORDER — PREGABALIN 50 MG/1
200 CAPSULE ORAL 3 TIMES DAILY
Status: CANCELLED | OUTPATIENT
Start: 2023-08-31

## 2023-08-31 RX ORDER — MAGNESIUM SULFATE IN WATER 40 MG/ML
2000 INJECTION, SOLUTION INTRAVENOUS PRN
Status: CANCELLED | OUTPATIENT
Start: 2023-08-31

## 2023-08-31 RX ORDER — ACETAMINOPHEN 650 MG/1
650 SUPPOSITORY RECTAL EVERY 6 HOURS PRN
Status: CANCELLED | OUTPATIENT
Start: 2023-08-31

## 2023-08-31 RX ORDER — ENOXAPARIN SODIUM 100 MG/ML
40 INJECTION SUBCUTANEOUS DAILY
Status: CANCELLED | OUTPATIENT
Start: 2023-09-01

## 2023-08-31 RX ORDER — LORAZEPAM 2 MG/ML
1 INJECTION INTRAMUSCULAR
Status: CANCELLED | OUTPATIENT
Start: 2023-08-31

## 2023-08-31 RX ORDER — POTASSIUM CHLORIDE 20 MEQ/1
40 TABLET, EXTENDED RELEASE ORAL PRN
Status: CANCELLED | OUTPATIENT
Start: 2023-08-31

## 2023-08-31 RX ORDER — IBUPROFEN 600 MG/1
1 TABLET ORAL PRN
Status: CANCELLED | OUTPATIENT
Start: 2023-08-31

## 2023-08-31 RX ORDER — LORAZEPAM 2 MG/ML
3 INJECTION INTRAMUSCULAR
Status: CANCELLED | OUTPATIENT
Start: 2023-08-31

## 2023-08-31 RX ORDER — LORAZEPAM 1 MG/1
4 TABLET ORAL
Status: CANCELLED | OUTPATIENT
Start: 2023-08-31

## 2023-08-31 RX ORDER — LORAZEPAM 2 MG/ML
2 INJECTION INTRAMUSCULAR
Status: CANCELLED | OUTPATIENT
Start: 2023-08-31

## 2023-08-31 RX ORDER — ACETAMINOPHEN 325 MG/1
650 TABLET ORAL EVERY 6 HOURS PRN
Status: CANCELLED | OUTPATIENT
Start: 2023-08-31

## 2023-08-31 RX ORDER — LORAZEPAM 1 MG/1
1 TABLET ORAL
Status: CANCELLED | OUTPATIENT
Start: 2023-08-31

## 2023-08-31 RX ORDER — POLYETHYLENE GLYCOL 3350 17 G/17G
17 POWDER, FOR SOLUTION ORAL DAILY PRN
Status: CANCELLED | OUTPATIENT
Start: 2023-08-31

## 2023-08-31 RX ORDER — SODIUM CHLORIDE 0.9 % (FLUSH) 0.9 %
5-40 SYRINGE (ML) INJECTION PRN
Status: CANCELLED | OUTPATIENT
Start: 2023-08-31

## 2023-08-31 RX ORDER — ONDANSETRON 4 MG/1
4 TABLET, ORALLY DISINTEGRATING ORAL EVERY 8 HOURS PRN
Status: CANCELLED | OUTPATIENT
Start: 2023-08-31

## 2023-08-31 RX ORDER — 0.9 % SODIUM CHLORIDE 0.9 %
1000 INTRAVENOUS SOLUTION INTRAVENOUS ONCE
Status: COMPLETED | OUTPATIENT
Start: 2023-08-31 | End: 2023-08-31

## 2023-08-31 RX ORDER — DEXTROSE AND SODIUM CHLORIDE 5; .45 G/100ML; G/100ML
INJECTION, SOLUTION INTRAVENOUS CONTINUOUS
Status: DISCONTINUED | OUTPATIENT
Start: 2023-08-31 | End: 2023-09-01 | Stop reason: HOSPADM

## 2023-08-31 RX ORDER — LORAZEPAM 2 MG/ML
4 INJECTION INTRAMUSCULAR
Status: CANCELLED | OUTPATIENT
Start: 2023-08-31

## 2023-08-31 RX ORDER — LORAZEPAM 2 MG/ML
INJECTION INTRAMUSCULAR
Status: COMPLETED
Start: 2023-08-31 | End: 2023-08-31

## 2023-08-31 RX ORDER — LORAZEPAM 1 MG/1
2 TABLET ORAL
Status: CANCELLED | OUTPATIENT
Start: 2023-08-31

## 2023-08-31 RX ORDER — SODIUM CHLORIDE 9 MG/ML
INJECTION, SOLUTION INTRAVENOUS CONTINUOUS
Status: CANCELLED | OUTPATIENT
Start: 2023-08-31 | End: 2023-09-01

## 2023-08-31 RX ORDER — DEXTROSE MONOHYDRATE 100 MG/ML
INJECTION, SOLUTION INTRAVENOUS CONTINUOUS PRN
Status: CANCELLED | OUTPATIENT
Start: 2023-08-31

## 2023-08-31 RX ORDER — LISINOPRIL 20 MG/1
20 TABLET ORAL DAILY
Status: CANCELLED | OUTPATIENT
Start: 2023-09-01

## 2023-08-31 RX ORDER — SODIUM CHLORIDE 9 MG/ML
INJECTION, SOLUTION INTRAVENOUS PRN
Status: CANCELLED | OUTPATIENT
Start: 2023-08-31

## 2023-08-31 RX ORDER — LORAZEPAM 1 MG/1
3 TABLET ORAL
Status: CANCELLED | OUTPATIENT
Start: 2023-08-31

## 2023-08-31 RX ORDER — POTASSIUM CHLORIDE 7.45 MG/ML
10 INJECTION INTRAVENOUS PRN
Status: CANCELLED | OUTPATIENT
Start: 2023-08-31

## 2023-08-31 RX ORDER — ARIPIPRAZOLE 5 MG/1
5 TABLET ORAL DAILY
Status: CANCELLED | OUTPATIENT
Start: 2023-09-01

## 2023-08-31 RX ORDER — VENLAFAXINE 50 MG/1
100 TABLET ORAL 3 TIMES DAILY
Status: CANCELLED | OUTPATIENT
Start: 2023-08-31

## 2023-08-31 RX ORDER — 0.9 % SODIUM CHLORIDE 0.9 %
1000 INTRAVENOUS SOLUTION INTRAVENOUS ONCE
Status: COMPLETED | OUTPATIENT
Start: 2023-08-31 | End: 2023-09-01

## 2023-08-31 RX ADMIN — SODIUM CHLORIDE 1000 ML: 9 INJECTION, SOLUTION INTRAVENOUS at 22:28

## 2023-08-31 RX ADMIN — LORAZEPAM 2 MG: 2 INJECTION INTRAMUSCULAR at 17:57

## 2023-08-31 RX ADMIN — LORAZEPAM 2 MG: 2 INJECTION INTRAMUSCULAR; INTRAVENOUS at 17:57

## 2023-08-31 RX ADMIN — LORAZEPAM 2 MG: 2 INJECTION INTRAMUSCULAR; INTRAVENOUS at 18:22

## 2023-08-31 RX ADMIN — LORAZEPAM 2 MG: 2 INJECTION INTRAMUSCULAR at 17:34

## 2023-08-31 RX ADMIN — LORAZEPAM 2 MG: 2 INJECTION INTRAMUSCULAR; INTRAVENOUS at 18:08

## 2023-08-31 RX ADMIN — SODIUM CHLORIDE 1000 ML: 9 INJECTION, SOLUTION INTRAVENOUS at 17:55

## 2023-08-31 RX ADMIN — LORAZEPAM 2 MG: 2 INJECTION INTRAMUSCULAR; INTRAVENOUS at 17:34

## 2023-08-31 RX ADMIN — DEXTROSE AND SODIUM CHLORIDE: 5; 450 INJECTION, SOLUTION INTRAVENOUS at 22:26

## 2023-08-31 ASSESSMENT — PAIN - FUNCTIONAL ASSESSMENT: PAIN_FUNCTIONAL_ASSESSMENT: NONE - DENIES PAIN

## 2023-08-31 NOTE — ED NOTES
Patient lying supine on bed. RR easy and unlabored. No distress noted. Call light in reach.      Tavo Gamble RN  08/31/23 1235

## 2023-08-31 NOTE — ED PROVIDER NOTES
[address] ENCOUNTER        PATIENT NAME: Nikunj Basilio  MRN: [id number]  : 1985  WEIR: 2023  PROVIDER: En Streeter MD    CHIEF COMPLAINT       Chief Complaint   Patient presents with    Addiction Problem       Patient is seen and evaluated in a timely fashion. Nurses Notes are reviewed and I agree except as noted in the HPI. HISTORY OF PRESENT ILLNESS   Nikunj Basilio is a 43 y.o. male who presents to Emergency Department with Addiction Problem     A 27-year-old male with PMH of anxiety, depression, opiate abuse, and meth abuse is brought in by  for agitation and abnormal behavior. He was found acting erratically in the [employer] parking lot. He admits that he used meth this afternoon. He was agitated and restless on arrival.  He was sweating profusely. He complains generalized discomfort. He was febrile on arrival with temp 100.7. No trauma is noticed. No SOB. No nausea and no vomiting. Multiple recent ED admissions including 1 ICU admission after intubation over last month secondary to meth abuse and rhabdomyolysis. Most recent ED visit 2023 at [facility] and the patient signed AMA after he gained sobriety. This HPI was provided by patient. PAST MEDICAL HISTORY    has a past medical history of Anxiety, Depression, Kidney stone, Liver disease, and Opiate abuse, continuous ([address] SURGICAL HISTORY      has no past surgical history on file. CURRENT MEDICATIONS       Discharge Medication List as of 2023 12:36 AM        CONTINUE these medications which have NOT CHANGED    Details   pregabalin (LYRICA) 200 MG capsule Take 1 capsule by mouth in the morning, at noon, and at bedtime for 30 days. Max Daily Amount: 600 mg, Disp-90 capsule, R-0Normal      lisdexamfetamine (VYVANSE) 70 MG capsule Take 1 capsule by mouth every morning for 30 days.  Max Daily Amount: 70 mg, Disp-30 capsule, R-0Normal      ARIPiprazole Eosinophils 1.3 %    Basophils 0.5 %    Immature Granulocytes 0.3 %    Segs Absolute 6.5 1.8 - 7.7 thou/mm3    Lymphocytes Absolute 4.0 1.0 - 4.8 thou/mm3    Monocytes Absolute 1.2 0.4 - 1.3 thou/mm3    Eosinophils Absolute 0.2 0.0 - 0.4 thou/mm3    Basophils Absolute 0.1 0.0 - 0.1 thou/mm3    Immature Grans (Abs) 0.04 0.00 - 0.07 thou/mm3    nRBC 0 /100 wbc   Comprehensive Metabolic Panel   Result Value Ref Range    Glucose 114 (H) 70 - 108 mg/dL    Creatinine 1.3 (H) 0.4 - 1.2 mg/dL    BUN 20 7 - 22 mg/dL    Sodium 144 135 - 145 meq/L    Potassium 4.0 3.5 - 5.2 meq/L    Chloride 108 98 - 111 meq/L    CO2 22 (L) 23 - 33 meq/L    Calcium 10.1 8.5 - 10.5 mg/dL    AST 69 (H) 5 - 40 U/L    Alkaline Phosphatase 112 38 - 126 U/L    Total Protein 8.1 (H) 6.1 - 8.0 g/dL    Albumin 4.6 3.5 - 5.1 g/dL    Total Bilirubin 0.3 0.3 - 1.2 mg/dL    ALT 44 11 - 66 U/L   CK   Result Value Ref Range    Total CK 1,773 (H) 55 - 170 U/L   Troponin   Result Value Ref Range    Troponin, High Sensitivity 19 (H) 0 - 12 ng/L   Acetaminophen Level   Result Value Ref Range    Acetaminophen Level < 5.0 0.0 - 16.9 ug/mL   Salicylate   Result Value Ref Range    Salicylate, Serum 2.2 2.0 - 10.0 mg/dL   Ethanol   Result Value Ref Range    ETHYL ALCOHOL, SERUM < 0.01 0.00 %   Blood gas, venous   Result Value Ref Range    PH MIXED 7.31 7.31 - 7.41    PCO2, MIXED VENOUS 40 (L) 41 - 51 mmhg    PO2, Mixed 65 (H) 25 - 40 mmhg    HCO3, Mixed 20 (L) 23 - 28 mmol/l    Base Exc, Mixed -5.8 (L) -2.0 - 3.0 mmol/l    O2 Sat, Mixed 90 %    COLLECTED BY: [id number]    Anion Gap   Result Value Ref Range    Anion Gap 14.0 8.0 - 16.0 meq/L   Glomerular Filtration Rate, Estimated   Result Value Ref Range    Est, Glom Filt Rate >60 >60 ml/min/1.73m2   Osmolality   Result Value Ref Range    Osmolality Calc 290.3 275.0 - 300.0 mOsmol/kg   EKG Emergency   Result Value Ref Range    Ventricular Rate 121 BPM    Atrial Rate 121 BPM    P-R Interval 172 ms    QRS Duration 84 ms

## 2023-09-01 LAB
EKG ATRIAL RATE: 121 BPM
EKG P AXIS: 65 DEGREES
EKG P-R INTERVAL: 172 MS
EKG Q-T INTERVAL: 316 MS
EKG QRS DURATION: 84 MS
EKG QTC CALCULATION (BAZETT): 448 MS
EKG R AXIS: 28 DEGREES
EKG T AXIS: 51 DEGREES
EKG VENTRICULAR RATE: 121 BPM

## 2023-09-01 PROCEDURE — 93010 ELECTROCARDIOGRAM REPORT: CPT | Performed by: NUCLEAR MEDICINE

## 2023-09-01 NOTE — ED NOTES
Attempted to contact patients listed domestic partner to ask about patient whereabouts. Number is not valid. Room has been checked several times to see if patient has returned. Patient is still not in room and appears to have eloped.  Will notify admitting provider Dr. Uriah Palma RN  09/01/23 4079

## 2023-09-01 NOTE — ED NOTES
Patient lying in bed with right arm hanging through side rails off bed. RR easy and unlabored. No distress noted. Patient arousable when stimulating. Call light in reach. Bed alarm activated for patient safety.      Gordon Troncoso RN  08/31/23 2031

## 2023-09-01 NOTE — ED NOTES
Bedside report received from Zuleyka, [address] [place] [address] Patient resting in bed. Respirations easy and unlabored. No distress noted. Call light within reach.        Balta Clemente RN  08/31/23 8742

## 2023-09-01 NOTE — H&P
History & Physical       Patient: Nadja Albert  YOB: 1985    MRN: [id number]     Acct: [de-identified]    PCP: PAPA Hilton CNP    Date of Admission: 8/31/2023    Date of Service: Patient seen / examined on 08/31/23 and admitted to Inpatient with expected LOS less than two midnights due to medical therapy. ASSESSMENT / PLAN:  Rhabdomyolysis: Likely secondary to methamphetamine use, see below.  8/29 and 1773 8/31, following methamphetamine use. Mild SATYA, relatively improving. Electrolytes WNL. S/p 2 L NS. Continue aggressive IVF. Repeat CK daily. On telemetry, monitor for arrhythmias. Methamphetamine intoxication: Last known use 8/31. Check UDS. POA agitation. Continue Ativan as needed for agitation. SATYA: Likely secondary to prerenal etiology in setting of decreased p.o. oral intake and polysubstance abuse. Baseline Cr 0.9-1.0. Initial Cr 1.3, however this improved from recent trend. On IVF. Check UA. Avoid nephrotoxic agents. Monitor renal function with daily BMP. Leukocytosis: Likely reactive. Low suspicion for infectious process. Check UA with reflex to culture. Prior blood cultures pending. Defer antibiotics. Monitor WBC with daily CBC. HTN: Held home lisinopril in setting of SATYA,  See above. Monitor BP. Hepatic steatosis: Suspect secondary to alcoholism vs fatty liver disease. Hepatitis C positive. Stable hepatomegaly and hepatic steatosis on CTA A/P 8/29. Initial LFTs AST 69. Encouraged alcohol cessation and advised outpatient GI follow-up. Anxiety/depression: On multiple agents. Initial EKG QTc 448. Continue home Lyrica 200 mg p.o. 3 times daily, Abilify 5 mg p.o. daily, venlafaxine 100 mg p.o. 3 times daily and mirtazapine 40 mg p.o. daily. Placed on telemetry to monitor QTc.       Chief Complaint: Addiction problem    History of Present Illness:  43 y.o. male with PMH of polysubstance abuse and anxiety/depression who presented to [facility] All CT scans at this facility use dose modulation, iterative reconstruction, and/or weight based dosing when appropriate to reduce the radiation dose to as low as reasonably achievable. CONTRAST: 80 cc Isovue-370. FINDINGS: Aorta: The thoracic and abdominal aorta are normal course and caliber. No aortic aneurysm or dissection is present. The aortic arch and branch vessels are unremarkable. The mesenteric branch vessels are widely patent. The renal artery origins are unremarkable. The common iliac, external, and internal iliac arteries are patent and unremarkable. Heart/mediastinum: The thyroid gland is unremarkable. The heart size is normal. No pericardial effusion is observed. Although not specifically tailored for coronary arterial filling defects, no central pulmonary arterial filling defect is visualized. Bilateral axillary lymph nodes are prominent measuring up to 11 mm in short axis. No mediastinal or hilar lymphadenopathy is observed. Lungs: Dependent atelectasis is noted at the lung bases. Scattered bilateral patchy groundglass opacities are most pronounced at the lung bases. No pleural effusion or pneumothorax is observed. Liver/gallbladder/bilary tree: Hepatomegaly and hepatic steatosis are unchanged. No radiopaque gallstones or biliary ductal dilatation is identified. No liver lesions are observed. Pancreas: Normal. Spleen : Mildly enlarged measuring 12.5 cm in length, unchanged. Adrenal glands: Normal. Kidneys/ ureters/ bladder: No renal calculus, hydronephrosis, or hydroureter is visualized. The urinary bladder is unremarkable. Gastrointestinal:  The appendix is normal. No bowel obstruction, free fluid, fluid collection, or free air is observed. Moderate retained fecal material is seen throughout the colon. Retroperitoneum / lymph nodes: No lymphadenopathy is visualized. Pelvis: The prostate gland is not enlarged. Musculoskeletal: The visualized skeletal structures appear intact.      1. The thoracic and

## 2023-09-01 NOTE — ED NOTES
Pt resting in bed. Fluids administered. No distress noted, pt is drowsy and unable to answer questions. Call light within reach. Swallow screen completed.        Ketty Zhou RN  08/31/23 2553       Ketty Zhou RN  08/31/23 CHRIS Nicholas  09/01/23 4647

## 2023-09-01 NOTE — ED NOTES
LPN notified this nurse that pt is not in room. Pt appeared to elope.       Sudha Barksdale RN  08/31/23 9003

## 2023-09-01 NOTE — ED NOTES
Went to transport pt to  30 and pt was gone from room. Looked in lobby and he was not there, no one has seen him.      Varun Whaley, DEIRDREN  46/40/75 1973

## 2023-09-02 LAB — MYOGLOBIN SERPL-MCNC: 165 NG/ML

## 2023-09-03 LAB
BACTERIA BLD AEROBE CULT: NORMAL
BACTERIA BLD AEROBE CULT: NORMAL

## 2023-09-11 ENCOUNTER — APPOINTMENT (OUTPATIENT)
Dept: CT IMAGING | Age: 38
End: 2023-09-11
Payer: COMMERCIAL

## 2023-09-11 ENCOUNTER — HOSPITAL ENCOUNTER (EMERGENCY)
Age: 38
Discharge: HOME | End: 2023-09-11
Attending: EMERGENCY MEDICINE
Payer: COMMERCIAL

## 2023-09-11 ENCOUNTER — APPOINTMENT (OUTPATIENT)
Dept: GENERAL RADIOLOGY | Age: 38
End: 2023-09-11
Payer: COMMERCIAL

## 2023-09-11 VITALS
RESPIRATION RATE: 18 BRPM | SYSTOLIC BLOOD PRESSURE: 154 MMHG | WEIGHT: 215 LBS | HEART RATE: 74 BPM | HEIGHT: 70 IN | TEMPERATURE: 98.9 F | BODY MASS INDEX: 30.78 KG/M2 | DIASTOLIC BLOOD PRESSURE: 97 MMHG | OXYGEN SATURATION: 99 %

## 2023-09-11 DIAGNOSIS — F19.90 SUBSTANCE USE DISORDER: Primary | ICD-10-CM

## 2023-09-11 LAB
ALBUMIN SERPL BCG-MCNC: 3.5 G/DL (ref 3.5–5.1)
ALP SERPL-CCNC: 95 U/L (ref 38–126)
ALT SERPL W/O P-5'-P-CCNC: 40 U/L (ref 11–66)
AMPHETAMINES UR QL SCN: NEGATIVE
ANION GAP SERPL CALC-SCNC: 11 MEQ/L (ref 8–16)
APAP SERPL-MCNC: < 5 UG/ML (ref 0–20)
AST SERPL-CCNC: 35 U/L (ref 5–40)
BARBITURATES UR QL SCN: NEGATIVE
BASE EXCESS BLDA CALC-SCNC: 6.2 MMOL/L (ref -2–3)
BASOPHILS ABSOLUTE: 0 THOU/MM3 (ref 0–0.1)
BASOPHILS NFR BLD AUTO: 0.4 %
BENZODIAZ UR QL SCN: NEGATIVE
BILIRUB SERPL-MCNC: < 0.2 MG/DL (ref 0.3–1.2)
BILIRUB UR QL STRIP.AUTO: NEGATIVE
BUN SERPL-MCNC: 13 MG/DL (ref 7–22)
BZE UR QL SCN: POSITIVE
CALCIUM SERPL-MCNC: 9 MG/DL (ref 8.5–10.5)
CANNABINOIDS UR QL SCN: NEGATIVE
CHARACTER UR: CLEAR
CHLORIDE SERPL-SCNC: 105 MEQ/L (ref 98–111)
CK SERPL-CCNC: 270 U/L (ref 55–170)
CO2 SERPL-SCNC: 25 MEQ/L (ref 23–33)
COLLECTED BY:: ABNORMAL
COLOR: YELLOW
CREAT SERPL-MCNC: 0.8 MG/DL (ref 0.4–1.2)
DEPRECATED RDW RBC AUTO: 51.6 FL (ref 35–45)
EOSINOPHIL NFR BLD AUTO: 4.2 %
EOSINOPHILS ABSOLUTE: 0.2 THOU/MM3 (ref 0–0.4)
ERYTHROCYTE [DISTWIDTH] IN BLOOD BY AUTOMATED COUNT: 17 % (ref 11.5–14.5)
ETHANOL SERPL-MCNC: < 0.01 %
FENTANYL: POSITIVE
FLUAV RNA RESP QL NAA+PROBE: NOT DETECTED
FLUBV RNA RESP QL NAA+PROBE: NOT DETECTED
GFR SERPL CREATININE-BSD FRML MDRD: > 60 ML/MIN/1.73M2
GLUCOSE SERPL-MCNC: 113 MG/DL (ref 70–108)
GLUCOSE UR QL STRIP.AUTO: NEGATIVE MG/DL
HCO3 BLDA-SCNC: 29 MMOL/L (ref 23–28)
HCT VFR BLD AUTO: 30 % (ref 42–52)
HGB BLD-MCNC: 9.1 GM/DL (ref 14–18)
HGB UR QL STRIP.AUTO: NEGATIVE
IMM GRANULOCYTES # BLD AUTO: 0.01 THOU/MM3 (ref 0–0.07)
IMM GRANULOCYTES NFR BLD AUTO: 0.2 %
KETONES UR QL STRIP.AUTO: NEGATIVE
LACTIC ACID, SEPSIS: 1.6 MMOL/L (ref 0.5–1.9)
LIPASE SERPL-CCNC: 10.1 U/L (ref 5.6–51.3)
LYMPHOCYTES ABSOLUTE: 1.9 THOU/MM3 (ref 1–4.8)
LYMPHOCYTES NFR BLD AUTO: 37.6 %
MAGNESIUM SERPL-MCNC: 2 MG/DL (ref 1.6–2.4)
MCH RBC QN AUTO: 25.5 PG (ref 26–33)
MCHC RBC AUTO-ENTMCNC: 30.3 GM/DL (ref 32.2–35.5)
MCV RBC AUTO: 84 FL (ref 80–94)
MONOCYTES ABSOLUTE: 0.6 THOU/MM3 (ref 0.4–1.3)
MONOCYTES NFR BLD AUTO: 10.9 %
NEUTROPHILS NFR BLD AUTO: 46.7 %
NITRITE UR QL STRIP: NEGATIVE
NRBC BLD AUTO-RTO: 0 /100 WBC
OPIATES UR QL SCN: NEGATIVE
OSMOLALITY SERPL CALC.SUM OF ELEC: 282.2 MOSMOL/KG (ref 275–300)
OXYCODONE: NEGATIVE
PCO2 TEMP ADJ BLDMV: 34 MMHG (ref 41–51)
PCP UR QL SCN: NEGATIVE
PH BLDMV: 7.54 [PH] (ref 7.31–7.41)
PH UR STRIP.AUTO: 7 [PH] (ref 5–9)
PLATELET # BLD AUTO: 280 THOU/MM3 (ref 130–400)
PMV BLD AUTO: 9.7 FL (ref 9.4–12.4)
PO2 BLDMV: 140 MMHG (ref 25–40)
POTASSIUM SERPL-SCNC: 4.2 MEQ/L (ref 3.5–5.2)
PROT SERPL-MCNC: 6.2 G/DL (ref 6.1–8)
PROT UR STRIP.AUTO-MCNC: NEGATIVE MG/DL
RBC # BLD AUTO: 3.57 MILL/MM3 (ref 4.7–6.1)
SALICYLATES SERPL-MCNC: 1.8 MG/DL (ref 2–10)
SAO2 % BLDMV: 99 %
SARS-COV-2 RNA RESP QL NAA+PROBE: NOT DETECTED
SEGMENTED NEUTROPHILS ABSOLUTE COUNT: 2.4 THOU/MM3 (ref 1.8–7.7)
SODIUM SERPL-SCNC: 141 MEQ/L (ref 135–145)
SP GR UR REFRACT.AUTO: 1.01 (ref 1–1.03)
TROPONIN, HIGH SENSITIVITY: 25 NG/L (ref 0–12)
TROPONIN, HIGH SENSITIVITY: 26 NG/L (ref 0–12)
TSH SERPL DL<=0.005 MIU/L-ACNC: 1.26 UIU/ML (ref 0.4–4.2)
UROBILINOGEN, URINE: 0.2 EU/DL (ref 0–1)
WBC # BLD AUTO: 5.1 THOU/MM3 (ref 4.8–10.8)
WBC #/AREA URNS HPF: NEGATIVE /[HPF]

## 2023-09-11 PROCEDURE — 81003 URINALYSIS AUTO W/O SCOPE: CPT

## 2023-09-11 PROCEDURE — 96361 HYDRATE IV INFUSION ADD-ON: CPT

## 2023-09-11 PROCEDURE — 85025 COMPLETE CBC W/AUTO DIFF WBC: CPT

## 2023-09-11 PROCEDURE — 80053 COMPREHEN METABOLIC PANEL: CPT

## 2023-09-11 PROCEDURE — 83605 ASSAY OF LACTIC ACID: CPT

## 2023-09-11 PROCEDURE — 82077 ASSAY SPEC XCP UR&BREATH IA: CPT

## 2023-09-11 PROCEDURE — 82550 ASSAY OF CK (CPK): CPT

## 2023-09-11 PROCEDURE — 6360000002 HC RX W HCPCS

## 2023-09-11 PROCEDURE — 2580000003 HC RX 258: Performed by: EMERGENCY MEDICINE

## 2023-09-11 PROCEDURE — 84484 ASSAY OF TROPONIN QUANT: CPT

## 2023-09-11 PROCEDURE — 74176 CT ABD & PELVIS W/O CONTRAST: CPT

## 2023-09-11 PROCEDURE — 83735 ASSAY OF MAGNESIUM: CPT

## 2023-09-11 PROCEDURE — 96375 TX/PRO/DX INJ NEW DRUG ADDON: CPT

## 2023-09-11 PROCEDURE — 93005 ELECTROCARDIOGRAM TRACING: CPT

## 2023-09-11 PROCEDURE — 84443 ASSAY THYROID STIM HORMONE: CPT

## 2023-09-11 PROCEDURE — 36415 COLL VENOUS BLD VENIPUNCTURE: CPT

## 2023-09-11 PROCEDURE — 83690 ASSAY OF LIPASE: CPT

## 2023-09-11 PROCEDURE — 96374 THER/PROPH/DIAG INJ IV PUSH: CPT

## 2023-09-11 PROCEDURE — 80143 DRUG ASSAY ACETAMINOPHEN: CPT

## 2023-09-11 PROCEDURE — 82803 BLOOD GASES ANY COMBINATION: CPT

## 2023-09-11 PROCEDURE — 80307 DRUG TEST PRSMV CHEM ANLYZR: CPT

## 2023-09-11 PROCEDURE — 71045 X-RAY EXAM CHEST 1 VIEW: CPT

## 2023-09-11 PROCEDURE — 93010 ELECTROCARDIOGRAM REPORT: CPT | Performed by: INTERNAL MEDICINE

## 2023-09-11 PROCEDURE — 80179 DRUG ASSAY SALICYLATE: CPT

## 2023-09-11 PROCEDURE — 87636 SARSCOV2 & INF A&B AMP PRB: CPT

## 2023-09-11 PROCEDURE — 99285 EMERGENCY DEPT VISIT HI MDM: CPT

## 2023-09-11 RX ORDER — LORAZEPAM 2 MG/ML
2 INJECTION INTRAMUSCULAR
Status: DISCONTINUED | OUTPATIENT
Start: 2023-09-11 | End: 2023-09-11 | Stop reason: HOSPADM

## 2023-09-11 RX ORDER — DROPERIDOL 2.5 MG/ML
0.62 INJECTION, SOLUTION INTRAMUSCULAR; INTRAVENOUS EVERY 6 HOURS PRN
Status: DISCONTINUED | OUTPATIENT
Start: 2023-09-11 | End: 2023-09-11 | Stop reason: HOSPADM

## 2023-09-11 RX ORDER — 0.9 % SODIUM CHLORIDE 0.9 %
1000 INTRAVENOUS SOLUTION INTRAVENOUS ONCE
Status: COMPLETED | OUTPATIENT
Start: 2023-09-11 | End: 2023-09-11

## 2023-09-11 RX ADMIN — LORAZEPAM 2 MG: 2 INJECTION INTRAMUSCULAR; INTRAVENOUS at 12:36

## 2023-09-11 RX ADMIN — SODIUM CHLORIDE 1000 ML: 9 INJECTION, SOLUTION INTRAVENOUS at 12:07

## 2023-09-11 RX ADMIN — DROPERIDOL 0.62 MG: 2.5 INJECTION, SOLUTION INTRAMUSCULAR; INTRAVENOUS at 12:37

## 2023-09-11 ASSESSMENT — PAIN SCALES - GENERAL: PAINLEVEL_OUTOF10: 10

## 2023-09-11 ASSESSMENT — PAIN - FUNCTIONAL ASSESSMENT
PAIN_FUNCTIONAL_ASSESSMENT: NONE - DENIES PAIN
PAIN_FUNCTIONAL_ASSESSMENT: NONE - DENIES PAIN
PAIN_FUNCTIONAL_ASSESSMENT: 0-10

## 2023-09-11 ASSESSMENT — PAIN DESCRIPTION - ORIENTATION: ORIENTATION: LEFT;RIGHT

## 2023-09-11 ASSESSMENT — PAIN DESCRIPTION - LOCATION: LOCATION: FLANK

## 2023-09-11 NOTE — ED NOTES
Pt presents to the ED with complaints of bilateral flank pain. Pt state she is concerned about his kidney due to recent fentanyl use. Pt denies any meth use. Pt is thrashing all over bed. Pt respirations are even and unlabored.       Lamonte Tracy RN  09/11/23 5160

## 2023-09-11 NOTE — ED NOTES
Pt resting on cot with eyes closed. Pt respirations are even and unlabored.       Gerry Olszewski, RN  09/11/23 8617

## 2023-09-11 NOTE — ED NOTES
RN turned on lights to attempt to obtain urine sample. Pt unable to urinate at this time.       Jael Sanford RN  09/11/23 8563

## 2023-09-14 LAB
EKG ATRIAL RATE: 75 BPM
EKG P AXIS: 55 DEGREES
EKG P-R INTERVAL: 226 MS
EKG Q-T INTERVAL: 374 MS
EKG QRS DURATION: 92 MS
EKG QTC CALCULATION (BAZETT): 417 MS
EKG R AXIS: 23 DEGREES
EKG T AXIS: 41 DEGREES
EKG VENTRICULAR RATE: 75 BPM

## 2023-09-19 ENCOUNTER — CLINICAL DOCUMENTATION (OUTPATIENT)
Dept: INTERNAL MEDICINE CLINIC | Age: 38
End: 2023-09-19

## 2023-09-26 ENCOUNTER — OFFICE VISIT (OUTPATIENT)
Dept: INTERNAL MEDICINE CLINIC | Age: 38
End: 2023-09-26
Payer: COMMERCIAL

## 2023-09-26 VITALS
HEART RATE: 89 BPM | BODY MASS INDEX: 27.77 KG/M2 | WEIGHT: 194 LBS | DIASTOLIC BLOOD PRESSURE: 73 MMHG | SYSTOLIC BLOOD PRESSURE: 125 MMHG | HEIGHT: 70 IN

## 2023-09-26 DIAGNOSIS — F41.9 ANXIETY AND DEPRESSION: ICD-10-CM

## 2023-09-26 DIAGNOSIS — G47.00 INSOMNIA, UNSPECIFIED TYPE: ICD-10-CM

## 2023-09-26 DIAGNOSIS — I10 ESSENTIAL HYPERTENSION: ICD-10-CM

## 2023-09-26 DIAGNOSIS — F32.A ANXIETY AND DEPRESSION: ICD-10-CM

## 2023-09-26 DIAGNOSIS — F11.20 SEVERE OPIOID USE DISORDER (HCC): Primary | ICD-10-CM

## 2023-09-26 DIAGNOSIS — F90.9 ATTENTION DEFICIT HYPERACTIVITY DISORDER (ADHD), UNSPECIFIED ADHD TYPE: ICD-10-CM

## 2023-09-26 PROCEDURE — 3078F DIAST BP <80 MM HG: CPT | Performed by: NURSE PRACTITIONER

## 2023-09-26 PROCEDURE — G8428 CUR MEDS NOT DOCUMENT: HCPCS | Performed by: NURSE PRACTITIONER

## 2023-09-26 PROCEDURE — 4004F PT TOBACCO SCREEN RCVD TLK: CPT | Performed by: NURSE PRACTITIONER

## 2023-09-26 PROCEDURE — 99215 OFFICE O/P EST HI 40 MIN: CPT | Performed by: NURSE PRACTITIONER

## 2023-09-26 PROCEDURE — 1111F DSCHRG MED/CURRENT MED MERGE: CPT | Performed by: NURSE PRACTITIONER

## 2023-09-26 PROCEDURE — G8417 CALC BMI ABV UP PARAM F/U: HCPCS | Performed by: NURSE PRACTITIONER

## 2023-09-26 PROCEDURE — 80305 DRUG TEST PRSMV DIR OPT OBS: CPT | Performed by: NURSE PRACTITIONER

## 2023-09-26 PROCEDURE — 3074F SYST BP LT 130 MM HG: CPT | Performed by: NURSE PRACTITIONER

## 2023-09-26 RX ORDER — LISINOPRIL 20 MG/1
20 TABLET ORAL DAILY
Qty: 30 TABLET | Refills: 3 | Status: SHIPPED | OUTPATIENT
Start: 2023-09-26

## 2023-09-26 RX ORDER — CLONAZEPAM 0.5 MG/1
0.5 TABLET ORAL NIGHTLY PRN
Qty: 7 TABLET | Refills: 0 | Status: SHIPPED | OUTPATIENT
Start: 2023-09-26 | End: 2023-10-09 | Stop reason: SDUPTHER

## 2023-09-26 RX ORDER — PREGABALIN 200 MG/1
200 CAPSULE ORAL 3 TIMES DAILY
Qty: 90 CAPSULE | Refills: 0 | Status: SHIPPED | OUTPATIENT
Start: 2023-09-26 | End: 2023-10-09 | Stop reason: SDUPTHER

## 2023-09-26 RX ORDER — VENLAFAXINE 100 MG/1
100 TABLET ORAL 3 TIMES DAILY
Qty: 90 TABLET | Refills: 3 | Status: SHIPPED | OUTPATIENT
Start: 2023-09-26

## 2023-09-26 RX ORDER — LISDEXAMFETAMINE DIMESYLATE CAPSULES 50 MG/1
50 CAPSULE ORAL EVERY MORNING
Qty: 30 CAPSULE | Refills: 0 | Status: SHIPPED | OUTPATIENT
Start: 2023-09-26 | End: 2023-10-09 | Stop reason: SDUPTHER

## 2023-09-26 RX ORDER — ARIPIPRAZOLE 5 MG/1
5 TABLET ORAL DAILY
Qty: 30 TABLET | Refills: 0 | Status: SHIPPED | OUTPATIENT
Start: 2023-09-26

## 2023-09-26 RX ORDER — MIRTAZAPINE 45 MG/1
45 TABLET, FILM COATED ORAL NIGHTLY
Qty: 30 TABLET | Refills: 3 | Status: SHIPPED | OUTPATIENT
Start: 2023-09-26

## 2023-09-26 ASSESSMENT — PATIENT HEALTH QUESTIONNAIRE - PHQ9
SUM OF ALL RESPONSES TO PHQ QUESTIONS 1-9: 2
SUM OF ALL RESPONSES TO PHQ9 QUESTIONS 1 & 2: 2
2. FEELING DOWN, DEPRESSED OR HOPELESS: 1
SUM OF ALL RESPONSES TO PHQ QUESTIONS 1-9: 2
1. LITTLE INTEREST OR PLEASURE IN DOING THINGS: 1

## 2023-09-26 NOTE — PROGRESS NOTES
[address] INTERNAL MEDICINE AND MEDICATION MANAGEMENT  [address] [facility]  [address] [facility] [address]  Dept: [phone]  Dept Fax: [phone]: [phone]     Visit Date:  9/26/2023    Patient:  Mary Mccormick  YOB: 1985    HPI:     Chief Complaint   Patient presents with    Drug Problem     Renetta Swain presents today for medical evaluation of ADHD, anxiety/depression/PTSD, chronic pain, HTN     I last seen him 2 months ago. He has been hospitalized 6 times since last visit related to his drug use    Scheduled to go to the methadone clinic Friday    States that he continues to relapse because he cannot sleep at night. He is requesting a benzodiazepine. Discussed at length with patient. For harm reduction, I agreed to prescribe at a low dose under the circumstance that he sustains from any illicit use. Understanding voiced. Urine positive for cocaine, fentanyl, MDMA, and THC    Last used this morning    Previously followed with Dr. Ash Alvarez for MAT. He looks awful     Discussed at length that he cannot continue to use illicit drugs and be on a stimulant. I agreed to prescribe under the agreement that use would stop, and as harm reduction; as he complained that the reason for his methamphetamine use was because his ADHD was not controlled. Guanfacine not helpful. Vyvanse has been the most effective. This has also helped him not use methamphetamines. Anxiety/depression well controlled with effexor and abilify; and counseling. Continue current regimen. He does have severe PTSD. Unable to work. Chronic pain controlled with pregabalin     BP well controlled with lisinopril 20 mg daily.  BP today in office 125/73      Medications    Current Outpatient Medications:     venlafaxine (EFFEXOR) 100 MG tablet, Take 1 tablet by mouth 3 times daily, Disp: 90 tablet, Rfl: 3    lisinopril (PRINIVIL;ZESTRIL) 20 MG tablet, Take 1 tablet by

## 2023-09-26 NOTE — PROGRESS NOTES
Patient updated PHQ-9 depression screening. Score: 10  Patient identifies the following symptoms, having little interest / pleasure in doing things, feeling down/ depressed, trouble falling asleep staying asleep, feeling tired/ having little energy, feeling bad about himself , letting himself/ others down, trouble concentrating, thoughts that he would be better off dead/ hurting himself. Provider updated. Paper copy scanned into chart. Sw was able to schedule transportation for patient to go to [facility] tomorrow. Sw will contact patient to update.

## 2023-09-27 ENCOUNTER — APPOINTMENT (OUTPATIENT)
Dept: GENERAL RADIOLOGY | Age: 38
DRG: 896 | End: 2023-09-27
Payer: COMMERCIAL

## 2023-09-27 ENCOUNTER — HOSPITAL ENCOUNTER (INPATIENT)
Age: 38
LOS: 7 days | Discharge: HOME | DRG: 896 | End: 2023-10-04
Attending: EMERGENCY MEDICINE | Admitting: INTERNAL MEDICINE
Payer: COMMERCIAL

## 2023-09-27 DIAGNOSIS — R45.1 RESTLESSNESS AND AGITATION: ICD-10-CM

## 2023-09-27 DIAGNOSIS — F19.10 POLYSUBSTANCE ABUSE (HCC): Primary | ICD-10-CM

## 2023-09-27 PROBLEM — R41.82 AMS (ALTERED MENTAL STATUS): Status: ACTIVE | Noted: 2023-09-27

## 2023-09-27 LAB
ALBUMIN SERPL BCG-MCNC: 4.6 G/DL (ref 3.5–5.1)
ALP SERPL-CCNC: 147 U/L (ref 38–126)
ALT SERPL W/O P-5'-P-CCNC: 60 U/L (ref 11–66)
AMPHETAMINES UR QL SCN: POSITIVE
ANION GAP SERPL CALC-SCNC: 16 MEQ/L (ref 8–16)
AST SERPL-CCNC: 60 U/L (ref 5–40)
BARBITURATES UR QL SCN: NEGATIVE
BENZODIAZ UR QL SCN: NEGATIVE
BILIRUB SERPL-MCNC: 0.4 MG/DL (ref 0.3–1.2)
BUN SERPL-MCNC: 29 MG/DL (ref 7–22)
BZE UR QL SCN: POSITIVE
CALCIUM SERPL-MCNC: 9.5 MG/DL (ref 8.5–10.5)
CANNABINOIDS UR QL SCN: POSITIVE
CHLORIDE SERPL-SCNC: 99 MEQ/L (ref 98–111)
CK SERPL-CCNC: 258 U/L (ref 55–170)
CO2 SERPL-SCNC: 26 MEQ/L (ref 23–33)
CREAT SERPL-MCNC: 1.8 MG/DL (ref 0.4–1.2)
CREAT UR-MCNC: 142.4 MG/DL
DEPRECATED RDW RBC AUTO: 51.3 FL (ref 35–45)
ERYTHROCYTE [DISTWIDTH] IN BLOOD BY AUTOMATED COUNT: 16.9 % (ref 11.5–14.5)
ETHANOL SERPL-MCNC: < 0.01 %
FENTANYL: POSITIVE
GFR SERPL CREATININE-BSD FRML MDRD: 48 ML/MIN/1.73M2
GLUCOSE BLD STRIP.AUTO-MCNC: 83 MG/DL (ref 70–108)
GLUCOSE SERPL-MCNC: 59 MG/DL (ref 70–108)
HCT VFR BLD AUTO: 37.4 % (ref 42–52)
HGB BLD-MCNC: 11.3 GM/DL (ref 14–18)
MCH RBC QN AUTO: 25.3 PG (ref 26–33)
MCHC RBC AUTO-ENTMCNC: 30.2 GM/DL (ref 32.2–35.5)
MCV RBC AUTO: 83.7 FL (ref 80–94)
MRSA DNA SPEC QL NAA+PROBE: POSITIVE
NT-PROBNP SERPL IA-MCNC: 395.1 PG/ML (ref 0–124)
OPIATES UR QL SCN: NEGATIVE
OSMOLALITY SERPL CALC.SUM OF ELEC: 284.9 MOSMOL/KG (ref 275–300)
OSMOLALITY UR: 637 MOSMOL/KG (ref 250–750)
OXYCODONE: NEGATIVE
PCP UR QL SCN: NEGATIVE
PLATELET # BLD AUTO: 409 THOU/MM3 (ref 130–400)
PMV BLD AUTO: 9.7 FL (ref 9.4–12.4)
POTASSIUM SERPL-SCNC: 5 MEQ/L (ref 3.5–5.2)
PROT SERPL-MCNC: 8.4 G/DL (ref 6.1–8)
RBC # BLD AUTO: 4.47 MILL/MM3 (ref 4.7–6.1)
SODIUM SERPL-SCNC: 141 MEQ/L (ref 135–145)
SODIUM UR-SCNC: 33 MEQ/L
TROPONIN, HIGH SENSITIVITY: 23 NG/L (ref 0–12)
UUN 24H UR-MCNC: 982 MG/DL
WBC # BLD AUTO: 22.3 THOU/MM3 (ref 4.8–10.8)

## 2023-09-27 PROCEDURE — 6360000002 HC RX W HCPCS

## 2023-09-27 PROCEDURE — 2580000003 HC RX 258

## 2023-09-27 PROCEDURE — 2500000003 HC RX 250 WO HCPCS: Performed by: EMERGENCY MEDICINE

## 2023-09-27 PROCEDURE — 80307 DRUG TEST PRSMV CHEM ANLYZR: CPT

## 2023-09-27 PROCEDURE — 31500 INSERT EMERGENCY AIRWAY: CPT

## 2023-09-27 PROCEDURE — 82077 ASSAY SPEC XCP UR&BREATH IA: CPT

## 2023-09-27 PROCEDURE — 2500000003 HC RX 250 WO HCPCS

## 2023-09-27 PROCEDURE — 51702 INSERT TEMP BLADDER CATH: CPT

## 2023-09-27 PROCEDURE — 94002 VENT MGMT INPAT INIT DAY: CPT

## 2023-09-27 PROCEDURE — 99285 EMERGENCY DEPT VISIT HI MDM: CPT

## 2023-09-27 PROCEDURE — 6360000002 HC RX W HCPCS: Performed by: EMERGENCY MEDICINE

## 2023-09-27 PROCEDURE — 99291 CRITICAL CARE FIRST HOUR: CPT | Performed by: INTERNAL MEDICINE

## 2023-09-27 PROCEDURE — 84484 ASSAY OF TROPONIN QUANT: CPT

## 2023-09-27 PROCEDURE — 80053 COMPREHEN METABOLIC PANEL: CPT

## 2023-09-27 PROCEDURE — 5A1955Z RESPIRATORY VENTILATION, GREATER THAN 96 CONSECUTIVE HOURS: ICD-10-PCS | Performed by: INTERNAL MEDICINE

## 2023-09-27 PROCEDURE — 87086 URINE CULTURE/COLONY COUNT: CPT

## 2023-09-27 PROCEDURE — 2000000000 HC ICU R&B

## 2023-09-27 PROCEDURE — 83880 ASSAY OF NATRIURETIC PEPTIDE: CPT

## 2023-09-27 PROCEDURE — 36415 COLL VENOUS BLD VENIPUNCTURE: CPT

## 2023-09-27 PROCEDURE — 84540 ASSAY OF URINE/UREA-N: CPT

## 2023-09-27 PROCEDURE — 82550 ASSAY OF CK (CPK): CPT

## 2023-09-27 PROCEDURE — 71045 X-RAY EXAM CHEST 1 VIEW: CPT

## 2023-09-27 PROCEDURE — 96374 THER/PROPH/DIAG INJ IV PUSH: CPT

## 2023-09-27 PROCEDURE — 82948 REAGENT STRIP/BLOOD GLUCOSE: CPT

## 2023-09-27 PROCEDURE — 84300 ASSAY OF URINE SODIUM: CPT

## 2023-09-27 PROCEDURE — 87070 CULTURE OTHR SPECIMN AEROBIC: CPT

## 2023-09-27 PROCEDURE — 87641 MR-STAPH DNA AMP PROBE: CPT

## 2023-09-27 PROCEDURE — 82570 ASSAY OF URINE CREATININE: CPT

## 2023-09-27 PROCEDURE — 85027 COMPLETE CBC AUTOMATED: CPT

## 2023-09-27 PROCEDURE — 83935 ASSAY OF URINE OSMOLALITY: CPT

## 2023-09-27 RX ORDER — PROPOFOL 10 MG/ML
5-50 INJECTION, EMULSION INTRAVENOUS CONTINUOUS
Status: DISCONTINUED | OUTPATIENT
Start: 2023-09-27 | End: 2023-09-27

## 2023-09-27 RX ORDER — KETAMINE HCL IN NACL, ISO-OSM 100MG/10ML
SYRINGE (ML) INJECTION
Status: COMPLETED
Start: 2023-09-27 | End: 2023-09-27

## 2023-09-27 RX ORDER — ETOMIDATE 2 MG/ML
20 INJECTION INTRAVENOUS ONCE
Status: COMPLETED | OUTPATIENT
Start: 2023-09-27 | End: 2023-09-27

## 2023-09-27 RX ORDER — DEXMEDETOMIDINE HYDROCHLORIDE 4 UG/ML
.1-1.5 INJECTION, SOLUTION INTRAVENOUS CONTINUOUS
Status: DISCONTINUED | OUTPATIENT
Start: 2023-09-27 | End: 2023-10-04 | Stop reason: HOSPADM

## 2023-09-27 RX ORDER — PROPOFOL 10 MG/ML
5-50 INJECTION, EMULSION INTRAVENOUS CONTINUOUS
Status: DISCONTINUED | OUTPATIENT
Start: 2023-09-27 | End: 2023-09-28

## 2023-09-27 RX ORDER — MORPHINE SULFATE 4 MG/ML
4 INJECTION, SOLUTION INTRAMUSCULAR; INTRAVENOUS ONCE
Status: COMPLETED | OUTPATIENT
Start: 2023-09-27 | End: 2023-09-27

## 2023-09-27 RX ORDER — KETAMINE HYDROCHLORIDE 10 MG/ML
1 INJECTION INTRAMUSCULAR; INTRAVENOUS ONCE
Status: COMPLETED | OUTPATIENT
Start: 2023-09-27 | End: 2023-09-27

## 2023-09-27 RX ORDER — ROCURONIUM BROMIDE 10 MG/ML
100 INJECTION, SOLUTION INTRAVENOUS ONCE
Status: COMPLETED | OUTPATIENT
Start: 2023-09-27 | End: 2023-09-27

## 2023-09-27 RX ORDER — KETAMINE HCL IN NACL, ISO-OSM 100MG/10ML
1 SYRINGE (ML) INJECTION ONCE
Status: COMPLETED | OUTPATIENT
Start: 2023-09-27 | End: 2023-09-27

## 2023-09-27 RX ORDER — SODIUM CHLORIDE, SODIUM LACTATE, POTASSIUM CHLORIDE, CALCIUM CHLORIDE 600; 310; 30; 20 MG/100ML; MG/100ML; MG/100ML; MG/100ML
INJECTION, SOLUTION INTRAVENOUS CONTINUOUS
Status: DISCONTINUED | OUTPATIENT
Start: 2023-09-27 | End: 2023-10-04 | Stop reason: HOSPADM

## 2023-09-27 RX ORDER — MORPHINE SULFATE 4 MG/ML
INJECTION, SOLUTION INTRAMUSCULAR; INTRAVENOUS
Status: COMPLETED
Start: 2023-09-27 | End: 2023-09-27

## 2023-09-27 RX ORDER — MIDAZOLAM HYDROCHLORIDE 1 MG/ML
4 INJECTION INTRAMUSCULAR; INTRAVENOUS ONCE
Status: COMPLETED | OUTPATIENT
Start: 2023-09-27 | End: 2023-09-27

## 2023-09-27 RX ADMIN — MORPHINE SULFATE 4 MG: 4 INJECTION, SOLUTION INTRAMUSCULAR; INTRAVENOUS at 15:52

## 2023-09-27 RX ADMIN — ROCURONIUM BROMIDE 100 MG: 10 INJECTION, SOLUTION INTRAVENOUS at 10:17

## 2023-09-27 RX ADMIN — Medication 88.8 MG: at 09:59

## 2023-09-27 RX ADMIN — PROPOFOL 50 MCG/KG/MIN: 10 INJECTION, EMULSION INTRAVENOUS at 18:37

## 2023-09-27 RX ADMIN — PROPOFOL 50 MCG/KG/MIN: 10 INJECTION, EMULSION INTRAVENOUS at 15:56

## 2023-09-27 RX ADMIN — PROPOFOL 20 MCG/KG/MIN: 10 INJECTION, EMULSION INTRAVENOUS at 10:31

## 2023-09-27 RX ADMIN — SODIUM CHLORIDE, POTASSIUM CHLORIDE, SODIUM LACTATE AND CALCIUM CHLORIDE: 600; 310; 30; 20 INJECTION, SOLUTION INTRAVENOUS at 18:36

## 2023-09-27 RX ADMIN — Medication 88 MG: at 10:06

## 2023-09-27 RX ADMIN — ETOMIDATE 20 MG: 20 INJECTION, SOLUTION INTRAVENOUS at 10:16

## 2023-09-27 RX ADMIN — MIDAZOLAM 4 MG: 1 INJECTION INTRAMUSCULAR; INTRAVENOUS at 13:31

## 2023-09-27 RX ADMIN — PROPOFOL 50 MCG/KG/MIN: 10 INJECTION, EMULSION INTRAVENOUS at 20:00

## 2023-09-27 RX ADMIN — Medication 0.2 MCG/KG/HR: at 20:32

## 2023-09-27 ASSESSMENT — PAIN SCALES - GENERAL: PAINLEVEL_OUTOF10: 9

## 2023-09-27 ASSESSMENT — PULMONARY FUNCTION TESTS
PIF_VALUE: 20
PIF_VALUE: 24

## 2023-09-27 NOTE — ED NOTES
Patient resting in bed on ventilator. Respirations easy and unlabored. No distress noted. Call light within reach.        Joshua Dhaliwal RN  09/27/23 3859

## 2023-09-27 NOTE — H&P
CRITICAL CARE PROGRESS NOTE      Patient:  Marley Nielsen    Unit/Bed:4D-15/015-A  YOB: 1985  MRN: [id number]   PCP: PAPA Crandall CNP  Date of Admission: 9/27/2023  Chief Complaint:-Drug use    Assessment and Plan:    Delirium: Patient presented to the ER with complaint of drug use and making erratic movements. Patient was intubated in the ER unknown reason why other than erratic movements. Intubated 9/27. Patient became agitated after arriving in the ICU kicking and thrashing in the bed, bolus of propofol. We will keep him intubated for 3 days without weaning while he is withdrawing. Acute Respiratory Failure:  Patient intubated 9/27/23 in the ER for airway protection. Patient was very agitated and required sedating medications. Airway was at risk and therefore, patient airway was secured. Allow patient to withdraw while sedated on mechanical ventilator. Look to extubate Saturday or Sunday. Polysubstance use disorder: Patient has extensive drug use history including the use of amphetamine, benzodiazepine, cocaine, crack cocaine, fentanyl, marijuana, heroin, oxycodone, sedative/hypnotics, opiates, methamphetamines, Suboxone. Will offer help with abstinence, patient has not seemed interested in cessation on previous admissions. SATYA: Baseline creatinine 1.0, presented 1.8. Patient started on LR. We will continue to monitor creatinine. Will order urine osmolality, sodium, creatinine, urea. , suspicion for rhabdomyolysis. INITIAL H AND P AND ICU COURSE:  69-year-old male with PMH of anxiety, depression, drug use. Patient presented to the ED today complaining of drug use, he was making erratic movements in the bed. The patient was intubated down in the ED for unknown reasons to myself as I see no documentation.   We will plan on keeping him intubated for several days on propofol as he goes through withdrawal, we will start to wean the patient off the

## 2023-09-27 NOTE — ED NOTES
Erratic movements continue. Preparing to intubate at this time.       Nicho Boateng RN  09/27/23 2656

## 2023-09-27 NOTE — ED TRIAGE NOTES
Presents to ED with c/o drug use. Patient making erratic movements in bed and unable to obtain vitals at this time. Dr. Tomas Farah at bedside.

## 2023-09-27 NOTE — PLAN OF CARE
Problem: Respiratory - Adult  Goal: Normal spontaneous ventilation  Outcome: Progressing                                                  Patient Weaning Progress    The patient's vent settings was able to be weaned this shift. Ventilator settings that were weaned              [] Mode   [] Pressure support weaned   [x] Fio2 weaned   [] Peep weaned      Spontaneous weaning trial  was not attempted. due to defined parameters for SBT (spontaneous breathing trial) not being met. Evac tube was  hooked up with continuous low suction(20-30mmHg)      Cuff was not  deflated to determine cuff leak. Unable to get agreement for goals because no family is present and patient cannot respond.

## 2023-09-27 NOTE — ED NOTES
Patient continues to make erratic movements and unable to hold still.      Marybeth Reis RN  09/27/23 5211

## 2023-09-27 NOTE — ED NOTES
Patient resting in bed. Respirations easy and unlabored on ventilator. No distress noted.         Zulema Mc, RN  09/27/23 3303

## 2023-09-27 NOTE — ED NOTES
Patient resting in bed with eyes closed. Respirations easy and unlabored on ventilator. Will monitor.       Cyndi Aguiar RN  09/27/23 2798

## 2023-09-27 NOTE — PROGRESS NOTES
Pt agitated-pulling at ETT, kicking/thrashing around in bed; called for assistance- RT and 2 other RNs arrived to assist. 50mg bolus of propofol given through pump per Dr. Carol Mcclain

## 2023-09-27 NOTE — ED NOTES
Patient resting in bed with eyes closed. Respirations easy and unlabored on ventilator. Will monitor.       Joshua Dhaliwal RN  09/27/23 6031

## 2023-09-28 LAB
ANION GAP SERPL CALC-SCNC: 14 MEQ/L (ref 8–16)
BUN SERPL-MCNC: 39 MG/DL (ref 7–22)
CALCIUM SERPL-MCNC: 9.7 MG/DL (ref 8.5–10.5)
CHLORIDE SERPL-SCNC: 102 MEQ/L (ref 98–111)
CK SERPL-CCNC: 1946 U/L (ref 55–170)
CO2 SERPL-SCNC: 23 MEQ/L (ref 23–33)
CREAT SERPL-MCNC: 1.3 MG/DL (ref 0.4–1.2)
DEPRECATED RDW RBC AUTO: 51.2 FL (ref 35–45)
ERYTHROCYTE [DISTWIDTH] IN BLOOD BY AUTOMATED COUNT: 17.2 % (ref 11.5–14.5)
GFR SERPL CREATININE-BSD FRML MDRD: > 60 ML/MIN/1.73M2
GLUCOSE SERPL-MCNC: 90 MG/DL (ref 70–108)
HCT VFR BLD AUTO: 37.1 % (ref 42–52)
HGB BLD-MCNC: 11.3 GM/DL (ref 14–18)
MAGNESIUM SERPL-MCNC: 2.9 MG/DL (ref 1.6–2.4)
MCH RBC QN AUTO: 24.9 PG (ref 26–33)
MCHC RBC AUTO-ENTMCNC: 30.5 GM/DL (ref 32.2–35.5)
MCV RBC AUTO: 81.7 FL (ref 80–94)
PLATELET # BLD AUTO: 341 THOU/MM3 (ref 130–400)
PMV BLD AUTO: 9.6 FL (ref 9.4–12.4)
POTASSIUM SERPL-SCNC: 4.3 MEQ/L (ref 3.5–5.2)
RBC # BLD AUTO: 4.54 MILL/MM3 (ref 4.7–6.1)
SODIUM SERPL-SCNC: 139 MEQ/L (ref 135–145)
WBC # BLD AUTO: 7.8 THOU/MM3 (ref 4.8–10.8)

## 2023-09-28 PROCEDURE — 2000000000 HC ICU R&B

## 2023-09-28 PROCEDURE — 6360000002 HC RX W HCPCS

## 2023-09-28 PROCEDURE — 2580000003 HC RX 258

## 2023-09-28 PROCEDURE — 83735 ASSAY OF MAGNESIUM: CPT

## 2023-09-28 PROCEDURE — 82550 ASSAY OF CK (CPK): CPT

## 2023-09-28 PROCEDURE — 6360000002 HC RX W HCPCS: Performed by: NURSE PRACTITIONER

## 2023-09-28 PROCEDURE — 99291 CRITICAL CARE FIRST HOUR: CPT | Performed by: INTERNAL MEDICINE

## 2023-09-28 PROCEDURE — 2700000000 HC OXYGEN THERAPY PER DAY

## 2023-09-28 PROCEDURE — 36415 COLL VENOUS BLD VENIPUNCTURE: CPT

## 2023-09-28 PROCEDURE — 94761 N-INVAS EAR/PLS OXIMETRY MLT: CPT

## 2023-09-28 PROCEDURE — 6370000000 HC RX 637 (ALT 250 FOR IP): Performed by: EMERGENCY MEDICINE

## 2023-09-28 PROCEDURE — 80048 BASIC METABOLIC PNL TOTAL CA: CPT

## 2023-09-28 PROCEDURE — 94003 VENT MGMT INPAT SUBQ DAY: CPT

## 2023-09-28 PROCEDURE — 85027 COMPLETE CBC AUTOMATED: CPT

## 2023-09-28 PROCEDURE — 2500000003 HC RX 250 WO HCPCS

## 2023-09-28 PROCEDURE — 6370000000 HC RX 637 (ALT 250 FOR IP)

## 2023-09-28 PROCEDURE — 93005 ELECTROCARDIOGRAM TRACING: CPT | Performed by: INTERNAL MEDICINE

## 2023-09-28 PROCEDURE — A4216 STERILE WATER/SALINE, 10 ML: HCPCS

## 2023-09-28 RX ORDER — ENOXAPARIN SODIUM 100 MG/ML
40 INJECTION SUBCUTANEOUS EVERY 24 HOURS
Status: DISCONTINUED | OUTPATIENT
Start: 2023-09-28 | End: 2023-10-04 | Stop reason: HOSPADM

## 2023-09-28 RX ORDER — CHLORHEXIDINE GLUCONATE ORAL RINSE 1.2 MG/ML
15 SOLUTION DENTAL 2 TIMES DAILY
Status: DISCONTINUED | OUTPATIENT
Start: 2023-09-28 | End: 2023-10-04

## 2023-09-28 RX ORDER — MIDAZOLAM HYDROCHLORIDE 1 MG/ML
4 INJECTION INTRAMUSCULAR; INTRAVENOUS ONCE
Status: COMPLETED | OUTPATIENT
Start: 2023-09-28 | End: 2023-09-28

## 2023-09-28 RX ORDER — NOREPINEPHRINE BITARTRATE 0.06 MG/ML
1-100 INJECTION, SOLUTION INTRAVENOUS CONTINUOUS
Status: DISCONTINUED | OUTPATIENT
Start: 2023-09-28 | End: 2023-10-02

## 2023-09-28 RX ORDER — QUETIAPINE FUMARATE 25 MG/1
25 TABLET, FILM COATED ORAL NIGHTLY
Status: DISCONTINUED | OUTPATIENT
Start: 2023-09-28 | End: 2023-09-30

## 2023-09-28 RX ORDER — PROPOFOL 10 MG/ML
5-50 INJECTION, EMULSION INTRAVENOUS CONTINUOUS
Status: DISCONTINUED | OUTPATIENT
Start: 2023-09-28 | End: 2023-10-04

## 2023-09-28 RX ORDER — MIDAZOLAM HYDROCHLORIDE 1 MG/ML
1-10 INJECTION, SOLUTION INTRAVENOUS CONTINUOUS
Status: DISCONTINUED | OUTPATIENT
Start: 2023-09-28 | End: 2023-10-02

## 2023-09-28 RX ORDER — NOREPINEPHRINE BITARTRATE 0.06 MG/ML
INJECTION, SOLUTION INTRAVENOUS
Status: DISPENSED
Start: 2023-09-28 | End: 2023-09-28

## 2023-09-28 RX ORDER — BUMETANIDE 0.25 MG/ML
1 INJECTION INTRAMUSCULAR; INTRAVENOUS ONCE
Status: COMPLETED | OUTPATIENT
Start: 2023-09-28 | End: 2023-09-28

## 2023-09-28 RX ORDER — ACETAMINOPHEN 325 MG/1
650 TABLET ORAL EVERY 4 HOURS PRN
Status: DISCONTINUED | OUTPATIENT
Start: 2023-09-28 | End: 2023-10-04 | Stop reason: HOSPADM

## 2023-09-28 RX ORDER — LORAZEPAM 2 MG/ML
INJECTION INTRAMUSCULAR
Status: COMPLETED
Start: 2023-09-28 | End: 2023-09-28

## 2023-09-28 RX ORDER — LORAZEPAM 2 MG/ML
2 INJECTION INTRAMUSCULAR ONCE
Status: COMPLETED | OUTPATIENT
Start: 2023-09-28 | End: 2023-09-28

## 2023-09-28 RX ADMIN — BUMETANIDE 1 MG: 0.25 INJECTION INTRAMUSCULAR; INTRAVENOUS at 11:02

## 2023-09-28 RX ADMIN — PROPOFOL 50 MCG/KG/MIN: 10 INJECTION, EMULSION INTRAVENOUS at 19:55

## 2023-09-28 RX ADMIN — ACETAMINOPHEN 650 MG: 325 TABLET ORAL at 12:09

## 2023-09-28 RX ADMIN — PROPOFOL 45 MCG/KG/MIN: 10 INJECTION, EMULSION INTRAVENOUS at 07:48

## 2023-09-28 RX ADMIN — Medication 1.3 MCG/KG/HR: at 14:48

## 2023-09-28 RX ADMIN — PROPOFOL 50 MCG/KG/MIN: 10 INJECTION, EMULSION INTRAVENOUS at 00:20

## 2023-09-28 RX ADMIN — Medication 0.6 MCG/KG/HR: at 07:33

## 2023-09-28 RX ADMIN — ENOXAPARIN SODIUM 40 MG: 100 INJECTION SUBCUTANEOUS at 20:34

## 2023-09-28 RX ADMIN — Medication 2 MG/HR: at 20:27

## 2023-09-28 RX ADMIN — PROPOFOL 50 MCG/KG/MIN: 10 INJECTION, EMULSION INTRAVENOUS at 16:02

## 2023-09-28 RX ADMIN — SODIUM CHLORIDE, POTASSIUM CHLORIDE, SODIUM LACTATE AND CALCIUM CHLORIDE: 600; 310; 30; 20 INJECTION, SOLUTION INTRAVENOUS at 03:36

## 2023-09-28 RX ADMIN — CHLORHEXIDINE GLUCONATE 15 ML: 1.2 RINSE ORAL at 20:34

## 2023-09-28 RX ADMIN — LORAZEPAM 2 MG: 2 INJECTION INTRAMUSCULAR at 12:26

## 2023-09-28 RX ADMIN — Medication 1.3 MCG/KG/HR: at 19:05

## 2023-09-28 RX ADMIN — SODIUM CHLORIDE, POTASSIUM CHLORIDE, SODIUM LACTATE AND CALCIUM CHLORIDE: 600; 310; 30; 20 INJECTION, SOLUTION INTRAVENOUS at 17:05

## 2023-09-28 RX ADMIN — PROPOFOL 50 MCG/KG/MIN: 10 INJECTION, EMULSION INTRAVENOUS at 12:02

## 2023-09-28 RX ADMIN — FAMOTIDINE 20 MG: 10 INJECTION INTRAVENOUS at 20:35

## 2023-09-28 RX ADMIN — MIDAZOLAM 4 MG: 1 INJECTION INTRAMUSCULAR; INTRAVENOUS at 20:27

## 2023-09-28 RX ADMIN — SODIUM CHLORIDE, POTASSIUM CHLORIDE, SODIUM LACTATE AND CALCIUM CHLORIDE: 600; 310; 30; 20 INJECTION, SOLUTION INTRAVENOUS at 13:06

## 2023-09-28 RX ADMIN — QUETIAPINE FUMARATE 25 MG: 25 TABLET ORAL at 20:34

## 2023-09-28 RX ADMIN — Medication 5 MCG/MIN: at 02:21

## 2023-09-28 RX ADMIN — PROPOFOL 50 MCG/KG/MIN: 10 INJECTION, EMULSION INTRAVENOUS at 03:38

## 2023-09-28 RX ADMIN — SODIUM CHLORIDE, POTASSIUM CHLORIDE, SODIUM LACTATE AND CALCIUM CHLORIDE: 600; 310; 30; 20 INJECTION, SOLUTION INTRAVENOUS at 08:16

## 2023-09-28 RX ADMIN — LORAZEPAM 2 MG: 2 INJECTION INTRAMUSCULAR; INTRAVENOUS at 12:26

## 2023-09-28 ASSESSMENT — PULMONARY FUNCTION TESTS
PIF_VALUE: 14
PIF_VALUE: 16
PIF_VALUE: 18

## 2023-09-28 NOTE — CARE COORDINATION
Case Management Assessment  Initial Evaluation    Date/Time of Evaluation: 9/28/2023 11:35 AM  Assessment Completed by: Marita Rees RN    If patient is discharged prior to next notation, then this note serves as note for discharge by case management. Patient Name: Rola Michelle                   YOB: 1985  Diagnosis: AMS (altered mental status) [R41.82]                   Date / Time: 9/27/2023  9:45 AM  Location: [address] [place] [address]     Patient Admission Status: Inpatient   Readmission Risk Low 0-14, Mod 15-19), High > 20: Readmission Risk Score: 25.4    Current PCP: PAPA Palmer CNP  PCP verified by CM? Yes    Chart Reviewed: Yes      History Provided by: Child/Family Karl David)  Patient Orientation: Sedated (on vent)    Patient Cognition: Other (see comment) (Sedated on vent)    Hospitalization in the last 30 days (Readmission):  No    If yes, Readmission Assessment in  Navigator will be completed. Advance Directives:      Code Status: Prior   Patient's Primary Decision Maker is: Legal Next of Kin      Discharge Planning:    Patient lives with: Spouse/Significant Other Type of Home: House  Primary Care Giver: Self  Patient Support Systems include: Spouse/Significant Other, Family Members   Current Financial resources: Medicaid  Current community resources: Other (Comment), Transportation (Suboxone clinic; [facility] for transportation)  Current services prior to admission: None            Current DME:              Type of Home Care services:  None    ADLS  Prior functional level: Independent in ADLs/IADLs  Current functional level: Other (see comment) (GEORGE; sedated on vent)    Family can provide assistance at DC: Other (comment) (S.O. can)  Would you like Case Management to discuss the discharge plan with any other family members/significant others, and if so, who?  No  Plans to Return to Present Housing: Yes  Other Identified Issues/Barriers to RETURNING to current housing:

## 2023-09-28 NOTE — PLAN OF CARE
Problem: Discharge Planning  Goal: Discharge to home or other facility with appropriate resources  Outcome: Progressing  Flowsheets (Taken 9/28/2023 0013)  Discharge to home or other facility with appropriate resources:   Identify barriers to discharge with patient and caregiver   Arrange for needed discharge resources and transportation as appropriate   Identify discharge learning needs (meds, wound care, etc)   Arrange for interpreters to assist at discharge as needed   Refer to discharge planning if patient needs post-hospital services based on physician order or complex needs related to functional status, cognitive ability or social support system     Problem: Safety - Medical Restraint  Goal: Remains free of injury from restraints (Restraint for Interference with Medical Device)  Description: INTERVENTIONS:  1. Determine that other, less restrictive measures have been tried or would not be effective before applying the restraint  2. Evaluate the patient's condition at the time of restraint application  3. Inform patient/family regarding the reason for restraint  4.  Q2H: Monitor safety, psychosocial status, comfort, nutrition and hydration  Outcome: Progressing  Flowsheets  Taken 9/28/2023 0000 by Patrick Vilchis RN  Remains free of injury from restraints (restraint for interference with medical device):   Determine that other, less restrictive measures have been tried or would not be effective before applying the restraint   Evaluate the patient's condition at the time of restraint application   Inform patient/family regarding the reason for restraint   Every 2 hours: Monitor safety, psychosocial status, comfort, nutrition and hydration  Taken 9/27/2023 1628 by Willam Prieto RN  Remains free of injury from restraints (restraint for interference with medical device):   Determine that other, less restrictive measures have been tried or would not be effective before applying the restraint   Evaluate the

## 2023-09-28 NOTE — PLAN OF CARE
Problem: Respiratory - Adult  Goal: Normal spontaneous ventilation  9/28/2023 0427 by Laurie Henderson RCP  Outcome: Progressing                                                Patient Weaning Progress    The patient's vent settings was able to be weaned this shift. Ventilator settings that were weaned              [] Mode   [x] Pressure support weaned   [x] Fio2 weaned   [] Peep weaned      Spontaneous weaning trial  was not attempted. due to defined parameters for SBT (spontaneous breathing trial) not being met. The patient had a lack of inspiratory effort. I was informed to keep the patient on the ventilator for 3 days due to active withdrawal.       Evac tube was  hooked up with continuous low suction(20-30mmHg)      Cuff was not  deflated to determine cuff leak. Unable to get agreement for goals because no family is present and patient cannot respond.

## 2023-09-28 NOTE — PROGRESS NOTES
Pt was unresponsive but was blessed.     09/28/23 1255   Encounter Summary   Service Provided For: Patient   Referral/Consult From: Celestino   Last Encounter  09/28/23  (NR)   Complexity of Encounter Low   Begin Time 1240   End Time  1445   Total Time Calculated 125 min   Spiritual/Emotional needs   Type Spiritual Support   Assessment/Intervention/Outcome   Assessment Unable to assess

## 2023-09-28 NOTE — PLAN OF CARE
Problem: Discharge Planning  Goal: Discharge to home or other facility with appropriate resources  Outcome: Progressing  Flowsheets (Taken 9/28/2023 1455)  Discharge to home or other facility with appropriate resources: Identify barriers to discharge with patient and caregiver     Problem: Safety - Medical Restraint  Goal: Remains free of injury from restraints (Restraint for Interference with Medical Device)  Description: INTERVENTIONS:  1. Determine that other, less restrictive measures have been tried or would not be effective before applying the restraint  2. Evaluate the patient's condition at the time of restraint application  3. Inform patient/family regarding the reason for restraint  4.  Q2H: Monitor safety, psychosocial status, comfort, nutrition and hydration  Outcome: Progressing  Flowsheets (Taken 9/28/2023 1455)  Remains free of injury from restraints (restraint for interference with medical device):   Determine that other, less restrictive measures have been tried or would not be effective before applying the restraint   Evaluate the patient's condition at the time of restraint application   Every 2 hours: Monitor safety, psychosocial status, comfort, nutrition and hydration     Problem: Pain  Goal: Verbalizes/displays adequate comfort level or baseline comfort level  Outcome: Progressing  Flowsheets (Taken 9/28/2023 1455)  Verbalizes/displays adequate comfort level or baseline comfort level: Encourage patient to monitor pain and request assistance     Problem: Respiratory - Adult  Goal: Normal spontaneous ventilation  9/28/2023 1455 by Bishop Fercho RN  Outcome: Progressing  9/28/2023 0427 by Aneta Delaney RCP  Outcome: Progressing     Problem: Nutrition Deficit:  Goal: Optimize nutritional status  9/28/2023 1455 by Bishop Fercho RN  Outcome: Progressing  Flowsheets  Taken 9/28/2023 1455 by Bishop Fercho RN  Nutrient intake appropriate for improving, restoring, or maintaining nutritional needs:

## 2023-09-28 NOTE — ED PROVIDER NOTES
[facility] ICU 4D      CHIEF COMPLAINT       Chief Complaint   Patient presents with    Addiction Problem       Nurses Notes reviewed and I agree except as noted in the HPI. HISTORY OF PRESENT ILLNESS    Muncy Lady is a 43 y.o. male who presents with complaint of extreme agitation due to drug intoxication, patient has history of cocaine/methamphetamine/ecstasy/fentanyl abuse. Patient transported to ED via EMS after family members called due to agitation. On arrival in the ED, patient extremely agitated, thrashing around in bed, trying to throw himself out of bed. Patient had to be held down by 3 police officers plus nursing staff for self protection. Patient belligerent, appears to be hallucinating. Onset: Acute  Duration: Unknown  Timing: Unknown  Location of Pain:   Intesity/severity: Severe agitation/confusion  Modifying Factors: History of polysubstance abuse  Relieved by;  Previous Episodes; Tx Before arrival: None  REVIEW OF SYSTEMS         PAST MEDICAL HISTORY    has a past medical history of Anxiety, Depression, Kidney stone, Liver disease, and Opiate abuse, continuous ([address]). SURGICAL HISTORY      has no past surgical history on file. CURRENT MEDICATIONS       Current Discharge Medication List        CONTINUE these medications which have NOT CHANGED    Details   venlafaxine (EFFEXOR) 100 MG tablet Take 1 tablet by mouth 3 times daily  Qty: 90 tablet, Refills: 3    Associated Diagnoses: Anxiety and depression      lisinopril (PRINIVIL;ZESTRIL) 20 MG tablet Take 1 tablet by mouth daily  Qty: 30 tablet, Refills: 3    Associated Diagnoses: Essential hypertension      pregabalin (LYRICA) 200 MG capsule Take 1 capsule by mouth in the morning, at noon, and at bedtime for 30 days.  Max Daily Amount: 600 mg  Qty: 90 capsule, Refills: 0    Associated Diagnoses: Anxiety and depression      mirtazapine (REMERON) 45 MG tablet Take 1 tablet by mouth nightly  Qty: 30 tablet, Refills: 3    Associated chloride (PF) 0.9 % 10 mL injection (has no administration in time range)   enoxaparin (LOVENOX) injection 40 mg (has no administration in time range)   ketamine (KETALAR) injection 88 mg (88.8 mg IntraVENous Given by Other 9/27/23 0959)   ketamine (KETALAR) injection 88 mg (88 mg IntraVENous Given 9/27/23 1006)   rocuronium (ZEMURON) injection 100 mg (100 mg IntraVENous Given 9/27/23 1017)   etomidate (AMIDATE) injection 20 mg (20 mg IntraVENous Given 9/27/23 1016)   midazolam (VERSED) injection 4 mg (4 mg IntraVENous Given 9/27/23 1331)   morphine injection 4 mg (4 mg IntraVENous Given 9/27/23 1552)   bumetanide (BUMEX) injection 1 mg (1 mg IntraVENous Given 9/28/23 1102)   LORazepam (ATIVAN) injection 2 mg (2 mg IntraVENous Given 9/28/23 1226)               DIAGNOSTIC RESULTS     EKG: All EKG's are interpreted by the Emergency Department Physician who either signs or Co-signs this chart in the absence of a cardiologist.      RADIOLOGY: non-plain film images(s) such as CT, Ultrasound and MRI are read by the radiologist.  Plain radiographic images are visualized and preliminarily interpreted by the emergency physician unless otherwise stated below. LABS:   Labs Reviewed   MRSA BY PCR - Abnormal; Notable for the following components:       Result Value    MRSA SCREEN RT-PCR POSITIVE (*)     All other components within normal limits   COMPREHENSIVE METABOLIC PANEL - Abnormal; Notable for the following components:    Glucose 59 (*)     Creatinine 1.8 (*)     BUN 29 (*)     AST 60 (*)     Alkaline Phosphatase 147 (*)     Total Protein 8.4 (*)     All other components within normal limits   CK - Abnormal; Notable for the following components:     Total  (*)     All other components within normal limits   TROPONIN - Abnormal; Notable for the following components:    Troponin, High Sensitivity 23 (*)     All other components within normal limits   BRAIN NATRIURETIC PEPTIDE - Abnormal; Notable for the following

## 2023-09-28 NOTE — PROGRESS NOTES
Comprehensive Nutrition Assessment    Type and Reason for Visit:  Initial, Consult (TF ordering and management)    Nutrition Recommendations/Plan:   Begin Vital 1.2 20ml/hour increase 10ml every 4h as tolerated to goal of 50ml/hour  Additional free H20 per Physician  A1c was 11.5% 5/24/21 - recommend rechecking  Monitoring labs, diprivan for EN adjustments as appropriate     Malnutrition Assessment:  Malnutrition Status: At risk for malnutrition (Comment) (09/28/23 7795)    Context:   chronic illness  Findings of the 6 clinical characteristics of malnutrition:  Energy Intake:  Unable to assess (pt. intubated)  Weight Loss:  No significant weight loss (per EMR)     Body Fat Loss:  No significant body fat loss     Muscle Mass Loss:  No significant muscle mass loss    Fluid Accumulation:  No significant fluid accumulation     Strength:  Not Performed    Nutrition Assessment:    Pt. nutritionally compromised AEB NPO d/t intubation 9/27, need for nturition support. At risk for further nutrition compromise r/t admit with AMS, drug OD, plan to remain intubated x3d without weaning while pt. Is withdrawing and underlying medical condition Hepatitis C, severe substance abuse.      Nutrition Related Findings:    Pt. Report/Treatments/Miscellaneous: intubated after admit to  ED with delirium and erratic movements; sedated; spoke with RN and Physician; starting EN today as plan intubation at least next 3d   GI Status:  BM 0  Pertinent Labs: glucose 90  BUN 39  creatinine 1.3  Na 139  K+ 4.3  MAP 91  A1c in 2021 was 11.5% - no other values - monitoring glucose  Pertinent Meds: bumex, precedex, diprivan, levophed    Wound Type: None       Current Nutrition Intake & Therapies:     ADULT TUBE FEEDING; Orogastric; Peptide Based; Continuous; 20; Yes; 10; Q 4 hours; 50; 30; Q 4 hours  Current Tube Feeding (TF) Orders:  Feeding Route: Orogastric  Formula: Peptide Based (started 9/28)  Schedule: Continuous  Feeding Regimen: Vital 1.2 20ml/hour increase 10ml every 4h as tolerated to goal of 50ml/hour  Additives/Modulars: None  Water Flushes: per Provider  Goal TF & Flush Orders Provides: 1440 kcals (2137 with diprivan lipids), 90gms protein, 133gms cho, 6gms fiber, 973ml free H20 in total volume of 1200ml/24h  Additional Calorie Sources:  diprivan at 26.4ml/hour provides 697 lipid kcals    Anthropometric Measures:  Height: 5' 11\" (180.3 cm)  Ideal Body Weight (IBW): 172 lbs (78 kg)    Admission Body Weight: 182 lb (82.6 kg) (9/27 with trace edema)  Current Body Weight: 182 lb (82.6 kg) (9/28 with trace edema),    Weight Source: Bed Scale  Current BMI (kg/m2): 25.4  Usual Body Weight: 190 lb (86.2 kg) (8/23/23 per EMR)  % Weight Change (Calculated): -4.2    BMI Categories: Overweight (BMI 25.0-29. 9)    Estimated Daily Nutrient Needs:  Energy Requirements Based On: Kcal/kg  Weight Used for Energy Requirements: Admission (83kgm)  Energy (kcal/day): 5075-0427 (20-25/kgm)  Weight Used for Protein Requirements: Current (83kgm)  Protein (g/day): ~125gms (1.5/kgm)     Fluid (ml/day): per Provider    Nutrition Diagnosis:   Inadequate oral intake related to impaired respiratory function as evidenced by NPO or clear liquid status due to medical condition, intubation, nutrition support - enteral nutrition    Nutrition Interventions:   Food and/or Nutrient Delivery: Continue NPO, Start Tube Feeding  Nutrition Education/Counseling: Education not appropriate  Coordination of Nutrition Care: Continue to monitor while inpatient, Interdisciplinary Rounds       Goals:     Goals: Tolerate nutrition support at goal rate, by next RD assessment       Nutrition Monitoring and Evaluation:      Food/Nutrient Intake Outcomes: Enteral Nutrition Intake/Tolerance  Physical Signs/Symptoms Outcomes: Biochemical Data, GI Status, Fluid Status or Edema, Hemodynamic Status, Nutrition Focused Physical Findings, Skin, Weight    Discharge Planning:     Too soon to determine     [facility]

## 2023-09-28 NOTE — PROGRESS NOTES
CRITICAL CARE PROGRESS NOTE      Patient:  Sole Fair    Unit/Bed:4D-15/015-A  YOB: 1985  MRN: [id number]   PCP: PAPA Barney CNP  Date of Admission: 9/27/2023  Chief Complaint:-Drug use    Assessment and Plan:    Delirium: Patient presented to the ER with extreme agitation. Given ketamine without improvement of agitation. Decision was made to intubate 2/2 protection of the patient and staff. We will keep him intubated for several days without weaning while he is withdrawing. He wakes up a little once or twice a day and becomes agitated to the point we need to give him more sedation. Acute Respiratory Failure:  Patient intubated 9/27/23 in the ER for patient and staff protection 2/2 extreme agitation. Patient remained agitated after receiving ketamine. Airway was at risk and therefore, patient airway was secured. Allow patient to withdraw while sedated on mechanical ventilator. Look to extubate Saturday or Sunday. Polysubstance use disorder: Patient has extensive drug use history including the use of amphetamine, benzodiazepine, cocaine, crack cocaine, fentanyl, marijuana, heroin, oxycodone, sedative/hypnotics, opiates, methamphetamines, Suboxone. Will offer help with abstinence, patient has not seemed interested in cessation on previous admissions. SATYA: Baseline creatinine 1.0, presented 1.8. Patient started on LR. We will continue to monitor creatinine. Will order urine osmolality, sodium, creatinine, urea. CK 1946, suspicion for very mild rhabdomyolysis. INITIAL H AND P AND ICU COURSE:  39-year-old male with PMH of anxiety, depression, drug use. Patient presented to the ED today complaining of drug use, he was making erratic movements in the bed. The patient was intubated down in the ED for unknown reasons to myself as I see no documentation.   We will plan on keeping him intubated for several days on propofol as he goes through withdrawal, we will

## 2023-09-29 ENCOUNTER — APPOINTMENT (OUTPATIENT)
Dept: GENERAL RADIOLOGY | Age: 38
DRG: 896 | End: 2023-09-29
Payer: COMMERCIAL

## 2023-09-29 LAB
ACINETOBACTER CALCOACETICUS-BAUMANNII BY PCR: NOT DETECTED
ANION GAP SERPL CALC-SCNC: 7 MEQ/L (ref 8–16)
BACTERIA SPEC AEROBE CULT: NORMAL
BACTERIA UR CULT: NORMAL
BLACTX-M ISLT/SPM QL: NOT DETECTED
BLAIMP ISLT/SPM QL: NOT DETECTED
BLAKPC ISLT/SPM QL: NOT DETECTED
BLAOXA-48-LIKE ISLT/SPM QL: ABNORMAL
BLAVIM ISLT/SPM QL: NOT DETECTED
BUN SERPL-MCNC: 23 MG/DL (ref 7–22)
C PNEUM DNA LOWER RESP QL NAA+NON-PROBE: NOT DETECTED
CALCIUM SERPL-MCNC: 9.4 MG/DL (ref 8.5–10.5)
CHLORIDE SERPL-SCNC: 104 MEQ/L (ref 98–111)
CO2 SERPL-SCNC: 27 MEQ/L (ref 23–33)
CREAT SERPL-MCNC: 0.8 MG/DL (ref 0.4–1.2)
DEPRECATED RDW RBC AUTO: 50.4 FL (ref 35–45)
ENTEROBACTER CLOACAE COMPLEX BY PCR: NOT DETECTED
ERYTHROCYTE [DISTWIDTH] IN BLOOD BY AUTOMATED COUNT: 17.2 % (ref 11.5–14.5)
ESCHERICHIA COLI BY PCR: NOT DETECTED
FLUAV RNA LOWER RESP QL NAA+NON-PROBE: NOT DETECTED
FLUBV RNA LOWER RESP QL NAA+NON-PROBE: NOT DETECTED
GFR SERPL CREATININE-BSD FRML MDRD: > 60 ML/MIN/1.73M2
GLUCOSE SERPL-MCNC: 105 MG/DL (ref 70–108)
HADV DNA LOWER RESP QL NAA+NON-PROBE: NOT DETECTED
HAEMOPHILUS INFLUENZAE BY PCR: NOT DETECTED
HCOV RNA LOWER RESP QL NAA+NON-PROBE: NOT DETECTED
HCT VFR BLD AUTO: 34.2 % (ref 42–52)
HGB BLD-MCNC: 10.6 GM/DL (ref 14–18)
HMPV RNA LOWER RESP QL NAA+NON-PROBE: NOT DETECTED
HPIV RNA LOWER RESP QL NAA+NON-PROBE: NOT DETECTED
KLEBSIELLA AEROGENES BY PCR: NOT DETECTED
KLEBSIELLA OXYTOCA BY PCR: NOT DETECTED
KLEBSIELLA PNEUMONIAE GROUP BY PCR: NOT DETECTED
L PNEUMO DNA LOWER RESP QL NAA+NON-PROBE: NOT DETECTED
M PNEUMO DNA LOWER RESP QL NAA+NON-PROBE: NOT DETECTED
MAGNESIUM SERPL-MCNC: 2.2 MG/DL (ref 1.6–2.4)
MCH RBC QN AUTO: 25.1 PG (ref 26–33)
MCHC RBC AUTO-ENTMCNC: 31 GM/DL (ref 32.2–35.5)
MCV RBC AUTO: 81 FL (ref 80–94)
MORAXELLA CATARRHALIS BY PCR: NOT DETECTED
PLATELET # BLD AUTO: 284 THOU/MM3 (ref 130–400)
PMV BLD AUTO: 9.2 FL (ref 9.4–12.4)
POTASSIUM SERPL-SCNC: 4.4 MEQ/L (ref 3.5–5.2)
PROTEUS SPECIES BY PCR: NOT DETECTED
PSEUDOMONAS AERUGINOSA BY PCR: DETECTED
RBC # BLD AUTO: 4.22 MILL/MM3 (ref 4.7–6.1)
RESISTANT GENE MECA/C & MREJ BY PCR: DETECTED
RESISTANT GENE NDM BY PCR: NOT DETECTED
RSV RNA LOWER RESP QL NAA+NON-PROBE: NOT DETECTED
RV+EV RNA LOWER RESP QL NAA+NON-PROBE: NOT DETECTED
SERRATIA MARCESCENS BY PCR: NOT DETECTED
SODIUM SERPL-SCNC: 138 MEQ/L (ref 135–145)
SOURCE: ABNORMAL
SPECIMEN ACCEPTABILITY: ABNORMAL
STAPH AUREUS BY PCR: DETECTED
STREP AGALACTIAE BY PCR: NOT DETECTED
STREP PNEUMONIAE BY PCR: NOT DETECTED
STREP PYOGENES BY PCR: NOT DETECTED
TRIGL SERPL-MCNC: 94 MG/DL (ref 0–199)
WBC # BLD AUTO: 7.5 THOU/MM3 (ref 4.8–10.8)

## 2023-09-29 PROCEDURE — 87486 CHLMYD PNEUM DNA AMP PROBE: CPT

## 2023-09-29 PROCEDURE — 87205 SMEAR GRAM STAIN: CPT

## 2023-09-29 PROCEDURE — 85027 COMPLETE CBC AUTOMATED: CPT

## 2023-09-29 PROCEDURE — 2500000003 HC RX 250 WO HCPCS

## 2023-09-29 PROCEDURE — 87541 LEGION PNEUMO DNA AMP PROB: CPT

## 2023-09-29 PROCEDURE — A4216 STERILE WATER/SALINE, 10 ML: HCPCS

## 2023-09-29 PROCEDURE — 36415 COLL VENOUS BLD VENIPUNCTURE: CPT

## 2023-09-29 PROCEDURE — 6370000000 HC RX 637 (ALT 250 FOR IP): Performed by: EMERGENCY MEDICINE

## 2023-09-29 PROCEDURE — 71045 X-RAY EXAM CHEST 1 VIEW: CPT

## 2023-09-29 PROCEDURE — 87150 DNA/RNA AMPLIFIED PROBE: CPT

## 2023-09-29 PROCEDURE — 6370000000 HC RX 637 (ALT 250 FOR IP)

## 2023-09-29 PROCEDURE — 2000000000 HC ICU R&B

## 2023-09-29 PROCEDURE — 87147 CULTURE TYPE IMMUNOLOGIC: CPT

## 2023-09-29 PROCEDURE — 6360000002 HC RX W HCPCS: Performed by: PHARMACIST

## 2023-09-29 PROCEDURE — 99291 CRITICAL CARE FIRST HOUR: CPT | Performed by: INTERNAL MEDICINE

## 2023-09-29 PROCEDURE — 93010 ELECTROCARDIOGRAM REPORT: CPT | Performed by: INTERNAL MEDICINE

## 2023-09-29 PROCEDURE — 94003 VENT MGMT INPAT SUBQ DAY: CPT

## 2023-09-29 PROCEDURE — 6360000002 HC RX W HCPCS: Performed by: NURSE PRACTITIONER

## 2023-09-29 PROCEDURE — 87798 DETECT AGENT NOS DNA AMP: CPT

## 2023-09-29 PROCEDURE — 84478 ASSAY OF TRIGLYCERIDES: CPT

## 2023-09-29 PROCEDURE — 87631 RESP VIRUS 3-5 TARGETS: CPT

## 2023-09-29 PROCEDURE — 2580000003 HC RX 258: Performed by: NURSE PRACTITIONER

## 2023-09-29 PROCEDURE — 87077 CULTURE AEROBIC IDENTIFY: CPT

## 2023-09-29 PROCEDURE — 6360000002 HC RX W HCPCS

## 2023-09-29 PROCEDURE — 83735 ASSAY OF MAGNESIUM: CPT

## 2023-09-29 PROCEDURE — 80048 BASIC METABOLIC PNL TOTAL CA: CPT

## 2023-09-29 PROCEDURE — 89220 SPUTUM SPECIMEN COLLECTION: CPT

## 2023-09-29 PROCEDURE — 87070 CULTURE OTHR SPECIMN AEROBIC: CPT

## 2023-09-29 PROCEDURE — 87581 M.PNEUMON DNA AMP PROBE: CPT

## 2023-09-29 PROCEDURE — 2580000003 HC RX 258

## 2023-09-29 PROCEDURE — 94761 N-INVAS EAR/PLS OXIMETRY MLT: CPT

## 2023-09-29 PROCEDURE — 87186 SC STD MICRODIL/AGAR DIL: CPT

## 2023-09-29 RX ORDER — LORAZEPAM 2 MG/ML
2 INJECTION INTRAMUSCULAR EVERY 4 HOURS PRN
Status: DISCONTINUED | OUTPATIENT
Start: 2023-09-29 | End: 2023-10-04

## 2023-09-29 RX ADMIN — FAMOTIDINE 20 MG: 10 INJECTION INTRAVENOUS at 21:27

## 2023-09-29 RX ADMIN — ACETAMINOPHEN 650 MG: 325 TABLET ORAL at 07:56

## 2023-09-29 RX ADMIN — ACETAMINOPHEN 650 MG: 325 TABLET ORAL at 15:47

## 2023-09-29 RX ADMIN — SODIUM CHLORIDE, POTASSIUM CHLORIDE, SODIUM LACTATE AND CALCIUM CHLORIDE: 600; 310; 30; 20 INJECTION, SOLUTION INTRAVENOUS at 03:28

## 2023-09-29 RX ADMIN — LORAZEPAM 2 MG: 2 INJECTION INTRAMUSCULAR; INTRAVENOUS at 07:43

## 2023-09-29 RX ADMIN — ACETAMINOPHEN 650 MG: 325 TABLET ORAL at 11:38

## 2023-09-29 RX ADMIN — PROPOFOL 50 MCG/KG/MIN: 10 INJECTION, EMULSION INTRAVENOUS at 07:01

## 2023-09-29 RX ADMIN — Medication 10 MG/HR: at 10:10

## 2023-09-29 RX ADMIN — QUETIAPINE FUMARATE 25 MG: 25 TABLET ORAL at 21:30

## 2023-09-29 RX ADMIN — PROPOFOL 50 MCG/KG/MIN: 10 INJECTION, EMULSION INTRAVENOUS at 11:28

## 2023-09-29 RX ADMIN — FAMOTIDINE 20 MG: 10 INJECTION INTRAVENOUS at 07:43

## 2023-09-29 RX ADMIN — Medication 1750 MG: at 16:43

## 2023-09-29 RX ADMIN — Medication 10 MG/HR: at 21:29

## 2023-09-29 RX ADMIN — ENOXAPARIN SODIUM 40 MG: 100 INJECTION SUBCUTANEOUS at 21:27

## 2023-09-29 RX ADMIN — PROPOFOL 50 MCG/KG/MIN: 10 INJECTION, EMULSION INTRAVENOUS at 00:25

## 2023-09-29 RX ADMIN — Medication 0.2 MCG/KG/HR: at 10:38

## 2023-09-29 RX ADMIN — PROPOFOL 50 MCG/KG/MIN: 10 INJECTION, EMULSION INTRAVENOUS at 23:57

## 2023-09-29 RX ADMIN — Medication 0.6 MCG/KG/HR: at 18:35

## 2023-09-29 RX ADMIN — CHLORHEXIDINE GLUCONATE 15 ML: 1.2 RINSE ORAL at 07:43

## 2023-09-29 RX ADMIN — CHLORHEXIDINE GLUCONATE 15 ML: 1.2 RINSE ORAL at 21:27

## 2023-09-29 RX ADMIN — PROPOFOL 50 MCG/KG/MIN: 10 INJECTION, EMULSION INTRAVENOUS at 19:02

## 2023-09-29 RX ADMIN — ACETAMINOPHEN 650 MG: 325 TABLET ORAL at 23:59

## 2023-09-29 RX ADMIN — CEFEPIME 2000 MG: 2 INJECTION, POWDER, FOR SOLUTION INTRAVENOUS at 16:41

## 2023-09-29 RX ADMIN — PROPOFOL 50 MCG/KG/MIN: 10 INJECTION, EMULSION INTRAVENOUS at 04:09

## 2023-09-29 RX ADMIN — SODIUM CHLORIDE, POTASSIUM CHLORIDE, SODIUM LACTATE AND CALCIUM CHLORIDE: 600; 310; 30; 20 INJECTION, SOLUTION INTRAVENOUS at 15:46

## 2023-09-29 RX ADMIN — PROPOFOL 50 MCG/KG/MIN: 10 INJECTION, EMULSION INTRAVENOUS at 15:18

## 2023-09-29 ASSESSMENT — PULMONARY FUNCTION TESTS
PIF_VALUE: 17
PIF_VALUE: 20
PIF_VALUE: 18
PIF_VALUE: 16
PIF_VALUE: 16
PIF_VALUE: 17
PIF_VALUE: 17

## 2023-09-29 NOTE — PROGRESS NOTES
Patient Weaning Progress    The patient's vent settings was not able to be weaned this shift. Ventilator settings that were weaned              [] Mode   [] Pressure support weaned   [] Fio2 weaned   [] Peep weaned      Spontaneous weaning trial  was not attempted. due to defined parameters for SBT (spontaneous breathing trial) not being met. Evac tube was not  hooked up with continuous low suction(20-30mmHg)      Cuff was not  deflated to determine cuff leak. Unable to get agreement for goals because no family is present and patient cannot respond.         *Specific details of weaning located in Ventilator documentation flowsheets*

## 2023-09-29 NOTE — PLAN OF CARE
Problem: Discharge Planning  Goal: Discharge to home or other facility with appropriate resources  9/29/2023 8204 by Jalyn Escobedo RN  Outcome: Progressing  Flowsheets (Taken 9/29/2023 1800)  Discharge to home or other facility with appropriate resources: Identify barriers to discharge with patient and caregiver     Problem: Safety - Medical Restraint  Goal: Remains free of injury from restraints (Restraint for Interference with Medical Device)  Description: INTERVENTIONS:  1. Determine that other, less restrictive measures have been tried or would not be effective before applying the restraint  2. Evaluate the patient's condition at the time of restraint application  3. Inform patient/family regarding the reason for restraint  4.  Q2H: Monitor safety, psychosocial status, comfort, nutrition and hydration  9/29/2023 0952 by Jalyn Escobedo RN  Outcome: Progressing  Flowsheets (Taken 9/29/2023 4108)  Remains free of injury from restraints (restraint for interference with medical device):   Determine that other, less restrictive measures have been tried or would not be effective before applying the restraint   Evaluate the patient's condition at the time of restraint application   Every 2 hours: Monitor safety, psychosocial status, comfort, nutrition and hydration   Inform patient/family regarding the reason for restraint     Problem: Pain  Goal: Verbalizes/displays adequate comfort level or baseline comfort level  9/29/2023 0952 by Jalyn Escobedo RN  Outcome: Progressing  Flowsheets (Taken 9/29/2023 4155)  Verbalizes/displays adequate comfort level or baseline comfort level:   Encourage patient to monitor pain and request assistance   Assess pain using appropriate pain scale   Administer analgesics based on type and severity of pain and evaluate response   Implement non-pharmacological measures as appropriate and evaluate response   Consider cultural and social influences on pain and pain management   Notify

## 2023-09-29 NOTE — PLAN OF CARE
Problem: Respiratory - Adult  Goal: Normal spontaneous ventilation  9/29/2023 0149 by Emily Garcia RCP  Outcome: Progressing  Note: SBT when appropriate. Problem: Respiratory - Adult  Goal: Achieves optimal ventilation and oxygenation  Outcome: Progressing  Flowsheets (Taken 9/29/2023 0149)  Achieves optimal ventilation and oxygenation:   Assess for changes in respiratory status   Position to facilitate oxygenation and minimize respiratory effort   Assess the need for suctioning and aspirate as needed   Respiratory therapy support as indicated   Assess for changes in mentation and behavior   Oxygen supplementation based on oxygen saturation or arterial blood gases   Assess and instruct to report shortness of breath or any respiratory difficulty  Note: Intubated/Vented. Wean as tolerated.

## 2023-09-29 NOTE — PROGRESS NOTES
Per epic, pt remains on vent - unable to complete addiction consult at this time. SHOBHA to follow for completion.

## 2023-09-29 NOTE — PROGRESS NOTES
This RN notified Dr. Debra Jacobsen of patient's persistent restlessness, agitation after increasing sedation gtt dose when attempting to bathe patient. Verbal order to increase propofol gtt to 60mcg/kg/min.  See [facility]

## 2023-09-29 NOTE — PLAN OF CARE
Problem: Respiratory - Adult  Goal: Achieves optimal ventilation and oxygenation  9/29/2023 0734 by Hamida Chahal RN  Flowsheets (Taken 9/29/2023 5904)  Achieves optimal ventilation and oxygenation:   Assess for changes in respiratory status   Assess the need for suctioning and aspirate as needed     Problem: Discharge Planning  Goal: Discharge to home or other facility with appropriate resources  Flowsheets (Taken 9/28/2023 2000)  Discharge to home or other facility with appropriate resources:   Identify barriers to discharge with patient and caregiver   Identify discharge learning needs (meds, wound care, etc)   Arrange for needed discharge resources and transportation as appropriate   Refer to discharge planning if patient needs post-hospital services based on physician order or complex needs related to functional status, cognitive ability or social support system     Problem: Safety - Medical Restraint  Goal: Remains free of injury from restraints (Restraint for Interference with Medical Device)  Description: INTERVENTIONS:  1. Determine that other, less restrictive measures have been tried or would not be effective before applying the restraint  2. Evaluate the patient's condition at the time of restraint application  3. Inform patient/family regarding the reason for restraint  4.  Q2H: Monitor safety, psychosocial status, comfort, nutrition and hydration  Flowsheets (Taken 9/29/2023 0000)  Remains free of injury from restraints (restraint for interference with medical device):   Determine that other, less restrictive measures have been tried or would not be effective before applying the restraint   Evaluate the patient's condition at the time of restraint application   Inform patient/family regarding the reason for restraint   Every 2 hours: Monitor safety, psychosocial status, comfort, nutrition and hydration     Problem: Pain  Goal: Verbalizes/displays adequate comfort level or baseline comfort

## 2023-09-29 NOTE — PROGRESS NOTES
CRITICAL CARE PROGRESS NOTE      Patient:  Joseline Price    Unit/Bed:4D-15/015-A  YOB: 1985  MRN: [id number]   PCP: PAPA Jordan CNP  Date of Admission: 9/27/2023  Chief Complaint:-Drug use    Assessment and Plan:    Delirium: Patient presented to the ER with extreme agitation. Given ketamine without improvement of agitation. Decision was made to intubate 2/2 protection of the patient and staff. We will keep him intubated for several days without weaning while he is withdrawing. He wakes up a little once or twice a day and becomes agitated to the point we need to give him more sedation. Propofol, precedex transitioned to versed due to bradycardia and ativan PRN. Acute Respiratory Failure:  Patient intubated 9/27/23 in the ER for airway protection. Patient remained agitated after receiving ketamine. Airway was at risk and therefore, patient airway was secured. Allow patient to withdraw while sedated on mechanical ventilator. Look to extubate Saturday or Sunday. Polysubstance use disorder: Patient has extensive drug use history including the use of amphetamine, benzodiazepine, cocaine, crack cocaine, fentanyl, marijuana, heroin, oxycodone, sedative/hypnotics, opiates, methamphetamines, Suboxone. Will offer help with abstinence, patient has not seemed interested in cessation on previous admissions. SATYA:resolved Baseline creatinine 1.0, presented 1.8. Patient started on LR. We will continue to monitor creatinine. Will order urine osmolality, sodium, creatinine, urea. CK 1946, suspicion for very mild rhabdomyolysis. INITIAL H AND P AND ICU COURSE:  71-year-old male with PMH of anxiety, depression, drug use. Patient presented to the ED today complaining of drug use, he was making erratic movements in the bed. The patient was intubated down in the ED for unknown reasons to myself as I see no documentation.   We will plan on keeping him intubated for several days on

## 2023-09-29 NOTE — SIGNIFICANT EVENT
[facility]  Department of Critical Care Medicine   ICU Night Call Note        Call initiated by: Nursing staff:  CHRIS Vega  Call addressed around: 9/28/2023 7:50 PM      Reason for call: HR drooped to 40    Patient seen and examined. Patient is currently intubated on mechanical ventilator and sedated with precedex and propofol drip. Noted patient HR between 42-45. Advised the nurse to turn off the precidex, and change precedex to versed gtt. While waiting for versed drip, versed 4 mg once ordered.      Orders placed:  Versed 4 mg IV push, once, followed by drip        ANNE Lopez   Critical Care Medicine  9/28/2023 8:06 PM    PAPA Lopez CNP

## 2023-09-29 NOTE — PLAN OF CARE
Patient Weaning Progress    The patient's vent settings was not able to be weaned this shift. Ventilator settings that were weaned              [] Mode   [] Pressure support weaned   [] Fio2 weaned   [] Peep weaned      Spontaneous weaning trial  was not attempted. due to defined parameters for SBT (spontaneous breathing trial) not being met. Evac tube was  hooked up with continuous low suction(20-30mmHg)            Unable to get agreement for goals because no family is present and patient cannot respond.

## 2023-09-29 NOTE — CARE COORDINATION
9/29/23, 2:39 PM EDT    DISCHARGE ON GOING [address] day: 2  Location: [facility]15/015-A Reason for admit: AMS (altered mental status) [R41.82]   Procedure:   9/27 CXR: No acute findings  9/27 Intubated  9/29 CXR: No acute infiltrates or effusions are seen. No appreciable change from prior study. Barriers to Discharge: HR in the 40's last night; stopped precedex drip and switched to versed drip. Easily agitated, prn IV ativan ordered. Remains on vent w/ETT on AC/PC, peep 6, FIO2 21%, sats 99%. Tmax 101.3. NSR. Follows commands x4. Addiction Services consulted. +MRSA nares. Respiratory culture +Pseudomonase aeruginosa & MRSA by PCR. Telemetry, OG w/TF, levin, SCDs. Precedex @ 0.2 mcg/kg/hr, IVF, versed @ 10 mg/hr, levo @ 0.5 mcg/min, diprivan @ 50 mcg/kg/mi, peridex, lovenox, IV pepcid, prn IV ativan, seroquel. Hgb 10.6. PCP: PAPA Rosas CNP  Readmission Risk Score: 26.2%  Patient Goals/Plan/Treatment Preferences: Home with MADISON Osullivan. Independent. Monitor for needs. Will likely need ride at discharge. Monitor for needs once extubated.

## 2023-09-30 LAB
ANION GAP SERPL CALC-SCNC: 10 MEQ/L (ref 8–16)
BUN SERPL-MCNC: 8 MG/DL (ref 7–22)
CALCIUM SERPL-MCNC: 9 MG/DL (ref 8.5–10.5)
CHLORIDE SERPL-SCNC: 107 MEQ/L (ref 98–111)
CO2 SERPL-SCNC: 23 MEQ/L (ref 23–33)
CREAT SERPL-MCNC: 0.5 MG/DL (ref 0.4–1.2)
DEPRECATED RDW RBC AUTO: 46 FL (ref 35–45)
ERYTHROCYTE [DISTWIDTH] IN BLOOD BY AUTOMATED COUNT: 17 % (ref 11.5–14.5)
GFR SERPL CREATININE-BSD FRML MDRD: > 60 ML/MIN/1.73M2
GLUCOSE SERPL-MCNC: 118 MG/DL (ref 70–108)
HCT VFR BLD AUTO: 32.8 % (ref 42–52)
HGB BLD-MCNC: 11 GM/DL (ref 14–18)
MAGNESIUM SERPL-MCNC: 2.1 MG/DL (ref 1.6–2.4)
MCH RBC QN AUTO: 25.3 PG (ref 26–33)
MCHC RBC AUTO-ENTMCNC: 33.5 GM/DL (ref 32.2–35.5)
MCV RBC AUTO: 75.4 FL (ref 80–94)
PLATELET # BLD AUTO: 208 THOU/MM3 (ref 130–400)
PMV BLD AUTO: 9.9 FL (ref 9.4–12.4)
POTASSIUM SERPL-SCNC: 4.4 MEQ/L (ref 3.5–5.2)
RBC # BLD AUTO: 4.35 MILL/MM3 (ref 4.7–6.1)
SODIUM SERPL-SCNC: 140 MEQ/L (ref 135–145)
WBC # BLD AUTO: 12.6 THOU/MM3 (ref 4.8–10.8)

## 2023-09-30 PROCEDURE — 94761 N-INVAS EAR/PLS OXIMETRY MLT: CPT

## 2023-09-30 PROCEDURE — 2500000003 HC RX 250 WO HCPCS

## 2023-09-30 PROCEDURE — 2000000000 HC ICU R&B

## 2023-09-30 PROCEDURE — 2580000003 HC RX 258: Performed by: NURSE PRACTITIONER

## 2023-09-30 PROCEDURE — 36415 COLL VENOUS BLD VENIPUNCTURE: CPT

## 2023-09-30 PROCEDURE — 6370000000 HC RX 637 (ALT 250 FOR IP): Performed by: EMERGENCY MEDICINE

## 2023-09-30 PROCEDURE — A4216 STERILE WATER/SALINE, 10 ML: HCPCS

## 2023-09-30 PROCEDURE — 6370000000 HC RX 637 (ALT 250 FOR IP): Performed by: PODIATRIST

## 2023-09-30 PROCEDURE — 6370000000 HC RX 637 (ALT 250 FOR IP)

## 2023-09-30 PROCEDURE — 6360000002 HC RX W HCPCS: Performed by: NURSE PRACTITIONER

## 2023-09-30 PROCEDURE — 2580000003 HC RX 258: Performed by: PHARMACIST

## 2023-09-30 PROCEDURE — 6360000002 HC RX W HCPCS: Performed by: PHARMACIST

## 2023-09-30 PROCEDURE — 2580000003 HC RX 258

## 2023-09-30 PROCEDURE — 94003 VENT MGMT INPAT SUBQ DAY: CPT

## 2023-09-30 PROCEDURE — 99291 CRITICAL CARE FIRST HOUR: CPT | Performed by: INTERNAL MEDICINE

## 2023-09-30 PROCEDURE — 6360000002 HC RX W HCPCS

## 2023-09-30 PROCEDURE — 83735 ASSAY OF MAGNESIUM: CPT

## 2023-09-30 PROCEDURE — 85027 COMPLETE CBC AUTOMATED: CPT

## 2023-09-30 PROCEDURE — 80048 BASIC METABOLIC PNL TOTAL CA: CPT

## 2023-09-30 RX ORDER — VENLAFAXINE 50 MG/1
100 TABLET ORAL 3 TIMES DAILY
Status: DISCONTINUED | OUTPATIENT
Start: 2023-09-30 | End: 2023-10-04 | Stop reason: HOSPADM

## 2023-09-30 RX ORDER — SENNOSIDES A AND B 8.6 MG/1
1 TABLET, FILM COATED ORAL NIGHTLY
Status: DISCONTINUED | OUTPATIENT
Start: 2023-09-30 | End: 2023-10-04

## 2023-09-30 RX ORDER — QUETIAPINE FUMARATE 25 MG/1
25 TABLET, FILM COATED ORAL NIGHTLY PRN
Status: DISCONTINUED | OUTPATIENT
Start: 2023-09-30 | End: 2023-10-04 | Stop reason: HOSPADM

## 2023-09-30 RX ORDER — ARIPIPRAZOLE 5 MG/1
5 TABLET ORAL DAILY
Status: DISCONTINUED | OUTPATIENT
Start: 2023-09-30 | End: 2023-10-04 | Stop reason: HOSPADM

## 2023-09-30 RX ORDER — LISINOPRIL 20 MG/1
20 TABLET ORAL DAILY
Status: DISCONTINUED | OUTPATIENT
Start: 2023-09-30 | End: 2023-10-04 | Stop reason: HOSPADM

## 2023-09-30 RX ORDER — POLYETHYLENE GLYCOL 3350 17 G/17G
17 POWDER, FOR SOLUTION ORAL DAILY
Status: DISCONTINUED | OUTPATIENT
Start: 2023-09-30 | End: 2023-10-04

## 2023-09-30 RX ADMIN — ARIPIPRAZOLE 5 MG: 5 TABLET ORAL at 13:37

## 2023-09-30 RX ADMIN — ENOXAPARIN SODIUM 40 MG: 100 INJECTION SUBCUTANEOUS at 20:43

## 2023-09-30 RX ADMIN — CEFEPIME 2000 MG: 2 INJECTION, POWDER, FOR SOLUTION INTRAVENOUS at 00:22

## 2023-09-30 RX ADMIN — PROPOFOL 50 MCG/KG/MIN: 10 INJECTION, EMULSION INTRAVENOUS at 11:18

## 2023-09-30 RX ADMIN — PROPOFOL 50 MCG/KG/MIN: 10 INJECTION, EMULSION INTRAVENOUS at 18:14

## 2023-09-30 RX ADMIN — ACETAMINOPHEN 650 MG: 325 TABLET ORAL at 11:00

## 2023-09-30 RX ADMIN — PREGABALIN 200 MG: 75 CAPSULE ORAL at 15:23

## 2023-09-30 RX ADMIN — PROPOFOL 50 MCG/KG/MIN: 10 INJECTION, EMULSION INTRAVENOUS at 03:02

## 2023-09-30 RX ADMIN — VENLAFAXINE HYDROCHLORIDE 100 MG: 50 TABLET ORAL at 13:37

## 2023-09-30 RX ADMIN — PROPOFOL 50 MCG/KG/MIN: 10 INJECTION, EMULSION INTRAVENOUS at 07:01

## 2023-09-30 RX ADMIN — Medication 1250 MG: at 17:37

## 2023-09-30 RX ADMIN — VENLAFAXINE HYDROCHLORIDE 100 MG: 50 TABLET ORAL at 20:43

## 2023-09-30 RX ADMIN — FAMOTIDINE 20 MG: 10 INJECTION INTRAVENOUS at 07:59

## 2023-09-30 RX ADMIN — CHLORHEXIDINE GLUCONATE 15 ML: 1.2 RINSE ORAL at 07:59

## 2023-09-30 RX ADMIN — POLYETHYLENE GLYCOL 3350 17 G: 17 POWDER, FOR SOLUTION ORAL at 07:58

## 2023-09-30 RX ADMIN — Medication 1250 MG: at 05:04

## 2023-09-30 RX ADMIN — Medication 0.6 MCG/KG/HR: at 03:04

## 2023-09-30 RX ADMIN — LISINOPRIL 20 MG: 20 TABLET ORAL at 13:37

## 2023-09-30 RX ADMIN — CHLORHEXIDINE GLUCONATE 15 ML: 1.2 RINSE ORAL at 20:43

## 2023-09-30 RX ADMIN — Medication 8 MG/HR: at 08:16

## 2023-09-30 RX ADMIN — SENNOSIDES 8.6 MG: 8.6 TABLET, FILM COATED ORAL at 20:43

## 2023-09-30 RX ADMIN — FAMOTIDINE 20 MG: 10 INJECTION INTRAVENOUS at 20:43

## 2023-09-30 RX ADMIN — PROPOFOL 50 MCG/KG/MIN: 10 INJECTION, EMULSION INTRAVENOUS at 14:23

## 2023-09-30 RX ADMIN — PROPOFOL 50 MCG/KG/MIN: 10 INJECTION, EMULSION INTRAVENOUS at 22:07

## 2023-09-30 RX ADMIN — CEFEPIME 2000 MG: 2 INJECTION, POWDER, FOR SOLUTION INTRAVENOUS at 15:34

## 2023-09-30 RX ADMIN — LORAZEPAM 2 MG: 2 INJECTION INTRAMUSCULAR; INTRAVENOUS at 23:29

## 2023-09-30 RX ADMIN — Medication 0.6 MCG/KG/HR: at 17:38

## 2023-09-30 RX ADMIN — PREGABALIN 200 MG: 75 CAPSULE ORAL at 20:44

## 2023-09-30 RX ADMIN — CEFEPIME 2000 MG: 2 INJECTION, POWDER, FOR SOLUTION INTRAVENOUS at 08:12

## 2023-09-30 ASSESSMENT — PULMONARY FUNCTION TESTS
PIF_VALUE: 21
PIF_VALUE: 23
PIF_VALUE: 20
PIF_VALUE: 23
PIF_VALUE: 20
PIF_VALUE: 17

## 2023-09-30 NOTE — PLAN OF CARE
Problem: Discharge Planning  Goal: Discharge to home or other facility with appropriate resources  Outcome: Progressing  Discharge to home or other facility with appropriate resources: Identify barriers to discharge with patient and caregiver     Problem: Safety - Medical Restraint  Goal: Remains free of injury from restraints (Restraint for Interference with Medical Device)  Description: INTERVENTIONS:  1. Determine that other, less restrictive measures have been tried or would not be effective before applying the restraint  2. Evaluate the patient's condition at the time of restraint application  3. Inform patient/family regarding the reason for restraint  4.  Q2H: Monitor safety, psychosocial status, comfort, nutrition and hydration  Outcome: Progressing  Remains free of injury from restraints (restraint for interference with medical device):   Determine that other, less restrictive measures have been tried or would not be effective before applying the restraint   Evaluate the patient's condition at the time of restraint application   Every 2 hours: Monitor safety, psychosocial status, comfort, nutrition and hydration   Inform patient/family regarding the reason for restraint     Problem: Pain  Goal: Verbalizes/displays adequate comfort level or baseline comfort level  Outcome: Progressing  Verbalizes/displays adequate comfort level or baseline comfort level:   Encourage patient to monitor pain and request assistance   Assess pain using appropriate pain scale   Administer analgesics based on type and severity of pain and evaluate response   Implement non-pharmacological measures as appropriate and evaluate response   Consider cultural and social influences on pain and pain management   Notify Licensed Independent Practitioner if interventions unsuccessful or patient reports new pain     Problem: Respiratory - Adult  Goal: Normal spontaneous ventilation  Outcome: Progressing  Note: Pt to remain intubated today due

## 2023-09-30 NOTE — PLAN OF CARE
Problem: Discharge Planning  Goal: Discharge to home or other facility with appropriate resources  Outcome: Progressing     Problem: Safety - Medical Restraint  Goal: Remains free of injury from restraints (Restraint for Interference with Medical Device)  Description: INTERVENTIONS:  1. Determine that other, less restrictive measures have been tried or would not be effective before applying the restraint  2. Evaluate the patient's condition at the time of restraint application  3. Inform patient/family regarding the reason for restraint  4. Q2H: Monitor safety, psychosocial status, comfort, nutrition and hydration  Outcome: Progressing  Flowsheets (Taken 9/29/2023 2000)  Remains free of injury from restraints (restraint for interference with medical device):   Determine that other, less restrictive measures have been tried or would not be effective before applying the restraint   Evaluate the patient's condition at the time of restraint application   Every 2 hours: Monitor safety, psychosocial status, comfort, nutrition and hydration   Patient unable to participate in care planning. No family at bedside.

## 2023-09-30 NOTE — PROGRESS NOTES
CRITICAL CARE PROGRESS NOTE      Patient:  Dangelo Fast    Unit/Bed:4D-15/015-A  YOB: 1985  MRN: [id number]   PCP: PAPA Hernandez CNP  Date of Admission: 9/27/2023  Chief Complaint:-Drug use    Assessment and Plan:    Delirium: Patient presented to the ER with extreme agitation likely secondary polysubstance abuse. Given ketamine without improvement of agitation. Decision was made to intubate 2/2 protection of the patient and staff. May need longer sedation period for withdrawal on propofol. Acute Respiratory Failure:  Patient intubated 9/27/23 in the ER for airway protection. Sedation on Propofol and Versed gtt; begin weaning versed today. Goal to be off versed by tomorrow; transition from propofol to precedx at that time. Did not tolerate SBT this morning, will try again tomorrow when patient more alert. Peridex + GI prophylaxis. MRSA PNA: RespCx (9/29/2023) grew Coag pos Staph Aureus. PNA PCR (9/29/2023) positive Pseudomonas aeruginosa and MRSA. Serial CXR; worsening RLL opacities. On vanc and Cefepim (9/29 - 10/05). Polysubstance use disorder: Patient has extensive drug use history including the use of amphetamine, benzodiazepine, cocaine, crack cocaine, fentanyl, marijuana, heroin, oxycodone, sedative/hypnotics, opiates, methamphetamines, Suboxone. Will offer help with abstinence, patient has not seemed interested in cessation on previous admissions. Primary HTN: held home lisinopril for SATYA; can restart now that SATYA resolved. Anxiety/Depression: continued home venlafaxine and Abilify. Non-oliguric SATYA:resolved Baseline creatinine 1.0, presented 1.8. resolved on IVFs. INITIAL H AND P AND ICU COURSE:    Patient is a 43yo M w/ PMH polysubstance use, anxiety, depression who presented to [facility] ED for severe agitation. Endorsed recent drug use and erratically moving on the bed. Subsequently intubated in ED for no documented reason, suspect need for sedation.  We

## 2023-09-30 NOTE — PLAN OF CARE
Problem: Respiratory - Adult  Goal: Normal spontaneous ventilation  Outcome: Progressing                                                Patient Weaning Progress    The patient's vent settings was able to be weaned this shift. Ventilator settings that were weaned              [x] Mode   [] Pressure support weaned   [] Fio2 weaned   [] Peep weaned      Spontaneous weaning trial  was attempted. due to defined parameters for SBT (spontaneous breathing trial) not being met. The patient was able to tolerate SBT. RSBI  was 52.39 with EtCO2 of 30 and SpO2 of 97 on 30% FiO2. Spontanteous VT was 439 and RR 23 breaths/min. Evac tube was  hooked up with continuous low suction(20-30mmHg)      Cuff was  deflated to determine cuff leak. A leak  was detected. Unable to get agreement for goals because no family is present and patient cannot respond.

## 2023-10-01 ENCOUNTER — APPOINTMENT (OUTPATIENT)
Dept: GENERAL RADIOLOGY | Age: 38
DRG: 896 | End: 2023-10-01
Payer: COMMERCIAL

## 2023-10-01 PROBLEM — J96.01 ACUTE RESPIRATORY FAILURE WITH HYPOXIA (HCC): Status: ACTIVE | Noted: 2019-08-26

## 2023-10-01 LAB
ANION GAP SERPL CALC-SCNC: 10 MEQ/L (ref 8–16)
BACTERIA SPEC RESP CULT: ABNORMAL
BACTERIA SPEC RESP CULT: ABNORMAL
BUN SERPL-MCNC: 8 MG/DL (ref 7–22)
CALCIUM SERPL-MCNC: 8.9 MG/DL (ref 8.5–10.5)
CHLORIDE SERPL-SCNC: 106 MEQ/L (ref 98–111)
CO2 SERPL-SCNC: 23 MEQ/L (ref 23–33)
CREAT SERPL-MCNC: 0.4 MG/DL (ref 0.4–1.2)
DEPRECATED RDW RBC AUTO: 49.4 FL (ref 35–45)
ERYTHROCYTE [DISTWIDTH] IN BLOOD BY AUTOMATED COUNT: 17.2 % (ref 11.5–14.5)
GFR SERPL CREATININE-BSD FRML MDRD: > 60 ML/MIN/1.73M2
GLUCOSE SERPL-MCNC: 125 MG/DL (ref 70–108)
GRAM STN SPEC: ABNORMAL
HCT VFR BLD AUTO: 27.4 % (ref 42–52)
HCT VFR BLD AUTO: 32.3 % (ref 42–52)
HGB BLD-MCNC: 10 GM/DL (ref 14–18)
HGB BLD-MCNC: 8.6 GM/DL (ref 14–18)
MAGNESIUM SERPL-MCNC: 2 MG/DL (ref 1.6–2.4)
MCH RBC QN AUTO: 24.9 PG (ref 26–33)
MCHC RBC AUTO-ENTMCNC: 31.4 GM/DL (ref 32.2–35.5)
MCV RBC AUTO: 79.2 FL (ref 80–94)
ORGANISM: ABNORMAL
PLATELET # BLD AUTO: 237 THOU/MM3 (ref 130–400)
PMV BLD AUTO: 9.6 FL (ref 9.4–12.4)
POTASSIUM SERPL-SCNC: 3.4 MEQ/L (ref 3.5–5.2)
RBC # BLD AUTO: 3.46 MILL/MM3 (ref 4.7–6.1)
SODIUM SERPL-SCNC: 139 MEQ/L (ref 135–145)
VANCOMYCIN SERPL-MCNC: 11.1 UG/ML (ref 0.1–39.9)
WBC # BLD AUTO: 9.2 THOU/MM3 (ref 4.8–10.8)

## 2023-10-01 PROCEDURE — A4216 STERILE WATER/SALINE, 10 ML: HCPCS

## 2023-10-01 PROCEDURE — 83735 ASSAY OF MAGNESIUM: CPT

## 2023-10-01 PROCEDURE — 85014 HEMATOCRIT: CPT

## 2023-10-01 PROCEDURE — 2700000000 HC OXYGEN THERAPY PER DAY

## 2023-10-01 PROCEDURE — 36415 COLL VENOUS BLD VENIPUNCTURE: CPT

## 2023-10-01 PROCEDURE — 6360000002 HC RX W HCPCS: Performed by: INTERNAL MEDICINE

## 2023-10-01 PROCEDURE — 74018 RADEX ABDOMEN 1 VIEW: CPT

## 2023-10-01 PROCEDURE — 6360000002 HC RX W HCPCS

## 2023-10-01 PROCEDURE — 80048 BASIC METABOLIC PNL TOTAL CA: CPT

## 2023-10-01 PROCEDURE — 6370000000 HC RX 637 (ALT 250 FOR IP): Performed by: INTERNAL MEDICINE

## 2023-10-01 PROCEDURE — 2500000003 HC RX 250 WO HCPCS

## 2023-10-01 PROCEDURE — 85027 COMPLETE CBC AUTOMATED: CPT

## 2023-10-01 PROCEDURE — 93010 ELECTROCARDIOGRAM REPORT: CPT | Performed by: INTERNAL MEDICINE

## 2023-10-01 PROCEDURE — 94003 VENT MGMT INPAT SUBQ DAY: CPT

## 2023-10-01 PROCEDURE — 99291 CRITICAL CARE FIRST HOUR: CPT | Performed by: INTERNAL MEDICINE

## 2023-10-01 PROCEDURE — 85018 HEMOGLOBIN: CPT

## 2023-10-01 PROCEDURE — 6370000000 HC RX 637 (ALT 250 FOR IP)

## 2023-10-01 PROCEDURE — 2000000000 HC ICU R&B

## 2023-10-01 PROCEDURE — 2580000003 HC RX 258

## 2023-10-01 PROCEDURE — 93005 ELECTROCARDIOGRAM TRACING: CPT | Performed by: STUDENT IN AN ORGANIZED HEALTH CARE EDUCATION/TRAINING PROGRAM

## 2023-10-01 PROCEDURE — 80202 ASSAY OF VANCOMYCIN: CPT

## 2023-10-01 PROCEDURE — 6360000002 HC RX W HCPCS: Performed by: PHARMACIST

## 2023-10-01 PROCEDURE — 94761 N-INVAS EAR/PLS OXIMETRY MLT: CPT

## 2023-10-01 RX ORDER — POTASSIUM CHLORIDE 7.45 MG/ML
10 INJECTION INTRAVENOUS PRN
Status: DISCONTINUED | OUTPATIENT
Start: 2023-10-01 | End: 2023-10-04 | Stop reason: HOSPADM

## 2023-10-01 RX ORDER — MIDAZOLAM HYDROCHLORIDE 1 MG/ML
4 INJECTION INTRAMUSCULAR; INTRAVENOUS ONCE
Status: COMPLETED | OUTPATIENT
Start: 2023-10-01 | End: 2023-10-02

## 2023-10-01 RX ORDER — POTASSIUM CHLORIDE 20 MEQ/1
40 TABLET, EXTENDED RELEASE ORAL PRN
Status: DISCONTINUED | OUTPATIENT
Start: 2023-10-01 | End: 2023-10-04 | Stop reason: HOSPADM

## 2023-10-01 RX ORDER — ATROPINE SULFATE 0.1 MG/ML
INJECTION INTRAVENOUS
Status: DISPENSED
Start: 2023-10-01 | End: 2023-10-02

## 2023-10-01 RX ORDER — HYDRALAZINE HYDROCHLORIDE 20 MG/ML
5 INJECTION INTRAMUSCULAR; INTRAVENOUS EVERY 4 HOURS PRN
Status: DISCONTINUED | OUTPATIENT
Start: 2023-10-01 | End: 2023-10-03

## 2023-10-01 RX ADMIN — CEFEPIME 2000 MG: 2 INJECTION, POWDER, FOR SOLUTION INTRAVENOUS at 15:52

## 2023-10-01 RX ADMIN — PREGABALIN 200 MG: 75 CAPSULE ORAL at 14:32

## 2023-10-01 RX ADMIN — POTASSIUM BICARBONATE 40 MEQ: 782 TABLET, EFFERVESCENT ORAL at 08:06

## 2023-10-01 RX ADMIN — PREGABALIN 200 MG: 75 CAPSULE ORAL at 20:38

## 2023-10-01 RX ADMIN — PROPOFOL 50 MCG/KG/MIN: 10 INJECTION, EMULSION INTRAVENOUS at 02:32

## 2023-10-01 RX ADMIN — Medication 1250 MG: at 05:38

## 2023-10-01 RX ADMIN — FAMOTIDINE 20 MG: 10 INJECTION INTRAVENOUS at 07:44

## 2023-10-01 RX ADMIN — VENLAFAXINE HYDROCHLORIDE 100 MG: 50 TABLET ORAL at 14:29

## 2023-10-01 RX ADMIN — CEFEPIME 2000 MG: 2 INJECTION, POWDER, FOR SOLUTION INTRAVENOUS at 00:18

## 2023-10-01 RX ADMIN — LORAZEPAM 2 MG: 2 INJECTION INTRAMUSCULAR; INTRAVENOUS at 17:43

## 2023-10-01 RX ADMIN — PROPOFOL 45 MCG/KG/MIN: 10 INJECTION, EMULSION INTRAVENOUS at 21:30

## 2023-10-01 RX ADMIN — PROPOFOL 50 MCG/KG/MIN: 10 INJECTION, EMULSION INTRAVENOUS at 05:37

## 2023-10-01 RX ADMIN — LISINOPRIL 20 MG: 20 TABLET ORAL at 07:44

## 2023-10-01 RX ADMIN — SODIUM CHLORIDE, POTASSIUM CHLORIDE, SODIUM LACTATE AND CALCIUM CHLORIDE: 600; 310; 30; 20 INJECTION, SOLUTION INTRAVENOUS at 09:37

## 2023-10-01 RX ADMIN — Medication 1250 MG: at 15:09

## 2023-10-01 RX ADMIN — POLYETHYLENE GLYCOL 3350 17 G: 17 POWDER, FOR SOLUTION ORAL at 07:44

## 2023-10-01 RX ADMIN — CHLORHEXIDINE GLUCONATE 15 ML: 1.2 RINSE ORAL at 07:44

## 2023-10-01 RX ADMIN — VENLAFAXINE HYDROCHLORIDE 100 MG: 50 TABLET ORAL at 07:44

## 2023-10-01 RX ADMIN — VENLAFAXINE HYDROCHLORIDE 100 MG: 50 TABLET ORAL at 20:38

## 2023-10-01 RX ADMIN — PROPOFOL 50 MCG/KG/MIN: 10 INJECTION, EMULSION INTRAVENOUS at 10:02

## 2023-10-01 RX ADMIN — SODIUM CHLORIDE, POTASSIUM CHLORIDE, SODIUM LACTATE AND CALCIUM CHLORIDE: 600; 310; 30; 20 INJECTION, SOLUTION INTRAVENOUS at 23:13

## 2023-10-01 RX ADMIN — ENOXAPARIN SODIUM 40 MG: 100 INJECTION SUBCUTANEOUS at 20:38

## 2023-10-01 RX ADMIN — FAMOTIDINE 20 MG: 10 INJECTION INTRAVENOUS at 20:38

## 2023-10-01 RX ADMIN — PROPOFOL 50 MCG/KG/MIN: 10 INJECTION, EMULSION INTRAVENOUS at 17:31

## 2023-10-01 RX ADMIN — PROPOFOL 50 MCG/KG/MIN: 10 INJECTION, EMULSION INTRAVENOUS at 13:05

## 2023-10-01 RX ADMIN — ARIPIPRAZOLE 5 MG: 5 TABLET ORAL at 07:44

## 2023-10-01 RX ADMIN — PREGABALIN 200 MG: 75 CAPSULE ORAL at 08:05

## 2023-10-01 RX ADMIN — QUETIAPINE FUMARATE 25 MG: 25 TABLET ORAL at 20:38

## 2023-10-01 RX ADMIN — Medication 1250 MG: at 23:16

## 2023-10-01 RX ADMIN — HYDRALAZINE HYDROCHLORIDE 5 MG: 20 INJECTION, SOLUTION INTRAMUSCULAR; INTRAVENOUS at 15:18

## 2023-10-01 RX ADMIN — CHLORHEXIDINE GLUCONATE 15 ML: 1.2 RINSE ORAL at 20:38

## 2023-10-01 RX ADMIN — CEFEPIME 2000 MG: 2 INJECTION, POWDER, FOR SOLUTION INTRAVENOUS at 08:05

## 2023-10-01 RX ADMIN — Medication 1 MCG/KG/HR: at 19:20

## 2023-10-01 ASSESSMENT — PULMONARY FUNCTION TESTS
PIF_VALUE: 20
PIF_VALUE: 21
PIF_VALUE: 17
PIF_VALUE: 21
PIF_VALUE: 21
PIF_VALUE: 23

## 2023-10-01 NOTE — PROGRESS NOTES
CRITICAL CARE PROGRESS NOTE      Patient:  Micheal Garcia    Unit/Bed:4D-15/015-A  YOB: 1985  MRN: [id number]   PCP: PAPA Esquivel CNP  Date of Admission: 9/27/2023  Chief Complaint:-Drug use    Assessment and Plan:    Delirium: Patient presented to the ER with extreme agitation likely secondary polysubstance abuse. Given ketamine without improvement of agitation. Decision was made to intubate 2/2 protection of the patient and staff. May need longer sedation period for withdrawal on propofol. We will wean the sedation as tolerated to facilitate weaning from the ventilator  Acute Respiratory Failure:  Patient intubated 9/27/23 in the ER for airway protection. Peridex + GI prophylaxis. Wean patient off sedation as tolerated and consider SBT trial and consider extubation if patient meets the criteria. MRSA AND Pseudomonas Pneumonia: RespCx (9/29/2023) grew Coag pos Staph Aureus. PNA PCR (9/29/2023) positive Pseudomonas aeruginosa and MRSA. Serial CXR; worsening RLL opacities. On vanc and Cefepim (9/29 - 10/05). Continue current antibiotic regimen. Polysubstance use disorder: Patient has extensive drug use history including the use of amphetamine, benzodiazepine, cocaine, crack cocaine, fentanyl, marijuana, heroin, oxycodone, sedative/hypnotics, opiates, methamphetamines, Suboxone. Will offer help with abstinence, patient has not seemed interested in cessation on previous admissions. Primary HTN: Under control with current medications we will continue. Anxiety/Depression: continued home venlafaxine and Abilify. Non-oliguric SATYA:resolved Baseline creatinine 1.0, presented 1.8. resolved on IVFs. Hypokalemia: We will supplement per protocol      INITIAL H AND P AND ICU COURSE:  Patient is a 43yo M w/ PMH polysubstance use, anxiety, depression who presented to [facility] ED for severe agitation. Endorsed recent drug use and erratically moving on the bed.  Subsequently intubated in ED for no

## 2023-10-01 NOTE — PROGRESS NOTES
0726  Per epic, pt remains on vent - unable to complete addiction consult at this time. SHOBHA to follow for completion.

## 2023-10-01 NOTE — PLAN OF CARE
Problem: Safety - Medical Restraint  Goal: Remains free of injury from restraints (Restraint for Interference with Medical Device)  Description: INTERVENTIONS:  1. Determine that other, less restrictive measures have been tried or would not be effective before applying the restraint  2. Evaluate the patient's condition at the time of restraint application  3. Inform patient/family regarding the reason for restraint  4. Q2H: Monitor safety, psychosocial status, comfort, nutrition and hydration  Outcome: Not Progressing  Flowsheets (Taken 10/1/2023 0306)  Remains free of injury from restraints (restraint for interference with medical device):   Determine that other, less restrictive measures have been tried or would not be effective before applying the restraint   Evaluate the patient's condition at the time of restraint application   Every 2 hours: Monitor safety, psychosocial status, comfort, nutrition and hydration     Problem: Pain  Goal: Verbalizes/displays adequate comfort level or baseline comfort level  Outcome: Not Progressing   Patient unable to participate in care planning. No family at bedside.

## 2023-10-01 NOTE — PLAN OF CARE
Problem: Respiratory - Adult  Goal: Achieves optimal ventilation and oxygenation  10/1/2023 0954 by Kaycee Flood RCP  Note: Vent settings optimized to achieve target tidal volume, respiratory rate and ideal oxygen saturations. Will continue to wean as patient tolerates. SBT will be performed when appropriate. Patient Weaning Progress    The patient's vent settings were able to be weaned this shift. Ventilator settings that were weaned              [] Mode   [x] Pressure support weaned   [] Fio2 weaned   [] Peep weaned      Spontaneous weaning trial  was not attempted due to defined parameters for SBT (spontaneous breathing trial) not being met. Evac tube was  hooked up with continuous low suction(20-30mmHg)      Cuff was deflated to determine cuff leak. A leak was detected. Unable to get agreement for goals because no family is present and patient cannot respond.

## 2023-10-01 NOTE — PLAN OF CARE
Problem: Respiratory - Adult  Goal: Achieves optimal ventilation and oxygenation  Outcome: Progressing                                                   Patient Weaning Progress    The patient's vent settings was able to be weaned this shift. Ventilator settings that were weaned              [] Mode   [x] Pressure support weaned   [] Fio2 weaned   [] Peep weaned      Spontaneous weaning trial  was not attempted. due to defined parameters for SBT (spontaneous breathing trial) not being met. Evac tube was  hooked up with continuous low suction(20-30mmHg)      Cuff was not  deflated to determine cuff leak. Unable to get agreement for goals because no family is present and patient cannot respond.

## 2023-10-02 ENCOUNTER — APPOINTMENT (OUTPATIENT)
Dept: GENERAL RADIOLOGY | Age: 38
DRG: 896 | End: 2023-10-02
Payer: COMMERCIAL

## 2023-10-02 LAB
ACINETOBACTER CALCOACETICUS-BAUMANNII BY PCR: NOT DETECTED
ANION GAP SERPL CALC-SCNC: 9 MEQ/L (ref 8–16)
BACTERIA: ABNORMAL
BILIRUB UR QL STRIP: NEGATIVE
BLACTX-M ISLT/SPM QL: NOT DETECTED
BLAIMP ISLT/SPM QL: NOT DETECTED
BLAKPC ISLT/SPM QL: NOT DETECTED
BLAOXA-48-LIKE ISLT/SPM QL: ABNORMAL
BLAVIM ISLT/SPM QL: NOT DETECTED
BUN SERPL-MCNC: 10 MG/DL (ref 7–22)
C PNEUM DNA LOWER RESP QL NAA+NON-PROBE: NOT DETECTED
CALCIUM SERPL-MCNC: 9.3 MG/DL (ref 8.5–10.5)
CASTS #/AREA URNS LPF: ABNORMAL /LPF
CASTS #/AREA URNS LPF: ABNORMAL /LPF
CHARACTER UR: CLEAR
CHARCOAL URNS QL MICRO: ABNORMAL
CHLORIDE SERPL-SCNC: 107 MEQ/L (ref 98–111)
CO2 SERPL-SCNC: 25 MEQ/L (ref 23–33)
COLOR UR: YELLOW
CREAT SERPL-MCNC: 0.5 MG/DL (ref 0.4–1.2)
CRYSTALS URNS QL MICRO: ABNORMAL
DEPRECATED RDW RBC AUTO: 49.6 FL (ref 35–45)
EKG ATRIAL RATE: 45 BPM
EKG ATRIAL RATE: 47 BPM
EKG P AXIS: 62 DEGREES
EKG P AXIS: 66 DEGREES
EKG P-R INTERVAL: 160 MS
EKG P-R INTERVAL: 210 MS
EKG Q-T INTERVAL: 480 MS
EKG Q-T INTERVAL: 482 MS
EKG QRS DURATION: 86 MS
EKG QRS DURATION: 92 MS
EKG QTC CALCULATION (BAZETT): 416 MS
EKG QTC CALCULATION (BAZETT): 424 MS
EKG R AXIS: 49 DEGREES
EKG R AXIS: 56 DEGREES
EKG T AXIS: 48 DEGREES
EKG T AXIS: 65 DEGREES
EKG VENTRICULAR RATE: 45 BPM
EKG VENTRICULAR RATE: 47 BPM
ENTEROBACTER CLOACAE COMPLEX BY PCR: NOT DETECTED
EPITHELIAL CELLS, UA: ABNORMAL /HPF
ERYTHROCYTE [DISTWIDTH] IN BLOOD BY AUTOMATED COUNT: 17 % (ref 11.5–14.5)
ESCHERICHIA COLI BY PCR: NOT DETECTED
FLUAV RNA LOWER RESP QL NAA+NON-PROBE: NOT DETECTED
FLUBV RNA LOWER RESP QL NAA+NON-PROBE: NOT DETECTED
GFR SERPL CREATININE-BSD FRML MDRD: > 60 ML/MIN/1.73M2
GLUCOSE SERPL-MCNC: 113 MG/DL (ref 70–108)
GLUCOSE UR QL STRIP.AUTO: NEGATIVE MG/DL
HADV DNA LOWER RESP QL NAA+NON-PROBE: NOT DETECTED
HAEMOPHILUS INFLUENZAE BY PCR: NOT DETECTED
HCOV RNA LOWER RESP QL NAA+NON-PROBE: NOT DETECTED
HCT VFR BLD AUTO: 29.6 % (ref 42–52)
HGB BLD-MCNC: 9.1 GM/DL (ref 14–18)
HGB UR QL STRIP.AUTO: NEGATIVE
HMPV RNA LOWER RESP QL NAA+NON-PROBE: NOT DETECTED
HPIV RNA LOWER RESP QL NAA+NON-PROBE: NOT DETECTED
KETONES UR QL STRIP.AUTO: NEGATIVE
KLEBSIELLA AEROGENES BY PCR: NOT DETECTED
KLEBSIELLA OXYTOCA BY PCR: NOT DETECTED
KLEBSIELLA PNEUMONIAE GROUP BY PCR: NOT DETECTED
L PNEUMO DNA LOWER RESP QL NAA+NON-PROBE: NOT DETECTED
LEUKOCYTE ESTERASE UR QL STRIP.AUTO: NEGATIVE
M PNEUMO DNA LOWER RESP QL NAA+NON-PROBE: NOT DETECTED
MAGNESIUM SERPL-MCNC: 2.1 MG/DL (ref 1.6–2.4)
MAGNESIUM SERPL-MCNC: 2.2 MG/DL (ref 1.6–2.4)
MCH RBC QN AUTO: 24.6 PG (ref 26–33)
MCHC RBC AUTO-ENTMCNC: 30.7 GM/DL (ref 32.2–35.5)
MCV RBC AUTO: 80 FL (ref 80–94)
MORAXELLA CATARRHALIS BY PCR: NOT DETECTED
NITRITE UR QL STRIP.AUTO: NEGATIVE
PH UR STRIP.AUTO: 7.5 [PH] (ref 5–9)
PLATELET # BLD AUTO: 273 THOU/MM3 (ref 130–400)
PMV BLD AUTO: 10 FL (ref 9.4–12.4)
POTASSIUM SERPL-SCNC: 3.5 MEQ/L (ref 3.5–5.2)
POTASSIUM SERPL-SCNC: 3.7 MEQ/L (ref 3.5–5.2)
PROT UR STRIP.AUTO-MCNC: 30 MG/DL
PROTEUS SPECIES BY PCR: NOT DETECTED
PSEUDOMONAS AERUGINOSA BY PCR: DETECTED
RBC # BLD AUTO: 3.7 MILL/MM3 (ref 4.7–6.1)
RBC #/AREA URNS HPF: ABNORMAL /HPF
RENAL EPI CELLS #/AREA URNS HPF: ABNORMAL /[HPF]
RESISTANT GENE MECA/C & MREJ BY PCR: DETECTED
RESISTANT GENE NDM BY PCR: NOT DETECTED
RSV RNA LOWER RESP QL NAA+NON-PROBE: NOT DETECTED
RV+EV RNA LOWER RESP QL NAA+NON-PROBE: NOT DETECTED
SERRATIA MARCESCENS BY PCR: NOT DETECTED
SODIUM SERPL-SCNC: 141 MEQ/L (ref 135–145)
SOURCE: ABNORMAL
SPECIFIC GRAVITY UA: 1.02 (ref 1–1.03)
SPECIMEN ACCEPTABILITY: ABNORMAL
STAPH AUREUS BY PCR: DETECTED
STREP AGALACTIAE BY PCR: NOT DETECTED
STREP PNEUMONIAE BY PCR: NOT DETECTED
STREP PYOGENES BY PCR: NOT DETECTED
TRIGL SERPL-MCNC: 123 MG/DL (ref 0–199)
UROBILINOGEN, URINE: 0.2 EU/DL (ref 0–1)
VANCOMYCIN TROUGH SERPL-MCNC: 15.4 UG/ML (ref 10–20)
WBC # BLD AUTO: 9.2 THOU/MM3 (ref 4.8–10.8)
WBC #/AREA URNS HPF: ABNORMAL /HPF
YEAST LIKE FUNGI URNS QL MICRO: ABNORMAL

## 2023-10-02 PROCEDURE — 87040 BLOOD CULTURE FOR BACTERIA: CPT

## 2023-10-02 PROCEDURE — 87581 M.PNEUMON DNA AMP PROBE: CPT

## 2023-10-02 PROCEDURE — 87070 CULTURE OTHR SPECIMN AEROBIC: CPT

## 2023-10-02 PROCEDURE — 81001 URINALYSIS AUTO W/SCOPE: CPT

## 2023-10-02 PROCEDURE — 84132 ASSAY OF SERUM POTASSIUM: CPT

## 2023-10-02 PROCEDURE — 36415 COLL VENOUS BLD VENIPUNCTURE: CPT

## 2023-10-02 PROCEDURE — 94003 VENT MGMT INPAT SUBQ DAY: CPT

## 2023-10-02 PROCEDURE — 84478 ASSAY OF TRIGLYCERIDES: CPT

## 2023-10-02 PROCEDURE — A4216 STERILE WATER/SALINE, 10 ML: HCPCS

## 2023-10-02 PROCEDURE — 87486 CHLMYD PNEUM DNA AMP PROBE: CPT

## 2023-10-02 PROCEDURE — 87077 CULTURE AEROBIC IDENTIFY: CPT

## 2023-10-02 PROCEDURE — 87150 DNA/RNA AMPLIFIED PROBE: CPT

## 2023-10-02 PROCEDURE — 85027 COMPLETE CBC AUTOMATED: CPT

## 2023-10-02 PROCEDURE — 6370000000 HC RX 637 (ALT 250 FOR IP): Performed by: EMERGENCY MEDICINE

## 2023-10-02 PROCEDURE — 6360000002 HC RX W HCPCS: Performed by: INTERNAL MEDICINE

## 2023-10-02 PROCEDURE — 87798 DETECT AGENT NOS DNA AMP: CPT

## 2023-10-02 PROCEDURE — 2700000000 HC OXYGEN THERAPY PER DAY

## 2023-10-02 PROCEDURE — 99291 CRITICAL CARE FIRST HOUR: CPT | Performed by: INTERNAL MEDICINE

## 2023-10-02 PROCEDURE — 94761 N-INVAS EAR/PLS OXIMETRY MLT: CPT

## 2023-10-02 PROCEDURE — 6360000002 HC RX W HCPCS: Performed by: NURSE PRACTITIONER

## 2023-10-02 PROCEDURE — 2500000003 HC RX 250 WO HCPCS

## 2023-10-02 PROCEDURE — 87631 RESP VIRUS 3-5 TARGETS: CPT

## 2023-10-02 PROCEDURE — 2580000003 HC RX 258

## 2023-10-02 PROCEDURE — 6360000002 HC RX W HCPCS

## 2023-10-02 PROCEDURE — 6370000000 HC RX 637 (ALT 250 FOR IP)

## 2023-10-02 PROCEDURE — 87541 LEGION PNEUMO DNA AMP PROB: CPT

## 2023-10-02 PROCEDURE — 71045 X-RAY EXAM CHEST 1 VIEW: CPT

## 2023-10-02 PROCEDURE — 83735 ASSAY OF MAGNESIUM: CPT

## 2023-10-02 PROCEDURE — 80202 ASSAY OF VANCOMYCIN: CPT

## 2023-10-02 PROCEDURE — 87186 SC STD MICRODIL/AGAR DIL: CPT

## 2023-10-02 PROCEDURE — 2000000000 HC ICU R&B

## 2023-10-02 PROCEDURE — 80048 BASIC METABOLIC PNL TOTAL CA: CPT

## 2023-10-02 PROCEDURE — 87205 SMEAR GRAM STAIN: CPT

## 2023-10-02 RX ORDER — POTASSIUM CHLORIDE 7.45 MG/ML
10 INJECTION INTRAVENOUS
Status: COMPLETED | OUTPATIENT
Start: 2023-10-02 | End: 2023-10-02

## 2023-10-02 RX ADMIN — PREGABALIN 200 MG: 75 CAPSULE ORAL at 07:48

## 2023-10-02 RX ADMIN — PROPOFOL 40 MCG/KG/MIN: 10 INJECTION, EMULSION INTRAVENOUS at 01:59

## 2023-10-02 RX ADMIN — MIDAZOLAM 4 MG: 1 INJECTION INTRAMUSCULAR; INTRAVENOUS at 01:41

## 2023-10-02 RX ADMIN — VENLAFAXINE HYDROCHLORIDE 100 MG: 50 TABLET ORAL at 20:49

## 2023-10-02 RX ADMIN — ARIPIPRAZOLE 5 MG: 5 TABLET ORAL at 07:43

## 2023-10-02 RX ADMIN — LORAZEPAM 2 MG: 2 INJECTION INTRAMUSCULAR; INTRAVENOUS at 14:17

## 2023-10-02 RX ADMIN — FAMOTIDINE 20 MG: 10 INJECTION INTRAVENOUS at 20:49

## 2023-10-02 RX ADMIN — VENLAFAXINE HYDROCHLORIDE 100 MG: 50 TABLET ORAL at 07:43

## 2023-10-02 RX ADMIN — PROPOFOL 50 MCG/KG/MIN: 10 INJECTION, EMULSION INTRAVENOUS at 05:18

## 2023-10-02 RX ADMIN — CHLORHEXIDINE GLUCONATE 15 ML: 1.2 RINSE ORAL at 07:48

## 2023-10-02 RX ADMIN — ACETAMINOPHEN 650 MG: 325 TABLET ORAL at 14:17

## 2023-10-02 RX ADMIN — ENOXAPARIN SODIUM 40 MG: 100 INJECTION SUBCUTANEOUS at 20:49

## 2023-10-02 RX ADMIN — FAMOTIDINE 20 MG: 10 INJECTION INTRAVENOUS at 07:48

## 2023-10-02 RX ADMIN — Medication 1250 MG: at 06:45

## 2023-10-02 RX ADMIN — VENLAFAXINE HYDROCHLORIDE 100 MG: 50 TABLET ORAL at 14:12

## 2023-10-02 RX ADMIN — PREGABALIN 200 MG: 75 CAPSULE ORAL at 14:15

## 2023-10-02 RX ADMIN — CHLORHEXIDINE GLUCONATE 15 ML: 1.2 RINSE ORAL at 20:49

## 2023-10-02 RX ADMIN — CEFEPIME 2000 MG: 2 INJECTION, POWDER, FOR SOLUTION INTRAVENOUS at 01:04

## 2023-10-02 RX ADMIN — PROPOFOL 50 MCG/KG/MIN: 10 INJECTION, EMULSION INTRAVENOUS at 20:36

## 2023-10-02 RX ADMIN — POTASSIUM CHLORIDE 10 MEQ: 7.46 INJECTION, SOLUTION INTRAVENOUS at 02:27

## 2023-10-02 RX ADMIN — Medication 1250 MG: at 23:12

## 2023-10-02 RX ADMIN — Medication 1250 MG: at 14:58

## 2023-10-02 RX ADMIN — SODIUM CHLORIDE, POTASSIUM CHLORIDE, SODIUM LACTATE AND CALCIUM CHLORIDE: 600; 310; 30; 20 INJECTION, SOLUTION INTRAVENOUS at 16:40

## 2023-10-02 RX ADMIN — CEFEPIME 2000 MG: 2 INJECTION, POWDER, FOR SOLUTION INTRAVENOUS at 09:02

## 2023-10-02 RX ADMIN — LORAZEPAM 2 MG: 2 INJECTION INTRAMUSCULAR; INTRAVENOUS at 04:14

## 2023-10-02 RX ADMIN — PROPOFOL 50 MCG/KG/MIN: 10 INJECTION, EMULSION INTRAVENOUS at 09:05

## 2023-10-02 RX ADMIN — POTASSIUM CHLORIDE 10 MEQ: 7.46 INJECTION, SOLUTION INTRAVENOUS at 03:30

## 2023-10-02 RX ADMIN — LISINOPRIL 20 MG: 20 TABLET ORAL at 07:43

## 2023-10-02 RX ADMIN — CEFEPIME 2000 MG: 2 INJECTION, POWDER, FOR SOLUTION INTRAVENOUS at 15:35

## 2023-10-02 RX ADMIN — POTASSIUM CHLORIDE 10 MEQ: 7.46 INJECTION, SOLUTION INTRAVENOUS at 01:10

## 2023-10-02 RX ADMIN — LORAZEPAM 2 MG: 2 INJECTION INTRAMUSCULAR; INTRAVENOUS at 08:57

## 2023-10-02 RX ADMIN — PROPOFOL 50 MCG/KG/MIN: 10 INJECTION, EMULSION INTRAVENOUS at 16:41

## 2023-10-02 RX ADMIN — PREGABALIN 200 MG: 75 CAPSULE ORAL at 20:48

## 2023-10-02 RX ADMIN — HYDRALAZINE HYDROCHLORIDE 5 MG: 20 INJECTION, SOLUTION INTRAMUSCULAR; INTRAVENOUS at 19:41

## 2023-10-02 RX ADMIN — QUETIAPINE FUMARATE 25 MG: 25 TABLET ORAL at 20:49

## 2023-10-02 ASSESSMENT — PULMONARY FUNCTION TESTS
PIF_VALUE: 17
PIF_VALUE: 19
PIF_VALUE: 21
PIF_VALUE: 20
PIF_VALUE: 23
PIF_VALUE: 18

## 2023-10-02 NOTE — FLOWSHEET NOTE
0800 Mr Elisha Ramirez rests in the bed, intubated with 7.5 ETT at 24 cm to his lips. He appears agitated and his resp rate is about 30. He has propofol infusing at 50 ug which is max. He receives 2 mg Ativan IV to help with his sedation. He has a temp sensing levin patent to gravity. He is able to follow simple commands. He has peripheral IV's which appear clear. He has constant stools which are brown and loose. He has tube feedings of Vital at 50 cc/hr and he has about 20 cc's residual with this. His vent mode is on spontaneous.

## 2023-10-02 NOTE — PLAN OF CARE
Problem: Discharge Planning  Goal: Discharge to home or other facility with appropriate resources  Outcome: Progressing  Flowsheets (Taken 10/1/2023 2000 by Ronda Hills RN)  Discharge to home or other facility with appropriate resources:   Identify barriers to discharge with patient and caregiver   Arrange for needed discharge resources and transportation as appropriate   Identify discharge learning needs (meds, wound care, etc)   Refer to discharge planning if patient needs post-hospital services based on physician order or complex needs related to functional status, cognitive ability or social support system     Problem: Safety - Medical Restraint  Goal: Remains free of injury from restraints (Restraint for Interference with Medical Device)  Description: INTERVENTIONS:  1. Determine that other, less restrictive measures have been tried or would not be effective before applying the restraint  2. Evaluate the patient's condition at the time of restraint application  3. Inform patient/family regarding the reason for restraint  4.  Q2H: Monitor safety, psychosocial status, comfort, nutrition and hydration  Outcome: Progressing  Flowsheets  Taken 10/2/2023 0028 by Ronda Hills RN  Remains free of injury from restraints (restraint for interference with medical device):   Determine that other, less restrictive measures have been tried or would not be effective before applying the restraint   Evaluate the patient's condition at the time of restraint application   Inform patient/family regarding the reason for restraint   Every 2 hours: Monitor safety, psychosocial status, comfort, nutrition and hydration  Taken 10/2/2023 0000 by Ronda Hills RN  Remains free of injury from restraints (restraint for interference with medical device):   Determine that other, less restrictive measures have been tried or would not be effective before applying the restraint   Evaluate the patient's condition

## 2023-10-02 NOTE — CARE COORDINATION
10/2/23, 12:58 PM EDT    DISCHARGE ON GOING [address] day: 5  Location: [facility]15/015-A Reason for admit: AMS (altered mental status) [R41.82]   Procedure:   9/27 CXR: No acute findings  9/27 Intubated  9/29 CXR: No acute infiltrates or effusions are seen. No appreciable change from prior study. 10/2 CXR:   1. Mild linear atelectasis in the left lung base. 2. A tiny left pleural effusion is questioned. Barriers to Discharge: Intensivist following. Remains on vent with ETT: cpap, 30% fio2, peep 6. Dietitian following for tube feeds. Multiple loose stools today. Appeared agitated, resp 30 earlier in shift. IVF. Propofol gtt. IV cefepime. IV vanc. IV pepcid. Lisinopril. PRN ativan. Movantik. PCP: PAPA Yepez CNP  Readmission Risk Score: 27.2%  Patient Goals/Plan/Treatment Preferences: Home with MADISON Osullivan. Independent. Monitor for needs. Will likely need ride at discharge. Monitor for needs once extubated.

## 2023-10-02 NOTE — PROGRESS NOTES
Comprehensive Nutrition Assessment    Type and Reason for Visit:  Reassess (TF ordering and management)    Nutrition Recommendations/Plan:   Continue Vital 1.2 at goal of 50ml/hour  Additional free H20 per Physician  A1c was 11.5% 5/24/21 - recommend rechecking  Recommend holding senokot and glycolax and adding Probiotic   Monitoring labs, diprivan for EN adjustments as appropriate     Malnutrition Assessment:  Malnutrition Status: At risk for malnutrition (Comment) (09/28/23 5283)    Context:  Chronic Illness     Findings of the 6 clinical characteristics of malnutrition:  Energy Intake:  Unable to assess (pt. intubated)  Weight Loss:  No significant weight loss (per EMR)     Body Fat Loss:  No significant body fat loss     Muscle Mass Loss:  No significant muscle mass loss    Fluid Accumulation:  No significant fluid accumulation     Strength:  Not Performed    Nutrition Assessment:     Pt. Improving nutritionally AEB NPO d/t intubation 9/27 and tolerating TF at goal. At risk for further nutrition compromise r/t admit with AMS, drug OD, plan to remain intubated without weaning while pt.  Is withdrawing and underlying medical condition Hepatitis C, severe substance abuse    Nutrition Related Findings:    Pt. Report/Treatments/Miscellaneous: pt. Seen; intubated; tolerating TF at goal this am; diprivan continues; spoke with RN - not extubating today; UO 5700ml  GI Status: loose stools - BM x7 past 24h  Pertinent Labs: glucose 113  BUN 10  creatinine 0.5  Na 141  K+ 3.5  Mg 2.1  triglycerides 123  Pertinent Meds: ATB, senokot, diprivan    Wound Type: None       Current Nutrition Intake & Therapies:     ADULT TUBE FEEDING; Orogastric; Peptide Based; Continuous; 20; Yes; 10; Q 4 hours; 50; 30; Q 4 hours  Current Tube Feeding (TF) Orders:  Feeding Route: Orogastric  Formula: Peptide Based (started 9/28)  Schedule: Continuous  Feeding Regimen: Vital 1.2 20ml/hour increase 10ml every 4h as tolerated to goal of

## 2023-10-02 NOTE — PROGRESS NOTES
Per epic pt remains on vent, unable to complete AOD consult at this time. SHOBHA to continue following for completion.

## 2023-10-02 NOTE — PLAN OF CARE
Problem: Discharge Planning  Goal: Discharge to home or other facility with appropriate resources  10/2/2023 1409 by Huyen Marcelo RN  Outcome: Progressing  Flowsheets (Taken 10/2/2023 1409)  Discharge to home or other facility with appropriate resources:   Identify barriers to discharge with patient and caregiver   Arrange for needed discharge resources and transportation as appropriate   Identify discharge learning needs (meds, wound care, etc)     Problem: Safety - Medical Restraint  Goal: Remains free of injury from restraints (Restraint for Interference with Medical Device)  Description: INTERVENTIONS:  1. Determine that other, less restrictive measures have been tried or would not be effective before applying the restraint  2. Evaluate the patient's condition at the time of restraint application  3. Inform patient/family regarding the reason for restraint  4.  Q2H: Monitor safety, psychosocial status, comfort, nutrition and hydration  10/2/2023 1409 by Huyen Marcelo RN  Outcome: Progressing  Flowsheets  Taken 10/2/2023 1409 by Huyen Marcelo RN  Remains free of injury from restraints (restraint for interference with medical device):   Determine that other, less restrictive measures have been tried or would not be effective before applying the restraint   Inform patient/family regarding the reason for restraint   Every 2 hours: Monitor safety, psychosocial status, comfort, nutrition and hydration  Taken 10/2/2023 0200 by Thony Wu RN  Remains free of injury from restraints (restraint for interference with medical device):   Determine that other, less restrictive measures have been tried or would not be effective before applying the restraint   Evaluate the patient's condition at the time of restraint application   Inform patient/family regarding the reason for restraint   Every 2 hours: Monitor safety, psychosocial status, comfort, nutrition and hydration     Problem: Pain  Goal: needs     Problem: Safety - Adult  Goal: Free from fall injury  10/2/2023 1409 by Hosea Cuba RN  Outcome: Progressing  Flowsheets (Taken 10/2/2023 1409)  Free From Fall Injury:   Kate Beatty family/caregiver on patient safety   Based on caregiver fall risk screen, instruct family/caregiver to ask for assistance with transferring infant if caregiver noted to have fall risk factors     Problem: Skin/Tissue Integrity  Goal: Absence of new skin breakdown  Description: 1. Monitor for areas of redness and/or skin breakdown  2. Assess vascular access sites hourly  3. Every 4-6 hours minimum:  Change oxygen saturation probe site  4. Every 4-6 hours:  If on nasal continuous positive airway pressure, respiratory therapy assess nares and determine need for appliance change or resting period. 10/2/2023 1409 by Hosea Cuba RN  Outcome: Progressing  Note: Frequent position change   Care plan reviewed with patient and FAMILY. Patient and FAMILY verbalize understanding of the plan of care and contribute to goal setting.

## 2023-10-02 NOTE — PROGRESS NOTES
interpreted unless otherwise noted). Telemetry: NSR. Na 141 K 3.5 Cl 107 CO2 25 BUN 10 Cr 0.5 Mg 2.1 Gluc 113 Ca 9.3  Vanc trough: 15.4  WBC 9.2 Hgb 9.1 Hct 29.6 Plt 273  Respiratory culture 9/29/2: MRSA. Sensitivities reviewed. Molecular pneumonia panel 9/29/23: MRSA, pseudomonas aeruginosa. Urine culture 9/27/23: NGTD. CXR 10/2/23 report: Mild linear atelectasis in the left lung base. A tiny left pleural effusion is questioned. Case discussed with Dr. Marry Banks. Electronically signed by ANNE Juan                                     Patient seen by me including key components of medical care. Case discussed with NP. Patient failed SBT due to pneumonia. On cefepime and vancomycin pending sensitivities. Italicized font, if present,  represents changes to the note made by me. CC time 35 minutes. Time was discontiguous. Time does not include procedure. Time does include my direct assessment of the patient and coordination of care. Time represents more than 50% of the time involved with patient care by the [address] [place] [address] team.  Electronically signed by Kay Dubois.  Marry Banks MD.

## 2023-10-02 NOTE — PROGRESS NOTES
Physician Progress Note      PATIENT:               Vero Mejias  CSN #:                  [id number]  :                       1985  ADMIT DATE:       2023 9:45 AM  DISCH DATE:  RESPONDING  PROVIDER #:        Ivett OWENS          QUERY TEXT:    Pt admitted with agitation. Pt noted to have AMS. If possible, please document   in the progress notes and discharge summary if you are evaluating and / or   treating any of the following: The medical record reflects the following:  Risk Factors: Drug abuse  Clinical Indicators: agitation with hallucinations  Treatment: ICU placement, lab monitoring, medication monitoring    Thank You! Hershell Babinski RN, CRCR  RN Clinical Documentation Integrity  Options provided:  -- Drug-induced encephalopathy, substance(s) unknown  -- Toxic encephalopathy  -- Other - I will add my own diagnosis  -- Disagree - Not applicable / Not valid  -- Disagree - Clinically unable to determine / Unknown  -- Refer to Clinical Documentation Reviewer    PROVIDER RESPONSE TEXT:    This patient has drug-induced encephalopathy, substance unknown. Query created by: Hershell Babinski on 2023 1:22 PM      QUERY TEXT:    Patient admitted with agitation. Noted documentation of intubation for airway   protection and acute respiratory failure in [facility]. Please indicate one of the   following and document in the medical record: The medical record reflects the following:  Risk Factors: Drug abuse  Clinical Indicators: Documented-Acute Respiratory Failure:  Patient intubated   23 in the ER for airway protection,88% on room air, resp 36, HR tachy  Treatment: ICU placement, lab monitoring, imaging , medication management    Thank You! Hershell Babinski RN, CRCR  RN Clinical Documentation Integrity  Options provided:  -- Intubated for Acute Respiratory Failure as evidenced by, Please document   evidence.   -- Intubated for airway protection only, Acute Respiratory Failure ruled

## 2023-10-02 NOTE — PLAN OF CARE
Problem: Respiratory - Adult  Goal: Normal spontaneous ventilation  10/2/2023 0437 by Bryson Sandoval RCP  Outcome: Progressing                                                Patient Weaning Progress    The patient's vent settings was able to be weaned this shift. Ventilator settings that were weaned              [x] Mode   [x] Pressure support weaned   [] Fio2 weaned   [] Peep weaned      Spontaneous weaning trial  was attempted. due to defined parameters for SBT (spontaneous breathing trial) not being met. The patient was able to tolerate SBT. RSBI  was 34 with EtCO2 of 27 and SpO2 of 100 on 21% FiO2. Spontanteous VT was 582 and RR 20 breaths/min. Evac tube was  hooked up with continuous low suction(20-30mmHg)      Cuff was  deflated to determine cuff leak. A leak  was detected. Unable to get agreement for goals because no family is present and patient cannot respond.

## 2023-10-03 ENCOUNTER — APPOINTMENT (OUTPATIENT)
Dept: GENERAL RADIOLOGY | Age: 38
DRG: 896 | End: 2023-10-03
Payer: COMMERCIAL

## 2023-10-03 LAB
ANION GAP SERPL CALC-SCNC: 12 MEQ/L (ref 8–16)
ANION GAP SERPL CALC-SCNC: 14 MEQ/L (ref 8–16)
BUN SERPL-MCNC: 10 MG/DL (ref 7–22)
BUN SERPL-MCNC: 10 MG/DL (ref 7–22)
CALCIUM SERPL-MCNC: 9.3 MG/DL (ref 8.5–10.5)
CALCIUM SERPL-MCNC: 9.6 MG/DL (ref 8.5–10.5)
CHLORIDE SERPL-SCNC: 108 MEQ/L (ref 98–111)
CHLORIDE SERPL-SCNC: 109 MEQ/L (ref 98–111)
CO2 SERPL-SCNC: 23 MEQ/L (ref 23–33)
CO2 SERPL-SCNC: 24 MEQ/L (ref 23–33)
CREAT SERPL-MCNC: 0.4 MG/DL (ref 0.4–1.2)
CREAT SERPL-MCNC: 0.5 MG/DL (ref 0.4–1.2)
DEPRECATED RDW RBC AUTO: 47.2 FL (ref 35–45)
ERYTHROCYTE [DISTWIDTH] IN BLOOD BY AUTOMATED COUNT: 16.4 % (ref 11.5–14.5)
FERRITIN SERPL IA-MCNC: 63 NG/ML (ref 22–322)
GFR SERPL CREATININE-BSD FRML MDRD: > 60 ML/MIN/1.73M2
GFR SERPL CREATININE-BSD FRML MDRD: > 60 ML/MIN/1.73M2
GLUCOSE SERPL-MCNC: 118 MG/DL (ref 70–108)
GLUCOSE SERPL-MCNC: 119 MG/DL (ref 70–108)
HCT VFR BLD AUTO: 31.8 % (ref 42–52)
HGB BLD-MCNC: 10.1 GM/DL (ref 14–18)
IRON SATN MFR SERPL: 9 % (ref 20–50)
IRON SERPL-MCNC: 29 UG/DL (ref 65–195)
MAGNESIUM SERPL-MCNC: 2.2 MG/DL (ref 1.6–2.4)
MCH RBC QN AUTO: 25.2 PG (ref 26–33)
MCHC RBC AUTO-ENTMCNC: 31.8 GM/DL (ref 32.2–35.5)
MCV RBC AUTO: 79.3 FL (ref 80–94)
PLATELET # BLD AUTO: 292 THOU/MM3 (ref 130–400)
PMV BLD AUTO: 9.5 FL (ref 9.4–12.4)
POTASSIUM SERPL-SCNC: 3.3 MEQ/L (ref 3.5–5.2)
POTASSIUM SERPL-SCNC: 3.6 MEQ/L (ref 3.5–5.2)
RBC # BLD AUTO: 4.01 MILL/MM3 (ref 4.7–6.1)
SODIUM SERPL-SCNC: 144 MEQ/L (ref 135–145)
SODIUM SERPL-SCNC: 146 MEQ/L (ref 135–145)
TIBC SERPL-MCNC: 311 UG/DL (ref 171–450)
TRIGL SERPL-MCNC: 91 MG/DL (ref 0–199)
WBC # BLD AUTO: 9.8 THOU/MM3 (ref 4.8–10.8)

## 2023-10-03 PROCEDURE — 93010 ELECTROCARDIOGRAM REPORT: CPT | Performed by: INTERNAL MEDICINE

## 2023-10-03 PROCEDURE — 71045 X-RAY EXAM CHEST 1 VIEW: CPT

## 2023-10-03 PROCEDURE — 6370000000 HC RX 637 (ALT 250 FOR IP): Performed by: EMERGENCY MEDICINE

## 2023-10-03 PROCEDURE — 2100000000 HC CCU R&B

## 2023-10-03 PROCEDURE — A4216 STERILE WATER/SALINE, 10 ML: HCPCS

## 2023-10-03 PROCEDURE — 82728 ASSAY OF FERRITIN: CPT

## 2023-10-03 PROCEDURE — 80048 BASIC METABOLIC PNL TOTAL CA: CPT

## 2023-10-03 PROCEDURE — 6360000002 HC RX W HCPCS: Performed by: NURSE PRACTITIONER

## 2023-10-03 PROCEDURE — 2500000003 HC RX 250 WO HCPCS

## 2023-10-03 PROCEDURE — 85027 COMPLETE CBC AUTOMATED: CPT

## 2023-10-03 PROCEDURE — 36415 COLL VENOUS BLD VENIPUNCTURE: CPT

## 2023-10-03 PROCEDURE — 6370000000 HC RX 637 (ALT 250 FOR IP)

## 2023-10-03 PROCEDURE — 99291 CRITICAL CARE FIRST HOUR: CPT | Performed by: INTERNAL MEDICINE

## 2023-10-03 PROCEDURE — 6360000002 HC RX W HCPCS

## 2023-10-03 PROCEDURE — 2700000000 HC OXYGEN THERAPY PER DAY

## 2023-10-03 PROCEDURE — 84478 ASSAY OF TRIGLYCERIDES: CPT

## 2023-10-03 PROCEDURE — 83735 ASSAY OF MAGNESIUM: CPT

## 2023-10-03 PROCEDURE — 93005 ELECTROCARDIOGRAM TRACING: CPT | Performed by: NURSE PRACTITIONER

## 2023-10-03 PROCEDURE — 2580000003 HC RX 258

## 2023-10-03 PROCEDURE — 89220 SPUTUM SPECIMEN COLLECTION: CPT

## 2023-10-03 PROCEDURE — 94003 VENT MGMT INPAT SUBQ DAY: CPT

## 2023-10-03 PROCEDURE — 6370000000 HC RX 637 (ALT 250 FOR IP): Performed by: INTERNAL MEDICINE

## 2023-10-03 PROCEDURE — 6360000002 HC RX W HCPCS: Performed by: INTERNAL MEDICINE

## 2023-10-03 PROCEDURE — 83540 ASSAY OF IRON: CPT

## 2023-10-03 PROCEDURE — 83550 IRON BINDING TEST: CPT

## 2023-10-03 PROCEDURE — 94761 N-INVAS EAR/PLS OXIMETRY MLT: CPT

## 2023-10-03 RX ORDER — HYDRALAZINE HYDROCHLORIDE 20 MG/ML
10 INJECTION INTRAMUSCULAR; INTRAVENOUS EVERY 4 HOURS PRN
Status: DISCONTINUED | OUTPATIENT
Start: 2023-10-03 | End: 2023-10-04 | Stop reason: HOSPADM

## 2023-10-03 RX ORDER — POTASSIUM CHLORIDE 7.45 MG/ML
10 INJECTION INTRAVENOUS
Status: DISCONTINUED | OUTPATIENT
Start: 2023-10-03 | End: 2023-10-03

## 2023-10-03 RX ADMIN — VENLAFAXINE HYDROCHLORIDE 100 MG: 50 TABLET ORAL at 13:09

## 2023-10-03 RX ADMIN — CEFEPIME 2000 MG: 2 INJECTION, POWDER, FOR SOLUTION INTRAVENOUS at 08:43

## 2023-10-03 RX ADMIN — PROPOFOL 50 MCG/KG/MIN: 10 INJECTION, EMULSION INTRAVENOUS at 00:45

## 2023-10-03 RX ADMIN — PREGABALIN 200 MG: 75 CAPSULE ORAL at 20:39

## 2023-10-03 RX ADMIN — VENLAFAXINE HYDROCHLORIDE 100 MG: 50 TABLET ORAL at 08:25

## 2023-10-03 RX ADMIN — POTASSIUM BICARBONATE 40 MEQ: 782 TABLET, EFFERVESCENT ORAL at 08:48

## 2023-10-03 RX ADMIN — CHLORHEXIDINE GLUCONATE 15 ML: 1.2 RINSE ORAL at 08:26

## 2023-10-03 RX ADMIN — Medication 1250 MG: at 17:18

## 2023-10-03 RX ADMIN — HYDRALAZINE HYDROCHLORIDE 5 MG: 20 INJECTION, SOLUTION INTRAMUSCULAR; INTRAVENOUS at 09:05

## 2023-10-03 RX ADMIN — PROPOFOL 50 MCG/KG/MIN: 10 INJECTION, EMULSION INTRAVENOUS at 04:54

## 2023-10-03 RX ADMIN — PROPOFOL 50 MCG/KG/MIN: 10 INJECTION, EMULSION INTRAVENOUS at 17:04

## 2023-10-03 RX ADMIN — LORAZEPAM 2 MG: 2 INJECTION INTRAMUSCULAR; INTRAVENOUS at 00:46

## 2023-10-03 RX ADMIN — ARIPIPRAZOLE 5 MG: 5 TABLET ORAL at 08:26

## 2023-10-03 RX ADMIN — CEFEPIME 2000 MG: 2 INJECTION, POWDER, FOR SOLUTION INTRAVENOUS at 00:46

## 2023-10-03 RX ADMIN — LISINOPRIL 20 MG: 20 TABLET ORAL at 08:26

## 2023-10-03 RX ADMIN — ACETAMINOPHEN 650 MG: 325 TABLET ORAL at 15:05

## 2023-10-03 RX ADMIN — Medication 1250 MG: at 06:29

## 2023-10-03 RX ADMIN — PROPOFOL 50 MCG/KG/MIN: 10 INJECTION, EMULSION INTRAVENOUS at 12:05

## 2023-10-03 RX ADMIN — LORAZEPAM 2 MG: 2 INJECTION INTRAMUSCULAR; INTRAVENOUS at 15:05

## 2023-10-03 RX ADMIN — HYDRALAZINE HYDROCHLORIDE 10 MG: 20 INJECTION, SOLUTION INTRAMUSCULAR; INTRAVENOUS at 13:31

## 2023-10-03 RX ADMIN — CHLORHEXIDINE GLUCONATE 15 ML: 1.2 RINSE ORAL at 20:38

## 2023-10-03 RX ADMIN — Medication 1250 MG: at 22:55

## 2023-10-03 RX ADMIN — LORAZEPAM 2 MG: 2 INJECTION INTRAMUSCULAR; INTRAVENOUS at 06:59

## 2023-10-03 RX ADMIN — FAMOTIDINE 20 MG: 10 INJECTION INTRAVENOUS at 20:39

## 2023-10-03 RX ADMIN — LORAZEPAM 2 MG: 2 INJECTION INTRAMUSCULAR; INTRAVENOUS at 10:56

## 2023-10-03 RX ADMIN — PREGABALIN 200 MG: 75 CAPSULE ORAL at 08:30

## 2023-10-03 RX ADMIN — PROPOFOL 50 MCG/KG/MIN: 10 INJECTION, EMULSION INTRAVENOUS at 23:26

## 2023-10-03 RX ADMIN — VENLAFAXINE HYDROCHLORIDE 100 MG: 50 TABLET ORAL at 20:40

## 2023-10-03 RX ADMIN — PREGABALIN 200 MG: 75 CAPSULE ORAL at 15:05

## 2023-10-03 RX ADMIN — CEFEPIME 2000 MG: 2 INJECTION, POWDER, FOR SOLUTION INTRAVENOUS at 17:07

## 2023-10-03 RX ADMIN — FAMOTIDINE 20 MG: 10 INJECTION INTRAVENOUS at 08:30

## 2023-10-03 RX ADMIN — PROPOFOL 50 MCG/KG/MIN: 10 INJECTION, EMULSION INTRAVENOUS at 19:34

## 2023-10-03 RX ADMIN — ENOXAPARIN SODIUM 40 MG: 100 INJECTION SUBCUTANEOUS at 20:38

## 2023-10-03 RX ADMIN — LORAZEPAM 2 MG: 2 INJECTION INTRAMUSCULAR; INTRAVENOUS at 22:33

## 2023-10-03 ASSESSMENT — PULMONARY FUNCTION TESTS
PIF_VALUE: 21
PIF_VALUE: 19
PIF_VALUE: 19
PIF_VALUE: 14
PIF_VALUE: 22
PIF_VALUE: 17
PIF_VALUE: 19

## 2023-10-03 ASSESSMENT — PAIN SCALES - WONG BAKER: WONGBAKER_NUMERICALRESPONSE: 4

## 2023-10-03 ASSESSMENT — PAIN SCALES - GENERAL: PAINLEVEL_OUTOF10: 4

## 2023-10-03 NOTE — FLOWSHEET NOTE
0800 Mr Raya Francois rests in the bed, intubated with 7.5 ETT at 24 cm to his lips. He appears agitated but his RR is better this AM. He has propofol infusing at 50 ug which is max. He receives 2 mg Ativan IV to help with his sedation. He has a temp sensing levin patent to gravity. He is able to follow simple commands. He has peripheral IV's which appear clear. He has constant stools which are brown and loose. A flexicil was inserted this AM but he is leaking around this. He has tube feedings of Vital at 50 cc/hr and he has about 50 cc's residual with this. His vent mode is on spontaneous again this AM and we will wean his pressure support as tolerates. 1000 Dr [place] has rounded and we will be unable to extubate today as his RR does not tolerate the lower pressure support. RT has placed him back on PCV mode.

## 2023-10-03 NOTE — PLAN OF CARE
Problem: Respiratory - Adult  Goal: Achieves optimal ventilation and oxygenation  10/2/2023 2120 by Td Mancini RCP  Outcome: Progressing  Flowsheets (Taken 10/2/2023 2120)  Achieves optimal ventilation and oxygenation:   Assess for changes in respiratory status   Position to facilitate oxygenation and minimize respiratory effort   Assess the need for suctioning and aspirate as needed   Respiratory therapy support as indicated   Assess for changes in mentation and behavior   Oxygen supplementation based on oxygen saturation or arterial blood gases   Assess and instruct to report shortness of breath or any respiratory difficulty  Note: Intubated/Vented. Wean as tolerated. SBT when appropriate.

## 2023-10-03 NOTE — PROGRESS NOTES
Patient Weaning Progress    The patient's vent settings was able to be weaned this shift. Ventilator settings that were weaned              [x] Mode   [] Pressure support weaned   [] Fio2 weaned   [] Peep weaned      Spontaneous weaning trial  was attempted. The patient was able to tolerate SBT. RSBI  was 45 with EtCO2 of 30 and SpO2 of 100 on 30% FiO2. Spontanteous VT was 435 and RR 20 breaths/min. Evac tube was  hooked up with continuous low suction(20-30mmHg)      Cuff was not  deflated to determine cuff leak. Unable to get agreement for goals because no family is present and patient cannot respond.

## 2023-10-03 NOTE — PROGRESS NOTES
Patient:  Anupam Noriega    Unit/Bed:4D-15/015-A  MRN: [id number]   PCP: PAPA Avalos CNP  Date of Admission: 9/27/2023    Assessment and Plan(All pulmonary edema, renal failure, PE, and respiratory failure diagnoses are acute in nature unless otherwise specified):        Acute hypoxic respiratory failure: initially intubated for airway protection in need for higher sedation in setting of erratic behavior, polysubstance abuse. Now has pneumonia. Fails SBT for tachypnea. PS weaned to 12 from 15 today and will assess tolerance. Continue antibiotics. Wean vent as tolerated. Pneumonia: retrocardiac RML/posterior heart infiltrate on CXR. Sputum production, fever, many neutrophils on resp culture gram stain. Molecular pneumonia panel shows positive for pseudomonas aeruginosa and MRSA. Resp culture is growing MRSA thus far. Sensitivities reviewed; resistant to erythromycin, oxacillin, bactrim. Continue of Cefepime and Vanc day # 5. Follow CXR. Blood cultures pending. Polysubstance abuse: positive for many substances on admission urine toxicology (opiates, amphetamines, cocaine, THC). Will address at extubation and consult addiction services at the appropriate timing. Sinus bradycardia: HR intermittently as low as 30's, with normal/high BP. Patient denies chest pain. Appears iatrogenic 2/2 propofol. Will monitor and wean sedation as tolerated. Re-assess hypokalemia in a few hours with repeat lab. Normal TSH one month ago. Obtain ECG. Chronic iron deficiency anemia: iron anemia profile reviewed. Hgb stable near baseline. No active bleeding seen. Trend CBC daily. Start iron supplementation once infection resolved. HTN: on lisinopril. PRN hydralazine. Depression/anxiety:  Abilify, Effexor, Lyrica continued. SATYA: resolved. CC:  Alerted mental status  HPI: Roxanne Ortega is a 42 y/o male former-smoker with PMH of polysubstance abuse, anxiety, depression, kidney stone, liver disease.      Patient

## 2023-10-03 NOTE — PROGRESS NOTES
Pt was unresponsive but was blessed.     10/03/23 1515   Encounter Summary   Service Provided For: Patient   Referral/Consult From: Celestino   Last Encounter  10/03/23  (NR)   Complexity of Encounter Low   Spiritual/Emotional needs   Type Spiritual Support   Assessment/Intervention/Outcome   Assessment Unable to assess

## 2023-10-03 NOTE — PLAN OF CARE
Problem: Discharge Planning  Goal: Discharge to home or other facility with appropriate resources  Outcome: Progressing  Flowsheets (Taken 10/3/2023 1320)  Discharge to home or other facility with appropriate resources:   Identify barriers to discharge with patient and caregiver   Arrange for needed discharge resources and transportation as appropriate   Identify discharge learning needs (meds, wound care, etc)   Refer to discharge planning if patient needs post-hospital services based on physician order or complex needs related to functional status, cognitive ability or social support system     Problem: Safety - Medical Restraint  Goal: Remains free of injury from restraints (Restraint for Interference with Medical Device)  Description: INTERVENTIONS:  1. Determine that other, less restrictive measures have been tried or would not be effective before applying the restraint  2. Evaluate the patient's condition at the time of restraint application  3. Inform patient/family regarding the reason for restraint  4.  Q2H: Monitor safety, psychosocial status, comfort, nutrition and hydration  Outcome: Progressing  Flowsheets (Taken 10/3/2023 1320)  Remains free of injury from restraints (restraint for interference with medical device):   Determine that other, less restrictive measures have been tried or would not be effective before applying the restraint   Evaluate the patient's condition at the time of restraint application   Inform patient/family regarding the reason for restraint   Every 2 hours: Monitor safety, psychosocial status, comfort, nutrition and hydration     Problem: Pain  Goal: Verbalizes/displays adequate comfort level or baseline comfort level  Outcome: Progressing  Flowsheets (Taken 10/3/2023 1320)  Verbalizes/displays adequate comfort level or baseline comfort level:   Encourage patient to monitor pain and request assistance   Assess pain using appropriate pain scale   Administer analgesics based on

## 2023-10-03 NOTE — PLAN OF CARE
Problem: Safety - Medical Restraint  Goal: Remains free of injury from restraints (Restraint for Interference with Medical Device)  Description: INTERVENTIONS:  1. Determine that other, less restrictive measures have been tried or would not be effective before applying the restraint  2. Evaluate the patient's condition at the time of restraint application  3. Inform patient/family regarding the reason for restraint  4.  Q2H: Monitor safety, psychosocial status, comfort, nutrition and hydration  10/3/2023 1951 by Rico San RN  Outcome: Progressing  Flowsheets (Taken 10/3/2023 1320 by Robin Pierson RN)  Remains free of injury from restraints (restraint for interference with medical device):   Determine that other, less restrictive measures have been tried or would not be effective before applying the restraint   Evaluate the patient's condition at the time of restraint application   Inform patient/family regarding the reason for restraint   Every 2 hours: Monitor safety, psychosocial status, comfort, nutrition and hydration

## 2023-10-03 NOTE — PROGRESS NOTES
Per epic pt remains on vent, unable to complete AOD consult at this time. SHOBHA to continue monitoring for completion.

## 2023-10-04 ENCOUNTER — APPOINTMENT (OUTPATIENT)
Dept: GENERAL RADIOLOGY | Age: 38
DRG: 896 | End: 2023-10-04
Payer: COMMERCIAL

## 2023-10-04 VITALS
BODY MASS INDEX: 27.16 KG/M2 | WEIGHT: 194 LBS | DIASTOLIC BLOOD PRESSURE: 121 MMHG | RESPIRATION RATE: 20 BRPM | TEMPERATURE: 98.6 F | SYSTOLIC BLOOD PRESSURE: 142 MMHG | HEIGHT: 71 IN | OXYGEN SATURATION: 93 % | HEART RATE: 46 BPM

## 2023-10-04 LAB
ANION GAP SERPL CALC-SCNC: 13 MEQ/L (ref 8–16)
BACTERIA SPEC RESP CULT: ABNORMAL
BACTERIA SPEC RESP CULT: ABNORMAL
BUN SERPL-MCNC: 11 MG/DL (ref 7–22)
CALCIUM SERPL-MCNC: 9.3 MG/DL (ref 8.5–10.5)
CHLORIDE SERPL-SCNC: 105 MEQ/L (ref 98–111)
CO2 SERPL-SCNC: 23 MEQ/L (ref 23–33)
CREAT SERPL-MCNC: 0.5 MG/DL (ref 0.4–1.2)
DEPRECATED RDW RBC AUTO: 46.9 FL (ref 35–45)
EKG ATRIAL RATE: 65 BPM
EKG P AXIS: 88 DEGREES
EKG P-R INTERVAL: 178 MS
EKG Q-T INTERVAL: 408 MS
EKG QRS DURATION: 94 MS
EKG QTC CALCULATION (BAZETT): 424 MS
EKG R AXIS: 29 DEGREES
EKG T AXIS: 49 DEGREES
EKG VENTRICULAR RATE: 65 BPM
ERYTHROCYTE [DISTWIDTH] IN BLOOD BY AUTOMATED COUNT: 16.2 % (ref 11.5–14.5)
GFR SERPL CREATININE-BSD FRML MDRD: > 60 ML/MIN/1.73M2
GLUCOSE SERPL-MCNC: 112 MG/DL (ref 70–108)
GRAM STN SPEC: ABNORMAL
HCT VFR BLD AUTO: 32.4 % (ref 42–52)
HGB BLD-MCNC: 10.1 GM/DL (ref 14–18)
MAGNESIUM SERPL-MCNC: 2.2 MG/DL (ref 1.6–2.4)
MCH RBC QN AUTO: 24.8 PG (ref 26–33)
MCHC RBC AUTO-ENTMCNC: 31.2 GM/DL (ref 32.2–35.5)
MCV RBC AUTO: 79.4 FL (ref 80–94)
ORGANISM: ABNORMAL
PLATELET # BLD AUTO: 340 THOU/MM3 (ref 130–400)
PMV BLD AUTO: 9.6 FL (ref 9.4–12.4)
POTASSIUM SERPL-SCNC: 3.2 MEQ/L (ref 3.5–5.2)
RBC # BLD AUTO: 4.08 MILL/MM3 (ref 4.7–6.1)
SODIUM SERPL-SCNC: 141 MEQ/L (ref 135–145)
WBC # BLD AUTO: 10.6 THOU/MM3 (ref 4.8–10.8)

## 2023-10-04 PROCEDURE — 6370000000 HC RX 637 (ALT 250 FOR IP): Performed by: EMERGENCY MEDICINE

## 2023-10-04 PROCEDURE — 2500000003 HC RX 250 WO HCPCS

## 2023-10-04 PROCEDURE — 89220 SPUTUM SPECIMEN COLLECTION: CPT

## 2023-10-04 PROCEDURE — 6370000000 HC RX 637 (ALT 250 FOR IP): Performed by: NURSE PRACTITIONER

## 2023-10-04 PROCEDURE — 36415 COLL VENOUS BLD VENIPUNCTURE: CPT

## 2023-10-04 PROCEDURE — 80048 BASIC METABOLIC PNL TOTAL CA: CPT

## 2023-10-04 PROCEDURE — 6370000000 HC RX 637 (ALT 250 FOR IP)

## 2023-10-04 PROCEDURE — 71045 X-RAY EXAM CHEST 1 VIEW: CPT

## 2023-10-04 PROCEDURE — 99291 CRITICAL CARE FIRST HOUR: CPT | Performed by: INTERNAL MEDICINE

## 2023-10-04 PROCEDURE — 94761 N-INVAS EAR/PLS OXIMETRY MLT: CPT

## 2023-10-04 PROCEDURE — 94003 VENT MGMT INPAT SUBQ DAY: CPT

## 2023-10-04 PROCEDURE — 2700000000 HC OXYGEN THERAPY PER DAY

## 2023-10-04 PROCEDURE — 92610 EVALUATE SWALLOWING FUNCTION: CPT

## 2023-10-04 PROCEDURE — 2580000003 HC RX 258

## 2023-10-04 PROCEDURE — 85027 COMPLETE CBC AUTOMATED: CPT

## 2023-10-04 PROCEDURE — 6360000002 HC RX W HCPCS

## 2023-10-04 PROCEDURE — 83735 ASSAY OF MAGNESIUM: CPT

## 2023-10-04 PROCEDURE — A4216 STERILE WATER/SALINE, 10 ML: HCPCS

## 2023-10-04 RX ORDER — DOXYCYCLINE HYCLATE 100 MG
100 TABLET ORAL 2 TIMES DAILY
Qty: 10 TABLET | Refills: 0 | Status: SHIPPED | OUTPATIENT
Start: 2023-10-04 | End: 2023-10-09

## 2023-10-04 RX ORDER — POTASSIUM CHLORIDE 20 MEQ/1
60 TABLET, EXTENDED RELEASE ORAL ONCE
Status: DISCONTINUED | OUTPATIENT
Start: 2023-10-04 | End: 2023-10-04

## 2023-10-04 RX ORDER — CIPROFLOXACIN 500 MG/1
750 TABLET, FILM COATED ORAL 2 TIMES DAILY
Qty: 15 TABLET | Refills: 0 | Status: SHIPPED | OUTPATIENT
Start: 2023-10-04 | End: 2023-10-09

## 2023-10-04 RX ORDER — BISMUTH SUBSALICYLATE 262 MG/1
524 TABLET, CHEWABLE ORAL EVERY 8 HOURS PRN
Status: DISCONTINUED | OUTPATIENT
Start: 2023-10-04 | End: 2023-10-04 | Stop reason: HOSPADM

## 2023-10-04 RX ORDER — MULTIVITAMIN WITH IRON
1 TABLET ORAL DAILY
Status: DISCONTINUED | OUTPATIENT
Start: 2023-10-04 | End: 2023-10-04 | Stop reason: HOSPADM

## 2023-10-04 RX ORDER — BISMUTH SUBSALICYLATE 262 MG/1
524 TABLET, CHEWABLE ORAL DAILY PRN
Status: DISCONTINUED | OUTPATIENT
Start: 2023-10-04 | End: 2023-10-04

## 2023-10-04 RX ORDER — POTASSIUM CHLORIDE 20 MEQ/1
20 TABLET, EXTENDED RELEASE ORAL ONCE
Status: COMPLETED | OUTPATIENT
Start: 2023-10-04 | End: 2023-10-04

## 2023-10-04 RX ADMIN — SODIUM CHLORIDE, POTASSIUM CHLORIDE, SODIUM LACTATE AND CALCIUM CHLORIDE: 600; 310; 30; 20 INJECTION, SOLUTION INTRAVENOUS at 00:04

## 2023-10-04 RX ADMIN — BISMUTH SUBSALICYLATE 524 MG: 262 TABLET, CHEWABLE ORAL at 14:44

## 2023-10-04 RX ADMIN — POTASSIUM CHLORIDE 20 MEQ: 1500 TABLET, EXTENDED RELEASE ORAL at 17:22

## 2023-10-04 RX ADMIN — CEFEPIME 2000 MG: 2 INJECTION, POWDER, FOR SOLUTION INTRAVENOUS at 00:29

## 2023-10-04 RX ADMIN — ARIPIPRAZOLE 5 MG: 5 TABLET ORAL at 07:51

## 2023-10-04 RX ADMIN — CEFEPIME 2000 MG: 2 INJECTION, POWDER, FOR SOLUTION INTRAVENOUS at 08:03

## 2023-10-04 RX ADMIN — PREGABALIN 200 MG: 75 CAPSULE ORAL at 14:05

## 2023-10-04 RX ADMIN — VENLAFAXINE HYDROCHLORIDE 100 MG: 50 TABLET ORAL at 14:05

## 2023-10-04 RX ADMIN — Medication 1250 MG: at 14:07

## 2023-10-04 RX ADMIN — PROPOFOL 50 MCG/KG/MIN: 10 INJECTION, EMULSION INTRAVENOUS at 03:57

## 2023-10-04 RX ADMIN — CHLORHEXIDINE GLUCONATE 15 ML: 1.2 RINSE ORAL at 07:51

## 2023-10-04 RX ADMIN — CEFEPIME 2000 MG: 2 INJECTION, POWDER, FOR SOLUTION INTRAVENOUS at 15:41

## 2023-10-04 RX ADMIN — ACETAMINOPHEN 650 MG: 325 TABLET ORAL at 08:33

## 2023-10-04 RX ADMIN — FAMOTIDINE 20 MG: 10 INJECTION INTRAVENOUS at 07:51

## 2023-10-04 RX ADMIN — Medication 1250 MG: at 06:13

## 2023-10-04 RX ADMIN — PREGABALIN 200 MG: 75 CAPSULE ORAL at 07:51

## 2023-10-04 RX ADMIN — LISINOPRIL 20 MG: 20 TABLET ORAL at 07:50

## 2023-10-04 RX ADMIN — ACETAMINOPHEN 650 MG: 325 TABLET ORAL at 04:11

## 2023-10-04 RX ADMIN — Medication 1 TABLET: at 17:22

## 2023-10-04 RX ADMIN — VENLAFAXINE HYDROCHLORIDE 100 MG: 50 TABLET ORAL at 07:51

## 2023-10-04 RX ADMIN — PROPOFOL 40 MCG/KG/MIN: 10 INJECTION, EMULSION INTRAVENOUS at 07:46

## 2023-10-04 RX ADMIN — POTASSIUM BICARBONATE 40 MEQ: 782 TABLET, EFFERVESCENT ORAL at 16:20

## 2023-10-04 ASSESSMENT — PAIN DESCRIPTION - FREQUENCY: FREQUENCY: INTERMITTENT

## 2023-10-04 ASSESSMENT — PAIN SCALES - GENERAL
PAINLEVEL_OUTOF10: 4
PAINLEVEL_OUTOF10: 0

## 2023-10-04 ASSESSMENT — PULMONARY FUNCTION TESTS
PIF_VALUE: 21
PIF_VALUE: 20
PIF_VALUE: 17

## 2023-10-04 ASSESSMENT — PATIENT HEALTH QUESTIONNAIRE - PHQ9
SUM OF ALL RESPONSES TO PHQ QUESTIONS 1-9: 0
1. LITTLE INTEREST OR PLEASURE IN DOING THINGS: 0
SUM OF ALL RESPONSES TO PHQ9 QUESTIONS 1 & 2: 0
SUM OF ALL RESPONSES TO PHQ QUESTIONS 1-9: 0
2. FEELING DOWN, DEPRESSED OR HOPELESS: 0

## 2023-10-04 ASSESSMENT — PAIN DESCRIPTION - ORIENTATION: ORIENTATION: LEFT;RIGHT

## 2023-10-04 ASSESSMENT — PAIN DESCRIPTION - ONSET: ONSET: GRADUAL

## 2023-10-04 ASSESSMENT — PAIN - FUNCTIONAL ASSESSMENT: PAIN_FUNCTIONAL_ASSESSMENT: ACTIVITIES ARE NOT PREVENTED

## 2023-10-04 ASSESSMENT — PAIN DESCRIPTION - LOCATION: LOCATION: LEG

## 2023-10-04 ASSESSMENT — PAIN DESCRIPTION - DESCRIPTORS: DESCRIPTORS: CRAMPING

## 2023-10-04 ASSESSMENT — PAIN DESCRIPTION - PAIN TYPE: TYPE: ACUTE PAIN

## 2023-10-04 ASSESSMENT — LIFESTYLE VARIABLES
HOW MANY STANDARD DRINKS CONTAINING ALCOHOL DO YOU HAVE ON A TYPICAL DAY: PATIENT DOES NOT DRINK
HOW OFTEN DO YOU HAVE A DRINK CONTAINING ALCOHOL: NEVER

## 2023-10-04 NOTE — PLAN OF CARE
Problem: Discharge Planning  Goal: Discharge to home or other facility with appropriate resources  Outcome: Adequate for Discharge     Problem: Safety - Medical Restraint  Goal: Remains free of injury from restraints (Restraint for Interference with Medical Device)  Description: INTERVENTIONS:  1. Determine that other, less restrictive measures have been tried or would not be effective before applying the restraint  2. Evaluate the patient's condition at the time of restraint application  3. Inform patient/family regarding the reason for restraint  4.  Q2H: Monitor safety, psychosocial status, comfort, nutrition and hydration  10/4/2023 1818 by Sherry Wesley RN  Outcome: Adequate for Discharge  10/4/2023 0915 by Sherry Wesley RN  Outcome: Progressing  Flowsheets (Taken 10/4/2023 0800)  Remains free of injury from restraints (restraint for interference with medical device):   Determine that other, less restrictive measures have been tried or would not be effective before applying the restraint   Evaluate the patient's condition at the time of restraint application   Inform patient/family regarding the reason for restraint   Every 2 hours: Monitor safety, psychosocial status, comfort, nutrition and hydration     Problem: Pain  Goal: Verbalizes/displays adequate comfort level or baseline comfort level  10/4/2023 1818 by Sherry Wesley RN  Outcome: Adequate for Discharge  10/4/2023 0915 by Sherry Wesley RN  Outcome: Progressing  Flowsheets (Taken 10/4/2023 0745)  Verbalizes/displays adequate comfort level or baseline comfort level:   Encourage patient to monitor pain and request assistance   Administer analgesics based on type and severity of pain and evaluate response   Assess pain using appropriate pain scale     Problem: Respiratory - Adult  Goal: Normal spontaneous ventilation  Outcome: Adequate for Discharge  Goal: Achieves optimal ventilation and oxygenation  Outcome: Adequate for Discharge     Problem: Nutrition

## 2023-10-04 NOTE — SIGNIFICANT EVENT
I was called back to the bedside, as patient had apparently changed his mind and was wanting to leave against medical advice (AMA) again. His sister and primary nurse were at the bedside when I arrived. I again explained his risks of leaving the hospital. See my previous significant event. Patient verbally repeated back to me these risks. His sister tried to convince him to stay as well. He is adamant that he will be leaving. He is alert and oriented and coherent of thought. Given his decision to leave, I have sent prescriptions for Doxycycline and Ciprofloxacin for 5 day courses, to his preferred pharmacy. I instructed him to pick these up today. I explained that I cannot guarantee nor monitor the resolution of his pneumonia once he leaves the hospital. I advised him to follow up with his PCP in the next day or two, or to return to the ED sooner if he experiences any worsening symptoms. Case discussed with Dr. Nicanor Leung.     Electronically signed by PAPA Santos CNP on 10/4/2023 at 5:46 PM

## 2023-10-04 NOTE — PROGRESS NOTES
Writer call pt. Sister Denis How to verify that she was coming to pick him up. Denis How stated that she has not talked to him, but wanted to now since he was extubated. Afterwards pt. Agreed to stay until she gets to [facility]. SolangePeris. Celia told writer that she is in school until 5:30p.m. and it takes her 3 hours to get here. Sister also wanted to take to spiritual care about POA paperwork. NP updated. Will continue to monitor.

## 2023-10-04 NOTE — PROGRESS NOTES
Patient has been successfully weaned from Mechanical Ventilation. SpO2 of 100% on 30% FiO2. Patient extubated and placed on room air. Post extubation SpO2 is 99% with HR  61 bpm and RR 19 breaths/min. Patient had strong cough that was non-productive. Extubation Well tolerated by patient. Jaye Bonner

## 2023-10-04 NOTE — PLAN OF CARE
difficulty     Problem: Nutrition Deficit:  Goal: Optimize nutritional status  10/3/2023 2159 by Jarad Moise RN  Outcome: Progressing  Flowsheets (Taken 10/3/2023 1320 by Claudette Eric RN)  Nutrient intake appropriate for improving, restoring, or maintaining nutritional needs:   Assess nutritional status and recommend course of action   Monitor oral intake, labs, and treatment plans   Recommend appropriate diets, oral nutritional supplements, and vitamin/mineral supplements   Order, calculate, and assess calorie counts as needed   Recommend, monitor, and adjust tube feedings and TPN/PPN based on assessed needs  10/3/2023 1320 by Claudette Eric RN  Outcome: Progressing  Flowsheets (Taken 10/3/2023 1320)  Nutrient intake appropriate for improving, restoring, or maintaining nutritional needs:   Assess nutritional status and recommend course of action   Monitor oral intake, labs, and treatment plans   Recommend appropriate diets, oral nutritional supplements, and vitamin/mineral supplements   Order, calculate, and assess calorie counts as needed   Recommend, monitor, and adjust tube feedings and TPN/PPN based on assessed needs     Problem: Safety - Adult  Goal: Free from fall injury  10/3/2023 2159 by Jarad Moise RN  Outcome: Progressing  Flowsheets (Taken 10/3/2023 1320 by Claudette Eric RN)  Free From Fall Injury:   Instruct family/caregiver on patient safety   Based on caregiver fall risk screen, instruct family/caregiver to ask for assistance with transferring infant if caregiver noted to have fall risk factors  10/3/2023 1320 by Claudette Eric RN  Outcome: Progressing  Flowsheets (Taken 10/3/2023 1320)  Free From Fall Injury:   Instruct family/caregiver on patient safety   Based on caregiver fall risk screen, instruct family/caregiver to ask for assistance with transferring infant if caregiver noted to have fall risk factors     Problem: Skin/Tissue Integrity  Goal: Absence of new skin breakdown  Description: 1. Monitor for areas of redness and/or skin breakdown  2. Assess vascular access sites hourly  3. Every 4-6 hours minimum:  Change oxygen saturation probe site  4. Every 4-6 hours:  If on nasal continuous positive airway pressure, respiratory therapy assess nares and determine need for appliance change or resting period.   10/3/2023 2159 by Ayanna Collado RN  Outcome: Progressing  10/3/2023 1320 by Carlita Magana RN  Outcome: Progressing

## 2023-10-04 NOTE — PROGRESS NOTES
Writer call micro regarding blood cultures that where sent out on 10/2 per nursing communication orders. Micro tech told writer that she is not sure what has happened, but will investigate and call writer back at number provided. Sarahi PADILLA undated.

## 2023-10-04 NOTE — PROGRESS NOTES
[facility]  SPEECH THERAPY  [facility] CVICU 4B  Clinical Swallow Evaluation      SLP Individual Minutes  Time In: 1250  Time Out: 8106  Minutes: 12  Timed Code Treatment Minutes: 0 Minutes       Date: 10/4/2023  Patient Name: Nadja Albert      CSN: [id number]   : 1985  (43 y.o.)  Gender: male   Referring Physician: Zahira ELAM - CNP    Diagnosis: AMS (altered mental status)    History of Present Illness/Injury: Patient admit to [facility] with above medical dx. Per physician H&P, patient is a \"43year-old male with PMH of anxiety, depression, drug use. Patient presented to the ED today complaining of drug use, he was making erratic movements in the bed. The patient was intubated down in the ED for unknown reasons to myself as I see no documentation. We will plan on keeping him intubated for several days on propofol as he goes through withdrawal, we will start to wean the patient off the ventilator at that time. \"  Intubated . Extubated 10/4. ST consult for CSE post extubation. Past Medical History:   Diagnosis Date    Anxiety     Depression     Kidney stone     Liver disease     Hep C    Opiate abuse, continuous ([address])        SUBJECTIVE:  Session approved by RN, Emi Muniz. Patient seen upright in bed. Alert with ongoing confusion. Threatening to leave AMA per medical staff. OBJECTIVE:    Pain:  No pain reported.     Current Diet: NPO     Respiratory Status:  Room Air    Behavioral Observation:  Alert, Confused, and cooperative    CRANIAL NERVE ASSESSMENT   CN V (Trigeminal) Closes and Opens Mandible WFL    Rotary Jaw Movement Mod I       CN VII (Facial) Cheeks Hold Food out of Sulci WFL    Opens, Closes/Seals, Protrudes, Retracts Lips WFL    General Appearance WFL    Sensation WFL      CN X (Vagus - Pharyngeal) Raises Back of Tongue WFL      CN XI (Accessory) Lifts Soft Palate WFL      CN XII (Hypoglossal) Elevates Tongue Up and Back WFL    Protrusion   WFL    Lateralizes

## 2023-10-04 NOTE — PROGRESS NOTES
Pt. Refusing to let writer get vitals or wear telemetry, stating that he is leaving AMA once his sister arrives. VALERIE Mcclain notified. Will continue to monitor.

## 2023-10-04 NOTE — CARE COORDINATION
10/4/23, 3:46 PM EDT    DISCHARGE ON GOING [address] day: 7  Location: [facility]04/004-A Reason for admit: AMS (altered mental status) [R41.82]   Procedure:   9/27 CXR: No acute findings  9/27 Intubated  10/4 Extubated  10/4 CXR: 1. Borderline heart size. ET tube and NG tube remain in good position. 2. Mild left basilar atelectasis/pneumonia. No effusion. 3. Overall appearance of chest slightly worse than yesterday    Barriers to Discharge: Extubated this morning. Pt immediately stated he plans to leave AMA later today after his sister comes. Tmax 100.8. SB 40's. On room air. Ox4. Follows commands. SLP/PT/OT. Respiratory culture +Pseudomonas aeruginosa. IVF, abilify, prn pepto bismol, IV cefepime, lovenox, prn IV hydralazine, lisinopril, prn IV ativan, movantik, lyrica, prn seroquel, IV vancomycin, effexor, Electrolyte replacement protocols. K+ 3.2, hgb 10.1. PCP: PAPA Whitman CNP  Readmission Risk Score: 26.5%  Patient Goals/Plan/Treatment Preferences: Home with MADISON Osullivan. Independent. Monitor for needs. Will likely need ride at discharge if does not leave AMA.

## 2023-10-04 NOTE — PROGRESS NOTES
Writer got a call back from micro stating that they have found the blood cultures but not sure why it has not been uploaded. She also stated that she will have to manually put in the results, which she told writer that \"there is no growth. \" Writer updated VALERIE Mcclain. Will continue to monitor.

## 2023-10-04 NOTE — PROGRESS NOTES
Patient:  Carlita Wisemanchure    Unit/Bed:4B-04/004-A  MRN: [id number]   PCP: PAPA Smith CNP  Date of Admission: 9/27/2023    Assessment and Plan(All pulmonary edema, renal failure, PE, and respiratory failure diagnoses are acute in nature unless otherwise specified):        Acute hypoxic respiratory failure: initially intubated for airway protection in need for higher sedation in setting of erratic behavior, polysubstance abuse. Now has pneumonia. PS weaned to 12 this morning on Spontaneous mode, and we will assess patient's tolerance of this. Continue antibiotics. Wean vent as tolerated. Pneumonia: retrocardiac RML/posterior heart infiltrate on CXR. Sputum production, fever, many neutrophils on resp culture gram stain. Molecular pneumonia panel shows positive for pseudomonas aeruginosa and MRSA. Resp culture is growing MRSA and pseudomonas aeruginosa. Sensitivities reviewed. Continue on Cefepime and Vanc day # 6. Follow CXR. Blood cultures pending; discussed with lab, no growth at 48 hours. Polysubstance abuse: positive for many substances on admission urine toxicology (opiates, amphetamines, cocaine, THC). Will address at extubation and consult addiction services at the appropriate timing. Sinus bradycardia: HR intermittently as low as 30's, with normal/high BP. Patient denies chest pain. Appears iatrogenic 2/2 propofol. Will monitor and wean sedation as tolerated. Follow electrolytes. Normal TSH one month ago. 12-lead ECG with no AVB nor ischemic changes. Chronic iron deficiency anemia: iron anemia profile reviewed. Hgb stable near baseline. No active bleeding seen. Trend CBC daily. Start iron supplementation once infection resolved. HTN: on lisinopril. PRN hydralazine. Depression/anxiety:  Abilify, Effexor, Lyrica continued. SATYA: resolved.        CC:  Alerted mental status  HPI: Valarie Calabrese is a 44 y/o male former-smoker with PMH of polysubstance abuse, anxiety, depression, kidney stone, production, white/cloudy. No retractions  Cardiac: Bradycardic. Regular. S1/S2. No murmur. No JVD. Abdomen: soft. Nontender. Extremities:  No clubbing, cyanosis, or edema x 4. Vasculature: capillary refill < 3 seconds. Palpable dorsalis pedis pulses. Skin:  warm and dry. Track marks from prior IV drug use noted to bilateral arms. Psych:  Sedated. Affect appropriate  Lymph:  No supraclavicular adenopathy. Neurologic:  No focal deficit. No seizures. Sedated. No clonus. No muscle rigidity. Data: (All radiographs, tracings, PFTs, and imaging are personally viewed and interpreted unless otherwise noted). Telemetry: NSR.   12-lead ECG 10/3/23: sinus arrhythmia. No acute ischemic changes. Today's labs are pending. Respiratory culture 10/2/23: pseudomonas aeruginosa. Pan-sensitive. Respiratory culture 9/29/2: MRSA. Sensitivities reviewed. Molecular pneumonia panel 10/2/23: MRSA, pseudomonas aeruginosa. Molecular pneumonia panel 9/29/23: MRSA, pseudomonas aeruginosa. Blood culture 1 10/2/23: sent/in process  Blood culture 2 10/2/23: sent/in process  UA 10/2/23: yellow, 30 protein, negative nitrite, negative leukocyte esterase, 2-4 WBC, no bacteria. Urine culture 9/27/23: NGTD. CXR 10/3/23 report: Endotracheal tube and enteric tube are again noted. No acute infiltrate or pleural effusion. Case discussed with Dr. Dionne Juarez. Electronically signed by ANNE Wing                                     Patient seen by me including key components of medical care. Case discussed with NP. Progress to SBT and extubate if tolerates. Continue antibiotics. Italicized font, if present,  represents changes to the note made by me. CC time 35 minutes. Time was discontiguous. Time does not include procedure. Time does include my direct assessment of the patient and coordination of care.   Time represents more than 50% of the time involved with patient care by the [address] [place] [address] team.  Electronically signed

## 2023-10-04 NOTE — PLAN OF CARE
Problem: Safety - Medical Restraint  Goal: Remains free of injury from restraints (Restraint for Interference with Medical Device)  Description: INTERVENTIONS:  1. Determine that other, less restrictive measures have been tried or would not be effective before applying the restraint  2. Evaluate the patient's condition at the time of restraint application  3. Inform patient/family regarding the reason for restraint  4.  Q2H: Monitor safety, psychosocial status, comfort, nutrition and hydration  10/4/2023 0915 by Belton Lesch, RN  Outcome: Progressing  Flowsheets (Taken 10/4/2023 0800)  Remains free of injury from restraints (restraint for interference with medical device):   Determine that other, less restrictive measures have been tried or would not be effective before applying the restraint   Evaluate the patient's condition at the time of restraint application   Inform patient/family regarding the reason for restraint   Every 2 hours: Monitor safety, psychosocial status, comfort, nutrition and hydration     Problem: Pain  Goal: Verbalizes/displays adequate comfort level or baseline comfort level  10/4/2023 0915 by Belton Lesch, RN  Outcome: Progressing  Flowsheets (Taken 10/4/2023 0745)  Verbalizes/displays adequate comfort level or baseline comfort level:   Encourage patient to monitor pain and request assistance   Administer analgesics based on type and severity of pain and evaluate response   Assess pain using appropriate pain scale  Problem: Safety - Adult  Goal: Free from fall injury  10/4/2023 0915 by Belton Lesch, RN  Outcome: Progressing  Free From Fall Injury:   Instruct family/caregiver on patient safety   Based on caregiver fall risk screen, instruct family/caregiver to ask for assistance with transferring infant if caregiver noted to have fall risk factors

## 2023-10-04 NOTE — PROGRESS NOTES
Writer  was in room with RT when pt. Was extubated at 0956 a.m. Pt. Extubated to room air stating 96%. HR: 55; RR: 16; BP: 146/84. Pt. Did state to writer that he wanted to leave AMA. Writer explained to pt. That he should wait a while since he just got extubated and needs to let the sedation medication wear off. Writer also explained to pt. That he has not walk in a while and should wait for PT/OT. Pt. Stated that he will wait a while but is planning to leave today AMA. VALERIE Mcclain notified. Will continue to monitor.

## 2023-10-04 NOTE — SIGNIFICANT EVENT
Nursing notified me that patient is wanting to leave today against medical advice (AMA). I went to the bedside to discuss this with the patient. He expressed to me desire to leave AMA. He was alert and oriented, and appeared to be of appropriate thought clarity. I advised him that I would not recommend him leaving the hospital today, as he was recently extubated this morning, and should stay to ensure no further breathing issues that could develop post-extubation. If he left, I explained that he would not be able to be appropriately monitored for any breathing difficulty and/or hypoxia. I explained that he could deteriorate after leaving, and his safety and survivability could not be guaranteed. I also explained that he has an active pneumonia secondary to pseudomonas aeruginosa and MRSA, and is undergoing IV antibiotic therapy. I explained that he would risk worsening pneumonia if he left before completing his antibiotics. After further discussion, patient has agreed to stay \"for a few more days. \" His sister is coming to stay with him this evening. Nursing updated. Dr. Gregory Face updated.     Electronically signed by PAPA Valladares CNP on 10/4/2023 at 5:21 PM

## 2023-10-04 NOTE — PROGRESS NOTES
Writer called spiritual care about coming earlier to get the POA paperwork done due to patient wanting to leave AMA. Spiritual care informed writer that they might not be able to come by tonight as they are short chaplains. Writer printed out a State of [address] of  paperwork for patient to sign. A copy was given to patient Vilma Busby), sister Lauren Viveros) as well as a copy for the chart. Writer then went over discharge paperwork and informed him to please  the antibiotics at the pharmacy provided. Patient and sister verbalized understanding.

## 2023-10-04 NOTE — PLAN OF CARE
RN)  Free From Fall Injury:   Instruct family/caregiver on patient safety   Based on caregiver fall risk screen, instruct family/caregiver to ask for assistance with transferring infant if caregiver noted to have fall risk factors  10/3/2023 1320 by Davin Castro RN  Outcome: Progressing  Flowsheets (Taken 10/3/2023 1320)  Free From Fall Injury:   Instruct family/caregiver on patient safety   Based on caregiver fall risk screen, instruct family/caregiver to ask for assistance with transferring infant if caregiver noted to have fall risk factors     Problem: Skin/Tissue Integrity  Goal: Absence of new skin breakdown  Description: 1. Monitor for areas of redness and/or skin breakdown  2. Assess vascular access sites hourly  3. Every 4-6 hours minimum:  Change oxygen saturation probe site  4. Every 4-6 hours:  If on nasal continuous positive airway pressure, respiratory therapy assess nares and determine need for appliance change or resting period.   10/3/2023 2240 by Heddy Hashimoto, RN  Outcome: Progressing  10/3/2023 2159 by Heddy Hashimoto, RN  Outcome: Progressing  10/3/2023 1320 by Davin Castro RN  Outcome: Progressing

## 2023-10-04 NOTE — DISCHARGE SUMMARY
Discharge Summary        Patient: Ad Cox  YOB: 1985  MRN: [id number]   Acct: [de-identified]    Primary Care Physician: PAPA Gillespie - CNP    Admit date  9/27/2023    Discharge date:  10/4/2023 5:49 PM    Chief Complaint on presentation :-  Alerted mental status    Discharge Assessment and Plan:-   Acute hypoxic respiratory failure: initially intubated for airway protection in need for higher sedation in setting of erratic behavior, polysubstance abuse. Now has pneumonia. PS weaned to 12 this morning on Spontaneous mode, and we will assess patient's tolerance of this. Continue antibiotics. Wean vent as tolerated. Patient left against medical advice (AMA) after discussion of risks and benefits. Pneumonia: retrocardiac RML/posterior heart infiltrate on CXR. Sputum production, fever, many neutrophils on resp culture gram stain. Molecular pneumonia panel shows positive for pseudomonas aeruginosa and MRSA. Resp culture is growing MRSA and pseudomonas aeruginosa. Sensitivities reviewed. Continue on Cefepime and Vanc day # 6. Follow CXR. Blood cultures pending; discussed with lab, no growth at 48 hours. Polysubstance abuse: positive for many substances on admission urine toxicology (opiates, amphetamines, cocaine, THC). Will address at extubation and consult addiction services at the appropriate timing. Sinus bradycardia: HR intermittently as low as 30's, with normal/high BP. Patient denies chest pain. Appears iatrogenic 2/2 propofol. Will monitor and wean sedation as tolerated. Follow electrolytes. Normal TSH one month ago. 12-lead ECG with no AVB nor ischemic changes. Chronic iron deficiency anemia: iron anemia profile reviewed. Hgb stable near baseline. No active bleeding seen. Trend CBC daily. Start iron supplementation once infection resolved. HTN: on lisinopril. PRN hydralazine. Depression/anxiety:  Abilify, Effexor, Lyrica continued. SATYA: resolved.        Initial H 10/04/23  9:10 AM   Result Value Ref Range    Magnesium 2.2 1.6 - 2.4 mg/dL   CBC    Collection Time: 10/04/23  9:10 AM   Result Value Ref Range    WBC 10.6 4.8 - 10.8 thou/mm3    RBC 4.08 (L) 4.70 - 6.10 mill/mm3    Hemoglobin 10.1 (L) 14.0 - 18.0 gm/dl    Hematocrit 32.4 (L) 42.0 - 52.0 %    MCV 79.4 (L) 80.0 - 94.0 fL    MCH 24.8 (L) 26.0 - 33.0 pg    MCHC 31.2 (L) 32.2 - 35.5 gm/dl    RDW-CV 16.2 (H) 11.5 - 14.5 %    RDW-SD 46.9 (H) 35.0 - 45.0 fL    Platelets 459 523 - 236 thou/mm3    MPV 9.6 9.4 - 12.4 fL   Anion Gap    Collection Time: 10/04/23  9:10 AM   Result Value Ref Range    Anion Gap 13.0 8.0 - 16.0 meq/L   Glomerular Filtration Rate, Estimated    Collection Time: 10/04/23  9:10 AM   Result Value Ref Range    Est, Glom Filt Rate >60 >60 ml/min/1.73m2        Microbiology:    Blood culture #1:   Lab Results   Component Value Date/Time    BC No growth 24 hours. No growth 48 hours. 10/02/2023 01:00 PM       Blood culture #2:No results found for: \"BLOODCULT2\"    Organism:    Lab Results   Component Value Date/Time    LABGRAM  10/02/2023 12:30 PM     Many segmented neutrophils observed. Rare epithelial cells observed. Rare gram positive cocci occurring singly and in pairs. Rare small gram positive bacilli. MRSA culture only:No results found for: \"MRSAC\"    Urine culture:   Lab Results   Component Value Date/Time    LABURIN No growth-preliminary No growth 09/27/2023 01:52 PM     Lab Results   Component Value Date/Time    ORG Pseudomonas aeruginosa 10/02/2023 12:30 PM        Respiratory culture: No results found for: \"CULTRESP\"    Aerobic and Anaerobic :  Lab Results   Component Value Date/Time    LABAERO No Acinetobacter species isolated.  09/27/2023 01:52 PM     No results found for: \"LABANAE\"    Urinalysis:      Lab Results   Component Value Date/Time    NITRU NEGATIVE 10/02/2023 12:30 PM    WBCUA 2-4 10/02/2023 12:30 PM    BACTERIA NONE SEEN 10/02/2023 12:30 PM    RBCUA 5-10 10/02/2023 12:30 PM

## 2023-10-04 NOTE — PROGRESS NOTES
Per epic pt remains on vent, unable to complete AOD consult at this time.  SHOBHA to continue monitoring for completion

## 2023-10-05 ENCOUNTER — APPOINTMENT (OUTPATIENT)
Dept: CT IMAGING | Age: 38
End: 2023-10-05
Payer: COMMERCIAL

## 2023-10-05 ENCOUNTER — CARE COORDINATION (OUTPATIENT)
Dept: CARE COORDINATION | Age: 38
End: 2023-10-05

## 2023-10-05 ENCOUNTER — APPOINTMENT (OUTPATIENT)
Dept: GENERAL RADIOLOGY | Age: 38
End: 2023-10-05
Payer: COMMERCIAL

## 2023-10-05 ENCOUNTER — CARE COORDINATION (OUTPATIENT)
Dept: CASE MANAGEMENT | Age: 38
End: 2023-10-05

## 2023-10-05 ENCOUNTER — HOSPITAL ENCOUNTER (EMERGENCY)
Age: 38
Discharge: HOME | End: 2023-10-05
Attending: EMERGENCY MEDICINE
Payer: COMMERCIAL

## 2023-10-05 VITALS
DIASTOLIC BLOOD PRESSURE: 87 MMHG | TEMPERATURE: 98.4 F | HEART RATE: 79 BPM | OXYGEN SATURATION: 96 % | RESPIRATION RATE: 18 BRPM | BODY MASS INDEX: 27.16 KG/M2 | WEIGHT: 194 LBS | HEIGHT: 71 IN | SYSTOLIC BLOOD PRESSURE: 141 MMHG

## 2023-10-05 DIAGNOSIS — F19.20 DRUG DEPENDENCE, ABUSE (HCC): Primary | ICD-10-CM

## 2023-10-05 DIAGNOSIS — R06.02 SHORTNESS OF BREATH: Primary | ICD-10-CM

## 2023-10-05 LAB
ALBUMIN SERPL BCG-MCNC: 3.9 G/DL (ref 3.5–5.1)
ALP SERPL-CCNC: 100 U/L (ref 38–126)
ALT SERPL W/O P-5'-P-CCNC: 46 U/L (ref 11–66)
ANION GAP SERPL CALC-SCNC: 14 MEQ/L (ref 8–16)
AST SERPL-CCNC: 32 U/L (ref 5–40)
BILIRUB CONJ SERPL-MCNC: < 0.2 MG/DL (ref 0–0.3)
BILIRUB SERPL-MCNC: 0.4 MG/DL (ref 0.3–1.2)
BUN SERPL-MCNC: 14 MG/DL (ref 7–22)
CALCIUM SERPL-MCNC: 9.6 MG/DL (ref 8.5–10.5)
CHLORIDE SERPL-SCNC: 103 MEQ/L (ref 98–111)
CO2 SERPL-SCNC: 24 MEQ/L (ref 23–33)
CREAT SERPL-MCNC: 0.6 MG/DL (ref 0.4–1.2)
DEPRECATED RDW RBC AUTO: 45 FL (ref 35–45)
ERYTHROCYTE [DISTWIDTH] IN BLOOD BY AUTOMATED COUNT: 15.9 % (ref 11.5–14.5)
GFR SERPL CREATININE-BSD FRML MDRD: > 60 ML/MIN/1.73M2
GLUCOSE SERPL-MCNC: 109 MG/DL (ref 70–108)
HCT VFR BLD AUTO: 31.9 % (ref 42–52)
HGB BLD-MCNC: 10.3 GM/DL (ref 14–18)
LIPASE SERPL-CCNC: 19.9 U/L (ref 5.6–51.3)
MAGNESIUM SERPL-MCNC: 2.1 MG/DL (ref 1.6–2.4)
MCH RBC QN AUTO: 25.3 PG (ref 26–33)
MCHC RBC AUTO-ENTMCNC: 32.3 GM/DL (ref 32.2–35.5)
MCV RBC AUTO: 78.4 FL (ref 80–94)
OSMOLALITY SERPL CALC.SUM OF ELEC: 282.3 MOSMOL/KG (ref 275–300)
PLATELET # BLD AUTO: 401 THOU/MM3 (ref 130–400)
PMV BLD AUTO: 9.2 FL (ref 9.4–12.4)
POTASSIUM SERPL-SCNC: 2.7 MEQ/L (ref 3.5–5.2)
PROT SERPL-MCNC: 7.6 G/DL (ref 6.1–8)
RBC # BLD AUTO: 4.07 MILL/MM3 (ref 4.7–6.1)
SODIUM SERPL-SCNC: 141 MEQ/L (ref 135–145)
TROPONIN, HIGH SENSITIVITY: 17 NG/L (ref 0–12)
WBC # BLD AUTO: 12 THOU/MM3 (ref 4.8–10.8)

## 2023-10-05 PROCEDURE — 71250 CT THORAX DX C-: CPT

## 2023-10-05 PROCEDURE — 6370000000 HC RX 637 (ALT 250 FOR IP): Performed by: STUDENT IN AN ORGANIZED HEALTH CARE EDUCATION/TRAINING PROGRAM

## 2023-10-05 PROCEDURE — 6360000004 HC RX CONTRAST MEDICATION: Performed by: EMERGENCY MEDICINE

## 2023-10-05 PROCEDURE — 36415 COLL VENOUS BLD VENIPUNCTURE: CPT

## 2023-10-05 PROCEDURE — 6360000002 HC RX W HCPCS: Performed by: STUDENT IN AN ORGANIZED HEALTH CARE EDUCATION/TRAINING PROGRAM

## 2023-10-05 PROCEDURE — 93010 ELECTROCARDIOGRAM REPORT: CPT | Performed by: INTERNAL MEDICINE

## 2023-10-05 PROCEDURE — 83690 ASSAY OF LIPASE: CPT

## 2023-10-05 PROCEDURE — 83735 ASSAY OF MAGNESIUM: CPT

## 2023-10-05 PROCEDURE — 74177 CT ABD & PELVIS W/CONTRAST: CPT

## 2023-10-05 PROCEDURE — 93005 ELECTROCARDIOGRAM TRACING: CPT | Performed by: STUDENT IN AN ORGANIZED HEALTH CARE EDUCATION/TRAINING PROGRAM

## 2023-10-05 PROCEDURE — 80076 HEPATIC FUNCTION PANEL: CPT

## 2023-10-05 PROCEDURE — 96374 THER/PROPH/DIAG INJ IV PUSH: CPT

## 2023-10-05 PROCEDURE — 2580000003 HC RX 258: Performed by: STUDENT IN AN ORGANIZED HEALTH CARE EDUCATION/TRAINING PROGRAM

## 2023-10-05 PROCEDURE — 71045 X-RAY EXAM CHEST 1 VIEW: CPT

## 2023-10-05 PROCEDURE — 84484 ASSAY OF TROPONIN QUANT: CPT

## 2023-10-05 PROCEDURE — 80048 BASIC METABOLIC PNL TOTAL CA: CPT

## 2023-10-05 PROCEDURE — 85027 COMPLETE CBC AUTOMATED: CPT

## 2023-10-05 PROCEDURE — 99285 EMERGENCY DEPT VISIT HI MDM: CPT

## 2023-10-05 RX ORDER — CIPROFLOXACIN 500 MG/1
500 TABLET, FILM COATED ORAL ONCE
Status: COMPLETED | OUTPATIENT
Start: 2023-10-05 | End: 2023-10-05

## 2023-10-05 RX ORDER — ACETAMINOPHEN 500 MG
1000 TABLET ORAL ONCE
Status: COMPLETED | OUTPATIENT
Start: 2023-10-05 | End: 2023-10-05

## 2023-10-05 RX ORDER — DOXYCYCLINE HYCLATE 100 MG
100 TABLET ORAL ONCE
Status: COMPLETED | OUTPATIENT
Start: 2023-10-05 | End: 2023-10-05

## 2023-10-05 RX ORDER — 0.9 % SODIUM CHLORIDE 0.9 %
1000 INTRAVENOUS SOLUTION INTRAVENOUS ONCE
Status: COMPLETED | OUTPATIENT
Start: 2023-10-05 | End: 2023-10-05

## 2023-10-05 RX ORDER — LOPERAMIDE HYDROCHLORIDE 2 MG/1
2 CAPSULE ORAL ONCE
Status: COMPLETED | OUTPATIENT
Start: 2023-10-05 | End: 2023-10-05

## 2023-10-05 RX ORDER — ONDANSETRON 4 MG/1
4 TABLET, FILM COATED ORAL EVERY 8 HOURS PRN
Qty: 6 TABLET | Refills: 0 | Status: SHIPPED | OUTPATIENT
Start: 2023-10-05

## 2023-10-05 RX ORDER — ONDANSETRON 2 MG/ML
4 INJECTION INTRAMUSCULAR; INTRAVENOUS ONCE
Status: COMPLETED | OUTPATIENT
Start: 2023-10-05 | End: 2023-10-05

## 2023-10-05 RX ADMIN — IOPAMIDOL 80 ML: 755 INJECTION, SOLUTION INTRAVENOUS at 04:10

## 2023-10-05 RX ADMIN — POTASSIUM BICARBONATE 40 MEQ: 782 TABLET, EFFERVESCENT ORAL at 05:29

## 2023-10-05 RX ADMIN — CIPROFLOXACIN HYDROCHLORIDE 500 MG: 500 TABLET, FILM COATED ORAL at 05:18

## 2023-10-05 RX ADMIN — LOPERAMIDE HYDROCHLORIDE 2 MG: 2 CAPSULE ORAL at 04:28

## 2023-10-05 RX ADMIN — ONDANSETRON 4 MG: 2 INJECTION INTRAMUSCULAR; INTRAVENOUS at 04:28

## 2023-10-05 RX ADMIN — ACETAMINOPHEN 1000 MG: 500 TABLET ORAL at 04:27

## 2023-10-05 RX ADMIN — DOXYCYCLINE HYCLATE 100 MG: 100 TABLET, COATED ORAL at 05:18

## 2023-10-05 RX ADMIN — SODIUM CHLORIDE 1000 ML: 9 INJECTION, SOLUTION INTRAVENOUS at 04:30

## 2023-10-05 ASSESSMENT — ENCOUNTER SYMPTOMS
EYES NEGATIVE: 1
VOMITING: 1
DIARRHEA: 1
ALLERGIC/IMMUNOLOGIC NEGATIVE: 1
ABDOMINAL PAIN: 0
NAUSEA: 1
COUGH: 1
SHORTNESS OF BREATH: 1

## 2023-10-05 ASSESSMENT — PAIN - FUNCTIONAL ASSESSMENT
PAIN_FUNCTIONAL_ASSESSMENT: NONE - DENIES PAIN

## 2023-10-05 ASSESSMENT — HEART SCORE: ECG: 0

## 2023-10-05 NOTE — CARE COORDINATION
Received incoming call from pt. Left message stating he is just calling to check back in to see if I heard anything regarding transportation tomorrow. CTN called pt back. Informed him that I have not heard anything as of yet. States he needs to know so he can let the clinic know either way about tomorrow. He will still need a voucher for his ride to his appt if using [place]. Pt has an appt with PCP shortly and will discuss with her as well. Toni Moffett states he is NOT going to miss his methadone appt tomorrow if at all possible. He has CTN contact information if having any other questions.     Alexandra Martínez, RN  Care Transition Nurse
Nurse provided contact information. Plan for follow-up call in 1-2 days based on severity of symptoms and risk factors. Plan for next call: symptom management-SOB, seizure like activity, CP, drug use since d/c?, any new or worsening symptoms  follow-up appointment-f/u with PCP on 10/5/23? medication management-was he able to  medications-Zofran, Vyvanse?     Alexandra Martínez RN

## 2023-10-05 NOTE — ED PROVIDER NOTES
[facility] Name: Nikia Haile  MRN: [id number]  [address] 1985  Date of evaluation: 9/12/20      I personally saw and examined the patient. I have reviewed and agree with the Resident findings, including all diagnostic interpretations and treatment plans as written. I was present for the key portion of any procedures performed and the inclusive time noted in any critical care statement. History: This patient was seen with Alessandro Albert, resident physician. 50-year-old male who was recently admitted to the intensive care unit and apparently left AGAINST MEDICAL ADVICE earlier in the day. He was intubated for quite some time, it sounds that he may have developed pneumonia. He complained of shortness of breath when he got back here although visibly does look comfortable and has a normal respiratory rate and pulse ox. He stated that he \"used again\" tonight. He does not have any further evidence of pneumonia on his imaging with no acute findings. He was mainly complaining of nausea at this point and we gave him some Zofran and then he was asking for a turkey sandwich. He is up and ambulatory. He appears to be stable for discharge.                     DO Susan Whelna DO  10/05/23 [id number]

## 2023-10-05 NOTE — CARE COORDINATION
JENNYFER called [employer] to schedule transportation for pt for 8 am 10/6 to [facility] at [address] [place] [address]. \"Saisei will pick pt up at 7:30 am tomorrow morning,at [address] [facility] dispatch at [facility] knew pt address before I informed her of it. JENNYFER will inform pt of this.

## 2023-10-05 NOTE — CARE COORDINATION
SW called pt introduced self and my role. Informed him that [address] will pick him up and transport him to [facility] on [address] at 7:30 am  tomorrow. Pt will need CMS office to call [facility] after appt there is completed and they will return to take him home. Pt voiced understanding and was very   Appreciative of this service through [facility].

## 2023-10-05 NOTE — ED PROVIDER NOTES
[facility] ENCOUNTER          Pt Name: Anupam Noriega  MRN: [id number]  [address] [facility] [place] 1985  Date of evaluation: 10/5/2023  Emergency Physician: Jaleesa Negron MD    CHIEF COMPLAINT       Chief Complaint   Patient presents with    Shortness of Breath     History obtained from the patient. HISTORY OF PRESENT ILLNESS    HPI  Anupam Noriega is a 43 y.o. male with past medical history of polysubstance abuse, delirium secondary to illicit drug use, hypertension, hepatitis C infection, who presents to the emergency department for evaluation of shortness of breath. Patient is coming back after he left AMA yesterday evening due to wanting to get high. Patient states that he realized he made a mistake and decided against using drugs but still felt short of breath and decided to come back. States that he has not been able to keep down any of his antibiotics as he has been nauseous and vomiting. Denies abdominal pain but does report some left-sided chest pain that radiates to his left upper back. Also has a cough. No lower extremity swelling. Denies fevers or chills. Per chart review, patient was admitted to ICU due to altered mental status secondary to delirium and developed pneumonia while on the ventilator. Respiratory cultures were growing Pseudomonas and MRSA and he was receiving cefepime and vancomycin. The patient has no other acute complaints at this time. REVIEW OF SYSTEMS   Review of Systems   Constitutional:  Positive for appetite change. Negative for chills and fever. HENT: Negative. Eyes: Negative. Respiratory:  Positive for cough and shortness of breath. Cardiovascular:  Positive for chest pain. Negative for leg swelling. Gastrointestinal:  Positive for diarrhea, nausea and vomiting. Negative for abdominal pain. Endocrine: Negative. Genitourinary: Negative. Musculoskeletal: Negative. Skin: Negative.

## 2023-10-05 NOTE — ED NOTES
Provider at bedside. Dr Rinaldi [facility] states to wait a little bit to give the oral antibiotics due to patient nausea.      Onel Angeles, RN  10/05/23 2541

## 2023-10-05 NOTE — ED NOTES
Pt medicated per MAR. Pt given snack box per provider approval. VS stable.  Call light in reach     Chanel Tavarez RN  10/05/23 6897

## 2023-10-05 NOTE — ED TRIAGE NOTES
Pt presents to the ED with C/O shortness of breath. Pt states he was admitted here a week ago in the ICU pt states he was intubated due to pneumonia. Pt states he left AMA today due to wanting to get high. Pt states he realized that was a mistake and did not get high but felt short of breath and vomited when taking his antibiotics.

## 2023-10-06 ENCOUNTER — HOSPITAL ENCOUNTER (OUTPATIENT)
Age: 38
Setting detail: SPECIMEN
Discharge: HOME | End: 2023-10-06
Payer: COMMERCIAL

## 2023-10-06 ENCOUNTER — CARE COORDINATION (OUTPATIENT)
Dept: CASE MANAGEMENT | Age: 38
End: 2023-10-06

## 2023-10-06 DIAGNOSIS — Z91.89 AT RISK FOR CLOSTRIDIUM DIFFICILE INFECTION: Primary | ICD-10-CM

## 2023-10-06 DIAGNOSIS — Z91.89 AT RISK FOR CLOSTRIDIUM DIFFICILE INFECTION: ICD-10-CM

## 2023-10-06 LAB — C DIFF GDH STL QL: NEGATIVE

## 2023-10-06 PROCEDURE — 87449 NOS EACH ORGANISM AG IA: CPT

## 2023-10-06 NOTE — CARE COORDINATION
Care Transitions Follow Up Call    Patient Current Location:  Home: [facility]  [address] [place] [address] Dr  [address] [facility] [phone]    Care Transition Nurse contacted the patient by telephone to follow up after admission on 23. Verified name and  with patient as identifiers. Patient: Nain Silvestre  Patient : 1985   MRN: [id number]  Reason for Admission: Substance abuse, AMS, Acute respiratory failure, ED visit on 10/5/23  Discharge Date: 10/5/23 RARS: Readmission Risk Score: 28.5      Needs to be reviewed by the provider   Additional needs identified to be addressed with provider: No  none             Method of communication with provider: none. Contacted pt for care transition follow up. Ad Man states he is doing better. Denies having any c/o chest pain/discomfort, shortness of breath, seizure like activity, fever, chills. He is up and moving around. He informed CTN that he woke up and went [facility] app[facility]. He states that he did not realize that he had to do the entire intake again. States he was not able to complete the entire intake d/t his transportation having to leave. He is scheduled to go back on Monday to complete the intake. Also has a follow up with PCP on 10/9/23 which he had rescheduled d/t the rain yesterday. Pt will be using [employer] for his future transportation. He informed CTN that he has not used any drugs since he returned home from the ED. We discussed going to an inpatient rehab facility. We discussed when to contact provider. He verbalized understanding. No questions or needs from CTN at this time. Addressed changes since last contact:  none  Discussed follow-up appointments. If no appointment was previously scheduled, appointment scheduling offered: Yes. Is follow up appointment scheduled within 7 days of discharge? Yes.     Follow Up  Future Appointments   Date Time Provider [address]   10/9/2023 10:00 AM PAPA Mendoza - NAI SRPX  MED

## 2023-10-07 LAB
BACTERIA BLD AEROBE CULT: NORMAL
BACTERIA BLD AEROBE CULT: NORMAL
EKG ATRIAL RATE: 65 BPM
EKG P AXIS: 43 DEGREES
EKG P-R INTERVAL: 180 MS
EKG Q-T INTERVAL: 384 MS
EKG QRS DURATION: 96 MS
EKG QTC CALCULATION (BAZETT): 399 MS
EKG R AXIS: 9 DEGREES
EKG T AXIS: 35 DEGREES
EKG VENTRICULAR RATE: 65 BPM

## 2023-10-09 ENCOUNTER — CLINICAL DOCUMENTATION (OUTPATIENT)
Dept: INTERNAL MEDICINE CLINIC | Age: 38
End: 2023-10-09

## 2023-10-09 ENCOUNTER — OFFICE VISIT (OUTPATIENT)
Dept: INTERNAL MEDICINE CLINIC | Age: 38
End: 2023-10-09
Payer: COMMERCIAL

## 2023-10-09 VITALS
WEIGHT: 186 LBS | BODY MASS INDEX: 26.04 KG/M2 | HEIGHT: 71 IN | SYSTOLIC BLOOD PRESSURE: 148 MMHG | RESPIRATION RATE: 20 BRPM | HEART RATE: 85 BPM | DIASTOLIC BLOOD PRESSURE: 98 MMHG

## 2023-10-09 DIAGNOSIS — F90.9 ATTENTION DEFICIT HYPERACTIVITY DISORDER (ADHD), UNSPECIFIED ADHD TYPE: ICD-10-CM

## 2023-10-09 DIAGNOSIS — F11.20 SEVERE OPIOID USE DISORDER (HCC): Primary | ICD-10-CM

## 2023-10-09 DIAGNOSIS — F32.A ANXIETY AND DEPRESSION: ICD-10-CM

## 2023-10-09 DIAGNOSIS — F41.9 ANXIETY AND DEPRESSION: ICD-10-CM

## 2023-10-09 PROCEDURE — 99214 OFFICE O/P EST MOD 30 MIN: CPT | Performed by: NURSE PRACTITIONER

## 2023-10-09 PROCEDURE — G8417 CALC BMI ABV UP PARAM F/U: HCPCS | Performed by: NURSE PRACTITIONER

## 2023-10-09 PROCEDURE — G8428 CUR MEDS NOT DOCUMENT: HCPCS | Performed by: NURSE PRACTITIONER

## 2023-10-09 PROCEDURE — 3080F DIAST BP >= 90 MM HG: CPT | Performed by: NURSE PRACTITIONER

## 2023-10-09 PROCEDURE — 4004F PT TOBACCO SCREEN RCVD TLK: CPT | Performed by: NURSE PRACTITIONER

## 2023-10-09 PROCEDURE — G8484 FLU IMMUNIZE NO ADMIN: HCPCS | Performed by: NURSE PRACTITIONER

## 2023-10-09 PROCEDURE — 80305 DRUG TEST PRSMV DIR OPT OBS: CPT | Performed by: NURSE PRACTITIONER

## 2023-10-09 PROCEDURE — 1111F DSCHRG MED/CURRENT MED MERGE: CPT | Performed by: NURSE PRACTITIONER

## 2023-10-09 PROCEDURE — 3077F SYST BP >= 140 MM HG: CPT | Performed by: NURSE PRACTITIONER

## 2023-10-09 RX ORDER — LISDEXAMFETAMINE DIMESYLATE CAPSULES 50 MG/1
50 CAPSULE ORAL EVERY MORNING
Qty: 30 CAPSULE | Refills: 0 | Status: SHIPPED | OUTPATIENT
Start: 2023-10-09 | End: 2023-11-08

## 2023-10-09 RX ORDER — CLONAZEPAM 0.5 MG/1
0.5 TABLET ORAL NIGHTLY PRN
Qty: 7 TABLET | Refills: 0 | Status: SHIPPED | OUTPATIENT
Start: 2023-10-09 | End: 2023-10-16

## 2023-10-09 RX ORDER — PREGABALIN 200 MG/1
200 CAPSULE ORAL 3 TIMES DAILY
Qty: 90 CAPSULE | Refills: 0 | Status: SHIPPED | OUTPATIENT
Start: 2023-10-09 | End: 2023-11-08

## 2023-10-09 ASSESSMENT — ENCOUNTER SYMPTOMS
RHINORRHEA: 0
VOMITING: 0
ABDOMINAL PAIN: 0
SINUS PAIN: 0
CHEST TIGHTNESS: 0
WHEEZING: 0
ABDOMINAL PAIN: 0
TROUBLE SWALLOWING: 0
CONSTIPATION: 0
SINUS PAIN: 0
VOMITING: 0
SHORTNESS OF BREATH: 0
COUGH: 0
SINUS PRESSURE: 0
NAUSEA: 0
SORE THROAT: 0
SORE THROAT: 0
DIARRHEA: 0
RHINORRHEA: 0
ABDOMINAL DISTENTION: 0
PHOTOPHOBIA: 0
TROUBLE SWALLOWING: 0
PHOTOPHOBIA: 0
CHEST TIGHTNESS: 0
BLOOD IN STOOL: 0
WHEEZING: 0
SINUS PRESSURE: 0
COUGH: 0
ABDOMINAL DISTENTION: 0
CONSTIPATION: 0
BLOOD IN STOOL: 0
DIARRHEA: 0
NAUSEA: 0
SHORTNESS OF BREATH: 0

## 2023-10-09 NOTE — PROGRESS NOTES
Patient is interested in impatient treatment though is wanting to stay on his methadone. Sw offered support and assist patient with contacting facilities. Sw and patient is awaiting [facility] to contact steve desk phone to complete intake. Patient appears to be committed to his recovery today.

## 2023-10-09 NOTE — PROGRESS NOTES
[address] INTERNAL MEDICINE AND MEDICATION MANAGEMENT  [address] [facility]  [address] [place] [id number]  Dept: [phone]  Dept Fax: [phone]: [phone]     Visit Date:  10/9/2023    Patient:  Jay Bell  YOB: 1985    HPI:     Chief Complaint   Patient presents with    Drug Problem     Alice López presents today for medical evaluation of ADHD, anxiety/depression/PTSD, chronic pain, HTN     I last seen him 2 weeks ago. He has been hospitalized 2 times since last visit related to his drug use     States that he continues to relapse because he cannot sleep at night. He is requesting a benzodiazepine. Discussed at length with patient. For harm reduction, I agreed to prescribe at a low dose under the circumstance that he sustains from any illicit use. Understanding voiced. Urine positive for amphetamines, cocaine, fentanyl, methadone, and THC       Last used Wednesday- overdosed- 3 narcan      Previously followed with Dr. Silvana Dill for MAT. He looks awful     Discussed at length that he cannot continue to use illicit drugs and be on a stimulant. I agreed to prescribe under the agreement that use would stop, and as harm reduction; as he complained that the reason for his methamphetamine use was because his ADHD was not controlled. Guanfacine not helpful. Vyvanse has been the most effective. This has also helped him not use methamphetamines. Anxiety/depression well controlled with effexor and abilify; and counseling. Continue current regimen. He does have severe PTSD. Unable to work. Chronic pain controlled with pregabalin     BP well controlled with lisinopril 20 mg daily. BP today in office 148/98    JUST DOSED METHADONE TODAY. Medications    Current Outpatient Medications:     clonazePAM (KLONOPIN) 0.5 MG tablet, Take 1 tablet by mouth nightly as needed for Anxiety for up to 7 days.  Max Daily Amount: 0.5 mg,

## 2023-10-11 ENCOUNTER — CARE COORDINATION (OUTPATIENT)
Dept: CASE MANAGEMENT | Age: 38
End: 2023-10-11

## 2023-10-11 NOTE — CARE COORDINATION
Care Transitions Follow Up Call    Patient Current Location:  Home: [facility]  [address] [place] [address] Dr  [address] [facility] [phone]    Care Transition Nurse contacted the patient by telephone to follow up after admission on 23. Verified name and  with patient as identifiers. Patient: Gianni Freed  Patient : 1985   MRN: [id number]  Reason for Admission: Substance abuse, AMS, Acute respiratory failure, ED visit on 10/5/23  Discharge Date: 10/5/23 RARS: Readmission Risk Score: 28.5      Needs to be reviewed by the provider   Additional needs identified to be addressed with provider: No  none             Method of communication with provider: none. Contacted pt for care transition follow up. Dillan West states he is doing good. Reports he completed the intake at the methadone clinic and received his first dose on Monday, 10/9/23. Also had follow up with PCP on 10/9/23. States he's had \"urges to use\" but reports that he remains clean. He denies having any c/o chest pain/discomfort, shortness of breath, seizure activity, tremors, fever, chills. Reports he is working with JENNYFER at his doctor's office who is assisting with finding him inpatient treatment. He also informed CTN that he is completing the SSI paperwork. He does not have any questions or needs at this time. Addressed changes since last contact:   completed intake at the methadone clinic on Monday, 10/9/23   Discussed follow-up appointments. If no appointment was previously scheduled, appointment scheduling offered: Yes. Is follow up appointment scheduled within 7 days of discharge? Yes. Follow Up  Future Appointments   Date Time Provider [facility] [address] [place] [address]   10/16/2023 10:20 AM PAPA Ruiz - CNP SRPX  MED [facility] Transition Nurse reviewed medical action plan and red flags with patient and discussed any barriers to care and/or understanding of plan of care after discharge.  Discussed appropriate site of care based on symptoms

## 2023-10-12 ENCOUNTER — CLINICAL DOCUMENTATION (OUTPATIENT)
Dept: INTERNAL MEDICINE CLINIC | Age: 38
End: 2023-10-12

## 2023-10-13 ENCOUNTER — CARE COORDINATION (OUTPATIENT)
Dept: CASE MANAGEMENT | Age: 38
End: 2023-10-13

## 2023-10-13 NOTE — CARE COORDINATION
top risk factors for readmission: medical condition-substance abuse, AMS, Acute respiratory failure  Interventions to address risk factors:  appt at [facility]  on 10/14/23    Offered patient enrollment in the Remote Patient Monitoring (RPM) program for in-home monitoring: Patient is not eligible for RPM program.     Care Transitions Subsequent and Final Call    Subsequent and Final Calls  Do you have any ongoing symptoms?: No  Have your medications changed?: No  Do you have any questions related to your medications?: No  Do you currently have any active services?: No  Do you have any needs or concerns that I can assist you with?: No  Care Transitions Interventions                          Other Interventions:             Care Transition Nurse provided contact information for future needs. Plan for follow-up call in 5-7 days based on severity of symptoms and risk factors.   Plan for next call: symptom management-any new or worsening symptoms    Silvia Rasmussen RN

## 2023-10-14 ENCOUNTER — HOSPITAL ENCOUNTER (EMERGENCY)
Age: 38
Discharge: LEFT BEFORE DISCHARGE | End: 2023-10-14
Attending: STUDENT IN AN ORGANIZED HEALTH CARE EDUCATION/TRAINING PROGRAM

## 2023-10-14 VITALS
OXYGEN SATURATION: 100 % | HEART RATE: 122 BPM | DIASTOLIC BLOOD PRESSURE: 93 MMHG | TEMPERATURE: 98.3 F | SYSTOLIC BLOOD PRESSURE: 138 MMHG | RESPIRATION RATE: 20 BRPM

## 2023-10-14 DIAGNOSIS — Z53.21 ELOPED FROM EMERGENCY DEPARTMENT: Primary | ICD-10-CM

## 2023-10-14 ASSESSMENT — PAIN - FUNCTIONAL ASSESSMENT: PAIN_FUNCTIONAL_ASSESSMENT: NONE - DENIES PAIN

## 2023-10-15 NOTE — ED NOTES
Pt states he wants AMA papers and is not staying, RN brought AMA paper, pt was unable to be aroused to sign paper         Danyell Peralta RN  10/14/23 8415

## 2023-10-15 NOTE — ED PROVIDER NOTES
Patient eloped from the emergency department prior to my evaluation.     Jacque Ma,   11:14 PM       Jacque Ma,   10/14/23 4084

## 2023-10-15 NOTE — ED NOTES
2 RN's to pt room, pt awakens from sternal rub, awakes very agitated and states he is leaving  RN asks pt if he feels he is in a condition to leave, pt remains agitated, rips off tele and walks down hallway in [place], [place]  10/14/23 7671

## 2023-10-15 NOTE — ED NOTES
Pt arrives to ED from home by EMS with c/o drug problem.  Pt arrives barley able to stay awake   Pt states he took some \"klonodine\" and took too many so his girlfriend called EMS    Pt is uncooperative and agitated on arrival, cannot keep eyes open for triage to answers questions          Geovanny Zarate RN  10/14/23 3065

## 2023-10-16 DIAGNOSIS — F41.9 ANXIETY AND DEPRESSION: ICD-10-CM

## 2023-10-16 DIAGNOSIS — F32.A ANXIETY AND DEPRESSION: ICD-10-CM

## 2023-10-16 RX ORDER — CLONAZEPAM 0.5 MG/1
0.5 TABLET ORAL NIGHTLY PRN
Qty: 7 TABLET | Refills: 0 | Status: SHIPPED | OUTPATIENT
Start: 2023-10-16 | End: 2023-10-23

## 2023-10-19 ENCOUNTER — CARE COORDINATION (OUTPATIENT)
Dept: CASE MANAGEMENT | Age: 38
End: 2023-10-19

## 2023-10-19 NOTE — CARE COORDINATION
nausea/vomiting. CTN will route message to PCP. He has a follow up on 10/23/23. Pt denies having any further chest pain/discomfort, shortness of breath, seizure like activity. CTN encouraged pt to drink plenty of water to avoid dehydration from the diarrhea. He verbalized understanding. Pt denies taking any \"street drugs\" and only taking his Rx medications. Pt reports he has an appt with the [facility] tomorrow. Pt is still interested in inpatient tx. He is still working with SW.  Pt informed CTN today that he is agreeable to stop the Methadone if he is able to get into a facility. He also states his sister is agreeable to have him stay with her in [facility] [employer] if needed. CTN will send a message to the SW who pt has been working with. Randi Lenard does not have any questions or needs at this time. Addressed changes since last contact:   ED on 10/14/23 but eloped    Follow Up  Future Appointments   Date Time Provider [address]   10/23/2023  9:40 AM PAPA Baker - CNP SRPX [facility] Transition Nurse reviewed medical action plan and red flags with patient and discussed any barriers to care and/or understanding of plan of care after discharge. Discussed appropriate site of care based on symptoms and resources available to patient including: PCP  When to call 911. The patient agrees to contact the PCP office for questions related to their healthcare.      Patients top risk factors for readmission: medical condition-substance abuse, AMS, Acute respiratory failure  Interventions to address risk factors: Scheduled appointment with PCP-appt on 10/23/23 and appt at [facility] on 10/20/23    Offered patient enrollment in the Remote Patient Monitoring (RPM) program for in-home monitoring: Patient is not eligible for RPM program.     Care Transitions Subsequent and Final Call    Subsequent and Final Calls  Do you have any ongoing symptoms?: Yes  Patient-reported

## 2023-10-23 ENCOUNTER — NURSE ONLY (OUTPATIENT)
Dept: INTERNAL MEDICINE CLINIC | Age: 38
End: 2023-10-23
Payer: COMMERCIAL

## 2023-10-23 ENCOUNTER — HOSPITAL ENCOUNTER (EMERGENCY)
Age: 38
Discharge: LEFT BEFORE DISCHARGE | End: 2023-10-23
Attending: EMERGENCY MEDICINE
Payer: COMMERCIAL

## 2023-10-23 ENCOUNTER — APPOINTMENT (OUTPATIENT)
Dept: CT IMAGING | Age: 38
End: 2023-10-23
Payer: COMMERCIAL

## 2023-10-23 VITALS
RESPIRATION RATE: 18 BRPM | OXYGEN SATURATION: 99 % | HEART RATE: 113 BPM | TEMPERATURE: 98.1 F | DIASTOLIC BLOOD PRESSURE: 82 MMHG | SYSTOLIC BLOOD PRESSURE: 109 MMHG

## 2023-10-23 VITALS
WEIGHT: 188 LBS | HEIGHT: 71 IN | SYSTOLIC BLOOD PRESSURE: 158 MMHG | HEART RATE: 126 BPM | DIASTOLIC BLOOD PRESSURE: 92 MMHG | BODY MASS INDEX: 26.32 KG/M2 | RESPIRATION RATE: 20 BRPM

## 2023-10-23 DIAGNOSIS — R10.9 RIGHT FLANK PAIN: Primary | ICD-10-CM

## 2023-10-23 DIAGNOSIS — F41.9 ANXIETY AND DEPRESSION: ICD-10-CM

## 2023-10-23 DIAGNOSIS — F32.A ANXIETY AND DEPRESSION: ICD-10-CM

## 2023-10-23 DIAGNOSIS — F11.20 SEVERE OPIOID USE DISORDER (HCC): Primary | ICD-10-CM

## 2023-10-23 LAB
ALBUMIN SERPL BCG-MCNC: 3.6 G/DL (ref 3.5–5.1)
ALCOHOL URINE: ABNORMAL
ALP SERPL-CCNC: 88 U/L (ref 38–126)
ALT SERPL W/O P-5'-P-CCNC: 40 U/L (ref 11–66)
AMPHETAMINE SCREEN, URINE: ABNORMAL
ANION GAP SERPL CALC-SCNC: 13 MEQ/L (ref 8–16)
AST SERPL-CCNC: 37 U/L (ref 5–40)
BARBITURATE SCREEN, URINE: ABNORMAL
BASOPHILS ABSOLUTE: 0 THOU/MM3 (ref 0–0.1)
BASOPHILS NFR BLD AUTO: 0.2 %
BENZODIAZEPINE SCREEN, URINE: ABNORMAL
BILIRUB SERPL-MCNC: 0.2 MG/DL (ref 0.3–1.2)
BUN SERPL-MCNC: 10 MG/DL (ref 7–22)
BUPRENORPHINE URINE: ABNORMAL
CALCIUM SERPL-MCNC: 9 MG/DL (ref 8.5–10.5)
CHLORIDE SERPL-SCNC: 104 MEQ/L (ref 98–111)
CO2 SERPL-SCNC: 23 MEQ/L (ref 23–33)
COCAINE METABOLITE SCREEN URINE: ABNORMAL
CREAT SERPL-MCNC: 0.7 MG/DL (ref 0.4–1.2)
DEPRECATED RDW RBC AUTO: 49.5 FL (ref 35–45)
EOSINOPHIL NFR BLD AUTO: 3 %
EOSINOPHILS ABSOLUTE: 0.2 THOU/MM3 (ref 0–0.4)
ERYTHROCYTE [DISTWIDTH] IN BLOOD BY AUTOMATED COUNT: 17.1 % (ref 11.5–14.5)
FENTANYL SCREEN, URINE: ABNORMAL
GABAPENTIN SCREEN, URINE: ABNORMAL
GFR SERPL CREATININE-BSD FRML MDRD: > 60 ML/MIN/1.73M2
GLUCOSE SERPL-MCNC: 104 MG/DL (ref 70–108)
HCT VFR BLD AUTO: 29.6 % (ref 42–52)
HGB BLD-MCNC: 9.1 GM/DL (ref 14–18)
IMM GRANULOCYTES # BLD AUTO: 0 THOU/MM3 (ref 0–0.07)
IMM GRANULOCYTES NFR BLD AUTO: 0 %
LIPASE SERPL-CCNC: 8.9 U/L (ref 5.6–51.3)
LYMPHOCYTES ABSOLUTE: 2.3 THOU/MM3 (ref 1–4.8)
LYMPHOCYTES NFR BLD AUTO: 44.7 %
MAGNESIUM SERPL-MCNC: 1.8 MG/DL (ref 1.6–2.4)
MCH RBC QN AUTO: 24.6 PG (ref 26–33)
MCHC RBC AUTO-ENTMCNC: 30.7 GM/DL (ref 32.2–35.5)
MCV RBC AUTO: 80 FL (ref 80–94)
MDMA URINE: ABNORMAL
METHADONE SCREEN, URINE: ABNORMAL
METHAMPHETAMINE, URINE: ABNORMAL
MONOCYTES ABSOLUTE: 0.6 THOU/MM3 (ref 0.4–1.3)
MONOCYTES NFR BLD AUTO: 11.9 %
NEUTROPHILS NFR BLD AUTO: 40.2 %
NRBC BLD AUTO-RTO: 0 /100 WBC
OPIATE SCREEN URINE: ABNORMAL
OSMOLALITY SERPL CALC.SUM OF ELEC: 278.7 MOSMOL/KG (ref 275–300)
OXYCODONE SCREEN URINE: ABNORMAL
PHENCYCLIDINE SCREEN URINE: ABNORMAL
PLATELET # BLD AUTO: 150 THOU/MM3 (ref 130–400)
PMV BLD AUTO: 11 FL (ref 9.4–12.4)
POTASSIUM SERPL-SCNC: 3.5 MEQ/L (ref 3.5–5.2)
PROPOXYPHENE SCREEN, URINE: ABNORMAL
PROT SERPL-MCNC: 6.7 G/DL (ref 6.1–8)
RBC # BLD AUTO: 3.7 MILL/MM3 (ref 4.7–6.1)
SEGMENTED NEUTROPHILS ABSOLUTE COUNT: 2.1 THOU/MM3 (ref 1.8–7.7)
SODIUM SERPL-SCNC: 140 MEQ/L (ref 135–145)
SYNTHETIC CANNABINOIDS(K2) SCREEN, URINE: ABNORMAL
THC SCREEN, URINE: ABNORMAL
TRAMADOL SCREEN URINE: ABNORMAL
TRICYCLIC ANTIDEPRESSANTS, UR: ABNORMAL
WBC # BLD AUTO: 5.1 THOU/MM3 (ref 4.8–10.8)

## 2023-10-23 PROCEDURE — 80053 COMPREHEN METABOLIC PANEL: CPT

## 2023-10-23 PROCEDURE — 83735 ASSAY OF MAGNESIUM: CPT

## 2023-10-23 PROCEDURE — 83690 ASSAY OF LIPASE: CPT

## 2023-10-23 PROCEDURE — 36415 COLL VENOUS BLD VENIPUNCTURE: CPT

## 2023-10-23 PROCEDURE — 85025 COMPLETE CBC W/AUTO DIFF WBC: CPT

## 2023-10-23 PROCEDURE — 80305 DRUG TEST PRSMV DIR OPT OBS: CPT | Performed by: NURSE PRACTITIONER

## 2023-10-23 PROCEDURE — 99283 EMERGENCY DEPT VISIT LOW MDM: CPT

## 2023-10-23 RX ORDER — ONDANSETRON 2 MG/ML
4 INJECTION INTRAMUSCULAR; INTRAVENOUS ONCE
Status: DISCONTINUED | OUTPATIENT
Start: 2023-10-23 | End: 2023-10-23 | Stop reason: HOSPADM

## 2023-10-23 RX ORDER — DIPHENHYDRAMINE HYDROCHLORIDE 50 MG/ML
50 INJECTION INTRAMUSCULAR; INTRAVENOUS ONCE
Status: DISCONTINUED | OUTPATIENT
Start: 2023-10-23 | End: 2023-10-23 | Stop reason: HOSPADM

## 2023-10-23 RX ORDER — 0.9 % SODIUM CHLORIDE 0.9 %
1000 INTRAVENOUS SOLUTION INTRAVENOUS ONCE
Status: DISCONTINUED | OUTPATIENT
Start: 2023-10-23 | End: 2023-10-23 | Stop reason: HOSPADM

## 2023-10-23 RX ORDER — CLONAZEPAM 0.5 MG/1
0.5 TABLET ORAL NIGHTLY PRN
Qty: 3 TABLET | Refills: 0 | Status: SHIPPED | OUTPATIENT
Start: 2023-10-23 | End: 2023-10-26

## 2023-10-23 RX ORDER — MORPHINE SULFATE 4 MG/ML
4 INJECTION, SOLUTION INTRAMUSCULAR; INTRAVENOUS ONCE
Status: DISCONTINUED | OUTPATIENT
Start: 2023-10-23 | End: 2023-10-23 | Stop reason: HOSPADM

## 2023-10-23 NOTE — ED PROVIDER NOTES
Pulmonary effort is normal. No respiratory distress. Breath sounds: No stridor. No wheezing, rhonchi or rales. Abdominal:      General: Bowel sounds are normal. There is no distension. Palpations: Abdomen is soft. Tenderness: There is no abdominal tenderness. There is right CVA tenderness. There is no guarding or rebound. Musculoskeletal:         General: Tenderness present. No swelling. Cervical back: Normal range of motion and neck supple. Comments:   Right lower back tenderness. Right CVA tenderness. Skin:     General: Skin is warm and dry. Neurological:      Mental Status: He is alert and oriented to person, place, and time. Cranial Nerves: No cranial nerve deficit. Sensory: No sensory deficit. Motor: No weakness. Psychiatric:         Thought Content: Thought content normal.         Judgment: Judgment normal.      Comments:   Anxious. FORMAL DIAGNOSTIC RESULTS     RADIOLOGY: Interpretation per the Radiologist below, if available at the time of this note (none if blank):   No orders to display     EKG: (Interpreted by me)  Not indicated    LABS: (None if blank)  Results for orders placed or performed during the hospital encounter of 10/23/23   CBC with Auto Differential   Result Value Ref Range    WBC 5.1 4.8 - 10.8 thou/mm3    RBC 3.70 (L) 4.70 - 6.10 mill/mm3    Hemoglobin 9.1 (L) 14.0 - 18.0 gm/dl    Hematocrit 29.6 (L) 42.0 - 52.0 %    MCV 80.0 80.0 - 94.0 fL    MCH 24.6 (L) 26.0 - 33.0 pg    MCHC 30.7 (L) 32.2 - 35.5 gm/dl    RDW-CV 17.1 (H) 11.5 - 14.5 %    RDW-SD 49.5 (H) 35.0 - 45.0 fL    Platelets 233 256 - 628 thou/mm3    MPV 11.0 9.4 - 12.4 fL    Seg Neutrophils 40.2 %    Lymphocytes 44.7 %    Monocytes 11.9 %    Eosinophils 3.0 %    Basophils 0.2 %    Immature Granulocytes 0.0 %    Segs Absolute 2.1 1.8 - 7.7 thou/mm3    Lymphocytes Absolute 2.3 1.0 - 4.8 thou/mm3    Monocytes Absolute 0.6 0.4 - 1.3 thou/mm3    Eosinophils Absolute 0.2 0.0 - 0.4

## 2023-10-23 NOTE — ED NOTES
Pt states he cant stand still for CT. RN asked Dr. Cande Cruz for medication. RN went back to medicate pt and pt states \" no Im leaving\" and walks out.       Negrito Cherry RN  10/23/23 1919

## 2023-10-23 NOTE — ED NOTES
Pt in through ED lobby. He states he had a follow up appointment today with Andie Whaley NP. While he was there he mentioned he has been having left flank pain since last night. They did a urine sample and it was positive for blood and protein. He was sent to ED. He states he is a meth user, last used yesterday, and has had kidney issues in the past. He states today he drank a gallon of water and has only urinated twice. His urine is orange in color per pt.       Lizzie Chamorro RN  10/23/23 7304

## 2023-10-23 NOTE — PROGRESS NOTES
This RN talked with Mary Bland, CNP she stated that he will get enough medication until Thursday and if he is not clean then he will not get anymore medication

## 2023-10-23 NOTE — PROGRESS NOTES
Per Karie Urbano CNP patient needs to have a witnessed urine. This RN informed the patient. He started to get loud staff members calmed him down and then the patient agreed to it. This RN and Keron PICHARDO LPN went into the bathroom with the patient. Patient attempted to pee. Was in the bathrrom for around 20ish min with the patient. Patient stated unable to pee. Pt was placed in a room and given water.

## 2023-10-25 ENCOUNTER — CARE COORDINATION (OUTPATIENT)
Dept: CASE MANAGEMENT | Age: 38
End: 2023-10-25

## 2023-10-26 ENCOUNTER — HOSPITAL ENCOUNTER (EMERGENCY)
Age: 38
Discharge: LEFT BEFORE DISCHARGE | DRG: 351 | End: 2023-10-26
Attending: FAMILY MEDICINE
Payer: COMMERCIAL

## 2023-10-26 VITALS
SYSTOLIC BLOOD PRESSURE: 123 MMHG | RESPIRATION RATE: 16 BRPM | HEART RATE: 125 BPM | TEMPERATURE: 98.4 F | DIASTOLIC BLOOD PRESSURE: 65 MMHG | OXYGEN SATURATION: 97 %

## 2023-10-26 DIAGNOSIS — F15.10 METHAMPHETAMINE ABUSE (HCC): Primary | ICD-10-CM

## 2023-10-26 PROCEDURE — 6360000002 HC RX W HCPCS: Performed by: FAMILY MEDICINE

## 2023-10-26 PROCEDURE — 99284 EMERGENCY DEPT VISIT MOD MDM: CPT

## 2023-10-26 PROCEDURE — 96374 THER/PROPH/DIAG INJ IV PUSH: CPT

## 2023-10-26 PROCEDURE — 93005 ELECTROCARDIOGRAM TRACING: CPT | Performed by: FAMILY MEDICINE

## 2023-10-26 RX ORDER — LORAZEPAM 2 MG/ML
1 INJECTION INTRAMUSCULAR ONCE
Status: COMPLETED | OUTPATIENT
Start: 2023-10-27 | End: 2023-10-26

## 2023-10-26 RX ADMIN — LORAZEPAM 1 MG: 2 INJECTION INTRAMUSCULAR; INTRAVENOUS at 23:37

## 2023-10-26 ASSESSMENT — PAIN - FUNCTIONAL ASSESSMENT: PAIN_FUNCTIONAL_ASSESSMENT: 0-10

## 2023-10-26 ASSESSMENT — PAIN DESCRIPTION - LOCATION: LOCATION: FLANK

## 2023-10-26 ASSESSMENT — PAIN SCALES - GENERAL: PAINLEVEL_OUTOF10: 6

## 2023-10-27 ENCOUNTER — HOSPITAL ENCOUNTER (INPATIENT)
Age: 38
LOS: 1 days | Discharge: HOME | DRG: 351 | End: 2023-10-28
Attending: STUDENT IN AN ORGANIZED HEALTH CARE EDUCATION/TRAINING PROGRAM | Admitting: INTERNAL MEDICINE
Payer: COMMERCIAL

## 2023-10-27 DIAGNOSIS — M62.82 NON-TRAUMATIC RHABDOMYOLYSIS: Primary | ICD-10-CM

## 2023-10-27 DIAGNOSIS — N17.9 AKI (ACUTE KIDNEY INJURY) (HCC): ICD-10-CM

## 2023-10-27 PROBLEM — I95.89 OTHER HYPOTENSION: Status: ACTIVE | Noted: 2023-08-15

## 2023-10-27 LAB
ANION GAP SERPL CALC-SCNC: 15 MEQ/L (ref 8–16)
ANION GAP SERPL CALC-SCNC: 20 MEQ/L (ref 8–16)
APAP SERPL-MCNC: < 5 UG/ML (ref 0–20)
BACTERIA URNS QL MICRO: ABNORMAL /HPF
BASOPHILS ABSOLUTE: 0 THOU/MM3 (ref 0–0.1)
BASOPHILS NFR BLD AUTO: 0.3 %
BILIRUB UR QL STRIP.AUTO: NEGATIVE
BUN SERPL-MCNC: 26 MG/DL (ref 7–22)
BUN SERPL-MCNC: 34 MG/DL (ref 7–22)
CALCIUM SERPL-MCNC: 8.5 MG/DL (ref 8.5–10.5)
CALCIUM SERPL-MCNC: 8.8 MG/DL (ref 8.5–10.5)
CASTS #/AREA URNS LPF: ABNORMAL /LPF
CASTS 2: ABNORMAL /LPF
CHARACTER UR: CLEAR
CHLORIDE SERPL-SCNC: 101 MEQ/L (ref 98–111)
CHLORIDE SERPL-SCNC: 104 MEQ/L (ref 98–111)
CK SERPL-CCNC: 4917 U/L (ref 55–170)
CK SERPL-CCNC: 8168 U/L (ref 55–170)
CO2 SERPL-SCNC: 17 MEQ/L (ref 23–33)
CO2 SERPL-SCNC: 19 MEQ/L (ref 23–33)
COLOR: YELLOW
CREAT SERPL-MCNC: 0.8 MG/DL (ref 0.4–1.2)
CREAT SERPL-MCNC: 1.7 MG/DL (ref 0.4–1.2)
CRYSTALS URNS MICRO: ABNORMAL
DEPRECATED RDW RBC AUTO: 50.4 FL (ref 35–45)
EKG ATRIAL RATE: 123 BPM
EKG ATRIAL RATE: 68 BPM
EKG P AXIS: 59 DEGREES
EKG P AXIS: 71 DEGREES
EKG P-R INTERVAL: 168 MS
EKG P-R INTERVAL: 194 MS
EKG Q-T INTERVAL: 300 MS
EKG Q-T INTERVAL: 476 MS
EKG QRS DURATION: 88 MS
EKG QRS DURATION: 94 MS
EKG QTC CALCULATION (BAZETT): 429 MS
EKG QTC CALCULATION (BAZETT): 506 MS
EKG R AXIS: 34 DEGREES
EKG R AXIS: 47 DEGREES
EKG T AXIS: 66 DEGREES
EKG T AXIS: 69 DEGREES
EKG VENTRICULAR RATE: 123 BPM
EKG VENTRICULAR RATE: 68 BPM
EOSINOPHIL NFR BLD AUTO: 0.1 %
EOSINOPHILS ABSOLUTE: 0 THOU/MM3 (ref 0–0.4)
EPITHELIAL CELLS, UA: ABNORMAL /HPF
ERYTHROCYTE [DISTWIDTH] IN BLOOD BY AUTOMATED COUNT: 17.4 % (ref 11.5–14.5)
ETHANOL SERPL-MCNC: < 0.01 %
GFR SERPL CREATININE-BSD FRML MDRD: 52 ML/MIN/1.73M2
GFR SERPL CREATININE-BSD FRML MDRD: > 60 ML/MIN/1.73M2
GLUCOSE SERPL-MCNC: 150 MG/DL (ref 70–108)
GLUCOSE SERPL-MCNC: 54 MG/DL (ref 70–108)
GLUCOSE UR QL STRIP.AUTO: NEGATIVE MG/DL
HCT VFR BLD AUTO: 32.8 % (ref 42–52)
HGB BLD-MCNC: 10.3 GM/DL (ref 14–18)
HGB UR QL STRIP.AUTO: ABNORMAL
IMM GRANULOCYTES # BLD AUTO: 0.02 THOU/MM3 (ref 0–0.07)
IMM GRANULOCYTES NFR BLD AUTO: 0.3 %
KETONES UR QL STRIP.AUTO: 40
LACTATE SERPL-SCNC: 1.5 MMOL/L (ref 0.5–2)
LACTATE SERPL-SCNC: 2.3 MMOL/L (ref 0.5–2)
LYMPHOCYTES ABSOLUTE: 1.2 THOU/MM3 (ref 1–4.8)
LYMPHOCYTES NFR BLD AUTO: 16.2 %
MCH RBC QN AUTO: 25.1 PG (ref 26–33)
MCHC RBC AUTO-ENTMCNC: 31.4 GM/DL (ref 32.2–35.5)
MCV RBC AUTO: 80 FL (ref 80–94)
MISCELLANEOUS 2: ABNORMAL
MONOCYTES ABSOLUTE: 0.7 THOU/MM3 (ref 0.4–1.3)
MONOCYTES NFR BLD AUTO: 9.8 %
MRSA DNA SPEC QL NAA+PROBE: POSITIVE
NEUTROPHILS NFR BLD AUTO: 73.3 %
NITRITE UR QL STRIP: NEGATIVE
NRBC BLD AUTO-RTO: 0 /100 WBC
OSMOLALITY SERPL CALC.SUM OF ELEC: 279.6 MOSMOL/KG (ref 275–300)
OSMOLALITY SERPL CALC.SUM OF ELEC: 284.5 MOSMOL/KG (ref 275–300)
PH UR STRIP.AUTO: 5.5 [PH] (ref 5–9)
PLATELET # BLD AUTO: 297 THOU/MM3 (ref 130–400)
PMV BLD AUTO: 9.8 FL (ref 9.4–12.4)
POTASSIUM SERPL-SCNC: 3.6 MEQ/L (ref 3.5–5.2)
POTASSIUM SERPL-SCNC: 5.2 MEQ/L (ref 3.5–5.2)
PROT UR STRIP.AUTO-MCNC: ABNORMAL MG/DL
RBC # BLD AUTO: 4.1 MILL/MM3 (ref 4.7–6.1)
RBC URINE: ABNORMAL /HPF
RENAL EPI CELLS #/AREA URNS HPF: ABNORMAL /[HPF]
SALICYLATES SERPL-MCNC: < 0.3 MG/DL (ref 2–10)
SEGMENTED NEUTROPHILS ABSOLUTE COUNT: 5.3 THOU/MM3 (ref 1.8–7.7)
SODIUM SERPL-SCNC: 136 MEQ/L (ref 135–145)
SODIUM SERPL-SCNC: 140 MEQ/L (ref 135–145)
SP GR UR REFRACT.AUTO: 1.02 (ref 1–1.03)
UROBILINOGEN, URINE: 0.2 EU/DL (ref 0–1)
WBC # BLD AUTO: 7.2 THOU/MM3 (ref 4.8–10.8)
WBC #/AREA URNS HPF: ABNORMAL /HPF
WBC #/AREA URNS HPF: NEGATIVE /[HPF]
YEAST LIKE FUNGI URNS QL MICRO: ABNORMAL

## 2023-10-27 PROCEDURE — 82077 ASSAY SPEC XCP UR&BREATH IA: CPT

## 2023-10-27 PROCEDURE — 96372 THER/PROPH/DIAG INJ SC/IM: CPT

## 2023-10-27 PROCEDURE — 2060000000 HC ICU INTERMEDIATE R&B

## 2023-10-27 PROCEDURE — 2580000003 HC RX 258: Performed by: STUDENT IN AN ORGANIZED HEALTH CARE EDUCATION/TRAINING PROGRAM

## 2023-10-27 PROCEDURE — 80179 DRUG ASSAY SALICYLATE: CPT

## 2023-10-27 PROCEDURE — 93010 ELECTROCARDIOGRAM REPORT: CPT | Performed by: INTERNAL MEDICINE

## 2023-10-27 PROCEDURE — 6360000002 HC RX W HCPCS: Performed by: STUDENT IN AN ORGANIZED HEALTH CARE EDUCATION/TRAINING PROGRAM

## 2023-10-27 PROCEDURE — 80143 DRUG ASSAY ACETAMINOPHEN: CPT

## 2023-10-27 PROCEDURE — 82550 ASSAY OF CK (CPK): CPT

## 2023-10-27 PROCEDURE — 6360000002 HC RX W HCPCS: Performed by: INTERNAL MEDICINE

## 2023-10-27 PROCEDURE — 2500000003 HC RX 250 WO HCPCS: Performed by: INTERNAL MEDICINE

## 2023-10-27 PROCEDURE — 80307 DRUG TEST PRSMV CHEM ANLYZR: CPT

## 2023-10-27 PROCEDURE — 87070 CULTURE OTHR SPECIMN AEROBIC: CPT

## 2023-10-27 PROCEDURE — 2580000003 HC RX 258: Performed by: INTERNAL MEDICINE

## 2023-10-27 PROCEDURE — 99285 EMERGENCY DEPT VISIT HI MDM: CPT

## 2023-10-27 PROCEDURE — 99222 1ST HOSP IP/OBS MODERATE 55: CPT | Performed by: INTERNAL MEDICINE

## 2023-10-27 PROCEDURE — 93005 ELECTROCARDIOGRAM TRACING: CPT | Performed by: STUDENT IN AN ORGANIZED HEALTH CARE EDUCATION/TRAINING PROGRAM

## 2023-10-27 PROCEDURE — 87641 MR-STAPH DNA AMP PROBE: CPT

## 2023-10-27 PROCEDURE — 99223 1ST HOSP IP/OBS HIGH 75: CPT | Performed by: INTERNAL MEDICINE

## 2023-10-27 PROCEDURE — 85025 COMPLETE CBC W/AUTO DIFF WBC: CPT

## 2023-10-27 PROCEDURE — 81001 URINALYSIS AUTO W/SCOPE: CPT

## 2023-10-27 PROCEDURE — 36415 COLL VENOUS BLD VENIPUNCTURE: CPT

## 2023-10-27 PROCEDURE — 80048 BASIC METABOLIC PNL TOTAL CA: CPT

## 2023-10-27 PROCEDURE — A4216 STERILE WATER/SALINE, 10 ML: HCPCS | Performed by: INTERNAL MEDICINE

## 2023-10-27 PROCEDURE — 83605 ASSAY OF LACTIC ACID: CPT

## 2023-10-27 RX ORDER — 0.9 % SODIUM CHLORIDE 0.9 %
1000 INTRAVENOUS SOLUTION INTRAVENOUS ONCE
Status: COMPLETED | OUTPATIENT
Start: 2023-10-27 | End: 2023-10-27

## 2023-10-27 RX ORDER — SODIUM CHLORIDE 9 MG/ML
INJECTION, SOLUTION INTRAVENOUS CONTINUOUS
Status: DISCONTINUED | OUTPATIENT
Start: 2023-10-27 | End: 2023-10-29 | Stop reason: HOSPADM

## 2023-10-27 RX ORDER — POLYETHYLENE GLYCOL 3350 17 G/17G
17 POWDER, FOR SOLUTION ORAL DAILY PRN
Status: DISCONTINUED | OUTPATIENT
Start: 2023-10-27 | End: 2023-10-29 | Stop reason: HOSPADM

## 2023-10-27 RX ORDER — SODIUM CHLORIDE 0.9 % (FLUSH) 0.9 %
5-40 SYRINGE (ML) INJECTION PRN
Status: DISCONTINUED | OUTPATIENT
Start: 2023-10-27 | End: 2023-10-29 | Stop reason: HOSPADM

## 2023-10-27 RX ORDER — SODIUM CHLORIDE 9 MG/ML
INJECTION, SOLUTION INTRAVENOUS PRN
Status: DISCONTINUED | OUTPATIENT
Start: 2023-10-27 | End: 2023-10-29 | Stop reason: HOSPADM

## 2023-10-27 RX ORDER — MAGNESIUM SULFATE IN WATER 40 MG/ML
2000 INJECTION, SOLUTION INTRAVENOUS PRN
Status: DISCONTINUED | OUTPATIENT
Start: 2023-10-27 | End: 2023-10-29 | Stop reason: HOSPADM

## 2023-10-27 RX ORDER — ACETAMINOPHEN 325 MG/1
650 TABLET ORAL EVERY 6 HOURS PRN
Status: DISCONTINUED | OUTPATIENT
Start: 2023-10-27 | End: 2023-10-28

## 2023-10-27 RX ORDER — POTASSIUM CHLORIDE 20 MEQ/1
40 TABLET, EXTENDED RELEASE ORAL PRN
Status: DISCONTINUED | OUTPATIENT
Start: 2023-10-27 | End: 2023-10-29 | Stop reason: HOSPADM

## 2023-10-27 RX ORDER — HALOPERIDOL 5 MG/ML
5 INJECTION INTRAMUSCULAR ONCE
Status: COMPLETED | OUTPATIENT
Start: 2023-10-27 | End: 2023-10-27

## 2023-10-27 RX ORDER — ONDANSETRON 2 MG/ML
4 INJECTION INTRAMUSCULAR; INTRAVENOUS EVERY 6 HOURS PRN
Status: DISCONTINUED | OUTPATIENT
Start: 2023-10-27 | End: 2023-10-27

## 2023-10-27 RX ORDER — LORAZEPAM 2 MG/ML
2 INJECTION INTRAMUSCULAR ONCE
Status: COMPLETED | OUTPATIENT
Start: 2023-10-27 | End: 2023-10-27

## 2023-10-27 RX ORDER — POTASSIUM CHLORIDE 7.45 MG/ML
10 INJECTION INTRAVENOUS PRN
Status: DISCONTINUED | OUTPATIENT
Start: 2023-10-27 | End: 2023-10-29 | Stop reason: HOSPADM

## 2023-10-27 RX ORDER — ENOXAPARIN SODIUM 100 MG/ML
40 INJECTION SUBCUTANEOUS DAILY
Status: DISCONTINUED | OUTPATIENT
Start: 2023-10-27 | End: 2023-10-29 | Stop reason: HOSPADM

## 2023-10-27 RX ORDER — SODIUM CHLORIDE 0.9 % (FLUSH) 0.9 %
5-40 SYRINGE (ML) INJECTION EVERY 12 HOURS SCHEDULED
Status: DISCONTINUED | OUTPATIENT
Start: 2023-10-27 | End: 2023-10-29 | Stop reason: HOSPADM

## 2023-10-27 RX ORDER — ONDANSETRON 4 MG/1
4 TABLET, ORALLY DISINTEGRATING ORAL EVERY 8 HOURS PRN
Status: DISCONTINUED | OUTPATIENT
Start: 2023-10-27 | End: 2023-10-27

## 2023-10-27 RX ORDER — ACETAMINOPHEN 650 MG/1
650 SUPPOSITORY RECTAL EVERY 6 HOURS PRN
Status: DISCONTINUED | OUTPATIENT
Start: 2023-10-27 | End: 2023-10-28

## 2023-10-27 RX ADMIN — ENOXAPARIN SODIUM 40 MG: 100 INJECTION SUBCUTANEOUS at 14:27

## 2023-10-27 RX ADMIN — SODIUM CHLORIDE 1000 ML: 9 INJECTION, SOLUTION INTRAVENOUS at 11:32

## 2023-10-27 RX ADMIN — FAMOTIDINE 20 MG: 10 INJECTION, SOLUTION INTRAVENOUS at 14:27

## 2023-10-27 RX ADMIN — HALOPERIDOL LACTATE 5 MG: 5 INJECTION, SOLUTION INTRAMUSCULAR at 10:05

## 2023-10-27 RX ADMIN — SODIUM CHLORIDE: 9 INJECTION, SOLUTION INTRAVENOUS at 14:22

## 2023-10-27 RX ADMIN — SODIUM CHLORIDE, PRESERVATIVE FREE 10 ML: 5 INJECTION INTRAVENOUS at 20:52

## 2023-10-27 RX ADMIN — SODIUM CHLORIDE: 9 INJECTION, SOLUTION INTRAVENOUS at 22:32

## 2023-10-27 RX ADMIN — LORAZEPAM 2 MG: 2 INJECTION INTRAMUSCULAR; INTRAVENOUS at 09:28

## 2023-10-27 RX ADMIN — FAMOTIDINE 20 MG: 10 INJECTION, SOLUTION INTRAVENOUS at 20:48

## 2023-10-27 ASSESSMENT — PAIN SCALES - GENERAL
PAINLEVEL_OUTOF10: 0
PAINLEVEL_OUTOF10: 0

## 2023-10-27 NOTE — ED PROVIDER NOTES
EMERGENCY DEPARTMENT ENCOUNTER     CHIEF COMPLAINT   Chief Complaint   Patient presents with    Withdrawal     Meth        HPI   Rola Michelle is a 43 y.o. male with known polysubstance abuse with recent admissions for renal issues, opiate abuse, who presents with self proclaimed concerns for polysubstance withdrawal, namely meth. He purported shot up about 1 g of meth yesterday, and all day today, he could stay still, writhing all over the place, He arrived by EMS, grunting, able to converse but all the while moving his body all over the gurney. Pt appeared to have uncontrolled body movements along with his purported withdrawal symptoms. He was tachycardic upon arrival but was exhibiting normal oxygen saturations. PAST MEDICAL & SURGICAL HISTORY   Past Medical History:   Diagnosis Date    Anxiety     Depression     Kidney stone     Liver disease     Hep C    Opiate abuse, continuous ([address])       No past surgical history on file. CURRENT MEDICATIONS   Current Outpatient Rx   Medication Sig Dispense Refill    clonazePAM (KLONOPIN) 0.5 MG tablet Take 1 tablet by mouth nightly as needed for Anxiety for up to 3 days. Max Daily Amount: 0.5 mg 3 tablet 0    lisdexamfetamine (VYVANSE) 50 MG capsule Take 1 capsule by mouth every morning for 30 days. Max Daily Amount: 50 mg 30 capsule 0    pregabalin (LYRICA) 200 MG capsule Take 1 capsule by mouth in the morning, at noon, and at bedtime for 30 days.  Max Daily Amount: 600 mg 90 capsule 0    ondansetron (ZOFRAN) 4 MG tablet Take 1 tablet by mouth every 8 hours as needed for Nausea or Vomiting 6 tablet 0    venlafaxine (EFFEXOR) 100 MG tablet Take 1 tablet by mouth 3 times daily 90 tablet 3    lisinopril (PRINIVIL;ZESTRIL) 20 MG tablet Take 1 tablet by mouth daily 30 tablet 3    mirtazapine (REMERON) 45 MG tablet Take 1 tablet by mouth nightly 30 tablet 3    ARIPiprazole (ABILIFY) 5 MG tablet Take 1 tablet by mouth daily (Patient not taking: Reported on 10/5/2023) 30 around in bed, could not sit still  Psychiatric: Agitated affect, does make good eye contact, no endorsement of SI, HI or psychosis     EKG 1120 pm on 10/26/23  Sinus rhythm at 123 beats per minute, normal axis, normal intervals, nonacute ST-T segments     ED COURSE & MEDICAL DECISION MAKING   This is a known drug-abuse patient who came to the ED complaining of methamphetamine abuse. It is entirely possible that pt is high on meth as opposed to withdrawal, but certainly he could have very mild autonomic symptoms. FINAL IMPRESSION   1. Methamphetamine abuse ([address])         PLAN   I did not plan on obtaining any labs from the patient, as pt was neurologically intact aside from his symptoms. Nonetheless, pt at this time wants to be discharged, as the observation time has not yet transpired, and pt not deemed stable, he insisted on leaving, and thus will be AMA.      Electronically signed by: Javid Berrios MD, 10/26/2023 11:58 PM     (This note was completed with a voice recognition program)         Derick Henderson MD  10/26/23 1207

## 2023-10-27 NOTE — ED NOTES
Pt transported to  2 in stable condition. Floor contacted before transport,spoke to floor staff.       Suma Swift  10/27/23 8224

## 2023-10-27 NOTE — ED NOTES
Pt moved into hallway bed A at this time. Remains in stable condition with visitor at bedside.       Amairani Delvalle RN  10/27/23 7850

## 2023-10-27 NOTE — CONSULTS
[facility]           [place] [address]        [address]        [facility], [address] [place] [address],[phone]           Inpatient Initial consult note         10/27/2023 3:02 PM      Patient Name:   Uriah Mays  YOB: 1985  Primary Care Physician:  PAPA Schwartz CNP   Admit Date:    10/27/2023  9:16 AM    Consultation requested by : Laura Fitzpatrick DO    Reason for Consult : Nephrology following for SATYA/rhabdomyolysis. History of presenting illness :Uriah Mays is a 43 y.o.   male with Past Medical History as stated below presented with a chief complaint of Addiction Problem   on 10/27/2023 . Patient is extremely drowsy unable to be arousable patient apparently has received someAtivan in the ER cannot absolutely assess any history or review of systems     patient presented with chief complaints of agitation patient apparently endorsed to doing a lot of drugs. Also no overdose of any prescription drugs or alcohol he did complain of some pain in the kidneys and sitting still as well. In the ED patient received Ativan and Haldol. He was also found to have acute kidney injury and rhabdomyolysis with a CPK of 8000. Past History:  Past Medical History:   Diagnosis Date    Anxiety     Depression     Kidney stone     Liver disease     Hep C    Opiate abuse, continuous ([address])      No past surgical history on file.   Social History     Socioeconomic History    Marital status: Single     Spouse name: Not on file    Number of children: Not on file    Years of education: Not on file    Highest education level: Not on file   Occupational History    Not on file   Tobacco Use    Smoking status: Former     Types: Cigarettes     Quit date:      Years since quittin.8    Smokeless tobacco: Current   Vaping Use    Vaping Use: Not on file   Substance and Sexual Activity    Alcohol use: Not Currently    Drug use: Yes     Types: Opiates , Fentanyl, Amphetamines (Speed), Crack Cocaine, Heroin, Benzodiazepines (Downers/Zannies), Oxycodone (Oxy), Suboxone, Sedatives/Hypnotics, IV, Marijuana (Weed), Methamphetamines (Crystal Meth), Cocaine    Sexual activity: Not Currently   Other Topics Concern    Not on file   Social History Narrative    ** Merged History Encounter **          Social Determinants of Health     Financial Resource Strain: Low Risk  (7/15/2021)    Overall Financial Resource Strain (CARDIA)     Difficulty of Paying Living Expenses: Not hard at all   Food Insecurity: No Food Insecurity (7/15/2021)    Hunger Vital Sign     Worried About Running Out of Food in the Last Year: Never true     Ran Out of Food in the Last Year: Never true   Transportation Needs: Not on file   Physical Activity: Not on file   Stress: Not on file   Social Connections: Not on file   Intimate Partner Violence: Not on file   Housing Stability: Not on file     No family history on file. Medications & Allergies :  Prior to Admission medications    Medication Sig Start Date End Date Taking? Authorizing Provider   clonazePAM (KLONOPIN) 0.5 MG tablet Take 1 tablet by mouth nightly as needed for Anxiety for up to 3 days. Max Daily Amount: 0.5 mg 10/23/23 10/26/23  Cristiano PINEDA APRN - CNP   lisdexamfetamine (VYVANSE) 50 MG capsule Take 1 capsule by mouth every morning for 30 days. Max Daily Amount: 50 mg 10/9/23 11/8/23  PAPA Carrillo - CNP   pregabalin (LYRICA) 200 MG capsule Take 1 capsule by mouth in the morning, at noon, and at bedtime for 30 days.  Max Daily Amount: 600 mg 10/9/23 11/8/23  Crisitano PINEDA APRN - CNP   ondansetron [facility]) 4 MG tablet Take 1 tablet by mouth every 8 hours as needed for Nausea or Vomiting 10/5/23   Maury Stafford MD   [facility]) 100 MG tablet Take 1 tablet by mouth 3 times daily 9/26/23   PAPA Gunter CNP   lisinopril (PRINIVIL;ZESTRIL) 20 MG tablet Take 1 tablet by mouth daily 9/26/23   Cristiano Vela

## 2023-10-27 NOTE — CARE COORDINATION
10/27/23 1413   Readmission Assessment   Number of Days since last admission? 8-30 days   Previous Disposition Home with Family  (SATYA)   Who is being Interviewed Patient   What was the patient's/caregiver's perception as to why they think they needed to return back to the hospital? Other (Comment)   Did you visit your Primary Care Physician after you left the hospital, before you returned this time? Yes   Did you see a specialist, such as Cardiac, Pulmonary, Orthopedic Physician, etc. after you left the hospital? Yes   Who advised the patient to return to the hospital? Self-referral   Does the patient report anything that got in the way of taking their medications? No   In our efforts to provide the best possible care to you and others like you, can you think of anything that we could have done to help you after you left the hospital the first time, so that you might not have needed to return so soon?  Other (Comment)  (no)

## 2023-10-27 NOTE — ED NOTES
Pt to ED with complaints of substance abuse. Pt states he has taken meth, methadone, crack, and multiple opiates in pill form. Pts wife phoned EMS d/t pts side effects of medication. Pt arrives in stable condition. Alert and oriented x4. Provided pt with ice water per his request. Seizure pads applied to cot to maintain safety.       Charmaine Martinez RN  10/27/23 9012

## 2023-10-27 NOTE — DISCHARGE SUMMARY
10/27/23, 2:12 PM EDT      DISCHARGE PLANNING EVALUATION    Marley Nielsen  Admitted: 10/27/2023  Hospital Day: 0    Location: -02/002-A Reason for admit: Rhabdomyolysis [M62.82]  SATYA (acute kidney injury) ([address]) [N17.9]  Non-traumatic rhabdomyolysis [M62.82]    Past Medical History:   Diagnosis Date    Anxiety     Depression     Kidney stone     Liver disease     Hep C    Opiate abuse, continuous ([address])      Barriers to Discharge:   SATYA/Rhabdomyolysis  History: Hepatitis C, Cocaine/Meth/Marijuana/Suboxone Use  (+) UDS: Cocaine/Meth  K+ 2.7, Creatinine 1.7  IVF, IV PPI, IV Haldol    PCP: PAPA Crandall CNP    Readmission Risk Low 0-14, Mod 15-19), High > 20: Readmission Risk Score: 28.5      Advance Directives:      Code Status: Full Code   Patient's Primary Decision Maker is:      Primary Decision Maker: Celia Spencer - Brother/Sister - [phone]    Patient Goals/Plan/Treatment Preferences: plans home w ARNALDO Osullivan when medically cleared, needs cab ride; Addiction Services following, AMA in past    Transportation/Food Security/Housekeeping Addressed: No issues identified. If patient is discharged prior to next notation, then this note serves as note for discharge by case management. 10/27/23, 2:17 PM EDT    Patient goals/plan/ treatment preferences discussed by  and . Patient goals/plan/ treatment preferences reviewed with patient/ family. Patient/ family verbalize understanding of discharge plan and are in agreement with goal/plan/treatment preferences. Understanding was demonstrated using the teach back method. AVS provided by RN at time of discharge, which includes all necessary medical information pertaining to the patients current course of illness, treatment, post-discharge goals of care, and treatment preferences.      Services At/After Discharge: None

## 2023-10-27 NOTE — H&P
Hospitalist H+P      Patient:  Jay Bell    Unit/Bed:02/002A  YOB: 1985  MRN: [id number]   Acct: [de-identified]     PCP: PAPA Pacheco CNP  Date of Admission: 10/27/2023        Assessment and Plan:        Rhabdomyolysis secondary to ingestion of illegal drugs. We will monitor with BMP, CK, will monitor calcium and potassium and phosphorus also. We will also get addiction consult may need psych consult also  SATYA secondary to #1, will start IV fluids at normal saline at 125 an hour, will obtain nephrology consult  History of hepatitis C  History of depression and anxiety: We will hold his medications since the patient is very difficult to arouse, may need to obtain a psychiatric consult  History of hypertension currently hypotensive will hold his medication    CC: Addiction problems    HPI: 39-year-old white male presents emergency department for evaluation of agitation substance use. Upon entering the room he told the ER provider I did too much drugs again. Reports taking crack, methamphetamines, methadone, possible other substances. Did not endorse overdosing on acetaminophen, alcohol or salicylates. He has been trying to get clean but struggles staying consistent. Feels like he is having difficulty sitting still and pain in his kidneys. Reports being thirsty and wants something to eat. Patient was given in the ER 2 mg of Ativan IM and 5 mg of Haldol IM the above was obtained from the chart. ROS (14 point review of systems completed. Pertinent positives noted. Otherwise ROS is negative) : Unable to obtain secondary to medication given in ER. PMH:  Per HPI and       Diagnosis Date    Anxiety     Depression     Kidney stone     Liver disease     Hep C    Opiate abuse, continuous ([address])      SHX:  No past surgical history on file. FHX: No family history on file.   SOCHX:   Social History     Socioeconomic History    Marital status: Single   Tobacco Use    Smoking

## 2023-10-27 NOTE — ED NOTES
Patient left AMA at this time. AMA paperwork signed by patient. Provider notified and spoke to patient before leaving AMA.       Sagar Parks RN  10/27/23 0867

## 2023-10-27 NOTE — ED NOTES
Pt resting quietly with eyes closed on cot. Updated on POC. Denies any current needs. Call light within reach.       Aravind Simms RN  10/27/23 3771

## 2023-10-27 NOTE — ED TRIAGE NOTES
Patient presents to ED with chief complaint of meth withdrawal. Patient states he was clean for 2 weeks and used yesterday for the first time. Patient is erratic during triage. Alert and oriented.

## 2023-10-27 NOTE — ED PROVIDER NOTES
[facility] EMERGENCY DEPT    EMERGENCY MEDICINE      Pt Name: Tomasa Barba  MRN: [id number]  [address] 1985  Date of evaluation: 10/27/2023  Treating Physician: Sharad Wright DO    CHIEF COMPLAINT       Chief Complaint   Patient presents with    Addiction Problem     History obtained from chart review and the patient. HISTORY OF PRESENT ILLNESS    HPI    Tomasa Barba is a 43 y.o. male presents to the emergency department for evaluation of agitation and substance use. Upon entering the room Brian Mcduffie begins apologizing. He says \"Doc, sorry I did too much drugs again. \"  Reports taking crack, meth, methadone and possibly other substances. Did not endorse overdosing on any acetaminophen, alcohol or salicylates. Has been trying to get clean but struggles staying consistent. Feels like he is having difficulty sitting still and has pain in his kidneys. Reports being thirsty and wants something to eat. I instructed him that that could all happen as soon as we make sure that he was safe. Brian Mcduffie denies any chest pain, shortness of breath, recent infections, numbness, tingling or weakness. The patient has no other acute complaints at this time. PAST MEDICAL AND SURGICAL HISTORY     Past Medical History:   Diagnosis Date    Anxiety     Depression     Kidney stone     Liver disease     Hep C    Opiate abuse, continuous ([address])        No past surgical history on file. CURRENT MEDICATIONS     Current Discharge Medication List        CONTINUE these medications which have NOT CHANGED    Details   clonazePAM (KLONOPIN) 0.5 MG tablet Take 1 tablet by mouth nightly as needed for Anxiety for up to 3 days. Max Daily Amount: 0.5 mg  Qty: 3 tablet, Refills: 0    Associated Diagnoses: Anxiety and depression      lisdexamfetamine (VYVANSE) 50 MG capsule Take 1 capsule by mouth every morning for 30 days.  Max Daily Amount: 50 mg  Qty: 30 capsule, Refills: 0    Associated Diagnoses: Attention deficit may contain errors not detected in proofreading. Please refer to my supervising physician's documentation if my documentation differs.     Electronically Signed: Tiffani Villeda DO, 10/27/23, 12:00 PM        Tiffani Villeda DO  Resident  10/27/23 1200

## 2023-10-28 VITALS
BODY MASS INDEX: 26.46 KG/M2 | DIASTOLIC BLOOD PRESSURE: 71 MMHG | OXYGEN SATURATION: 96 % | HEART RATE: 76 BPM | WEIGHT: 189 LBS | HEIGHT: 71 IN | RESPIRATION RATE: 12 BRPM | SYSTOLIC BLOOD PRESSURE: 115 MMHG | TEMPERATURE: 97.6 F

## 2023-10-28 PROBLEM — R74.01 TRANSAMINITIS: Status: ACTIVE | Noted: 2023-10-28

## 2023-10-28 LAB
ALBUMIN SERPL BCG-MCNC: 3.1 G/DL (ref 3.5–5.1)
ALP SERPL-CCNC: 96 U/L (ref 38–126)
ALT SERPL W/O P-5'-P-CCNC: 75 U/L (ref 11–66)
AMPHETAMINES UR QL SCN: POSITIVE
ANION GAP SERPL CALC-SCNC: 11 MEQ/L (ref 8–16)
AST SERPL-CCNC: 179 U/L (ref 5–40)
BARBITURATES UR QL SCN: NEGATIVE
BASOPHILS ABSOLUTE: 0 THOU/MM3 (ref 0–0.1)
BASOPHILS NFR BLD AUTO: 0.7 %
BENZODIAZ UR QL SCN: NEGATIVE
BILIRUB CONJ SERPL-MCNC: < 0.2 MG/DL (ref 0–0.3)
BILIRUB SERPL-MCNC: 0.4 MG/DL (ref 0.3–1.2)
BUN SERPL-MCNC: 23 MG/DL (ref 7–22)
BZE UR QL SCN: POSITIVE
CA-I BLD ISE-SCNC: 1.25 MMOL/L (ref 1.12–1.32)
CALCIUM SERPL-MCNC: 8.6 MG/DL (ref 8.5–10.5)
CANNABINOIDS UR QL SCN: NEGATIVE
CHLORIDE SERPL-SCNC: 109 MEQ/L (ref 98–111)
CK SERPL-CCNC: 4382 U/L (ref 55–170)
CO2 SERPL-SCNC: 19 MEQ/L (ref 23–33)
CREAT SERPL-MCNC: 0.8 MG/DL (ref 0.4–1.2)
DEPRECATED RDW RBC AUTO: 51.7 FL (ref 35–45)
EOSINOPHIL NFR BLD AUTO: 3 %
EOSINOPHILS ABSOLUTE: 0.1 THOU/MM3 (ref 0–0.4)
ERYTHROCYTE [DISTWIDTH] IN BLOOD BY AUTOMATED COUNT: 17.6 % (ref 11.5–14.5)
FENTANYL: POSITIVE
GFR SERPL CREATININE-BSD FRML MDRD: > 60 ML/MIN/1.73M2
GLUCOSE SERPL-MCNC: 73 MG/DL (ref 70–108)
HCT VFR BLD AUTO: 32.7 % (ref 42–52)
HGB BLD-MCNC: 10 GM/DL (ref 14–18)
IMM GRANULOCYTES # BLD AUTO: 0.01 THOU/MM3 (ref 0–0.07)
IMM GRANULOCYTES NFR BLD AUTO: 0.2 %
LACTATE SERPL-SCNC: 0.6 MMOL/L (ref 0.5–2)
LYMPHOCYTES ABSOLUTE: 1.5 THOU/MM3 (ref 1–4.8)
LYMPHOCYTES NFR BLD AUTO: 35.7 %
MAGNESIUM SERPL-MCNC: 2.3 MG/DL (ref 1.6–2.4)
MCH RBC QN AUTO: 24.7 PG (ref 26–33)
MCHC RBC AUTO-ENTMCNC: 30.6 GM/DL (ref 32.2–35.5)
MCV RBC AUTO: 80.7 FL (ref 80–94)
MONOCYTES ABSOLUTE: 0.6 THOU/MM3 (ref 0.4–1.3)
MONOCYTES NFR BLD AUTO: 15 %
NEUTROPHILS NFR BLD AUTO: 45.4 %
NRBC BLD AUTO-RTO: 0 /100 WBC
OPIATES UR QL SCN: NEGATIVE
OXYCODONE: NEGATIVE
PCP UR QL SCN: NEGATIVE
PHOSPHATE SERPL-MCNC: 2.5 MG/DL (ref 2.4–4.7)
PLATELET # BLD AUTO: 278 THOU/MM3 (ref 130–400)
PMV BLD AUTO: 9.5 FL (ref 9.4–12.4)
POTASSIUM SERPL-SCNC: 3.6 MEQ/L (ref 3.5–5.2)
PROT SERPL-MCNC: 6.2 G/DL (ref 6.1–8)
RBC # BLD AUTO: 4.05 MILL/MM3 (ref 4.7–6.1)
SEGMENTED NEUTROPHILS ABSOLUTE COUNT: 2 THOU/MM3 (ref 1.8–7.7)
SODIUM SERPL-SCNC: 139 MEQ/L (ref 135–145)
WBC # BLD AUTO: 4.3 THOU/MM3 (ref 4.8–10.8)

## 2023-10-28 PROCEDURE — 51798 US URINE CAPACITY MEASURE: CPT

## 2023-10-28 PROCEDURE — 82330 ASSAY OF CALCIUM: CPT

## 2023-10-28 PROCEDURE — 99233 SBSQ HOSP IP/OBS HIGH 50: CPT | Performed by: INTERNAL MEDICINE

## 2023-10-28 PROCEDURE — 80048 BASIC METABOLIC PNL TOTAL CA: CPT

## 2023-10-28 PROCEDURE — 80076 HEPATIC FUNCTION PANEL: CPT

## 2023-10-28 PROCEDURE — 2500000003 HC RX 250 WO HCPCS: Performed by: INTERNAL MEDICINE

## 2023-10-28 PROCEDURE — 83605 ASSAY OF LACTIC ACID: CPT

## 2023-10-28 PROCEDURE — 84100 ASSAY OF PHOSPHORUS: CPT

## 2023-10-28 PROCEDURE — A4216 STERILE WATER/SALINE, 10 ML: HCPCS | Performed by: INTERNAL MEDICINE

## 2023-10-28 PROCEDURE — 99232 SBSQ HOSP IP/OBS MODERATE 35: CPT | Performed by: INTERNAL MEDICINE

## 2023-10-28 PROCEDURE — 2580000003 HC RX 258: Performed by: INTERNAL MEDICINE

## 2023-10-28 PROCEDURE — 85025 COMPLETE CBC W/AUTO DIFF WBC: CPT

## 2023-10-28 PROCEDURE — 82550 ASSAY OF CK (CPK): CPT

## 2023-10-28 PROCEDURE — 6360000002 HC RX W HCPCS: Performed by: INTERNAL MEDICINE

## 2023-10-28 PROCEDURE — 36415 COLL VENOUS BLD VENIPUNCTURE: CPT

## 2023-10-28 PROCEDURE — 83735 ASSAY OF MAGNESIUM: CPT

## 2023-10-28 RX ORDER — LISDEXAMFETAMINE DIMESYLATE CAPSULES 50 MG/1
50 CAPSULE ORAL EVERY MORNING
Status: DISCONTINUED | OUTPATIENT
Start: 2023-10-28 | End: 2023-10-29 | Stop reason: HOSPADM

## 2023-10-28 RX ADMIN — FAMOTIDINE 20 MG: 10 INJECTION, SOLUTION INTRAVENOUS at 10:26

## 2023-10-28 RX ADMIN — SODIUM CHLORIDE: 9 INJECTION, SOLUTION INTRAVENOUS at 13:00

## 2023-10-28 RX ADMIN — ENOXAPARIN SODIUM 40 MG: 100 INJECTION SUBCUTANEOUS at 10:26

## 2023-10-28 RX ADMIN — SODIUM CHLORIDE, PRESERVATIVE FREE 10 ML: 5 INJECTION INTRAVENOUS at 10:27

## 2023-10-28 RX ADMIN — SODIUM CHLORIDE: 9 INJECTION, SOLUTION INTRAVENOUS at 06:34

## 2023-10-28 ASSESSMENT — PAIN SCALES - GENERAL
PAINLEVEL_OUTOF10: 0

## 2023-10-28 NOTE — FLOWSHEET NOTE
10/28/23 1140   Safe Environment   Safety Measures Call light within reach;Standard Safety Measures  (virtual nurse safety round complete)     VN safety round complete. Pt resting in bed awake and alert. No signs of distress noted. 5 P's addressed, pt voiced no concerns.

## 2023-10-28 NOTE — FLOWSHEET NOTE
10/28/23 3849   Safe Environment   Safety Measures Call light within reach; Side rails X 2;Standard Safety Measures  (vitrual nurse safety round complete)     VN safety round complete. Pt resting in bed awake and alert. No signs of distress noted. 5 P's addressed, pt voiced no concerns.

## 2023-10-28 NOTE — PROGRESS NOTES
Patient attempted to use urinal but unable to start stream. He was bladder scanned 266 mL in bladder. He would prefer to try and go again in 30 minutes, if unsuccessful will allow a straight cath at that time.

## 2023-10-28 NOTE — PLAN OF CARE
Problem: Discharge Planning  Goal: Discharge to home or other facility with appropriate resources  10/28/2023 1446 by Espinoza Ndiaye RN  Outcome: Progressing  Flowsheets (Taken 10/28/2023 1446)  Discharge to home or other facility with appropriate resources:   Arrange for needed discharge resources and transportation as appropriate   Identify barriers to discharge with patient and caregiver   Identify discharge learning needs (meds, wound care, etc)   Arrange for interpreters to assist at discharge as needed   Refer to discharge planning if patient needs post-hospital services based on physician order or complex needs related to functional status, cognitive ability or social support system     Problem: Safety - Adult  Goal: Free from fall injury  10/28/2023 1446 by Espinoza Ndiaye RN  Outcome: Progressing  Flowsheets (Taken 10/28/2023 1446)  Free From Fall Injury: Instruct family/caregiver on patient safety     Problem: ABCDS Injury Assessment  Goal: Absence of physical injury  Outcome: Progressing  Flowsheets (Taken 10/28/2023 1446)  Absence of Physical Injury: Implement safety measures based on patient assessment     Problem: Pain  Goal: Verbalizes/displays adequate comfort level or baseline comfort level  10/28/2023 1446 by Espinoza Ndiaye RN  Outcome: Progressing  Flowsheets (Taken 10/28/2023 1446)  Verbalizes/displays adequate comfort level or baseline comfort level:   Encourage patient to monitor pain and request assistance   Assess pain using appropriate pain scale   Administer analgesics based on type and severity of pain and evaluate response   Implement non-pharmacological measures as appropriate and evaluate response   Consider cultural and social influences on pain and pain management   Notify Licensed Independent Practitioner if interventions unsuccessful or patient reports new pain     Problem: Chronic Conditions and Co-morbidities  Goal: Patient's chronic conditions and co-morbidity symptoms

## 2023-10-28 NOTE — PROGRESS NOTES
Attempted to reach patients significant other at [address] [facility], [facility] [phone]. Voicemail recording is that of a Camelia Doing and mailbox is full.

## 2023-10-29 LAB — BACTERIA SPEC AEROBE CULT: NORMAL

## 2023-10-29 NOTE — PROGRESS NOTES
Patient requested to leave AMA. Resident has been notified and discussed risks of leaving, specifically elevated liver enzymes and Total CK levels, and muscle breakdown. Education provided for proper hydration and patient acknowledged. Patient does complain of itchy red rash on R. Forearm prior to leaving, resident suggests potential poison ivy. Despite education and suggestion to remain in hospital, patient continues to request to leave. IV fluids have been stopped, IV has been removed, arm band has been removed, and patient belongings have been packed up. [place] paperwork has been signed. Patient leaves room at 2036.

## 2023-10-29 NOTE — SIGNIFICANT EVENT
Significant event note    Resident notified that patient wanted to leave [address] [place] [address]. He is accompanied by 2 other individuals. Hospital problems discussed with patient including elevated CK, hyper transaminasemia, polysubstance abuse. Patient complains of vesicular rash on distal anterior forearm, with some spreading to shoulder and other arm. Rash is itchy. Suggested possible poison ivy, and supportive therapy. Potential consequences of undertreated admission hospitalization problems discussed with patient, and his work-up thus far discussed. Despite potential consequences which were acknowledged and understood by the patient, he insists upon leaving [address] [place] [address]. Appropriate paperwork signed.     Electronically signed by Oh Pierre DO on 10/28/2023 at 10:10 PM

## 2023-10-30 ENCOUNTER — CARE COORDINATION (OUTPATIENT)
Dept: CASE MANAGEMENT | Age: 38
End: 2023-10-30

## 2023-10-30 NOTE — CARE COORDINATION
Care Transitions Outreach Attempt    Call within 2 business days of discharge: Yes   Attempted to reach patient for transitions of care follow up. Unable to reach patient. Patient: Jesus Choi Patient : 1985 MRN: [id number]    Last Discharge Facility       Date Complaint Diagnosis Description Type Department Provider    10/27/23 Addiction Problem Non-traumatic rhabdomyolysis . .. ED to Hosp-Admission (Discharged) (ADMITTED) KARTHIKEY[facility] 4K Mimi Head, DO; Isiah Nageotte. .. Was this an external facility discharge? No Discharge Facility Name: [facility]    Noted following upcoming appointments from discharge chart review:   [facility] follow up appointment(s): No future appointments. Non-[facility]  follow up appointment(s):     1st attempt to contact pt for initial care transition follow up. Unable to reach pt. Phone number listed is for another person. Recording states you have reached Antonette Alcantara. Unable to leave a message d/t mailbox being full. Will try again at a later time.       Ozzy Root RN

## 2023-10-30 NOTE — DISCHARGE SUMMARY
Internal Medicine  Resident Discharge Summary    Patient:  Eddie Clemons, 43 y.o. male  : 1985  Unit: 4K-02/002-A  MRN:  [id number]     Acct:  [de-identified]      PCP:  PAPA Baird - CNP      Date of Admission:  10/27/2023    Discharge Date:  10/28/23    PATIENT LEFT AMA - SEE PROGRESS NOTE FROM EARLIER IN THE DAY FOR THE PLAN OF CARE.

## 2023-10-31 ENCOUNTER — CARE COORDINATION (OUTPATIENT)
Dept: CASE MANAGEMENT | Age: 38
End: 2023-10-31

## 2023-10-31 ENCOUNTER — HOSPITAL ENCOUNTER (EMERGENCY)
Age: 38
Discharge: LEFT BEFORE DISCHARGE | End: 2023-10-31
Attending: EMERGENCY MEDICINE

## 2023-10-31 VITALS
HEART RATE: 107 BPM | SYSTOLIC BLOOD PRESSURE: 157 MMHG | RESPIRATION RATE: 15 BRPM | DIASTOLIC BLOOD PRESSURE: 108 MMHG | OXYGEN SATURATION: 97 % | TEMPERATURE: 97.9 F

## 2023-10-31 PROCEDURE — 4500000002 HC ER NO CHARGE

## 2023-10-31 NOTE — ED NOTES
Pt placed on 2L via NC for 84% on RA. Pt aroused, denies drug use today. States last used Meth over the weekend. Pt alerted to the need for narcan if unable to remain awake. Pt became immediately agitated as evidence by raising his voice and yelling at this nurse. Pt deescalated by providing firm boundaries of expectations while in ER. Pt now awake. Remains on cardiac monitor.        Lizzette Tripathi, [address]  10/31/23 5964

## 2023-10-31 NOTE — ED TRIAGE NOTES
Pt to ED via EMS w/rprts of generalized malaise. States today he's fatigue and having pain to R sided groin area, left flank area, and chest. Pt falls asleep in the middle of conversation. Placed on cardiac monitor. Denies the used of illegal drugs today. Rprts taking his methadone today along w/ibuprofen and tylenol for the general body aches and pains.

## 2023-10-31 NOTE — CARE COORDINATION
Care Transitions Outreach Attempt    Call within 2 business days of discharge: Yes   Attempted to reach patient for transitions of care follow up. Unable to reach patient. Patient: Anupam Noriega Patient : 1985 MRN: [id number]    Last Discharge Facility       Date Complaint Diagnosis Description Type Department Provider    10/27/23 Addiction Problem Non-traumatic rhabdomyolysis . .. ED to Hosp-Admission (Discharged) (ADMITTED) KARTHIKEYAN YorkK Alena , DO; Emre Mcelroy. .. Was this an external facility discharge? No Discharge Facility Name: [facility]    Noted following upcoming appointments from discharge chart review:   [address] follow up appointment(s): No future appointments. Non-[facility]  follow up appointment(s):     2nd attempt to contact pt for initial care transition follow up. Unable to reach pt. Phone number listed is for another person. Recording states you have reached [place]. Unable to leave a message d/t mailbox being full. Also attempted to call pt's sister Denis How [facility]). Phone line rang multiple times then disconnected. Episode/program to be resolved and CTN to sign off if return call is not received from the patient. CTN sent secure email to Brenton Pitts regarding pt.       Silvia Rasmussen RN

## 2023-11-01 NOTE — PROGRESS NOTES
Sw awaiting for police report to be emailed over for patient medications. Rakesh is unable to find impatient residential treatment for patient due to him wanting to stay on methadone. Rakesh informed patient but gave him an option to continue in OP/ IOP if his sister would allow him to come stay. Patient is going to call his sister and discuss options with her. Patient will keep sw updated. Dupixent Counseling: I discussed with the patient the risks of dupilumab including but not limited to eye infection and irritation, cold sores, injection site reactions, worsening of asthma, allergic reactions and increased risk of parasitic infection.  Live vaccines should be avoided while taking dupilumab. Dupilumab will also interact with certain medications such as warfarin and cyclosporine. The patient understands that monitoring is required and they must alert us or the primary physician if symptoms of infection or other concerning signs are noted.

## 2023-11-01 NOTE — DISCHARGE SUMMARY
Hospitalist JACI Note    Date of service 10/28/31    Patient left AMA on night shift. Please see progress note from same day for plan of care. Significant event note as stated by night resident.

## 2023-11-06 ENCOUNTER — CARE COORDINATION (OUTPATIENT)
Dept: CASE MANAGEMENT | Age: 38
End: 2023-11-06

## 2023-11-06 NOTE — CARE COORDINATION
Care Transitions Initial Follow Up Call    Call within 2 business days of discharge: Yes    Patient [place] [facility]  [facility]  [address] [address]    Care Transition Nurse contacted the patient by telephone to perform post hospital discharge assessment. Verified name and  with patient as identifiers. Provided introduction to self, and explanation of the Care Transition Nurse role. Patient: Sole Fair Patient : 1985   MRN: [id number]  Reason for Admission: Rhabdomyolysis, Polysubstance abuse)  Discharge Date: 10/31/23 RARS: Readmission Risk Score: 35.9      Last Discharge Facility       Date Complaint Diagnosis Description Type Department Provider    10/31/23 [facility]  ED (ELOPEMENT) [address] ED Aniyahabigailmarybeth First, DO            Was this an external facility discharge? Yes, 11/3/23-23  Discharge Facility: [address] to be reviewed by the provider   Additional needs identified to be addressed with provider: No  none               Method of communication with provider: none. Received incoming call from pt. Left message stating he is having issues with his transportation. Requesting assistance with this. He can be reached at [phone]. CTN called pt back for initial care transition follow up. Casi Barksdale states he went to [facility] and was in  there from 11/3- for swelling and pain in right leg. States he stayed the entire time and did not leave AMA. Reports he was started on Bactrim and Keflex. Confirmed he is taking both. Advised to schedule a follow up with PCP. He verbalized understanding and states he will call the office. Swelling still present but no longer having any pain. He denies having any chest pain/discomfort, shortness of breath, fever, chills. He informed CTN that he has an appt at [facility] tomorrow. Clinic is located at [address] in [facility]. States he is having issues with transportation.   States K [employer] P was taking

## 2023-11-07 ENCOUNTER — CARE COORDINATION (OUTPATIENT)
Dept: CASE MANAGEMENT | Age: 38
End: 2023-11-07

## 2023-11-07 ENCOUNTER — CARE COORDINATION (OUTPATIENT)
Dept: CARE COORDINATION | Age: 38
End: 2023-11-07

## 2023-11-07 NOTE — CARE COORDINATION
SW contacted pt to discuss transportation issues. Advised pt to contact [employer] and provided the phone #, since he is on disability. Encouraged pt to call me back if he has further issues. Pt voiced understanding.

## 2023-11-07 NOTE — CARE COORDINATION
Care Transitions Outreach Attempt    Attempted to reach patient for transitions of care follow up. Unable to reach patient. Patient: Dangelo Mi Patient : 1985 MRN: [id number]    Last Discharge Facility       Date Complaint Diagnosis Description Type Department Provider    10/31/23 [facility] [place]  ED (ELOPEMENT) [address] [facility] ED Олег Howe DO            Noted following upcoming appointments from discharge chart review:   [facility] follow up appointment(s): No future appointments. Attempted to contact pt for care transition follow up. Unable to reach pt. Left message with contact information and request for call back.       Imelda Tierney RN

## 2023-11-08 ENCOUNTER — CLINICAL DOCUMENTATION (OUTPATIENT)
Dept: INTERNAL MEDICINE CLINIC | Age: 38
End: 2023-11-08

## 2023-11-08 NOTE — PROGRESS NOTES
Patient contacted office for himself and girlfriend to re-establish care with [facility] for MAT treatment. Patient reports that he and his girlfriend has not had methadone in 1 week, and  have returned to using. Sw contacted [facility] about her schedule. Per [facility] both can be seen on 11/14. Sw attempted to make contact with patient to inform him. Patient did not answer and sw was unable to leave message.

## 2023-11-09 ENCOUNTER — CARE COORDINATION (OUTPATIENT)
Dept: CASE MANAGEMENT | Age: 38
End: 2023-11-09

## 2023-11-09 NOTE — CARE COORDINATION
Care Transitions Outreach Attempt    Attempted to reach patient for transitions of care follow up. Unable to reach patient. Patient: Branden Toney Patient : 1985 MRN: [id number]    Last Discharge Facility       Date Complaint Diagnosis Description Type Department Provider    10/31/23 [facility] [place]  ED (ELOPEMENT) [address] [facility] ED Lily Cooley DO            Noted following upcoming appointments from discharge chart review:   [facility] follow up appointment(s):   Future Appointments   Date Time Provider [facility]   2023  9:40 AM [facility], [address] [facility]     2nd attempt to contact pt for care transition follow up. Unable to reach pt. Left message with contact information and request for call back. Episode/program to be resolved and CTN to sign off if return call is not received from the patient.       Harper Butler RN

## 2023-11-10 ENCOUNTER — HOSPITAL ENCOUNTER (INPATIENT)
Age: 38
LOS: 1 days | Discharge: LEFT BEFORE DISCHARGE | DRG: 207 | End: 2023-11-12
Attending: EMERGENCY MEDICINE | Admitting: INTERNAL MEDICINE
Payer: COMMERCIAL

## 2023-11-10 ENCOUNTER — APPOINTMENT (OUTPATIENT)
Dept: CT IMAGING | Age: 38
DRG: 207 | End: 2023-11-10
Payer: COMMERCIAL

## 2023-11-10 DIAGNOSIS — M62.82 NON-TRAUMATIC RHABDOMYOLYSIS: ICD-10-CM

## 2023-11-10 DIAGNOSIS — N17.9 AKI (ACUTE KIDNEY INJURY) (HCC): Primary | ICD-10-CM

## 2023-11-10 DIAGNOSIS — R41.82 ALTERED MENTAL STATUS, UNSPECIFIED ALTERED MENTAL STATUS TYPE: ICD-10-CM

## 2023-11-10 DIAGNOSIS — I95.9 HYPOTENSION, UNSPECIFIED HYPOTENSION TYPE: ICD-10-CM

## 2023-11-10 PROCEDURE — 74176 CT ABD & PELVIS W/O CONTRAST: CPT

## 2023-11-10 PROCEDURE — 6360000002 HC RX W HCPCS: Performed by: EMERGENCY MEDICINE

## 2023-11-10 PROCEDURE — 99285 EMERGENCY DEPT VISIT HI MDM: CPT

## 2023-11-10 PROCEDURE — 96372 THER/PROPH/DIAG INJ SC/IM: CPT

## 2023-11-10 RX ORDER — 0.9 % SODIUM CHLORIDE 0.9 %
1000 INTRAVENOUS SOLUTION INTRAVENOUS ONCE
Status: DISCONTINUED | OUTPATIENT
Start: 2023-11-10 | End: 2023-11-11

## 2023-11-10 RX ORDER — MIDAZOLAM HYDROCHLORIDE 10 MG/2ML
5 INJECTION, SOLUTION INTRAMUSCULAR; INTRAVENOUS ONCE
Status: COMPLETED | OUTPATIENT
Start: 2023-11-10 | End: 2023-11-10

## 2023-11-10 RX ADMIN — MIDAZOLAM 5 MG: 5 INJECTION INTRAMUSCULAR; INTRAVENOUS at 22:59

## 2023-11-10 ASSESSMENT — PAIN DESCRIPTION - LOCATION: LOCATION: BACK

## 2023-11-10 ASSESSMENT — PAIN SCALES - GENERAL: PAINLEVEL_OUTOF10: 10

## 2023-11-10 ASSESSMENT — PAIN - FUNCTIONAL ASSESSMENT: PAIN_FUNCTIONAL_ASSESSMENT: 0-10

## 2023-11-11 ENCOUNTER — APPOINTMENT (OUTPATIENT)
Dept: ULTRASOUND IMAGING | Age: 38
DRG: 207 | End: 2023-11-11
Payer: COMMERCIAL

## 2023-11-11 ENCOUNTER — APPOINTMENT (OUTPATIENT)
Dept: GENERAL RADIOLOGY | Age: 38
DRG: 207 | End: 2023-11-11
Payer: COMMERCIAL

## 2023-11-11 PROBLEM — I95.9 HYPOTENSION: Status: ACTIVE | Noted: 2023-11-11

## 2023-11-11 LAB
ALBUMIN SERPL BCG-MCNC: 3.7 G/DL (ref 3.5–5.1)
ALP SERPL-CCNC: 140 U/L (ref 38–126)
ALT SERPL W/O P-5'-P-CCNC: 110 U/L (ref 11–66)
AMMONIA PLAS-MCNC: 33 UMOL/L (ref 11–60)
AMPHETAMINES UR QL SCN: POSITIVE
ANION GAP SERPL CALC-SCNC: 12 MEQ/L (ref 8–16)
ANION GAP SERPL CALC-SCNC: 12 MEQ/L (ref 8–16)
ANION GAP SERPL CALC-SCNC: 18 MEQ/L (ref 8–16)
APAP SERPL-MCNC: < 5 UG/ML (ref 0–20)
AST SERPL-CCNC: 86 U/L (ref 5–40)
BACTERIA: ABNORMAL
BARBITURATES UR QL SCN: NEGATIVE
BASE EXCESS BLDA CALC-SCNC: -9.7 MMOL/L (ref -2–3)
BASOPHILS ABSOLUTE: 0 THOU/MM3 (ref 0–0.1)
BASOPHILS NFR BLD AUTO: 0.2 %
BENZODIAZ UR QL SCN: NEGATIVE
BILIRUB CONJ SERPL-MCNC: < 0.2 MG/DL (ref 0–0.3)
BILIRUB SERPL-MCNC: 0.3 MG/DL (ref 0.3–1.2)
BILIRUB UR QL STRIP: NEGATIVE
BUN SERPL-MCNC: 31 MG/DL (ref 7–22)
BUN SERPL-MCNC: 44 MG/DL (ref 7–22)
BUN SERPL-MCNC: 59 MG/DL (ref 7–22)
BZE UR QL SCN: POSITIVE
CA-I BLD ISE-SCNC: 1.13 MMOL/L (ref 1.12–1.32)
CALCIUM SERPL-MCNC: 8.8 MG/DL (ref 8.5–10.5)
CALCIUM SERPL-MCNC: 8.9 MG/DL (ref 8.5–10.5)
CALCIUM SERPL-MCNC: 9.3 MG/DL (ref 8.5–10.5)
CANNABINOIDS UR QL SCN: NEGATIVE
CASTS #/AREA URNS LPF: ABNORMAL /LPF
CASTS #/AREA URNS LPF: ABNORMAL /LPF
CHARACTER UR: ABNORMAL
CHARCOAL URNS QL MICRO: ABNORMAL
CHLORIDE SERPL-SCNC: 102 MEQ/L (ref 98–111)
CHLORIDE SERPL-SCNC: 103 MEQ/L (ref 98–111)
CHLORIDE SERPL-SCNC: 95 MEQ/L (ref 98–111)
CK SERPL-CCNC: 1465 U/L (ref 55–170)
CO2 SERPL-SCNC: 18 MEQ/L (ref 23–33)
CO2 SERPL-SCNC: 22 MEQ/L (ref 23–33)
CO2 SERPL-SCNC: 26 MEQ/L (ref 23–33)
COLLECTED BY:: ABNORMAL
COLOR UR: YELLOW
CREAT SERPL-MCNC: 0.9 MG/DL (ref 0.4–1.2)
CREAT SERPL-MCNC: 1.6 MG/DL (ref 0.4–1.2)
CREAT SERPL-MCNC: 2.6 MG/DL (ref 0.4–1.2)
CREAT UR-MCNC: 204.2 MG/DL
CRYSTALS URNS QL MICRO: ABNORMAL
DEPRECATED RDW RBC AUTO: 50.5 FL (ref 35–45)
EOSINOPHIL NFR BLD AUTO: 0.1 %
EOSINOPHILS ABSOLUTE: 0 THOU/MM3 (ref 0–0.4)
EPITHELIAL CELLS, UA: ABNORMAL /HPF
ERYTHROCYTE [DISTWIDTH] IN BLOOD BY AUTOMATED COUNT: 18.1 % (ref 11.5–14.5)
ETHANOL SERPL-MCNC: < 0.01 %
FENTANYL: POSITIVE
FOLATE SERPL-MCNC: 17.5 NG/ML (ref 4.8–24.2)
GFR SERPL CREATININE-BSD FRML MDRD: 31 ML/MIN/1.73M2
GFR SERPL CREATININE-BSD FRML MDRD: 56 ML/MIN/1.73M2
GFR SERPL CREATININE-BSD FRML MDRD: > 60 ML/MIN/1.73M2
GLUCOSE SERPL-MCNC: 117 MG/DL (ref 70–108)
GLUCOSE SERPL-MCNC: 178 MG/DL (ref 70–108)
GLUCOSE SERPL-MCNC: 87 MG/DL (ref 70–108)
GLUCOSE UR QL STRIP.AUTO: NEGATIVE MG/DL
HCO3 BLDA-SCNC: 18 MMOL/L (ref 23–28)
HCT VFR BLD AUTO: 30.3 % (ref 42–52)
HGB BLD-MCNC: 9.6 GM/DL (ref 14–18)
HGB UR QL STRIP.AUTO: ABNORMAL
IMM GRANULOCYTES # BLD AUTO: 0.03 THOU/MM3 (ref 0–0.07)
IMM GRANULOCYTES NFR BLD AUTO: 0.3 %
KETONES UR QL STRIP.AUTO: ABNORMAL
LACTATE SERPL-SCNC: 0.5 MMOL/L (ref 0.5–2)
LEUKOCYTE ESTERASE UR QL STRIP.AUTO: NEGATIVE
LYMPHOCYTES ABSOLUTE: 2.6 THOU/MM3 (ref 1–4.8)
LYMPHOCYTES NFR BLD AUTO: 26.5 %
MAGNESIUM SERPL-MCNC: 2.6 MG/DL (ref 1.6–2.4)
MAGNESIUM SERPL-MCNC: 2.6 MG/DL (ref 1.6–2.4)
MAGNESIUM SERPL-MCNC: 2.9 MG/DL (ref 1.6–2.4)
MCH RBC QN AUTO: 24.7 PG (ref 26–33)
MCHC RBC AUTO-ENTMCNC: 31.7 GM/DL (ref 32.2–35.5)
MCV RBC AUTO: 77.9 FL (ref 80–94)
MONOCYTES ABSOLUTE: 1 THOU/MM3 (ref 0.4–1.3)
MONOCYTES NFR BLD AUTO: 10.4 %
MRSA DNA SPEC QL NAA+PROBE: POSITIVE
MUCOUS THREADS URNS QL MICRO: ABNORMAL
NEUTROPHILS NFR BLD AUTO: 62.5 %
NITRITE UR QL STRIP.AUTO: NEGATIVE
NRBC BLD AUTO-RTO: 0 /100 WBC
OPIATES UR QL SCN: POSITIVE
OSMOLALITY SERPL CALC.SUM OF ELEC: 278.6 MOSMOL/KG (ref 275–300)
OXYCODONE: NEGATIVE
PCO2 TEMP ADJ BLDMV: 46 MMHG (ref 41–51)
PCP UR QL SCN: NEGATIVE
PH BLDMV: 7.2 [PH] (ref 7.31–7.41)
PH BLDV: 7.39 [PH] (ref 7.31–7.41)
PH BLDV: 7.39 [PH] (ref 7.31–7.41)
PH UR STRIP.AUTO: 5 [PH] (ref 5–9)
PHOSPHATE SERPL-MCNC: 3.5 MG/DL (ref 2.4–4.7)
PLATELET # BLD AUTO: 438 THOU/MM3 (ref 130–400)
PMV BLD AUTO: 9.3 FL (ref 9.4–12.4)
PO2 BLDMV: 61 MMHG (ref 25–40)
POTASSIUM SERPL-SCNC: 3.5 MEQ/L (ref 3.5–5.2)
POTASSIUM SERPL-SCNC: 3.6 MEQ/L (ref 3.5–5.2)
POTASSIUM SERPL-SCNC: 4.5 MEQ/L (ref 3.5–5.2)
PROT SERPL-MCNC: 7.8 G/DL (ref 6.1–8)
PROT UR STRIP.AUTO-MCNC: ABNORMAL MG/DL
RBC # BLD AUTO: 3.89 MILL/MM3 (ref 4.7–6.1)
RBC #/AREA URNS HPF: ABNORMAL /HPF
REASON FOR REJECTION: NORMAL
REJECTED TEST: NORMAL
RENAL EPI CELLS #/AREA URNS HPF: ABNORMAL /[HPF]
SALICYLATES SERPL-MCNC: 0.4 MG/DL (ref 2–10)
SAO2 % BLDMV: 85 %
SEGMENTED NEUTROPHILS ABSOLUTE COUNT: 6.1 THOU/MM3 (ref 1.8–7.7)
SODIUM SERPL-SCNC: 131 MEQ/L (ref 135–145)
SODIUM SERPL-SCNC: 137 MEQ/L (ref 135–145)
SODIUM SERPL-SCNC: 140 MEQ/L (ref 135–145)
SODIUM UR-SCNC: < 20 MEQ/L
SP GR UR REFRACT.AUTO: 1.02 (ref 1–1.03)
UROBILINOGEN UR QL STRIP.AUTO: 0.2 EU/DL (ref 0–1)
VIT B12 SERPL-MCNC: 554 PG/ML (ref 211–911)
WBC # BLD AUTO: 9.7 THOU/MM3 (ref 4.8–10.8)
WBC #/AREA URNS HPF: ABNORMAL /HPF
YEAST LIKE FUNGI URNS QL MICRO: ABNORMAL

## 2023-11-11 PROCEDURE — 6370000000 HC RX 637 (ALT 250 FOR IP)

## 2023-11-11 PROCEDURE — C9113 INJ PANTOPRAZOLE SODIUM, VIA: HCPCS | Performed by: EMERGENCY MEDICINE

## 2023-11-11 PROCEDURE — 82140 ASSAY OF AMMONIA: CPT

## 2023-11-11 PROCEDURE — 2100000000 HC CCU R&B

## 2023-11-11 PROCEDURE — 85025 COMPLETE CBC W/AUTO DIFF WBC: CPT

## 2023-11-11 PROCEDURE — 80053 COMPREHEN METABOLIC PANEL: CPT

## 2023-11-11 PROCEDURE — 6360000002 HC RX W HCPCS: Performed by: EMERGENCY MEDICINE

## 2023-11-11 PROCEDURE — 87070 CULTURE OTHR SPECIMN AEROBIC: CPT

## 2023-11-11 PROCEDURE — 76770 US EXAM ABDO BACK WALL COMP: CPT

## 2023-11-11 PROCEDURE — 82800 BLOOD PH: CPT

## 2023-11-11 PROCEDURE — 2580000003 HC RX 258: Performed by: EMERGENCY MEDICINE

## 2023-11-11 PROCEDURE — 81001 URINALYSIS AUTO W/SCOPE: CPT

## 2023-11-11 PROCEDURE — 82248 BILIRUBIN DIRECT: CPT

## 2023-11-11 PROCEDURE — 82330 ASSAY OF CALCIUM: CPT

## 2023-11-11 PROCEDURE — 82077 ASSAY SPEC XCP UR&BREATH IA: CPT

## 2023-11-11 PROCEDURE — 84100 ASSAY OF PHOSPHORUS: CPT

## 2023-11-11 PROCEDURE — 6370000000 HC RX 637 (ALT 250 FOR IP): Performed by: EMERGENCY MEDICINE

## 2023-11-11 PROCEDURE — 80179 DRUG ASSAY SALICYLATE: CPT

## 2023-11-11 PROCEDURE — 83735 ASSAY OF MAGNESIUM: CPT

## 2023-11-11 PROCEDURE — 6370000000 HC RX 637 (ALT 250 FOR IP): Performed by: INTERNAL MEDICINE

## 2023-11-11 PROCEDURE — 87086 URINE CULTURE/COLONY COUNT: CPT

## 2023-11-11 PROCEDURE — 71045 X-RAY EXAM CHEST 1 VIEW: CPT

## 2023-11-11 PROCEDURE — 2500000003 HC RX 250 WO HCPCS: Performed by: EMERGENCY MEDICINE

## 2023-11-11 PROCEDURE — 2580000003 HC RX 258

## 2023-11-11 PROCEDURE — 87641 MR-STAPH DNA AMP PROBE: CPT

## 2023-11-11 PROCEDURE — 83605 ASSAY OF LACTIC ACID: CPT

## 2023-11-11 PROCEDURE — 80307 DRUG TEST PRSMV CHEM ANLYZR: CPT

## 2023-11-11 PROCEDURE — 93010 ELECTROCARDIOGRAM REPORT: CPT | Performed by: INTERNAL MEDICINE

## 2023-11-11 PROCEDURE — 2500000003 HC RX 250 WO HCPCS

## 2023-11-11 PROCEDURE — 99223 1ST HOSP IP/OBS HIGH 75: CPT | Performed by: INTERNAL MEDICINE

## 2023-11-11 PROCEDURE — 36415 COLL VENOUS BLD VENIPUNCTURE: CPT

## 2023-11-11 PROCEDURE — 82746 ASSAY OF FOLIC ACID SERUM: CPT

## 2023-11-11 PROCEDURE — 82550 ASSAY OF CK (CPK): CPT

## 2023-11-11 PROCEDURE — 82607 VITAMIN B-12: CPT

## 2023-11-11 PROCEDURE — 2580000003 HC RX 258: Performed by: INTERNAL MEDICINE

## 2023-11-11 PROCEDURE — 02HV33Z INSERTION OF INFUSION DEVICE INTO SUPERIOR VENA CAVA, PERCUTANEOUS APPROACH: ICD-10-PCS | Performed by: EMERGENCY MEDICINE

## 2023-11-11 PROCEDURE — 80143 DRUG ASSAY ACETAMINOPHEN: CPT

## 2023-11-11 PROCEDURE — 82803 BLOOD GASES ANY COMBINATION: CPT

## 2023-11-11 PROCEDURE — 93005 ELECTROCARDIOGRAM TRACING: CPT | Performed by: EMERGENCY MEDICINE

## 2023-11-11 RX ORDER — METHADONE HYDROCHLORIDE 10 MG/1
80 TABLET ORAL DAILY
Status: DISCONTINUED | OUTPATIENT
Start: 2023-11-11 | End: 2023-11-12 | Stop reason: HOSPADM

## 2023-11-11 RX ORDER — POTASSIUM CHLORIDE 20 MEQ/1
40 TABLET, EXTENDED RELEASE ORAL PRN
Status: DISCONTINUED | OUTPATIENT
Start: 2023-11-11 | End: 2023-11-12 | Stop reason: HOSPADM

## 2023-11-11 RX ORDER — LORAZEPAM 2 MG/ML
3 INJECTION INTRAMUSCULAR
Status: DISCONTINUED | OUTPATIENT
Start: 2023-11-11 | End: 2023-11-12 | Stop reason: HOSPADM

## 2023-11-11 RX ORDER — SODIUM CHLORIDE 9 MG/ML
INJECTION, SOLUTION INTRAVENOUS PRN
Status: DISCONTINUED | OUTPATIENT
Start: 2023-11-11 | End: 2023-11-12 | Stop reason: HOSPADM

## 2023-11-11 RX ORDER — ARIPIPRAZOLE 5 MG/1
5 TABLET ORAL DAILY
Status: DISCONTINUED | OUTPATIENT
Start: 2023-11-11 | End: 2023-11-11 | Stop reason: CLARIF

## 2023-11-11 RX ORDER — ENOXAPARIN SODIUM 100 MG/ML
40 INJECTION SUBCUTANEOUS DAILY
Status: DISCONTINUED | OUTPATIENT
Start: 2023-11-11 | End: 2023-11-12 | Stop reason: HOSPADM

## 2023-11-11 RX ORDER — IBUPROFEN 600 MG/1
1 TABLET ORAL PRN
Status: DISCONTINUED | OUTPATIENT
Start: 2023-11-11 | End: 2023-11-12 | Stop reason: HOSPADM

## 2023-11-11 RX ORDER — ACETAMINOPHEN 325 MG/1
650 TABLET ORAL EVERY 6 HOURS PRN
Status: DISCONTINUED | OUTPATIENT
Start: 2023-11-11 | End: 2023-11-12 | Stop reason: HOSPADM

## 2023-11-11 RX ORDER — LORAZEPAM 2 MG/ML
2 INJECTION INTRAMUSCULAR
Status: DISCONTINUED | OUTPATIENT
Start: 2023-11-11 | End: 2023-11-12 | Stop reason: HOSPADM

## 2023-11-11 RX ORDER — SODIUM CHLORIDE 0.9 % (FLUSH) 0.9 %
5-40 SYRINGE (ML) INJECTION PRN
Status: DISCONTINUED | OUTPATIENT
Start: 2023-11-11 | End: 2023-11-12 | Stop reason: HOSPADM

## 2023-11-11 RX ORDER — VENLAFAXINE 50 MG/1
100 TABLET ORAL 3 TIMES DAILY
Status: DISCONTINUED | OUTPATIENT
Start: 2023-11-11 | End: 2023-11-12 | Stop reason: HOSPADM

## 2023-11-11 RX ORDER — LORAZEPAM 2 MG/ML
1 INJECTION INTRAMUSCULAR
Status: DISCONTINUED | OUTPATIENT
Start: 2023-11-11 | End: 2023-11-12 | Stop reason: HOSPADM

## 2023-11-11 RX ORDER — 0.9 % SODIUM CHLORIDE 0.9 %
1000 INTRAVENOUS SOLUTION INTRAVENOUS ONCE
Status: COMPLETED | OUTPATIENT
Start: 2023-11-11 | End: 2023-11-11

## 2023-11-11 RX ORDER — NOREPINEPHRINE BITARTRATE 0.06 MG/ML
1-100 INJECTION, SOLUTION INTRAVENOUS CONTINUOUS
Status: DISCONTINUED | OUTPATIENT
Start: 2023-11-11 | End: 2023-11-12

## 2023-11-11 RX ORDER — SODIUM CHLORIDE, SODIUM LACTATE, POTASSIUM CHLORIDE, CALCIUM CHLORIDE 600; 310; 30; 20 MG/100ML; MG/100ML; MG/100ML; MG/100ML
INJECTION, SOLUTION INTRAVENOUS CONTINUOUS
Status: DISCONTINUED | OUTPATIENT
Start: 2023-11-11 | End: 2023-11-12 | Stop reason: HOSPADM

## 2023-11-11 RX ORDER — ARIPIPRAZOLE 5 MG/1
5 TABLET ORAL DAILY
Status: DISCONTINUED | OUTPATIENT
Start: 2023-11-11 | End: 2023-11-12

## 2023-11-11 RX ORDER — LORAZEPAM 1 MG/1
4 TABLET ORAL
Status: DISCONTINUED | OUTPATIENT
Start: 2023-11-11 | End: 2023-11-12 | Stop reason: HOSPADM

## 2023-11-11 RX ORDER — 0.9 % SODIUM CHLORIDE 0.9 %
1000 INTRAVENOUS SOLUTION INTRAVENOUS ONCE
Status: DISCONTINUED | OUTPATIENT
Start: 2023-11-11 | End: 2023-11-11

## 2023-11-11 RX ORDER — LORAZEPAM 1 MG/1
1 TABLET ORAL
Status: DISCONTINUED | OUTPATIENT
Start: 2023-11-11 | End: 2023-11-12 | Stop reason: HOSPADM

## 2023-11-11 RX ORDER — LORAZEPAM 1 MG/1
2 TABLET ORAL
Status: DISCONTINUED | OUTPATIENT
Start: 2023-11-11 | End: 2023-11-12 | Stop reason: HOSPADM

## 2023-11-11 RX ORDER — POTASSIUM CHLORIDE 7.45 MG/ML
10 INJECTION INTRAVENOUS PRN
Status: DISCONTINUED | OUTPATIENT
Start: 2023-11-11 | End: 2023-11-12 | Stop reason: HOSPADM

## 2023-11-11 RX ORDER — THIAMINE HYDROCHLORIDE 100 MG/ML
100 INJECTION, SOLUTION INTRAMUSCULAR; INTRAVENOUS DAILY
Status: DISCONTINUED | OUTPATIENT
Start: 2023-11-11 | End: 2023-11-11

## 2023-11-11 RX ORDER — SODIUM CHLORIDE 0.9 % (FLUSH) 0.9 %
5-40 SYRINGE (ML) INJECTION EVERY 12 HOURS SCHEDULED
Status: DISCONTINUED | OUTPATIENT
Start: 2023-11-11 | End: 2023-11-12 | Stop reason: HOSPADM

## 2023-11-11 RX ORDER — MAGNESIUM SULFATE IN WATER 40 MG/ML
2000 INJECTION, SOLUTION INTRAVENOUS PRN
Status: DISCONTINUED | OUTPATIENT
Start: 2023-11-11 | End: 2023-11-12 | Stop reason: HOSPADM

## 2023-11-11 RX ORDER — CLONAZEPAM 0.5 MG/1
0.5 TABLET ORAL NIGHTLY PRN
Status: DISCONTINUED | OUTPATIENT
Start: 2023-11-11 | End: 2023-11-12 | Stop reason: HOSPADM

## 2023-11-11 RX ORDER — ONDANSETRON 2 MG/ML
4 INJECTION INTRAMUSCULAR; INTRAVENOUS EVERY 6 HOURS PRN
Status: DISCONTINUED | OUTPATIENT
Start: 2023-11-11 | End: 2023-11-12 | Stop reason: HOSPADM

## 2023-11-11 RX ORDER — DEXTROSE MONOHYDRATE 100 MG/ML
INJECTION, SOLUTION INTRAVENOUS CONTINUOUS PRN
Status: DISCONTINUED | OUTPATIENT
Start: 2023-11-11 | End: 2023-11-12 | Stop reason: HOSPADM

## 2023-11-11 RX ORDER — ACETAMINOPHEN 650 MG/1
650 SUPPOSITORY RECTAL EVERY 6 HOURS PRN
Status: DISCONTINUED | OUTPATIENT
Start: 2023-11-11 | End: 2023-11-12 | Stop reason: HOSPADM

## 2023-11-11 RX ORDER — LANOLIN ALCOHOL/MO/W.PET/CERES
100 CREAM (GRAM) TOPICAL DAILY
Status: DISCONTINUED | OUTPATIENT
Start: 2023-11-11 | End: 2023-11-12 | Stop reason: HOSPADM

## 2023-11-11 RX ORDER — ONDANSETRON 4 MG/1
4 TABLET, ORALLY DISINTEGRATING ORAL EVERY 8 HOURS PRN
Status: DISCONTINUED | OUTPATIENT
Start: 2023-11-11 | End: 2023-11-12 | Stop reason: HOSPADM

## 2023-11-11 RX ORDER — NALOXONE HYDROCHLORIDE 4 MG/.1ML
1 SPRAY NASAL PRN
Status: DISCONTINUED | OUTPATIENT
Start: 2023-11-11 | End: 2023-11-12 | Stop reason: HOSPADM

## 2023-11-11 RX ORDER — POLYETHYLENE GLYCOL 3350 17 G/17G
17 POWDER, FOR SOLUTION ORAL DAILY PRN
Status: DISCONTINUED | OUTPATIENT
Start: 2023-11-11 | End: 2023-11-12 | Stop reason: HOSPADM

## 2023-11-11 RX ORDER — LORAZEPAM 1 MG/1
3 TABLET ORAL
Status: DISCONTINUED | OUTPATIENT
Start: 2023-11-11 | End: 2023-11-12 | Stop reason: HOSPADM

## 2023-11-11 RX ORDER — LORAZEPAM 2 MG/ML
4 INJECTION INTRAMUSCULAR
Status: DISCONTINUED | OUTPATIENT
Start: 2023-11-11 | End: 2023-11-12 | Stop reason: HOSPADM

## 2023-11-11 RX ORDER — LISDEXAMFETAMINE DIMESYLATE CAPSULES 50 MG/1
50 CAPSULE ORAL EVERY MORNING
Status: DISCONTINUED | OUTPATIENT
Start: 2023-11-12 | End: 2023-11-12 | Stop reason: HOSPADM

## 2023-11-11 RX ORDER — PANTOPRAZOLE SODIUM 40 MG/10ML
40 INJECTION, POWDER, LYOPHILIZED, FOR SOLUTION INTRAVENOUS DAILY
Status: DISCONTINUED | OUTPATIENT
Start: 2023-11-11 | End: 2023-11-12 | Stop reason: HOSPADM

## 2023-11-11 RX ADMIN — SODIUM CHLORIDE, POTASSIUM CHLORIDE, SODIUM LACTATE AND CALCIUM CHLORIDE: 600; 310; 30; 20 INJECTION, SOLUTION INTRAVENOUS at 20:26

## 2023-11-11 RX ADMIN — VENLAFAXINE HYDROCHLORIDE 100 MG: 50 TABLET ORAL at 22:04

## 2023-11-11 RX ADMIN — PANTOPRAZOLE SODIUM 40 MG: 40 INJECTION, POWDER, FOR SOLUTION INTRAVENOUS at 04:10

## 2023-11-11 RX ADMIN — SODIUM CHLORIDE, PRESERVATIVE FREE 10 ML: 5 INJECTION INTRAVENOUS at 10:00

## 2023-11-11 RX ADMIN — SODIUM CHLORIDE 1000 ML: 9 INJECTION, SOLUTION INTRAVENOUS at 04:11

## 2023-11-11 RX ADMIN — SODIUM CHLORIDE, PRESERVATIVE FREE 10 ML: 5 INJECTION INTRAVENOUS at 20:28

## 2023-11-11 RX ADMIN — THIAMINE HYDROCHLORIDE 100 MG: 100 INJECTION, SOLUTION INTRAMUSCULAR; INTRAVENOUS at 05:33

## 2023-11-11 RX ADMIN — ACETAMINOPHEN 650 MG: 325 TABLET ORAL at 20:20

## 2023-11-11 RX ADMIN — Medication 5 MCG/MIN: at 03:07

## 2023-11-11 RX ADMIN — ENOXAPARIN SODIUM 40 MG: 100 INJECTION SUBCUTANEOUS at 09:59

## 2023-11-11 RX ADMIN — SODIUM BICARBONATE: 84 INJECTION, SOLUTION INTRAVENOUS at 04:45

## 2023-11-11 RX ADMIN — Medication 100 MG: at 10:44

## 2023-11-11 RX ADMIN — SODIUM CHLORIDE 1000 ML: 9 INJECTION, SOLUTION INTRAVENOUS at 00:40

## 2023-11-11 RX ADMIN — SODIUM BICARBONATE: 84 INJECTION, SOLUTION INTRAVENOUS at 16:37

## 2023-11-11 ASSESSMENT — PAIN DESCRIPTION - DESCRIPTORS: DESCRIPTORS: ACHING

## 2023-11-11 ASSESSMENT — ENCOUNTER SYMPTOMS
NAUSEA: 0
COUGH: 0
ABDOMINAL PAIN: 0
SHORTNESS OF BREATH: 0
VOMITING: 0
BACK PAIN: 1

## 2023-11-11 ASSESSMENT — PAIN DESCRIPTION - ORIENTATION
ORIENTATION: MID;RIGHT
ORIENTATION: MID;RIGHT

## 2023-11-11 ASSESSMENT — PAIN DESCRIPTION - PAIN TYPE: TYPE: ACUTE PAIN

## 2023-11-11 ASSESSMENT — PAIN DESCRIPTION - LOCATION
LOCATION: BACK;FLANK
LOCATION: BACK;FLANK

## 2023-11-11 ASSESSMENT — PAIN SCALES - GENERAL
PAINLEVEL_OUTOF10: 0
PAINLEVEL_OUTOF10: 8
PAINLEVEL_OUTOF10: 0
PAINLEVEL_OUTOF10: 8
PAINLEVEL_OUTOF10: 5

## 2023-11-11 ASSESSMENT — PAIN - FUNCTIONAL ASSESSMENT: PAIN_FUNCTIONAL_ASSESSMENT: PREVENTS OR INTERFERES SOME ACTIVE ACTIVITIES AND ADLS

## 2023-11-11 ASSESSMENT — PAIN DESCRIPTION - FREQUENCY: FREQUENCY: CONTINUOUS

## 2023-11-11 ASSESSMENT — PAIN DESCRIPTION - ONSET: ONSET: ON-GOING

## 2023-11-11 NOTE — CARE COORDINATION
11/11/23 1358   Readmission Assessment   Number of Days since last admission? 8-30 days   Previous Disposition Other (comment)  (Pt left AMA.)   Who is being Interviewed Caregiver  (Med record. Pt left AMA last admission.)   What was the patient's/caregiver's perception as to why they think they needed to return back to the hospital? [address] discharge on prior admission   Did you visit your Primary Care Physician after you left the hospital, before you returned this time? No   Why weren't you able to visit your PCP? Other (Comment)  (Unknown)   Did you see a specialist, such as Cardiac, Pulmonary, Orthopedic Physician, etc. after you left the hospital? No   Who advised the patient to return to the hospital? Self-referral   Does the patient report anything that got in the way of taking their medications? No   In our efforts to provide the best possible care to you and others like you, can you think of anything that we could have done to help you after you left the hospital the first time, so that you might not have needed to return so soon?  Other (Comment)  (Pt left AMA)

## 2023-11-11 NOTE — PROGRESS NOTES
Patient arrived to unit from ED via cart. Patient transferred to 3A bed and placed on continuous bedside monitor. Patient admitted for Hypotension [I95.9]  SATYA (acute kidney injury) ([address]) [N17.9]  Altered mental status, unspecified altered mental status type [R41.82]. Vitals obtained. See flowsheets. Patient's IV access includes right IJ triple lumen CVC and a 20g peripheral IV in patient's right forearm. Current infusions and rates of infusion include Levophed continuous IV infusion at 5 mcg/min. Assessment completed by primary RN, Vandana Owens. Two nurse skin assessment completed by this RN and Madelaine PEREZ. See flowsheets for assessment details. Policies and procedures of ICU unable to be explained to patient at this time.

## 2023-11-11 NOTE — CARE COORDINATION
11/11/23 1349   Service Assessment   Patient Orientation Alert and Oriented   Cognition Alert   History Provided By Patient   Primary Caregiver Self   Support Systems Spouse/Significant Other   Patient's Healthcare Decision Maker is: Legal Next of Kin   PCP Verified by CM Yes   Last Visit to PCP Within last 3 months   Prior Functional Level Independent in ADLs/IADLs   Current Functional Level Independent in ADLs/IADLs   Can patient return to prior living arrangement Yes   Ability to make needs known: Good   Family able to assist with home care needs: No   Would you like for me to discuss the discharge plan with any other family members/significant others, and if so, who? No   Financial Resources  Resources None   Social/Functional History   Type of Home Apartment   Active  No   Discharge Planning   Type of Residence Apartment   Living Arrangements Spouse/Significant Other   Current Services Prior To Admission None   Potential Assistance Needed Transportation   DME Ordered? No   Potential Assistance Purchasing Medications No   Type of Home Care Services None   Patient expects to be discharged to: [address] Discharge   Mode of Transport at Discharge Other (see comment)  (uncertain)   Confirm Follow Up Transport Other (see comment)  (Uncertain at present time.)     Patient Goals/Plan/Treatment Preferences: Met with Gerard Brar. He is very drowsy but answers appropriately. Lives with SO. He does not drive. [employer]. SW consulted for resources and discharge needs. If patient is discharged prior to next notation, then this note serves as note for discharge by case management.

## 2023-11-11 NOTE — ED PROVIDER NOTES
[facility]          Pt Name: Mary Mccormick  MRN: [id number]  [address] 1985  Date of evaluation: 11/10/2023  Emergency Physician: Abiel Huerta       Chief Complaint   Patient presents with    Back Pain     History obtained from the patient. HISTORY OF PRESENT ILLNESS    HPI  Mary Mccormick is a 43 y.o. male who presents to the emergency department for evaluation of flank pain. Patient with a past medical history of polysubstance use. He he reports methamphetamine and opiate abuse. He states that he developed bilateral flank pain worse on the right than the left. He rates his pain at a 10. No nausea no vomiting. No fever no chills. The patient has no other acute complaints at this time. REVIEW OF SYSTEMS   Review of Systems   Constitutional:  Negative for chills and fever. Respiratory:  Negative for cough and shortness of breath. Cardiovascular:  Negative for chest pain. Gastrointestinal:  Negative for abdominal pain, nausea and vomiting. Genitourinary:  Positive for flank pain. Negative for dysuria and frequency. Musculoskeletal:  Positive for back pain. Skin:  Negative for rash. PAST MEDICAL AND SURGICAL HISTORY     Past Medical History:   Diagnosis Date    Anxiety     Depression     Kidney stone     Liver disease     Hep C    Opiate abuse, continuous ([address])      No past surgical history on file.       MEDICATIONS     Current Facility-Administered Medications:     norepinephrine (LEVOPHED) 16 mg in sodium chloride 0.9 % 250 mL infusion, 1-100 mcg/min, IntraVENous, Continuous, oLla Duvall DO, Last Rate: 3.8 mL/hr at 11/11/23 0337, 4 mcg/min at 11/11/23 7774    sodium chloride flush 0.9 % injection 5-40 mL, 5-40 mL, IntraVENous, 2 times per day, Rachele Cabrales MD    sodium chloride flush 0.9 % injection 5-40 mL, 5-40 mL, IntraVENous, PRN, Rachele Cabrales MD    0.9

## 2023-11-11 NOTE — H&P
CRITICAL CARE- History & Physical      Patient: Joseline Price    Unit/Bed:05/005A  YOB: 1985  MRN: [id number]   Acct: [de-identified]   PCP: PAPA Jordan - CNP    Date of Service: Pt seen/examined on 11/11/23  and Admitted to Inpatient with expected LOS greater than two midnights due to medical therapy. Chief Complaint:  Flank pain    Assessment and Plan:-    Hypotension: 2/2 sedation, polysubstance abuse, hypovolemia. Patient was agitated due to cocaine and meth intoxication - required versed sedation. MAP in 50s - central line placed for levo - titrate as able. IV fluids  Monitor UOP    SATYA: Cr 2.6 from 0.8 on 10/28/23 likely 2/2 rhabdomyolysis, hypovolemia (CK 1465; BUN:Cr 22)  IV fluids   Monitor creatinine, electrolytes, UOP    High anion gap metabolic acidosis:   pH 7.2, HCO3 18, AG 18  BiCarb deficit ~ 188 - 150 mEq Bicarb in D5W ordered at 150 mL/hr x 6 hrs (approx 1Liter)  BUN 59; Lactate, salicylates, EtOH pending - f/u    Chronic microcytic anemia: Hgb stable 9.6, baseline for patient has been 9.1 - 10 over the past month. No signs of active bleeding at this time. Daily CBC   Check folate and B-12 levels     Elevated transaminases: AST 86,   Pt has HCV history, polysubstance abuse   Trend AST, ALT  Check salicylate and acetaminophen levels, avoid hepatotoxic medications as much as able     Polysubstance abuse: meth, cocaine, opiates on UDA   Consult case work for counseling and rehab    History Of Present Illness:    42 yo male presented to the ED with complaint of \"kidney pain\" worse on right than left, unknown duration, rates pain 10/10. No acute findings on CTAP. In the ED he was agitated and treated with versed, admitted to meth and cocaine use. Pt became hypotensive and required levo and CVC placement. Still maintaining his airway at this time. Admitted to ICU.        Past Medical History:        Diagnosis Date    Anxiety     Depression     Kidney stone

## 2023-11-11 NOTE — PLAN OF CARE
Pt was seen and examined this AM, in no acute distress. Pt has been off of levo since ~4am this AM. UDS positive for amphetamines, cocaine, opiates, and fentanyl. ETOH <0.1. CK 1465, will follow up tomorrow CK. Pt states that he has a h/o alcohol withdrawal that required hospitalization 2/2 seizures. Denies being in any pain currently, denies CP, SOB, abd pain. Admits to feeling \"like shit\" and has N/V. Per nurse, there was no report of vomiting and has not vomited since being on the unit. Pt states he does not remember when he last ate or drank. Will continue IVF and start a regular diet. CIWA protocol in place.

## 2023-11-11 NOTE — PROCEDURES
Central Line    Date/Time: 11/11/2023 4:02 AM    Performed by: Lawrence Young DO  Authorized by: Francoise Winter DO  Consent: The procedure was performed in an emergent situation. Verbal consent not obtained. Written consent not obtained.   Patient identity confirmed: arm band and hospital-assigned identification number  Indications: vascular access  Anesthesia: local infiltration    Anesthesia:  Local Anesthetic: lidocaine 1% without epinephrine  Anesthetic total: 5 mL    Sedation:  Patient sedated: yes  Sedation type: anxiolysis  Sedatives: midazolam    Preparation: skin prepped with 2% chlorhexidine  Skin prep agent dried: skin prep agent completely dried prior to procedure  Hand hygiene: hand hygiene performed prior to central venous catheter insertion  Location details: right internal jugular  Patient position: Trendelenburg  Catheter type: triple lumen  Catheter size: 7 Fr  Ultrasound guidance: yes  Number of attempts: 3  Successful placement: yes  Post-procedure: line sutured and dressing applied  Assessment: blood return through all ports, free fluid flow, placement verified by x-ray and no pneumothorax on x-ray  Comments: First 2 attempts by Dr. Akash Yoon - too high  Third attempt successfully placed by Dr. Joyce Wilkerson was at bedside for the procedure

## 2023-11-12 VITALS
HEART RATE: 89 BPM | TEMPERATURE: 98.2 F | HEIGHT: 71 IN | OXYGEN SATURATION: 94 % | DIASTOLIC BLOOD PRESSURE: 76 MMHG | RESPIRATION RATE: 12 BRPM | BODY MASS INDEX: 24.35 KG/M2 | SYSTOLIC BLOOD PRESSURE: 141 MMHG | WEIGHT: 173.94 LBS

## 2023-11-12 LAB
ALBUMIN SERPL BCG-MCNC: 3.4 G/DL (ref 3.5–5.1)
ALP SERPL-CCNC: 125 U/L (ref 38–126)
ALT SERPL W/O P-5'-P-CCNC: 90 U/L (ref 11–66)
ANION GAP SERPL CALC-SCNC: 10 MEQ/L (ref 8–16)
AST SERPL-CCNC: 71 U/L (ref 5–40)
BASOPHILS ABSOLUTE: 0 THOU/MM3 (ref 0–0.1)
BASOPHILS NFR BLD AUTO: 0.6 %
BILIRUB SERPL-MCNC: 0.3 MG/DL (ref 0.3–1.2)
BUN SERPL-MCNC: 25 MG/DL (ref 7–22)
CALCIUM SERPL-MCNC: 9.3 MG/DL (ref 8.5–10.5)
CHLORIDE SERPL-SCNC: 106 MEQ/L (ref 98–111)
CK SERPL-CCNC: 331 U/L (ref 55–170)
CO2 SERPL-SCNC: 27 MEQ/L (ref 23–33)
CREAT SERPL-MCNC: 0.8 MG/DL (ref 0.4–1.2)
DEPRECATED RDW RBC AUTO: 51.8 FL (ref 35–45)
EOSINOPHIL NFR BLD AUTO: 1.9 %
EOSINOPHILS ABSOLUTE: 0.1 THOU/MM3 (ref 0–0.4)
ERYTHROCYTE [DISTWIDTH] IN BLOOD BY AUTOMATED COUNT: 18.4 % (ref 11.5–14.5)
GFR SERPL CREATININE-BSD FRML MDRD: > 60 ML/MIN/1.73M2
GLUCOSE SERPL-MCNC: 85 MG/DL (ref 70–108)
HCT VFR BLD AUTO: 30.9 % (ref 42–52)
HGB BLD-MCNC: 9.5 GM/DL (ref 14–18)
IMM GRANULOCYTES # BLD AUTO: 0.01 THOU/MM3 (ref 0–0.07)
IMM GRANULOCYTES NFR BLD AUTO: 0.2 %
LYMPHOCYTES ABSOLUTE: 2.4 THOU/MM3 (ref 1–4.8)
LYMPHOCYTES NFR BLD AUTO: 44.3 %
MAGNESIUM SERPL-MCNC: 2.5 MG/DL (ref 1.6–2.4)
MCH RBC QN AUTO: 24.1 PG (ref 26–33)
MCHC RBC AUTO-ENTMCNC: 30.7 GM/DL (ref 32.2–35.5)
MCV RBC AUTO: 78.4 FL (ref 80–94)
MONOCYTES ABSOLUTE: 0.7 THOU/MM3 (ref 0.4–1.3)
MONOCYTES NFR BLD AUTO: 12.8 %
NEUTROPHILS NFR BLD AUTO: 40.2 %
NRBC BLD AUTO-RTO: 0 /100 WBC
PLATELET # BLD AUTO: 427 THOU/MM3 (ref 130–400)
PMV BLD AUTO: 9 FL (ref 9.4–12.4)
POTASSIUM SERPL-SCNC: 3.7 MEQ/L (ref 3.5–5.2)
PROT SERPL-MCNC: 6.9 G/DL (ref 6.1–8)
RBC # BLD AUTO: 3.94 MILL/MM3 (ref 4.7–6.1)
SEGMENTED NEUTROPHILS ABSOLUTE COUNT: 2.2 THOU/MM3 (ref 1.8–7.7)
SODIUM SERPL-SCNC: 143 MEQ/L (ref 135–145)
WBC # BLD AUTO: 5.4 THOU/MM3 (ref 4.8–10.8)

## 2023-11-12 PROCEDURE — 6370000000 HC RX 637 (ALT 250 FOR IP)

## 2023-11-12 PROCEDURE — 99233 SBSQ HOSP IP/OBS HIGH 50: CPT | Performed by: INTERNAL MEDICINE

## 2023-11-12 PROCEDURE — 51798 US URINE CAPACITY MEASURE: CPT

## 2023-11-12 PROCEDURE — 99222 1ST HOSP IP/OBS MODERATE 55: CPT

## 2023-11-12 PROCEDURE — 6370000000 HC RX 637 (ALT 250 FOR IP): Performed by: EMERGENCY MEDICINE

## 2023-11-12 PROCEDURE — 82550 ASSAY OF CK (CPK): CPT

## 2023-11-12 PROCEDURE — 2580000003 HC RX 258: Performed by: INTERNAL MEDICINE

## 2023-11-12 PROCEDURE — 36415 COLL VENOUS BLD VENIPUNCTURE: CPT

## 2023-11-12 PROCEDURE — 6370000000 HC RX 637 (ALT 250 FOR IP): Performed by: INTERNAL MEDICINE

## 2023-11-12 PROCEDURE — 6360000002 HC RX W HCPCS: Performed by: EMERGENCY MEDICINE

## 2023-11-12 PROCEDURE — 2580000003 HC RX 258

## 2023-11-12 PROCEDURE — 99238 HOSP IP/OBS DSCHRG MGMT 30/<: CPT | Performed by: INTERNAL MEDICINE

## 2023-11-12 PROCEDURE — 83735 ASSAY OF MAGNESIUM: CPT

## 2023-11-12 PROCEDURE — 80053 COMPREHEN METABOLIC PANEL: CPT

## 2023-11-12 PROCEDURE — C9113 INJ PANTOPRAZOLE SODIUM, VIA: HCPCS | Performed by: EMERGENCY MEDICINE

## 2023-11-12 PROCEDURE — 85025 COMPLETE CBC W/AUTO DIFF WBC: CPT

## 2023-11-12 RX ADMIN — SODIUM CHLORIDE, POTASSIUM CHLORIDE, SODIUM LACTATE AND CALCIUM CHLORIDE: 600; 310; 30; 20 INJECTION, SOLUTION INTRAVENOUS at 03:10

## 2023-11-12 RX ADMIN — SODIUM CHLORIDE, POTASSIUM CHLORIDE, SODIUM LACTATE AND CALCIUM CHLORIDE: 600; 310; 30; 20 INJECTION, SOLUTION INTRAVENOUS at 10:58

## 2023-11-12 RX ADMIN — ENOXAPARIN SODIUM 40 MG: 100 INJECTION SUBCUTANEOUS at 09:05

## 2023-11-12 RX ADMIN — Medication 100 MG: at 09:05

## 2023-11-12 RX ADMIN — VENLAFAXINE HYDROCHLORIDE 100 MG: 50 TABLET ORAL at 09:05

## 2023-11-12 RX ADMIN — ACETAMINOPHEN 650 MG: 325 TABLET ORAL at 11:41

## 2023-11-12 RX ADMIN — PANTOPRAZOLE SODIUM 40 MG: 40 INJECTION, POWDER, FOR SOLUTION INTRAVENOUS at 09:05

## 2023-11-12 RX ADMIN — SODIUM CHLORIDE, POTASSIUM CHLORIDE, SODIUM LACTATE AND CALCIUM CHLORIDE: 600; 310; 30; 20 INJECTION, SOLUTION INTRAVENOUS at 11:38

## 2023-11-12 ASSESSMENT — PAIN SCALES - GENERAL
PAINLEVEL_OUTOF10: 6
PAINLEVEL_OUTOF10: 5
PAINLEVEL_OUTOF10: 4
PAINLEVEL_OUTOF10: 6

## 2023-11-12 ASSESSMENT — PAIN DESCRIPTION - LOCATION
LOCATION: BACK;FLANK
LOCATION: BACK;FLANK
LOCATION: BACK

## 2023-11-12 ASSESSMENT — ENCOUNTER SYMPTOMS
NAUSEA: 0
COUGH: 0
CONSTIPATION: 0
VOMITING: 0
ABDOMINAL PAIN: 0
CHEST TIGHTNESS: 0
SHORTNESS OF BREATH: 0
DIARRHEA: 0

## 2023-11-12 ASSESSMENT — PAIN DESCRIPTION - ORIENTATION: ORIENTATION: MID;RIGHT

## 2023-11-12 NOTE — FLOWSHEET NOTE
11/12/23 1304   Safe Environment   Safety Measures Bed in low position;Call light within reach; Fall prevention (comment); Side rails X 2;Standard Safety Measures  (Virtual nurse safety round completed.)     Introduced self and explained virtual nursing. Patient is awake and  alert and answers questions. No distress visible. Inquired if patient had needs for addressing pain, potty, positioning, access to personal things and any other personal needs. No needs at this time.

## 2023-11-12 NOTE — PROGRESS NOTES
CRITICAL CARE- History & Physical      Patient: Leonard Briones    Unit/Bed:4K-05/005-A  YOB: 1985  MRN: [id number]   Acct: [de-identified]   PCP: PAPA Partida - CNP    Date of Service: Pt seen/examined on 11/12/23  and Admitted to Inpatient with expected LOS greater than two midnights due to medical therapy. Chief Complaint:  Flank pain    Assessment and Plan:-    Hypotension: (Resolved) 2/2 sedation, polysubstance abuse, hypovolemia. Patient was agitated due to cocaine and meth intoxication - required versed sedation. 125 mL lactated Ringer's  Monitor UOP    SATYA: (Resolved) Cr 2.6 from 0.8 on 10/28/23 likely 2/2 rhabdomyolysis, hypovolemia (, creatinine 0.8)  125 mL/h lactated Ringer's  Monitor creatinine, electrolytes, UOP    Chronic microcytic anemia: Hgb stable 9.5, baseline for patient has been 9.1 - 10 over the past month. No signs of active bleeding at this time. Daily CBC   Check folate and B-12 levels     Elevated transaminases: AST 86,   Pt has HCV history, polysubstance abuse   Trend AST, ALT  Check salicylate and acetaminophen levels, avoid hepatotoxic medications as much as able     Polysubstance abuse: meth, cocaine, opiates on UDA   Consult case work for counseling and rehab    History Of Present Illness:    65-year-old male past medical history liver disease from hepatitis C, opiate abuse, kidney stone presented to the ED 11/11/2023 due to \"kidney pain\" bilaterally worse on the right than the left. States that the pain as it was a 10 out of 10 on presentation. CT abdomen pelvis showed no acute findings. Patient endorses methamphetamine and cocaine use. In the ED patient became agitated requiring treatment with Versed, later became hypotensive requiring Levophed and central line placement. Admitted to the ICU for further evaluation and management.       Past Medical History:        Diagnosis Date    Anxiety     Depression     Kidney stone     Liver

## 2023-11-12 NOTE — PROGRESS NOTES
Patient tired. Alert and oriented. Very slow and weak. Patient ataxic with movements. Upper and lower extremities. Patient reports drug use and reports history of alcohol use. He does report back pain. Also states pain in is kidneys. He stated both kidneys. He endorses not being out of bed since he got here.

## 2023-11-12 NOTE — FLOWSHEET NOTE
11/12/23 1200   Safe Environment   Safety Measures Bed in low position;Call light within reach; Fall prevention (comment); Side rails X 2;Standard Safety Measures  (Virtual nurse safety round completed.)     Introduced self and explained virtual nursing. Patient is awake and  alert and answers questions. No distress visible. Inquired if patient had needs for addressing pain, potty, positioning, access to personal things and any other personal needs. No needs at this time. Referred  to page 13 of the handbook for fall prevention review. Educated on call light use. Voices understanding on using the call light. Call light in reach of patient.

## 2023-11-12 NOTE — PROGRESS NOTES
Patient and nurse discussed leaving against against medical advice.    Patient encouraged to seek medical attention with any emergency after leaving hospital.

## 2023-11-12 NOTE — PROGRESS NOTES
Virtual nurse completed remaining admission documents with patient. Patient is alert and oriented. Previous documentation in the chart shows that staff is aware that patient left AMA in the past. Patient states he is comfortable being here right now and is not planning on leaving. Medication reconciliation completed. Call light in reach. Fall prevention education completed. Referred patient to page 13 of the handbook for fall prevention review. Virtual nursing explained to patient and services are accepted.

## 2023-11-12 NOTE — CONSULTS
11/12/2023 04:33 AM    RBC 3.94 11/12/2023 04:33 AM    HGB 9.5 11/12/2023 04:33 AM    HCT 30.9 11/12/2023 04:33 AM    MCV 78.4 11/12/2023 04:33 AM    MCH 24.1 11/12/2023 04:33 AM    MCHC 30.7 11/12/2023 04:33 AM     11/12/2023 04:33 AM    MPV 9.0 11/12/2023 04:33 AM     BMP:    Lab Results   Component Value Date/Time     11/12/2023 04:33 AM    K 3.7 11/12/2023 04:33 AM    K 3.6 10/28/2023 03:42 AM     11/12/2023 04:33 AM    CO2 27 11/12/2023 04:33 AM    BUN 25 11/12/2023 04:33 AM    CREATININE 0.8 11/12/2023 04:33 AM    CALCIUM 9.3 11/12/2023 04:33 AM    LABGLOM >60 11/12/2023 04:33 AM    GLUCOSE 85 11/12/2023 04:33 AM     BUN/Creatinine:    Lab Results   Component Value Date/Time    BUN 25 11/12/2023 04:33 AM    CREATININE 0.8 11/12/2023 04:33 AM     Magnesium:    Lab Results   Component Value Date/Time    MG 2.5 11/12/2023 04:33 AM     Phosphorus:    Lab Results   Component Value Date/Time    PHOS 3.5 11/11/2023 10:10 AM     PT/INR:    Lab Results   Component Value Date/Time    INR 1.22 04/12/2022 05:47 PM     U/A:    Lab Results   Component Value Date/Time    COLORU YELLOW 11/11/2023 01:10 AM    PHUR 5.0 11/11/2023 01:10 AM    LABCAST 4-8 HYALINE 11/11/2023 01:10 AM    LABCAST NONE SEEN 11/11/2023 01:10 AM    WBCUA 5-9 11/11/2023 01:10 AM    RBCUA 10-15 11/11/2023 01:10 AM    MUCUS THREADS 11/11/2023 01:10 AM    YEAST NONE SEEN 11/11/2023 01:10 AM    BACTERIA NONE SEEN 11/11/2023 01:10 AM    SPECGRAV 1.019 11/11/2023 01:10 AM    LEUKOCYTESUR NEGATIVE 11/11/2023 01:10 AM    LEUKOCYTESUR NEGATIVE 10/27/2023 11:10 PM    UROBILINOGEN 0.2 11/11/2023 01:10 AM    BILIRUBINUR NEGATIVE 11/11/2023 01:10 AM    BLOODU MODERATE 11/11/2023 01:10 AM    GLUCOSEU NEGATIVE 10/27/2023 11:10 PM    AMORPHOUS DEBRIS 11/05/2020 08:15 PM       Imaging: The patient has had a Renal Ultrasound on 11/11/2023 which I have independently reviewed along with its accompanying report.   The study demonstrates     FINDINGS:

## 2023-11-12 NOTE — PROGRESS NOTES
This resource RN alerted patient intends to leave AMA, order received to remove RIJ CVC. RIJ CVC dressing removed, absence of redness, swelling at insertion site. Sutures removed. Site cleansed with chloraprep for 30 seconds. CVC removed, intact, tip inspected, intact. Manual pressure applied. Sterile dressing with CHG gel applied. No complications. Instructed to leave on for 24-48 hours, reviewed signs, symptoms of infection, instructed to call if any complications. Instructed must remain in bed for 1 hour. Primary RN updated.

## 2023-11-13 ENCOUNTER — TELEPHONE (OUTPATIENT)
Dept: UROLOGY | Age: 38
End: 2023-11-13

## 2023-11-13 ENCOUNTER — CARE COORDINATION (OUTPATIENT)
Dept: CASE MANAGEMENT | Age: 38
End: 2023-11-13

## 2023-11-13 LAB
BACTERIA SPEC AEROBE CULT: NORMAL
BACTERIA UR CULT: ABNORMAL
ORGANISM: ABNORMAL

## 2023-11-13 NOTE — CARE COORDINATION
Care Transitions Outreach Attempt    Call within 2 business days of discharge: Yes   Attempted to reach patient for transitions of care follow up. Unable to reach patient. Patient: Eddie Clemons Patient : 1985 MRN: [id number]    Last Discharge Facility       Date Complaint Diagnosis Description Type Department Provider    11/10/23 Back Pain SATYA (acute kidney injury) ([address] [facility]) . .. ED to Hosp-Admission (Discharged) (ADMITTED) Antione Martinez MD; Gabriel Reason. .. Was this an external facility discharge? No Discharge Facility Name: [facility]    Noted following upcoming appointments from discharge chart review:   [facility] follow up appointment(s):   Future Appointments   Date Time Provider [facility] [address]   2023  9:40 AM PAPA Baird - CNP [facility] [address]     1st attempt to contact pt for initial care transition follow up. Unable to reach pt. Left message with contact information and request for call back.       Patricia Barnhart RN

## 2023-11-14 ENCOUNTER — CARE COORDINATION (OUTPATIENT)
Dept: CASE MANAGEMENT | Age: 38
End: 2023-11-14

## 2023-11-14 NOTE — CARE COORDINATION
Care Transitions Outreach Attempt    Call within 2 business days of discharge: Yes   Attempted to reach patient for transitions of care follow up. Unable to reach patient. Patient: Ad Cox Patient : 1985 MRN: [id number]    Last Discharge Facility       Date Complaint Diagnosis Description Type Department Provider    11/10/23 Back Pain SATYA (acute kidney injury) ([address] [facility]) . .. ED to Hosp-Admission (Discharged) (ADMITTED) KARTHIKEYAN Carbajal, Selena Pereira MD; Antwon Avalos. .. Was this an external facility discharge? No Discharge Facility Name: [facility]    Noted following upcoming appointments from discharge chart review:   [facility] follow up appointment(s): No future appointments. Non-[facility]  follow up appointment(s):     2nd attempt to contact pt for initial care transition follow up. Pt left AMA on 23. Unable to reach pt. Left message with contact information and request for call back. Pt had an appt scheduled with PCP for today, 23. Pt was a no show. Sent message to [place],  to mail UTR letter. Letter created.       Neva Patterson RN

## 2023-11-14 NOTE — DISCHARGE SUMMARY
[facility]                                           Discharge Summary      Patient - Jesus Choi   MRN -  [id number]   [facility] # - [de-identified]      - 1985   43 y.o. Admission Date: 11/10/2023    Discharge Date and Time: 2023-left against Medical advice    Admitting Diagnosis:    Principal Problem:    Hypotension  Active Problems:    Substance abuse ([address])    Acute renal failure ([address])  Resolved Problems:    * No resolved hospital problems. *  Acute kidney injury  Rhabdomyolysis  Chronic microcytic anemia  Elevated transaminases  Polysubstance abuse    Final Diagnosis / Assesment:   Principal Problem:    Hypotension  Active Problems:    Substance abuse (HCC)    Acute renal failure (HCC)  Resolved Problems:    * No resolved hospital problems. *   Acute kidney injury  Rhabdomyolysis  Chronic microcytic anemia  Elevated transaminases  Polysubstance abuse      Hospital Course: 79-year-old male past medical history liver disease from hepatitis C, opiate abuse, kidney stone presented to the ED 2023 due to \"kidney pain\" bilaterally worse on the right than the left. States that the pain as it was a 10 out of 10 on presentation. CT abdomen pelvis showed no acute findings. Patient endorses methamphetamine and cocaine use. In the ED patient became agitated requiring treatment with Versed, later became hypotensive requiring Levophed and central line placement. Admitted to the ICU for further evaluation and management. Patient was treated with IV fluids initially with a bicarb drip followed by Ringer's lactate. Patient acute kidney injury improved with IV hydration. He is more awake alert. He underwent ultrasound of the kidneys on 2023. Patient found to have a mild right-sided hydronephrosis.

## 2023-11-19 LAB
EKG ATRIAL RATE: 82 BPM
EKG P AXIS: 86 DEGREES
EKG P-R INTERVAL: 188 MS
EKG Q-T INTERVAL: 416 MS
EKG QRS DURATION: 92 MS
EKG QTC CALCULATION (BAZETT): 486 MS
EKG R AXIS: 23 DEGREES
EKG T AXIS: 54 DEGREES
EKG VENTRICULAR RATE: 82 BPM

## 2023-11-20 ENCOUNTER — CARE COORDINATION (OUTPATIENT)
Dept: CARE COORDINATION | Age: 38
End: 2023-11-20

## 2023-11-28 NOTE — SIGNIFICANT EVENT
Patient self extubated. All sedation was stopped. Patient was alert and oriented. Bedside swallow passed with nursing. 100% SPO2 on room air. At this time patient is agreeable to stay although he does state he will be leaving AMA this morning. Electronically signed by [place] John.  Drew Sterling CNP on 8/24/2023 at 2:56 AM No

## 2023-12-01 ENCOUNTER — OFFICE VISIT (OUTPATIENT)
Dept: INTERNAL MEDICINE CLINIC | Age: 38
End: 2023-12-01
Payer: COMMERCIAL

## 2023-12-01 VITALS
HEART RATE: 104 BPM | HEIGHT: 71 IN | RESPIRATION RATE: 18 BRPM | DIASTOLIC BLOOD PRESSURE: 75 MMHG | BODY MASS INDEX: 27.02 KG/M2 | WEIGHT: 193 LBS | SYSTOLIC BLOOD PRESSURE: 141 MMHG

## 2023-12-01 DIAGNOSIS — R56.9 SEIZURE (HCC): ICD-10-CM

## 2023-12-01 DIAGNOSIS — F90.9 ATTENTION DEFICIT HYPERACTIVITY DISORDER (ADHD), UNSPECIFIED ADHD TYPE: ICD-10-CM

## 2023-12-01 DIAGNOSIS — F41.9 ANXIETY AND DEPRESSION: ICD-10-CM

## 2023-12-01 DIAGNOSIS — F11.20 SEVERE OPIOID USE DISORDER (HCC): Primary | ICD-10-CM

## 2023-12-01 DIAGNOSIS — G47.00 INSOMNIA, UNSPECIFIED TYPE: ICD-10-CM

## 2023-12-01 DIAGNOSIS — I10 ESSENTIAL HYPERTENSION: ICD-10-CM

## 2023-12-01 DIAGNOSIS — F32.A ANXIETY AND DEPRESSION: ICD-10-CM

## 2023-12-01 PROCEDURE — 99214 OFFICE O/P EST MOD 30 MIN: CPT | Performed by: NURSE PRACTITIONER

## 2023-12-01 PROCEDURE — 1111F DSCHRG MED/CURRENT MED MERGE: CPT | Performed by: NURSE PRACTITIONER

## 2023-12-01 PROCEDURE — 4004F PT TOBACCO SCREEN RCVD TLK: CPT | Performed by: NURSE PRACTITIONER

## 2023-12-01 PROCEDURE — G8427 DOCREV CUR MEDS BY ELIG CLIN: HCPCS | Performed by: NURSE PRACTITIONER

## 2023-12-01 PROCEDURE — G8484 FLU IMMUNIZE NO ADMIN: HCPCS | Performed by: NURSE PRACTITIONER

## 2023-12-01 PROCEDURE — 3074F SYST BP LT 130 MM HG: CPT | Performed by: NURSE PRACTITIONER

## 2023-12-01 PROCEDURE — 3078F DIAST BP <80 MM HG: CPT | Performed by: NURSE PRACTITIONER

## 2023-12-01 PROCEDURE — 80305 DRUG TEST PRSMV DIR OPT OBS: CPT | Performed by: NURSE PRACTITIONER

## 2023-12-01 PROCEDURE — G8417 CALC BMI ABV UP PARAM F/U: HCPCS | Performed by: NURSE PRACTITIONER

## 2023-12-01 RX ORDER — BUPRENORPHINE AND NALOXONE 8; 2 MG/1; MG/1
1 FILM, SOLUBLE BUCCAL; SUBLINGUAL 2 TIMES DAILY
Qty: 6 FILM | Refills: 0 | Status: SHIPPED | OUTPATIENT
Start: 2023-12-01 | End: 2023-12-04

## 2023-12-01 RX ORDER — LISINOPRIL 20 MG/1
20 TABLET ORAL DAILY
Qty: 3 TABLET | Refills: 0 | Status: SHIPPED | OUTPATIENT
Start: 2023-12-01 | End: 2023-12-04 | Stop reason: SDUPTHER

## 2023-12-01 RX ORDER — ONDANSETRON 4 MG/1
4 TABLET, FILM COATED ORAL EVERY 8 HOURS PRN
Qty: 6 TABLET | Refills: 0 | Status: CANCELLED | OUTPATIENT
Start: 2023-12-01

## 2023-12-01 RX ORDER — LISDEXAMFETAMINE DIMESYLATE CAPSULES 50 MG/1
50 CAPSULE ORAL EVERY MORNING
Qty: 3 CAPSULE | Refills: 0 | Status: SHIPPED | OUTPATIENT
Start: 2023-12-01 | End: 2023-12-07 | Stop reason: SDUPTHER

## 2023-12-01 RX ORDER — PREGABALIN 200 MG/1
200 CAPSULE ORAL 3 TIMES DAILY
Qty: 9 CAPSULE | Refills: 0 | Status: SHIPPED | OUTPATIENT
Start: 2023-12-01 | End: 2023-12-07 | Stop reason: SDUPTHER

## 2023-12-01 RX ORDER — VENLAFAXINE 100 MG/1
100 TABLET ORAL 3 TIMES DAILY
Qty: 9 TABLET | Refills: 0 | Status: SHIPPED | OUTPATIENT
Start: 2023-12-01 | End: 2023-12-07 | Stop reason: SDUPTHER

## 2023-12-01 RX ORDER — CLONAZEPAM 0.5 MG/1
0.5 TABLET ORAL 2 TIMES DAILY PRN
Qty: 6 TABLET | Refills: 0 | Status: SHIPPED | OUTPATIENT
Start: 2023-12-01 | End: 2023-12-07 | Stop reason: SDUPTHER

## 2023-12-01 RX ORDER — MIRTAZAPINE 45 MG/1
45 TABLET, FILM COATED ORAL NIGHTLY
Qty: 3 TABLET | Refills: 0 | Status: SHIPPED | OUTPATIENT
Start: 2023-12-01 | End: 2023-12-04 | Stop reason: SDUPTHER

## 2023-12-01 NOTE — PROGRESS NOTES
11/12/2023    K 3.7 11/12/2023     11/12/2023    CREATININE 0.8 11/12/2023    BUN 25 (H) 11/12/2023    CO2 27 11/12/2023    TSH 1.260 09/11/2023    PSA 0.98 04/25/2019    INR 1.22 (H) 04/12/2022    LABA1C 11.5 (H) 05/24/2021       Assessment/Plan      1. Severe opioid use disorder (HCC)    - POCT Rapid Drug Screen  - buprenorphine-naloxone (SUBOXONE) 8-2 MG FILM SL film; Place 1 Film under the tongue in the morning and at bedtime for 3 days. Max Daily Amount: 2 Film  Dispense: 6 Film; Refill: 0  - has narcan at home  - plan inpatient rehab Monday    2. Anxiety and depression    - Agree to klonopin if patient able to sustain illicit drug use  - continue effexor    3. Attention deficit hyperactivity disorder (ADHD), unspecified ADHD type    - continue vyvanse    4. Essential hypertension    - continue lisinopril    5. Seizure ([address])    - history of, related to drug use    7. Insomnia, unspecified type    - continue remeron      No follow-ups on file. Patient given educational materials - see patient instructions. Discussed use, benefit, and side effects of prescribed medications. All patient questions answered. Pt voiced understanding. Reviewed health maintenance.        Electronically signed PAPA Ugarte - CNP on 12/1/23 at 9:30 AM EST

## 2023-12-04 DIAGNOSIS — G47.00 INSOMNIA, UNSPECIFIED TYPE: ICD-10-CM

## 2023-12-04 DIAGNOSIS — I10 ESSENTIAL HYPERTENSION: ICD-10-CM

## 2023-12-04 RX ORDER — MIRTAZAPINE 45 MG/1
45 TABLET, FILM COATED ORAL NIGHTLY
Qty: 3 TABLET | Refills: 0 | Status: SHIPPED | OUTPATIENT
Start: 2023-12-04 | End: 2023-12-07 | Stop reason: SDUPTHER

## 2023-12-04 RX ORDER — LISINOPRIL 20 MG/1
20 TABLET ORAL DAILY
Qty: 3 TABLET | Refills: 0 | Status: SHIPPED | OUTPATIENT
Start: 2023-12-04 | End: 2023-12-07 | Stop reason: SDUPTHER

## 2023-12-05 ENCOUNTER — SOCIAL WORK (OUTPATIENT)
Dept: INTERNAL MEDICINE CLINIC | Age: 38
End: 2023-12-05

## 2023-12-05 NOTE — PROGRESS NOTES
Patient did not go to [place] on 11/4 as planned due to not having contact information. Patient contacted sw today upset about not having medication. Patient reports that he had everything planned to go to though cancelled his write at the last minute. Sw attempted multiple times to contact patient utilizing the number that she told to. Sw explained that it appears to be all a misunderstanding. Sw provided patient with [facility] contact information for him to be able to contact and set up an intake again. Sw will speak to provider about medications, Sw will assist patient with transportation also.

## 2023-12-05 NOTE — PROGRESS NOTES
Sw staffed with provider. Patient is going to treatment on 12/8 @9A with transportation from [facility]. Provider sending medications for patient. Sw will continue to assist as needed.

## 2023-12-07 DIAGNOSIS — F32.A ANXIETY AND DEPRESSION: ICD-10-CM

## 2023-12-07 DIAGNOSIS — F41.9 ANXIETY AND DEPRESSION: ICD-10-CM

## 2023-12-07 DIAGNOSIS — I10 ESSENTIAL HYPERTENSION: ICD-10-CM

## 2023-12-07 DIAGNOSIS — F90.9 ATTENTION DEFICIT HYPERACTIVITY DISORDER (ADHD), UNSPECIFIED ADHD TYPE: ICD-10-CM

## 2023-12-07 DIAGNOSIS — F11.20: ICD-10-CM

## 2023-12-07 DIAGNOSIS — F11.20: Primary | ICD-10-CM

## 2023-12-07 DIAGNOSIS — G47.00 INSOMNIA, UNSPECIFIED TYPE: ICD-10-CM

## 2023-12-07 RX ORDER — BUPRENORPHINE AND NALOXONE 8; 2 MG/1; MG/1
1 FILM, SOLUBLE BUCCAL; SUBLINGUAL 2 TIMES DAILY
Qty: 2 FILM | Refills: 0 | Status: SHIPPED | OUTPATIENT
Start: 2023-12-07 | End: 2023-12-07 | Stop reason: SDUPTHER

## 2023-12-07 RX ORDER — VENLAFAXINE 100 MG/1
100 TABLET ORAL 3 TIMES DAILY
Qty: 3 TABLET | Refills: 0 | Status: SHIPPED | OUTPATIENT
Start: 2023-12-07 | End: 2023-12-07 | Stop reason: SDUPTHER

## 2023-12-07 RX ORDER — MIRTAZAPINE 45 MG/1
45 TABLET, FILM COATED ORAL NIGHTLY
Qty: 28 TABLET | Refills: 0 | Status: SHIPPED | OUTPATIENT
Start: 2023-12-08 | End: 2024-01-05

## 2023-12-07 RX ORDER — LISDEXAMFETAMINE DIMESYLATE CAPSULES 50 MG/1
50 CAPSULE ORAL EVERY MORNING
Qty: 28 CAPSULE | Refills: 0 | Status: SHIPPED | OUTPATIENT
Start: 2023-12-07 | End: 2024-01-04

## 2023-12-07 RX ORDER — PREGABALIN 200 MG/1
200 CAPSULE ORAL 3 TIMES DAILY
Qty: 3 CAPSULE | Refills: 0 | Status: SHIPPED | OUTPATIENT
Start: 2023-12-07 | End: 2023-12-07 | Stop reason: SDUPTHER

## 2023-12-07 RX ORDER — CLONAZEPAM 0.5 MG/1
0.5 TABLET ORAL 2 TIMES DAILY PRN
Qty: 28 TABLET | Refills: 0 | Status: SHIPPED | OUTPATIENT
Start: 2023-12-08 | End: 2024-01-05

## 2023-12-07 RX ORDER — LISDEXAMFETAMINE DIMESYLATE CAPSULES 50 MG/1
50 CAPSULE ORAL EVERY MORNING
Qty: 1 CAPSULE | Refills: 0 | Status: SHIPPED | OUTPATIENT
Start: 2023-12-07 | End: 2023-12-07 | Stop reason: SDUPTHER

## 2023-12-07 RX ORDER — PREGABALIN 200 MG/1
200 CAPSULE ORAL 3 TIMES DAILY
Qty: 84 CAPSULE | Refills: 0 | Status: SHIPPED | OUTPATIENT
Start: 2023-12-07 | End: 2024-01-04

## 2023-12-07 RX ORDER — VENLAFAXINE 100 MG/1
100 TABLET ORAL 3 TIMES DAILY
Qty: 84 TABLET | Refills: 0 | Status: SHIPPED | OUTPATIENT
Start: 2023-12-07 | End: 2024-01-04

## 2023-12-07 RX ORDER — LISINOPRIL 20 MG/1
20 TABLET ORAL DAILY
Qty: 28 TABLET | Refills: 0 | Status: SHIPPED | OUTPATIENT
Start: 2023-12-08 | End: 2024-01-05

## 2023-12-07 RX ORDER — CLONAZEPAM 0.5 MG/1
0.5 TABLET ORAL 2 TIMES DAILY PRN
Qty: 2 TABLET | Refills: 0 | Status: SHIPPED | OUTPATIENT
Start: 2023-12-07 | End: 2023-12-07 | Stop reason: SDUPTHER

## 2023-12-07 RX ORDER — BUPRENORPHINE AND NALOXONE 8; 2 MG/1; MG/1
1 FILM, SOLUBLE BUCCAL; SUBLINGUAL 2 TIMES DAILY
Qty: 56 FILM | Refills: 0 | Status: SHIPPED | OUTPATIENT
Start: 2023-12-07 | End: 2024-01-04

## 2023-12-09 ENCOUNTER — HOSPITAL ENCOUNTER (EMERGENCY)
Age: 38
Discharge: LEFT BEFORE DISCHARGE | End: 2023-12-09

## 2023-12-09 VITALS
DIASTOLIC BLOOD PRESSURE: 89 MMHG | SYSTOLIC BLOOD PRESSURE: 102 MMHG | OXYGEN SATURATION: 100 % | TEMPERATURE: 99.5 F | HEART RATE: 115 BPM | BODY MASS INDEX: 28 KG/M2 | HEIGHT: 71 IN | RESPIRATION RATE: 18 BRPM | WEIGHT: 200 LBS

## 2023-12-09 ASSESSMENT — PAIN - FUNCTIONAL ASSESSMENT: PAIN_FUNCTIONAL_ASSESSMENT: 0-10

## 2023-12-09 ASSESSMENT — PAIN DESCRIPTION - ORIENTATION: ORIENTATION: LOWER

## 2023-12-09 ASSESSMENT — PAIN DESCRIPTION - LOCATION: LOCATION: BACK

## 2023-12-09 ASSESSMENT — PAIN DESCRIPTION - DESCRIPTORS: DESCRIPTORS: SHARP

## 2023-12-10 PROBLEM — T50.902A DRUG OVERDOSE, INTENTIONAL (HCC): Status: RESOLVED | Noted: 2019-08-25 | Resolved: 2023-12-10

## 2023-12-10 ASSESSMENT — ENCOUNTER SYMPTOMS
SINUS PAIN: 0
VOMITING: 0
WHEEZING: 0
NAUSEA: 0
PHOTOPHOBIA: 0
CHEST TIGHTNESS: 0
COUGH: 0
RHINORRHEA: 0
SORE THROAT: 0
ABDOMINAL DISTENTION: 0
SHORTNESS OF BREATH: 0
CONSTIPATION: 0
TROUBLE SWALLOWING: 0
ABDOMINAL PAIN: 0
SINUS PRESSURE: 0
BLOOD IN STOOL: 0
DIARRHEA: 0

## 2023-12-10 NOTE — ED NOTES
Patient agitated and yelling at officer for water. . Patient gave water and being cooperative at this time.       Roger Vanessa RN  12/09/23 2019

## 2023-12-10 NOTE — ED NOTES
Patient sitting on the floor. Patient yelling and kicking his legs and throwing his belongings on the floor at this time. Jorge Zamora RN supervisor notified. Officers called to department.      Jian Hooker RN  12/09/23 [id number]

## 2023-12-10 NOTE — ED TRIAGE NOTES
Patient presents to ED with C/O flank pain. Patient states he thinks it is his kidneys. Patient states he passed out and was screaming and that he was taken here. Patient states he has been drinking fluids today but hasn't urinated at all. Patient states \"my whole body hurts\". Patient is moving in bed back and forth at this time. Patient states his back hurts all the time but it got worse today. Patient states he used meth and fentanyl today around 1600.

## 2023-12-11 ENCOUNTER — SOCIAL WORK (OUTPATIENT)
Dept: INTERNAL MEDICINE CLINIC | Age: 38
End: 2023-12-11

## 2023-12-11 NOTE — PROGRESS NOTES
Patient did not go to [facility] on 12/8. Patient told transportation he did not want go due to not feeling well. Patient also stated that provider lied about sending all medications. Sw staffed with provider who did send all medications to pharmacy as prescribed, though did not fill 30 days of klonopin, due to patient directed to take PRN. Provider states that she is not going to see patient anymore, due to not following up on agreement, of attending inpatient treatment.

## 2024-01-03 ENCOUNTER — HOSPITAL ENCOUNTER (EMERGENCY)
Age: 39
Discharge: LEFT BEFORE DISCHARGE | End: 2024-01-03
Attending: STUDENT IN AN ORGANIZED HEALTH CARE EDUCATION/TRAINING PROGRAM
Payer: COMMERCIAL

## 2024-01-03 VITALS
OXYGEN SATURATION: 98 % | RESPIRATION RATE: 12 BRPM | SYSTOLIC BLOOD PRESSURE: 117 MMHG | DIASTOLIC BLOOD PRESSURE: 78 MMHG | TEMPERATURE: 97.8 F | HEART RATE: 72 BPM

## 2024-01-03 DIAGNOSIS — F19.90 DRUG USE DISORDER: Primary | ICD-10-CM

## 2024-01-03 LAB
ANION GAP SERPL CALC-SCNC: 10 MEQ/L (ref 8–16)
BASOPHILS ABSOLUTE: 0 THOU/MM3 (ref 0–0.1)
BASOPHILS NFR BLD AUTO: 0.4 %
BUN SERPL-MCNC: 23 MG/DL (ref 7–22)
CALCIUM SERPL-MCNC: 9.3 MG/DL (ref 8.5–10.5)
CHLORIDE SERPL-SCNC: 105 MEQ/L (ref 98–111)
CK SERPL-CCNC: 107 U/L (ref 55–170)
CO2 SERPL-SCNC: 26 MEQ/L (ref 23–33)
CREAT SERPL-MCNC: 0.8 MG/DL (ref 0.4–1.2)
DEPRECATED RDW RBC AUTO: 55.4 FL (ref 35–45)
EKG ATRIAL RATE: 125 BPM
EKG P AXIS: 72 DEGREES
EKG P-R INTERVAL: 150 MS
EKG Q-T INTERVAL: 296 MS
EKG QRS DURATION: 82 MS
EKG QTC CALCULATION (BAZETT): 427 MS
EKG R AXIS: 36 DEGREES
EKG T AXIS: 61 DEGREES
EKG VENTRICULAR RATE: 125 BPM
EOSINOPHIL NFR BLD AUTO: 4.1 %
EOSINOPHILS ABSOLUTE: 0.3 THOU/MM3 (ref 0–0.4)
ERYTHROCYTE [DISTWIDTH] IN BLOOD BY AUTOMATED COUNT: 18.9 % (ref 11.5–14.5)
GFR SERPL CREATININE-BSD FRML MDRD: > 60 ML/MIN/1.73M2
GLUCOSE SERPL-MCNC: 99 MG/DL (ref 70–108)
HCT VFR BLD AUTO: 34.1 % (ref 42–52)
HGB BLD-MCNC: 10.4 GM/DL (ref 14–18)
IMM GRANULOCYTES # BLD AUTO: 0.02 THOU/MM3 (ref 0–0.07)
IMM GRANULOCYTES NFR BLD AUTO: 0.3 %
LACTATE SERPL-SCNC: 1 MMOL/L (ref 0.5–2)
LYMPHOCYTES ABSOLUTE: 3.1 THOU/MM3 (ref 1–4.8)
LYMPHOCYTES NFR BLD AUTO: 44.2 %
MCH RBC QN AUTO: 24.6 PG (ref 26–33)
MCHC RBC AUTO-ENTMCNC: 30.5 GM/DL (ref 32.2–35.5)
MCV RBC AUTO: 80.6 FL (ref 80–94)
MONOCYTES ABSOLUTE: 0.6 THOU/MM3 (ref 0.4–1.3)
MONOCYTES NFR BLD AUTO: 7.9 %
NEUTROPHILS NFR BLD AUTO: 43.1 %
NRBC BLD AUTO-RTO: 0 /100 WBC
NT-PROBNP SERPL IA-MCNC: 41.9 PG/ML (ref 0–124)
OSMOLALITY SERPL CALC.SUM OF ELEC: 285 MOSMOL/KG (ref 275–300)
PLATELET # BLD AUTO: 243 THOU/MM3 (ref 130–400)
PMV BLD AUTO: 10.2 FL (ref 9.4–12.4)
POTASSIUM SERPL-SCNC: 4.3 MEQ/L (ref 3.5–5.2)
RBC # BLD AUTO: 4.23 MILL/MM3 (ref 4.7–6.1)
SEGMENTED NEUTROPHILS ABSOLUTE COUNT: 3 THOU/MM3 (ref 1.8–7.7)
SODIUM SERPL-SCNC: 141 MEQ/L (ref 135–145)
TROPONIN, HIGH SENSITIVITY: 11 NG/L (ref 0–12)
WBC # BLD AUTO: 7 THOU/MM3 (ref 4.8–10.8)

## 2024-01-03 PROCEDURE — 85025 COMPLETE CBC W/AUTO DIFF WBC: CPT

## 2024-01-03 PROCEDURE — 96372 THER/PROPH/DIAG INJ SC/IM: CPT

## 2024-01-03 PROCEDURE — 6360000002 HC RX W HCPCS: Performed by: STUDENT IN AN ORGANIZED HEALTH CARE EDUCATION/TRAINING PROGRAM

## 2024-01-03 PROCEDURE — 93010 ELECTROCARDIOGRAM REPORT: CPT | Performed by: INTERNAL MEDICINE

## 2024-01-03 PROCEDURE — 82550 ASSAY OF CK (CPK): CPT

## 2024-01-03 PROCEDURE — 83880 ASSAY OF NATRIURETIC PEPTIDE: CPT

## 2024-01-03 PROCEDURE — 93005 ELECTROCARDIOGRAM TRACING: CPT | Performed by: STUDENT IN AN ORGANIZED HEALTH CARE EDUCATION/TRAINING PROGRAM

## 2024-01-03 PROCEDURE — 83605 ASSAY OF LACTIC ACID: CPT

## 2024-01-03 PROCEDURE — 36415 COLL VENOUS BLD VENIPUNCTURE: CPT

## 2024-01-03 PROCEDURE — 99284 EMERGENCY DEPT VISIT MOD MDM: CPT

## 2024-01-03 PROCEDURE — 80048 BASIC METABOLIC PNL TOTAL CA: CPT

## 2024-01-03 PROCEDURE — 84484 ASSAY OF TROPONIN QUANT: CPT

## 2024-01-03 PROCEDURE — 2580000003 HC RX 258: Performed by: STUDENT IN AN ORGANIZED HEALTH CARE EDUCATION/TRAINING PROGRAM

## 2024-01-03 RX ORDER — DROPERIDOL 2.5 MG/ML
1.25 INJECTION, SOLUTION INTRAMUSCULAR; INTRAVENOUS ONCE
Status: COMPLETED | OUTPATIENT
Start: 2024-01-03 | End: 2024-01-03

## 2024-01-03 RX ORDER — LORAZEPAM 2 MG/ML
2 INJECTION INTRAMUSCULAR ONCE
Status: COMPLETED | OUTPATIENT
Start: 2024-01-03 | End: 2024-01-03

## 2024-01-03 RX ORDER — LORAZEPAM 2 MG/ML
2 INJECTION INTRAMUSCULAR ONCE
Status: DISCONTINUED | OUTPATIENT
Start: 2024-01-03 | End: 2024-01-03

## 2024-01-03 RX ORDER — 0.9 % SODIUM CHLORIDE 0.9 %
1000 INTRAVENOUS SOLUTION INTRAVENOUS ONCE
Status: COMPLETED | OUTPATIENT
Start: 2024-01-03 | End: 2024-01-03

## 2024-01-03 RX ADMIN — SODIUM CHLORIDE 1000 ML: 9 INJECTION, SOLUTION INTRAVENOUS at 01:21

## 2024-01-03 RX ADMIN — LORAZEPAM 2 MG: 2 INJECTION INTRAMUSCULAR; INTRAVENOUS at 02:08

## 2024-01-03 RX ADMIN — DROPERIDOL 1.25 MG: 2.5 INJECTION, SOLUTION INTRAMUSCULAR; INTRAVENOUS at 02:08

## 2024-01-03 ASSESSMENT — PAIN - FUNCTIONAL ASSESSMENT: PAIN_FUNCTIONAL_ASSESSMENT: NONE - DENIES PAIN

## 2024-01-03 NOTE — ED NOTES
Pt continues with spastic irregular movements but is alert and oriented. Pt able to lay still when asked. Pt medicated per MAR.

## 2024-01-03 NOTE — ED TRIAGE NOTES
Pt presents to the ED with complaints of having two seizures tonight. Pt states that [facility] about two [facility]. Pt states that he took his suboxone the last two days. Pt is alert and oriented upon arrival. Pt states that he is supposed to take his klonopin for his spastic movements, but hasn't got the prescription yet.

## 2024-01-03 NOTE — ED PROVIDER NOTES
[facility] EMERGENCY DEPT  EMERGENCY DEPARTMENT ENCOUNTER          Pt Name: Demarco Spencer  MRN: [id number]  Birthdate 1985  Date of evaluation: 1/3/2024  Physician: Freddie Osman DO      CHIEF COMPLAINT       Chief Complaint   Patient presents with    Seizures         HISTORY OF PRESENT ILLNESS    HPI  Deamrco Spencer is a 38 y.o. male who presents to the emergency department from home, brought in by EMS for evaluation of spastic movements.  Patient reports having 2 seizures.  Patient reports that he treats spasms with Klonopin.  Patient reports has been out of this.  Patient is unable to provide any further information as he is tangential and not directable.  Patient does have a history of polysubstance abuse.  The patient has no other acute complaints at this time.      REVIEW OF SYSTEMS   Review of Systems      PAST MEDICAL AND SURGICAL HISTORY     Past Medical History:   Diagnosis Date    Anxiety     Depression     Drug overdose, intentional (HCC) 8/25/2019    Kidney stone     Liver disease     Hep C    Opiate abuse, continuous (HCC)      History reviewed. No pertinent surgical history.      MEDICATIONS   No current facility-administered medications for this encounter.    Current Outpatient Medications:     buprenorphine-naloxone (SUBOXONE) 8-2 MG FILM SL film, Place 1 Film under the tongue in the morning and at bedtime for 28 days. Max Daily Amount: 2 Film, Disp: 56 Film, Rfl: 0    clonazePAM (KLONOPIN) 0.5 MG tablet, Take 1 tablet by mouth 2 times daily as needed for Anxiety for up to 28 days. Max Daily Amount: 1 mg, Disp: 28 tablet, Rfl: 0    lisdexamfetamine (VYVANSE) 50 MG capsule, Take 1 capsule by mouth every morning for 28 days. Max Daily Amount: 50 mg, Disp: 28 capsule, Rfl: 0    venlafaxine (EFFEXOR) 100 MG tablet, Take 1 tablet by mouth 3 times daily for 28 days, Disp: 84 tablet, Rfl: 0    pregabalin (LYRICA) 200 MG capsule, Take 1 capsule by mouth in the morning, at noon, and  visit)      Radiologic studies results available at the moment of this note (None if blank):  No orders to display     See ED course below for my interpretation if applicable.  All radiology images independently reviewed by me in the clinical context of this patient, in addition to interpretation provided by the radiologist.      EKG interpretation (none if blank):  sinus tachycardia, rate 125, no ST or T-segment changes concerning for ischemia, these findings are new since last EKG per my external record review  All EKG results are individually reviewed and interpreted by me in the clinical context of this patient.  All EKGs are also interpreted by our Cardiology department, final interpretation may not be available as of the writing of this note.      MEDICAL DECISION MAKING   Initial Assessment Summary:   38-year-old male with likely substance abuse  Please see ED course section below for continuation and resolution of this initial assessment if applicable.      Comorbid conditions pertinent to this ED encounter:  Polysubstance abuse      Differential Diagnosis includes but is not limited to:  Polysubstance abuse  Withdrawal from medication      Decision Rules/Clinical Scores utilized:  Not Applicable.     Plan:   EKG, BMP, CBC, CK, lactic acid, troponin, urine drug screen, urinalysis, symptomatic treatment      Code Status:  Not addressed during this ED visit    Social determinants of health impacting treatment or disposition:  Considered and not applicable       PREVIOUS RECORDS  AND EXTERNAL INFORMATION REVIEWED   History obtained from: chart review and the patient.    Pertinent previous and/or external records reviewed:  Prior visits for substance abuse .    Case discussed with specialties other than Emergency Medicine: Not Applicable      ED COURSE   ED Medications administered this visit (None if left blank):   Medications   sodium chloride 0.9 % bolus 1,000 mL (0 mLs IntraVENous Stopped 1/3/24 0211)

## 2024-01-03 NOTE — ED PROVIDER NOTES
Transfer of Care Note:   I have personally performed a face to face diagnostic evaluation on this patient. I have personally performed a face to face diagnostic evaluation on this patient. The patient's initial evaluation and plan have been discussed with the prior provider who initially evaluated the patient. Nursing Notes, Past Medical Hx, Past Surgical Hx, Social Hx, Allergies, and Family Hx were all reviewed.    (Please note that portions of this note were completed with a voice recognition program.  Efforts were made to edit the dictations but occasionally words are mis-transcribed.)    8:07 AM EST: The patient was evaluated.     Demarco Spencer is a 38 y.o. male who presents to the Emergency Department for the evaluation of seizures/drug use disorder.      Exam: Refer to Dr. Osman's note    EKG: All EKG's are interpreted by the Emergency Department Physician who either signs or Co-signs this chart in the absence of a cardiologist.      RADIOLOGY: non-plain film images(s) such as CT, Ultrasound and MRI are read by the radiologist.  No orders to display       ED LABS:  Labs Reviewed   BASIC METABOLIC PANEL W/ REFLEX TO MG FOR LOW K - Abnormal; Notable for the following components:       Result Value    BUN 23 (*)     All other components within normal limits   CBC WITH AUTO DIFFERENTIAL - Abnormal; Notable for the following components:    RBC 4.23 (*)     Hemoglobin 10.4 (*)     Hematocrit 34.1 (*)     MCH 24.6 (*)     MCHC 30.5 (*)     RDW-CV 18.9 (*)     RDW-SD 55.4 (*)     All other components within normal limits   BRAIN NATRIURETIC PEPTIDE   LACTIC ACID   TROPONIN   ANION GAP   GLOMERULAR FILTRATION RATE, ESTIMATED   OSMOLALITY   CK   URINE DRUG SCREEN   URINALYSIS WITH MICROSCOPIC       MDM:  I do not have a chance to evaluate patient, I was given report concerning his drug use history, on reviewing nursing intake note, it appears that patient had run out of his benzodiazepines and reports having had 2

## 2024-01-06 ENCOUNTER — HOSPITAL ENCOUNTER (EMERGENCY)
Age: 39
Discharge: HOME | End: 2024-01-06
Attending: EMERGENCY MEDICINE
Payer: COMMERCIAL

## 2024-01-06 ENCOUNTER — APPOINTMENT (OUTPATIENT)
Dept: CT IMAGING | Age: 39
End: 2024-01-06
Payer: COMMERCIAL

## 2024-01-06 VITALS
TEMPERATURE: 98.7 F | WEIGHT: 200 LBS | RESPIRATION RATE: 14 BRPM | HEIGHT: 71 IN | OXYGEN SATURATION: 97 % | HEART RATE: 99 BPM | SYSTOLIC BLOOD PRESSURE: 118 MMHG | BODY MASS INDEX: 28 KG/M2 | DIASTOLIC BLOOD PRESSURE: 83 MMHG

## 2024-01-06 DIAGNOSIS — F14.10 COCAINE ABUSE (HCC): ICD-10-CM

## 2024-01-06 DIAGNOSIS — F11.10 FENTANYL USE DISORDER, MILD, ABUSE (HCC): ICD-10-CM

## 2024-01-06 DIAGNOSIS — F15.10 METHAMPHETAMINE ABUSE (HCC): ICD-10-CM

## 2024-01-06 DIAGNOSIS — F11.10: ICD-10-CM

## 2024-01-06 DIAGNOSIS — R56.9 SEIZURE (HCC): Primary | ICD-10-CM

## 2024-01-06 LAB
ALBUMIN SERPL BCG-MCNC: 3.9 G/DL (ref 3.5–5.1)
ALP SERPL-CCNC: 95 U/L (ref 38–126)
ALT SERPL W/O P-5'-P-CCNC: 41 U/L (ref 11–66)
AMPHETAMINES UR QL SCN: POSITIVE
ANION GAP SERPL CALC-SCNC: 14 MEQ/L (ref 8–16)
AST SERPL-CCNC: 53 U/L (ref 5–40)
BACTERIA URNS QL MICRO: ABNORMAL /HPF
BARBITURATES UR QL SCN: NEGATIVE
BASE EXCESS BLDA CALC-SCNC: -3.5 MMOL/L (ref -2–3)
BASOPHILS ABSOLUTE: 0 THOU/MM3 (ref 0–0.1)
BASOPHILS NFR BLD AUTO: 0.3 %
BENZODIAZ UR QL SCN: NEGATIVE
BILIRUB SERPL-MCNC: 0.3 MG/DL (ref 0.3–1.2)
BILIRUB UR QL STRIP.AUTO: NEGATIVE
BUN SERPL-MCNC: 29 MG/DL (ref 7–22)
BZE UR QL SCN: POSITIVE
CALCIUM SERPL-MCNC: 9.4 MG/DL (ref 8.5–10.5)
CANNABINOIDS UR QL SCN: NEGATIVE
CASTS #/AREA URNS LPF: ABNORMAL /LPF
CASTS 2: ABNORMAL /LPF
CHARACTER UR: CLEAR
CHLORIDE SERPL-SCNC: 104 MEQ/L (ref 98–111)
CO2 SERPL-SCNC: 21 MEQ/L (ref 23–33)
COLLECTED BY:: ABNORMAL
COLOR: YELLOW
CREAT SERPL-MCNC: 0.9 MG/DL (ref 0.4–1.2)
CRYSTALS URNS MICRO: ABNORMAL
DEPRECATED RDW RBC AUTO: 52.7 FL (ref 35–45)
DEVICE: ABNORMAL
EOSINOPHIL NFR BLD AUTO: 0.9 %
EOSINOPHILS ABSOLUTE: 0.1 THOU/MM3 (ref 0–0.4)
EPITHELIAL CELLS, UA: ABNORMAL /HPF
ERYTHROCYTE [DISTWIDTH] IN BLOOD BY AUTOMATED COUNT: 18.5 % (ref 11.5–14.5)
ETHANOL SERPL-MCNC: < 0.01 %
FENTANYL: POSITIVE
GFR SERPL CREATININE-BSD FRML MDRD: > 60 ML/MIN/1.73M2
GLUCOSE BLD STRIP.AUTO-MCNC: 68 MG/DL (ref 70–108)
GLUCOSE BLD STRIP.AUTO-MCNC: 71 MG/DL (ref 70–108)
GLUCOSE SERPL-MCNC: 65 MG/DL (ref 70–108)
GLUCOSE UR QL STRIP.AUTO: NEGATIVE MG/DL
HCO3 BLDA-SCNC: 20 MMOL/L (ref 23–28)
HCT VFR BLD AUTO: 31.3 % (ref 42–52)
HGB BLD-MCNC: 10 GM/DL (ref 14–18)
HGB UR QL STRIP.AUTO: ABNORMAL
IMM GRANULOCYTES # BLD AUTO: 0.03 THOU/MM3 (ref 0–0.07)
IMM GRANULOCYTES NFR BLD AUTO: 0.4 %
KETONES UR QL STRIP.AUTO: 15
LACTATE SERPL-SCNC: 0.8 MMOL/L (ref 0.5–2)
LYMPHOCYTES ABSOLUTE: 1.9 THOU/MM3 (ref 1–4.8)
LYMPHOCYTES NFR BLD AUTO: 26.1 %
MAGNESIUM SERPL-MCNC: 1.8 MG/DL (ref 1.6–2.4)
MCH RBC QN AUTO: 24.8 PG (ref 26–33)
MCHC RBC AUTO-ENTMCNC: 31.9 GM/DL (ref 32.2–35.5)
MCV RBC AUTO: 77.7 FL (ref 80–94)
MISCELLANEOUS 2: ABNORMAL
MONOCYTES ABSOLUTE: 1 THOU/MM3 (ref 0.4–1.3)
MONOCYTES NFR BLD AUTO: 13.8 %
NEUTROPHILS NFR BLD AUTO: 58.5 %
NITRITE UR QL STRIP: NEGATIVE
NRBC BLD AUTO-RTO: 0 /100 WBC
OPIATES UR QL SCN: NEGATIVE
OSMOLALITY SERPL CALC.SUM OF ELEC: 281.5 MOSMOL/KG (ref 275–300)
OXYCODONE: POSITIVE
PCO2 TEMP ADJ BLDMV: 32 MMHG (ref 41–51)
PCP UR QL SCN: NEGATIVE
PH BLDMV: 7.42 [PH] (ref 7.31–7.41)
PH UR STRIP.AUTO: 5.5 [PH] (ref 5–9)
PHOSPHATE SERPL-MCNC: 3.2 MG/DL (ref 2.4–4.7)
PLATELET # BLD AUTO: 208 THOU/MM3 (ref 130–400)
PMV BLD AUTO: 9.9 FL (ref 9.4–12.4)
PO2 BLDMV: 72 MMHG (ref 25–40)
POTASSIUM SERPL-SCNC: 3.9 MEQ/L (ref 3.5–5.2)
PROT SERPL-MCNC: 6.8 G/DL (ref 6.1–8)
PROT UR STRIP.AUTO-MCNC: ABNORMAL MG/DL
RBC # BLD AUTO: 4.03 MILL/MM3 (ref 4.7–6.1)
RBC URINE: ABNORMAL /HPF
RENAL EPI CELLS #/AREA URNS HPF: ABNORMAL /[HPF]
SAO2 % BLDMV: 95 %
SEGMENTED NEUTROPHILS ABSOLUTE COUNT: 4.3 THOU/MM3 (ref 1.8–7.7)
SODIUM SERPL-SCNC: 139 MEQ/L (ref 135–145)
SP GR UR REFRACT.AUTO: 1.02 (ref 1–1.03)
TROPONIN, HIGH SENSITIVITY: 10 NG/L (ref 0–12)
TROPONIN, HIGH SENSITIVITY: 14 NG/L (ref 0–12)
UROBILINOGEN, URINE: 0.2 EU/DL (ref 0–1)
WBC # BLD AUTO: 7.4 THOU/MM3 (ref 4.8–10.8)
WBC #/AREA URNS HPF: ABNORMAL /HPF
WBC #/AREA URNS HPF: NEGATIVE /[HPF]
YEAST LIKE FUNGI URNS QL MICRO: ABNORMAL

## 2024-01-06 PROCEDURE — 36415 COLL VENOUS BLD VENIPUNCTURE: CPT

## 2024-01-06 PROCEDURE — 83735 ASSAY OF MAGNESIUM: CPT

## 2024-01-06 PROCEDURE — 81001 URINALYSIS AUTO W/SCOPE: CPT

## 2024-01-06 PROCEDURE — 84100 ASSAY OF PHOSPHORUS: CPT

## 2024-01-06 PROCEDURE — 85025 COMPLETE CBC W/AUTO DIFF WBC: CPT

## 2024-01-06 PROCEDURE — 93005 ELECTROCARDIOGRAM TRACING: CPT

## 2024-01-06 PROCEDURE — 80307 DRUG TEST PRSMV CHEM ANLYZR: CPT

## 2024-01-06 PROCEDURE — 82077 ASSAY SPEC XCP UR&BREATH IA: CPT

## 2024-01-06 PROCEDURE — 80053 COMPREHEN METABOLIC PANEL: CPT

## 2024-01-06 PROCEDURE — 84484 ASSAY OF TROPONIN QUANT: CPT

## 2024-01-06 PROCEDURE — 2580000003 HC RX 258: Performed by: EMERGENCY MEDICINE

## 2024-01-06 PROCEDURE — 99284 EMERGENCY DEPT VISIT MOD MDM: CPT

## 2024-01-06 PROCEDURE — 83605 ASSAY OF LACTIC ACID: CPT

## 2024-01-06 PROCEDURE — 70450 CT HEAD/BRAIN W/O DYE: CPT

## 2024-01-06 PROCEDURE — 82948 REAGENT STRIP/BLOOD GLUCOSE: CPT

## 2024-01-06 PROCEDURE — 82803 BLOOD GASES ANY COMBINATION: CPT

## 2024-01-06 RX ORDER — 0.9 % SODIUM CHLORIDE 0.9 %
1000 INTRAVENOUS SOLUTION INTRAVENOUS ONCE
Status: COMPLETED | OUTPATIENT
Start: 2024-01-06 | End: 2024-01-06

## 2024-01-06 RX ADMIN — SODIUM CHLORIDE 1000 ML: 9 INJECTION, SOLUTION INTRAVENOUS at 17:41

## 2024-01-06 ASSESSMENT — PAIN - FUNCTIONAL ASSESSMENT
PAIN_FUNCTIONAL_ASSESSMENT: NONE - DENIES PAIN
PAIN_FUNCTIONAL_ASSESSMENT: NONE - DENIES PAIN

## 2024-01-06 NOTE — ED PROVIDER NOTES
[facility] EMERGENCY DEPT  EMERGENCY DEPARTMENT ENCOUNTER      Pt Name: Demarco Spencer  MRN: [id number]  Birthdate 1985  Date of evaluation: 1/6/2024  Resident Physician: Emeka Regan DO  Supervising Physician: Dr. Nora Hawkins MD    CHIEF COMPLAINT       Chief Complaint   Patient presents with    Seizures       HISTORY OF PRESENT ILLNESS    HPI  Demarco Spencer is a 38 y.o. male who presents to the emergency department, brought in by EMS for evaluation of seizure.  Per EMS, patient was being arrested by the police however patient had a seizure and paramedics was called to the scene.  And route, patient became agitated and restless and 10 mg of Versed was given IM.  Patient is now resting comfortably with unlabored respirations saturating 92% on RA, not arousable.   Past medical history significant for polysubstance abuse, risk of elopement, anxiety, drug overdose, withdrawal seizures..         PAST MEDICAL AND SURGICAL HISTORY     Past Medical History:   Diagnosis Date    Anxiety     Depression     Drug overdose, intentional (HCC) 8/25/2019    Kidney stone     Liver disease     Hep C    Opiate abuse, continuous (HCC)      History reviewed. No pertinent surgical history.    MEDICATIONS   No current facility-administered medications for this encounter.    Current Outpatient Medications:     clonazePAM (KLONOPIN) 0.5 MG tablet, Take 1 tablet by mouth 2 times daily as needed for Anxiety for up to 28 days. Max Daily Amount: 1 mg, Disp: 28 tablet, Rfl: 0    lisdexamfetamine (VYVANSE) 50 MG capsule, Take 1 capsule by mouth every morning for 28 days. Max Daily Amount: 50 mg, Disp: 28 capsule, Rfl: 0    venlafaxine (EFFEXOR) 100 MG tablet, Take 1 tablet by mouth 3 times daily for 28 days, Disp: 84 tablet, Rfl: 0    pregabalin (LYRICA) 200 MG capsule, Take 1 capsule by mouth in the morning, at noon, and at bedtime for 28 days. Max Daily Amount: 600 mg, Disp: 84 capsule, Rfl: 0    mirtazapine  Hospitalist    ED COURSE   ED Medications administered this visit (None if left blank):   Medications   sodium chloride 0.9 % bolus 1,000 mL (0 mLs IntraVENous Stopped 1/6/24 1908)       ED Course as of 01/06/24 2137   Sat Jan 06, 2024 2019 PH MIXED(!): 7.42 [HK]   2019 CO2(!): 21 [HK]      ED Course User Index  [HK] Emeka Regan DO       CRITICAL CARE:  None    PROCEDURES: (None if blank)  Procedures:     MEDICATION CHANGES     Discharge Medication List as of 1/6/2024  9:34 PM          FINAL DISPOSITION and MEDICAL DECISION MAKING   Medical Decision Making  Patient was altered on the time of examination.  Reached out to power of  however number is incorrect.  Decision to admit the patient under the hospitalist service however in that time, patient was arousable and upon re-examination patient stated he would like to leave AMA.   In spite of multiple attempts by myself and staff to convince the patient to stay for further evaluation and treatment, we have unfortunately been unsuccessful. However, the patient has the capacity to give, receive, and withhold information. The patient does not appear intoxicated or responding to external stimuli. The patient verbalizes understanding of their condition and symptoms and that this represents a significant threat. Risks and benefits of staying for further evaluation and treatment or leaving AMA have been discussed and acknowledged by the patient. The patient has verbalized to me that they understand the plan of diagnosis and treatment, and unfortunately will not agree and understand that it may cause worsening of their condition or death. Patient has an appointment on 1/9/2024 with his PCP.  Encourage patient to talk to his PCP for safely stopping his clonazepam.  Patient agreed to go to schedule appointment and safely taper down off Clonazepam.  The patient understands that they are welcome to return to the ED at any time for further care without prejudice

## 2024-01-06 NOTE — ED NOTES
Pt medicated per MAR at this time. VS assessed. RR regular and unlabored. Pt high fowlers in ED cot with eyes closed. Call light remains in reach.

## 2024-01-06 NOTE — ED PROVIDER NOTES

## 2024-01-06 NOTE — ED TRIAGE NOTES
Pt presents to the ER via EMS for seizures. In route, pt became agitated and restless-he received 10mg Versed IM. Upon arrival, pt is responsive to voice and pain. Respirations even and unlabored. 88% on RA, placed on 2L NC. VSS. EKG completed and IV established.

## 2024-01-07 LAB
EKG ATRIAL RATE: 110 BPM
EKG P AXIS: 147 DEGREES
EKG P-R INTERVAL: 176 MS
EKG Q-T INTERVAL: 336 MS
EKG QRS DURATION: 86 MS
EKG QTC CALCULATION (BAZETT): 454 MS
EKG R AXIS: 8 DEGREES
EKG T AXIS: 125 DEGREES
EKG VENTRICULAR RATE: 110 BPM

## 2024-01-07 PROCEDURE — 93010 ELECTROCARDIOGRAM REPORT: CPT | Performed by: INTERNAL MEDICINE

## 2024-01-07 NOTE — ED NOTES
This RN in to assess VS. Pt resting in ED cot with eyes closed at time of assessment. Call light in reach. RR regular and unlabored.

## 2024-01-07 NOTE — ED NOTES
This RN in to assess VS. RR regular and unlabored. Call light in reach. Pt resting in ED cot with eyes closed during assessment.

## 2024-01-07 NOTE — ED NOTES
This RN in to assess VS. Lab at bedside collecting specimen at this time. Call light in reach. RR regular and unlabored. No new complaints at this time.

## 2024-01-07 NOTE — ED NOTES
BG 68. D10 started per Dr Hawkins. Pt more alert and this time, talking with this RN and resident

## 2024-01-09 ENCOUNTER — HOSPITAL ENCOUNTER (EMERGENCY)
Age: 39
Discharge: HOME | End: 2024-01-09
Payer: COMMERCIAL

## 2024-01-09 ENCOUNTER — HOSPITAL ENCOUNTER (EMERGENCY)
Age: 39
Discharge: LEFT BEFORE DISCHARGE | End: 2024-01-09

## 2024-01-09 ENCOUNTER — APPOINTMENT (OUTPATIENT)
Dept: CT IMAGING | Age: 39
End: 2024-01-09
Payer: COMMERCIAL

## 2024-01-09 VITALS
DIASTOLIC BLOOD PRESSURE: 72 MMHG | SYSTOLIC BLOOD PRESSURE: 136 MMHG | HEIGHT: 71 IN | RESPIRATION RATE: 20 BRPM | BODY MASS INDEX: 26.6 KG/M2 | TEMPERATURE: 98 F | OXYGEN SATURATION: 97 % | HEART RATE: 131 BPM | WEIGHT: 190 LBS

## 2024-01-09 DIAGNOSIS — F19.10 SUBSTANCE ABUSE (HCC): Primary | ICD-10-CM

## 2024-01-09 LAB
AMORPH SED URNS QL MICRO: ABNORMAL
ANION GAP SERPL CALC-SCNC: 13 MEQ/L (ref 8–16)
BACTERIA: ABNORMAL
BASOPHILS ABSOLUTE: 0 THOU/MM3 (ref 0–0.1)
BASOPHILS NFR BLD AUTO: 0.2 %
BILIRUB UR QL STRIP: NEGATIVE
BUN SERPL-MCNC: 18 MG/DL (ref 7–22)
CALCIUM SERPL-MCNC: 8.9 MG/DL (ref 8.5–10.5)
CASTS #/AREA URNS LPF: ABNORMAL /LPF
CASTS #/AREA URNS LPF: ABNORMAL /LPF
CHARACTER UR: ABNORMAL
CHARCOAL URNS QL MICRO: ABNORMAL
CHLORIDE SERPL-SCNC: 101 MEQ/L (ref 98–111)
CO2 SERPL-SCNC: 20 MEQ/L (ref 23–33)
COLOR UR: YELLOW
CREAT SERPL-MCNC: 0.9 MG/DL (ref 0.4–1.2)
CRYSTALS URNS QL MICRO: ABNORMAL
DEPRECATED RDW RBC AUTO: 55.4 FL (ref 35–45)
EOSINOPHIL NFR BLD AUTO: 1.6 %
EOSINOPHILS ABSOLUTE: 0.1 THOU/MM3 (ref 0–0.4)
EPITHELIAL CELLS, UA: ABNORMAL /HPF
ERYTHROCYTE [DISTWIDTH] IN BLOOD BY AUTOMATED COUNT: 18.7 % (ref 11.5–14.5)
GFR SERPL CREATININE-BSD FRML MDRD: > 60 ML/MIN/1.73M2
GLUCOSE SERPL-MCNC: 87 MG/DL (ref 70–108)
GLUCOSE UR QL STRIP.AUTO: NEGATIVE MG/DL
HCT VFR BLD AUTO: 29.9 % (ref 42–52)
HGB BLD-MCNC: 9.1 GM/DL (ref 14–18)
HGB UR QL STRIP.AUTO: ABNORMAL
IMM GRANULOCYTES # BLD AUTO: 0.02 THOU/MM3 (ref 0–0.07)
IMM GRANULOCYTES NFR BLD AUTO: 0.2 %
KETONES UR QL STRIP.AUTO: ABNORMAL
LEUKOCYTE ESTERASE UR QL STRIP.AUTO: NEGATIVE
LYMPHOCYTES ABSOLUTE: 2.2 THOU/MM3 (ref 1–4.8)
LYMPHOCYTES NFR BLD AUTO: 25.4 %
MCH RBC QN AUTO: 24.5 PG (ref 26–33)
MCHC RBC AUTO-ENTMCNC: 30.4 GM/DL (ref 32.2–35.5)
MCV RBC AUTO: 80.4 FL (ref 80–94)
MONOCYTES ABSOLUTE: 0.9 THOU/MM3 (ref 0.4–1.3)
MONOCYTES NFR BLD AUTO: 10.5 %
MUCOUS THREADS URNS QL MICRO: ABNORMAL
NEUTROPHILS NFR BLD AUTO: 62.1 %
NITRITE UR QL STRIP.AUTO: NEGATIVE
NRBC BLD AUTO-RTO: 0 /100 WBC
OSMOLALITY SERPL CALC.SUM OF ELEC: 269.5 MOSMOL/KG (ref 275–300)
PH UR STRIP.AUTO: 5.5 [PH] (ref 5–9)
PLATELET # BLD AUTO: 209 THOU/MM3 (ref 130–400)
PMV BLD AUTO: 10 FL (ref 9.4–12.4)
POTASSIUM SERPL-SCNC: 4.1 MEQ/L (ref 3.5–5.2)
PROT UR STRIP.AUTO-MCNC: ABNORMAL MG/DL
RBC # BLD AUTO: 3.72 MILL/MM3 (ref 4.7–6.1)
RBC #/AREA URNS HPF: ABNORMAL /HPF
RENAL EPI CELLS #/AREA URNS HPF: ABNORMAL /[HPF]
SEGMENTED NEUTROPHILS ABSOLUTE COUNT: 5.3 THOU/MM3 (ref 1.8–7.7)
SODIUM SERPL-SCNC: 134 MEQ/L (ref 135–145)
SPECIFIC GRAVITY UA: 1.02 (ref 1–1.03)
UROBILINOGEN, URINE: 0.2 EU/DL (ref 0–1)
WBC # BLD AUTO: 8.5 THOU/MM3 (ref 4.8–10.8)
WBC #/AREA URNS HPF: ABNORMAL /HPF
YEAST LIKE FUNGI URNS QL MICRO: ABNORMAL

## 2024-01-09 PROCEDURE — 74176 CT ABD & PELVIS W/O CONTRAST: CPT

## 2024-01-09 PROCEDURE — 93005 ELECTROCARDIOGRAM TRACING: CPT | Performed by: NURSE PRACTITIONER

## 2024-01-09 PROCEDURE — 81001 URINALYSIS AUTO W/SCOPE: CPT

## 2024-01-09 PROCEDURE — 99284 EMERGENCY DEPT VISIT MOD MDM: CPT

## 2024-01-09 PROCEDURE — 96372 THER/PROPH/DIAG INJ SC/IM: CPT

## 2024-01-09 PROCEDURE — 36415 COLL VENOUS BLD VENIPUNCTURE: CPT

## 2024-01-09 PROCEDURE — 93010 ELECTROCARDIOGRAM REPORT: CPT | Performed by: INTERNAL MEDICINE

## 2024-01-09 PROCEDURE — 80048 BASIC METABOLIC PNL TOTAL CA: CPT

## 2024-01-09 PROCEDURE — 85025 COMPLETE CBC W/AUTO DIFF WBC: CPT

## 2024-01-09 PROCEDURE — 6360000002 HC RX W HCPCS: Performed by: NURSE PRACTITIONER

## 2024-01-09 RX ORDER — LORAZEPAM 2 MG/ML
2 INJECTION INTRAMUSCULAR ONCE
Status: COMPLETED | OUTPATIENT
Start: 2024-01-09 | End: 2024-01-09

## 2024-01-09 RX ORDER — DROPERIDOL 2.5 MG/ML
1.25 INJECTION, SOLUTION INTRAMUSCULAR; INTRAVENOUS ONCE
Status: DISCONTINUED | OUTPATIENT
Start: 2024-01-09 | End: 2024-01-09

## 2024-01-09 RX ORDER — KETOROLAC TROMETHAMINE 30 MG/ML
15 INJECTION, SOLUTION INTRAMUSCULAR; INTRAVENOUS ONCE
Status: DISCONTINUED | OUTPATIENT
Start: 2024-01-09 | End: 2024-01-09 | Stop reason: HOSPADM

## 2024-01-09 RX ADMIN — LORAZEPAM 2 MG: 2 INJECTION INTRAMUSCULAR; INTRAVENOUS at 08:25

## 2024-01-09 NOTE — ED NOTES
Patient to ED via EMS after taking crack with meth. Patient unable to sit still. Patient crawling all over the bed. No other problems voiced at this time

## 2024-01-09 NOTE — ED PROVIDER NOTES
[facility] Emergency Department    CHIEF COMPLAINT       Chief Complaint   Patient presents with    Addiction Problem       Nurses Notes reviewed and I agree except as noted in the HPI.    HISTORY OF PRESENT ILLNESS   Demarco Spencer is a 38 y.o. male who presents to the ED for evaluation of addiction problem.    Patient complains of back pain that started yesterday.  Stated temporary alleviation with \"a shot of fentanyl\" and 0.5 g of cocaine.  Back pain persisted which brought the patient to the ER.  Reports a history of kidney failure, kidney stones, and rhabdomyolysis.  Also reports of anxiety that is currently not controlled.  History of IV drug use.  Patient denies any recent trauma to cause his back pain.  He believes that he has a kidney stone currently.  He notes a history of these in the past.  Patient denies any recent trauma to cause his back pain.  He believes that he has a kidney stone currently.  He notes a history of these in the past.  He denies any radiation of the pain down his legs.      Pain description:  Onset: 1 day  Location: Lower back  Duration: 1 day  Character: Pressure-like  Aggravating factors: None  Radiation: Towards midline buttocks  Timing: Constant  Severity: 10/10    Experienced previously: Yes    HPI was provided by the patient.      PAST MEDICAL HISTORY     Past Medical History:   Diagnosis Date    Anxiety     Depression     Drug overdose, intentional (HCC) 8/25/2019    Kidney stone     Liver disease     Hep C    Opiate abuse, continuous ([facility])        SURGICALHISTORY      has no past surgical history on file.    CURRENT MEDICATIONS       Discharge Medication List as of 1/9/2024 11:09 AM        CONTINUE these medications which have NOT CHANGED    Details   clonazePAM (KLONOPIN) 0.5 MG tablet Take 1 tablet by mouth 2 times daily as needed for Anxiety for up to 28 days. Max Daily Amount: 1 mg, Disp-28 tablet, R-0Normal      lisdexamfetamine (VYVANSE) 50 MG capsule Take 1 capsule by

## 2024-01-09 NOTE — DISCHARGE INSTR - COC
Continuity of Care Form    Patient Name: Demarco Spencer   :  1985  MRN:  [id number]    Admit date:  2024  Discharge date:  ***    Code Status Order: Prior   Advance Directives:     Admitting Physician:  No admitting provider for patient encounter.  PCP: Jennifer Gloria, APRN - CNP    Discharging Nurse: ***  Discharging Hospital Unit/Room#: 17/017A  Discharging Unit Phone Number: ***    Emergency Contact:   Extended Emergency Contact Information  Primary Emergency Contact: Shi Palacios  Address: [address]  [place] [address] [facility]  Home Phone: [phone]  Work Phone: [phone]  Mobile Phone: [phone]  Relation: Domestic Partner   needed? No  Secondary Emergency Contact: TjCelia   [facility]  Home Phone: [phone]  Mobile Phone: [phone]  Relation: Brother/Sister    Past Surgical History:  History reviewed. No pertinent surgical history.    Immunization History:     There is no immunization history on file for this patient.    Active Problems:  Patient Active Problem List   Diagnosis Code    Methamphetamine abuse ([facility]) F15.10    Acute kidney injury ([facility]) N17.9    Metabolic acidosis E87.20    Intentional drug overdose ([facility]) T50.902A    Leukocytosis D72.829    Chronic anemia D64.9    Anxiety F41.9    History of kidney stones Z87.442    Acute respiratory failure with hypoxia ([facility]) J96.01    COVID-19 U07.1    Drug overdose, accidental or unintentional, initial encounter T50.901A    Drug overdose, multiple drugs, accidental or unintentional, initial encounter T50.911A    Substance abuse ([facility]) F19.10    Toxic encephalopathy G92.9    Fever R50.9    Bilateral leg edema R60.0    Severe fentanyl use disorder ([facility]) F11.20    Elevated liver enzymes R74.8    Altered mental status R41.82    Calculus of ureterovesical junction (UVJ) N20.1    Non-traumatic rhabdomyolysis M62.82    Posttraumatic stress disorder F43.10    Acute renal failure

## 2024-01-09 NOTE — ED TRIAGE NOTES
Pt arrives to ED via EMS for c/o meth use. EMS states pt's girlfriend called 911 for help with pt's meth use. Pt arrives to ED in bed moving constantly and not cooperating with staff. Pt began cussing at this nurse. This nurse asked pt not to speak to her that way and then pt stated he did not want to be seen and walked out of the ED. Liam EASON RN started an IV during this process and removed it per pt's request. Charge nurse and campus police.

## 2024-01-11 ENCOUNTER — OFFICE VISIT (OUTPATIENT)
Dept: INTERNAL MEDICINE CLINIC | Age: 39
End: 2024-01-11
Payer: COMMERCIAL

## 2024-01-11 ENCOUNTER — SOCIAL WORK (OUTPATIENT)
Dept: INTERNAL MEDICINE CLINIC | Age: 39
End: 2024-01-11

## 2024-01-11 ENCOUNTER — HOSPITAL ENCOUNTER (EMERGENCY)
Age: 39
Discharge: HOME | End: 2024-01-11
Attending: EMERGENCY MEDICINE
Payer: COMMERCIAL

## 2024-01-11 VITALS — HEIGHT: 71 IN | BODY MASS INDEX: 25.2 KG/M2 | WEIGHT: 180 LBS

## 2024-01-11 VITALS
HEART RATE: 103 BPM | WEIGHT: 180 LBS | RESPIRATION RATE: 18 BRPM | TEMPERATURE: 98 F | HEIGHT: 71 IN | BODY MASS INDEX: 25.2 KG/M2 | OXYGEN SATURATION: 97 % | DIASTOLIC BLOOD PRESSURE: 86 MMHG | SYSTOLIC BLOOD PRESSURE: 113 MMHG

## 2024-01-11 DIAGNOSIS — F90.9 ATTENTION DEFICIT HYPERACTIVITY DISORDER (ADHD), UNSPECIFIED ADHD TYPE: ICD-10-CM

## 2024-01-11 DIAGNOSIS — F32.A ANXIETY AND DEPRESSION: ICD-10-CM

## 2024-01-11 DIAGNOSIS — I10 ESSENTIAL HYPERTENSION: ICD-10-CM

## 2024-01-11 DIAGNOSIS — F11.20 SEVERE OPIOID USE DISORDER (HCC): Primary | ICD-10-CM

## 2024-01-11 DIAGNOSIS — G47.00 INSOMNIA, UNSPECIFIED TYPE: ICD-10-CM

## 2024-01-11 DIAGNOSIS — F15.10 METHAMPHETAMINE ABUSE (HCC): Primary | ICD-10-CM

## 2024-01-11 DIAGNOSIS — F41.9 ANXIETY AND DEPRESSION: ICD-10-CM

## 2024-01-11 LAB
ALBUMIN SERPL BCG-MCNC: 3.6 G/DL (ref 3.5–5.1)
ALCOHOL URINE: ABNORMAL
ALP SERPL-CCNC: 89 U/L (ref 38–126)
ALT SERPL W/O P-5'-P-CCNC: 54 U/L (ref 11–66)
AMPHETAMINE SCREEN, URINE: ABNORMAL
ANION GAP SERPL CALC-SCNC: 20 MEQ/L (ref 8–16)
APAP SERPL-MCNC: 6.4 UG/ML (ref 0–20)
AST SERPL-CCNC: 45 U/L (ref 5–40)
BARBITURATE SCREEN, URINE: ABNORMAL
BASOPHILS ABSOLUTE: 0 THOU/MM3 (ref 0–0.1)
BASOPHILS NFR BLD AUTO: 0.1 %
BENZODIAZEPINE SCREEN, URINE: ABNORMAL
BILIRUB SERPL-MCNC: 0.2 MG/DL (ref 0.3–1.2)
BUN SERPL-MCNC: 15 MG/DL (ref 7–22)
BUPRENORPHINE URINE: ABNORMAL
CALCIUM SERPL-MCNC: 9 MG/DL (ref 8.5–10.5)
CHLORIDE SERPL-SCNC: 103 MEQ/L (ref 98–111)
CO2 SERPL-SCNC: 17 MEQ/L (ref 23–33)
COCAINE METABOLITE SCREEN URINE: ABNORMAL
CREAT SERPL-MCNC: 1.2 MG/DL (ref 0.4–1.2)
DEPRECATED RDW RBC AUTO: 57.5 FL (ref 35–45)
EOSINOPHIL NFR BLD AUTO: 2.8 %
EOSINOPHILS ABSOLUTE: 0.2 THOU/MM3 (ref 0–0.4)
ERYTHROCYTE [DISTWIDTH] IN BLOOD BY AUTOMATED COUNT: 19.2 % (ref 11.5–14.5)
ETHANOL SERPL-MCNC: < 0.01 %
FENTANYL SCREEN, URINE: ABNORMAL
GABAPENTIN SCREEN, URINE: ABNORMAL
GFR SERPL CREATININE-BSD FRML MDRD: > 60 ML/MIN/1.73M2
GLUCOSE SERPL-MCNC: 70 MG/DL (ref 70–108)
HCT VFR BLD AUTO: 31.4 % (ref 42–52)
HGB BLD-MCNC: 9.3 GM/DL (ref 14–18)
IMM GRANULOCYTES # BLD AUTO: 0.03 THOU/MM3 (ref 0–0.07)
IMM GRANULOCYTES NFR BLD AUTO: 0.4 %
LYMPHOCYTES ABSOLUTE: 1.8 THOU/MM3 (ref 1–4.8)
LYMPHOCYTES NFR BLD AUTO: 24.8 %
MCH RBC QN AUTO: 24.2 PG (ref 26–33)
MCHC RBC AUTO-ENTMCNC: 29.6 GM/DL (ref 32.2–35.5)
MCV RBC AUTO: 81.8 FL (ref 80–94)
MDMA URINE: ABNORMAL
METHADONE SCREEN, URINE: ABNORMAL
METHAMPHETAMINE, URINE: ABNORMAL
MONOCYTES ABSOLUTE: 0.5 THOU/MM3 (ref 0.4–1.3)
MONOCYTES NFR BLD AUTO: 6.6 %
NEUTROPHILS NFR BLD AUTO: 65.3 %
NRBC BLD AUTO-RTO: 0 /100 WBC
OPIATE SCREEN URINE: ABNORMAL
OSMOLALITY SERPL CALC.SUM OF ELEC: 278.6 MOSMOL/KG (ref 275–300)
OXYCODONE SCREEN URINE: ABNORMAL
PHENCYCLIDINE SCREEN URINE: ABNORMAL
PLATELET # BLD AUTO: 244 THOU/MM3 (ref 130–400)
PMV BLD AUTO: 9.9 FL (ref 9.4–12.4)
POTASSIUM SERPL-SCNC: 3.3 MEQ/L (ref 3.5–5.2)
PROPOXYPHENE SCREEN, URINE: ABNORMAL
PROT SERPL-MCNC: 7 G/DL (ref 6.1–8)
RBC # BLD AUTO: 3.84 MILL/MM3 (ref 4.7–6.1)
SALICYLATES SERPL-MCNC: 5.6 MG/DL (ref 2–10)
SEGMENTED NEUTROPHILS ABSOLUTE COUNT: 4.8 THOU/MM3 (ref 1.8–7.7)
SODIUM SERPL-SCNC: 140 MEQ/L (ref 135–145)
SYNTHETIC CANNABINOIDS(K2) SCREEN, URINE: ABNORMAL
THC SCREEN, URINE: ABNORMAL
TRAMADOL SCREEN URINE: ABNORMAL
TRICYCLIC ANTIDEPRESSANTS, UR: ABNORMAL
WBC # BLD AUTO: 7.4 THOU/MM3 (ref 4.8–10.8)

## 2024-01-11 PROCEDURE — G8428 CUR MEDS NOT DOCUMENT: HCPCS | Performed by: NURSE PRACTITIONER

## 2024-01-11 PROCEDURE — 96372 THER/PROPH/DIAG INJ SC/IM: CPT

## 2024-01-11 PROCEDURE — 80053 COMPREHEN METABOLIC PANEL: CPT

## 2024-01-11 PROCEDURE — 80143 DRUG ASSAY ACETAMINOPHEN: CPT

## 2024-01-11 PROCEDURE — G8417 CALC BMI ABV UP PARAM F/U: HCPCS | Performed by: NURSE PRACTITIONER

## 2024-01-11 PROCEDURE — G8484 FLU IMMUNIZE NO ADMIN: HCPCS | Performed by: NURSE PRACTITIONER

## 2024-01-11 PROCEDURE — 99215 OFFICE O/P EST HI 40 MIN: CPT | Performed by: NURSE PRACTITIONER

## 2024-01-11 PROCEDURE — 85025 COMPLETE CBC W/AUTO DIFF WBC: CPT

## 2024-01-11 PROCEDURE — 82077 ASSAY SPEC XCP UR&BREATH IA: CPT

## 2024-01-11 PROCEDURE — 36415 COLL VENOUS BLD VENIPUNCTURE: CPT

## 2024-01-11 PROCEDURE — 80179 DRUG ASSAY SALICYLATE: CPT

## 2024-01-11 PROCEDURE — 6360000002 HC RX W HCPCS: Performed by: STUDENT IN AN ORGANIZED HEALTH CARE EDUCATION/TRAINING PROGRAM

## 2024-01-11 PROCEDURE — 80305 DRUG TEST PRSMV DIR OPT OBS: CPT | Performed by: NURSE PRACTITIONER

## 2024-01-11 PROCEDURE — 99284 EMERGENCY DEPT VISIT MOD MDM: CPT

## 2024-01-11 PROCEDURE — 4004F PT TOBACCO SCREEN RCVD TLK: CPT | Performed by: NURSE PRACTITIONER

## 2024-01-11 RX ORDER — LISDEXAMFETAMINE DIMESYLATE CAPSULES 50 MG/1
50 CAPSULE ORAL EVERY MORNING
Qty: 28 CAPSULE | Refills: 0 | Status: SHIPPED | OUTPATIENT
Start: 2024-01-11 | End: 2024-02-08

## 2024-01-11 RX ORDER — BUPRENORPHINE HYDROCHLORIDE AND NALOXONE HYDROCHLORIDE DIHYDRATE 8; 2 MG/1; MG/1
1 TABLET SUBLINGUAL 2 TIMES DAILY
Qty: 14 TABLET | Refills: 0 | Status: SHIPPED | OUTPATIENT
Start: 2024-01-11 | End: 2024-01-11 | Stop reason: CLARIF

## 2024-01-11 RX ORDER — VALACYCLOVIR HYDROCHLORIDE 1 G/1
2000 TABLET, FILM COATED ORAL 2 TIMES DAILY
Qty: 14 TABLET | Refills: 0 | Status: SHIPPED | OUTPATIENT
Start: 2024-01-11

## 2024-01-11 RX ORDER — PREGABALIN 200 MG/1
200 CAPSULE ORAL 3 TIMES DAILY
Qty: 84 CAPSULE | Refills: 0 | Status: SHIPPED | OUTPATIENT
Start: 2024-01-11 | End: 2024-02-08

## 2024-01-11 RX ORDER — MIRTAZAPINE 45 MG/1
45 TABLET, FILM COATED ORAL NIGHTLY
Qty: 28 TABLET | Refills: 0 | Status: SHIPPED | OUTPATIENT
Start: 2024-01-11 | End: 2024-02-08

## 2024-01-11 RX ORDER — LISDEXAMFETAMINE DIMESYLATE CAPSULES 50 MG/1
50 CAPSULE ORAL EVERY MORNING
Qty: 28 CAPSULE | Refills: 0 | Status: SHIPPED | OUTPATIENT
Start: 2024-01-11 | End: 2024-01-11 | Stop reason: CLARIF

## 2024-01-11 RX ORDER — CLONAZEPAM 0.5 MG/1
0.5 TABLET ORAL 2 TIMES DAILY PRN
Qty: 14 TABLET | Refills: 0 | Status: SHIPPED | OUTPATIENT
Start: 2024-01-11 | End: 2024-01-18

## 2024-01-11 RX ORDER — LORAZEPAM 2 MG/ML
2 INJECTION INTRAMUSCULAR ONCE
Status: COMPLETED | OUTPATIENT
Start: 2024-01-11 | End: 2024-01-11

## 2024-01-11 RX ORDER — DROPERIDOL 2.5 MG/ML
1.25 INJECTION, SOLUTION INTRAMUSCULAR; INTRAVENOUS ONCE
Status: COMPLETED | OUTPATIENT
Start: 2024-01-11 | End: 2024-01-11

## 2024-01-11 RX ORDER — LISINOPRIL 20 MG/1
20 TABLET ORAL DAILY
Qty: 30 TABLET | Refills: 0 | Status: SHIPPED | OUTPATIENT
Start: 2024-01-11 | End: 2024-02-10

## 2024-01-11 RX ORDER — VENLAFAXINE 100 MG/1
100 TABLET ORAL 3 TIMES DAILY
Qty: 90 TABLET | Refills: 0 | Status: SHIPPED | OUTPATIENT
Start: 2024-01-11 | End: 2024-02-10

## 2024-01-11 RX ORDER — BUPRENORPHINE AND NALOXONE 8; 2 MG/1; MG/1
1 FILM, SOLUBLE BUCCAL; SUBLINGUAL 2 TIMES DAILY
Qty: 14 FILM | Refills: 0 | Status: SHIPPED | OUTPATIENT
Start: 2024-01-11 | End: 2024-01-18

## 2024-01-11 RX ADMIN — LORAZEPAM 2 MG: 2 INJECTION INTRAMUSCULAR; INTRAVENOUS at 18:18

## 2024-01-11 RX ADMIN — DROPERIDOL 1.25 MG: 2.5 INJECTION, SOLUTION INTRAMUSCULAR; INTRAVENOUS at 18:18

## 2024-01-11 ASSESSMENT — ENCOUNTER SYMPTOMS
DIARRHEA: 0
CHEST TIGHTNESS: 0
NAUSEA: 0
COUGH: 0
TROUBLE SWALLOWING: 0
SORE THROAT: 0
SINUS PRESSURE: 0
SHORTNESS OF BREATH: 0
VOMITING: 0
ABDOMINAL DISTENTION: 0
RHINORRHEA: 0
ABDOMINAL PAIN: 0
WHEEZING: 0
BLOOD IN STOOL: 0
SINUS PAIN: 0
CONSTIPATION: 0
PHOTOPHOBIA: 0

## 2024-01-11 ASSESSMENT — PAIN - FUNCTIONAL ASSESSMENT: PAIN_FUNCTIONAL_ASSESSMENT: NONE - DENIES PAIN

## 2024-01-11 NOTE — ED NOTES
Patient placed in safe room that is ligature resistant with continuous monitoring in place. Provider notified, requested an assessment by behavioral health . Patient belongings secured in a locked lockers outside of the room. Explained suicide prevention precautions to the patient including constant observer.

## 2024-01-11 NOTE — ED PROVIDER NOTES
ATTENDING NOTE:    I supervised and discussed the history, physical exam and the management of this patient with the resident. I reviewed the resident's note and agree with the documented findings and plan of care.  Please see my additional note.    I personally saw and examined the patient.  I have reviewed and agree with the resident's findings, including all diagnostic interpretations and treatment plans as written.  I was present for the key portion of any procedures performed and the inclusive time noted in any critical care statement.    Electronically verified by Nora Rajan MD  01/12/24 0139    
hepatitis C virus infection (HCC) B18.2    Yeast UTI B37.49    Rhabdomyolysis M62.82    Drug dependence, abuse (HCC) F19.20    Drug dependence, abuse ([facility]) F19.20    Primary fibromyalgia syndrome M79.7    Mild recurrent major depression ([facility]) F33.0    Opioid dependence on agonist therapy ([facility]) F11.20    Primary hypertension I10    Retention of urine R33.9    Seizure (HCC) R56.9    SATYA (acute kidney injury) ([facility]) N17.9    Other hypotension I95.89    Polysubstance dependence ([facility]) F19.20    Drug overdose of undetermined intent, initial encounter T50.904A    Hyperkalemia E87.5    Sinus bradycardia R00.1    History of hypertension Z86.79    Depression F32.A    AMS (altered mental status) R41.82    Transaminitis R74.01    Hypotension I95.9     SURGICAL HISTORY     History reviewed. No pertinent surgical history.    CURRENT MEDICATIONS       Previous Medications    ASPIRIN-ACETAMINOPHEN-CAFFEINE (EXCEDRIN EXTRA STRENGTH PO)    Take by mouth as needed     BUPRENORPHINE-NALOXONE (SUBOXONE) 8-2 MG FILM SL FILM    Place 1 Film under the tongue 2 times daily for 7 days. Max Daily Amount: 2 Film    CLONAZEPAM (KLONOPIN) 0.5 MG TABLET    Take 1 tablet by mouth 2 times daily as needed for Anxiety for up to 7 days. Max Daily Amount: 1 mg    LISDEXAMFETAMINE (VYVANSE) 50 MG CAPSULE    Take 1 capsule by mouth every morning for 28 days. Max Daily Amount: 50 mg    LISINOPRIL (PRINIVIL;ZESTRIL) 20 MG TABLET    Take 1 tablet by mouth daily    METHADONE HCL PO    Take 80 mg by mouth daily Max Daily Amount: 80 mg    MIRTAZAPINE (REMERON) 45 MG TABLET    Take 1 tablet by mouth nightly for 28 days    NALOXONE (NARCAN) 4 MG/0.1ML LIQD NASAL SPRAY    1 spray by Nasal route as needed for Opioid Reversal    ONDANSETRON (ZOFRAN) 4 MG TABLET    Take 1 tablet by mouth every 8 hours as needed for Nausea or Vomiting    PREGABALIN (LYRICA) 200 MG CAPSULE    Take 1 capsule by mouth in the morning, at noon, and at bedtime for 28 days. Max Daily Amount:

## 2024-01-11 NOTE — PROGRESS NOTES
[facility] CRISIS ASSESSMENT    Chief Complaint:   ***       Provisional Diagnosis: ***      Risk, Psychosocial and Contextual Factors: (homeless, lack of social support etc.): ***      Current  Treatment: ***        Present Suicidal Behavior:  ***    Verbal:  ***    Attempt:  ***      Access to Weapons:   ***      C-SSRS Current Suicide Risk: Low, Moderate or High:    ***      Past Suicidal Behavior:   ***    Verbal:  ***    Attempts:   ***      Self-Injurious/Self-Mutilation: (Specify)   ***      Traumatic Event Within Past 2 Weeks: (Specify)  ***      Current Abuse:  (Specify)  ***      Legal: (Specify)   ***      Violence: (Specify)   ***      Protective Factors:  ***      Housing:   ***      CPAP/Oxygen/Ambulation Difficulties:   ***      Critical Labs:   ***      Risk Factors: ***      Clinical Summary:  ***      Level of Care Disposition:      Consulted with medical provider. Patient is medically stabilized.  Consulted with patients RN about abnormalities or medical concerns. No abnormalities or medical concerns noted.  Consulted with_____________Patient to be.______________________
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
## 2024-01-11 NOTE — PROGRESS NOTES
[facility] PROFESSIONAL SERVS  [facility]  [address]  [address]  [facility] [place] [facility] [address]  Dept: [phone]  Dept Fax: [phone]  Loc: [phone]     Visit Date:  1/11/2024    Patient:  Demarco Spencer  YOB: 1985    HPI:     Chief Complaint   Patient presents with    Drug Problem     Demarco presents today for medical evaluation of ADHD, anxiety/depression/PTSD, chronic pain, HTN     I last seen him 4 weeks ago.      He has been hospitalized 3 times since last visit related to his drug use    Plans to go to [facility] tomorrow and enroll in classes/IOP with Camila Mills. Wants to go back on Suboxone because he cannot make it to the methadone clinic daily. Urges and cravings previously controlled with Suboxone 8 mg BID.      States that he continues to relapse because he cannot sleep at night. He is requesting a benzodiazepine. Discussed at length with patient. For harm reduction, I agreed to prescribe at a low dose under the circumstance that he sustains from any illicit use. Understanding voiced.      He is agreeable to inpatient rehab. Arrangements made with the help of [facility]- he will be admitted Monday. He did not go. Was incarcerated since last visit.      Urine positive for benzodiazepines, cocaine, methamphetamines    Admits to using fentanyl this am     Previously followed with Dr. Mtz for MAT.      Discussed at length that he cannot continue to use illicit drugs and be on a stimulant. I agreed to prescribe under the agreement that use would stop, and as harm reduction; as he complained that the reason for his methamphetamine use was because his ADHD was not controlled. Guanfacine not helpful. Vyvanse has been the most effective. This has also helped him not use methamphetamines.      Anxiety/depression well controlled with effexor and abilify; and counseling. Continue current regimen. He does have

## 2024-01-12 ENCOUNTER — HOSPITAL ENCOUNTER (EMERGENCY)
Age: 39
Discharge: LEFT BEFORE DISCHARGE | End: 2024-01-12
Attending: EMERGENCY MEDICINE
Payer: COMMERCIAL

## 2024-01-12 VITALS — OXYGEN SATURATION: 96 % | HEART RATE: 130 BPM

## 2024-01-12 DIAGNOSIS — Z53.21 ELOPED FROM EMERGENCY DEPARTMENT: Primary | ICD-10-CM

## 2024-01-12 DIAGNOSIS — F15.10 METHAMPHETAMINE USE (HCC): ICD-10-CM

## 2024-01-12 LAB
EKG ATRIAL RATE: 123 BPM
EKG P AXIS: 55 DEGREES
EKG P-R INTERVAL: 164 MS
EKG Q-T INTERVAL: 304 MS
EKG QRS DURATION: 84 MS
EKG QTC CALCULATION (BAZETT): 435 MS
EKG R AXIS: 41 DEGREES
EKG T AXIS: 58 DEGREES
EKG VENTRICULAR RATE: 123 BPM

## 2024-01-12 PROCEDURE — 99281 EMR DPT VST MAYX REQ PHY/QHP: CPT

## 2024-01-12 RX ORDER — DROPERIDOL 2.5 MG/ML
5 INJECTION, SOLUTION INTRAMUSCULAR; INTRAVENOUS ONCE
Status: DISCONTINUED | OUTPATIENT
Start: 2024-01-12 | End: 2024-01-13 | Stop reason: HOSPADM

## 2024-01-12 RX ORDER — SODIUM CHLORIDE, SODIUM LACTATE, POTASSIUM CHLORIDE, AND CALCIUM CHLORIDE .6; .31; .03; .02 G/100ML; G/100ML; G/100ML; G/100ML
1000 INJECTION, SOLUTION INTRAVENOUS ONCE
Status: DISCONTINUED | OUTPATIENT
Start: 2024-01-12 | End: 2024-01-13 | Stop reason: HOSPADM

## 2024-01-12 NOTE — DISCHARGE INSTRUCTIONS
Return to the emergency department immediately develop chest pain, shortness of breath you have any other concerns.

## 2024-01-12 NOTE — ED NOTES
PT resting in bed with sitter at bedside. Respirations equal and unlabored.No signs of distress noted.

## 2024-01-13 NOTE — ED NOTES
Pt moving around in bed at this time unable to obtain vital signs at this time. Dr Castle at bedside.

## 2024-01-13 NOTE — ED PROVIDER NOTES
FAMILY MEDICINE DAILY PROGRESS NOTE  NAME: Cristo Musa  : 1952  MRN: [id number]     LOS: 7 days     PROVIDER OF SERVICE: Warren Stewart MD    Chief Complaint: Hyponatremia    Subjective:     Interval History:  History taken from: patient chart RN  No acute overnight events.  Patient awake at bedside. Hyponatremia has resolved as of yesterday/Pt continuing to express desire for discharge. Discharge was planned for yesterday but suitable transport could not be arranged. Will get CM involved today to ensure PACS transport.  Patient having good BMs overnight. No blood.    Review of Systems:   Review of Systems   Constitutional: Negative for activity change, appetite change, chills, diaphoresis and fever.   HENT: Negative for congestion, rhinorrhea, sinus pressure, sinus pain and sore throat.   Eyes: Negative for visual disturbance.   Respiratory: Negative for apnea, cough, choking, chest tightness, shortness of breath and wheezing.    Cardiovascular: Negative for chest pain, palpitations and leg swelling.   Gastrointestinal: Negative for abdominal distention, abdominal pain, blood in stool, constipation, diarrhea, nausea and vomiting.   Genitourinary: Negative for difficulty urinating, dysuria, frequency, hematuria and urgency.   Musculoskeletal: Negative for arthralgias, back pain, joint swelling, myalgias and neck pain.   Skin: Negative for color change, pallor, rash and wound.   Neurological: Negative for dizziness, weakness, numbness and headaches.   Psychiatric/Behavioral: Negative for agitation and behavioral problems.       Objective:     Vital Signs  Temp:  [96.1 °F (35.6 °C)-97.9 °F (36.6 °C)] 96.9 °F (36.1 °C)  Heart Rate:  [56-77] 56  Resp:  [18-20] 20  BP: (104-132)/(47-76) 132/64    Physical Exam  Physical Exam   Constitutional: He is oriented to person, place, and time. He appears well-developed and well-nourished. No distress.   HENT:   Head: Normocephalic and atraumatic.   Right  Ear: External ear normal.   Left Ear: External ear normal.   Nose: Nose normal.   Eyes: Conjunctivae and EOM are normal. Pupils are equal, round, and reactive to light. Right eye exhibits no discharge. Left eye exhibits no discharge. No scleral icterus.   Neck: Normal range of motion. Neck supple. No thyromegaly present.   Cardiovascular: Normal rate, regular rhythm, normal heart sounds and intact distal pulses. Exam reveals no gallop and no friction rub.   No murmur heard.  Pulmonary/Chest: Effort normal and breath sounds normal. No respiratory distress. He has no wheezes. He has no rales. He exhibits no tenderness.   Abdominal: Soft. Bowel sounds are normal. He exhibits no distension and no mass. There is no tenderness. There is no guarding.   Musculoskeletal: Normal range of motion. He exhibits no edema, tenderness or deformity.   Lymphadenopathy:     He has no cervical adenopathy.   Neurological: He is alert and oriented to person, place, and time. No cranial nerve deficit.   Skin: Skin is warm and dry. Capillary refill takes <2 seconds. He is not diaphoretic.   Psychiatric: He has a normal mood and affect. His behavior is normal. Judgment and thought content normal.       Medication Review    Current Facility-Administered Medications:   •  8-hydroxyquinoline sulfate (BAG BALM) ointment, , Apply externally, TID PRN, César Hiader MD  •  acetaminophen (TYLENOL) tablet 500 mg, 500 mg, Oral, Q4H PRN, Marylou Eugene MD, 500 mg at 08/04/19 1354  •  albumin human 25 % IV SOLN 25 g, 25 g, Intravenous, PRN, Alethea Diamond MD  •  albuterol (PROVENTIL) nebulizer solution 0.5% 2.5 mg/0.5mL, 10 mg, Nebulization, Q6H PRN, Salvador Elmore MD  •  benzonatate (TESSALON) capsule 100 mg, 100 mg, Oral, TID PRN, Marylou Eugene MD  •  brimonidine (ALPHAGAN) 0.15 % ophthalmic solution 1 drop, 1 drop, Both Eyes, TID, Marylou Eugene MD, 1 drop at 08/05/19 2011  •  cetirizine (zyrTEC) tablet 5 mg, 5  LISINOPRIL (PRINIVIL;ZESTRIL) 20 MG TABLET    Take 1 tablet by mouth daily    METHADONE HCL PO    Take 80 mg by mouth daily Max Daily Amount: 80 mg    MIRTAZAPINE (REMERON) 45 MG TABLET    Take 1 tablet by mouth nightly for 28 days    NALOXONE (NARCAN) 4 MG/0.1ML LIQD NASAL SPRAY    1 spray by Nasal route as needed for Opioid Reversal    ONDANSETRON (ZOFRAN) 4 MG TABLET    Take 1 tablet by mouth every 8 hours as needed for Nausea or Vomiting    PREGABALIN (LYRICA) 200 MG CAPSULE    Take 1 capsule by mouth in the morning, at noon, and at bedtime for 28 days. Max Daily Amount: 600 mg    VALACYCLOVIR (VALTREX) 1 G TABLET    Take 2 tablets by mouth 2 times daily    VENLAFAXINE (EFFEXOR) 100 MG TABLET    Take 1 tablet by mouth 3 times daily       ALLERGIES     Allergies   Allergen Reactions    Penicillins        FAMILY HISTORY   History reviewed. No pertinent family history.    SOCIAL HISTORY     Social History     Tobacco Use    Smoking status: Former     Current packs/day: 1.00     Types: Cigarettes    Smokeless tobacco: Current   Substance Use Topics    Alcohol use: Not Currently    Drug use: Yes     Types: Opiates , Fentanyl, Amphetamines (Speed), Crack Cocaine, Heroin, Benzodiazepines (Downers/Zannies), Oxycodone (Oxy), Suboxone, Sedatives/Hypnotics, IV, Marijuana (Weed), Methamphetamines (Crystal Meth), Cocaine       PHYSICAL EXAM     ED Triage Vitals [01/12/24 2311]   BP Temp Temp src Pulse Resp SpO2 Height Weight   -- -- -- (!) 130 -- 96 % -- --       Initial vital signs and nursing assessment reviewed and abnormal from tachycardia . There is no height or weight on file to calculate BMI.     Vitals:    01/12/24 2311   Pulse: (!) 130   SpO2: 96%       Physical Exam  Vitals and nursing note reviewed.   Constitutional:       General: He is not in acute distress.     Appearance: Normal appearance. He is underweight. He is not ill-appearing (Chronically ill-appearing), toxic-appearing or diaphoretic.      Comments:  Scattered skin lesions on face and arms   HENT:      Head: Normocephalic and atraumatic.      Nose: Nose normal.   Eyes:      Conjunctiva/sclera: Conjunctivae normal.   Cardiovascular:      Rate and Rhythm: Tachycardia present.   Pulmonary:      Effort: Pulmonary effort is normal.   Abdominal:      General: Abdomen is flat.   Skin:     General: Skin is warm and dry.   Neurological:      Mental Status: He is alert and oriented to person, place, and time.   Psychiatric:         Behavior: Behavior is agitated and hyperactive.         Thought Content: Thought content does not include suicidal ideation.          FORMAL DIAGNOSTIC RESULTS     RADIOLOGY: Interpretation per the Radiologist below, if available at the time of this note (none if blank):  No orders to display       LABS: (none if blank)  Labs Reviewed   CK   COMPREHENSIVE METABOLIC PANEL W/ REFLEX TO MG FOR LOW K     (Any cultures that may have been sent were not resulted at the time of this patient visit)    MEDICAL DECISION MAKING     Initial Differential: Includes but is not limited to acute intoxication with amphetamines, polysubstance use, electrolyte disturbance, agitation, QTc prolongation, anorexia    Discrepancies: Patient eloped from department prior to treatment or workup    Summary: This is a 38-year-old male well-known to our department brought in by police.  Initial assessment appeared agitated and was ordered supportive care with droperidol, IV fluid bolus electrolytes and CK in addition to an EKG.  Patient stated he wanted Ativan not droperidol.  He eloped from the department prior to receiving workup or treatment.    Medical Decision Making  Problems Addressed:  Eloped from emergency department: chronic illness or injury  Methamphetamine use (HCC): chronic illness or injury    Amount and/or Complexity of Data Reviewed  Labs: ordered.  ECG/medicine tests: ordered.    Risk  Parenteral controlled substances.  Diagnosis or treatment significantly  mg, Oral, Daily, Marylou Eugene MD, 5 mg at 08/05/19 1257  •  cholecalciferol (VITAMIN D3) tablet 500 Units, 500 Units, Oral, Daily, Marylou Eugene MD, 500 Units at 08/05/19 1258  •  cyclobenzaprine (FLEXERIL) tablet 5 mg, 5 mg, Oral, Q8H PRN, Marylou Eugene MD, 5 mg at 08/02/19 2206  •  dextrose (D50W) 25 g/ 50mL Intravenous Solution 25 g, 25 g, Intravenous, Q15 Min PRN, Maxi Hernandez III, MD  •  dextrose (D50W) 25 g/ 50mL Intravenous Solution 50 mL, 50 mL, Intravenous, Q1H PRN, Salvador Elmore MD  •  dextrose (GLUTOSE) oral gel 15 g, 15 g, Oral, Q15 Min PRN, Maxi Hernandez III, MD  •  epoetin ziggy (EPOGEN,PROCRIT) injection 10,000 Units, 10,000 Units, Intravenous, Once per day on Mon Wed Fri, Pascual Vilchis MD, 10,000 Units at 08/05/19 1055  •  escitalopram (LEXAPRO) tablet 10 mg, 10 mg, Oral, Daily, Marylou Eugene MD, 10 mg at 08/05/19 1258  •  fluticasone (FLONASE) 50 MCG/ACT nasal spray 2 spray, 2 spray, Nasal, Daily, Marylou Eugene MD, 2 spray at 08/05/19 1259  •  gabapentin (NEURONTIN) capsule 100 mg, 100 mg, Oral, Nightly, Marylou Eugene MD, 100 mg at 08/05/19 2010  •  glucagon (human recombinant) (GLUCAGEN DIAGNOSTIC) injection 1 mg, 1 mg, Subcutaneous, PRN, Maxi Hernandez III, MD  •  insulin aspart (novoLOG) injection 0-7 Units, 0-7 Units, Subcutaneous, 4x Daily AC & at Bedtime, Maxi Hernandez III, MD, 3 Units at 08/05/19 2015  •  insulin detemir (LEVEMIR) injection 10 Units, 10 Units, Subcutaneous, Daily, Singh Sommer MD, 10 Units at 08/05/19 1309  •  insulin regular (humuLIN R,novoLIN R) injection 10 Units, 10 Units, Intravenous, Once, Salvador Elmore MD, Stopped at 07/30/19 2227  •  lactulose (CHRONULAC) 10 GM/15ML solution 20 g, 20 g, Oral, TID, O'SushmaMaxi III, MD, 20 g at 08/05/19 1619  •  latanoprost (XALATAN) 0.005 % ophthalmic solution 1 drop, 1 drop, Both Eyes, Nightly, Marylou Eugene MD, 1 drop at  08/05/19 2011  •  melatonin tablet 5.25 mg, 5.25 mg, Oral, Nightly, Marlyou Eugene MD, 5.25 mg at 08/05/19 2010  •  ondansetron (ZOFRAN) injection 4 mg, 4 mg, Intravenous, Q6H PRN, Marylou Eugene MD  •  pantoprazole (PROTONIX) injection 40 mg, 40 mg, Intravenous, Q12H, Marylou Eugene MD, 40 mg at 08/05/19 2011  •  rifaximin (XIFAXAN) tablet 550 mg, 550 mg, Oral, Q12H, Marylou Eugene MD, 550 mg at 08/05/19 2011  •  sevelamer (RENVELA) tablet 800 mg, 800 mg, Oral, TID With Meals, Marylou Eugene MD, 800 mg at 08/05/19 1716  •  sodium chloride 0.9 % bolus 100 mL, 100 mL, Intravenous, PRN, Pascual Vilchis MD, 50 mL at 08/02/19 1550  •  sodium chloride 0.9 % flush 10 mL, 10 mL, Intravenous, PRN, Salvador Elmore MD, 10 mL at 08/05/19 1258  •  sodium chloride 0.9 % flush 3 mL, 3 mL, Intravenous, Q12H, Marylou Eugene MD, 3 mL at 08/05/19 2014  •  sodium chloride 0.9 % flush 3-10 mL, 3-10 mL, Intravenous, PRN, Marylou Eugene MD  •  traZODone (DESYREL) tablet 50 mg, 50 mg, Oral, Nightly PRN, Marylou Eugene MD, 50 mg at 08/04/19 2205     Diagnostic Data    Lab Results (last 24 hours)     Procedure Component Value Units Date/Time    POC Glucose Once [[id number]]  (Normal) Collected:  08/06/19 0735    Specimen:  Blood Updated:  08/06/19 0754     Glucose 90 mg/dL      Comment: : [id number] [facility] ID: [id number]       POC Glucose Once [[id number]]  (Abnormal) Collected:  08/05/19 1932    Specimen:  Blood Updated:  08/05/19 2048     Glucose 231 mg/dL      Comment: RN NotifiedOperator: [id number] [facility]: [id number]       POC Glucose Once [[id number]]  (Abnormal) Collected:  08/05/19 1652    Specimen:  Blood Updated:  08/05/19 1704     Glucose 178 mg/dL      Comment: RN NotifiedOperator: [id number] WILIAN MENJIVARFERMeter ID: [id number]       Basic Metabolic Panel [[id number]]  (Abnormal) Collected:  08/05/19 1629    Specimen:  Blood Updated:   08/05/19 1657     Glucose 130 mg/dL      BUN 15 mg/dL      Creatinine 4.36 mg/dL      Sodium 133 mmol/L      Potassium 3.6 mmol/L      Chloride 93 mmol/L      CO2 27.0 mmol/L      Calcium 7.7 mg/dL      eGFR  African Amer 16 mL/min/1.73      BUN/Creatinine Ratio 3.4     Anion Gap 13.0 mmol/L     Narrative:       GFR Normal >60  Chronic Kidney Disease <60  Kidney Failure <15    Ammonia [[id number]]  (Normal) Collected:  08/05/19 1629    Specimen:  Blood Updated:  08/05/19 1657     Ammonia 50 umol/L     POC Glucose Once [[id number]]  (Normal) Collected:  08/05/19 1324    Specimen:  Blood Updated:  08/05/19 1335     Glucose 89 mg/dL      Comment: : [id number] MATILDE SEEMeter ID: [id number]       Basic Metabolic Panel [[id number]]  (Abnormal) Collected:  08/05/19 0810    Specimen:  Blood Updated:  08/05/19 0919     Glucose 161 mg/dL      BUN 40 mg/dL      Creatinine 7.63 mg/dL      Sodium 118 mmol/L      Comment: Testing repeated to verify result        Potassium 4.1 mmol/L      Chloride 80 mmol/L      CO2 19.0 mmol/L      Calcium 6.9 mg/dL      eGFR  African Amer 9 mL/min/1.73      Comment: <15 Indicative of kidney failure.        eGFR Non African Amer -- mL/min/1.73      Comment: <15 Indicative of kidney failure.        BUN/Creatinine Ratio 5.2     Anion Gap 19.0 mmol/L     Narrative:       GFR Normal >60  Chronic Kidney Disease <60  Kidney Failure <15    CBC & Differential [[id number]] Collected:  08/05/19 0810    Specimen:  Blood Updated:  08/05/19 0828    Narrative:       The following orders were created for panel order CBC & Differential.  Procedure                               Abnormality         Status                     ---------                               -----------         ------                     CBC Auto Differential[[id number]]        Abnormal            Final result                 Please view results for these tests on the individual orders.    CBC Auto Differential [[id number]]  (Abnormal)  Collected:  08/05/19 0810    Specimen:  Blood Updated:  08/05/19 0828     WBC 4.48 10*3/mm3      RBC 3.15 10*6/mm3      Hemoglobin 9.4 g/dL      Hematocrit 25.9 %      MCV 82.2 fL      MCH 29.8 pg      MCHC 36.3 g/dL      RDW 14.6 %      RDW-SD 42.7 fl      MPV 9.2 fL      Platelets 126 10*3/mm3      Neutrophil % 69.9 %      Lymphocyte % 17.4 %      Monocyte % 7.6 %      Eosinophil % 4.5 %      Basophil % 0.2 %      Immature Grans % 0.4 %      Neutrophils, Absolute 3.13 10*3/mm3      Lymphocytes, Absolute 0.78 10*3/mm3      Monocytes, Absolute 0.34 10*3/mm3      Eosinophils, Absolute 0.20 10*3/mm3      Basophils, Absolute 0.01 10*3/mm3      Immature Grans, Absolute 0.02 10*3/mm3      nRBC 0.0 /100 WBC             I reviewed the patient's new clinical results.    Assessment/Plan:     Active Hospital Problems    Diagnosis POA   • Obstructive sleep apnea [G47.33] Yes     Patient utilizing BiPAP therapy at night.  -BiPAP therapy to at bedtime with titration by respiratory services.     • Gastrointestinal hemorrhage [K92.2] Yes     H&H stable.  Patient with multiple bowel movement secondary to lactulose that were non-melanotic and not hematochezia.  -Follow-up with GI outpatient.       • Hyperammonemia (CMS/[facility]) [E72.20] Yes     Continue lactulose and rifaximin  Check ammonia level:193--->38 now  - Pending ammonia level for today.  -We will continue Posey vest until persistent clearance of sensorium.     • COPD (chronic obstructive pulmonary disease) (CMS/[facility]) [J44.9] Yes     - Continue flonase, loratadine  - Patient uses Oxygen prn at the nursing home.   - Duonebs and albuterol nebs prn.        • Chronic pain [G89.29] Yes     Continue home Gabapentin nightly and Flexeril prn.      • Primary insomnia [F51.01] Yes     Continue home Trazodone and Melatonin.      • ESRD (end stage renal disease) (CMS/[facility]) [N18.6] Yes     HD MWF  Nephrologist Dr. Vilchis  - Epoetin alpha 10,000 units IV Monday Wednesday Friday  -Sevelamer 800  mg p.o. 3 times daily with meals         • Primary open angle glaucoma of left eye, moderate stage [H40.1122] Yes     Continue home drops.      • Type 2 diabetes mellitus with diabetic peripheral angiopathy and gangrene, with long-term current use of insulin (CMS/[facility]) [E11.52, Z79.4] Not Applicable     - Continue long acting insulin 25 units nightly.    We will continue to trend with sliding scale, no insulin requirement yesterday.  -Insulin aspart  low-dose sliding scale  - Finger stick glucose checks.      • Essential hypertension [I10] Yes     Monitor per floor protocol.   - He does not appear to be on any BP meds at home.            DVT prophylaxis: SCDs  Code Status and Medical Interventions:   Ordered at: 07/30/19 7073     Limited Support to NOT Include:    Intubation     Code Status:    No CPR     Medical Interventions (Level of Support Prior to Arrest):    Limited       Plan for disposition:When:  Anticipated discharge in 24 to 96 hours          This document has been electronically signed by Warren Stewart MD on August 6, 2019 8:01 AM

## 2024-01-13 NOTE — ED PROVIDER NOTES
[facility]  EMERGENCY MEDICINE ATTENDING ATTESTATION      Evaluation of Demarco Spencer.   Case discussed and care plan developed with resident physician.   I agree with the resident physician documentation and plan as documented by him, except if my documentation differs.   Patient seen, interviewed and examined by me.   I reviewed the medical, surgical, family and social history, medications and allergies.   I have reviewed and interpreted all available lab, radiology and ekg results available at the moment.  I have reviewed the nursing documentation.       Brief H&P   Patient brought in by police for intoxication.  Patient is known to our department and was seen yesterday after using meth, cocaine, fentanyl.  Patient does not appear to have been attacking anyone or attempting to hurt himself.  He denies suicidal ideation.    Physical exam is notable for well appearing, raised mood, no visible injuries, otherwise nonfocal exam.      Medical Decision Making   MDM:   Acute meth intoxication, possible polysubstance  Plan:   Supportive care, medication, IV fluids  Observation in the ED while awaiting results  Shortly after f my levels officers left the room and before medication was administered, he asked for Ativan instead of other medications, then eloped    Please see the resident physician completed note for final disposition except as documented on this attestation.   I have reviewed and interpreted all available lab, radiology and ekg results available at the moment.  Diagnosis, treatment and disposition plans were discussed and agreed upon by patient.   This transcription was electronically signed. It was dictated by use of voice recognition software and electronically transcribed. The transcription may contain errors not detected in proofreading.     I performed direct supervision and was present for the critical portion following procedures: None  Critical care time on this

## 2024-01-14 ENCOUNTER — HOSPITAL ENCOUNTER (EMERGENCY)
Age: 39
Discharge: LEFT BEFORE DISCHARGE | End: 2024-01-14
Payer: COMMERCIAL

## 2024-01-14 VITALS
RESPIRATION RATE: 16 BRPM | HEART RATE: 109 BPM | SYSTOLIC BLOOD PRESSURE: 115 MMHG | TEMPERATURE: 97.6 F | OXYGEN SATURATION: 98 % | DIASTOLIC BLOOD PRESSURE: 78 MMHG

## 2024-01-14 DIAGNOSIS — F15.10 METHAMPHETAMINE ABUSE (HCC): Primary | ICD-10-CM

## 2024-01-14 PROCEDURE — 6360000002 HC RX W HCPCS: Performed by: EMERGENCY MEDICINE

## 2024-01-14 PROCEDURE — 96372 THER/PROPH/DIAG INJ SC/IM: CPT

## 2024-01-14 PROCEDURE — 93005 ELECTROCARDIOGRAM TRACING: CPT | Performed by: PHYSICIAN ASSISTANT

## 2024-01-14 PROCEDURE — 99284 EMERGENCY DEPT VISIT MOD MDM: CPT

## 2024-01-14 RX ORDER — DIPHENHYDRAMINE HYDROCHLORIDE 50 MG/ML
50 INJECTION INTRAMUSCULAR; INTRAVENOUS ONCE
Status: DISCONTINUED | OUTPATIENT
Start: 2024-01-14 | End: 2024-01-14 | Stop reason: HOSPADM

## 2024-01-14 RX ORDER — DROPERIDOL 2.5 MG/ML
2.5 INJECTION, SOLUTION INTRAMUSCULAR; INTRAVENOUS ONCE
Status: COMPLETED | OUTPATIENT
Start: 2024-01-14 | End: 2024-01-14

## 2024-01-14 RX ORDER — LORAZEPAM 2 MG/ML
2 INJECTION INTRAMUSCULAR ONCE
Status: DISCONTINUED | OUTPATIENT
Start: 2024-01-14 | End: 2024-01-14 | Stop reason: HOSPADM

## 2024-01-14 RX ADMIN — DROPERIDOL 2.5 MG: 2.5 INJECTION, SOLUTION INTRAMUSCULAR; INTRAVENOUS at 00:37

## 2024-01-14 ASSESSMENT — PAIN - FUNCTIONAL ASSESSMENT: PAIN_FUNCTIONAL_ASSESSMENT: WONG-BAKER FACES

## 2024-01-14 ASSESSMENT — PAIN SCALES - WONG BAKER: WONGBAKER_NUMERICALRESPONSE: 0

## 2024-01-14 NOTE — ED NOTES
Upon first contact with patient this RN receives bedside shift report from Irma PEREZ. Pt resting comfortably on cot. Vitals stable. Call light in reach

## 2024-01-14 NOTE — ED NOTES
Pt requesting to leave at this time. AMA form signed by pt. Pt seen ambulating toward lobby at this time.

## 2024-01-14 NOTE — ED NOTES
This nurse co-signs with registration staff for consent to treat as pt agitated and not able to sign at this time.

## 2024-01-14 NOTE — ED NOTES
Pt resting on cot comfortably at this time. Respirations even and unlabored. No objective signs of distress noted.

## 2024-01-14 NOTE — ED PROVIDER NOTES
[facility] EMERGENCY DEPT      Pt Name: Demarco Spencer  MRN: [id number]  Birthdate 1985  Date of evaluation: 1/14/2024  Provider: Li Richardson PA-C    CHIEF COMPLAINT       Chief Complaint   Patient presents with    Ingestion       Nurses Notes reviewed and I agree except as noted in the HPI.      HISTORY OF PRESENT ILLNESS    Demarco Spencer is a 38 y.o. male who presents via police escort for intoxication due to drug use.  Officers at bedside tell me that his girlfriend called EMS because patient was throwing things around her home.  When police officers got there patient asked to be taken to the ER.  Patient admits to doing meth.  When asked how much he states \"a little bit.\"  Patient denies other drug use to me.  Patient hyperactive and continually moving and talking on the cart.  Patient medicated prior to me seeing him with droperidol 2.5 mg IM.  Upon police departure patient calmed down.  Patient denies complaint to me at this time.  He denies overdose.    REVIEW OF SYSTEMS     Review of Systems   Respiratory:  Negative for shortness of breath.    Cardiovascular:  Positive for chest pain.   Gastrointestinal:  Negative for abdominal pain.   Musculoskeletal:  Negative for gait problem.   Skin:  Positive for wound.   Psychiatric/Behavioral:  Positive for behavioral problems. The patient is hyperactive.         PAST MEDICAL HISTORY    has a past medical history of Anxiety, Depression, Drug overdose, intentional (HCC), Kidney stone, Liver disease, and Opiate abuse, continuous ([facility]).    SURGICAL HISTORY      has no past surgical history on file.    CURRENT MEDICATIONS       Discharge Medication List as of 1/14/2024  9:31 AM        CONTINUE these medications which have NOT CHANGED    Details   clonazePAM (KLONOPIN) 0.5 MG tablet Take 1 tablet by mouth 2 times daily as needed for Anxiety for up to 7 days. Max Daily Amount: 1 mg, Disp-14 tablet, R-0Normal      venlafaxine (EFFEXOR) 100 MG tablet Take 1

## 2024-01-14 NOTE — ED PROVIDER NOTES
Care of this patient was assumed from EDIS Aquino.  The patient was seen for Ingestion  .  The patient's initial evaluation and plan have been discussed with the prior provider who initially evaluated the patient.  Nursing Notes, Past Medical Hx, Past Surgical Hx, Social Hx, Allergies, and Family Hx were all reviewed.    Blood pressure 115/78, pulse (!) 109, temperature 97.6 °F (36.4 °C), resp. rate 16, SpO2 98 %.    RESULTS:  Labs Reviewed - No data to display  Unresulted tests above are all within normal limits.      MEDICAL DECISION MAKING:  This patient is a 38 y.o. Male presented with paranoia admitted use of meth.  Patient was treated with Inapsine and slept most of the night and upon wakening was ready to go home.    FINAL IMPRESSION:  1.      ICD-10-CM    1. Methamphetamine abuse (HCC)  F15.10               Beltran Guido PA  01/14/24 1815

## 2024-01-15 ENCOUNTER — TELEPHONE (OUTPATIENT)
Dept: INTERNAL MEDICINE CLINIC | Age: 39
End: 2024-01-15

## 2024-01-15 ENCOUNTER — SOCIAL WORK (OUTPATIENT)
Dept: INTERNAL MEDICINE CLINIC | Age: 39
End: 2024-01-15

## 2024-01-15 DIAGNOSIS — R19.7 DIARRHEA, UNSPECIFIED TYPE: ICD-10-CM

## 2024-01-15 DIAGNOSIS — R06.02 SHORTNESS OF BREATH: Primary | ICD-10-CM

## 2024-01-15 PROCEDURE — 93010 ELECTROCARDIOGRAM REPORT: CPT | Performed by: INTERNAL MEDICINE

## 2024-01-15 NOTE — PROGRESS NOTES
Patient contacted sw, reporting that he thinks there is something seriously wrong with him. Sw offered support. Patient reports being to the e/d multiple times here and at [facility]. Patient feels that they are not willing to do anything. Patient reports using crack, 3-4 days ago, Fentanyl yesterday. Patient reports having diarrhea for the last 5 days, with blood in his stool. Reports his kidneys hurt. Sw staffed with Jennifer who recommended having COVID /Influenza  and labs drawn. Sw message Carin PEREZ to have this ordered.  Sw contacting patient to inform of orders being completed. Patient prefers [facility], and is thankful for the testing ordered.

## 2024-01-18 ENCOUNTER — SOCIAL WORK (OUTPATIENT)
Dept: INTERNAL MEDICINE CLINIC | Age: 39
End: 2024-01-18

## 2024-01-18 ENCOUNTER — OFFICE VISIT (OUTPATIENT)
Dept: INTERNAL MEDICINE CLINIC | Age: 39
End: 2024-01-18
Payer: COMMERCIAL

## 2024-01-18 VITALS
WEIGHT: 186 LBS | SYSTOLIC BLOOD PRESSURE: 154 MMHG | HEART RATE: 107 BPM | HEIGHT: 71 IN | BODY MASS INDEX: 26.04 KG/M2 | DIASTOLIC BLOOD PRESSURE: 90 MMHG

## 2024-01-18 DIAGNOSIS — F11.20 SEVERE OPIOID USE DISORDER (HCC): ICD-10-CM

## 2024-01-18 DIAGNOSIS — F41.9 ANXIETY AND DEPRESSION: ICD-10-CM

## 2024-01-18 DIAGNOSIS — F32.A ANXIETY AND DEPRESSION: ICD-10-CM

## 2024-01-18 LAB
ALCOHOL URINE: ABNORMAL
AMPHETAMINE SCREEN, URINE: ABNORMAL
BARBITURATE SCREEN, URINE: ABNORMAL
BENZODIAZEPINE SCREEN, URINE: ABNORMAL
BUPRENORPHINE URINE: ABNORMAL
COCAINE METABOLITE SCREEN URINE: ABNORMAL
EKG ATRIAL RATE: 116 BPM
EKG P AXIS: 60 DEGREES
EKG P-R INTERVAL: 160 MS
EKG Q-T INTERVAL: 318 MS
EKG QRS DURATION: 84 MS
EKG QTC CALCULATION (BAZETT): 442 MS
EKG R AXIS: 45 DEGREES
EKG T AXIS: 60 DEGREES
EKG VENTRICULAR RATE: 116 BPM
FENTANYL SCREEN, URINE: ABNORMAL
GABAPENTIN SCREEN, URINE: ABNORMAL
MDMA URINE: ABNORMAL
METHADONE SCREEN, URINE: ABNORMAL
METHAMPHETAMINE, URINE: ABNORMAL
OPIATE SCREEN URINE: ABNORMAL
OXYCODONE SCREEN URINE: ABNORMAL
PHENCYCLIDINE SCREEN URINE: ABNORMAL
PROPOXYPHENE SCREEN, URINE: ABNORMAL
SYNTHETIC CANNABINOIDS(K2) SCREEN, URINE: ABNORMAL
THC SCREEN, URINE: ABNORMAL
TRAMADOL SCREEN URINE: ABNORMAL
TRICYCLIC ANTIDEPRESSANTS, UR: ABNORMAL

## 2024-01-18 PROCEDURE — G8484 FLU IMMUNIZE NO ADMIN: HCPCS | Performed by: NURSE PRACTITIONER

## 2024-01-18 PROCEDURE — 4004F PT TOBACCO SCREEN RCVD TLK: CPT | Performed by: NURSE PRACTITIONER

## 2024-01-18 PROCEDURE — G8417 CALC BMI ABV UP PARAM F/U: HCPCS | Performed by: NURSE PRACTITIONER

## 2024-01-18 PROCEDURE — G8428 CUR MEDS NOT DOCUMENT: HCPCS | Performed by: NURSE PRACTITIONER

## 2024-01-18 PROCEDURE — 3080F DIAST BP >= 90 MM HG: CPT | Performed by: NURSE PRACTITIONER

## 2024-01-18 PROCEDURE — 80305 DRUG TEST PRSMV DIR OPT OBS: CPT | Performed by: NURSE PRACTITIONER

## 2024-01-18 PROCEDURE — 3077F SYST BP >= 140 MM HG: CPT | Performed by: NURSE PRACTITIONER

## 2024-01-18 PROCEDURE — 99214 OFFICE O/P EST MOD 30 MIN: CPT | Performed by: NURSE PRACTITIONER

## 2024-01-18 RX ORDER — BUPRENORPHINE AND NALOXONE 8; 2 MG/1; MG/1
1 FILM, SOLUBLE BUCCAL; SUBLINGUAL 2 TIMES DAILY
Qty: 14 FILM | Refills: 0 | Status: CANCELLED | OUTPATIENT
Start: 2024-01-18 | End: 2024-01-25

## 2024-01-18 RX ORDER — CLONAZEPAM 0.5 MG/1
0.5 TABLET ORAL 2 TIMES DAILY PRN
Qty: 14 TABLET | Refills: 0 | Status: CANCELLED | OUTPATIENT
Start: 2024-01-18 | End: 2024-01-25

## 2024-01-18 NOTE — PROGRESS NOTES
Verbal order per ANTONELLA ROCHE CNP for urine drug screen. Positive for AMPH, BUP, METHAMP, MICHI. Verified results with LEONIDES PICHARDO LPN.    
Methamphetamines (Crystal Meth), Cocaine    Sexual activity: Not Currently   Other Topics Concern    Not on file   Social History Narrative    ** Merged History Encounter **          Social Determinants of Health     Financial Resource Strain: Low Risk  (7/15/2021)    Overall Financial Resource Strain (CARDIA)     Difficulty of Paying Living Expenses: Not hard at all   Food Insecurity: Not on file (11/12/2023)   Transportation Needs: No Transportation Needs (11/17/2023)    Transportation Problems ([facility] HRSN)     In the past 12 months, has lack of reliable transportation kept you from medical appointments, meetings, work or from getting things needed for daily living?: Not on file   Physical Activity: Not on file   Stress: Not on file   Social Connections: Not on file   Intimate Partner Violence: Not on file   Housing Stability: Low Risk  (11/12/2023)    Housing Stability Vital Sign     Unable to Pay for Housing in the Last Year: No     Number of Places Lived in the Last Year: 1     Unstable Housing in the Last Year: No         Current Outpatient Medications on File Prior to Visit   Medication Sig Dispense Refill    clonazePAM (KLONOPIN) 0.5 MG tablet Take 1 tablet by mouth 2 times daily as needed for Anxiety for up to 7 days. Max Daily Amount: 1 mg 14 tablet 0    venlafaxine (EFFEXOR) 100 MG tablet Take 1 tablet by mouth 3 times daily 90 tablet 0    lisinopril (PRINIVIL;ZESTRIL) 20 MG tablet Take 1 tablet by mouth daily 30 tablet 0    pregabalin (LYRICA) 200 MG capsule Take 1 capsule by mouth in the morning, at noon, and at bedtime for 28 days. Max Daily Amount: 600 mg 84 capsule 0    mirtazapine (REMERON) 45 MG tablet Take 1 tablet by mouth nightly for 28 days 28 tablet 0    valACYclovir (VALTREX) 1 g tablet Take 2 tablets by mouth 2 times daily 14 tablet 0    buprenorphine-naloxone (SUBOXONE) 8-2 MG FILM SL film Place 1 Film under the tongue 2 times daily for 7 days. Max Daily Amount: 2 Film 14 Film 0

## 2024-01-29 ENCOUNTER — SOCIAL WORK (OUTPATIENT)
Dept: INTERNAL MEDICINE CLINIC | Age: 39
End: 2024-01-29

## 2024-01-29 ENCOUNTER — OFFICE VISIT (OUTPATIENT)
Dept: INTERNAL MEDICINE CLINIC | Age: 39
End: 2024-01-29
Payer: COMMERCIAL

## 2024-01-29 VITALS
RESPIRATION RATE: 16 BRPM | HEIGHT: 71 IN | DIASTOLIC BLOOD PRESSURE: 75 MMHG | WEIGHT: 177 LBS | SYSTOLIC BLOOD PRESSURE: 125 MMHG | BODY MASS INDEX: 24.78 KG/M2 | HEART RATE: 104 BPM

## 2024-01-29 DIAGNOSIS — F41.9 ANXIETY AND DEPRESSION: ICD-10-CM

## 2024-01-29 DIAGNOSIS — F11.20 SEVERE OPIOID USE DISORDER (HCC): Primary | ICD-10-CM

## 2024-01-29 DIAGNOSIS — F32.A ANXIETY AND DEPRESSION: ICD-10-CM

## 2024-01-29 PROCEDURE — 4004F PT TOBACCO SCREEN RCVD TLK: CPT | Performed by: NURSE PRACTITIONER

## 2024-01-29 PROCEDURE — 99214 OFFICE O/P EST MOD 30 MIN: CPT | Performed by: NURSE PRACTITIONER

## 2024-01-29 PROCEDURE — G8484 FLU IMMUNIZE NO ADMIN: HCPCS | Performed by: NURSE PRACTITIONER

## 2024-01-29 PROCEDURE — G8428 CUR MEDS NOT DOCUMENT: HCPCS | Performed by: NURSE PRACTITIONER

## 2024-01-29 PROCEDURE — 3078F DIAST BP <80 MM HG: CPT | Performed by: NURSE PRACTITIONER

## 2024-01-29 PROCEDURE — G8420 CALC BMI NORM PARAMETERS: HCPCS | Performed by: NURSE PRACTITIONER

## 2024-01-29 PROCEDURE — 3074F SYST BP LT 130 MM HG: CPT | Performed by: NURSE PRACTITIONER

## 2024-01-29 PROCEDURE — 80305 DRUG TEST PRSMV DIR OPT OBS: CPT | Performed by: NURSE PRACTITIONER

## 2024-01-29 RX ORDER — BUPRENORPHINE AND NALOXONE 8; 2 MG/1; MG/1
1 FILM, SOLUBLE BUCCAL; SUBLINGUAL 2 TIMES DAILY
Qty: 14 FILM | Refills: 0 | Status: SHIPPED | OUTPATIENT
Start: 2024-01-29 | End: 2024-01-29

## 2024-01-29 RX ORDER — CLONAZEPAM 0.5 MG/1
0.5 TABLET ORAL 2 TIMES DAILY PRN
Qty: 14 TABLET | Refills: 0 | Status: SHIPPED | OUTPATIENT
Start: 2024-01-29 | End: 2024-01-29

## 2024-01-29 RX ORDER — BUPRENORPHINE AND NALOXONE 8; 2 MG/1; MG/1
1 FILM, SOLUBLE BUCCAL; SUBLINGUAL 2 TIMES DAILY
Qty: 14 FILM | Refills: 0 | Status: SHIPPED | OUTPATIENT
Start: 2024-01-29 | End: 2024-02-05

## 2024-01-29 RX ORDER — CLONAZEPAM 0.5 MG/1
0.5 TABLET ORAL 2 TIMES DAILY PRN
Qty: 14 TABLET | Refills: 0 | Status: SHIPPED | OUTPATIENT
Start: 2024-01-29 | End: 2024-02-05

## 2024-01-29 NOTE — PROGRESS NOTES
Sw completed transportation request for patient appointment.   Staffed with provider who agreed to see patient today.

## 2024-01-29 NOTE — PROGRESS NOTES
[facility] PROFESSIONAL SERVS  [facility]  [address]  [address]  [place] [address]  Dept: [phone]  Dept Fax: [phone]  Loc: [phone]     Visit Date:  1/29/2024    Patient:  Demarco Spencer  YOB: 1985    HPI:     Chief Complaint   Patient presents with    Drug Problem     Demarco presents today for medical evaluation of ADHD, anxiety/depression/PTSD, chronic pain, HTN     I last seen him 2 weeks ago. He was seen by Ginger Castillo 1/18     Plans to go to [facility] tomorrow and enroll in classes/IOP with Camila [employer]. Wants to go back on Suboxone because he cannot make it to the methadone clinic daily. Urges and cravings previously controlled with Suboxone 8 mg BID.      States that he continues to relapse because he cannot sleep at night. He is requesting a benzodiazepine. Discussed at length with patient. For harm reduction, I agreed to prescribe at a low dose under the circumstance that he sustains from any illicit use. Understanding voiced.      He is agreeable to inpatient rehab. Arrangements made with the help of ONELIA Junior- he will be admitted Monday. He did not go. Was incarcerated since last visit.      Urine positive for cocaine and methamphetamines     Denies any use since getting out of intermediate Friday- was there for 5 days      Previously followed with Dr. Mtz for MAT.      Discussed at length that he cannot continue to use illicit drugs and be on a stimulant. I agreed to prescribe under the agreement that use would stop, and as harm reduction; as he complained that the reason for his methamphetamine use was because his ADHD was not controlled. Guanfacine not helpful. Vyvanse has been the most effective. This has also helped him not use methamphetamines.      Anxiety/depression well controlled with effexor and abilify; and counseling. Continue current regimen. He does have severe PTSD. Unable to

## 2024-01-29 NOTE — PROGRESS NOTES
[facility] SENT. [FreeTextEntry1] : 57F here for chronic cough most c/w upper airway cough  wheezing is episodic currently only on albuterol prn cannot tolerate saline lavages  Plan: allergy serologies full PFT cont albuterol prn only start flonase with azelastine cont Xysol and singulair may be a good candidate for biologics depending on workup above may need ent f/u but pt not excited as she had several surgeries which did not help.

## 2024-02-03 ASSESSMENT — ENCOUNTER SYMPTOMS
TROUBLE SWALLOWING: 0
COUGH: 0
ABDOMINAL PAIN: 0
PHOTOPHOBIA: 0
DIARRHEA: 0
RHINORRHEA: 0
CONSTIPATION: 0
WHEEZING: 0
SORE THROAT: 0
BLOOD IN STOOL: 0
SHORTNESS OF BREATH: 0
VOMITING: 0
SINUS PRESSURE: 0
CHEST TIGHTNESS: 0
SINUS PAIN: 0
NAUSEA: 0
ABDOMINAL DISTENTION: 0

## 2024-02-05 ENCOUNTER — SOCIAL WORK (OUTPATIENT)
Dept: INTERNAL MEDICINE CLINIC | Age: 39
End: 2024-02-05

## 2024-03-06 ENCOUNTER — HOSPITAL ENCOUNTER (EMERGENCY)
Age: 39
Discharge: HOME | End: 2024-03-06
Payer: COMMERCIAL

## 2024-03-06 VITALS
BODY MASS INDEX: 25.05 KG/M2 | TEMPERATURE: 98.4 F | SYSTOLIC BLOOD PRESSURE: 124 MMHG | RESPIRATION RATE: 22 BRPM | WEIGHT: 175 LBS | OXYGEN SATURATION: 98 % | HEART RATE: 119 BPM | DIASTOLIC BLOOD PRESSURE: 79 MMHG | HEIGHT: 70 IN

## 2024-03-06 DIAGNOSIS — T07.XXXA MULTIPLE EXCORIATIONS: Primary | ICD-10-CM

## 2024-03-06 PROCEDURE — 99283 EMERGENCY DEPT VISIT LOW MDM: CPT

## 2024-03-06 RX ORDER — DOXYCYCLINE HYCLATE 100 MG
100 TABLET ORAL 2 TIMES DAILY
Qty: 14 TABLET | Refills: 0 | Status: SHIPPED | OUTPATIENT
Start: 2024-03-06 | End: 2024-03-13

## 2024-03-06 RX ORDER — DOXYCYCLINE HYCLATE 100 MG
100 TABLET ORAL ONCE
Status: DISCONTINUED | OUTPATIENT
Start: 2024-03-06 | End: 2024-03-06 | Stop reason: HOSPADM

## 2024-03-07 NOTE — ED TRIAGE NOTES
Pt presents to ED via EMS for complaints of MRSA infection. On arrival pt unable to stay still throwing arms and legs and raising voice when asked triage questions. Pt states he does IV drugs and used fetanyl yesterday and smoked cocaine two days ago. Pt does appear to have open wounds throughout body and states he has been shooting up. Pt alert and oriented at this time.

## 2024-03-07 NOTE — DISCHARGE INSTRUCTIONS
You are seen in the emergency department today for scrapes and wounds on her arms and legs.  Stop picking at these.  You can apply a topical antibiotic to them, I have written you prescription.  Return the emergency room immediately if you develop high fevers or if you have any other concerns.

## 2024-03-07 NOTE — ED PROVIDER NOTES
drug cessation  Lesions were not purulent  Some areas of superficial cellulitis  No wound tracking or signs of phlebitis      ED Medications administered this visit:  (None if blank)  Medications   doxycycline hyclate (VIBRA-TABS) tablet 100 mg (has no administration in time range)       PROCEDURES: (None if blank)  Procedures:     CRITICAL CARE: (Please see Attending note / Attestation regarding Critical Care Time. )        FINAL IMPRESSION      1. Multiple excoriations          DISPOSITION/PLAN       Condition: condition: stable  Dispo: Discharge to home  DISPOSITION Discharge - Pending Orders Complete 03/06/2024 08:29:33 PM          Outpatient follow up (If applicable):  Jennifer Gloria, APRN - CNP  [address]  [address] [address]  [phone]    Schedule an appointment as soon as possible for a visit in 3 days  for follow up    The results of pertinent diagnostic studies and exam findings were discussed with the patient/surrogate. The patient’s provisional diagnosis and plan of care were discussed with the patient and present family who expressed understanding. Medications were reviewed and indications and risks of medications were discussed with the patient/family present. Strict return precautions and follow up instructions were discussed with the patient prior to discharge, with which the patient agrees.          DISCHARGE PRESCRIPTIONS: (None if blank)  New Prescriptions    DOXYCYCLINE HYCLATE (VIBRA-TABS) 100 MG TABLET    Take 1 tablet by mouth 2 times daily for 7 days         This transcription was electronically signed. Parts of this transcriptions may have been dictated by use of voice recognition software and electronically transcribed, and parts may have been transcribed with the assistance of an ED scribe. The transcription may contain errors not detected in proofreading.  Please refer to my supervising physician's documentation if my documentation differs.    Electronically Signed: Cordell

## 2024-04-01 DIAGNOSIS — I10 ESSENTIAL HYPERTENSION: ICD-10-CM

## 2024-04-01 DIAGNOSIS — F41.9 ANXIETY AND DEPRESSION: ICD-10-CM

## 2024-04-01 DIAGNOSIS — F32.A ANXIETY AND DEPRESSION: ICD-10-CM

## 2024-04-01 DIAGNOSIS — G47.00 INSOMNIA, UNSPECIFIED TYPE: ICD-10-CM

## 2024-04-01 RX ORDER — LISINOPRIL 20 MG/1
20 TABLET ORAL DAILY
Qty: 30 TABLET | Refills: 0 | OUTPATIENT
Start: 2024-04-01 | End: 2024-05-01

## 2024-04-01 RX ORDER — VENLAFAXINE 100 MG/1
100 TABLET ORAL 3 TIMES DAILY
Qty: 90 TABLET | Refills: 0 | OUTPATIENT
Start: 2024-04-01 | End: 2024-05-01

## 2024-04-01 RX ORDER — MIRTAZAPINE 45 MG/1
45 TABLET, FILM COATED ORAL NIGHTLY
Qty: 28 TABLET | Refills: 0 | OUTPATIENT
Start: 2024-04-01 | End: 2024-04-29

## 2024-04-01 RX ORDER — PREGABALIN 200 MG/1
200 CAPSULE ORAL 3 TIMES DAILY
Qty: 84 CAPSULE | Refills: 0 | OUTPATIENT
Start: 2024-04-01 | End: 2024-04-29

## 2024-04-01 NOTE — TELEPHONE ENCOUNTER
The patient called and asked if he could get a refill on his medications. His appt is scheduled for 6/13/24

## 2024-04-18 ENCOUNTER — HOSPITAL ENCOUNTER (EMERGENCY)
Age: 39
Discharge: HOME | End: 2024-04-18
Payer: COMMERCIAL

## 2024-04-18 VITALS
WEIGHT: 170 LBS | OXYGEN SATURATION: 98 % | HEART RATE: 104 BPM | RESPIRATION RATE: 18 BRPM | BODY MASS INDEX: 23.8 KG/M2 | DIASTOLIC BLOOD PRESSURE: 90 MMHG | SYSTOLIC BLOOD PRESSURE: 122 MMHG | HEIGHT: 71 IN | TEMPERATURE: 98.1 F

## 2024-04-18 DIAGNOSIS — J06.9 ACUTE UPPER RESPIRATORY INFECTION: Primary | ICD-10-CM

## 2024-04-18 DIAGNOSIS — I10 ESSENTIAL HYPERTENSION: ICD-10-CM

## 2024-04-18 DIAGNOSIS — Z76.0 ENCOUNTER FOR MEDICATION REFILL: ICD-10-CM

## 2024-04-18 DIAGNOSIS — F41.9 ANXIETY AND DEPRESSION: ICD-10-CM

## 2024-04-18 DIAGNOSIS — F32.A ANXIETY AND DEPRESSION: ICD-10-CM

## 2024-04-18 LAB
FLUAV RNA RESP QL NAA+PROBE: NOT DETECTED
FLUBV RNA RESP QL NAA+PROBE: NOT DETECTED
SARS-COV-2 RNA RESP QL NAA+PROBE: NOT DETECTED

## 2024-04-18 PROCEDURE — 87636 SARSCOV2 & INF A&B AMP PRB: CPT

## 2024-04-18 PROCEDURE — 99283 EMERGENCY DEPT VISIT LOW MDM: CPT

## 2024-04-18 RX ORDER — PREGABALIN 100 MG/1
200 CAPSULE ORAL 3 TIMES DAILY
Qty: 18 CAPSULE | Refills: 0 | Status: SHIPPED | OUTPATIENT
Start: 2024-04-18 | End: 2024-04-21

## 2024-04-18 RX ORDER — PREGABALIN 100 MG/1
200 CAPSULE ORAL 3 TIMES DAILY
Qty: 18 CAPSULE | Refills: 0 | Status: SHIPPED | OUTPATIENT
Start: 2024-04-18 | End: 2024-04-18

## 2024-04-18 RX ORDER — LISINOPRIL 20 MG/1
20 TABLET ORAL DAILY
Qty: 30 TABLET | Refills: 0 | Status: SHIPPED | OUTPATIENT
Start: 2024-04-18 | End: 2024-05-18

## 2024-04-18 NOTE — DISCHARGE INSTRUCTIONS
Your evaluated in the ER for upper respiratory symptoms.  You can take over-the-counter Flonase once daily.  You can also use over-the-counter medications such as Mucinex or Mucinex DM as directed per package instructions.  Oftentimes this can be helpful for symptoms for for aches and pains you can take Tylenol or ibuprofen.  I was able to represcribe some of her medications however it appears that they have actually been filled or sent to fill as of today.  I did send in a prescription for your lisinopril and a 3-day course of your Lyrica however it appears that it has been represcribed accordingly.  I was not able to represcribe the Effexor.  Please call your PCPs office to discuss with them.

## 2024-04-18 NOTE — ED NOTES
Patient to ED for intermittent fevers cough and nasal congestion for the last few days. Patient also wanting some of his home medications refilled

## 2024-04-18 NOTE — DISCHARGE INSTR - COC
Discharge: { Patient Condition:808711972}    Rehab Potential (if transferring to Rehab): {Prognosis:7495195407}    Recommended Labs or Other Treatments After Discharge: ***    Physician Certification: I certify the above information and transfer of Demarco Spencer  is necessary for the continuing treatment of the diagnosis listed and that he requires {Admit to Appropriate Level of Care:69079} for {GREATER/LESS:899188978} 30 days.     Update Admission H&P: {CHP DME Changes in HandP:769642859}    PHYSICIAN SIGNATURE:  {Esignature:607672239}

## 2024-04-19 RX ORDER — LISINOPRIL 20 MG/1
20 TABLET ORAL DAILY
Qty: 30 TABLET | Refills: 0 | OUTPATIENT
Start: 2024-04-19 | End: 2024-05-19

## 2024-04-19 RX ORDER — PREGABALIN 200 MG/1
200 CAPSULE ORAL 3 TIMES DAILY
Qty: 84 CAPSULE | Refills: 0 | OUTPATIENT
Start: 2024-04-19 | End: 2024-05-17

## 2024-04-19 RX ORDER — VENLAFAXINE 100 MG/1
100 TABLET ORAL 3 TIMES DAILY
Qty: 90 TABLET | Refills: 0 | OUTPATIENT
Start: 2024-04-19 | End: 2024-05-19

## 2024-04-19 ASSESSMENT — ENCOUNTER SYMPTOMS
ABDOMINAL PAIN: 0
NAUSEA: 0
EYE PAIN: 0
BACK PAIN: 0
DIARRHEA: 0
COUGH: 1
VOMITING: 0
SHORTNESS OF BREATH: 0

## 2024-04-19 NOTE — ED PROVIDER NOTES
[facility] EMERGENCY DEPT  EMERGENCY DEPARTMENT ENCOUNTER      Pt Name: Demarco Spencer  MRN: [id number]  Birthdate 1985  Date of evaluation: 4/18/2024  Provider: Mandeep Hassan DO    CHIEF COMPLAINT       Chief Complaint   Patient presents with    Cough    Nasal Congestion    Medication Refill         HISTORY OF PRESENT ILLNESS   (Location/Symptom, Timing/Onset, Context/Setting, Quality, Duration, Modifying Factors, Severity)  Note limiting factors.   Demarco Spencer is a 39 y.o. male who presents to the emergency department for management emergency room for evaluation of fever and cough as well as request for medication refills.  He states that he had intermittent subjective fevers at home cough nasal congestion for the last few days worse today.  He also states that his PCP is been out of town for the last several weeks has been out of because medications filled.  He is requesting refills of his Lyrica, Effexor, and lisinopril.  He denies any other acute complaints or concerns at this time.    HPI    Nursing Notes were reviewed.    REVIEW OF SYSTEMS    (2-9 systems for level 4, 10 or more for level 5)     Review of Systems   Constitutional:  Negative for fever.   HENT:  Positive for congestion.    Eyes:  Negative for pain.   Respiratory:  Positive for cough. Negative for shortness of breath.    Cardiovascular:  Negative for chest pain.   Gastrointestinal:  Negative for abdominal pain, diarrhea, nausea and vomiting.   Genitourinary:  Negative for dysuria and flank pain.   Musculoskeletal:  Negative for back pain.   Skin:  Negative for rash.   Neurological:  Negative for headaches.   Psychiatric/Behavioral:  Negative for confusion.        Except as noted above the remainder of the review of systems was reviewed and negative.       PAST MEDICAL HISTORY     Past Medical History:   Diagnosis Date    Anxiety     Depression     Drug overdose, intentional (HCC) 8/25/2019    Kidney stone     Liver disease     Hep C

## 2024-04-24 ENCOUNTER — OFFICE VISIT (OUTPATIENT)
Dept: INTERNAL MEDICINE CLINIC | Age: 39
End: 2024-04-24

## 2024-04-24 VITALS
HEIGHT: 71 IN | DIASTOLIC BLOOD PRESSURE: 111 MMHG | HEART RATE: 108 BPM | SYSTOLIC BLOOD PRESSURE: 178 MMHG | WEIGHT: 163 LBS | BODY MASS INDEX: 22.82 KG/M2

## 2024-04-24 DIAGNOSIS — T50.902S INTENTIONAL DRUG OVERDOSE, SEQUELA (HCC): ICD-10-CM

## 2024-04-24 DIAGNOSIS — G47.00 INSOMNIA, UNSPECIFIED TYPE: ICD-10-CM

## 2024-04-24 DIAGNOSIS — F11.20 SEVERE OPIOID USE DISORDER (HCC): ICD-10-CM

## 2024-04-24 DIAGNOSIS — I10 ESSENTIAL HYPERTENSION: Primary | ICD-10-CM

## 2024-04-24 DIAGNOSIS — F90.9 ATTENTION DEFICIT HYPERACTIVITY DISORDER (ADHD), UNSPECIFIED ADHD TYPE: ICD-10-CM

## 2024-04-24 DIAGNOSIS — F41.9 ANXIETY AND DEPRESSION: ICD-10-CM

## 2024-04-24 DIAGNOSIS — F32.A ANXIETY AND DEPRESSION: ICD-10-CM

## 2024-04-24 DIAGNOSIS — B18.2 CHRONIC HEPATITIS C WITHOUT HEPATIC COMA (HCC): ICD-10-CM

## 2024-04-24 DIAGNOSIS — J96.01 ACUTE RESPIRATORY FAILURE WITH HYPOXIA (HCC): ICD-10-CM

## 2024-04-24 RX ORDER — LISINOPRIL 20 MG/1
20 TABLET ORAL DAILY
Qty: 30 TABLET | Refills: 0 | Status: SHIPPED | OUTPATIENT
Start: 2024-04-24 | End: 2024-05-24

## 2024-04-24 RX ORDER — MIRTAZAPINE 45 MG/1
45 TABLET, FILM COATED ORAL NIGHTLY
Qty: 30 TABLET | Refills: 0 | Status: SHIPPED | OUTPATIENT
Start: 2024-04-24 | End: 2024-05-24

## 2024-04-24 RX ORDER — MIRTAZAPINE 45 MG/1
45 TABLET, FILM COATED ORAL NIGHTLY
Qty: 30 TABLET | Refills: 0 | Status: SHIPPED | OUTPATIENT
Start: 2024-04-24 | End: 2024-04-24

## 2024-04-24 RX ORDER — BUPRENORPHINE AND NALOXONE 8; 2 MG/1; MG/1
1 FILM, SOLUBLE BUCCAL; SUBLINGUAL 2 TIMES DAILY
Qty: 14 FILM | Refills: 0 | Status: SHIPPED | OUTPATIENT
Start: 2024-04-24 | End: 2024-04-24

## 2024-04-24 RX ORDER — LISDEXAMFETAMINE DIMESYLATE CAPSULES 50 MG/1
50 CAPSULE ORAL EVERY MORNING
Qty: 30 CAPSULE | Refills: 0 | Status: SHIPPED | OUTPATIENT
Start: 2024-04-24 | End: 2024-04-24

## 2024-04-24 RX ORDER — CLONAZEPAM 0.5 MG/1
0.5 TABLET ORAL 2 TIMES DAILY PRN
Qty: 14 TABLET | Refills: 0 | Status: SHIPPED | OUTPATIENT
Start: 2024-04-24 | End: 2024-04-24

## 2024-04-24 RX ORDER — PREGABALIN 200 MG/1
200 CAPSULE ORAL 3 TIMES DAILY
Qty: 90 CAPSULE | Refills: 0 | Status: SHIPPED | OUTPATIENT
Start: 2024-04-24 | End: 2024-04-24

## 2024-04-24 RX ORDER — VENLAFAXINE 100 MG/1
100 TABLET ORAL 3 TIMES DAILY
Qty: 90 TABLET | Refills: 0 | Status: SHIPPED | OUTPATIENT
Start: 2024-04-24

## 2024-04-24 RX ORDER — BUPRENORPHINE AND NALOXONE 8; 2 MG/1; MG/1
1 FILM, SOLUBLE BUCCAL; SUBLINGUAL 2 TIMES DAILY
Qty: 14 FILM | Refills: 0 | Status: SHIPPED | OUTPATIENT
Start: 2024-04-24 | End: 2024-05-01

## 2024-04-24 RX ORDER — VENLAFAXINE 100 MG/1
100 TABLET ORAL 3 TIMES DAILY
Qty: 90 TABLET | Refills: 0 | Status: SHIPPED | OUTPATIENT
Start: 2024-04-24 | End: 2024-05-24

## 2024-04-24 RX ORDER — PREGABALIN 200 MG/1
200 CAPSULE ORAL 3 TIMES DAILY
Qty: 90 CAPSULE | Refills: 0 | Status: SHIPPED | OUTPATIENT
Start: 2024-04-24 | End: 2024-05-24

## 2024-04-24 RX ORDER — LISDEXAMFETAMINE DIMESYLATE CAPSULES 50 MG/1
50 CAPSULE ORAL EVERY MORNING
Qty: 30 CAPSULE | Refills: 0 | Status: SHIPPED | OUTPATIENT
Start: 2024-04-24 | End: 2024-05-24

## 2024-04-24 RX ORDER — VENLAFAXINE 100 MG/1
100 TABLET ORAL 3 TIMES DAILY
Qty: 90 TABLET | Refills: 0 | Status: SHIPPED | OUTPATIENT
Start: 2024-04-24 | End: 2024-04-24

## 2024-04-24 RX ORDER — LISINOPRIL 20 MG/1
20 TABLET ORAL DAILY
Qty: 30 TABLET | Refills: 0 | Status: SHIPPED | OUTPATIENT
Start: 2024-04-24 | End: 2024-04-24

## 2024-04-24 RX ORDER — CLONAZEPAM 0.5 MG/1
0.5 TABLET ORAL 2 TIMES DAILY PRN
Qty: 14 TABLET | Refills: 0 | Status: SHIPPED | OUTPATIENT
Start: 2024-04-24 | End: 2024-05-01

## 2024-04-24 ASSESSMENT — PATIENT HEALTH QUESTIONNAIRE - PHQ9
3. TROUBLE FALLING OR STAYING ASLEEP: SEVERAL DAYS
SUM OF ALL RESPONSES TO PHQ9 QUESTIONS 1 & 2: 2
6. FEELING BAD ABOUT YOURSELF - OR THAT YOU ARE A FAILURE OR HAVE LET YOURSELF OR YOUR FAMILY DOWN: SEVERAL DAYS
4. FEELING TIRED OR HAVING LITTLE ENERGY: SEVERAL DAYS
1. LITTLE INTEREST OR PLEASURE IN DOING THINGS: SEVERAL DAYS
7. TROUBLE CONCENTRATING ON THINGS, SUCH AS READING THE NEWSPAPER OR WATCHING TELEVISION: SEVERAL DAYS
8. MOVING OR SPEAKING SO SLOWLY THAT OTHER PEOPLE COULD HAVE NOTICED. OR THE OPPOSITE, BEING SO FIGETY OR RESTLESS THAT YOU HAVE BEEN MOVING AROUND A LOT MORE THAN USUAL: SEVERAL DAYS
SUM OF ALL RESPONSES TO PHQ QUESTIONS 1-9: 8
SUM OF ALL RESPONSES TO PHQ QUESTIONS 1-9: 9
10. IF YOU CHECKED OFF ANY PROBLEMS, HOW DIFFICULT HAVE THESE PROBLEMS MADE IT FOR YOU TO DO YOUR WORK, TAKE CARE OF THINGS AT HOME, OR GET ALONG WITH OTHER PEOPLE: VERY DIFFICULT
SUM OF ALL RESPONSES TO PHQ QUESTIONS 1-9: 9
2. FEELING DOWN, DEPRESSED OR HOPELESS: SEVERAL DAYS
5. POOR APPETITE OR OVEREATING: SEVERAL DAYS
9. THOUGHTS THAT YOU WOULD BE BETTER OFF DEAD, OR OF HURTING YOURSELF: SEVERAL DAYS
SUM OF ALL RESPONSES TO PHQ QUESTIONS 1-9: 9

## 2024-04-24 ASSESSMENT — ENCOUNTER SYMPTOMS
BLOOD IN STOOL: 0
VOMITING: 0
WHEEZING: 0
PHOTOPHOBIA: 0
SINUS PAIN: 0
RHINORRHEA: 0
DIARRHEA: 0
SORE THROAT: 0
COUGH: 0
SINUS PRESSURE: 0
ABDOMINAL PAIN: 0
NAUSEA: 1
CHEST TIGHTNESS: 0
CONSTIPATION: 0
SHORTNESS OF BREATH: 0
ABDOMINAL DISTENTION: 0
TROUBLE SWALLOWING: 0

## 2024-04-24 NOTE — PROGRESS NOTES
[facility] PROFESSIONAL SERVS  [facility]IC[facility]ICATION MANAGEMENT  [address]  [address]  [place] [id number]  Dept: [phone]  Dept Fax: [phone]  Loc: [phone]     Visit Date:  4/24/2024    Patient:  Demarco Spencer  YOB: 1985    HPI:     Chief Complaint   Patient presents with    Drug Problem     Demarco presents today for medical evaluation of ADHD, anxiety/depression/PTSD, chronic pain, HTN     I last seen him 3 months ago.     No use since Monday- in withdrawal- COWS 32    Urine positive for buprenorphine. States that he did take suboxone last night and it did help with withdrawals. He has been using fentanyl.      Wants to go back on Suboxone because he cannot make it to the methadone clinic daily. Urges and cravings previously controlled with Suboxone 8 mg BID.      States that he continues to relapse because he cannot sleep at night. He is requesting a benzodiazepine. Discussed at length with patient. For harm reduction, I agreed to prescribe at a low dose under the circumstance that he sustains from any illicit use. Understanding voiced.      States that the courts are ordering him to go to an inpatient rehab in [place] next week.      Discussed at length that he cannot continue to use illicit drugs and be on a stimulant. I agreed to prescribe under the agreement that use would stop, and as harm reduction; as he complained that the reason for his methamphetamine use was because his ADHD was not controlled. Guanfacine not helpful. Vyvanse has been the most effective. This has also helped him not use methamphetamines.      Anxiety/depression well controlled with effexor and abilify; and counseling. Continue current regimen. He does have severe PTSD. Unable to work.      Chronic pain controlled with pregabalin     BP well controlled with lisinopril 20 mg daily.     Medications    Current Outpatient Medications:     venlafaxine (EFFEXOR) 100

## 2024-04-28 ENCOUNTER — APPOINTMENT (OUTPATIENT)
Dept: CT IMAGING | Age: 39
End: 2024-04-28
Payer: COMMERCIAL

## 2024-04-28 ENCOUNTER — HOSPITAL ENCOUNTER (EMERGENCY)
Age: 39
Discharge: LEFT BEFORE DISCHARGE | End: 2024-04-28
Attending: EMERGENCY MEDICINE
Payer: COMMERCIAL

## 2024-04-28 VITALS
TEMPERATURE: 98.2 F | SYSTOLIC BLOOD PRESSURE: 130 MMHG | DIASTOLIC BLOOD PRESSURE: 73 MMHG | RESPIRATION RATE: 20 BRPM | OXYGEN SATURATION: 98 % | HEART RATE: 105 BPM

## 2024-04-28 DIAGNOSIS — R10.9 RIGHT FLANK PAIN: Primary | ICD-10-CM

## 2024-04-28 PROCEDURE — 99283 EMERGENCY DEPT VISIT LOW MDM: CPT

## 2024-04-28 RX ORDER — DROPERIDOL 2.5 MG/ML
1.25 INJECTION, SOLUTION INTRAMUSCULAR; INTRAVENOUS EVERY 6 HOURS PRN
Status: DISCONTINUED | OUTPATIENT
Start: 2024-04-28 | End: 2024-04-28 | Stop reason: HOSPADM

## 2024-04-28 RX ORDER — KETOROLAC TROMETHAMINE 30 MG/ML
30 INJECTION, SOLUTION INTRAMUSCULAR; INTRAVENOUS ONCE
Status: DISCONTINUED | OUTPATIENT
Start: 2024-04-28 | End: 2024-04-28 | Stop reason: HOSPADM

## 2024-04-28 ASSESSMENT — PAIN - FUNCTIONAL ASSESSMENT: PAIN_FUNCTIONAL_ASSESSMENT: 0-10

## 2024-04-28 ASSESSMENT — PAIN DESCRIPTION - ORIENTATION: ORIENTATION: RIGHT;LEFT

## 2024-04-28 ASSESSMENT — PAIN SCALES - GENERAL: PAINLEVEL_OUTOF10: 8

## 2024-04-28 ASSESSMENT — PAIN DESCRIPTION - LOCATION: LOCATION: FLANK

## 2024-04-28 NOTE — ED NOTES
Pt came out and advised he's leaving at this time stating he \"has a doctor's appointment this week anyway\". Pt refused to sign AMA paperwork. Provider aware of pt leaving.

## 2024-04-28 NOTE — ED PROVIDER NOTES
[facility]  EMERGENCY DEPARTMENT ENCOUNTER        PATIENT NAME: Demarco Spencer  MRN: [id number]  : 1985  WEIR: 2024  PROVIDER: Mateo Gómez MD    Patient was seen and evaluated at 7:13 PM EDT. Nurses Notes are reviewed and I agree except as noted in the HPI.    HISTORY OF PRESENT ILLNESS   Demarco Spencer is a 39 y.o. male who presents to Emergency Department with Flank Pain     A 39-year-old male with PMH of anxiety, depression, IV drug use, overdose, kidney stone, hepatic steatosis, opioid abuse, and hypertension is brought in for evaluation of R flank pain on and off for last week and worse today. Pain is rated at 10/10, radiating to right groin. He is restless and rolling in bed. No reported fever or chills. No chest pain. No SOB. No diarrhea. He has been having hematuria.     This HPI was provided by patient.     PAST MEDICAL HISTORY    has a past medical history of Anxiety, Depression, Drug overdose, intentional (HCC), Kidney stone, Liver disease, and Opiate abuse, continuous (HCC).    SURGICAL HISTORY      has no past surgical history on file.    CURRENT MEDICATIONS       Discharge Medication List as of 2024  7:35 PM        CONTINUE these medications which have NOT CHANGED    Details   !! venlafaxine (EFFEXOR) 100 MG tablet Take 1 tablet by mouth 3 times daily, Disp-90 tablet, R-0Normal      buprenorphine-naloxone (SUBOXONE) 8-2 MG FILM SL film Place 1 Film under the tongue in the morning and at bedtime for 7 days. Max Daily Amount: 2 Film, Disp-14 Film, R-0Normal      clonazePAM (KLONOPIN) 0.5 MG tablet Take 1 tablet by mouth 2 times daily as needed for Anxiety for up to 7 days. Max Daily Amount: 1 mg, Disp-14 tablet, R-0Normal      lisdexamfetamine (VYVANSE) 50 MG capsule Take 1 capsule by mouth every morning for 30 days. Max Daily Amount: 50 mg, Disp-30 capsule, R-0Normal      lisinopril (PRINIVIL;ZESTRIL) 20 MG tablet Take 1 tablet by mouth daily, Disp-30 tablet, R-0Normal

## 2024-04-28 NOTE — ED NOTES
Pt to ED c/o bilateral flank pain and hematuria for the last week. Pt states he has pain when he urinates. Pt denies any drug or alcohol use today. VSS.

## 2024-04-28 NOTE — ED NOTES
Pt refusing Droperidol and Toradol at this time. Pt states \"Those meds don't do anything. I'm just going to leave anyway\". Provider notified.

## 2024-04-28 NOTE — ED NOTES
Per Dr. Gómez, CT scan of abdomen and pelvis is without IV contrast. CT notified of change in order.

## 2024-04-30 ENCOUNTER — OFFICE VISIT (OUTPATIENT)
Dept: INTERNAL MEDICINE CLINIC | Age: 39
End: 2024-04-30
Payer: COMMERCIAL

## 2024-04-30 VITALS
SYSTOLIC BLOOD PRESSURE: 134 MMHG | BODY MASS INDEX: 23.24 KG/M2 | HEIGHT: 71 IN | DIASTOLIC BLOOD PRESSURE: 87 MMHG | HEART RATE: 88 BPM | RESPIRATION RATE: 16 BRPM | WEIGHT: 166 LBS

## 2024-04-30 DIAGNOSIS — F90.9 ATTENTION DEFICIT HYPERACTIVITY DISORDER (ADHD), UNSPECIFIED ADHD TYPE: ICD-10-CM

## 2024-04-30 DIAGNOSIS — F32.A ANXIETY AND DEPRESSION: ICD-10-CM

## 2024-04-30 DIAGNOSIS — F41.9 ANXIETY AND DEPRESSION: ICD-10-CM

## 2024-04-30 DIAGNOSIS — F11.20 SEVERE OPIOID USE DISORDER (HCC): Primary | ICD-10-CM

## 2024-04-30 PROCEDURE — G8420 CALC BMI NORM PARAMETERS: HCPCS | Performed by: NURSE PRACTITIONER

## 2024-04-30 PROCEDURE — 80305 DRUG TEST PRSMV DIR OPT OBS: CPT | Performed by: NURSE PRACTITIONER

## 2024-04-30 PROCEDURE — 4004F PT TOBACCO SCREEN RCVD TLK: CPT | Performed by: NURSE PRACTITIONER

## 2024-04-30 PROCEDURE — 3079F DIAST BP 80-89 MM HG: CPT | Performed by: NURSE PRACTITIONER

## 2024-04-30 PROCEDURE — 3075F SYST BP GE 130 - 139MM HG: CPT | Performed by: NURSE PRACTITIONER

## 2024-04-30 PROCEDURE — G8427 DOCREV CUR MEDS BY ELIG CLIN: HCPCS | Performed by: NURSE PRACTITIONER

## 2024-04-30 PROCEDURE — 99214 OFFICE O/P EST MOD 30 MIN: CPT | Performed by: NURSE PRACTITIONER

## 2024-04-30 RX ORDER — CLONAZEPAM 1 MG/1
1 TABLET ORAL 2 TIMES DAILY PRN
Qty: 14 TABLET | Refills: 0 | Status: SHIPPED | OUTPATIENT
Start: 2024-04-30 | End: 2024-05-07

## 2024-04-30 RX ORDER — BUPRENORPHINE AND NALOXONE 8; 2 MG/1; MG/1
1 FILM, SOLUBLE BUCCAL; SUBLINGUAL 2 TIMES DAILY
Qty: 14 FILM | Refills: 0 | Status: SHIPPED | OUTPATIENT
Start: 2024-04-30 | End: 2024-05-07

## 2024-04-30 RX ORDER — LISDEXAMFETAMINE DIMESYLATE 70 MG/1
70 CAPSULE ORAL EVERY MORNING
Qty: 30 CAPSULE | Refills: 0 | Status: SHIPPED | OUTPATIENT
Start: 2024-04-30 | End: 2024-05-30

## 2024-04-30 SDOH — ECONOMIC STABILITY: FOOD INSECURITY: WITHIN THE PAST 12 MONTHS, YOU WORRIED THAT YOUR FOOD WOULD RUN OUT BEFORE YOU GOT MONEY TO BUY MORE.: NEVER TRUE

## 2024-04-30 SDOH — ECONOMIC STABILITY: INCOME INSECURITY: HOW HARD IS IT FOR YOU TO PAY FOR THE VERY BASICS LIKE FOOD, HOUSING, MEDICAL CARE, AND HEATING?: NOT HARD AT ALL

## 2024-04-30 SDOH — ECONOMIC STABILITY: FOOD INSECURITY: WITHIN THE PAST 12 MONTHS, THE FOOD YOU BOUGHT JUST DIDN'T LAST AND YOU DIDN'T HAVE MONEY TO GET MORE.: NEVER TRUE

## 2024-04-30 SDOH — ECONOMIC STABILITY: HOUSING INSECURITY
IN THE LAST 12 MONTHS, WAS THERE A TIME WHEN YOU DID NOT HAVE A STEADY PLACE TO SLEEP OR SLEPT IN A SHELTER (INCLUDING NOW)?: NO

## 2024-04-30 ASSESSMENT — ENCOUNTER SYMPTOMS
DIARRHEA: 0
SINUS PRESSURE: 0
RHINORRHEA: 0
TROUBLE SWALLOWING: 0
CONSTIPATION: 0
ABDOMINAL PAIN: 0
BLOOD IN STOOL: 0
VOMITING: 0
PHOTOPHOBIA: 0
COUGH: 0
SINUS PAIN: 0
SHORTNESS OF BREATH: 0
ABDOMINAL DISTENTION: 0
CHEST TIGHTNESS: 0
NAUSEA: 0
WHEEZING: 0
SORE THROAT: 0

## 2024-04-30 NOTE — PROGRESS NOTES
[facility] PROFESSIONAL SERVS  [facility]  [address]  [address]  [place] [address]  Dept: [phone]  Dept Fax: [phone]  Loc: [phone]     Visit Date:  4/30/2024    Patient:  Demarco Spencer  YOB: 1985    HPI:     Chief Complaint   Patient presents with    Follow-up     Demarco presents today for medical evaluation of ADHD, anxiety/depression/PTSD, chronic pain, HTN, severe opioid use disorder, opioid withdrawal     I last seen him 1 week ago.      He was in severe opioid withdrawal when I seen him last week. Was able to complete induction in office. States that he did well for 2 days, but then his significant other got dope and he used. He last used last night.      Urine positive for buprenorphine, cocaine, fentanyl.      Wants to go back on Suboxone because he cannot make it to the methadone clinic daily. Urges and cravings previously controlled with Suboxone 8 mg BID.      States that he continues to relapse because he cannot sleep at night. He is requesting a benzodiazepine. Discussed at length with patient. For harm reduction, I agreed to prescribe at a low dose under the circumstance that he sustains from any illicit use. Understanding voiced.      States that the courts are ordering him to go to an inpatient rehab in [place] next week.      Discussed at length that he cannot continue to use illicit drugs and be on a stimulant. I agreed to prescribe under the agreement that use would stop, and as harm reduction; as he complained that the reason for his methamphetamine use was because his ADHD was not controlled. Guanfacine not helpful. Vyvanse has been the most effective. This has also helped him not use methamphetamines.      Anxiety/depression well controlled with effexor and abilify; and counseling. Continue current regimen. He does have severe PTSD. Unable to work.      Chronic pain controlled with

## 2024-04-30 NOTE — PATIENT INSTRUCTIONS
[place] [employer]*  (Call 211 if need more resources.)      [facility] [place] on A[facility]:  What they offer:  Provides transportation to appointments, Congregate meal sites, grocery shopping, prescription pick-up, banking, bill pay and social security/ human services.   Phone number:  [phone]  Website: https://www.accWave - Private Location App.org    [employer]:  What they offer: Transportation to the [place] area.  Phone number: [phone]    [employer]:  What they offer: Transportation to medical and non-medical individuals in the community. Accommodations for both ambulatory and wheelchair bound individuals.    Phone number: [phone]    [employer]:  What they offer: Transportation for those 60 years of age and older. Vans are wheelchair accessible. Transportation provided daily in [place] and to surrounding area for essential appointments such as medical, banking, grocery shopping, meal sites, , and senior center. Available in [place], [place] and [place] for medical appointment.   Cost: Suggested contribution of $5.00 per round trip to [place] and $12.00 per round trip for out-of-town appointments.  Phone number:  [phone]  Website: [email or web address] [email or web address]/transportation.html      [employer]:  Phone number: 1-970.916.7008    [employer] bus:  Phone number: 731.866.1012  Website: https://www.BESOS/en-us/bus-station    [facility]:  What they offer: Emergency and non-emergency transportation for patients in [place]. Available for transportation to hospitals, medical centers, and health centers.   Phone number: [phone]  Website: https://www.[email or web address]/[facility]-paramedics-ambulance-[place]    [employer]/:  What they offer: Transportation to appointments, shopping and visiting friends.  Cost: Adults $1.00, 65+ &disabled with a [employer] card $0.50, Medicare $0.50, Youth 6 to 18

## 2024-05-04 ENCOUNTER — HOSPITAL ENCOUNTER (EMERGENCY)
Age: 39
Discharge: HOME | End: 2024-05-04
Attending: EMERGENCY MEDICINE
Payer: COMMERCIAL

## 2024-05-04 VITALS
OXYGEN SATURATION: 98 % | WEIGHT: 166 LBS | BODY MASS INDEX: 23.77 KG/M2 | TEMPERATURE: 98.3 F | SYSTOLIC BLOOD PRESSURE: 117 MMHG | HEART RATE: 110 BPM | RESPIRATION RATE: 18 BRPM | HEIGHT: 70 IN | DIASTOLIC BLOOD PRESSURE: 61 MMHG

## 2024-05-04 DIAGNOSIS — Z53.29 LEFT AGAINST MEDICAL ADVICE: Primary | ICD-10-CM

## 2024-05-04 LAB
ALBUMIN SERPL BCG-MCNC: 3.8 G/DL (ref 3.5–5.1)
ALP SERPL-CCNC: 95 U/L (ref 38–126)
ALT SERPL W/O P-5'-P-CCNC: 37 U/L (ref 11–66)
ANION GAP SERPL CALC-SCNC: 14 MEQ/L (ref 8–16)
APAP SERPL-MCNC: < 5 UG/ML (ref 0–20)
AST SERPL-CCNC: 48 U/L (ref 5–40)
BASOPHILS ABSOLUTE: 0.1 THOU/MM3 (ref 0–0.1)
BASOPHILS NFR BLD AUTO: 0.4 %
BILIRUB SERPL-MCNC: 0.2 MG/DL (ref 0.3–1.2)
BUN SERPL-MCNC: 35 MG/DL (ref 7–22)
CALCIUM SERPL-MCNC: 9 MG/DL (ref 8.5–10.5)
CHLORIDE SERPL-SCNC: 97 MEQ/L (ref 98–111)
CK SERPL-CCNC: 768 U/L (ref 55–170)
CO2 SERPL-SCNC: 23 MEQ/L (ref 23–33)
CREAT SERPL-MCNC: 1.7 MG/DL (ref 0.4–1.2)
DEPRECATED RDW RBC AUTO: 57.3 FL (ref 35–45)
ELLIPTOCYTES: ABNORMAL
EOSINOPHIL NFR BLD AUTO: 1.3 %
EOSINOPHILS ABSOLUTE: 0.2 THOU/MM3 (ref 0–0.4)
ERYTHROCYTE [DISTWIDTH] IN BLOOD BY AUTOMATED COUNT: 19.9 % (ref 11.5–14.5)
ETHANOL SERPL-MCNC: < 0.01 %
GFR SERPL CREATININE-BSD FRML MDRD: 52 ML/MIN/1.73M2
GLUCOSE SERPL-MCNC: 82 MG/DL (ref 70–108)
HCT VFR BLD AUTO: 30.6 % (ref 42–52)
HGB BLD-MCNC: 9.5 GM/DL (ref 14–18)
IMM GRANULOCYTES # BLD AUTO: 0.05 THOU/MM3 (ref 0–0.07)
IMM GRANULOCYTES NFR BLD AUTO: 0.4 %
LYMPHOCYTES ABSOLUTE: 4.4 THOU/MM3 (ref 1–4.8)
LYMPHOCYTES NFR BLD AUTO: 31.4 %
MCH RBC QN AUTO: 24.7 PG (ref 26–33)
MCHC RBC AUTO-ENTMCNC: 31 GM/DL (ref 32.2–35.5)
MCV RBC AUTO: 79.5 FL (ref 80–94)
MONOCYTES ABSOLUTE: 1.5 THOU/MM3 (ref 0.4–1.3)
MONOCYTES NFR BLD AUTO: 11 %
NEUTROPHILS ABSOLUTE: 7.8 THOU/MM3 (ref 1.8–7.7)
NEUTROPHILS NFR BLD AUTO: 55.5 %
NRBC BLD AUTO-RTO: 0 /100 WBC
OSMOLALITY SERPL CALC.SUM OF ELEC: 275.3 MOSMOL/KG (ref 275–300)
PLATELET # BLD AUTO: 308 THOU/MM3 (ref 130–400)
PLATELET BLD QL SMEAR: ADEQUATE
PMV BLD AUTO: 9.3 FL (ref 9.4–12.4)
POTASSIUM SERPL-SCNC: 4.9 MEQ/L (ref 3.5–5.2)
PROT SERPL-MCNC: 7.1 G/DL (ref 6.1–8)
RBC # BLD AUTO: 3.85 MILL/MM3 (ref 4.7–6.1)
SALICYLATES SERPL-MCNC: < 0.3 MG/DL (ref 2–10)
SCAN OF BLOOD SMEAR: NORMAL
SODIUM SERPL-SCNC: 134 MEQ/L (ref 135–145)
TROPONIN, HIGH SENSITIVITY: 40 NG/L (ref 0–12)
VARIANT LYMPHS BLD QL SMEAR: ABNORMAL %
WBC # BLD AUTO: 14 THOU/MM3 (ref 4.8–10.8)

## 2024-05-04 PROCEDURE — 85025 COMPLETE CBC W/AUTO DIFF WBC: CPT

## 2024-05-04 PROCEDURE — 82550 ASSAY OF CK (CPK): CPT

## 2024-05-04 PROCEDURE — 80053 COMPREHEN METABOLIC PANEL: CPT

## 2024-05-04 PROCEDURE — 2580000003 HC RX 258: Performed by: STUDENT IN AN ORGANIZED HEALTH CARE EDUCATION/TRAINING PROGRAM

## 2024-05-04 PROCEDURE — 99284 EMERGENCY DEPT VISIT MOD MDM: CPT

## 2024-05-04 PROCEDURE — 36415 COLL VENOUS BLD VENIPUNCTURE: CPT

## 2024-05-04 PROCEDURE — 82077 ASSAY SPEC XCP UR&BREATH IA: CPT

## 2024-05-04 PROCEDURE — 80179 DRUG ASSAY SALICYLATE: CPT

## 2024-05-04 PROCEDURE — 84484 ASSAY OF TROPONIN QUANT: CPT

## 2024-05-04 PROCEDURE — 80143 DRUG ASSAY ACETAMINOPHEN: CPT

## 2024-05-04 RX ORDER — DROPERIDOL 2.5 MG/ML
1.25 INJECTION, SOLUTION INTRAMUSCULAR; INTRAVENOUS ONCE
Status: DISCONTINUED | OUTPATIENT
Start: 2024-05-04 | End: 2024-05-04 | Stop reason: HOSPADM

## 2024-05-04 RX ORDER — 0.9 % SODIUM CHLORIDE 0.9 %
500 INTRAVENOUS SOLUTION INTRAVENOUS ONCE
Status: COMPLETED | OUTPATIENT
Start: 2024-05-04 | End: 2024-05-04

## 2024-05-04 RX ORDER — LORAZEPAM 2 MG/ML
1 INJECTION INTRAMUSCULAR ONCE
Status: DISCONTINUED | OUTPATIENT
Start: 2024-05-04 | End: 2024-05-04

## 2024-05-04 RX ADMIN — SODIUM CHLORIDE 500 ML: 9 INJECTION, SOLUTION INTRAVENOUS at 20:38

## 2024-05-04 ASSESSMENT — PAIN DESCRIPTION - LOCATION: LOCATION: SHOULDER

## 2024-05-04 ASSESSMENT — PAIN - FUNCTIONAL ASSESSMENT: PAIN_FUNCTIONAL_ASSESSMENT: 0-10

## 2024-05-04 ASSESSMENT — PAIN SCALES - GENERAL: PAINLEVEL_OUTOF10: 6

## 2024-05-05 NOTE — ED PROVIDER NOTES
I performed a history and physical examination of the patient and discussed management with the resident. I reviewed the resident’s note and agree with the documented findings and plan of care. Any areas of disagreement are noted on the chart. I was personally present for the key portions of any procedures. I have documented in the chart those procedures where I was not present during the key portions. I have reviewed the emergency nurses triage note. I agree with the chief complaint, past medical history, past surgical history, allergies, medications, social and family history as documented unless otherwise noted below. Documentation of the HPI, Physical Exam and Medical Decision Making performed by medical students or scribes is based on my personal performance of the HPI, PE and MDM. For Phys Assistant/ Nurse Practitioner cases/documentation I have personally evaluated this patient and have completed at least one if not all key elements of the E/M (history, physical exam, and MDM). My findings are as noted below.    In other words, I personally saw and examined the patient I have reviewed and agreed with the resident findings including all diagnostic interpretations and treatment plans as written.  I was present for the key portion of any procedures performed and the inclusive time noted in any critical care statement.    Patient presents today with LPD.  Apparently the patient smoked crack, he was found on the street and he was brought in by LPD.  Patient is not suicidal or homicidal.  He was brought in for evaluation.  Here today shifting back and forth on the bed.  He does agree to have labs and give fluids.  He is not complaining of any physical complaints at this time.  Speaking with the nurses we have handled him multiple times, apparently droperidol works well to get his agitation down without causing any kind of problems with his blood pressure or mental status.      No orders to display     Labs Reviewed 
present family who expressed understanding. Any medications were reviewed and indications and risks of medications were discussed with the patient /family present. Strict verbal and written return precautions, instructions and appropriate follow-up provided to  the patient .     ED Medications administered this visit:  (None if blank)  Medications   sodium chloride 0.9 % bolus 500 mL (0 mLs IntraVENous Stopped 5/4/24 2054)         PROCEDURES: (None if blank)  Procedures:     CRITICAL CARE: (None if blank)      DISCHARGE PRESCRIPTIONS: (None if blank)  Discharge Medication List as of 5/4/2024  9:10 PM          FINAL IMPRESSION      1. Left against medical advice          DISPOSITION/PLAN   DISPOSITION Josephine 05/04/2024 08:58:30 PM      OUTPATIENT FOLLOW UP THE PATIENT:  No follow-up provider specified.    Tejinder Recinos MD

## 2024-05-05 NOTE — DISCHARGE INSTRUCTIONS
The patient has decided to leave against medical advice, because he does not want any more intervention..    He has normal mental status and adequate capacity to make medical decisions.    The patient refuses hospital admission and wants to be discharged.    The risks have been explained to the patient, including worsening illness, chronic pain, permanent disability, and death.    The benefits of admission have also been explained, including the availability and proximity of nurses, physicians, monitoring, diagnostic testing, and treatment.    The patient was able to understand and state the risks and benefits of hospital admission.  This was witnessed by nurse.    He had the opportunity to ask questions about his medical condition.    The patient was treated to the extent that he would allow, and knows that he may return for care at any time.

## 2024-05-05 NOTE — ED TRIAGE NOTES
Patient presents to ED with [place] [employer] with C/O drug problem. Patient states he smoked crack an hour and a half ago. Patient states he fell asleep so he doesn't think it was crack. Patient does not believe he was narcaned but the police brought him here to the ED to be evaluated. Patient shifting back and forth around the bed. Unable to obtain BP at this time. Patient cooperative at this time.

## 2024-05-14 ENCOUNTER — OFFICE VISIT (OUTPATIENT)
Dept: INTERNAL MEDICINE CLINIC | Age: 39
End: 2024-05-14
Payer: COMMERCIAL

## 2024-05-14 DIAGNOSIS — F32.A ANXIETY AND DEPRESSION: ICD-10-CM

## 2024-05-14 DIAGNOSIS — F41.9 ANXIETY AND DEPRESSION: ICD-10-CM

## 2024-05-14 DIAGNOSIS — F11.20 SEVERE OPIOID USE DISORDER (HCC): Primary | ICD-10-CM

## 2024-05-14 DIAGNOSIS — I10 ESSENTIAL HYPERTENSION: ICD-10-CM

## 2024-05-14 DIAGNOSIS — F90.9 ATTENTION DEFICIT HYPERACTIVITY DISORDER (ADHD), UNSPECIFIED ADHD TYPE: ICD-10-CM

## 2024-05-14 DIAGNOSIS — G47.00 INSOMNIA, UNSPECIFIED TYPE: ICD-10-CM

## 2024-05-14 PROCEDURE — 99214 OFFICE O/P EST MOD 30 MIN: CPT | Performed by: NURSE PRACTITIONER

## 2024-05-14 PROCEDURE — 4004F PT TOBACCO SCREEN RCVD TLK: CPT | Performed by: NURSE PRACTITIONER

## 2024-05-14 PROCEDURE — G8420 CALC BMI NORM PARAMETERS: HCPCS | Performed by: NURSE PRACTITIONER

## 2024-05-14 PROCEDURE — G8428 CUR MEDS NOT DOCUMENT: HCPCS | Performed by: NURSE PRACTITIONER

## 2024-05-14 PROCEDURE — 80305 DRUG TEST PRSMV DIR OPT OBS: CPT | Performed by: NURSE PRACTITIONER

## 2024-05-14 RX ORDER — CLONAZEPAM 1 MG/1
1 TABLET ORAL 2 TIMES DAILY PRN
Qty: 14 TABLET | Refills: 0 | Status: SHIPPED | OUTPATIENT
Start: 2024-05-14 | End: 2024-05-21

## 2024-05-14 RX ORDER — VENLAFAXINE 100 MG/1
100 TABLET ORAL 3 TIMES DAILY
Qty: 90 TABLET | Refills: 0 | Status: SHIPPED | OUTPATIENT
Start: 2024-05-14 | End: 2024-05-14

## 2024-05-14 RX ORDER — VENLAFAXINE 100 MG/1
100 TABLET ORAL 3 TIMES DAILY
Qty: 90 TABLET | Refills: 0 | Status: SHIPPED | OUTPATIENT
Start: 2024-05-14

## 2024-05-14 RX ORDER — LISINOPRIL 20 MG/1
20 TABLET ORAL DAILY
Qty: 30 TABLET | Refills: 0 | Status: SHIPPED | OUTPATIENT
Start: 2024-05-14 | End: 2024-06-13

## 2024-05-14 RX ORDER — LISDEXAMFETAMINE DIMESYLATE 70 MG/1
70 CAPSULE ORAL EVERY MORNING
Qty: 7 CAPSULE | Refills: 0 | Status: SHIPPED | OUTPATIENT
Start: 2024-05-14 | End: 2024-05-14

## 2024-05-14 RX ORDER — CLONAZEPAM 1 MG/1
1 TABLET ORAL 2 TIMES DAILY PRN
Qty: 14 TABLET | Refills: 0 | Status: SHIPPED | OUTPATIENT
Start: 2024-05-14 | End: 2024-05-14

## 2024-05-14 RX ORDER — MIRTAZAPINE 45 MG/1
45 TABLET, FILM COATED ORAL NIGHTLY
Qty: 30 TABLET | Refills: 0 | Status: SHIPPED | OUTPATIENT
Start: 2024-05-14 | End: 2024-06-13

## 2024-05-14 RX ORDER — PREGABALIN 200 MG/1
200 CAPSULE ORAL 3 TIMES DAILY
Qty: 21 CAPSULE | Refills: 0 | Status: SHIPPED | OUTPATIENT
Start: 2024-05-14 | End: 2024-05-21

## 2024-05-14 RX ORDER — LISINOPRIL 20 MG/1
20 TABLET ORAL DAILY
Qty: 30 TABLET | Refills: 0 | Status: SHIPPED | OUTPATIENT
Start: 2024-05-14 | End: 2024-05-14

## 2024-05-14 RX ORDER — MIRTAZAPINE 45 MG/1
45 TABLET, FILM COATED ORAL NIGHTLY
Qty: 30 TABLET | Refills: 0 | Status: SHIPPED | OUTPATIENT
Start: 2024-05-14 | End: 2024-05-14

## 2024-05-14 RX ORDER — PREGABALIN 200 MG/1
200 CAPSULE ORAL 3 TIMES DAILY
Qty: 21 CAPSULE | Refills: 0 | Status: SHIPPED | OUTPATIENT
Start: 2024-05-14 | End: 2024-05-14

## 2024-05-14 RX ORDER — LISDEXAMFETAMINE DIMESYLATE 70 MG/1
70 CAPSULE ORAL EVERY MORNING
Qty: 7 CAPSULE | Refills: 0 | Status: SHIPPED | OUTPATIENT
Start: 2024-05-14 | End: 2024-05-21

## 2024-05-14 ASSESSMENT — ENCOUNTER SYMPTOMS
WHEEZING: 0
VOMITING: 0
SORE THROAT: 0
SINUS PRESSURE: 0
ABDOMINAL PAIN: 0
BLOOD IN STOOL: 0
TROUBLE SWALLOWING: 0
SINUS PAIN: 0
NAUSEA: 0
COUGH: 0
PHOTOPHOBIA: 0
CONSTIPATION: 0
DIARRHEA: 0
SHORTNESS OF BREATH: 0
CHEST TIGHTNESS: 0

## 2024-05-14 NOTE — PROGRESS NOTES
[facility] PROFESSIONAL SERVS  [facility]'S INTERNAL MEDICINE AND MEDICATION MANAGEMENT  [address]  [address]  [facility] [address]  Dept: [phone]  Dept Fax: [phone]  Loc: [phone]     Visit Date:  5/14/2024    Patient:  Demarco Spencer  YOB: 1985    HPI:     Chief Complaint   Patient presents with    Drug Problem     Demarco presents today for medical evaluation of ADHD, anxiety/depression/PTSD, chronic pain, HTN, severe opioid use disorder, opioid withdrawal     I last seen him 2 weeks ago.     Was sent to the CSU last Tues by his . Received Brixadi injection Friday. Signed himself out afterwards. He did use fentanyl once released, but reports that it threw him into withdrawal. He is to report to long-term today, and then being sent to rehab. States that he has to bring his medications with him. Planning the [facility] or [place].      Urine positive for buprenorphine and cocaine     Wants to go back on Suboxone because he cannot make it to the methadone clinic daily. Urges and cravings previously controlled with Suboxone 8 mg BID.      States that he continues to relapse because he cannot sleep at night. He is requesting a benzodiazepine. Discussed at length with patient. For harm reduction, I agreed to prescribe at a low dose under the circumstance that he sustains from any illicit use. Understanding voiced.      States that the courts are ordering him to go to an inpatient rehab in [place] next week.      Discussed at length that he cannot continue to use illicit drugs and be on a stimulant. I agreed to prescribe under the agreement that use would stop, and as harm reduction; as he complained that the reason for his methamphetamine use was because his ADHD was not controlled. Guanfacine not helpful. Vyvanse has been the most effective. This has also helped him not use methamphetamines.      Anxiety/depression well controlled with

## 2024-05-17 ENCOUNTER — HOSPITAL ENCOUNTER (EMERGENCY)
Age: 39
Discharge: HOME | End: 2024-05-17

## 2024-05-17 VITALS
BODY MASS INDEX: 23.77 KG/M2 | DIASTOLIC BLOOD PRESSURE: 91 MMHG | WEIGHT: 166 LBS | RESPIRATION RATE: 18 BRPM | SYSTOLIC BLOOD PRESSURE: 122 MMHG | HEIGHT: 70 IN | OXYGEN SATURATION: 99 %

## 2024-05-17 ASSESSMENT — PAIN - FUNCTIONAL ASSESSMENT: PAIN_FUNCTIONAL_ASSESSMENT: NONE - DENIES PAIN

## 2024-05-17 NOTE — ED NOTES
Pt ambulated out of the ED cursing at staff. Pt states \"fuck you man I dont need to stay here\". Dr. Major notified.

## 2024-05-17 NOTE — ED TRIAGE NOTES
Pt presents to the ED via EMS with c/o hematuria. EMS states pt reports blood in his urine. Pt does not answer questions upon arrival. Pt thrashing around in bed and verbally abusing staff. Pt refusing IV and all treatment. Pt denies any drug use today. Unable to obtain accurate pulse due to pt ripping tele monitors and pulse ox off.

## 2024-05-23 DIAGNOSIS — F41.9 ANXIETY AND DEPRESSION: ICD-10-CM

## 2024-05-23 DIAGNOSIS — F32.A ANXIETY AND DEPRESSION: ICD-10-CM

## 2024-05-23 DIAGNOSIS — F90.9 ATTENTION DEFICIT HYPERACTIVITY DISORDER (ADHD), UNSPECIFIED ADHD TYPE: ICD-10-CM

## 2024-05-23 RX ORDER — PREGABALIN 200 MG/1
200 CAPSULE ORAL 3 TIMES DAILY
Qty: 21 CAPSULE | Refills: 0 | Status: SHIPPED | OUTPATIENT
Start: 2024-05-23 | End: 2024-05-30

## 2024-05-23 RX ORDER — CLONAZEPAM 1 MG/1
1 TABLET ORAL 2 TIMES DAILY PRN
Qty: 14 TABLET | Refills: 0 | Status: SHIPPED | OUTPATIENT
Start: 2024-05-23 | End: 2024-05-30

## 2024-05-23 RX ORDER — LISDEXAMFETAMINE DIMESYLATE 70 MG/1
70 CAPSULE ORAL EVERY MORNING
Qty: 7 CAPSULE | Refills: 0 | Status: SHIPPED | OUTPATIENT
Start: 2024-05-23 | End: 2024-05-30

## 2024-05-28 ENCOUNTER — HOSPITAL ENCOUNTER (EMERGENCY)
Age: 39
Discharge: LEFT BEFORE DISCHARGE | End: 2024-05-28
Payer: COMMERCIAL

## 2024-05-28 ENCOUNTER — APPOINTMENT (OUTPATIENT)
Dept: CT IMAGING | Age: 39
End: 2024-05-28
Payer: COMMERCIAL

## 2024-05-28 ENCOUNTER — HOSPITAL ENCOUNTER (EMERGENCY)
Age: 39
Discharge: LEFT BEFORE DISCHARGE | End: 2024-05-28
Attending: EMERGENCY MEDICINE
Payer: COMMERCIAL

## 2024-05-28 ENCOUNTER — HOSPITAL ENCOUNTER (EMERGENCY)
Age: 39
Discharge: HOME | End: 2024-05-28
Payer: COMMERCIAL

## 2024-05-28 VITALS
WEIGHT: 166 LBS | HEIGHT: 70 IN | OXYGEN SATURATION: 99 % | DIASTOLIC BLOOD PRESSURE: 84 MMHG | SYSTOLIC BLOOD PRESSURE: 118 MMHG | HEART RATE: 85 BPM | TEMPERATURE: 97.2 F | BODY MASS INDEX: 23.77 KG/M2 | RESPIRATION RATE: 17 BRPM

## 2024-05-28 VITALS
SYSTOLIC BLOOD PRESSURE: 123 MMHG | OXYGEN SATURATION: 95 % | TEMPERATURE: 98.2 F | HEART RATE: 109 BPM | RESPIRATION RATE: 22 BRPM | DIASTOLIC BLOOD PRESSURE: 64 MMHG

## 2024-05-28 DIAGNOSIS — R10.9 FLANK PAIN: ICD-10-CM

## 2024-05-28 DIAGNOSIS — F19.10 POLYSUBSTANCE ABUSE (HCC): Primary | ICD-10-CM

## 2024-05-28 DIAGNOSIS — Z53.29 LEFT AGAINST MEDICAL ADVICE: Primary | ICD-10-CM

## 2024-05-28 PROCEDURE — 6360000002 HC RX W HCPCS: Performed by: PHYSICIAN ASSISTANT

## 2024-05-28 PROCEDURE — 99284 EMERGENCY DEPT VISIT MOD MDM: CPT

## 2024-05-28 PROCEDURE — 96372 THER/PROPH/DIAG INJ SC/IM: CPT

## 2024-05-28 PROCEDURE — 6360000002 HC RX W HCPCS: Performed by: STUDENT IN AN ORGANIZED HEALTH CARE EDUCATION/TRAINING PROGRAM

## 2024-05-28 PROCEDURE — 99283 EMERGENCY DEPT VISIT LOW MDM: CPT

## 2024-05-28 RX ORDER — DROPERIDOL 2.5 MG/ML
2.5 INJECTION, SOLUTION INTRAMUSCULAR; INTRAVENOUS ONCE
Status: COMPLETED | OUTPATIENT
Start: 2024-05-28 | End: 2024-05-28

## 2024-05-28 RX ORDER — LORAZEPAM 2 MG/ML
2 INJECTION INTRAMUSCULAR ONCE
Status: COMPLETED | OUTPATIENT
Start: 2024-05-28 | End: 2024-05-28

## 2024-05-28 RX ORDER — DROPERIDOL 2.5 MG/ML
1.25 INJECTION, SOLUTION INTRAMUSCULAR; INTRAVENOUS EVERY 6 HOURS PRN
Status: DISCONTINUED | OUTPATIENT
Start: 2024-05-28 | End: 2024-05-28

## 2024-05-28 RX ORDER — DROPERIDOL 2.5 MG/ML
1.25 INJECTION, SOLUTION INTRAMUSCULAR; INTRAVENOUS ONCE
Status: DISCONTINUED | OUTPATIENT
Start: 2024-05-28 | End: 2024-05-28 | Stop reason: HOSPADM

## 2024-05-28 RX ORDER — 0.9 % SODIUM CHLORIDE 0.9 %
1000 INTRAVENOUS SOLUTION INTRAVENOUS ONCE
Status: DISCONTINUED | OUTPATIENT
Start: 2024-05-28 | End: 2024-05-28 | Stop reason: HOSPADM

## 2024-05-28 RX ADMIN — LORAZEPAM 2 MG: 2 INJECTION INTRAMUSCULAR; INTRAVENOUS at 05:09

## 2024-05-28 RX ADMIN — DROPERIDOL 2.5 MG: 2.5 INJECTION, SOLUTION INTRAMUSCULAR; INTRAVENOUS at 08:20

## 2024-05-28 RX ADMIN — LORAZEPAM 2 MG: 2 INJECTION INTRAMUSCULAR; INTRAVENOUS at 05:01

## 2024-05-28 ASSESSMENT — PAIN - FUNCTIONAL ASSESSMENT
PAIN_FUNCTIONAL_ASSESSMENT: NONE - DENIES PAIN
PAIN_FUNCTIONAL_ASSESSMENT: WONG-BAKER FACES

## 2024-05-28 ASSESSMENT — PAIN SCALES - GENERAL: PAINLEVEL_OUTOF10: 0

## 2024-05-28 ASSESSMENT — PAIN SCALES - WONG BAKER: WONGBAKER_NUMERICALRESPONSE: HURTS LITTLE MORE

## 2024-05-28 NOTE — ED NOTES
Upon receiving report, patient refusing tx. Patient flailing arms and legs on cart intermittently.

## 2024-05-28 NOTE — ED NOTES
In room to attempt to d/c pt. Pt opens eyes briefly to verbal commands and then falls back asleep. Pt unable to sustain eye contact. Resp regular. Call light in reach.

## 2024-05-28 NOTE — ED TRIAGE NOTES
Pt comes to ED with c/o flank pain. Pt continuously flailing in bed and yelling on arrival to ED. Pt states that he can't hold still because of the back pain. Pt was in ED approximately one hour PTA for same complain and left AMA. Pt intermittently resting in between erratic movements.    Quality 431: Preventive Care And Screening: Unhealthy Alcohol Use - Screening: Patient not identified as an unhealthy alcohol user when screened for unhealthy alcohol use using a systematic screening method Detail Level: Detailed Quality 226: Preventive Care And Screening: Tobacco Use: Screening And Cessation Intervention: Patient screened for tobacco use and is an ex/non-smoker Quality 110: Preventive Care And Screening: Influenza Immunization: Influenza immunization was not ordered or administered, reason not given Quality 130: Documentation Of Current Medications In The Medical Record: Current Medications Documented

## 2024-05-28 NOTE — ED PROVIDER NOTES
[facility] EMERGENCY DEPT      EMERGENCY MEDICINE     Pt Name: Demarco Spencer  MRN: [id number]  Birthdate 1985  Date of evaluation: 5/28/2024  Provider: Beltran Guido PA-C    CHIEF COMPLAINT       Chief Complaint   Patient presents with    Addiction Problem     HISTORY OF PRESENT ILLNESS   Demarco Spencer is a pleasant 39 y.o. male who presents to the emergency department f brought in by police for drowsiness.  Patient has been seen 2 times previous to this visit today.  Patient was given 4 mg Ativan first visit and then given 2.5 mg of Inapsine the second visit.  Patient was outside and placed morning back in.  Patient without complaint.  Patient is well-known to us.  Patient uses meth and other amphetamines and stimulants regularly per nurses/previous provider notes. no other complaints.  History is limited to the ability of the patient to provide history.  History is also provided by EMS and other providers and providers notes.    PASTMEDICAL HISTORY     Past Medical History:   Diagnosis Date    Anxiety     Depression     Drug overdose, intentional ([facility]) 8/25/2019    Kidney stone     Liver disease     Hep C    Opiate abuse, continuous ([facility])        Patient Active Problem List   Diagnosis Code    Methamphetamine abuse ([facility]) F15.10    Acute kidney injury ([facility]) N17.9    Metabolic acidosis E87.20    Intentional drug overdose ([facility]) T50.902A    Leukocytosis D72.829    Chronic anemia D64.9    Anxiety F41.9    History of kidney stones Z87.442    Acute respiratory failure with hypoxia ([facility]) J96.01    COVID-19 U07.1    Drug overdose, accidental or unintentional, initial encounter T50.901A    Drug overdose, multiple drugs, accidental or unintentional, initial encounter T50.911A    Substance abuse ([facility]) F19.10    Toxic encephalopathy G92.9    Fever R50.9    Bilateral leg edema R60.0    Severe fentanyl use disorder ([facility]) F11.20    Elevated liver enzymes R74.8    Altered mental status R41.82    Calculus of ureterovesical

## 2024-05-28 NOTE — ED NOTES
Patient reported he was leaving AMA. Unable to stop patient. [place] police notified. Patient alert and oriented.

## 2024-05-28 NOTE — ED PROVIDER NOTES
[facility] EMERGENCY DEPT     Pt Name: Demarco Spencer  MRN: [id number]  Birthdate 1985  Date of evaluation: 5/28/2024  Resident Physician: Cordell Aguilar MD  Attending Physician: Mary Angulo MD      CHIEF COMPLAINT       Chief Complaint   Patient presents with    Flank Pain     HISTORY OF PRESENT ILLNESS   Demarco Spencer is a 39 y.o. male with PMHx of polysubstance abuse  who presents to the emergency department for foreign evaluation.  Per EMS, patient called stating that his kidneys hurt and that he needed dialysis.  On arrival, the patient was notably agitated and flailing all 4 extremities at staff.  I am unable to get a direct answer as to what brought the patient to the emergency department today however he states that he may have had an altercation with the police.  He is unable to verbalize more than this.  He states that his body hurts.    The patient has no other acute complaints at this time.  PASTMEDICAL HISTORY     Past Medical History:   Diagnosis Date    Anxiety     Depression     Drug overdose, intentional ([facility]) 8/25/2019    Kidney stone     Liver disease     Hep C    Opiate abuse, continuous ([facility])        Patient Active Problem List   Diagnosis Code    Methamphetamine abuse ([facility]) F15.10    Acute kidney injury ([facility]) N17.9    Metabolic acidosis E87.20    Intentional drug overdose ([facility]) T50.902A    Leukocytosis D72.829    Chronic anemia D64.9    Anxiety F41.9    History of kidney stones Z87.442    Acute respiratory failure with hypoxia ([facility]) J96.01    COVID-19 U07.1    Drug overdose, accidental or unintentional, initial encounter T50.901A    Drug overdose, multiple drugs, accidental or unintentional, initial encounter T50.911A    Substance abuse ([facility]) F19.10    Toxic encephalopathy G92.9    Fever R50.9    Bilateral leg edema R60.0    Severe fentanyl use disorder ([facility]) F11.20    Elevated liver enzymes R74.8    Altered mental status R41.82    Calculus of ureterovesical junction (UVJ) N20.1

## 2024-05-28 NOTE — ED TRIAGE NOTES
Patient presents via [facility] to ER with reports of possible overdose. EMS reports giving patient Narcan on arrival. Patient reportedly laying on ground flailing arms and legs. Patient recently eloped this ER within past hour. Patient has been in this ER 3 times today. Patient A/O x4.

## 2024-05-28 NOTE — ED TRIAGE NOTES
Pt to ED with c/o flank pain. Pt is manic and uncontrollable. Pt is sweating profusely. Pt states his back hurts. Pt is constantly moving all extremities all over the bed. Pt is speaking words that do not make sense. Pt is not in a calm state. Unable to obtain vital signs due to patient eratic movements.

## 2024-05-28 NOTE — ED NOTES
Patient states that he is leaving AMA at this time. Patient states that he does not want to be here, because he does not want to lay in the bed. AMA form signed. Dr. Aguilar and Dr. Angulo notified.

## 2024-05-28 NOTE — ED NOTES
Assumed pt care at this time. Report received from Masood PEREZ. Pt lying in bed, asleep. Resp regular. Lights dimmed. Call light in reach.

## 2024-05-28 NOTE — ED PROVIDER NOTES
DIFFERENTIAL DIAGNOSIS:   Including but not limited to: Substance abuse, drug-induced state, UTI, SATYA    Diagnoses Considered but I have low suspicion of:   Encephalopathy, sepsis    DIAGNOSTIC RESULTS     EKG: All [facility]'s [facility] who either signs or Co-signs this chart in the absence of a cardiologist.  Patient refused    RADIOLOGY: non-plain film images(s) such as CT,Ultrasound and MRI are read by the radiologist.  Plain radiographic images are visualized and preliminarily interpreted by the emergency physician unless otherwise stated below.  No orders to display   Patient refused    LABS:   Labs Reviewed   CBC WITH AUTO DIFFERENTIAL   BASIC METABOLIC PANEL   HEPATIC FUNCTION PANEL   ACETAMINOPHEN LEVEL   SALICYLATE LEVEL   CK   URINALYSIS WITH REFLEX TO CULTURE   URINE DRUG SCREEN   ETHANOL   PROTIME-INR     Patient refused    EMERGENCY DEPARTMENT COURSE:   Vitals:    Vitals:    05/28/24 0602   BP: 123/64   Pulse: (!) 109   Resp: 22   Temp: 98.2 °F (36.8 °C)   TempSrc: Oral   SpO2: 95%     MDM:  The patient was seen by me in the emergency department after he was found rolling around on the ground by campus police.  Physical exam revealed a 39-year-old gentleman who was thrashing around on the bed with periods of rest.  Vital signs reviewed and noted stable.  Old records reviewed.  The patient refused any and all testing.  Patient had rested for short time in the department but then became restless, thrashing about the cart, and nearly falling off the cart.  Patient medicated with droperidol 2.5 mg IM.  Patient then abruptly got up and left the department AGAINST MEDICAL ADVICE.  This happened when I was seeing another patient.  Nurse informed me that the patient refused care and walked out.  I was unable to talk to the patient as he had already left the building by the time I was informed.    CRITICAL CARE:   None    CONSULTS:  None    PROCEDURES:  None    FINAL

## 2024-05-28 NOTE — ED NOTES
Pt refuses Saline IV lock at this time. Pt is yelling profanity at nurse and LPN. Pt is moving erratically in bed. Pt will not keep calm. Pt screaming \"no more!\".

## 2024-05-29 ENCOUNTER — OFFICE VISIT (OUTPATIENT)
Dept: INTERNAL MEDICINE CLINIC | Age: 39
End: 2024-05-29
Payer: COMMERCIAL

## 2024-05-29 ENCOUNTER — SOCIAL WORK (OUTPATIENT)
Dept: INTERNAL MEDICINE CLINIC | Age: 39
End: 2024-05-29

## 2024-05-29 VITALS
HEART RATE: 86 BPM | DIASTOLIC BLOOD PRESSURE: 57 MMHG | SYSTOLIC BLOOD PRESSURE: 117 MMHG | WEIGHT: 162 LBS | RESPIRATION RATE: 16 BRPM | BODY MASS INDEX: 23.19 KG/M2 | HEIGHT: 70 IN

## 2024-05-29 DIAGNOSIS — F32.A ANXIETY AND DEPRESSION: ICD-10-CM

## 2024-05-29 DIAGNOSIS — F90.9 ATTENTION DEFICIT HYPERACTIVITY DISORDER (ADHD), UNSPECIFIED ADHD TYPE: ICD-10-CM

## 2024-05-29 DIAGNOSIS — F41.9 ANXIETY AND DEPRESSION: ICD-10-CM

## 2024-05-29 DIAGNOSIS — F11.20 SEVERE OPIOID USE DISORDER (HCC): Primary | ICD-10-CM

## 2024-05-29 PROCEDURE — 80305 DRUG TEST PRSMV DIR OPT OBS: CPT | Performed by: NURSE PRACTITIONER

## 2024-05-29 PROCEDURE — G8428 CUR MEDS NOT DOCUMENT: HCPCS | Performed by: NURSE PRACTITIONER

## 2024-05-29 PROCEDURE — 99214 OFFICE O/P EST MOD 30 MIN: CPT | Performed by: NURSE PRACTITIONER

## 2024-05-29 PROCEDURE — 3074F SYST BP LT 130 MM HG: CPT | Performed by: NURSE PRACTITIONER

## 2024-05-29 PROCEDURE — 3078F DIAST BP <80 MM HG: CPT | Performed by: NURSE PRACTITIONER

## 2024-05-29 PROCEDURE — G8420 CALC BMI NORM PARAMETERS: HCPCS | Performed by: NURSE PRACTITIONER

## 2024-05-29 PROCEDURE — 4004F PT TOBACCO SCREEN RCVD TLK: CPT | Performed by: NURSE PRACTITIONER

## 2024-05-29 RX ORDER — LISDEXAMFETAMINE DIMESYLATE 70 MG/1
70 CAPSULE ORAL EVERY MORNING
Qty: 5 CAPSULE | Refills: 0 | Status: CANCELLED | OUTPATIENT
Start: 2024-05-29 | End: 2024-06-03

## 2024-05-29 RX ORDER — PREGABALIN 200 MG/1
200 CAPSULE ORAL 3 TIMES DAILY
Qty: 15 CAPSULE | Refills: 0 | Status: CANCELLED | OUTPATIENT
Start: 2024-05-29 | End: 2024-06-03

## 2024-05-29 RX ORDER — CLONAZEPAM 1 MG/1
1 TABLET ORAL 2 TIMES DAILY PRN
Qty: 10 TABLET | Refills: 0 | Status: CANCELLED | OUTPATIENT
Start: 2024-05-29 | End: 2024-06-03

## 2024-05-29 NOTE — PROGRESS NOTES
05/29/24   The patients primary care physician is Jennifer Gloria, APRN - CNP    Demarco Spencer is a 39 y.o.  male who presents in office today for follow up medication assisted treatment, substance use disorder.     Pt walked in requesting to be seen. He is an established pt with BENEDICT Gloria. Last seen on 5/14  He has multiple ED visits in the past several months. 3 ED visits yesterday. Pt left AMA each time. Suspected OD - refer to notes    Recent office visits reviewed. Jennifer agreed to provide klonopin and vyvanse to patient for harm reduction in agreement that he would remain substance free.   Pt has continued to use opiates and other substances.     He currently appears intoxicated, fidgety, moving all limbs frequently, unable to sit still, no eye contact, and speech slightly slurred.     Nurse reported concern pt diluted urine or added water to specimen cup. All negative findings. She then poured urine into another cup with positive results for bup, benzos, and cocaine.   He admits to using fentanyl yesterday at 5pm. - unable to test for fentanyl in office.     He does have scabs and track marks to bilateral extremities. Multiple scabbed areas on his face.   States his last use by injection was 3 weeks ago. Since then he has snorted. pt mixes fentanyl with water and uses a straw- refers to it as \" a shot\" states \" its better this way \"  Denies use of methamphetamine. Cocaine 3-4 days ago    Using suboxone off the streets to help with withdrawal. He does not want prescribed suboxone, he is asking for klonopin and vyvanse.   Brixadi 24mg given on 4/30. OOARS reviewed, last suboxone rx given on 4/30- 7 day supply    I strongly encouraged inpt rehab, pt reluctant. He did agree detox would be beneficial.  [facility] crisis [facility] will no longer accept him but could discuss other options.   He then immediately became agitated and loud stating \"fuck this when will Jennifer be back\" and walked out of

## 2024-05-29 NOTE — PROGRESS NOTES
Patient presented to office today requesting to see Jennifer due to missing his nurse visit yesterday. Patient appears to be under the influence, very fidgety, poor eye contact, when eye contact is made his eyes are barely open. Patient was informed that Jennifer is out of the office until Monday.   Patient reports that suboxone was never called in last week along with his other medication. Sw reminded patient that he needs to call if there is an issue with his medication.   Patient was in e/d three times yesterday, due to substance abuse. He reports his kidneys are really bothering him too.  Patient's case was staffed Diana Scott LPN, who then staffed Ginger Castillo CNP, who agreed to see him.  Patient was informed that he was going to see Ginger and he needed to be respectful.   Sw provided information to Ginger that he admitted to using fentanyl yesterday.  Patient did not have UDS completed in e/d.  Ginger met with patient to review medications refills and to plan for best treatment. Patient became upset while having conversation with Ginger and left her office requesting to see Jennifer on Monday.  Rakesh will provide update to Jennifer when she returns to the office.

## 2024-05-29 NOTE — PROGRESS NOTES
Verbal order per ANTONELLA ROHCE CNP for urine drug screen. Positive for BUP, BENZO, MICHI.  Unable to test for Fentanyl but patient admitted to using yesterday.

## 2024-06-03 ENCOUNTER — OFFICE VISIT (OUTPATIENT)
Dept: INTERNAL MEDICINE CLINIC | Age: 39
End: 2024-06-03

## 2024-06-03 DIAGNOSIS — F90.9 ATTENTION DEFICIT HYPERACTIVITY DISORDER (ADHD), UNSPECIFIED ADHD TYPE: ICD-10-CM

## 2024-06-03 DIAGNOSIS — R11.0 NAUSEA: ICD-10-CM

## 2024-06-03 DIAGNOSIS — F32.A ANXIETY AND DEPRESSION: ICD-10-CM

## 2024-06-03 DIAGNOSIS — F11.20 SEVERE OPIOID USE DISORDER (HCC): Primary | ICD-10-CM

## 2024-06-03 DIAGNOSIS — F41.9 ANXIETY AND DEPRESSION: ICD-10-CM

## 2024-06-03 RX ORDER — ONDANSETRON 4 MG/1
4 TABLET, FILM COATED ORAL 3 TIMES DAILY PRN
Qty: 15 TABLET | Refills: 0 | Status: SHIPPED | OUTPATIENT
Start: 2024-06-03

## 2024-06-03 RX ORDER — CLONAZEPAM 1 MG/1
1 TABLET ORAL 2 TIMES DAILY PRN
Qty: 14 TABLET | Refills: 0 | Status: SHIPPED | OUTPATIENT
Start: 2024-06-03 | End: 2024-06-10

## 2024-06-03 RX ORDER — PREGABALIN 200 MG/1
200 CAPSULE ORAL 3 TIMES DAILY
Qty: 21 CAPSULE | Refills: 0 | Status: SHIPPED | OUTPATIENT
Start: 2024-06-03 | End: 2024-06-10

## 2024-06-03 RX ORDER — VENLAFAXINE 100 MG/1
100 TABLET ORAL 3 TIMES DAILY
Qty: 21 TABLET | Refills: 0 | Status: SHIPPED | OUTPATIENT
Start: 2024-06-03 | End: 2024-06-10

## 2024-06-03 RX ORDER — BUPRENORPHINE AND NALOXONE 8; 2 MG/1; MG/1
1 FILM, SOLUBLE BUCCAL; SUBLINGUAL 2 TIMES DAILY
Qty: 14 FILM | Refills: 0 | Status: SHIPPED | OUTPATIENT
Start: 2024-06-03 | End: 2024-06-10

## 2024-06-03 RX ORDER — LISDEXAMFETAMINE DIMESYLATE 70 MG/1
70 CAPSULE ORAL EVERY MORNING
Qty: 7 CAPSULE | Refills: 0 | Status: SHIPPED | OUTPATIENT
Start: 2024-06-03 | End: 2024-06-10

## 2024-06-03 ASSESSMENT — ENCOUNTER SYMPTOMS
CHEST TIGHTNESS: 0
RHINORRHEA: 0
SHORTNESS OF BREATH: 0
ABDOMINAL DISTENTION: 0
VOMITING: 0
SORE THROAT: 0
CONSTIPATION: 0
COUGH: 0
NAUSEA: 1
WHEEZING: 0
ABDOMINAL PAIN: 0
DIARRHEA: 0
SINUS PRESSURE: 0
PHOTOPHOBIA: 0
TROUBLE SWALLOWING: 0
SINUS PAIN: 0
BLOOD IN STOOL: 0

## 2024-06-03 NOTE — PROGRESS NOTES
[facility]  [address]  [address]  [place] [place] [facility] [address]  Dept: [phone]  Dept Fax: [phone]  Loc: [phone]     Visit Date:  6/3/2024    Patient:  Demarco Spencer  YOB: 1985    HPI:     Chief Complaint   Patient presents with    Drug Problem     Demarco presents today for medical evaluation of ADHD, anxiety/depression/PTSD, chronic pain, HTN, severe opioid use disorder, opioid withdrawal     I last seen him 3 weeks ago. He was seen by Ginger Castillo CNP 5/29. At that time he became upset and left the office.      Was sent to the CSU previously by his . Received Brixadi injection. Signed himself out afterwards. He did use fentanyl once released, but reports that it threw him into withdrawal. He was to report to [facility] 5/14, and then being sent to rehab. States that he has to bring his medications with him. Planning the [facility] or [place]. He did not go.     Was seen in the ER 5/28 for flank pain 3 times. He left AMA each time. However was brought in the 3rd time by [facility] for a possible overdose. Given Narcan and eloped.      Urine positive for cocaine. Will send for comprehensive analysis. He denies any use in 3 days, prior to ER visits. Unable to test for fentanyl in office. He is erratic, unable to maintain conversation, arms and legs are flailing. He is diaphoretic. States that he is nauseated, in withdrawal.     Nurse attempted to give him suboxone 2 mg in office, he declined.     He is upset and yelling because I will not give him more than 1 weeks worth of medications.     Urges and cravings previously controlled with Suboxone 8 mg BID reportedly      States that he continues to relapse because he cannot sleep at night. He is requesting a benzodiazepine. Discussed at length with patient. For harm reduction, I agreed to prescribe at a low dose under the

## 2024-06-03 NOTE — PROGRESS NOTES
0915- Patient was to take 1- 2mg Suboxone film from stock for induction per Jennifer Gloria verbal orders. When this nurse went to give it to him he refused it and stated he will throw up and just wanted to go home and go get his stomach medication. He just wanted to go to rehab and get help this time. He wanted Sara the  to call him tomorrow to help him get in somewhere. His girlfriend in room and stated she was going to call [facility] where she went and that's where he wanted to go and see if they took his insurance and get him in there today. This nurse told him that I would leave a note for Sara to call him tomorrow.  Patient left office and went home. Suboxone was wasted with Jennifer Gloria CNP.

## 2024-06-04 ENCOUNTER — SOCIAL WORK (OUTPATIENT)
Dept: INTERNAL MEDICINE CLINIC | Age: 39
End: 2024-06-04

## 2024-06-04 NOTE — PROGRESS NOTES
Sw assisting patient with contacting [facility] to complete intake. Sw also completing assistance form for patient's electric.

## 2024-06-06 ENCOUNTER — HOSPITAL ENCOUNTER (EMERGENCY)
Age: 39
Discharge: HOME | End: 2024-06-06
Attending: FAMILY MEDICINE
Payer: COMMERCIAL

## 2024-06-06 VITALS
OXYGEN SATURATION: 98 % | WEIGHT: 175 LBS | HEART RATE: 110 BPM | RESPIRATION RATE: 18 BRPM | TEMPERATURE: 99.3 F | SYSTOLIC BLOOD PRESSURE: 136 MMHG | BODY MASS INDEX: 25.11 KG/M2 | DIASTOLIC BLOOD PRESSURE: 101 MMHG

## 2024-06-06 DIAGNOSIS — R10.9 BILATERAL FLANK PAIN: Primary | ICD-10-CM

## 2024-06-06 LAB
EKG ATRIAL RATE: 121 BPM
EKG P AXIS: 64 DEGREES
EKG P-R INTERVAL: 160 MS
EKG Q-T INTERVAL: 298 MS
EKG QRS DURATION: 88 MS
EKG QTC CALCULATION (BAZETT): 423 MS
EKG R AXIS: 40 DEGREES
EKG T AXIS: 74 DEGREES
EKG VENTRICULAR RATE: 121 BPM

## 2024-06-06 PROCEDURE — 93005 ELECTROCARDIOGRAM TRACING: CPT | Performed by: FAMILY MEDICINE

## 2024-06-06 PROCEDURE — 99283 EMERGENCY DEPT VISIT LOW MDM: CPT

## 2024-06-06 RX ORDER — KETOROLAC TROMETHAMINE 30 MG/ML
30 INJECTION, SOLUTION INTRAMUSCULAR; INTRAVENOUS ONCE
Status: DISCONTINUED | OUTPATIENT
Start: 2024-06-06 | End: 2024-06-06

## 2024-06-06 RX ORDER — NAPROXEN 250 MG/1
500 TABLET ORAL ONCE
Status: DISCONTINUED | OUTPATIENT
Start: 2024-06-06 | End: 2024-06-06 | Stop reason: HOSPADM

## 2024-06-06 ASSESSMENT — PAIN - FUNCTIONAL ASSESSMENT: PAIN_FUNCTIONAL_ASSESSMENT: 0-10

## 2024-06-06 ASSESSMENT — PAIN DESCRIPTION - LOCATION: LOCATION: BACK

## 2024-06-06 ASSESSMENT — PAIN SCALES - GENERAL: PAINLEVEL_OUTOF10: 9

## 2024-06-06 ASSESSMENT — PAIN DESCRIPTION - ORIENTATION: ORIENTATION: LOWER

## 2024-06-06 NOTE — ED NOTES
This RN at bedside to medicate patient. Pt refuses ordered medication and states he is leaving. Advised patient to stay. Pt refuses and walks out of ED. Notified Dr. Delgado.

## 2024-06-06 NOTE — ED PROVIDER NOTES
EMERGENCY DEPARTMENT ENCOUNTER     CHIEF COMPLAINT   Chief Complaint   Patient presents with    Back Pain        HPI   Demarco Spencer is a 39 y.o. male who presents with acute on chronic bilateral flank and abdominal pain, onset was this morning. The duration has been intermittent since the onset.  The patient has no associated fever, dysuria or back pain.  There are no alleviating factors.  The context is that the symptoms started spontaneously, without any known precipitants.    Pt denies drug abuse. Pt has been to the ED multiple times for various issues, including polysubstance abuse.    PAST MEDICAL & SURGICAL HISTORY   Past Medical History:   Diagnosis Date    Anxiety     Depression     Drug overdose, intentional ([facility]) 8/25/2019    Kidney stone     Liver disease     Hep C    Opiate abuse, continuous ([facility])      No past surgical history on file.     CURRENT MEDICATIONS   Current Outpatient Rx   Medication Sig Dispense Refill    venlafaxine (EFFEXOR) 100 MG tablet Take 1 tablet by mouth 3 times daily for 7 days 21 tablet 0    pregabalin (LYRICA) 200 MG capsule Take 1 capsule by mouth in the morning, at noon, and at bedtime for 7 days. Max Daily Amount: 600 mg 21 capsule 0    lisdexamfetamine (VYVANSE) 70 MG capsule Take 1 capsule by mouth every morning for 7 days. Max Daily Amount: 70 mg 7 capsule 0    buprenorphine-naloxone (SUBOXONE) 8-2 MG FILM SL film Place 1 Film under the tongue in the morning and at bedtime for 7 days. Max Daily Amount: 2 Film 14 Film 0    clonazePAM (KLONOPIN) 1 MG tablet Take 1 tablet by mouth 2 times daily as needed for Anxiety for up to 7 days. Max Daily Amount: 2 mg 14 tablet 0    ondansetron (ZOFRAN) 4 MG tablet Take 1 tablet by mouth 3 times daily as needed for Nausea or Vomiting 15 tablet 0    lisinopril (PRINIVIL;ZESTRIL) 20 MG tablet Take 1 tablet by mouth daily 30 tablet 0    mirtazapine (REMERON) 45 MG tablet Take 1 tablet by mouth nightly 30 tablet 0    valACYclovir (VALTREX)

## 2024-06-06 NOTE — ED TRIAGE NOTES
Presents to ED with c/o lower back pain that started a couple days ago. Patient states his urine is darker. Patient making erratic movements in bed. States his last drug use was a couple days ago.

## 2024-06-13 ENCOUNTER — OFFICE VISIT (OUTPATIENT)
Dept: INTERNAL MEDICINE CLINIC | Age: 39
End: 2024-06-13
Payer: COMMERCIAL

## 2024-06-13 ENCOUNTER — NURSE ONLY (OUTPATIENT)
Dept: INTERNAL MEDICINE CLINIC | Age: 39
End: 2024-06-13

## 2024-06-13 VITALS
SYSTOLIC BLOOD PRESSURE: 154 MMHG | HEART RATE: 91 BPM | WEIGHT: 159 LBS | BODY MASS INDEX: 22.76 KG/M2 | DIASTOLIC BLOOD PRESSURE: 94 MMHG | HEIGHT: 70 IN | RESPIRATION RATE: 18 BRPM

## 2024-06-13 DIAGNOSIS — F90.9 ATTENTION DEFICIT HYPERACTIVITY DISORDER (ADHD), UNSPECIFIED ADHD TYPE: ICD-10-CM

## 2024-06-13 DIAGNOSIS — I10 ESSENTIAL HYPERTENSION: ICD-10-CM

## 2024-06-13 DIAGNOSIS — F11.20 SEVERE OPIOID USE DISORDER (HCC): ICD-10-CM

## 2024-06-13 DIAGNOSIS — R11.0 NAUSEA: ICD-10-CM

## 2024-06-13 DIAGNOSIS — F32.A ANXIETY AND DEPRESSION: ICD-10-CM

## 2024-06-13 DIAGNOSIS — G47.00 INSOMNIA, UNSPECIFIED TYPE: ICD-10-CM

## 2024-06-13 DIAGNOSIS — F41.9 ANXIETY AND DEPRESSION: ICD-10-CM

## 2024-06-13 PROCEDURE — 4004F PT TOBACCO SCREEN RCVD TLK: CPT | Performed by: NURSE PRACTITIONER

## 2024-06-13 PROCEDURE — 99214 OFFICE O/P EST MOD 30 MIN: CPT | Performed by: NURSE PRACTITIONER

## 2024-06-13 PROCEDURE — 3080F DIAST BP >= 90 MM HG: CPT | Performed by: NURSE PRACTITIONER

## 2024-06-13 PROCEDURE — G8420 CALC BMI NORM PARAMETERS: HCPCS | Performed by: NURSE PRACTITIONER

## 2024-06-13 PROCEDURE — 80305 DRUG TEST PRSMV DIR OPT OBS: CPT | Performed by: NURSE PRACTITIONER

## 2024-06-13 PROCEDURE — G8427 DOCREV CUR MEDS BY ELIG CLIN: HCPCS | Performed by: NURSE PRACTITIONER

## 2024-06-13 PROCEDURE — 3077F SYST BP >= 140 MM HG: CPT | Performed by: NURSE PRACTITIONER

## 2024-06-13 RX ORDER — VENLAFAXINE 100 MG/1
100 TABLET ORAL 3 TIMES DAILY
Qty: 21 TABLET | Refills: 0 | Status: SHIPPED | OUTPATIENT
Start: 2024-06-13 | End: 2024-06-20

## 2024-06-13 RX ORDER — LISDEXAMFETAMINE DIMESYLATE 70 MG/1
70 CAPSULE ORAL EVERY MORNING
Qty: 7 CAPSULE | Refills: 0 | Status: SHIPPED | OUTPATIENT
Start: 2024-06-13 | End: 2024-06-20

## 2024-06-13 RX ORDER — MIRTAZAPINE 45 MG/1
45 TABLET, FILM COATED ORAL NIGHTLY
Qty: 30 TABLET | Refills: 0 | Status: SHIPPED | OUTPATIENT
Start: 2024-06-13 | End: 2024-07-13

## 2024-06-13 RX ORDER — ONDANSETRON 4 MG/1
4 TABLET, FILM COATED ORAL 3 TIMES DAILY PRN
Qty: 15 TABLET | Refills: 0 | Status: SHIPPED | OUTPATIENT
Start: 2024-06-13

## 2024-06-13 RX ORDER — CLONAZEPAM 1 MG/1
1 TABLET ORAL 2 TIMES DAILY PRN
Qty: 14 TABLET | Refills: 0 | Status: SHIPPED | OUTPATIENT
Start: 2024-06-13 | End: 2024-06-20

## 2024-06-13 RX ORDER — LISINOPRIL 20 MG/1
20 TABLET ORAL DAILY
Qty: 30 TABLET | Refills: 0 | Status: SHIPPED | OUTPATIENT
Start: 2024-06-13 | End: 2024-07-13

## 2024-06-13 RX ORDER — PREGABALIN 200 MG/1
200 CAPSULE ORAL 3 TIMES DAILY
Qty: 21 CAPSULE | Refills: 0 | Status: SHIPPED | OUTPATIENT
Start: 2024-06-13 | End: 2024-06-20

## 2024-06-13 RX ORDER — BUPRENORPHINE AND NALOXONE 8; 2 MG/1; MG/1
1 FILM, SOLUBLE BUCCAL; SUBLINGUAL 2 TIMES DAILY
Qty: 14 FILM | Refills: 0 | Status: SHIPPED | OUTPATIENT
Start: 2024-06-13 | End: 2024-06-20

## 2024-06-13 NOTE — PROGRESS NOTES
Patient informed this RN that he last used 3 days ago and stated it was Fentanyl and Cocaine. Patient stated he took a Suboxone yesterday and did not go through withdraw  
Sw provided patient with other resources of impatient treatment that would take his insurance. Patient was denied for [place] due to insurance.  
disorder (HCC)    - POCT Rapid Drug Screen  - buprenorphine-naloxone (SUBOXONE) 8-2 MG FILM SL film; Place 1 Film under the tongue in the morning and at bedtime for 7 days. Max Daily Amount: 2 Film  Dispense: 14 Film; Refill: 0  - narcan at home  - [facility] reviewed, no discrepancies  - Extensive discussion. Encouraged inpatient rehab      Return in about 1 week (around 6/20/2024).    Patient given educational materials - see patient instructions.  Discussed use, benefit, and side effects of prescribed medications.  All patient questions answered.  Pt voiced understanding.  Reviewed health maintenance.       Electronically signed byPAPA Aberu CNP on 6/13/24 at 10:50 AM EDT

## 2024-06-18 ENCOUNTER — APPOINTMENT (OUTPATIENT)
Dept: GENERAL RADIOLOGY | Age: 39
End: 2024-06-18
Payer: COMMERCIAL

## 2024-06-18 ENCOUNTER — HOSPITAL ENCOUNTER (EMERGENCY)
Age: 39
Discharge: HOME | End: 2024-06-18
Attending: INTERNAL MEDICINE
Payer: COMMERCIAL

## 2024-06-18 VITALS
RESPIRATION RATE: 17 BRPM | WEIGHT: 160 LBS | BODY MASS INDEX: 22.9 KG/M2 | SYSTOLIC BLOOD PRESSURE: 146 MMHG | TEMPERATURE: 97.7 F | DIASTOLIC BLOOD PRESSURE: 101 MMHG | OXYGEN SATURATION: 98 % | HEART RATE: 92 BPM | HEIGHT: 70 IN

## 2024-06-18 DIAGNOSIS — S91.311A LACERATION OF RIGHT FOOT, INITIAL ENCOUNTER: Primary | ICD-10-CM

## 2024-06-18 PROCEDURE — 6360000002 HC RX W HCPCS: Performed by: INTERNAL MEDICINE

## 2024-06-18 PROCEDURE — 73620 X-RAY EXAM OF FOOT: CPT

## 2024-06-18 PROCEDURE — 90714 TD VACC NO PRESV 7 YRS+ IM: CPT | Performed by: INTERNAL MEDICINE

## 2024-06-18 PROCEDURE — 99284 EMERGENCY DEPT VISIT MOD MDM: CPT

## 2024-06-18 PROCEDURE — 6370000000 HC RX 637 (ALT 250 FOR IP): Performed by: INTERNAL MEDICINE

## 2024-06-18 PROCEDURE — 90471 IMMUNIZATION ADMIN: CPT | Performed by: INTERNAL MEDICINE

## 2024-06-18 PROCEDURE — 2500000003 HC RX 250 WO HCPCS: Performed by: INTERNAL MEDICINE

## 2024-06-18 PROCEDURE — 12001 RPR S/N/AX/GEN/TRNK 2.5CM/<: CPT

## 2024-06-18 RX ORDER — SULFAMETHOXAZOLE AND TRIMETHOPRIM 800; 160 MG/1; MG/1
1 TABLET ORAL 2 TIMES DAILY
Qty: 20 TABLET | Refills: 0 | Status: SHIPPED | OUTPATIENT
Start: 2024-06-18 | End: 2024-06-18

## 2024-06-18 RX ORDER — IBUPROFEN 200 MG
400 TABLET ORAL ONCE
Status: COMPLETED | OUTPATIENT
Start: 2024-06-18 | End: 2024-06-18

## 2024-06-18 RX ORDER — SULFAMETHOXAZOLE AND TRIMETHOPRIM 800; 160 MG/1; MG/1
1 TABLET ORAL 2 TIMES DAILY
Qty: 20 TABLET | Refills: 0 | Status: SHIPPED | OUTPATIENT
Start: 2024-06-18 | End: 2024-06-28

## 2024-06-18 RX ORDER — SULFAMETHOXAZOLE AND TRIMETHOPRIM 800; 160 MG/1; MG/1
1 TABLET ORAL ONCE
Status: COMPLETED | OUTPATIENT
Start: 2024-06-18 | End: 2024-06-18

## 2024-06-18 RX ORDER — LIDOCAINE HYDROCHLORIDE 10 MG/ML
20 INJECTION, SOLUTION EPIDURAL; INFILTRATION; INTRACAUDAL; PERINEURAL ONCE
Status: COMPLETED | OUTPATIENT
Start: 2024-06-18 | End: 2024-06-18

## 2024-06-18 RX ADMIN — IBUPROFEN 400 MG: 200 TABLET, FILM COATED ORAL at 19:04

## 2024-06-18 RX ADMIN — LIDOCAINE HYDROCHLORIDE 20 ML: 10 INJECTION, SOLUTION EPIDURAL; INFILTRATION; INTRACAUDAL; PERINEURAL at 21:19

## 2024-06-18 RX ADMIN — SULFAMETHOXAZOLE AND TRIMETHOPRIM 1 TABLET: 800; 160 TABLET ORAL at 21:09

## 2024-06-18 RX ADMIN — CLOSTRIDIUM TETANI TOXOID ANTIGEN (FORMALDEHYDE INACTIVATED) AND CORYNEBACTERIUM DIPHTHERIAE TOXOID ANTIGEN (FORMALDEHYDE INACTIVATED) 0.5 ML: 5; 2 INJECTION, SUSPENSION INTRAMUSCULAR at 21:14

## 2024-06-18 ASSESSMENT — PAIN SCALES - GENERAL: PAINLEVEL_OUTOF10: 8

## 2024-06-18 ASSESSMENT — PAIN - FUNCTIONAL ASSESSMENT: PAIN_FUNCTIONAL_ASSESSMENT: 0-10

## 2024-06-18 NOTE — ED PROVIDER NOTES
eMERGENCY dEPARTMENT eNCOUnter      CHIEF COMPLAINT    Chief Complaint   Patient presents with    Foot Injury       HPI    Demarco Spencer is a 39 y.o. male who presents to the emergency department after he injured his both feet walking on broken glass.  Patient does not have any bleeding in that area.  Patient feels that he still has a piece of glass on his left heel.  Patient is not up-to-date on his tetanus toxoid.    PAST MEDICAL HISTORY    Past Medical History:   Diagnosis Date    Anxiety     Depression     Drug overdose, intentional (HCC) 8/25/2019    Kidney stone     Liver disease     Hep C    Opiate abuse, continuous (HCC)        SURGICAL HISTORY    No past surgical history on file.    CURRENT MEDICATIONS    Current Outpatient Rx   Medication Sig Dispense Refill    sulfamethoxazole-trimethoprim (BACTRIM DS;SEPTRA DS) 800-160 MG per tablet Take 1 tablet by mouth 2 times daily for 10 days 20 tablet 0    lisdexamfetamine (VYVANSE) 70 MG capsule Take 1 capsule by mouth every morning for 7 days. Max Daily Amount: 70 mg 7 capsule 0    mirtazapine (REMERON) 45 MG tablet Take 1 tablet by mouth nightly 30 tablet 0    ondansetron (ZOFRAN) 4 MG tablet Take 1 tablet by mouth 3 times daily as needed for Nausea or Vomiting 15 tablet 0    pregabalin (LYRICA) 200 MG capsule Take 1 capsule by mouth in the morning, at noon, and at bedtime for 7 days. Max Daily Amount: 600 mg 21 capsule 0    lisinopril (PRINIVIL;ZESTRIL) 20 MG tablet Take 1 tablet by mouth daily 30 tablet 0    venlafaxine (EFFEXOR) 100 MG tablet Take 1 tablet by mouth 3 times daily for 7 days 21 tablet 0    clonazePAM (KLONOPIN) 1 MG tablet Take 1 tablet by mouth 2 times daily as needed for Anxiety for up to 7 days. Max Daily Amount: 2 mg 14 tablet 0    buprenorphine-naloxone (SUBOXONE) 8-2 MG FILM SL film Place 1 Film under the tongue in the morning and at bedtime for 7 days. Max Daily Amount: 2 Film 14 Film 0    valACYclovir (VALTREX) 1 g tablet Take 2

## 2024-06-18 NOTE — ED NOTES
Presents to ED via Police with complaints of laceration to left foot. Police states the [facility] requested pt to be evaluated for the deep laceration. No bleeding noted at this time.

## 2024-06-19 NOTE — DISCHARGE INSTRUCTIONS
Patient is advised to take antibiotics and keep his wound clean.  Patient is advised to come back to the emergency department if patient starts having fever or chills or purulent discharge coming from the laceration.  Patient is also advised to have the stitches removed in 10 days.

## 2024-06-20 ASSESSMENT — ENCOUNTER SYMPTOMS
TROUBLE SWALLOWING: 0
DIARRHEA: 0
VOMITING: 0
COUGH: 0
PHOTOPHOBIA: 0
SORE THROAT: 0
SHORTNESS OF BREATH: 0
ABDOMINAL PAIN: 0
ABDOMINAL DISTENTION: 0
BLOOD IN STOOL: 0
SINUS PAIN: 0
RHINORRHEA: 0
WHEEZING: 0
CONSTIPATION: 0
CHEST TIGHTNESS: 0
SINUS PRESSURE: 0
NAUSEA: 0

## 2024-07-18 ENCOUNTER — NURSE ONLY (OUTPATIENT)
Dept: INTERNAL MEDICINE CLINIC | Age: 39
End: 2024-07-18
Payer: COMMERCIAL

## 2024-07-18 VITALS
HEIGHT: 70 IN | BODY MASS INDEX: 24.05 KG/M2 | WEIGHT: 168 LBS | RESPIRATION RATE: 18 BRPM | SYSTOLIC BLOOD PRESSURE: 131 MMHG | HEART RATE: 97 BPM | DIASTOLIC BLOOD PRESSURE: 94 MMHG

## 2024-07-18 DIAGNOSIS — F90.9 ATTENTION DEFICIT HYPERACTIVITY DISORDER (ADHD), UNSPECIFIED ADHD TYPE: ICD-10-CM

## 2024-07-18 DIAGNOSIS — R11.0 NAUSEA: ICD-10-CM

## 2024-07-18 DIAGNOSIS — F11.20 SEVERE OPIOID USE DISORDER (HCC): Primary | ICD-10-CM

## 2024-07-18 DIAGNOSIS — G47.00 INSOMNIA, UNSPECIFIED TYPE: ICD-10-CM

## 2024-07-18 DIAGNOSIS — F41.9 ANXIETY AND DEPRESSION: ICD-10-CM

## 2024-07-18 DIAGNOSIS — I10 ESSENTIAL HYPERTENSION: ICD-10-CM

## 2024-07-18 DIAGNOSIS — F32.A ANXIETY AND DEPRESSION: ICD-10-CM

## 2024-07-18 LAB
ALCOHOL URINE: ABNORMAL
AMPHETAMINE SCREEN URINE: ABNORMAL
BARBITURATE SCREEN URINE: ABNORMAL
BENZODIAZEPINE SCREEN, URINE: ABNORMAL
BUPRENORPHINE URINE: ABNORMAL
COCAINE METABOLITE SCREEN URINE: ABNORMAL
FENTANYL SCREEN, URINE: ABNORMAL
GABAPENTIN SCREEN, URINE: ABNORMAL
MDMA URINE: ABNORMAL
METHADONE SCREEN, URINE: ABNORMAL
METHAMPHETAMINE, URINE: ABNORMAL
OPIATE SCREEN URINE: ABNORMAL
OXYCODONE SCREEN URINE: ABNORMAL
PHENCYCLIDINE SCREEN URINE: ABNORMAL
PROPOXYPHENE SCREEN, URINE: ABNORMAL
SYNTHETIC CANNABINOIDS(K2) SCREEN, URINE: ABNORMAL
THC SCREEN, URINE: ABNORMAL
TRAMADOL SCREEN URINE: ABNORMAL
TRICYCLIC ANTIDEPRESSANTS, UR: ABNORMAL

## 2024-07-18 PROCEDURE — 96372 THER/PROPH/DIAG INJ SC/IM: CPT | Performed by: NURSE PRACTITIONER

## 2024-07-18 PROCEDURE — 80305 DRUG TEST PRSMV DIR OPT OBS: CPT | Performed by: NURSE PRACTITIONER

## 2024-07-18 RX ORDER — MIRTAZAPINE 45 MG/1
45 TABLET, FILM COATED ORAL NIGHTLY
Qty: 30 TABLET | Refills: 0 | Status: SHIPPED | OUTPATIENT
Start: 2024-07-18 | End: 2024-08-17

## 2024-07-18 RX ORDER — CLONAZEPAM 1 MG/1
1 TABLET ORAL 2 TIMES DAILY PRN
Qty: 14 TABLET | Refills: 0 | Status: SHIPPED | OUTPATIENT
Start: 2024-07-18 | End: 2024-07-23 | Stop reason: SDUPTHER

## 2024-07-18 RX ORDER — ONDANSETRON 4 MG/1
4 TABLET, FILM COATED ORAL 3 TIMES DAILY PRN
Qty: 15 TABLET | Refills: 0 | Status: SHIPPED | OUTPATIENT
Start: 2024-07-18

## 2024-07-18 RX ORDER — LISDEXAMFETAMINE DIMESYLATE 70 MG/1
70 CAPSULE ORAL EVERY MORNING
Qty: 7 CAPSULE | Refills: 0 | Status: SHIPPED | OUTPATIENT
Start: 2024-07-18 | End: 2024-07-23 | Stop reason: SDUPTHER

## 2024-07-18 RX ORDER — PREGABALIN 200 MG/1
200 CAPSULE ORAL 3 TIMES DAILY
Qty: 21 CAPSULE | Refills: 0 | Status: SHIPPED | OUTPATIENT
Start: 2024-07-18 | End: 2024-07-23 | Stop reason: SDUPTHER

## 2024-07-18 RX ORDER — VENLAFAXINE 100 MG/1
100 TABLET ORAL 3 TIMES DAILY
Qty: 21 TABLET | Refills: 0 | Status: SHIPPED | OUTPATIENT
Start: 2024-07-18 | End: 2024-07-25 | Stop reason: SDUPTHER

## 2024-07-18 RX ORDER — LISINOPRIL 20 MG/1
20 TABLET ORAL DAILY
Qty: 30 TABLET | Refills: 0 | Status: SHIPPED | OUTPATIENT
Start: 2024-07-18 | End: 2024-08-17

## 2024-07-22 NOTE — TELEPHONE ENCOUNTER
Patient feels like he is dying and wants to know if you can order him labs for COVID, Influenza, CMP CBC   
yes

## 2024-07-23 ENCOUNTER — NURSE ONLY (OUTPATIENT)
Dept: INTERNAL MEDICINE CLINIC | Age: 39
End: 2024-07-23
Payer: COMMERCIAL

## 2024-07-23 VITALS
SYSTOLIC BLOOD PRESSURE: 140 MMHG | WEIGHT: 169 LBS | HEIGHT: 70 IN | HEART RATE: 110 BPM | DIASTOLIC BLOOD PRESSURE: 103 MMHG | RESPIRATION RATE: 20 BRPM | BODY MASS INDEX: 24.2 KG/M2

## 2024-07-23 DIAGNOSIS — F11.20 SEVERE OPIOID USE DISORDER (HCC): Primary | ICD-10-CM

## 2024-07-23 DIAGNOSIS — F90.9 ATTENTION DEFICIT HYPERACTIVITY DISORDER (ADHD), UNSPECIFIED ADHD TYPE: ICD-10-CM

## 2024-07-23 DIAGNOSIS — F32.A ANXIETY AND DEPRESSION: ICD-10-CM

## 2024-07-23 DIAGNOSIS — F41.9 ANXIETY AND DEPRESSION: ICD-10-CM

## 2024-07-23 PROCEDURE — 80305 DRUG TEST PRSMV DIR OPT OBS: CPT | Performed by: NURSE PRACTITIONER

## 2024-07-23 RX ORDER — LISDEXAMFETAMINE DIMESYLATE 70 MG/1
70 CAPSULE ORAL EVERY MORNING
Qty: 2 CAPSULE | Refills: 0 | Status: SHIPPED | OUTPATIENT
Start: 2024-07-23 | End: 2024-07-25 | Stop reason: SDUPTHER

## 2024-07-23 RX ORDER — CLONAZEPAM 1 MG/1
1 TABLET ORAL 2 TIMES DAILY PRN
Qty: 4 TABLET | Refills: 0 | Status: SHIPPED | OUTPATIENT
Start: 2024-07-23 | End: 2024-07-25 | Stop reason: SDUPTHER

## 2024-07-23 RX ORDER — PREGABALIN 200 MG/1
200 CAPSULE ORAL 3 TIMES DAILY
Qty: 6 CAPSULE | Refills: 0 | Status: SHIPPED | OUTPATIENT
Start: 2024-07-23 | End: 2024-07-25 | Stop reason: SDUPTHER

## 2024-07-23 RX ORDER — ERYTHROMYCIN 5 MG/G
OINTMENT OPHTHALMIC
Qty: 1 EACH | Refills: 0 | Status: SHIPPED | OUTPATIENT
Start: 2024-07-23 | End: 2024-08-02

## 2024-07-23 RX ORDER — VALACYCLOVIR HYDROCHLORIDE 1 G/1
2000 TABLET, FILM COATED ORAL 2 TIMES DAILY
Qty: 14 TABLET | Refills: 0 | Status: SHIPPED | OUTPATIENT
Start: 2024-07-23

## 2024-07-25 ENCOUNTER — TELEMEDICINE (OUTPATIENT)
Dept: INTERNAL MEDICINE CLINIC | Age: 39
End: 2024-07-25
Payer: COMMERCIAL

## 2024-07-25 VITALS
HEIGHT: 70 IN | BODY MASS INDEX: 24.34 KG/M2 | WEIGHT: 170 LBS | DIASTOLIC BLOOD PRESSURE: 88 MMHG | RESPIRATION RATE: 18 BRPM | SYSTOLIC BLOOD PRESSURE: 139 MMHG | HEART RATE: 92 BPM

## 2024-07-25 DIAGNOSIS — F11.20 SEVERE OPIOID USE DISORDER (HCC): Primary | ICD-10-CM

## 2024-07-25 DIAGNOSIS — F32.A ANXIETY AND DEPRESSION: ICD-10-CM

## 2024-07-25 DIAGNOSIS — F90.9 ATTENTION DEFICIT HYPERACTIVITY DISORDER (ADHD), UNSPECIFIED ADHD TYPE: ICD-10-CM

## 2024-07-25 DIAGNOSIS — F41.9 ANXIETY AND DEPRESSION: ICD-10-CM

## 2024-07-25 LAB
ALCOHOL URINE: ABNORMAL
AMPHETAMINE SCREEN URINE: POSITIVE
BARBITURATE SCREEN URINE: ABNORMAL
BENZODIAZEPINE SCREEN, URINE: ABNORMAL
BUPRENORPHINE URINE: ABNORMAL
COCAINE METABOLITE SCREEN URINE: ABNORMAL
FENTANYL SCREEN, URINE: ABNORMAL
GABAPENTIN SCREEN, URINE: ABNORMAL
MDMA, URINE: ABNORMAL
METHADONE SCREEN, URINE: ABNORMAL
METHAMPHETAMINE, URINE: ABNORMAL
OPIATE SCREEN URINE: ABNORMAL
OXYCODONE SCREEN URINE: ABNORMAL
PHENCYCLIDINE SCREEN URINE: ABNORMAL
PROPOXYPHENE SCREEN, URINE: ABNORMAL
SYNTHETIC CANNABINOIDS(K2) SCREEN, URINE: ABNORMAL
THC SCREEN, URINE: ABNORMAL
TRAMADOL SCREEN URINE: ABNORMAL
TRICYCLIC ANTIDEPRESSANTS, UR: ABNORMAL

## 2024-07-25 PROCEDURE — 80305 DRUG TEST PRSMV DIR OPT OBS: CPT | Performed by: NURSE PRACTITIONER

## 2024-07-25 PROCEDURE — G8420 CALC BMI NORM PARAMETERS: HCPCS | Performed by: NURSE PRACTITIONER

## 2024-07-25 PROCEDURE — G8427 DOCREV CUR MEDS BY ELIG CLIN: HCPCS | Performed by: NURSE PRACTITIONER

## 2024-07-25 PROCEDURE — 3079F DIAST BP 80-89 MM HG: CPT | Performed by: NURSE PRACTITIONER

## 2024-07-25 PROCEDURE — 3075F SYST BP GE 130 - 139MM HG: CPT | Performed by: NURSE PRACTITIONER

## 2024-07-25 PROCEDURE — 99211 OFF/OP EST MAY X REQ PHY/QHP: CPT | Performed by: NURSE PRACTITIONER

## 2024-07-25 RX ORDER — PREGABALIN 200 MG/1
200 CAPSULE ORAL 3 TIMES DAILY
Qty: 12 CAPSULE | Refills: 0 | Status: SHIPPED | OUTPATIENT
Start: 2024-07-25 | End: 2024-07-29

## 2024-07-25 RX ORDER — CLONAZEPAM 1 MG/1
1 TABLET ORAL 2 TIMES DAILY PRN
Qty: 4 TABLET | Refills: 0 | Status: SHIPPED | OUTPATIENT
Start: 2024-07-25 | End: 2024-07-29

## 2024-07-25 RX ORDER — VENLAFAXINE 100 MG/1
100 TABLET ORAL 3 TIMES DAILY
Qty: 12 TABLET | Refills: 0 | Status: SHIPPED | OUTPATIENT
Start: 2024-07-25 | End: 2024-07-29

## 2024-07-25 RX ORDER — LISDEXAMFETAMINE DIMESYLATE 70 MG/1
70 CAPSULE ORAL EVERY MORNING
Qty: 4 CAPSULE | Refills: 0 | Status: SHIPPED | OUTPATIENT
Start: 2024-07-25 | End: 2024-07-29

## 2024-07-31 ENCOUNTER — TELEPHONE (OUTPATIENT)
Dept: INTERNAL MEDICINE CLINIC | Age: 39
End: 2024-07-31

## 2024-07-31 NOTE — TELEPHONE ENCOUNTER
Jackie Malik got a hold of the patient's PO to see if the PO was able to get the patient into rehab. The PO informed Jackie Malik that the patient walked into the assisted  voluntary to be locked up so he could get sent to rehab. The patient is at [facility] now.

## 2024-08-05 NOTE — PROGRESS NOTES
Nurse visit completed. Patient just out of nursing home and needs med refills until I am able to see him.

## 2024-08-09 NOTE — ED NOTES
Pt refusing to leave blood pressure cuff and pulse ox on at this time.       Funmi Ignacio RN  11/05/20 4009 Would like a ATB called in can't get into urology until Thursday.

## 2024-08-26 ENCOUNTER — SOCIAL WORK (OUTPATIENT)
Dept: INTERNAL MEDICINE CLINIC | Age: 39
End: 2024-08-26

## 2024-08-26 NOTE — PROGRESS NOTES
Patient presented to office today. Patient has been at [facility] for the last three weeks. Patient states that he is feeling really good. Patient is currently on Suboxone.   Patient planning to start groups and counseling at [facility].   Patient wants to move out of his current apartment. Patient appeared to be clear headed and spoke about weaning off medication.  Sw will continue to assist patient.  Patient is to see provider tomorrow for medical / MH medication refills.

## 2024-08-29 ENCOUNTER — TELEPHONE (OUTPATIENT)
Dept: INTERNAL MEDICINE CLINIC | Age: 39
End: 2024-08-29

## 2024-11-06 NOTE — ED NOTES
ASSESSMENT AND PLAN :    Benign hypertension with chronic kidney disease  - metoPROLOL succinate (TOPROL-XL) 50 MG 24 hr tablet; Take 1 tablet by mouth daily. Appointment required for future refills.  Dispense: 30 tablet; Refill: 0    Other specified hypothyroidism  - levothyroxine 125 MCG tablet; Take 1 tablet by mouth daily (before breakfast). Repeat labs due about 10/20/2024  Dispense: 30 tablet; Refill: 0    Gastroesophageal reflux disease, unspecified whether esophagitis present  - omeprazole (PriLOSEC) 40 MG capsule; Take 1 capsule by mouth daily.  Dispense: 30 capsule; Refill: 1    Anxiety  - sertraline (ZOLOFT) 100 MG tablet; Take 1 tablet by mouth daily.  Dispense: 30 tablet; Refill: 1      Coleen Suarez NP     Patient resting in bed. Respirations easy and unlabored. No distress noted. Call light within reach.        Lyn Charlton RN  08/05/21 1317

## 2024-11-20 NOTE — PROGRESS NOTES
Patient is followed by Shivam Jack as well as myself  We both feel that last week he had relapsed  I went in to directly observe his urine specimen  He threw the cup into the sink and said \" F you Mtz\" and walked out  I am going to have the   send him a letter of termination  I have given him multiple chances obviously he was upset because he has relapsed    Actually the police had come last week  Apparently they saw texts on his cell phone that he was selling trading Suboxone Adderall etc. on the street
Pt was to perform and observed urine. Dr. Georgia Talbot accompanied pt into the restroom to do the observed urine-- pt stormed out of the office without providing a urine sample. Vitals were not obtained.
decreased ability to use arms for pushing/pulling/decreased ability to use legs for bridging/pushing/impaired ability to control trunk for mobility

## 2025-03-18 NOTE — ED TRIAGE NOTES
Patient presents to ED via [facility] and reports he took \"the rest of his prescription tonight. Patient is ambulatory and A//O x4 on arrival. Patient appears restless on arrival and unable remain calm. Patient is following commands at this time. When asked patient states he only took 4 klonopin PTA.  
No